# Patient Record
Sex: FEMALE | Race: WHITE | NOT HISPANIC OR LATINO | Employment: UNEMPLOYED
[De-identification: names, ages, dates, MRNs, and addresses within clinical notes are randomized per-mention and may not be internally consistent; named-entity substitution may affect disease eponyms.]

---

## 2017-09-24 ENCOUNTER — HEALTH MAINTENANCE LETTER (OUTPATIENT)
Age: 32
End: 2017-09-24

## 2021-03-03 ENCOUNTER — TRANSFERRED RECORDS (OUTPATIENT)
Dept: HEALTH INFORMATION MANAGEMENT | Facility: CLINIC | Age: 36
End: 2021-03-03

## 2021-03-18 ENCOUNTER — TRANSFERRED RECORDS (OUTPATIENT)
Dept: HEALTH INFORMATION MANAGEMENT | Facility: CLINIC | Age: 36
End: 2021-03-18

## 2021-03-19 ENCOUNTER — TRANSFERRED RECORDS (OUTPATIENT)
Dept: HEALTH INFORMATION MANAGEMENT | Facility: CLINIC | Age: 36
End: 2021-03-19

## 2021-03-25 ENCOUNTER — TRANSFERRED RECORDS (OUTPATIENT)
Dept: HEALTH INFORMATION MANAGEMENT | Facility: CLINIC | Age: 36
End: 2021-03-25

## 2021-04-03 ENCOUNTER — HOSPITAL ENCOUNTER (EMERGENCY)
Facility: CLINIC | Age: 36
Discharge: HOME | End: 2021-04-03
Attending: EMERGENCY MEDICINE | Admitting: EMERGENCY MEDICINE
Payer: COMMERCIAL

## 2021-04-03 VITALS
SYSTOLIC BLOOD PRESSURE: 118 MMHG | OXYGEN SATURATION: 100 % | RESPIRATION RATE: 16 BRPM | HEART RATE: 77 BPM | TEMPERATURE: 98 F | DIASTOLIC BLOOD PRESSURE: 75 MMHG

## 2021-04-03 DIAGNOSIS — L53.9 ERYTHEMATOUS CONDITION: ICD-10-CM

## 2021-04-03 LAB
BASOPHILS # BLD AUTO: 0 10E9/L (ref 0–0.2)
BASOPHILS NFR BLD AUTO: 0.1 %
CRP SERPL-MCNC: <2.9 MG/L (ref 0–8)
DIFFERENTIAL METHOD BLD: ABNORMAL
EOSINOPHIL # BLD AUTO: 0 10E9/L (ref 0–0.7)
EOSINOPHIL NFR BLD AUTO: 0 %
ERYTHROCYTE [DISTWIDTH] IN BLOOD BY AUTOMATED COUNT: 13 % (ref 10–15)
HCT VFR BLD AUTO: 37.7 % (ref 35–47)
HGB BLD-MCNC: 12.6 G/DL (ref 11.7–15.7)
IMM GRANULOCYTES # BLD: 0.1 10E9/L (ref 0–0.4)
IMM GRANULOCYTES NFR BLD: 0.9 %
LYMPHOCYTES # BLD AUTO: 0.4 10E9/L (ref 0.8–5.3)
LYMPHOCYTES NFR BLD AUTO: 4.9 %
MCH RBC QN AUTO: 30.7 PG (ref 26.5–33)
MCHC RBC AUTO-ENTMCNC: 33.4 G/DL (ref 31.5–36.5)
MCV RBC AUTO: 92 FL (ref 78–100)
MONOCYTES # BLD AUTO: 0.4 10E9/L (ref 0–1.3)
MONOCYTES NFR BLD AUTO: 4.9 %
NEUTROPHILS # BLD AUTO: 7 10E9/L (ref 1.6–8.3)
NEUTROPHILS NFR BLD AUTO: 89.2 %
NRBC # BLD AUTO: 0 10*3/UL
NRBC BLD AUTO-RTO: 0 /100
PLATELET # BLD AUTO: 249 10E9/L (ref 150–450)
RBC # BLD AUTO: 4.11 10E12/L (ref 3.8–5.2)
WBC # BLD AUTO: 7.8 10E9/L (ref 4–11)

## 2021-04-03 PROCEDURE — 96365 THER/PROPH/DIAG IV INF INIT: CPT

## 2021-04-03 PROCEDURE — 85025 COMPLETE CBC W/AUTO DIFF WBC: CPT | Performed by: EMERGENCY MEDICINE

## 2021-04-03 PROCEDURE — 96376 TX/PRO/DX INJ SAME DRUG ADON: CPT

## 2021-04-03 PROCEDURE — 86140 C-REACTIVE PROTEIN: CPT | Performed by: EMERGENCY MEDICINE

## 2021-04-03 PROCEDURE — 96375 TX/PRO/DX INJ NEW DRUG ADDON: CPT

## 2021-04-03 PROCEDURE — 250N000011 HC RX IP 250 OP 636: Performed by: EMERGENCY MEDICINE

## 2021-04-03 PROCEDURE — 99285 EMERGENCY DEPT VISIT HI MDM: CPT | Mod: 25

## 2021-04-03 RX ORDER — ONDANSETRON 4 MG/1
4 TABLET, ORALLY DISINTEGRATING ORAL EVERY 4 HOURS PRN
Qty: 10 TABLET | Refills: 0 | Status: SHIPPED | OUTPATIENT
Start: 2021-04-03 | End: 2021-04-06

## 2021-04-03 RX ORDER — ONDANSETRON 2 MG/ML
8 INJECTION INTRAMUSCULAR; INTRAVENOUS ONCE
Status: COMPLETED | OUTPATIENT
Start: 2021-04-03 | End: 2021-04-03

## 2021-04-03 RX ORDER — HYDROMORPHONE HYDROCHLORIDE 2 MG/1
2-4 TABLET ORAL EVERY 4 HOURS PRN
Qty: 12 TABLET | Refills: 0 | Status: SHIPPED | OUTPATIENT
Start: 2021-04-03 | End: 2021-04-06

## 2021-04-03 RX ORDER — ONDANSETRON 2 MG/ML
4 INJECTION INTRAMUSCULAR; INTRAVENOUS ONCE
Status: COMPLETED | OUTPATIENT
Start: 2021-04-03 | End: 2021-04-03

## 2021-04-03 RX ORDER — MAGNESIUM SULFATE HEPTAHYDRATE 40 MG/ML
2 INJECTION, SOLUTION INTRAVENOUS ONCE
Status: COMPLETED | OUTPATIENT
Start: 2021-04-03 | End: 2021-04-03

## 2021-04-03 RX ORDER — HYDROMORPHONE HYDROCHLORIDE 1 MG/ML
0.5 INJECTION, SOLUTION INTRAMUSCULAR; INTRAVENOUS; SUBCUTANEOUS
Status: DISCONTINUED | OUTPATIENT
Start: 2021-04-03 | End: 2021-04-04 | Stop reason: HOSPADM

## 2021-04-03 RX ADMIN — ONDANSETRON 8 MG: 2 INJECTION INTRAMUSCULAR; INTRAVENOUS at 21:45

## 2021-04-03 RX ADMIN — MAGNESIUM SULFATE HEPTAHYDRATE 2 G: 40 INJECTION, SOLUTION INTRAVENOUS at 21:15

## 2021-04-03 RX ADMIN — ONDANSETRON 4 MG: 2 INJECTION INTRAMUSCULAR; INTRAVENOUS at 21:14

## 2021-04-03 RX ADMIN — HYDROMORPHONE HYDROCHLORIDE 0.5 MG: 1 INJECTION, SOLUTION INTRAMUSCULAR; INTRAVENOUS; SUBCUTANEOUS at 21:09

## 2021-04-03 ASSESSMENT — ENCOUNTER SYMPTOMS
JOINT SWELLING: 1
CHILLS: 0
WOUND: 1
ARTHRALGIAS: 1
FEVER: 0
SHORTNESS OF BREATH: 0

## 2021-04-04 NOTE — ED NOTES
Bed: ED12  Expected date:   Expected time:   Means of arrival:   Comments:  441   35F LLE edema/Pain  2015

## 2021-04-04 NOTE — ED PROVIDER NOTES
History   Chief Complaint:  Leg Pain    HPI   Ronna Rivera is a 35 year old female, who presents to the ED for evaluation of leg pain. The patient reports she was recently diagnosed with erythromelalgia. The patient conveys she has gone through numerous screenings and tests including nerve studies, sweat tests, EMG, as well as lab tests below. The patient says she has gone through IV magnesium infusions and recently finished a 5 day course of IV Solu-Medrol 1 g infusions the past week to see if this would help the symptoms associated with this disease pattern. She notes she is going to [facility] for these infusions and is begin followed by them as well as the [facility]. She has been prescribed gabapentin, Norco, and Mexiletine, to manage her symptoms at home, but today felt that it was not helping. She was normally taking the pain medication sparingly, but has been taking it every 6 hours and still have an extreme amount of pain. She notes that she has swelling, redness, and warmth to her face, bilateral hands, and bilateral feet. She conveys that her right foot is the worse with the other aforementioned sites being alright. The patient conveys her right foot has been extremely swollen and notes that the skin is stretching causing some breakdown on the top of the foot. She denies any fever, shaking chills, or diaphoresis. The patient conveys she would like something to cover the pain so she can follow up with [employer] [facility] to see what the next possible treatment could be for her condition. For her symptoms, she has been practically immobile for the past two weeks and needs to elevate her legs. She uses cold packs as well as compression stockings, but has minimal relief. She does not feel that there is an infection as the redness and warmth around the foot are consistent with the disease. She has no shortness of breath or chest pain. Her legs are not swollen.     Laboratory-3/19/21  CBC: WBC  9.6, HGB 14.1,   BMP: ALT 46, WNL (Creatinine 0.87)  Immunoglobulin studies  Monoclonal Gammopathy Diagnostic  Tryptase  Serum Protein Studies  Alpha-Galactosidase    Review of Systems   Constitutional: Negative for chills and fever.   Respiratory: Negative for shortness of breath.    Cardiovascular: Negative for chest pain and leg swelling.   Musculoskeletal: Positive for arthralgias, gait problem and joint swelling.        Right foot swelling   Skin: Positive for wound.     Allergies:  No known drug allergies    Medications:    Mexiletine  Gabapentin  Aspirin  Norco  Albuterol    Past Medical History:    Erythromelalgia  Migraine    Past Surgical History:    The patient denies past surgical history.     Family History:    Hypertension    Social History:  Presents to the ED with spouse.  Arrives via EMS.    Physical Exam     Patient Vitals for the past 24 hrs:   BP Temp Temp src Pulse Resp SpO2   04/03/21 2230 118/75 -- -- 77 -- 100 %   04/03/21 2200 101/68 -- -- 62 -- 99 %   04/03/21 2145 108/64 -- -- 65 -- 98 %   04/03/21 2130 99/58 -- -- 56 -- 97 %   04/03/21 2100 101/49 -- -- 70 -- 99 %   04/03/21 2045 113/66 -- -- -- -- 100 %   04/03/21 2038 -- 98  F (36.7  C) Oral -- -- --   04/03/21 2030 115/73 -- -- 93 16 91 %       Physical Exam  Constitutional: Middle age white female, supine with both legs elevated on wedge pillow.  HENT: No signs of trauma.   Eyes: EOM are normal. Pupils are equal, round, and reactive to light.   Neck: Normal range of motion. No JVD present. No cervical adenopathy.  Cardiovascular: Regular rhythm.  Exam reveals no gallop and no friction rub.    No murmur heard.  Pulmonary/Chest: Bilateral breath sounds normal. No wheezes, rhonchi or rales.  Abdominal: Soft. No tenderness. No rebound or guarding.   Musculoskeletal: Compression gloves on both hands. Let foot, minimal swelling, edema, and diffuse tenderness. Right foot has 1+ edema, red, and warm. Superficial breakdown of skin over  dorsum of foot and ankle and over lateral malleolus. No ascending lymphangitic streaks.   Lymphadenopathy: No lymphadenopathy.   Neurological: Alert and oriented to person, place, and time. Normal strength. Coordination normal.   Skin: Skin is warm and dry. No rash noted. No erythema.     Emergency Department Course   Laboratory:  CBC: WBC 7.8, HGB 12.6,     CRP Inflammation: <2.9    Emergency Department Course:    Reviewed:  I reviewed the patient's nursing notes, vitals, past available medical records.     Assessments:  2028: I obtained history and examined the patient as noted above.     2035: I rechecked the patient and explained findings. The patient and spouse are amendable to the plan.     Interventions:  2109: Dilaudid 0.5 mg IV  2115: Magnesium sulfate 2 g in NS IV Infusion    2115: Zofran 4 mg IV  2145: Zofran 8 mg IV    Disposition:  The patient was discharged to home.    Impression & Plan    Medical Decision Making:  Ronna Rivera is an unfortunate 35 year old female with a recent diagnosis of erythromelalgia. She has been worked up over the last several weeks with multiple tests done. She just finished a 5 day course of IV Solu-Medrol at the neurology clinic and is also on gabapentin, particularly at bedtime, and Vicodin. She has been on a course of antibiotics, naproxen, and antifungals in the past. Reading the notes from the [facility], it seems that this is still being worked up in the treatment course and process. The patient is here tonight as she is having so much pain, particularly in the right foot. The foot is red, there is some superficial skin breakdown, and there is some edema. She has her feet chronically elevated and she is taking her medications without relief. A CBC and CRP were obtained and are normal. I have given her IV magnesium, since that had been tried once at [facility] and they considered trying it again, as well as Dilaudid which has helped. I have offered her a short course of  Dilaudid to use instead of the Vicodin. I have told her to not use this with the Vicodin, but to use this instead. I have also given her Zofran for nausea. Patient will need to contact the [facility] for follow up. We did consider treating her with antibiotics for infection, but both her and her  thought this was the normal look to her foot and that antibiotics have been tried before without relief. She has been on steroids as noted and several things have been tried without success. She does not appear septic.     Diagnosis:    ICD-10-CM    1. Erythematous condition  L53.9        Discharge Medications:  Discharge Medication List as of 4/3/2021 10:34 PM      START taking these medications    Details   HYDROmorphone (DILAUDID) 2 MG tablet Take 1-2 tablets (2-4 mg) by mouth every 4 hours as needed for pain, Disp-12 tablet, R-0, Local Print      ondansetron (ZOFRAN ODT) 4 MG ODT tab Take 1 tablet (4 mg) by mouth every 4 hours as needed for nausea, Disp-10 tablet, R-0, Local Print           Scribe Disclosure:  I, Aaron Perdue, am serving as a scribe at 8:28 PM on 4/3/2021 to document services personally performed by Nate Pascual MD based on my observations and the provider's statements to me.      Nate Pascual MD  04/04/21 0133

## 2021-04-04 NOTE — ED TRIAGE NOTES
Recent dx of erythromelalga at [facility]. Pain getting worse despite norco/gabapentin and solumedrol infusions.

## 2021-04-09 ENCOUNTER — HOSPITAL ENCOUNTER (INPATIENT)
Facility: CLINIC | Age: 36
LOS: 1 days | Discharge: HOME HEALTH | End: 2021-04-13
Attending: EMERGENCY MEDICINE | Admitting: INTERNAL MEDICINE
Payer: COMMERCIAL

## 2021-04-09 DIAGNOSIS — R11.0 NAUSEA: ICD-10-CM

## 2021-04-09 DIAGNOSIS — G89.4 CHRONIC PAIN SYNDROME: ICD-10-CM

## 2021-04-09 DIAGNOSIS — I73.81 ERYTHROMELALGIA (H): Primary | ICD-10-CM

## 2021-04-09 DIAGNOSIS — G89.29 OTHER CHRONIC PAIN: ICD-10-CM

## 2021-04-09 DIAGNOSIS — Z29.9 PREVENTIVE MEASURE: ICD-10-CM

## 2021-04-09 DIAGNOSIS — R21 RASH AND NONSPECIFIC SKIN ERUPTION: ICD-10-CM

## 2021-04-09 LAB
ALBUMIN SERPL-MCNC: 3.7 G/DL (ref 3.4–5)
ALP SERPL-CCNC: 73 U/L (ref 40–150)
ALT SERPL W P-5'-P-CCNC: 121 U/L (ref 0–50)
ANION GAP SERPL CALCULATED.3IONS-SCNC: 3 MMOL/L (ref 3–14)
AST SERPL W P-5'-P-CCNC: 67 U/L (ref 0–45)
BASOPHILS # BLD AUTO: 0 10E9/L (ref 0–0.2)
BASOPHILS NFR BLD AUTO: 0.3 %
BILIRUB SERPL-MCNC: 0.4 MG/DL (ref 0.2–1.3)
BUN SERPL-MCNC: 14 MG/DL (ref 7–30)
CALCIUM SERPL-MCNC: 8.7 MG/DL (ref 8.5–10.1)
CHLORIDE SERPL-SCNC: 107 MMOL/L (ref 94–109)
CO2 SERPL-SCNC: 29 MMOL/L (ref 20–32)
CREAT SERPL-MCNC: 0.81 MG/DL (ref 0.52–1.04)
CRP SERPL-MCNC: <2.9 MG/L (ref 0–8)
DIFFERENTIAL METHOD BLD: NORMAL
EOSINOPHIL # BLD AUTO: 0 10E9/L (ref 0–0.7)
EOSINOPHIL NFR BLD AUTO: 0.4 %
ERYTHROCYTE [DISTWIDTH] IN BLOOD BY AUTOMATED COUNT: 12.9 % (ref 10–15)
ERYTHROCYTE [SEDIMENTATION RATE] IN BLOOD BY WESTERGREN METHOD: 6 MM/H (ref 0–20)
GFR SERPL CREATININE-BSD FRML MDRD: >90 ML/MIN/{1.73_M2}
GLUCOSE SERPL-MCNC: 118 MG/DL (ref 70–99)
HCT VFR BLD AUTO: 39.9 % (ref 35–47)
HGB BLD-MCNC: 13.2 G/DL (ref 11.7–15.7)
IMM GRANULOCYTES # BLD: 0.1 10E9/L (ref 0–0.4)
IMM GRANULOCYTES NFR BLD: 0.9 %
LABORATORY COMMENT REPORT: NORMAL
LACTATE BLD-SCNC: 0.8 MMOL/L (ref 0.7–2)
LYMPHOCYTES # BLD AUTO: 1 10E9/L (ref 0.8–5.3)
LYMPHOCYTES NFR BLD AUTO: 12.7 %
MCH RBC QN AUTO: 31.1 PG (ref 26.5–33)
MCHC RBC AUTO-ENTMCNC: 33.1 G/DL (ref 31.5–36.5)
MCV RBC AUTO: 94 FL (ref 78–100)
MONOCYTES # BLD AUTO: 0.9 10E9/L (ref 0–1.3)
MONOCYTES NFR BLD AUTO: 11.2 %
NEUTROPHILS # BLD AUTO: 5.9 10E9/L (ref 1.6–8.3)
NEUTROPHILS NFR BLD AUTO: 74.5 %
NRBC # BLD AUTO: 0 10*3/UL
NRBC BLD AUTO-RTO: 0 /100
PLATELET # BLD AUTO: 257 10E9/L (ref 150–450)
POTASSIUM SERPL-SCNC: 3.8 MMOL/L (ref 3.4–5.3)
PROT SERPL-MCNC: 6.7 G/DL (ref 6.8–8.8)
RBC # BLD AUTO: 4.25 10E12/L (ref 3.8–5.2)
SARS-COV-2 RNA RESP QL NAA+PROBE: NEGATIVE
SODIUM SERPL-SCNC: 139 MMOL/L (ref 133–144)
SPECIMEN SOURCE: NORMAL
WBC # BLD AUTO: 7.9 10E9/L (ref 4–11)

## 2021-04-09 PROCEDURE — 250N000011 HC RX IP 250 OP 636: Performed by: EMERGENCY MEDICINE

## 2021-04-09 PROCEDURE — 80053 COMPREHEN METABOLIC PANEL: CPT | Performed by: EMERGENCY MEDICINE

## 2021-04-09 PROCEDURE — 250N000013 HC RX MED GY IP 250 OP 250 PS 637: Performed by: INTERNAL MEDICINE

## 2021-04-09 PROCEDURE — 86140 C-REACTIVE PROTEIN: CPT | Performed by: EMERGENCY MEDICINE

## 2021-04-09 PROCEDURE — 83605 ASSAY OF LACTIC ACID: CPT | Performed by: EMERGENCY MEDICINE

## 2021-04-09 PROCEDURE — G0378 HOSPITAL OBSERVATION PER HR: HCPCS

## 2021-04-09 PROCEDURE — 96375 TX/PRO/DX INJ NEW DRUG ADDON: CPT

## 2021-04-09 PROCEDURE — 96374 THER/PROPH/DIAG INJ IV PUSH: CPT

## 2021-04-09 PROCEDURE — 85652 RBC SED RATE AUTOMATED: CPT | Performed by: EMERGENCY MEDICINE

## 2021-04-09 PROCEDURE — 96376 TX/PRO/DX INJ SAME DRUG ADON: CPT

## 2021-04-09 PROCEDURE — 99285 EMERGENCY DEPT VISIT HI MDM: CPT | Mod: 25

## 2021-04-09 PROCEDURE — 87635 SARS-COV-2 COVID-19 AMP PRB: CPT | Performed by: EMERGENCY MEDICINE

## 2021-04-09 PROCEDURE — 250N000011 HC RX IP 250 OP 636: Performed by: INTERNAL MEDICINE

## 2021-04-09 PROCEDURE — 85025 COMPLETE CBC W/AUTO DIFF WBC: CPT | Performed by: EMERGENCY MEDICINE

## 2021-04-09 PROCEDURE — 99220 PR INITIAL OBSERVATION CARE,LEVEL III: CPT | Performed by: INTERNAL MEDICINE

## 2021-04-09 RX ORDER — AMOXICILLIN 250 MG
2 CAPSULE ORAL DAILY
Status: DISCONTINUED | OUTPATIENT
Start: 2021-04-09 | End: 2021-04-13 | Stop reason: HOSPADM

## 2021-04-09 RX ORDER — GABAPENTIN 300 MG/1
600-900 CAPSULE ORAL AT BEDTIME
Status: DISCONTINUED | OUTPATIENT
Start: 2021-04-09 | End: 2021-04-09

## 2021-04-09 RX ORDER — POLYETHYLENE GLYCOL 3350 17 G/17G
17 POWDER, FOR SOLUTION ORAL 2 TIMES DAILY PRN
Status: DISCONTINUED | OUTPATIENT
Start: 2021-04-09 | End: 2021-04-13 | Stop reason: HOSPADM

## 2021-04-09 RX ORDER — MEXILETINE HYDROCHLORIDE 150 MG/1
150 CAPSULE ORAL 3 TIMES DAILY
COMMUNITY
End: 2021-05-28

## 2021-04-09 RX ORDER — GABAPENTIN 300 MG/1
600-900 CAPSULE ORAL AT BEDTIME
Status: ON HOLD | COMMUNITY
End: 2021-04-13

## 2021-04-09 RX ORDER — LIDOCAINE 4 G/G
2 PATCH TOPICAL
Status: DISCONTINUED | OUTPATIENT
Start: 2021-04-09 | End: 2021-04-09

## 2021-04-09 RX ORDER — ONDANSETRON 2 MG/ML
4 INJECTION INTRAMUSCULAR; INTRAVENOUS EVERY 6 HOURS PRN
Status: DISCONTINUED | OUTPATIENT
Start: 2021-04-09 | End: 2021-04-13 | Stop reason: HOSPADM

## 2021-04-09 RX ORDER — HYDROCODONE BITARTRATE AND ACETAMINOPHEN 5; 325 MG/1; MG/1
2 TABLET ORAL EVERY 4 HOURS PRN
Status: DISCONTINUED | OUTPATIENT
Start: 2021-04-09 | End: 2021-04-12

## 2021-04-09 RX ORDER — MORPHINE SULFATE 2 MG/ML
2 INJECTION, SOLUTION INTRAMUSCULAR; INTRAVENOUS
Status: DISCONTINUED | OUTPATIENT
Start: 2021-04-09 | End: 2021-04-13 | Stop reason: HOSPADM

## 2021-04-09 RX ORDER — HYDROMORPHONE HYDROCHLORIDE 2 MG/1
2-4 TABLET ORAL EVERY 4 HOURS PRN
Status: ON HOLD | COMMUNITY
End: 2021-04-13

## 2021-04-09 RX ORDER — ACETAMINOPHEN 325 MG/1
650 TABLET ORAL EVERY 4 HOURS PRN
Status: DISCONTINUED | OUTPATIENT
Start: 2021-04-09 | End: 2021-04-13 | Stop reason: HOSPADM

## 2021-04-09 RX ORDER — MORPHINE SULFATE 4 MG/ML
4 INJECTION, SOLUTION INTRAMUSCULAR; INTRAVENOUS
Status: COMPLETED | OUTPATIENT
Start: 2021-04-09 | End: 2021-04-09

## 2021-04-09 RX ORDER — OXYCODONE HCL 10 MG/1
10 TABLET, FILM COATED, EXTENDED RELEASE ORAL ONCE
Status: COMPLETED | OUTPATIENT
Start: 2021-04-09 | End: 2021-04-09

## 2021-04-09 RX ORDER — LORAZEPAM 2 MG/ML
1 INJECTION INTRAMUSCULAR ONCE
Status: COMPLETED | OUTPATIENT
Start: 2021-04-09 | End: 2021-04-09

## 2021-04-09 RX ORDER — HYDROCODONE BITARTRATE AND ACETAMINOPHEN 5; 325 MG/1; MG/1
1-2 TABLET ORAL EVERY 6 HOURS PRN
Status: ON HOLD | COMMUNITY
End: 2021-04-13

## 2021-04-09 RX ORDER — ONDANSETRON 2 MG/ML
4 INJECTION INTRAMUSCULAR; INTRAVENOUS EVERY 30 MIN PRN
Status: DISCONTINUED | OUTPATIENT
Start: 2021-04-09 | End: 2021-04-09

## 2021-04-09 RX ORDER — LORAZEPAM 0.5 MG/1
0.5 TABLET ORAL EVERY 4 HOURS PRN
Status: DISCONTINUED | OUTPATIENT
Start: 2021-04-09 | End: 2021-04-13 | Stop reason: HOSPADM

## 2021-04-09 RX ORDER — ONDANSETRON 4 MG/1
4 TABLET, ORALLY DISINTEGRATING ORAL EVERY 6 HOURS PRN
COMMUNITY
End: 2021-06-03

## 2021-04-09 RX ORDER — MEXILETINE HYDROCHLORIDE 150 MG/1
150 CAPSULE ORAL 3 TIMES DAILY
Status: DISCONTINUED | OUTPATIENT
Start: 2021-04-09 | End: 2021-04-13 | Stop reason: HOSPADM

## 2021-04-09 RX ORDER — ACETAMINOPHEN 650 MG/1
650 SUPPOSITORY RECTAL EVERY 4 HOURS PRN
Status: DISCONTINUED | OUTPATIENT
Start: 2021-04-09 | End: 2021-04-13 | Stop reason: HOSPADM

## 2021-04-09 RX ORDER — GABAPENTIN 300 MG/1
600 CAPSULE ORAL 3 TIMES DAILY
Status: DISCONTINUED | OUTPATIENT
Start: 2021-04-09 | End: 2021-04-13 | Stop reason: HOSPADM

## 2021-04-09 RX ADMIN — HYDROCODONE BITARTRATE AND ACETAMINOPHEN 2 TABLET: 5; 325 TABLET ORAL at 21:39

## 2021-04-09 RX ADMIN — SENNOSIDES AND DOCUSATE SODIUM 2 TABLET: 8.6; 5 TABLET ORAL at 21:38

## 2021-04-09 RX ADMIN — MORPHINE SULFATE 2 MG: 2 INJECTION, SOLUTION INTRAMUSCULAR; INTRAVENOUS at 22:16

## 2021-04-09 RX ADMIN — ONDANSETRON 4 MG: 2 INJECTION INTRAMUSCULAR; INTRAVENOUS at 17:32

## 2021-04-09 RX ADMIN — MEXILETINE HYDROCHLORIDE 150 MG: 150 CAPSULE ORAL at 22:15

## 2021-04-09 RX ADMIN — GABAPENTIN 600 MG: 300 CAPSULE ORAL at 21:39

## 2021-04-09 RX ADMIN — LORAZEPAM 0.5 MG: 0.5 TABLET ORAL at 21:39

## 2021-04-09 RX ADMIN — LORAZEPAM 1 MG: 2 INJECTION INTRAMUSCULAR; INTRAVENOUS at 17:32

## 2021-04-09 RX ADMIN — MORPHINE SULFATE 4 MG: 4 INJECTION, SOLUTION INTRAMUSCULAR; INTRAVENOUS at 17:33

## 2021-04-09 RX ADMIN — OXYCODONE HYDROCHLORIDE 10 MG: 10 TABLET, FILM COATED, EXTENDED RELEASE ORAL at 20:09

## 2021-04-09 ASSESSMENT — ENCOUNTER SYMPTOMS
ABDOMINAL PAIN: 0
WOUND: 1
CHILLS: 0
DIAPHORESIS: 0
FEVER: 0

## 2021-04-09 ASSESSMENT — MIFFLIN-ST. JEOR: SCORE: 1147.21

## 2021-04-09 NOTE — ED TRIAGE NOTES
Pt has Erythematous condition. She has chronic sores and pain to BLE (feet). She was seen at [facility] today for testing and they referred her to be seen in ED to get admitted for pain control. Patient also reports SI today due to her medica condition and feeing hopeless. She wrapped a cell phone  around her neck in a suicidal attempt.  saw and states that the cord was not on for long due to him stopping her.

## 2021-04-09 NOTE — PHARMACY-ADMISSION MEDICATION HISTORY
Pharmacy Medication History  Admission medication history interview status for the 4/9/2021  admission is complete. See EPIC admission navigator for prior to admission medications     Location of Interview: Patient room  Medication history sources: Patient, Patient's family/friend (Spouse), Surescripts and Care Everywhere    Significant changes made to the medication list:  All medications added to list and confirmed with RX bottles brought in.     In the past week, patient estimated taking medication this percent of the time: greater than 90%    Additional medication history information:   Recently completed Mg infusions and 5 days of methylprednisolone.     Medication reconciliation completed by provider prior to medication history? No    Time spent in this activity: 15 minutes    Prior to Admission medications    Medication Sig Last Dose Taking? Auth Provider   gabapentin (NEURONTIN) 300 MG capsule Take 600-900 mg by mouth At Bedtime 4/9/2021 at PM Yes Unknown, Entered By History   HYDROcodone-acetaminophen (NORCO) 5-325 MG tablet Take 1-2 tablets by mouth every 6 hours as needed for severe pain 4/9/2021 at AM Yes Unknown, Entered By History   HYDROmorphone (DILAUDID) 2 MG tablet Take 2-4 mg by mouth every 4 hours as needed for severe pain 4/9/2021 at 0211 Yes Unknown, Entered By History   melatonin 1 MG TABS tablet Take 1-2 mg by mouth nightly as needed for sleep 4/8/2021 at PM Yes Unknown, Entered By History   mexiletine (MEXITIL) 150 MG capsule Take 150 mg by mouth 3 times daily 4/9/2021 at 1300 Yes Unknown, Entered By History   NONFORMULARY Apply topically 3 times daily Ketamine 0.5/Amit2% in Lidoderm: apply to the feet 4/9/2021 at AM Yes Unknown, Entered By History   ondansetron (ZOFRAN-ODT) 4 MG ODT tab Take 4 mg by mouth every 6 hours as needed for nausea 4/9/2021 at 0211 Yes Unknown, Entered By History       The information provided in this note is only as accurate as the sources available at the time of  update(s)

## 2021-04-09 NOTE — ED PROVIDER NOTES
History   Chief Complaint:  Suicidal Ideation and Pain Control    HPI   Ronna Rivera is a 35 year old female, with history of erythromelalgia and his currently being followed at the [facility]. She has gone through several treatment regiments and has not had relief in her symptoms. She recently was seen at [facility] yesterday and had a study performed, but did not have a treatment plan set up afterwards as they needed to analyze the data. Apparently, during the study, per spouse and patient report, they had to cease it early as she was in excruciating pain and could not tolerate it. She was sent home and while in her hotel, the pain became worse. She notes she has been feeling more down and depressed with the pain, and today it became so bad that she wrapped a phone cord around her neck and her spouse found it shortly after she placed it around her neck and removed it. The spouse then contacted the provider and was instructed to bring the patient into the emergency department for pain control, therefore, they present today. The patient says she had no SI prior to the condition and given how much constant pain she is in, she only started having suicidal thought over the past couple months. She adamantly states that when her pain is controlled she does not feel suicidal, she reports she is fed up with the pain and wants it to stop. She conveys she has not been able to sleep for the past three days and when she normally tries to ice and elevate her feet, it will not fully take away her symptoms. It is a tingling, burning, lightening type feeling in her bilateral feet. She even notes that her skin has become stretched so much that she now has open wounds on her right foot, which were not present during the last emergency department visit. The patient denies any fever, chills, diaphoresis, chest pain, cardinal pain, or any other acute symptoms.  who presents to the ED for evaluation of suicidal ideation and pain  "control.    Review of Systems   Constitutional: Negative for chills, diaphoresis and fever.   Cardiovascular: Negative for chest pain.   Gastrointestinal: Negative for abdominal pain.   Skin: Positive for wound.   Psychiatric/Behavioral: Positive for suicidal ideas.   All other systems reviewed and are negative.    Allergies:  No known drug allergies     Medications:    Mexiletine  Gabapentin  Aspirin  Norco  Albuterol     Past Medical History:    Erythromelalgia  Migraine     Past Surgical History:    The patient denies past surgical history.      Family History:    Hypertension    Social History:  Smoking status: No  Alcohol use: No  Drug use: No  PCP: Rosie Roper  Presents to the ED with spouse    Physical Exam     Patient Vitals for the past 24 hrs:   BP Temp Temp src Pulse Resp SpO2 Height Weight   04/09/21 1832 -- -- -- -- -- 100 % -- --   04/09/21 1801 -- -- -- -- -- 100 % -- --   04/09/21 1800 -- -- -- -- -- 100 % -- --   04/09/21 1643 114/65 97.4  F (36.3  C) Temporal 97 18 99 % 1.575 m (5' 2\") 49.9 kg (110 lb)       Physical Exam  Vitals: reviewed by me  General: Pt seen on [facility], pleasant, cooperative, and alert to conversation, somewhat anxious appearing.  Eyes: Tracking well, clear conjunctiva BL  ENT: MMM, midline trachea.   Lungs: No tachypnea, no accessory muscle use. No respiratory distress.   CV: Rate as above, regular rhythm.    Abd: Soft, non tender, no guarding, no rebound. Non distended  MSK: no peripheral edema or joint effusion.  No evidence of trauma.  Bilateral lower extremities distal to the ankles have superficial and subacute appearing sores throughout, significant erythema noted, skin looks taut, dry, irritated.  Skin is cool to touch, but she has palpable dorsalis pedis pulses bilaterally.  Significant tenderness noted throughout.  No evidence of secondary infection.  Skin: No rash, normal turgor and temperature  Neuro: Clear speech and no facial droop.  Psych: Not " RIS, no e/o AH/      Emergency Department Course   Laboratory:  CBC: WBC 7.9, HGB 13.2,     CMP:  (H), Protein Total 6.7 (L),  (H), AST 67 (H), o/w WNL (Creatinine 0.81)    Lactic Acid (Resulted 1739): 0.8    CRP Inflammation: <2.9    Erythrocyte Sedimentation Rate Auto: 6    Emergency Department Course:    Reviewed:  I reviewed the patient's nursing notes, vitals, past available medical records.     Assessments:  1715: I obtained history and examined the patient as noted above.    1825: I rechecked the patient and explained findings. The patient has some improvement in her symptoms after the initial interventions. Amendable to plan.    Consults:   1820: I spoke to Dr. Monreal on-call for the hospitalist service. Informed to contact the [facility] prior to admission at [facility].     1840: I discussed the patient with the admitting hospitalist, Dr. Monreal    Interventions:  1732: Ativan 1 mg IV  1732: Zofran 4 mg IV  1733: Morphine 4 mg IV    Disposition:  The patient was admitted to the hospital under the care of Dr. Monreal.    Impression & Plan    Medical Decision Making:  Ronna Rivera is a very pleasant 35 year old female who presents to the emergency department for evaluation of bilateral lower extremity skin eruptions, and significant neuropathic pain. Pain control is the main reason she will be admitted here, although I will state that she feels much improved after one dose of pain medication and Ativan here. She does have significant skin eruption and does appear to be in a significant amount of genuine pain. I do not think that her pain can be adequately controlled in the outpatient setting. We tried to send her to [facility] based on specialty care including dermatology and rheumatology, however, they do not have any [facility] beds. Therefore, she will be admitted here, [facility], to Dr. Monreal who has gladly accepted care of the patient. Regarding her suicidality, she denies  suicidality, simply tells me that she was at her wits end when dealing with her pain, and after the intense test at [facility] today did not reveal a diagnosis, she felt hopeless. She denies feeling hopeless right now and her main issue is her pain control.     Covid-19  Ronna Rivera was evaluated during a global COVID-19 pandemic, which necessitated consideration that the patient might be at risk for infection with the SARS-CoV-2 virus that causes COVID-19.   Applicable protocols for evaluation were followed during the patient's care.   COVID-19 was considered as part of the patient's evaluation. The plan for testing is:  a test was obtained during this visit.    Diagnosis:    ICD-10-CM    1. Other chronic pain  G89.29    2. Rash and nonspecific skin eruption  R21        Scribe Disclosure:  I, Aaron Perdue, am serving as a scribe at 5:15 PM on 4/9/2021 to document services personally performed by Neal Daly MD based on my observations and the provider's statements to me.      Neal Daly MD  04/09/21 2224

## 2021-04-10 PROCEDURE — 250N000011 HC RX IP 250 OP 636: Performed by: INTERNAL MEDICINE

## 2021-04-10 PROCEDURE — 250N000013 HC RX MED GY IP 250 OP 250 PS 637: Performed by: INTERNAL MEDICINE

## 2021-04-10 PROCEDURE — G0378 HOSPITAL OBSERVATION PER HR: HCPCS

## 2021-04-10 PROCEDURE — 96376 TX/PRO/DX INJ SAME DRUG ADON: CPT

## 2021-04-10 PROCEDURE — 96375 TX/PRO/DX INJ NEW DRUG ADDON: CPT

## 2021-04-10 PROCEDURE — 99226 PR SUBSEQUENT OBSERVATION CARE,LEVEL III: CPT | Performed by: INTERNAL MEDICINE

## 2021-04-10 PROCEDURE — 90791 PSYCH DIAGNOSTIC EVALUATION: CPT

## 2021-04-10 PROCEDURE — 250N000009 HC RX 250: Performed by: INTERNAL MEDICINE

## 2021-04-10 RX ORDER — GINSENG 100 MG
CAPSULE ORAL
Status: DISCONTINUED | OUTPATIENT
Start: 2021-04-10 | End: 2021-04-13 | Stop reason: HOSPADM

## 2021-04-10 RX ORDER — NALOXONE HYDROCHLORIDE 0.4 MG/ML
0.2 INJECTION, SOLUTION INTRAMUSCULAR; INTRAVENOUS; SUBCUTANEOUS
Status: DISCONTINUED | OUTPATIENT
Start: 2021-04-10 | End: 2021-04-13 | Stop reason: HOSPADM

## 2021-04-10 RX ORDER — OXYCODONE HCL 10 MG/1
10 TABLET, FILM COATED, EXTENDED RELEASE ORAL 2 TIMES DAILY
Status: DISCONTINUED | OUTPATIENT
Start: 2021-04-10 | End: 2021-04-13 | Stop reason: HOSPADM

## 2021-04-10 RX ORDER — NALOXONE HYDROCHLORIDE 0.4 MG/ML
0.4 INJECTION, SOLUTION INTRAMUSCULAR; INTRAVENOUS; SUBCUTANEOUS
Status: DISCONTINUED | OUTPATIENT
Start: 2021-04-10 | End: 2021-04-13 | Stop reason: HOSPADM

## 2021-04-10 RX ORDER — PROCHLORPERAZINE 25 MG
25 SUPPOSITORY, RECTAL RECTAL EVERY 12 HOURS PRN
Status: DISCONTINUED | OUTPATIENT
Start: 2021-04-10 | End: 2021-04-13 | Stop reason: HOSPADM

## 2021-04-10 RX ORDER — PROCHLORPERAZINE MALEATE 5 MG
5 TABLET ORAL EVERY 6 HOURS PRN
Status: DISCONTINUED | OUTPATIENT
Start: 2021-04-10 | End: 2021-04-13 | Stop reason: HOSPADM

## 2021-04-10 RX ADMIN — PROCHLORPERAZINE MALEATE 5 MG: 5 TABLET ORAL at 03:42

## 2021-04-10 RX ADMIN — POLYETHYLENE GLYCOL 3350 17 G: 17 POWDER, FOR SOLUTION ORAL at 20:53

## 2021-04-10 RX ADMIN — HYDROCODONE BITARTRATE AND ACETAMINOPHEN 2 TABLET: 5; 325 TABLET ORAL at 15:29

## 2021-04-10 RX ADMIN — MORPHINE SULFATE 2 MG: 2 INJECTION, SOLUTION INTRAMUSCULAR; INTRAVENOUS at 03:51

## 2021-04-10 RX ADMIN — HYDROCODONE BITARTRATE AND ACETAMINOPHEN 2 TABLET: 5; 325 TABLET ORAL at 20:42

## 2021-04-10 RX ADMIN — MORPHINE SULFATE 2 MG: 2 INJECTION, SOLUTION INTRAMUSCULAR; INTRAVENOUS at 06:45

## 2021-04-10 RX ADMIN — GABAPENTIN 600 MG: 300 CAPSULE ORAL at 15:29

## 2021-04-10 RX ADMIN — SENNOSIDES AND DOCUSATE SODIUM 1 TABLET: 8.6; 5 TABLET ORAL at 11:23

## 2021-04-10 RX ADMIN — MEXILETINE HYDROCHLORIDE 150 MG: 150 CAPSULE ORAL at 13:31

## 2021-04-10 RX ADMIN — ONDANSETRON 4 MG: 2 INJECTION INTRAMUSCULAR; INTRAVENOUS at 02:00

## 2021-04-10 RX ADMIN — OXYCODONE HYDROCHLORIDE 10 MG: 10 TABLET, FILM COATED, EXTENDED RELEASE ORAL at 13:28

## 2021-04-10 RX ADMIN — HYDROCODONE BITARTRATE AND ACETAMINOPHEN 2 TABLET: 5; 325 TABLET ORAL at 11:23

## 2021-04-10 RX ADMIN — ONDANSETRON 4 MG: 2 INJECTION INTRAMUSCULAR; INTRAVENOUS at 22:33

## 2021-04-10 RX ADMIN — POLYETHYLENE GLYCOL 3350 17 G: 17 POWDER, FOR SOLUTION ORAL at 11:19

## 2021-04-10 RX ADMIN — MEXILETINE HYDROCHLORIDE 150 MG: 150 CAPSULE ORAL at 11:22

## 2021-04-10 RX ADMIN — MORPHINE SULFATE 2 MG: 2 INJECTION, SOLUTION INTRAMUSCULAR; INTRAVENOUS at 02:37

## 2021-04-10 RX ADMIN — LORAZEPAM 0.5 MG: 0.5 TABLET ORAL at 02:08

## 2021-04-10 RX ADMIN — OXYCODONE HYDROCHLORIDE 10 MG: 10 TABLET, FILM COATED, EXTENDED RELEASE ORAL at 20:42

## 2021-04-10 RX ADMIN — MORPHINE SULFATE 2 MG: 2 INJECTION, SOLUTION INTRAMUSCULAR; INTRAVENOUS at 01:36

## 2021-04-10 RX ADMIN — GABAPENTIN 600 MG: 300 CAPSULE ORAL at 21:36

## 2021-04-10 RX ADMIN — MEXILETINE HYDROCHLORIDE 150 MG: 150 CAPSULE ORAL at 20:42

## 2021-04-10 RX ADMIN — PROCHLORPERAZINE EDISYLATE 5 MG: 5 INJECTION INTRAMUSCULAR; INTRAVENOUS at 23:01

## 2021-04-10 RX ADMIN — GABAPENTIN 600 MG: 300 CAPSULE ORAL at 11:22

## 2021-04-10 RX ADMIN — BACITRACIN: 500 OINTMENT TOPICAL at 15:30

## 2021-04-10 RX ADMIN — LORAZEPAM 0.5 MG: 0.5 TABLET ORAL at 21:39

## 2021-04-10 NOTE — UTILIZATION REVIEW
[facility]   Concurrent stay review; Secondary Review Determination     [facility]    4/9/2021  4:52 PM    Under the authority of the Utilization Management Committee, the utilization review process indicated a secondary review on the above patient.  The review outcome is based on review of the medical records, discussions with staff, and applying clinical experience noted on the date of the review.          (x) Observation Status Appropriate - Concurrent stay review    RATIONALE FOR DETERMINATION   34 yo female with recent diagnosis of erythromelalgia who was registered to observation with acute on chronic bilateral LE pain after LE venous hemodynamic studies earlier in the day at an outside hospital. She was diagnosed with the erythromelalgia in January and pain control has been difficult. She was registered to observation and was started on OxyContin in additional to PRN Norco and gabapentin. She did express suicidal ideation and was assessed on 4/10. She is no longer suicidal. She may discharge tomorrow pending improved pain control. OK for continued stay obs.       The severity of illness, intensity of service provided, expected LOS and risk for adverse outcome make the care appropriate for further observation.        The information on this document is developed by the utilization review team in order for the business office to ensure compliance.  This only denotes the appropriateness of proper admission status and does not reflect the quality of care rendered.         The definitions of Inpatient Status and Observation Status used in making the determination above are those provided in the CMS Coverage Manual, Chapter 1 and Chapter 6, section 70.4.      Sincerely,     Gali Dawkins MD MPH  Utilization Review  [facility].

## 2021-04-10 NOTE — PROGRESS NOTES
RECEIVING UNIT ED HANDOFF REVIEW    ED Nurse Handoff Report was reviewed by: Caity Flannery RN on April 9, 2021 at 8:15 PM

## 2021-04-10 NOTE — ED NOTES
"[facility]  ED Nurse Handoff Report    ED Chief complaint: Pain Control and Suicidal      ED Diagnosis:   Final diagnoses:   Other chronic pain   Rash and nonspecific skin eruption       Code Status: Full Code    Allergies:   Allergies   Allergen Reactions     Topiramate Anxiety       Patient Story: Pt has condition called erythromelalgia that she is being seen for at [facility].  Pt came here today for pain control.  Focused Assessment:  Alert and oriented.  VSS.  C/o pain, heat and blisters to bilateral feet d/t erythromelalgia.  Pt wears compression socks and ice slippers for pain.  Decrease in pain with IV pain meds and ativan.  Pt states that she has not been able to ambulate;  has been assisting her with standing.    Treatments and/or interventions provided: morphine, ativan, zofran  Patient's response to treatments and/or interventions:     To be done/followed up on inpatient unit:  See epic    Does this patient have any cognitive concerns?:     Activity level - Baseline/Home:  Stand with assist x2  Activity Level - Current:   Total Care    Patient's Preferred language: English   Needed?: No    Isolation: None  Infection: Not Applicable  Patient tested for COVID 19 prior to admission: YES  Bariatric?: No    Vital Signs:   Vitals:    04/09/21 1643 04/09/21 1800 04/09/21 1801 04/09/21 1832   BP: 114/65      Pulse: 97      Resp: 18      Temp: 97.4  F (36.3  C)      TempSrc: Temporal      SpO2: 99% 100% 100% 100%   Weight: 49.9 kg (110 lb)      Height: 1.575 m (5' 2\")          Cardiac Rhythm:     Was the PSS-3 completed:   Yes  What interventions are required if any?               Family Comments:   OBS brochure/video discussed/provided to patient/family: Yes              Name of person given brochure if not patient:               Relationship to patient:     For the majority of the shift this patient's behavior was Green.   Behavioral interventions performed were .    ED NURSE " PHONE NUMBER: [id number]

## 2021-04-10 NOTE — PROGRESS NOTES
[facility]    Hospitalist Progress Note    Assessment & Plan   Ronna Rivera is an otherwise healthy 35 year old female with recent diagnosis of erythromelalgia in 1/2021 for which she follows with providers at [facility] who was admitted under observation on 4/9/2021 with acute worsening of bilateral LE pain (R foot > LLE) after LE venous hemodynamic studies were performed earlier that day    Acute bilateral LE pain (R foot > LLE) dt Erythromelalgia, diagnosed in 1/2021  Had been seen by PCP for LE sx and was initially thought to have a fungal infection or cellulitis in her feet. Given lack of improvement with topical treatments, oral antibiotic. Was referred to [facility]. Has been seen by neurology and dermatology at [place] and, after additional testing, was diagnosed with erythromelalgia. Autoimmune workup was reportedly negative. Has tried various treatments for her pain (including topical ketoconazole, topical fluocinonide, Naproxen, 7d course of Keflex, Xyzol, Gabapentin 300-900mg HS, Duloxetine 20mg daily) with minimal relief. Also see mention that lidoderm patches to the feet, topical amitriptyline and topical ketamine were advised but sounds like these treatments weren't particularly helpful either.Treated with 5d course of IV solumedrol earlier this month with no improvement. Had been using Norco prn. Had been seen in the ED for worsening pain on 4/3 PM and ultimately prescribed oral dilaudid (2-4mg q4h prn). Doesn't think the Dilaudid seemed to help much. At times is unable to ambulate around her house dt the pain and her  has to lift her. Was back at [place] for additional testing on 4/9. Testing included LE venous hemodynamic study with compression of her LEs to assess venous flow. Following this test, she developed worsening pain that was severe enough that she felt suicidal (had reportedly wrapped a phone cord around her neck, spouse found her shortly thereafter and  removed it).  contact her provider at [place] who recommended she seek evaluation in the ED in part to assist with pain control. In the ED, was given 4mg IV morphine and 1mg IV Ativan. Admitted under observation. Started on OxyContin 10mg BID and continued on Norco 5/325mg ii tabs q4h prn. Gabapentin increased to 600mg TID.     -- cont OxyContin 10mg BID  -- cont Norco 5/325mg i-ii tabs q4h prn  -- cont gabapentin 600mg BID, can increase HS dose if needed as symptoms tend to worsen at night  -- cont mexiletine 150mg TID  -- would recommend resumption of duloxetine -- patient willing to consider  -- would also advise continued use of topical agents as previously advised  -- primary neurologist at [place] recommended pain management evaluation for possible stimulator or epidural catheter for clonidine infusion -- explained that will not be possible to do this hospital stay as no one here to manage that, but will see if we can get her established at the pain clinic at discharge  -- cooling techniques as needed  -- added bowel regimen while on scheduled narcotics  -- is on wait list to see neurology at  of , can follow up with [facility] as previously advised, would also benefit from establishing with pain clinic (pain team consult ordered on admission but won't be seen as it is currently the weekend) -- will see if we can get appt with pain clinic at discharge    Suicidal Ideation with depression, dt severe pain   Seen by DEC  on 4/10. Patient was no longer actively suicidal. I confirmed this with her during my visit on 4/10.  Using as needed Ativan for anxiety this stay which has been reportedly helping  Has an appointment with a counselor on 4/12/21.      History of asthma  Stable. No concerns presently.    FEN: no IVFs, lytes stable, regular diet  DVT Prophylaxis: PCDs, ambulation as able  Code Status: Full Code    Disposition: Anticipate discharge in 1-2d, pending adequate pain control. Patient unsure about  returning home dt limited ambulation abilities with pain. Unclear if she would benefit from TCU stay. PT consulted.     Spouse at bedside and updated on care plan.     Lola Garcia     Time Spent on this Encounter   I spent >35 minutes on the unit/floor managing the care of Ronna Rivera. Over 50% of my time was spent on the following:   - Counseling the patient and/or family regarding: prognosis and recommended follow-up  - Coordination of care with the: care coordinator/, nurse and spouse at bedside    Lola Garcia, DO      Interval History   Seen this afternoon. Pain is a little better today. Was able to get some rest overnight. Minimal need for prn Norco after starting OxyContin.     -Data reviewed today: I reviewed all new labs and imaging results over the last 24 hours. I personally reviewed no images or EKG's today.    Physical Exam   Temp: 98.2  F (36.8  C) Temp src: Oral BP: 107/65 Pulse: 90   Resp: 16 SpO2: 99 % O2 Device: None (Room air)    Vitals:    04/09/21 1643   Weight: 49.9 kg (110 lb)     Vital Signs with Ranges  Temp:  [97.4  F (36.3  C)-98.2  F (36.8  C)] 98.2  F (36.8  C)  Pulse:  [] 90  Resp:  [14-20] 16  BP: (107-125)/(65-70) 107/65  SpO2:  [99 %-100 %] 99 %  No intake/output data recorded.    Constitutional: Resting comfortably, conversing appropriately, NAD  Respiratory: CTAB, no wheeze/rales/rhonchi, no increased work of breathing  Cardiovascular: HRRR, no MGR, no LE edema  GI: S, NT, ND, +BS  Skin/Integumen: areas of erythema on R foot including great toe, superficial abrasions and skin tears on feet/ankle but no s/sx of infection  Other:      Medications       gabapentin  600 mg Oral TID     mexiletine  150 mg Oral TID     NONFORMULARY 1 Application  1 Application Topical TID     oxyCODONE  10 mg Oral BID     senna-docusate  2 tablet Oral Daily       Data   Recent Labs   Lab 04/09/21  1725 04/03/21  2037   WBC 7.9 7.8   HGB 13.2 12.6   MCV 94  92    249     --    POTASSIUM 3.8  --    CHLORIDE 107  --    CO2 29  --    BUN 14  --    CR 0.81  --    ANIONGAP 3  --    KEVAN 8.7  --    *  --    ALBUMIN 3.7  --    PROTTOTAL 6.7*  --    BILITOTAL 0.4  --    ALKPHOS 73  --    *  --    AST 67*  --        No results found for this or any previous visit (from the past 24 hour(s)).

## 2021-04-10 NOTE — CONSULTS
CM consult in progress.     Pt has Neurology followup already scheduled:    May 12, 2021  1:30 PM   New Visit with Darek Orta MD  [phone]  [facility] [address] [address]       CCRN task request made for:    Pt would like new PCP established in [facility], Hospitalist states pt needs hospital followup within the week.     Added contact information for pt to schedule with pain clinic:     Please contact the [facility] for your first appointment:   Telemedicineand in-clinic appointments available, call: [phone]  Or visit: [email or web address]          Debbie Saldana RN, BSN, PHN  [facility]  Inpatient Care Management - FLOAT  Mobile: [phone] 04/10/21 until 4pm  (after today's date, please call the patient's unit)

## 2021-04-10 NOTE — PLAN OF CARE
A&Ox4. Pivots to commode or chair otherwise A2 lift. Tolerating diet. Morphine, norco, cold water foot bath, numbing cream, gabapentin for pain. Ativan for anxiety. Denies SI. Feet hot, red, with open sores covered with bandages from home. Pain team consulted.

## 2021-04-10 NOTE — H&P
[facility]  History and Physical   Hospitalist  Elisabeth Monreal MD       Ronna Rivera MRN# [id number]   YOB: 1985 Age: 35 year old      Date of Admission:  4/9/2021         Assessment and Plan:    35-year-old female with recent diagnosis of Erythromelagia since January 2021 presented with acute worsening of bilateral lower extremity pain right foot greater than left lower extremity after a lower extremity venous hemodynamic study performed today    Acute severe worsening of bilateral lower extremity pain secondary to erythromelalgia  Bilateral lower extremity erythromelalgia;  Admit her under observation  We will start her on OxyContin 10 mg p.o. twice daily with the severity of the pain  Along with that will use Norco 5/325 mg tablet 2 tablets every 4 hours as needed for pain  IV morphine for as needed breakthrough pain  We will request pain medicine consultation regarding further evaluation management of this complicated patient  We discussed the importance of her establishing care with the pain clinic for management of her pain going forward  Continue gabapentin increase to 600mg TID  And use cooling techniques cooling fan and ice packs as needed  She is on the waiting list to see neurology at the [facility] as well  If her pain is not well controlled tomorrow with the current regimen we will plan on advancing her to inpatient status  She is at risk of constipation with a bowel regimen so start senna 2 tablets daily and MiraLAX twice daily as needed for constipation    Suicidal ideation with depression due to severe pain;  Psychiatric consultation will be requested for evaluation management  Use as needed Ativan until seen by psychiatry to help with anxiety and panic attacks as per the  with the pain    History of asthma;  Currently stable    DVT prophylaxis PCDs and ambulation    CODE STATUS full code    Elisabeth Monreal MD   Page [phone]           Code  Status:   Full Code         Primary Care Physician:   Rosie Roper SAMIR [phone]         Chief Complaint:   Acute worsening of  chronic pain of bilateral lower extremity with recent diagnosis of erythromelalgia    History is obtained from the patient         History of Present Illness:   35-year-old female with recent diagnosis of Erythromelagia since January 2021 presented with acute worsening of bilateral lower extremity pain right foot greater than left lower extremity.  She was seen by her primary care provider for this initially thought to be fungal infection and then cellulitis but it has been getting worse so she was really referred to [facility].   she was given a course of prednisone by Dr. Garry Smith with dermatology at [facility] and is also followed by neurology  currently.  She was also given course of IV Solu-Medrol for 5 days which was only minimally helpful as per the patient.  She was also started on ketamine and lidocaine cream and gabapentin.  She was taking Norco 5/325 1 to 2 tablets every 6 hours as needed for pain.  She was recently seen in [facility] emergency room and was given a prescription for Dilaudid 2 to 4 mg every 4 hours as needed for pain.  She thinks that the Dilaudid did not seem to help much.  Today she was at [facility] and underwent lower extremity venous hemodynamic study where there was a lot of compression of the lower extremities done to assess the venous flow of the lower extremities and that caused her severe pain.  She is accompanied by her  Ava Stephenson.  She was in so much pain today she felt depressed and felt suicidal.  She wrapped a phone cord around her neck and her spouse found her shortly after she placed it around her neck and removed it.  Her  contacted the provider and was instructed to bring the patient into the emergency department for pain control.  She is not suicidal prior to this condition for the last couple of months she is in so  much pain she was having suicidal thoughts.  If her pain is controlled she does not feel suicidal.  She is not able to walk much on her feet and her  was helping her to ambulate around the house by lifting her at times.. She otherwise denies any fevers chills no diaphoresis no chest pain no shortness of breath.  She is getting admitted under observation status for pain control.  And suicidal ideation .  She was given 4 mg of morphine on arrival to the emergency room and 1 mg of Ativan morphine seem to help with the pain so far.  She is resting comfortably with her feet elevated with the cooling fan on.  When her finger swelled up and the skin over her bones stretches and then opens up she has a abrasion look like looking rash on her right foot as well as skin breakdown with a small onset ulcer on her right lateral medialis .  She also had autoimmune work-up at [facility] which returned negative as per the patient           Past Medical History:   History migraines  Low back pain from degenerative disc  History of asthma          Past Surgical History:     Past Surgical History:   Procedure Laterality Date      History of               Home Medications:     Prior to Admission medications    Medication Sig Last Dose Taking? Auth Provider   gabapentin (NEURONTIN) 300 MG capsule Take 600-900 mg by mouth At Bedtime 2021 at PM Yes Unknown, Entered By History   HYDROcodone-acetaminophen (NORCO) 5-325 MG tablet Take 1-2 tablets by mouth every 6 hours as needed for severe pain 2021 at AM Yes Unknown, Entered By History   HYDROmorphone (DILAUDID) 2 MG tablet Take 2-4 mg by mouth every 4 hours as needed for severe pain 2021 at 0211 Yes Unknown, Entered By History   melatonin 1 MG TABS tablet Take 1-2 mg by mouth nightly as needed for sleep 2021 at PM Yes Unknown, Entered By History   mexiletine (MEXITIL) 150 MG capsule Take 150 mg by mouth 3 times daily 2021 at 1300 Yes Unknown, Entered By  "History   NONFORMULARY Apply topically 3 times daily Ketamine 0.5/Amit2% in Lidoderm: apply to the feet 4/9/2021 at AM Yes Unknown, Entered By History   ondansetron (ZOFRAN-ODT) 4 MG ODT tab Take 4 mg by mouth every 6 hours as needed for nausea 4/9/2021 at 0211 Yes Unknown, Entered By History            Allergies:     Allergies   Allergen Reactions     Topiramate Anxiety            Social History:     Social History     Tobacco Use     Smoking status: Never Smoker   Substance Use Topics     Alcohol use: No     She is .  She has  11-year-old twin boys.  She stays home with the kids            Family History:     Family History   Problem Relation Age of Onset     Hypertension Father      Cerebrovascular Disease Maternal Grandmother      Diabetes Maternal Grandfather      Breast Cancer Paternal Grandmother      Hypertension Paternal Grandfather      C.A.D. Paternal Grandfather                 Review of Systems:       The 10 point Review of Systems is negative other than noted in the HPI           Physical Exam:   Blood pressure 114/65, pulse 97, temperature 97.4  F (36.3  C), temperature source Temporal, resp. rate 18, height 1.575 m (5' 2\"), weight 49.9 kg (110 lb), last menstrual period 04/01/2021, SpO2 100 %.  110 lbs 0 oz    Constitutional: Alert, awake .  Mildly shaky and anxious.  Pleasant female resting with her feet elevated and cooling fan on   Psych:  Slightly depressed   Neuro: Cranial nerves 2-12 are intact, muscle power 5/5, no focal weakness   Eyes: No conjunctival injection, No scleral icterus, PERRLA   ENT: Ear, nose , throat are normal. Mucous membranes moist         Cardiovascular:  Normal rate rhythm regular, no murmurs rubs or gallops   Lungs:  Clear to auscultation, no rales rhonchi or wheezing   Abdomen:  Soft, nontender, nondistended, no hepatosplenomegaly   Skin:  Bilateral lower extremities has erythema of the feet both at the bottom and dorsum of the feet and feet are  warm to touch.  " In the dorsum of the right foot there is a skin abrasion rash and small ulcer on the right lateral mediolus    Musculoskeletal:  No other joint swelling or erythema noted   Other: /Facial skin flushing noted          Data:   All new lab and imaging data was reviewed.   Recent Labs   Lab Test 04/09/21  1725 04/03/21  2037   WBC 7.9 7.8   HGB 13.2 12.6   MCV 94 92    249       Recent Labs   Lab Test 04/09/21  1725      POTASSIUM 3.8   CHLORIDE 107   CO2 29   BUN 14   CR 0.81   ANIONGAP 3   KEVAN 8.7   *     No lab results found.    Invalid input(s): TROP, TROPONINIES

## 2021-04-10 NOTE — PLAN OF CARE
Was able to get some sleep overnight. Continues to have severe pain at times in bilat lower extremities. Utilizing PRN Morphine, Norco, ice. Started to complain of nausea. Zofran given with little effect, Compazine then given. Voiding in BSC. Plan for psych and pain management consults.

## 2021-04-11 ENCOUNTER — APPOINTMENT (OUTPATIENT)
Dept: PHYSICAL THERAPY | Facility: CLINIC | Age: 36
End: 2021-04-11
Attending: INTERNAL MEDICINE
Payer: COMMERCIAL

## 2021-04-11 PROCEDURE — 99226 PR SUBSEQUENT OBSERVATION CARE,LEVEL III: CPT | Performed by: INTERNAL MEDICINE

## 2021-04-11 PROCEDURE — 250N000013 HC RX MED GY IP 250 OP 250 PS 637: Performed by: INTERNAL MEDICINE

## 2021-04-11 PROCEDURE — G0378 HOSPITAL OBSERVATION PER HR: HCPCS

## 2021-04-11 PROCEDURE — 97530 THERAPEUTIC ACTIVITIES: CPT | Mod: GP | Performed by: PHYSICAL THERAPIST

## 2021-04-11 PROCEDURE — 97161 PT EVAL LOW COMPLEX 20 MIN: CPT | Mod: GP | Performed by: PHYSICAL THERAPIST

## 2021-04-11 RX ORDER — BISACODYL 10 MG
10 SUPPOSITORY, RECTAL RECTAL DAILY PRN
Status: DISCONTINUED | OUTPATIENT
Start: 2021-04-11 | End: 2021-04-13 | Stop reason: HOSPADM

## 2021-04-11 RX ADMIN — GABAPENTIN 600 MG: 300 CAPSULE ORAL at 16:29

## 2021-04-11 RX ADMIN — OXYCODONE HYDROCHLORIDE 10 MG: 10 TABLET, FILM COATED, EXTENDED RELEASE ORAL at 20:23

## 2021-04-11 RX ADMIN — GABAPENTIN 600 MG: 300 CAPSULE ORAL at 22:07

## 2021-04-11 RX ADMIN — HYDROCODONE BITARTRATE AND ACETAMINOPHEN 2 TABLET: 5; 325 TABLET ORAL at 17:48

## 2021-04-11 RX ADMIN — HYDROCODONE BITARTRATE AND ACETAMINOPHEN 2 TABLET: 5; 325 TABLET ORAL at 11:06

## 2021-04-11 RX ADMIN — MEXILETINE HYDROCHLORIDE 150 MG: 150 CAPSULE ORAL at 20:24

## 2021-04-11 RX ADMIN — OXYCODONE HYDROCHLORIDE 10 MG: 10 TABLET, FILM COATED, EXTENDED RELEASE ORAL at 09:45

## 2021-04-11 RX ADMIN — POLYETHYLENE GLYCOL 3350 17 G: 17 POWDER, FOR SOLUTION ORAL at 14:15

## 2021-04-11 RX ADMIN — MEXILETINE HYDROCHLORIDE 150 MG: 150 CAPSULE ORAL at 09:46

## 2021-04-11 RX ADMIN — LORAZEPAM 0.5 MG: 0.5 TABLET ORAL at 20:24

## 2021-04-11 RX ADMIN — GABAPENTIN 600 MG: 300 CAPSULE ORAL at 09:45

## 2021-04-11 RX ADMIN — MEXILETINE HYDROCHLORIDE 150 MG: 150 CAPSULE ORAL at 16:28

## 2021-04-11 RX ADMIN — LORAZEPAM 0.5 MG: 0.5 TABLET ORAL at 13:09

## 2021-04-11 RX ADMIN — SENNOSIDES AND DOCUSATE SODIUM 2 TABLET: 8.6; 5 TABLET ORAL at 09:45

## 2021-04-11 NOTE — PLAN OF CARE
DATE & TIME:4/10/21 7-730  Cognitive Concerns/ Orientation : a&Ox4  BEHAVIOR & AGGRESSION TOOL COLOR: green  CIWA SCORE: NA  ABNL VS/O2: VSS on RA    MOBILITY: SBA to BSC  PAIN MANAGMENT: scheduled oxycontin, PRN norco, cold therapy  DIET: Reg, tolerating  BOWEL/BLADDER: continent  ABNL LAB/BG: n/a  DRAIN/DEVICES: none  TELEMETRY RHYTHM: NA  SKIN: ulcerations on feet -dressings CDI  TESTS/PROCEDURES: none  D/C DAY/GOALS/PLACE: awaiting consult completion

## 2021-04-11 NOTE — PROGRESS NOTES
[facility]    Hospitalist Progress Note    Assessment & Plan   Ronna Rivera is an otherwise healthy 35 year old female with recent diagnosis of erythromelalgia in 1/2021 for which she follows with providers at [facility] who was admitted under observation on 4/9/2021 with acute worsening of bilateral LE pain (R foot > LLE) after LE venous hemodynamic studies were performed earlier that day    Acute bilateral LE pain (R foot > LLE) dt Erythromelalgia, diagnosed in 1/2021  Had been seen by PCP for LE sx and was initially thought to have a fungal infection or cellulitis in her feet. Given lack of improvement with topical treatments, oral antibiotic. Was referred to [facility]. Has been seen by neurology and dermatology at [place] and, after additional testing, was diagnosed with erythromelalgia. Autoimmune workup was reportedly negative. Has tried various treatments for her pain (including topical ketoconazole, topical fluocinonide, Naproxen, 7d course of Keflex, Xyzol, Gabapentin 300-900mg HS, Duloxetine 20mg daily) with minimal relief. Also see mention that lidoderm patches to the feet, topical amitriptyline and topical ketamine were advised but sounds like these treatments weren't particularly helpful either.Treated with 5d course of IV solumedrol earlier this month with no improvement. Had been using Norco prn. Had been seen in the ED for worsening pain on 4/3 PM and ultimately prescribed oral dilaudid (2-4mg q4h prn). Doesn't think the Dilaudid seemed to help much. At times is unable to ambulate around her house dt the pain and her  has to lift her. Was back at [place] for additional testing on 4/9. Testing included LE venous hemodynamic study with compression of her LEs to assess venous flow. Following this test, she developed worsening pain that was severe enough that she felt suicidal (had reportedly wrapped a phone cord around her neck, spouse found her shortly thereafter and  removed it).  contact her provider at [place] who recommended she seek evaluation in the ED in part to assist with pain control. In the ED, was given 4mg IV morphine and 1mg IV Ativan. Admitted under observation. Started on OxyContin 10mg BID and continued on Norco 5/325mg ii tabs q4h prn. Gabapentin increased to 600mg TID.     -- cont OxyContin 10mg BID  -- cont Norco 5/325mg i-ii tabs q4h prn  -- cont gabapentin 600mg BID, can increase HS dose if needed as symptoms tend to worsen at night  -- cont mexiletine 150mg TID  -- would recommend resumption of duloxetine -- patient willing to consider  -- would also advise continued use of topical agents as previously advised  -- primary neurologist at [place] (Dr. Shannon De La Rosa) recommended pain management evaluation for possible stimulator or epidural catheter for clonidine infusion -- explained that will not be possible to do this hospital stay as no one here to manage that, but will see if we can get her established at the pain clinic at discharge (pain team consult ordered on admission but won't be seen as it is currently the weekend), ?if Dr. De La Rosa would be able to coordinate infusion through [place]  -- cooling techniques as needed  -- added bowel regimen while on scheduled narcotics  -- has appt with Dr. Orta of [facility] on 5/21/21 can follow up with [place] as previously advised  -- PT consulted to issues with mobility at home -- pain worsens when attempting to ambulate so unclear if TCU stay would be of benefit    Suicidal Ideation with depression, dt severe pain: Resolved  Seen by DEC  on 4/10. Patient was no longer actively suicidal. I confirmed this with her during my visit on 4/10.  Using as needed Ativan for anxiety this stay which has been reportedly helping (says she gets anxious at night as that is when her pain tends to worsen, Ativan has helped this stay)  Has an appointment with a counselor on 4/12/21.   -- patient still interested in  "meeting with psych to discuss options for better management of anxiety       History of asthma  Stable. No concerns presently.    FEN: no IVFs, lytes stable, regular diet  DVT Prophylaxis: PCDs, ambulation as able  Code Status: Full Code    Disposition: Condition seems improved but patient about discharging home too soon. Patient wondering about TCU stay. To work with PT this morning. Will follow up with patient/spouse this afternoon to discuss discharge home vs continued observation for one more night.     1600 Addendum:  Met with patient and spouse this afternoon. Will monitor in hospital tonight to ensure pain remains reasonably controlled. Anticipate discharge tomorrow on oral pain meds with follow up (PCP, pain mgmt, neurology)    Lola Garcia     Interval History    Had a better night and was able to rest. Relays ongoing anxiety at night as that is when her sx worsen. Took dose of Ativan last night which helped. Seems encouraged that she had a better night but still says she is anxious that \"that may have been a fluke\". Hasn't needed any IV medications in 24hrs. Wondering if she would benefit from a TCU stay. Has PT session this morning.     -Data reviewed today: I reviewed all new labs and imaging results over the last 24 hours. I personally reviewed no images or EKG's today.    Physical Exam   Temp: 98.4  F (36.9  C) Temp src: Oral BP: 101/54 Pulse: 76   Resp: 16 SpO2: 97 % O2 Device: None (Room air)    Vitals:    04/09/21 1643   Weight: 49.9 kg (110 lb)     Vital Signs with Ranges  Temp:  [97.5  F (36.4  C)-98.4  F (36.9  C)] 98.4  F (36.9  C)  Pulse:  [76-90] 76  Resp:  [16] 16  BP: ()/(54-63) 101/54  SpO2:  [97 %-98 %] 97 %  I/O last 3 completed shifts:  In: 300 [P.O.:300]  Out: -     Constitutional: Resting comfortably, conversing appropriately, NAD  Respiratory: CTAB, no wheeze/rales/rhonchi, no increased work of breathing  Cardiovascular: HRRR, no MGR, no LE edema  GI: S, NT, ND, " +BS  Skin/Integumen: areas of erythema on R foot, superficial abrasions and skin tears on feet/ankle but no s/sx of infection  Other:      Medications       gabapentin  600 mg Oral TID     mexiletine  150 mg Oral TID     NONFORMULARY 1 Application  1 Application Topical TID     oxyCODONE  10 mg Oral BID     senna-docusate  2 tablet Oral Daily       Data   Recent Labs   Lab 04/09/21  1725   WBC 7.9   HGB 13.2   MCV 94         POTASSIUM 3.8   CHLORIDE 107   CO2 29   BUN 14   CR 0.81   ANIONGAP 3   KEVAN 8.7   *   ALBUMIN 3.7   PROTTOTAL 6.7*   BILITOTAL 0.4   ALKPHOS 73   *   AST 67*       No results found for this or any previous visit (from the past 24 hour(s)).

## 2021-04-11 NOTE — PLAN OF CARE
Summary:     DATE & TIME:4/10/21 3329-2726  Cognitive Concerns/ Orientation : a&Ox4  BEHAVIOR & AGGRESSION TOOL COLOR: green  CIWA SCORE: NA  ABNL VS/O2: VSS on RA    MOBILITY: SBA to BSC  PAIN MANAGMENT: scheduled oxycontin, PRN norco, cold therapy  DIET: Reg, tolerating  BOWEL/BLADDER: continent  ABNL LAB/BG: n/a  DRAIN/DEVICES: none  TELEMETRY RHYTHM: NA  SKIN: ulcerations on feet dressings CDI  TESTS/PROCEDURES: none  D/C DAY/GOALS/PLACE:  here on [place] shift. Very helpful. Pain management moderate w cold therapy. Didn't want to contract to be awakened overnight for pain meds. Nausea at HS. Zofran IV/compazine IV with effect. Crackers. Slept well all night

## 2021-04-11 NOTE — CONSULTS
Care Management Initial Consult    General Information  Assessment completed with: Patient, Spouse or significant other, Ronna & Shiraz   Type of CM/SW Visit: Initial Assessment    Primary Care Provider verified and updated as needed: Yes(see notes )   Readmission within the last 30 days:        Reason for Consult: discharge planning  Advance Care Planning:    no ACP documents on file      Communication Assessment  Patient's communication style: spoken language (English or Bilingual)    Hearing Difficulty or Deaf: no   Wear Glasses or Blind: no    Cognitive  Cognitive/Neuro/Behavioral: WDL  Level of Consciousness: alert  Arousal Level: arouses to touch/gentle shaking  Orientation: oriented x 4  Mood/Behavior: anxious  Best Language: 0 - No aphasia  Speech: whispers    Living Environment:   People in home:  Spouse,  two 11 yr old autistic children   Current living Arrangements: house      Able to return to prior arrangements:  yes     Family/Social Support:  Care provided by:  spouse  Provides care for: children  Marital Status:   Support system:   Shiraz       Description of Support System: Supportive, Involved    Support Assessment: Adequate family and caregiver support(experiencing some caregiver stress )    Current Resources:   Patient receiving home care services: No     Community Resources: None  Equipment currently used at home: wheelchair, manual(since Feb.; transfers only)  Supplies currently used at home: Other(ice, compression stockings)    Employment/Financial:  Employment Status: disabled(newly)     Employment/ Comments: recently disabled due to new diagnosis Jan 2021, needs to apply for disability   Financial Concerns: other (see comments)(newly navigating disability status )   Referral to Financial Counselor: Yes     Lifestyle & Psychosocial Needs:   Socioeconomic History     Marital status:      Spouse name: Shiraz     Number of children: Not on file     Years of  education: 16     Highest education level: Not on file   Occupational History     Employer: UNEMPLOYED     Tobacco Use     Smoking status: Never Smoker   Substance and Sexual Activity     Alcohol use: No     Drug use: No     Sexual activity: Yes     Partners: Male       Functional Status:  Prior to admission patient needed assistance: see therapy note     Mental Health Status:  Mental Health Status: Current Concern  Mental Health Management: Other (see comment)(would like to see Psych this admission (ordered))    Chemical Dependency Status:  Chemical Dependency Status: No Current Concerns           Values/Beliefs:  Spiritual, Cultural Beliefs, Mosque Practices, Values that affect care: no             Additional Information:  CM-RN met with patient and spouse in room, introduced self and role in discharge planning.      Discussed appointment obtained to establish primary care at [facility], getting pt in to be seen and connected with clinic care coordination, and acknowledging pt would like to find a provider who is not discouraged by her rare condition.       Apr 14, 2021  1:20 PM   Office Visit with Gutierrez Martinez MD   [facility] Coffey    Assisted pt in connecting with [facility]'s MyChart so that she has access to appointment scheduling, provider notes, virtual appointments, and messing with clinic providers.  She was able to successfully connect.      Explained I will place Clinic Care Coordinator referral, they may be able to help with specific MD suggestions and re: other concerns (done) adding pertinent details from our conversation.     Services are provided by a Care Coordinator for people with complex needs such as: medical, social, or financial troubles.  The Care Coordinator works with the patient and their Primary Care Provider to determine health goals, obtain resources, achieve outcomes, and develop care plans that help coordinate the patient's care.     Reason  for Referral: Complex Medical Concerns/Education: New diagnosis  recent diagnosis of erythromelalgia in 1/2021     Additional pertinent details:  Met with pt and spouse in hospital.  Community Care Coordination needed.  Wants to establish with  [facility].  Would love ANY help finding a provider who is familiar with erythromelalgia or interested in rare conditions would be a huge help.  Set up with close followup 4/14 so she can at least get established in a [facility] clinic, but is willing to go to other [facility] clinics in the future.  Prefers virtual appointments as having legs in dependent position triggers painful attack.  If in-person, would be AWESOME if clinic can accommodate waiting area (or waiting in exam room) to allow for supine position with legs elevated to assist in preventing episode.  Will be applying for disability and would appreciate any/all resources.  Would appreciate clinic care coordination assistance in coordinating information / continuity of care with [facility] specialty providers, prior PCP clinic did not do this.  Did create a [facility] MyChart for communication.  Thank you!     Clinical Staff have discussed the Care Coordination Referral with the patient and/or caregiver: yes     Provided contact information for Pain Clinics, and encouraged calling to schedule appointment, sharing it appears virtual and same-day appointments are available:     the [facility] for your first appointment:   Telemedicine and in-clinic appointments available, call: [phone]  Or visit: [email or web address]     or [facility] (Pain Clinic) - [place]  [address] [place] [address]  Appointments: [phone]  Or visit: [email or web address]    Discussed Home Care (private duty) which can be scheduled for extended hours but is private pay).  Specifically provided information for [facility]'s private duty contact:     [facility] - private duty  "rates  Call Reynold KILPATRICK 8am-4pm [phone] to arrange, they prefer 48 hours notice to set up care    Their Home Health Aides are via [employer]  (When you call he refers you to: [email or web address]   *see printed information)  - $35/hr 4 hour \"minimum\" each visit to get this rate  - $90 for 2 hour \"short visit\"  - $50 for 45 minute \"bath visit\" (any care can be provided during the time)  All require RN assessment admission fee of $150;  however this fee is waived if using 16+ hours per week.    Discussed Home Care (insurance-covered) as a service for therapies or nurse visits \"a few times per week.\"  They would be interested in this but agree it does not address the current caregiving needs of having 1:1 extended hours.      Discussed Transitional Care.  They are interested to see if insurance would cover for her diagnosis.  However they do not feel it would be a good fit due to the continued COVID-related visitor restrictions (no visitors x 2 weeks).  Discussed Medicare.gov as a good resource for finding Transitional Care Units which are located in Nursing Homes.      Discussed applying for disability on the Social Security website.  They verbalize understanding.  Notified [facility] Financial Counselor: Severiano [employer] (Main # [phone] #3;   Hours: 7 AM-3:30 PM) via email contact to reach out to pt to see how this is going. Encouraged pt and spouse reaching out to their county contacts (their kids have services through the [facility]) to figure out how to connect with a , as they would have the knowledge for qualifying for PCA services.      Discussed DME Supply stores do not have great hours on the weekend, would encourage calling some on Monday for wheelchairs with reclining capability options.  Discussed typically need both an order and a note for things to be covered via insurance.     Reviewed inpatient orders: awaiting Pain Management IP consult, Wound Ostomy Continence " Nurse IP Consult, and Psychiatry IP consult.  These all occur on Monday.  Patient does want to have pain management and anxiety/depression plans in place prior to discharge and would benefit from wound nurse documentation.  These are necessary prior to discharge.      Debbie Saldana RN, BSN, PHN  [facility]  Inpatient Care Management - FLOAT  Mobile: [phone] 04/11/21 until 4pm  (after today's date, please call the patient's unit)

## 2021-04-11 NOTE — PROGRESS NOTES
04/11/21 1140   Quick Adds   Type of Visit Initial PT Evaluation   Living Environment   People in home child(ebonie), dependent;spouse  (parents have been staying as well but leaving soon)   Current Living Arrangements house   Home Accessibility stairs to enter home;stairs within home   Number of Stairs, Within Home, Primary 7   Stair Railings, Within Home, Primary railings safe and in good condition   Transportation Anticipated family or friend will provide   Self-Care   Usual Activity Tolerance excellent  (runs, works out, bikes)   Current Activity Tolerance fair   Regular Exercise Yes   Activity/Exercise Type biking;running/jogging;strength training;walking   Exercise Amount/Frequency 3-5 times/wk   Equipment Currently Used at Home wheelchair, manual  (since Feb.; transfers only)   Disability/Function   Hearing Difficulty or Deaf no   Wear Glasses or Blind no   Concentrating, Remembering or Making Decisions Difficulty no   Difficulty Communicating no   Difficulty Eating/Swallowing no   Walking or Climbing Stairs Difficulty yes  (since Feb. )   Walking or Climbing Stairs ambulation difficulty, dependent   Dressing/Bathing Difficulty yes   Dressing/Bathing bathing difficulty, assistance 1 person   Toileting issues yes   Toileting Assistance toileting difficulty, assistance 1 person   Doing Errands Independently Difficulty (such as shopping) yes   Fall history within last six months no   Change in Functional Status Since Onset of Current Illness/Injury yes   General Information   Onset of Illness/Injury or Date of Surgery 04/09/21  (symptoms began in Jan./transfers to  since Feb.)   Referring Physician Lola Garcia DO   Patient/Family Therapy Goals Statement (PT) Needing assistance for managing symptoms and functional barriers due to erythromelalgia   Pertinent History of Current Problem (include personal factors and/or comorbidities that impact the POC) Began having symptoms of erythromelalgia in  Jan. 2021 which progressed to inability to ambulate/transfers only to  in Feb.  At times, bilateral foot pain/redness/swelling so bad that  has to carry her for transfers and has had to carry her up the stairs since Feb.  Diagnosed at [facility] and care being managed there.  Symtoms dealt with by using fans and ice baths on feet.  R foot dysfunction greater than left.  At times she can pivot on left, other times she cannot.    Existing Precautions/Restrictions weight bearing   Weight-Bearing Status - LLE weight-bearing as tolerated   Weight-Bearing Status - RLE weight-bearing as tolerated   General Observations Lying in bed with feet elevated, fan on feet.   arrived during session.    Cognition   Orientation Status (Cognition) oriented x 4   Affect/Mental Status (Cognition) WNL   Follows Commands (Cognition) WNL   Pain Assessment   Patient Currently in Pain Yes, see Vital Sign flowsheet  (right foot, not rated)   Integumentary/Edema   Integumentary/Edema Comments no swelling noted in feet, bilateral dark reddness, small wound on top of right foot.   Posture    Posture Not impaired   Range of Motion (ROM)   ROM Comment Bilateral ankle dorsiflexion to 10 degrees actively and 15 degrees with passive stretch.  Otherwise, WNL's throughout   Strength   Strength Comments 4-/5 at ankle bilaterally; otherwise appears WNL's   Bed Mobility   Comment (Bed Mobility) Supine to sitting at EOB I[place]y   Transfers   Transfer Safety Comments Sit to stand and then pivot on left foot to recliner with SBA.  Patient and  report it can be harder than this if patient is having increased pain in bilateral feet.     Gait/Stairs (Locomotion)   Comment (Gait/Stairs) Gait not performed.  Patient reluctant to put feet in dependent position due to increase of pain with bloodflow but did perform later in session to place right foot in ice bath.     Balance   Balance Comments SBA for bed to chair transfer   Sensory  Examination   Sensory Perception Comments increased pain in feet when in dependent position R>L; no deviation to touch sensation noted by patient.   Coordination   Coordination Comments delayed movement in dorsiflexio and plantarflexion on right   Muscle Tone   Muscle Tone Comments Mildly increased extensor tone in right foot and toes.  Patient and  report this can increase at times.     Clinical Impression   Criteria for Skilled Therapeutic Intervention yes, treatment indicated   PT Diagnosis (PT) impaired functional mobility   Influenced by the following impairments pain and swelling in bilateral feet   Functional limitations due to impairments decreased independence in functional mobility   Clinical Presentation Evolving/Changing   Clinical Presentation Rationale per clinical judgment   Clinical Decision Making (Complexity) low complexity   Therapy Frequency (PT) 3x/week   Predicted Duration of Therapy Intervention (days/wks) 1 week   Planned Therapy Interventions (PT) gait training;home exercise program;transfer training   Anticipated Equipment Needs at Discharge (PT) walker, rolling   Risk & Benefits of therapy have been explained evaluation/treatment results reviewed;care plan/treatment goals reviewed;risks/benefits reviewed;current/potential barriers reviewed;participants voiced agreement with care plan;participants included;patient;spouse/significant other   PT Discharge Planning    PT Discharge Recommendation (DC Rec) home with assist;home with home care physical therapy  (family is asking for increased one-on-one needs)   PT Rationale for DC Rec Patient and  have been managing with a high level of caregiver needs that  has been meeting, however, he has needed to take a leave from work to do so.  He is now need to return to work (will be working from home) but is needing assistance with care for wife including dealing with frequent bouts of pain in feet which require immediate  attention.  They currently have a wc with leg support but it does not recline and is uncomfortable in that way.  Recommend reclining wc with calf/feet rests.  If immediate assistance cannot be met at home, may need TCU.  Patient will benefit from PT involvement to try sliding board transfers as an option when pain escalates and  normally lifts patient (patient and  were shown sliding boards but patient currently transferring with SBA as able to place feet on floor today).  If further weight bearing would be tolerated, would benefit from additional gait training with a WW.     PT Brief overview of current status  SBA to transfer bed to chair with pivot on left foot but reports sometimes she cannot pivot on feet or place feet in dependent position and then  has to lift her.  Trial with WW in stance with SBA, good balance and patient and  reporting the potential need for one at home.    Total Evaluation Time   Total Evaluation Time (Minutes) 20   Skin WDL   Skin Color redness blanchable   Wellbeing Promotion   Family/Support System Care caregiver stress acknowledged;support provided

## 2021-04-11 NOTE — PLAN OF CARE
Summary:     DATE & TIME:4/11/21  [id number]  Cognitive Concerns/ Orientation : a&Ox4, anxious at times, prn ativan effecive  BEHAVIOR & AGGRESSION TOOL COLOR: green  CIWA SCORE: NA  ABNL VS/O2: VSS on RA    MOBILITY: SBA to BSC  PAIN MANAGMENT: scheduled oxycontin, PRN norco, cold therapy  DIET: Reg, tolerating  BOWEL/BLADDER: continent  ABNL LAB/BG: n/a  DRAIN/DEVICES: none  TELEMETRY RHYTHM: NA  SKIN: ulcerations on feet dressings CDI  TESTS/PROCEDURES: none  D/C DAY/GOALS/PLACE: discharge tomorrow pending pain control and completion of consults.     Other:  here throughout day. Very helpful. Pain management moderate w cold therapy.

## 2021-04-12 PROBLEM — I73.81 ERYTHROMELALGIA (H): Status: ACTIVE | Noted: 2021-04-12

## 2021-04-12 PROCEDURE — 99207 PR CDG-CHARGE REQUIRED MANUAL ENTRY: CPT | Performed by: INTERNAL MEDICINE

## 2021-04-12 PROCEDURE — G0378 HOSPITAL OBSERVATION PER HR: HCPCS

## 2021-04-12 PROCEDURE — 99232 SBSQ HOSP IP/OBS MODERATE 35: CPT | Performed by: PSYCHIATRY & NEUROLOGY

## 2021-04-12 PROCEDURE — 99232 SBSQ HOSP IP/OBS MODERATE 35: CPT | Performed by: INTERNAL MEDICINE

## 2021-04-12 PROCEDURE — 250N000013 HC RX MED GY IP 250 OP 250 PS 637: Performed by: INTERNAL MEDICINE

## 2021-04-12 PROCEDURE — G0463 HOSPITAL OUTPT CLINIC VISIT: HCPCS | Mod: 25

## 2021-04-12 PROCEDURE — 97602 WOUND(S) CARE NON-SELECTIVE: CPT

## 2021-04-12 PROCEDURE — 120N000001 HC R&B MED SURG/OB

## 2021-04-12 PROCEDURE — 250N000011 HC RX IP 250 OP 636: Performed by: INTERNAL MEDICINE

## 2021-04-12 RX ORDER — HYDROCODONE BITARTRATE AND ACETAMINOPHEN 7.5; 325 MG/1; MG/1
1 TABLET ORAL EVERY 4 HOURS PRN
Status: DISCONTINUED | OUTPATIENT
Start: 2021-04-12 | End: 2021-04-13 | Stop reason: HOSPADM

## 2021-04-12 RX ORDER — DULOXETIN HYDROCHLORIDE 20 MG/1
20 CAPSULE, DELAYED RELEASE ORAL DAILY
Status: DISCONTINUED | OUTPATIENT
Start: 2021-04-12 | End: 2021-04-13 | Stop reason: HOSPADM

## 2021-04-12 RX ORDER — LIDOCAINE 4 G/G
1-2 PATCH TOPICAL
Status: DISCONTINUED | OUTPATIENT
Start: 2021-04-12 | End: 2021-04-13 | Stop reason: HOSPADM

## 2021-04-12 RX ORDER — INDOMETHACIN 25 MG/1
25 CAPSULE ORAL
Status: DISCONTINUED | OUTPATIENT
Start: 2021-04-12 | End: 2021-04-13 | Stop reason: HOSPADM

## 2021-04-12 RX ADMIN — LORAZEPAM 0.5 MG: 0.5 TABLET ORAL at 09:16

## 2021-04-12 RX ADMIN — HYDROCODONE BITARTRATE AND ACETAMINOPHEN 2 TABLET: 5; 325 TABLET ORAL at 02:21

## 2021-04-12 RX ADMIN — GABAPENTIN 600 MG: 300 CAPSULE ORAL at 09:01

## 2021-04-12 RX ADMIN — MEXILETINE HYDROCHLORIDE 150 MG: 150 CAPSULE ORAL at 21:59

## 2021-04-12 RX ADMIN — LORAZEPAM 0.5 MG: 0.5 TABLET ORAL at 18:44

## 2021-04-12 RX ADMIN — LORAZEPAM 0.5 MG: 0.5 TABLET ORAL at 14:22

## 2021-04-12 RX ADMIN — OXYCODONE HYDROCHLORIDE 10 MG: 10 TABLET, FILM COATED, EXTENDED RELEASE ORAL at 09:01

## 2021-04-12 RX ADMIN — HYDROCODONE BITARTRATE AND ACETAMINOPHEN 1 TABLET: 7.5; 325 TABLET ORAL at 18:23

## 2021-04-12 RX ADMIN — MEXILETINE HYDROCHLORIDE 150 MG: 150 CAPSULE ORAL at 13:16

## 2021-04-12 RX ADMIN — MEXILETINE HYDROCHLORIDE 150 MG: 150 CAPSULE ORAL at 09:01

## 2021-04-12 RX ADMIN — MORPHINE SULFATE 2 MG: 2 INJECTION, SOLUTION INTRAMUSCULAR; INTRAVENOUS at 20:11

## 2021-04-12 RX ADMIN — PROCHLORPERAZINE EDISYLATE 5 MG: 5 INJECTION INTRAMUSCULAR; INTRAVENOUS at 20:16

## 2021-04-12 RX ADMIN — SENNOSIDES AND DOCUSATE SODIUM 2 TABLET: 8.6; 5 TABLET ORAL at 09:01

## 2021-04-12 RX ADMIN — HYDROCODONE BITARTRATE AND ACETAMINOPHEN 2 TABLET: 5; 325 TABLET ORAL at 12:20

## 2021-04-12 RX ADMIN — OXYCODONE HYDROCHLORIDE 10 MG: 10 TABLET, FILM COATED, EXTENDED RELEASE ORAL at 21:59

## 2021-04-12 RX ADMIN — GABAPENTIN 600 MG: 300 CAPSULE ORAL at 16:25

## 2021-04-12 RX ADMIN — GABAPENTIN 600 MG: 300 CAPSULE ORAL at 21:58

## 2021-04-12 RX ADMIN — HYDROCODONE BITARTRATE AND ACETAMINOPHEN 2 TABLET: 5; 325 TABLET ORAL at 06:54

## 2021-04-12 RX ADMIN — LIDOCAINE 2 PATCH: 560 PATCH PERCUTANEOUS; TOPICAL; TRANSDERMAL at 19:20

## 2021-04-12 ASSESSMENT — ACTIVITIES OF DAILY LIVING (ADL): ADLS_ACUITY_SCORE: 17

## 2021-04-12 NOTE — PLAN OF CARE
Summary:  Observation patient  DATE & TIME:4/12/21 4454-9308  Cognitive Concerns/ Orientation : A&Ox4  BEHAVIOR & AGGRESSION TOOL COLOR: green  CIWA SCORE: n/a  ABNL VS/O2: VSS on RA  MOBILITY: SBA to BSC/chair  PAIN MANAGMENT: c/o bilateral foot pain. Using cold therapy. On scheduled OxyContin; PRN Norco x1; PRN Ativan given X2; Pain cream compound in pt's room, from home ( verified and labeled) given X1  DIET: Reg, tolerating  BOWEL/BLADDER: continent, up to BSC  ABNL LAB/BG: ; AST 67  DRAIN/DEVICES: PIV, saline locked   TELEMETRY RHYTHM: n/a  SKIN: ulcerations on R foot, dressings CDI. Hands/feet red and hot.   TESTS/PROCEDURES: n/a  D/C DAY/GOALS/PLACE: discharge likely today, pending pain managed and consults.   OTHER: WOC and pain team consults completed. Psych consult completed

## 2021-04-12 NOTE — PROGRESS NOTES
Per MD request, nurse reached out to pain management team in an attempt to expedite their consultation with the patient. Haim agreed and reports he will be up to see her sooner.

## 2021-04-12 NOTE — PROGRESS NOTES
[facility]    Hospitalist Progress Note    Assessment & Plan   Ronna Rivera is an otherwise healthy 35 year old female with recent diagnosis of erythromelalgia in 3/2021 for which she follows with providers at [facility] who was admitted under observation on 4/9/2021 with acute worsening of bilateral LE pain (R foot > LLE) after LE venous hemodynamic studies were performed earlier that day    Acute bilateral LE pain (R foot > LLE) dt Erythromelalgia, diagnosed in 3/2021  Had been seen by PCP for LE sx and was initially thought to have a fungal infection or cellulitis in her feet. Given lack of improvement with topical treatments, oral antibiotic. Was referred to [facility]. Has been seen by neurology (Dr. De La Rosa) and dermatology (Dr. Smith) at [place] and, after additional testing, was diagnosed with erythromelalgia. Autoimmune workup was reportedly negative. Has tried various treatments for her pain (including topical ketoconazole, topical fluocinonide, Naproxen, 7d course of Keflex, Xyzol, Gabapentin 300-900mg HS, Duloxetine 20mg daily) with minimal relief. Also see mention that lidoderm patches to the feet, topical amitriptyline and topical ketamine were advised but sounds like these treatments weren't particularly helpful either. Treated with 5d course of IV solumedrol earlier this month with no improvement. Had been using Norco prn. Had been seen in the ED for worsening pain on 4/3 PM and ultimately prescribed oral dilaudid (2-4mg q4h prn). Doesn't think the Dilaudid seemed to help much. At times is unable to ambulate around her house dt the pain and her  has to lift her. Was back at [place] for additional testing on 4/9. Testing included LE venous hemodynamic study with compression of her LEs to assess venous flow (per later update from Dr. De La Rosa, test findings were consistent with diagnosis of erythromelalgia). Following this test, she developed worsening pain that was  severe enough that she felt suicidal (had reportedly wrapped a phone cord around her neck, spouse found her shortly thereafter and removed it).  contact her provider at [place] who recommended she seek evaluation in the ED in part to assist with pain control. In the ED, was given 4mg IV morphine and 1mg IV Ativan. Admitted under observation. Started on OxyContin 10mg BID and continued on Norco 5/325mg ii tabs q4h prn. Gabapentin increased to 600mg TID.     -- cont OxyContin 10mg BID  -- cont Norco but dose increased from 5/325mg tabs to 7.5/325mg i tab po q4h prn per pain team recs  -- added indomethacin 25mg po TID per pain team recs, will also add PPI  -- cont gabapentin 600mg BID, can increase HS dose if needed as symptoms tend to worsen at night  -- cont mexiletine 150mg TID  -- per discussions with WOC RN -- recommended trial of lidoderm patches (higher up on the leg and not on the feet), to see if this can help with some of her neuropathic sx  -- would recommend resumption of Cymbalta, psych reportedly in agreement but awaiting formal recs  -- would also advise continued use of topical agents (ketamine/lidoderm) to feet as previously advised  -- primary neurologist at [place] (Dr. Shannon De La Rosa) recommended pain management evaluation for possible stimulator or epidural catheter for clonidine infusion -- explained that will not be possible to do this hospital stay as no one here to manage that, but will see if we can get her established at the pain clinic at discharge (pain team consult ordered on admission but won't be seen as it is currently the weekend), ?if Dr. De La Rosa would be able to coordinate infusion through [place]  -- cooling techniques as needed  -- added bowel regimen while on scheduled narcotics  -- has appt with [facility] PCP on 4/14/21 and with [facility] on 4/15/21  -- has appt with Dr. Orta of [facility] on 5/21/21, can follow up with [place] as previously advised  -- PT consulted  to issues with mobility at home -- recommended home with assist, CC working on arranging possible home care and wheelchair with elevated legs     Suicidal Ideation with depression, dt severe pain: Resolved  Seen by DEC  on 4/10. Patient was no longer actively suicidal. I confirmed this with her during my visit on 4/10.  Using as needed Ativan for anxiety this stay which has been reportedly helping (says she gets anxious at night as that is when her pain tends to worsen, Ativan has helped this stay)  Has an appointment with a counselor on 4/12/21.   -- seen by psych this morning -- await note in chart but per patient had recommended initiation of Cymbalta and use of Ativan for breakthrough anxiety     History of asthma  Stable. No concerns presently.    FEN: no IVFs, lytes stable, regular diet  DVT Prophylaxis: PCDs, ambulation as able  Code Status: Full Code    Disposition: Given increased pain and anxiety this afternoon, meds further adjusted per pain team recs. Will monitor in hospital tonight to ensure sx reasonably managed and no recurrent pain crisis. Plan for discharge home tomorrow. Patient/spouse in agreement with this plan and reassured. Has follow up visits in the coming days with new [facility] PCP (4/14) and pain clinic (4/15). CC helping facilitate home cares as needed and wheelchair that allows her to keep her legs elevated.    Lola Garcia       Time Spent on this Encounter   I spent >35 minutes on the unit/floor managing the care of Ronna Rivera. Over 50% of my time was spent on the following:   - Counseling the patient and/or family regarding: prognosis, medication adjustments, plan for followup  - Coordination of care with the: consultant(s), care coordinator/, nurse and spouse    Lola Garcia, DO    Interval History    Seen this morning. Says she didn't have as good a night last night as she did the night before. Feeling anxious about pain and getting sx  better managed. Still having periodic flare of pain. Using Norco and OxyContin. Patient/spouse anxious about discharging home and recurrence of severe pain. No reported cp/sob/cough, abd pain/n/v.     -Data reviewed today: I reviewed all new labs and imaging results over the last 24 hours. I personally reviewed no images or EKG's today.    Physical Exam   Temp: 98.1  F (36.7  C) Temp src: Oral BP: 105/66 Pulse: 92   Resp: 16 SpO2: 98 % O2 Device: None (Room air)    Vitals:    04/09/21 1643   Weight: 49.9 kg (110 lb)     Vital Signs with Ranges  Temp:  [97.5  F (36.4  C)-98.2  F (36.8  C)] 98.1  F (36.7  C)  Pulse:  [] 92  Resp:  [16] 16  BP: (105-122)/(63-77) 105/66  SpO2:  [98 %-100 %] 98 %  I/O last 3 completed shifts:  In: -   Out: 250 [Urine:250]    Constitutional: Resting comfortably, conversing appropriately, NAD  Respiratory: CTAB, no wheeze/rales/rhonchi, no increased work of breathing  Cardiovascular: HRRR, no MGR, no LE edema  GI: S, NT, ND, +BS  Skin/Integumen: areas of erythema on R foot, superficial abrasions and skin tears on feet/ankle but no s/sx of infection  Other:      Medications       gabapentin  600 mg Oral TID     mexiletine  150 mg Oral TID     NONFORMULARY 1 Application  1 Application Topical TID     oxyCODONE  10 mg Oral BID     senna-docusate  2 tablet Oral Daily       Data   Recent Labs   Lab 04/09/21  1725   WBC 7.9   HGB 13.2   MCV 94         POTASSIUM 3.8   CHLORIDE 107   CO2 29   BUN 14   CR 0.81   ANIONGAP 3   KEVAN 8.7   *   ALBUMIN 3.7   PROTTOTAL 6.7*   BILITOTAL 0.4   ALKPHOS 73   *   AST 67*       No results found for this or any previous visit (from the past 24 hour(s)).

## 2021-04-12 NOTE — CONSULTS
Patient seen this morning, note to follow    [facility] Psychiatric Consult Progress Note    Interval History:   Pt seen, chart reviewed, case discussed with nursing staff and treating clinicians.  is also at the bedside. Patient reports that she is feeling improvement. Her mood and anxiety disorders are slightly improved. Denies any panic attacks. Her pain is under better control although still intermittently very bothersome. Reports suicidal ideation is now under better control. She is not currently feeling suicidal and does feel safe. Denies any hallucinatory experience or paranoid ideation. Reports her sleep has been improving as has been her appetite. Energy has been improving. She tolerated her medication regimen well.  also confirms that she is improving but they're still concerned about discharging home in terms of the seriousness of her underlying medical issue. They do bring up a belief that she might benefit from a retrial of duloxetine. They note that in the past they thought with a brief trial that it had increased her heart rate but now looking back think that that likely related to other factors at the time. They both report that they accept the possibility that the erythromelalgia might be also related to underlying depression and anxiety and thus patient would like to go ahead and try it again. She denies any history of jeffy or bipolar symptoms historically.     Review of systems:   10 point Review of Systems completed by Dr. Mcclelland, and is  is negative other than noted in the HPI     Medications:       gabapentin  600 mg Oral TID     indomethacin  25 mg Oral TID w/meals     lidocaine  1-2 patch Transdermal Q24h    And     lidocaine   Transdermal Q8H     mexiletine  150 mg Oral TID     NONFORMULARY 1 Application  1 Application Topical TID     [START ON 4/13/2021] omeprazole  20 mg Oral QAM AC     oxyCODONE  10 mg Oral BID     senna-docusate  2 tablet Oral Daily      acetaminophen **OR** acetaminophen, bacitracin, bisacodyl, HYDROcodone-acetaminophen, LORazepam, magnesium hydroxide, morphine, naloxone **OR** naloxone **OR** naloxone **OR** naloxone, ondansetron, polyethylene glycol, prochlorperazine **OR** prochlorperazine **OR** prochlorperazine    Mental Status Examination:     Appearance:  awake, alert  Eye Contact:  good  Speech:  clear, coherent  Language:Normal  Psychomotor Behavior:  no evidence of tardive dyskinesia, dystonia, or tics  Mood:  better  Affect:  mood congruent and reactive, at times tearful but controlled  Thought Process:  logical, linear and goal oriented no loose associations  Thought Content:  no evidence of suicidal ideation or homicidal ideation  Oriented to:  time, person, and place  Attention Span and Concentration:  intact  Recent and Remote Memory:  intact  Fund of Knowledge: appropriate  Muscle Strength and Tone: normal  Gait and Station: Normal  Insight:  good  Judgment:  intact        Labs/Vitals:   No results found for this or any previous visit (from the past 24 hour(s)).  B/P: 105/66, T: 98.1, P: 92, R: 16    Impression:   Ronna Rivera is an otherwise healthy 35 year old female with recent diagnosis of erythromelalgia in 3/2021 for which she follows with providers at [facility] who was admitted under observation on 4/9/2021 with acute worsening of bilateral pain. Pain refractory and severe that it caused worsening depression and suicidal ideation. With inpatient stabilization of pain she has experienced improvement and resoltion of her SI. Depression and anxiety remain improved but of concern to she and her . They have contemplated a re-trial of Cymbalta given theoretical benfeit on pain and perhaps note erythromyalgia can have complicating anxiety and depression.       DIagnoses:   1.Major depressive disorder, severe  2.Unspecified anxiety disorder  3. Erythromyalgia          Plan:   1. Continue Ativan PRN for anxiety  2.  Restart Cymalta 20 mg daily . Reviewed risks of SI, suicidal behavior, jeffy induction, mood/anxiety worsening, common side effects.   3. Patient does not require psychiatry admission thus may discharge once medically cleared for discharge  4. Re consult psychiatry as needed  Attestation:  Patient has been seen and evaluated by me,  Jose C Mcclelland MD

## 2021-04-12 NOTE — PLAN OF CARE
Cognitive Concerns/ Orientation : A&Ox4  BEHAVIOR & AGGRESSION TOOL COLOR: green  CIWA SCORE: n/a  ABNL VS/O2: VSS on RA  MOBILITY: SBA to BSC/chair  PAIN MANAGMENT: c/o bilateral foot pain. Using cold therapy. On scheduled OxyContin; PRN Norco x1  DIET: Reg, tolerating  BOWEL/BLADDER: continent, up to BSC; BM last evening   ABNL LAB/BG: n/a  DRAIN/DEVICES: PIV, saline locked   TELEMETRY RHYTHM: n/a  SKIN: ulcerations on R foot, dressings CDI. Hands/feet red and hot.   TESTS/PROCEDURES: n/a  D/C DAY/GOALS/PLACE: discharge likely today, pending pain managed and consults. Home with assist and home PT.   OTHER: WOC and pain team consults pending. Psych consult placed today.

## 2021-04-12 NOTE — PROGRESS NOTES
Care Management Follow Up    Length of Stay (days): 0    Expected Discharge Date: 04/12/21     Concerns to be Addressed:       Patient plan of care discussed at interdisciplinary rounds: Yes    Anticipated Discharge Disposition: Home      Anticipated Discharge Services:  Home care RN/PT/OT  Anticipated Discharge DME: Reclining wheelchair with elevating leg rests     Patient/family educated on Medicare website which has current facility and service quality ratings: NA   Education Provided on the Discharge Plan:  yes  Patient/Family in Agreement with the Plan: yes     Referrals Placed by CM/SW:  Still working on home care referral  Private pay costs discussed: Pt/spouse were given information by the Care Coordinator yesterday on costs of private duty HHA    Additional Information:  Met with pt and spouse.  Pt has to keep her feet elevated above the level of her head to ease pain that she suffers with her feet dependent.  She will need a reclining wheelchair to be able to have her legs elevated to this degree.  Discussed with Silvia at [facility].  She was sent a Demographic sheet and will get back to writer with what else is required for her insurance auth.    New PCP appt scheduled for 4/14/21   [facility] 4/15/21.      Florecita Cordero RN   Inpatient Care Management  [phone]

## 2021-04-12 NOTE — PLAN OF CARE
Cognitive Concerns/ Orientation : A&Ox4  BEHAVIOR & AGGRESSION TOOL COLOR: green  CIWA SCORE: NA  ABNL VS/O2: VSS on RA    MOBILITY: SBA to BSC  PAIN MANAGMENT: scheduled oxycontin, PRN norco, cold therapy  DIET: Reg, tolerating  BOWEL/BLADDER: continent  ABNL LAB/BG: n/a  DRAIN/DEVICES: none  TELEMETRY RHYTHM: NA  SKIN: ulcerations on feet dressings CDI  TESTS/PROCEDURES: none  D/C DAY/GOALS/PLACE: discharge tomorrow pending pain management and setting up discharge planning   here throughout day. Very helpful. Pain management moderate w cold therapy.

## 2021-04-12 NOTE — UTILIZATION REVIEW
[facility] Utilization Review  Admission Status; Secondary Review Determination     Admission Date: 4/9/2021  4:52 PM      Under the authority of the Utilization Management Committee, the utilization review process indicated a secondary review on the above patient.  The review outcome is based on review of the medical records, discussions with staff, and applying clinical experience noted on the date of the review.        (X)      Inpatient Status Appropriate - This patient's medical care is consistent with medical management for inpatient care and reasonable inpatient medical practice.          RATIONALE FOR DETERMINATION   35-year-old female with history of erythromelalgia, with chronic pain and rash, now admitted with acute worsening of bilateral lower extremity pain after lower extremity venous hemodynamic studies were performed earlier in the day of admission.  Discussed with Dr. Garcia, poorly controlled pain and has been on oral narcotics with increasing doses to transition to home, but now requiring OxyContin, Norco, Ativan, ketamine cream, gabapentin.  Patient still continues to have acute ongoing pain that requires interventions with close monitoring in the hospital with poor pain control, agree with change from observation to inpatient status with anticipated stay more than 2 midnight and aggressive pain control      The severity of illness, intensity of service provided, expected LOS and risk for adverse outcome make the care complex, high risk and appropriate for hospital admission.The patient requires hospital based medical care which is anticipated to require a stay of 2 or more midnights.        The information on this document is developed by the utilization review team in order for the business office to ensure compliance.  This only denotes the appropriateness of proper admission status and does not reflect the quality of care rendered.              Sincerely,       Agapito Tijerina,  MD  Physician Advisor  Utilization Review-[place]    Phone: [phone]

## 2021-04-12 NOTE — CONSULTS
[facility] Nurse Inpatient Assessment   Reason for consultation: Evaluate and treat bilateral feet    Assessment     Pt with complex syndrome that is effecting her bilateral feet, hands and face.   Face without any obvious or irritating presentations at this time, tissue intact, complaints of some heat off and on  Hands, tissue intact finger tips and distal knuckles very red and warm to touch.  Bilateral feet from toes to just above ankle with most impact.     The left foot, no edema, some complaints of pain, and intermittent swelling and erythema, one wound noted on dorsal aspect of foot, smooth, nonfluctuant, small serous scab.   Right foot most impacted and it is what is impacting her full life setting.  This foot randomly becomes severely edematous and hot, causing severe pain.  Wounds present do not appear infected, thin-feeling wounds.  Will use antimicrobial wound cleanser that is good for intact tissue and wounds and Plurogel to help with wound pain and debridment, Polymem foam to keep wound moist and compression- EdemaWear and will get a lymphedema consult        Treatment Plan    Bilateral feet plan of care: effective now   Daily  1. Clean feet/ toes with gentle soap and water  2. Clean wounds by spraying entire foot with Prophase wound cleanser and then wet gauze w Prophase, press to wound and let soak x 10-15minutes, wipe clean  3. Apply Plurogel to wound beds, cover with Polymem Foam (if tissue is moist the gel will slick off so may need to apply gel to the Polymem Foam and press to wound(s))  EdemaWear, small, from toes to knees, folding extra at the knees to make a cuff    Ideas for pain management  1. Confuse the nerves-  - consider a massage device to distract nerves from the pain- use around the knee, thigh areas  -when pain is intense tap above the foot/ ankle area, on the shin, etc  2. Try the lidocaine patches put place higher up on the leg vs just on the foot  area  3. Try different topical creams- menthols could cool but also dialate so th  3. Different ways to manage the swelling.  ? If you can manage the swelling can that help the pain? So more aggressive compression to toes foot? Am not sure this is right or wrong so go slowly and monitor closely as you have seen the toes go white and cold    Recommended provider order: n/a   Orders Written  WOC Nurse follow-up plan: tomorrow    Patient History    According to provider note(s):  35 year old female with recent diagnosis of erythromelalgia in 1/2021 for which she follows with providers at [facility] who was admitted under observation on 4/9/2021 with acute worsening of bilateral LE pain (R foot > LLE) after LE venous hemodynamic studies were performed earlier that day    Objective Data    Containment of urine/stool: Continent of bladder and Continent of bowel  Active Diet Order:  Orders Placed This Encounter      Regular Diet Adult    Labs:   Recent Labs   Lab 04/09/21  1725   ALBUMIN 3.7   HGB 13.2   WBC 7.9   CRP <2.9     Output:   I/O last 3 completed shifts:  In: -   Out: 250 [Urine:250]  Risk Assessment:   Sensory Perception: 4-->no impairment  Moisture: 4-->rarely moist  Activity: 3-->walks occasionally  Mobility: 3-->slightly limited  Nutrition: 3-->adequate  Friction and Shear: 3-->no apparent problem  Rodney Score: 20    Physical Exam    Bilateral feet assessment   Wound / skin history: Acute bilateral LE pain (R foot > LLE) dt Erythromelalgia, diagnosed in 1/2021  Had been seen by PCP for LE sx and was initially thought to have a fungal infection or cellulitis in her feet. Given lack of improvement with topical treatments, oral antibiotic. Was referred to [facility]. Has been seen by neurology and dermatology at [place] and, after additional testing, was diagnosed with erythromelalgia. Autoimmune workup was reportedly negative. Has tried various treatments for her pain (including topical ketoconazole, topical  fluocinonide, Naproxen, 7d course of Keflex, Xyzol, Gabapentin 300-900mg HS, Duloxetine 20mg daily) with minimal relief. Also see mention that lidoderm patches to the feet, topical amitriptyline and topical ketamine were advised but sounds like these treatments weren't particularly helpful either.Treated with 5d course of IV solumedrol earlier this month with no improvement. Had been using Norco prn. Had been seen in the ED for worsening pain on 4/3 PM and ultimately prescribed oral dilaudid (2-4mg q4h prn). Doesn't think the Dilaudid seemed to help much. At times is unable to ambulate around her house dt the pain and her  has to lift her. Was back at [place] for additional testing on 4/9. Testing included LE venous hemodynamic study with compression of her LEs to assess venous flow. Following this test, she developed worsening pain that was severe enough that she felt suicidal (had reportedly wrapped a phone cord around her neck, spouse found her shortly thereafter and removed it).  contact her provider at [place] who recommended she seek evaluation in the ED in part to assist with pain control. In the ED, was given 4mg IV morphine and 1mg IV Ativan. Admitted under observation. Started on OxyContin 10mg BID and continued on Norco 5/325mg ii tabs q4h prn. Gabapentin increased to 600mg TID.      -- cont OxyContin 10mg BID  -- cont Norco 5/325mg i-ii tabs q4h prn  -- cont gabapentin 600mg BID, can increase HS dose if needed as symptoms tend to worsen at night  -- cont mexiletine 150mg TID  -- would recommend resumption of duloxetine -- patient willing to consider  -- would also advise continued use of topical agents as previously advised  -- primary neurologist at [place] (Dr. Shannon De La Rosa) recommended pain management evaluation for possible stimulator or epidural catheter for clonidine infusion -- explained that will not be possible to do this hospital stay as no one here to manage that, but will see if we can  get her established at the pain clinic at discharge (pain team consult ordered on admission but won't be seen as it is currently the weekend), ?if Dr. De La Rosa would be able to coordinate infusion through [place]  -- cooling techniques as needed  -- added bowel regimen while on scheduled narcotics  -- has appt with Dr. Orta of [facility] on 5/21/21 can follow up with [place] as previously advised  -- PT consulted to issues with mobility at home -- pain worsens when attempting to ambulate so unclear if TCU stay would be of benefit  Photo date: April 12, 21 April 12, 21      Right lateral ankle April 12, 21        Left foot trace edema, wound on dorsal foot 0.9cm x 0.4cm x 0.2cm, dry no fluctuance, thin serous scab, ring of pink erythema, lightly warm to the touch, no pain  Right foot- generalized edema especially through ball of foot, intermittent hot to the touch, pink-red erythema and foot becomes very painful.  -dorsal aspect wound area about 5cm x 4cm of patchy, mostly dryish serous looking scabs, with a larger area of slightly moist adherent yellow slough, tender to the touch.  - right lateral ankle, 1.4cm x 1.4cm x 0.2cm dryish red dermal tissue,dry flaking periwound tissue.     Interventions  Current support surface: Standard  Atmos Air mattress,   Current off-loading measures: Pillows,   Visual inspection of wound(s) completed  Wound Care: done per plan of care,   Supplies: reviewed, gathered, placed at the bedside, discussed with RN and discussed with patient  Education provided: importance of repositioning, plan of care, wound progress and Off-loading pressure  Discussed plan of care with Patient, Family and Nurse and Dr Garcia, discussed lidocaine patches for pain    Robert Smith RN

## 2021-04-12 NOTE — CONSULTS
Inpatient Pharmacy Pain Consult:   I met with Ronna for about twenty minutes.   Others present during the interview include: wound care nurse,      Subjective:  Alertness: She was alert and conversational.     Anxiety: She did not present as anxious during our visit. She is engaged to work more with [place] to arrive at an on-going plan.     Competency as an historian: good     Non-Verbal Pain presentation: She is actively doing non-pharmaceutical measures including elevation and cold application during our visit.     Patient description of pain: Neuro-type electric bursts of pain can be very intense. As I understand the burning flushing is more prevalent on the lower limbs and feet with some facial involvement.     Triggers for pain: HEAT    Pain Score(s): Six-seven out of ten    Non-Pain Score Assessment of pain Control:  Quality of sleep/rest: we did not discuss   Participation in activity-therapy as ordered:  Ability to focus away from pain:    Objective:  Pertinent medical conditions/Labs to be aware for managing pain: New diagnosis of erythromelalgia, history of asthma, situational depression     Pertinent pain medication allergies/sensitivities: topiramate     Chronic pain history: Limited; history of - off opiates quickly.     Pertinent pain medication history: With recent diagnosis was initially on aspirin. She has a topical Rx of amitriptyline, ketamine and clonidine. PTA medications include mexiletine and gabapentin.     Side effects from pain medications: She reports no history of redness of itching from opiates per being on pain medications after . Presents as tolerating side effects of opiates.     Addiction history: She did not present as drug-seeking; was focused on results.     Assessment:  We discussed mental diversion techniques to focus away from pain including focused breathing. I encouraged patient to divert thoughts to activities of comfort (music, reading, TV, thinking  about favorite activities).  YES; she is familiar with yoga breathing.       Opiate Evaluation:  Do not suspect that histamine release from opiates adding to discomfort. She reports that oxycodone and hydrocodone are taking the edge off.     Agree with oxycontin 10 mg PO Q 12 H; agree with hydrocodone 5/325 2 tabs PO Q 4 H PRN. Other option would hydrocodone/acetaminophen 7.5/325 1 PO Q 4 H PRN.     Non-Opiate Evaluation:  Acetaminophen: See Vicodin. Consider stopping plain acetaminophen.    Anti-anxiety agents: Defer; see that lorazepam is of help.   Anti-depressants: Literature seems to lean towards medications affecting norepinephrine instead of serotonin.   Anti-histamines: Do not recommend. Antihistamines like hydroxyzine have been tried with erythromelalgia.  Muscle relaxants: Do not recommend.  Neuropathic agents: See gabapentin and mexiletine.   NSAIDS: Consider adding indomethacin 25 mg PO TID with meals/food.   Topical products: See PTA topical Rx.   ICE/Heat: COLD!   Therapy PT/OT: defer     Plan/Recommendations:  1. Agree with multimodal approach, but at later time suggest will want to subtract what may be minimally effective.   2. Agree with oxycontin 10 mg PO Q 12 H; agree with hydrocodone/acetaminophen 5/325 2 tabs PO Q 4 H PRN. Other option would be hydrocodone/acetaminophen 7.5/325 1 PO Q 4 H PRN.  3. Consider adding indomethacin 25 mg PO TID with meals/food.   Utilized the article below:   Yoana Jensen and Lizbet Gutierrez; Current pain management strategies for patients with erythromelalgia: a critical review; J Pain Res. 2018; 11: 1875-8527    Plan for follow-up: Plan to follow patient for the next 24 hours. Encourage re-consult if pain management becomes complex again or needs further follow-up.  Thank you for this consult!  Haim Steeleyuliana, Cell Phone: [phone]

## 2021-04-13 VITALS
HEIGHT: 62 IN | TEMPERATURE: 98.6 F | SYSTOLIC BLOOD PRESSURE: 97 MMHG | OXYGEN SATURATION: 100 % | DIASTOLIC BLOOD PRESSURE: 63 MMHG | BODY MASS INDEX: 20.24 KG/M2 | HEART RATE: 79 BPM | RESPIRATION RATE: 18 BRPM | WEIGHT: 110 LBS

## 2021-04-13 PROCEDURE — 250N000013 HC RX MED GY IP 250 OP 250 PS 637: Performed by: INTERNAL MEDICINE

## 2021-04-13 PROCEDURE — 250N000013 HC RX MED GY IP 250 OP 250 PS 637: Performed by: PSYCHIATRY & NEUROLOGY

## 2021-04-13 PROCEDURE — 99239 HOSP IP/OBS DSCHRG MGMT >30: CPT | Performed by: INTERNAL MEDICINE

## 2021-04-13 PROCEDURE — G0463 HOSPITAL OUTPT CLINIC VISIT: HCPCS | Mod: 25

## 2021-04-13 PROCEDURE — 97602 WOUND(S) CARE NON-SELECTIVE: CPT

## 2021-04-13 RX ORDER — GABAPENTIN 300 MG/1
600 CAPSULE ORAL 3 TIMES DAILY
Qty: 84 CAPSULE | Refills: 0 | Status: SHIPPED | OUTPATIENT
Start: 2021-04-13 | End: 2021-10-27

## 2021-04-13 RX ORDER — DULOXETIN HYDROCHLORIDE 20 MG/1
20 CAPSULE, DELAYED RELEASE ORAL DAILY
Qty: 14 CAPSULE | Refills: 0 | Status: SHIPPED | OUTPATIENT
Start: 2021-04-13 | End: 2021-10-27

## 2021-04-13 RX ORDER — POLYETHYLENE GLYCOL 3350 17 G/17G
17 POWDER, FOR SOLUTION ORAL DAILY
Qty: 510 G | COMMUNITY
Start: 2021-04-13 | End: 2021-06-03

## 2021-04-13 RX ORDER — LORAZEPAM 0.5 MG/1
0.5 TABLET ORAL EVERY 4 HOURS PRN
Qty: 20 TABLET | Refills: 0 | Status: SHIPPED | OUTPATIENT
Start: 2021-04-13 | End: 2021-05-05

## 2021-04-13 RX ORDER — PROCHLORPERAZINE MALEATE 5 MG
5 TABLET ORAL EVERY 6 HOURS PRN
Qty: 20 TABLET | Refills: 0 | Status: SHIPPED | OUTPATIENT
Start: 2021-04-13 | End: 2021-05-05

## 2021-04-13 RX ORDER — AMOXICILLIN 250 MG
2 CAPSULE ORAL DAILY
Qty: 30 TABLET | Refills: 0 | Status: SHIPPED | OUTPATIENT
Start: 2021-04-14 | End: 2021-06-03

## 2021-04-13 RX ORDER — HYDROCODONE BITARTRATE AND ACETAMINOPHEN 7.5; 325 MG/1; MG/1
1 TABLET ORAL EVERY 4 HOURS PRN
Qty: 20 TABLET | Refills: 0 | Status: SHIPPED | OUTPATIENT
Start: 2021-04-13 | End: 2021-10-27

## 2021-04-13 RX ORDER — OXYCODONE HCL 10 MG/1
10 TABLET, FILM COATED, EXTENDED RELEASE ORAL 2 TIMES DAILY
Qty: 8 TABLET | Refills: 0 | Status: SHIPPED | OUTPATIENT
Start: 2021-04-13 | End: 2021-10-04

## 2021-04-13 RX ORDER — INDOMETHACIN 25 MG/1
25 CAPSULE ORAL
Qty: 42 CAPSULE | Refills: 0 | Status: SHIPPED | OUTPATIENT
Start: 2021-04-13 | End: 2021-05-03

## 2021-04-13 RX ADMIN — DULOXETINE HYDROCHLORIDE 20 MG: 20 CAPSULE, DELAYED RELEASE ORAL at 13:07

## 2021-04-13 RX ADMIN — MEXILETINE HYDROCHLORIDE 150 MG: 150 CAPSULE ORAL at 13:09

## 2021-04-13 RX ADMIN — LORAZEPAM 0.5 MG: 0.5 TABLET ORAL at 14:08

## 2021-04-13 RX ADMIN — INDOMETHACIN 25 MG: 25 CAPSULE ORAL at 08:36

## 2021-04-13 RX ADMIN — OMEPRAZOLE 20 MG: 20 CAPSULE, DELAYED RELEASE ORAL at 08:36

## 2021-04-13 RX ADMIN — INDOMETHACIN 25 MG: 25 CAPSULE ORAL at 13:09

## 2021-04-13 RX ADMIN — GABAPENTIN 600 MG: 300 CAPSULE ORAL at 08:36

## 2021-04-13 RX ADMIN — HYDROCODONE BITARTRATE AND ACETAMINOPHEN 1 TABLET: 7.5; 325 TABLET ORAL at 14:08

## 2021-04-13 RX ADMIN — SENNOSIDES AND DOCUSATE SODIUM 2 TABLET: 8.6; 5 TABLET ORAL at 08:36

## 2021-04-13 RX ADMIN — MEXILETINE HYDROCHLORIDE 150 MG: 150 CAPSULE ORAL at 08:36

## 2021-04-13 RX ADMIN — OXYCODONE HYDROCHLORIDE 10 MG: 10 TABLET, FILM COATED, EXTENDED RELEASE ORAL at 08:36

## 2021-04-13 ASSESSMENT — ACTIVITIES OF DAILY LIVING (ADL)
ADLS_ACUITY_SCORE: 16
ADLS_ACUITY_SCORE: 17
ADLS_ACUITY_SCORE: 15
ADLS_ACUITY_SCORE: 17

## 2021-04-13 NOTE — PROGRESS NOTES
[facility]    Ronna Rivera  April 13, 2021    Ronna is followed related to Complex Care Plan which indicates plan to discharge home with supports for chronic pain and medical condition. The patient will not be admitted to a behavioral unit.. Please see initial DEC Crisis Assessment completed for complete assessment information. Medical record is reviewed ; spoke with patient care team, including Dr. Garcia. Dr. Garcia reports that the patient is discharging today, 04/13/21. Started patient on Cymbalta to try to decrease pain. Patient not endorsing SI. Patient has a PCP appointment scheduled for 04/14/21 and an appointment with the [facility] on 04/15/21. Care Management has also been following the patient.     There are not significant status changes. Full DEC Reassessment is not recommended at this time. Extended Care continues to be available for review of changes to initial crisis state resulting in this encounter.     Extended Care will follow and meet with patient/family/care team as able or requested.     Kailey Martinez, [facility], [facility]/DEC Extended Care   [phone]

## 2021-04-13 NOTE — PROGRESS NOTES
Care Management Discharge Note    Discharge Date: 04/13/21       Discharge Disposition: Home     Discharge Services: [facility] ([phone]) (fax [phone])    Discharge DME: Reclining wheelchair with elevated leg rests has been ordered through [facility] ([phone])   Walker was provided at discharge    Discharge Transportation: family or friend will provide    Private pay costs discussed: Not applicable    PAS Confirmation Code:  NA  Patient/family educated on Medicare website which has current facility and service quality ratings: yes     Education Provided on the Discharge Plan:  yes  Persons Notified of Discharge Plans:pt, Nsg  Patient/Family in Agreement with the Plan:  yes    Handoff Referral Completed:  Yes    Additional Information:  Appointments made for pt:  New PCP on 4/14/21  [facility] 4/15/21    Have called [facility] ([phone]) estimated [phone] concerning reclining wheelchair with elevated leg rests.  They noted it is still under review concerning if they have all the information they need.  They have been updated that pt will establish care with [facility] on 4/14/21 and are aware that pt has been discharged.          Florecita Cordero, RN   Inpatient Care Management  [phone]

## 2021-04-13 NOTE — PLAN OF CARE
Physical Therapy Discharge Summary    Reason for therapy discharge:    Discharged to home with home therapy.    Progress towards therapy goal(s). See goals on Care Plan in [facility] electronic health record for goal details.  Goals partially met.  Barriers to achieving goals:   discharge from facility. Bed mobility goal met, transfer and gait goals not met.    Therapy recommendation(s):    Continued therapy is recommended.  Rationale/Recommendations:  eval and treat with home PT.

## 2021-04-13 NOTE — PLAN OF CARE
Cognitive Concerns/ Orientation : A&Ox4  BEHAVIOR & AGGRESSION TOOL COLOR: GREEN  CIWA SCORE: n/a  ABNL VS/O2: VSS on RA  MOBILITY: independently transferring from chair to bed to commode-steady on feet  PAIN MANAGMENT: c/o bilateral foot pain. Using cold therapy. On scheduled OxyContin; Pain cream compound in pt's room, from home ( verified and labeled), lidocaine patches removed this am. Resting   DIET: Regular, tolerating  BOWEL/BLADDER: continent, up to BSC. Feels constipated  ABNL LAB/BG: NA  DRAIN/DEVICES: PIV, saline locked   TELEMETRY RHYTHM: n/a  SKIN: ulcerations on R foot, dressings CDI. Hands/feet warm with various redden areas.   TESTS/PROCEDURES: n/a  D/C DAY/GOALS/PLACE: Discharge today, meds filled, will give norco and ativan prior to discharging to home to assist in managing pain

## 2021-04-13 NOTE — PLAN OF CARE
Summary:  inpatient status now.  DATE & TIME:4/12-4/13/21 Night shift  Cognitive Concerns/ Orientation : A&Ox4  BEHAVIOR & AGGRESSION TOOL COLOR: GREEN  CIWA SCORE: n/a  ABNL VS/O2: VSS on RA  MOBILITY: SBA/ind  to BSC/chair  PAIN MANAGMENT: c/o bilateral foot pain. Using cold therapy. On scheduled OxyContin; Pain cream compound in pt's room, from home ( verified and labeled), lidocaine patches to bilateral calves. Slept well most of shift  DIET: Reg, tolerating  BOWEL/BLADDER: continent, up to BSC. Feels constipated  ABNL LAB/BG: ; AST 67  DRAIN/DEVICES: PIV, saline locked   TELEMETRY RHYTHM: n/a  SKIN: ulcerations on R foot, dressings CDI. Hands/feet red and hot. Pt replaced WOC dressings with her personal due to them being too hot and increasing pain.  TESTS/PROCEDURES: n/a  D/C DAY/GOALS/PLACE: Discharge potentially today, pending pain managed and consults. Home with assist and home PT.   OTHER: Psych consult completed. Needs home reclining wheelchair.

## 2021-04-13 NOTE — DISCHARGE SUMMARY
[facility]    Discharge Summary  Hospitalist    Date of Admission:  4/9/2021  Date of Discharge:  4/13/2021  Discharging Provider: Lola Garcia    Discharge Diagnoses   Acute bilateral LE pain dt Erythromelalgia, diagnosed in 3/2021  Suicidal Ideation with depression dt severe pain: Resolved  History of asthma    History of Present Illness   Ronna Rivera is an otherwise healthy 35 year old female with recent diagnosis of erythromelalgia in 3/2021 for which she follows with providers at [facility] who was admitted  on 4/9/2021 with acute worsening of bilateral LE pain (R foot > LLE) after LE venous hemodynamic studies were performed earlier that day.     Hospital Course   Ronna Rivera was admitted on 4/9/2021.  The following problems were addressed during her hospitalization:    Acute bilateral LE pain dt Erythromelalgia, erythromelalgia was diagnosed in 3/2021  * Had been seen by PCP (Dr. Cantu through [facility]) for LE sx and was initially thought to have a fungal infection or cellulitis in her feet. Given lack of improvement with topical treatments, oral antibiotic was referred to [facility].   * At [facility], was seen by neurology (Dr. De La Rosa) and dermatology (Dr. Smith) at [facility] and, after additional testing, was diagnosed with erythromelalgia. Autoimmune workup was reportedly negative. Has tried various treatments for her pain (including topical ketoconazole, topical fluocinonide, Naproxen, 7d course of Keflex, Xyzol, Gabapentin 300-900mg HS, Duloxetine 20mg daily) with minimal relief. Also see mention that lidoderm patches to the feet, topical amitriptyline and topical ketamine were advised but sounds like these treatments weren't particularly helpful either. Treated with 5d course of IV solumedrol earlier this month with no improvement. Had been using Norco prn.   * Had been seen in [facility] ED for worsening pain on 4/3 PM and prescribed oral dilaudid (2-4mg q4h  prn). Doesn't think the Dilaudid seemed to help much. At times is unable to ambulate around her house dt the pain and her  has to lift her.   * Was back at [place] for additional testing on 4/9. Testing included LE venous hemodynamic study with compression of her LEs to assess venous flow (per later update from Dr. De La Rosa, test findings were consistent with diagnosis of erythromelalgia). Following this test, she developed worsening pain that was severe enough that she felt suicidal (had reportedly wrapped a phone cord around her neck, spouse found her shortly thereafter and removed it).  contact her provider at [place] who recommended she seek evaluation in the ED in part to assist with pain control.   * In the ED, was given 4mg IV morphine and 1mg IV Ativan. Admitted under observation for continued pain control. Started on OxyContin 10mg BID and continued on Norco 5/325mg ii tabs q4h prn. Gabapentin increased to 600mg TID.     Pain meds adjusted this stay with assistance from several services (pain management pharmacist, psych).     Plan at discharge included:   -- OxyContin 10mg BID, discharged with bowel regimen as well  -- Norco 7.5/325mg i tab po q4h   -- indomethacin 25mg po TID (also added PPI)  -- gabapentin 600mg TID (can increase HS dose if needed as symptoms tend to worsen at night)  -- mexiletine 150mg TID  -- Cymbalta 20mg daily  -- use of topical agents (ketamine/lidoderm) to feet as previously advised    Recommended to continue cooling techniques as needed.    Primary neurologist at [place] (Dr. Shannon De La Rosa) recommended pain management evaluation for possible stimulator or epidural catheter for clonidine infusion -- was not be possible to do this hospital stay as no one here to manage chronic pain or epidural infusion. Recommended she establish with pain clinic after discharge (appt made as below), could also consider if Dr. De La Rosa would be able to coordinate infusion through  [facility].    Recommended to keep legs elevated as able as this seemed to help with her symptoms (notes acute worsening in warmth/edema/swelling when legs are in a dependent position). Orders placed for a reclining wheelchair to allow for her to better keep her legs elevated (and hopefully reduce risk of exacerbation of pain). Home RN, PT/OT also ordered    During hospitalization, I discussed with patient and  at length that opioids and benzodiazepines are not a good long-term solution to her pain and that I worry they could lead to addiction. We discussed that we are using these meds now as a bridge to try and manage her pain until she can establish with a pain specialist and follow up with neurology provider at the [facility] to come up with a better long-term solution. They seemed to be understanding of this. Also recommended to seek out support groups for patients/family with her condition.     Has appt with new [facility] PCP on 4/14/21 and with [facility] on 4/15/21  Has appt with Dr. Orta of [facility] on 5/21/21  Can follow up with [facility] team as previously advised.    I did recommend that if patient were to get into a pain crisis again, it may be worthwhile to present directly to the [facility], as they may have better options for specialty evaluation and pain management services (services available to manage chronic pain and pain crises are limited at [facility]).       Suicidal Ideation with depression dt severe pain: Resolved  Reported suicidal ideation around the time of admission dt severity of her pain.   Seen by counselor on 4/10 and felt to no longer be suicidal (which was confirmed during hospitalist visit).   Reported increased anxiety surrounding pain crises. Started on Ativan prn to help mitigate sx.   Reassessed by psych on 4/12. Recommended initiation of Cymbalta 20mg daily.     Discharged with Cymbalta and Ativan prn for breakthrough anxiety. Will follow up with   [facility] in clinic for ongoing evaluation.      History of asthma  Stable. No concerns presently.    Lola Garcia DO      Code Status   Full Code       Primary Care Physician   [facility]    Physical Exam   Temp: 98.6  F (37  C) Temp src: Oral BP: 97/63 Pulse: 79   Resp: 18 SpO2: 100 % O2 Device: None (Room air)    Vitals:    04/09/21 1643   Weight: 49.9 kg (110 lb)     Vital Signs with Ranges  Temp:  [97.7  F (36.5  C)-98.6  F (37  C)] 98.6  F (37  C)  Pulse:  [69-92] 79  Resp:  [16-18] 18  BP: ()/(58-66) 97/63  SpO2:  [98 %-100 %] 100 %  I/O last 3 completed shifts:  In: -   Out: 250 [Urine:250]    General: Resting comfortably, alert, conversive, NAD  CVS: HRRR, no MGR, no LE edema  Respiratory: CTAB, no wheeze/rales/rhonchi, no increased work of breathing  GI: S, NT, ND, +BS  Skin: Warm/dry, +wounds/excoriations on dorsum of feet and lateral malleolus  Neuro: CNs 2-12 intact, no focal motor/sensory deficits    Discharge Disposition   Discharged to home  Condition at discharge: Stable    Consultations This Hospital Stay    PSYCHIATRY IP CONSULT  PAIN MANAGEMENT ADULT IP CONSULT  WOUND OSTOMY CONTINENCE NURSE  IP CONSULT  CARE MANAGEMENT / SOCIAL WORK IP CONSULT  PHYSICAL THERAPY ADULT IP CONSULT    Time Spent on this Encounter   ILola DO, personally saw the patient today and spent greater than 30 minutes discharging this patient.    Discharge Orders      Care Coordination Referral      Home care nursing referral      Home Care PT Referral for Hospital Discharge      Home Care OT Referral for Hospital Discharge      Follow-up and recommended labs and tests     Follow up with [facility] as scheduled for you on Thursday 4/15/21 at 9:05 AM  [address] [place] [id number]  ([phone])     Reason for your hospital stay    To better manage your ongoing pain.     Follow-up and recommended labs and tests     Follow up with new PCP at [facility] on 4/14/21.   Follow up with [facility] as scheduled on 4/15/21.   Follow up with Dr. Orta at the [facility] as scheduled  Follow up with Dawna De La Rosa and Luis at [facility] as needed.     Activity    Your activity upon discharge: activity as tolerated     MD face to face encounter    Documentation of Face to Face and Certification for Home Health Services    I certify that patient: Ronna Rivera is under my care and that I, or a nurse practitioner or physician's assistant working with me, had a face-to-face encounter that meets the physician face-to-face encounter requirements with this patient on: 4/13/2021.    This encounter with the patient was in whole, or in part, for the following medical condition, which is the primary reason for home health care: recently diagnosed erythromelalgia with associated bilateral LE foot pain/swelling.    I certify that, based on my findings, the following services are medically necessary home health services: Nursing, Occupational Therapy and Physical Therapy.    My clinical findings support the need for the above services because: Nurse is needed: to assess response to pain management treatments started this stay., Occupational Therapy Services are needed to assess and treat cognitive ability and address ADL safety due to impairment in pain associated with erythromelalgia. and Physical Therapy Services are needed to assess and treat the following functional impairments: limited mobility dt pain associated with erytheromelalgia.    Further, I certify that my clinical findings support that this patient is homebound (i.e. absences from home require considerable and taxing effort and are for medical reasons or [facility] services or infrequently or of short duration when for other reasons) because: patient can no longer drive due to pain from erythromelalgia.    Based on the above findings. I certify that this patient is confined to the  home and needs intermittent skilled nursing care, physical therapy and/or speech therapy.  The patient is under my care, and I have initiated the establishment of the plan of care.  This patient will be followed by a physician who will periodically review the plan of care.  Physician/Provider to provide follow up care: Clinic, [facility]    [facility] physician (the Medicare certified [facility] provider): Lola Garcia DO  Physician Signature: See electronic signature associated with these discharge orders.  Date: 4/13/2021     Walker Order    DME Documentation:   Describe the reason for need to support medical necessity: impaired functional mobility    I, the undersigned, certify that the above prescribed supplies are medically necessary for this patient and is both reasonable and necessary in reference to accepted standards of medical and necessary in reference to accepted standards of medical practice in the treatment of this patient's condition and is not prescribed as a convenience.     Wheelchair    Wheelchair Documentation:   Describe the reason for need to support medical necessity: chronic lower extremity due to erythromelalgia  1. The patient has mobility limitations that impairs their ability to participate in one or more mobility related activities: all ADLs.  2. The patient's mobility limitations cannot be safely resolved by using a cane/walker: No  3. The patients home has adequate access to use a manual wheelchair: Yes  4. The use of a manual wheelchair on a regular basis will improve the patients ability to participate in mobility related ADL's at home: Yes  5. The patient is willing to use a manual wheelchair at home: Yes  6. The patient has adequate upper body strength and the mental capability to safely use a manual wheelchair and/or has a caregiver that is able to assist: Yes  7. Does the patient have a lower extremity injury or edema?: Yes    Reason for Type of  Wheelchair: Light Weight Wheelchair: Patient is unable to self-propel a standard wheelchair in the home but can self propel a light weight wheelchair.    **Use of a manual wheelchair will significantly improve the patient's ability to participate in MRADLs and the patient will use it on a regular basis in the home. The patient has not expressed an unwillingness to use the manual wheelchair that is provided in the home.**    I, the undersigned, certify that the above prescribed supplies are medically necessary for this patient and is both reasonable and necessary in reference to accepted standards of medical and necessary in reference to accepted standards of medical practice in the treatment of this patient's condition and is not prescribed as a convenience.     Diet    Follow this diet upon discharge: Regular     Discharge Medications   Current Discharge Medication List      START taking these medications    Details   DULoxetine (CYMBALTA) 20 MG capsule Take 1 capsule (20 mg) by mouth daily for 14 days  Qty: 14 capsule, Refills: 0    Associated Diagnoses: Erythromelalgia (H); Chronic pain syndrome      HYDROcodone-acetaminophen (NORCO) 7.5-325 MG per tablet Take 1 tablet by mouth every 4 hours as needed for moderate to severe pain  Qty: 20 tablet, Refills: 0    Associated Diagnoses: Erythromelalgia (H); Chronic pain syndrome      indomethacin (INDOCIN) 25 MG capsule Take 1 capsule (25 mg) by mouth 3 times daily (with meals) for 14 days  Qty: 42 capsule, Refills: 0    Associated Diagnoses: Erythromelalgia (H); Chronic pain syndrome      LORazepam (ATIVAN) 0.5 MG tablet Take 1 tablet (0.5 mg) by mouth every 4 hours as needed for anxiety  Qty: 20 tablet, Refills: 0    Associated Diagnoses: Erythromelalgia (H); Chronic pain syndrome      omeprazole (PRILOSEC) 20 MG DR capsule Take 1 capsule (20 mg) by mouth every morning (before breakfast) for 14 days  Qty: 14 capsule, Refills: 0    Associated Diagnoses: Preventive  measure      oxyCODONE (OXYCONTIN) 10 MG 12 hr tablet Take 1 tablet (10 mg) by mouth 2 times daily  Qty: 8 tablet, Refills: 0    Associated Diagnoses: Erythromelalgia (H); Chronic pain syndrome      polyethylene glycol (MIRALAX) 17 GM/Dose powder Take 17 g by mouth daily  Qty: 510 g    Associated Diagnoses: Preventive measure      prochlorperazine (COMPAZINE) 5 MG tablet Take 1 tablet (5 mg) by mouth every 6 hours as needed for nausea or vomiting  Qty: 20 tablet, Refills: 0    Associated Diagnoses: Nausea      senna-docusate (SENOKOT-S/PERICOLACE) 8.6-50 MG tablet Take 2 tablets by mouth daily  Qty: 30 tablet, Refills: 0    Associated Diagnoses: Preventive measure         CONTINUE these medications which have CHANGED    Details   gabapentin (NEURONTIN) 300 MG capsule Take 2 capsules (600 mg) by mouth 3 times daily  Qty: 84 capsule, Refills: 0    Associated Diagnoses: Erythromelalgia (H); Chronic pain syndrome         CONTINUE these medications which have NOT CHANGED    Details   melatonin 1 MG TABS tablet Take 1-2 mg by mouth nightly as needed for sleep      mexiletine (MEXITIL) 150 MG capsule Take 150 mg by mouth 3 times daily      NONFORMULARY Apply topically 3 times daily Ketamine 0.5/Amit2% in Lidoderm: apply to the feet      ondansetron (ZOFRAN-ODT) 4 MG ODT tab Take 4 mg by mouth every 6 hours as needed for nausea         STOP taking these medications       HYDROcodone-acetaminophen (NORCO) 5-325 MG tablet Comments:   Reason for Stopping:         HYDROmorphone (DILAUDID) 2 MG tablet Comments:   Reason for Stopping:             Allergies   Allergies   Allergen Reactions     Topiramate Anxiety     Data   Most Recent 3 CBC's:  Recent Labs   Lab Test 04/09/21  1725 04/03/21  2037   WBC 7.9 7.8   HGB 13.2 12.6   MCV 94 92    249      Most Recent 3 BMP's:  Recent Labs   Lab Test 04/09/21  1725      POTASSIUM 3.8   CHLORIDE 107   CO2 29   BUN 14   CR 0.81   ANIONGAP 3   KEVAN 8.7   *     Most Recent  2 LFT's:  Recent Labs   Lab Test 04/09/21  1725   AST 67*   *   ALKPHOS 73   BILITOTAL 0.4

## 2021-04-13 NOTE — DISCHARGE INSTRUCTIONS
"A referral has been sent to [facility] for home RN, Physical and Occupational therapies.  They will call you for scheduling.  Their phone number is [phone].    A reclining wheelchair with elevated leg rests has been ordered through [facility].  This will need to be authorized by your insurance before you will be able to get this.  Their phone number is [phone].    Bilateral feet plan of care: effective now   Daily wound care  1. Clean feet/ toes with gentle products, soap and water  2. Clean wounds by wetting gauze with wound cleanser and pressing to wound(s) x 10-15 minutes, Prophase or Vashe, pat dry   3. Apply smear of Iodosorb Gel and Plurogel to wound beds (does not matter which you apply first and if wound bed is wet gels will slick off so will need to apply to dressing then press to wound)- (especially to the wound on the right outside ankle and the top of the right foot) NOTE- mix up wound care plans after 2weeks, I dont like to do any one plan for extended times, you need restimulate wounds with new plans  4. Cover with dressing   - dry gauze or Telfa, held in place with compression stockings  OR  - cut any dressing to what every size may be helpful- try a small piece of Polymem Foam or Mepilex Lite or Mepilex Border- you don't need to use full dressings  5. Lotion  - try Vaseline-  pretty clean and simple  - try Thony then Vaseline and or other lotions that do not bother you  6. Creams  - apply pain medications per md  - look up where the nerves innervate the foot- this is best place to the pain gels and lidocaine patches, etc  - ask about use of morphine gel? Baljeet if morphine has worked good for you as a pain med, * am just not sure if appropriate*  - explore various cooling gels vs the use of the ice baths- can you get the cooling relief with various creams/ gels?   - think- \"Biofreeze\" OR \"cooling gel\" etc, to different brands  7. Try various modes of compression  -monitor for " too much or too little compression  - consider that toes may need no, some or more compression  - need most compression through the ball of the foot  - try combing various stockings, etc to get the best combo for the right foot and perhaps a different plan for the left foot  - may need a couple of different stockings at one time  - Do you need compression up to the groin? Will this help?    Idea behind backing off on the ice/ water baths is it constricts the blood vessels then you have less blood flow= higher potential for skin/ tissue damage=wounds    Ideas for pain management  1. Confuse the nerves-  - consider a massage device to distract nerves from the pain- use around the knee, thigh areas  -when pain is intense tap above the foot/ ankle area, on the shin, etc  2. Try the lidocaine patches but place higher up on the leg vs just on the foot area    Comments  - get a notebook to track details  - pay careful attention to what happened before an onset of swelling and document

## 2021-04-13 NOTE — PROGRESS NOTES
[facility] Nurse Inpatient Assessment   Reason for consultation: Evaluate and treat bilateral feet    Assessment     Pt with complex syndrome that is effecting her bilateral feet, hands and face.   Face without any obvious or irritating presentations at this time, tissue intact, complaints of some mild heat off and on  Hands, tissue intact finger tips and distal knuckles can be very red and warm to touch, but no significant pain at this time.  Bilateral feet from toes to just above ankle with most impact.     The left foot, no edema, some complaints of pain, and intermittent swelling and erythema, one wound noted on dorsal aspect of foot, smooth, nonfluctuant, small serous scab, mild erythema to periwound tissue.   Right foot most impacted and it is what is impacting her full life setting.  This foot randomly becomes severely edematous and hot, causing severe pain.  Wounds present do not appear infected,  with thin-feeling scab vs slough to all wounds.  Am changing the plan a bit today to use Plurogel and Iodosorb Gel.       Today the right foot looks better today than yesterday, wounds more defined, skin less dry and , with use of Vaseline as the   I spent considerable time with pt and wife discussing product options related to pain, wound healing and compression and skin care  Treatment Plan    Bilateral feet plan of care: effective now   Daily wound care  1. Clean feet/ toes with gentle products, soap and water  2. Clean wounds by wetting gauze with wound cleanser and pressing to wound(s) x 10-15 minutes, Prophase or Vashe, pat dry   3. Apply smear of Iodosorb Gel and Plurogel to wound beds (does not matter which you apply first and if wound bed is wet gels will slick off so will need to apply to dressing then press to wound)- (especially to the wound on the right outside ankle and the top of the right foot) NOTE- mix up wound care plans after 2weeks, I dont like to do any one plan  "for extended times, you need restimulate wounds with new plans  4. Cover with dressing   - dry gauze or Telfa, held in place with compression stockings  OR  - cut any dressing to what every size may be helpful- try a small piece of Polymem Foam or Mepilex Lite or Mepilex Border- you don't need to use full dressings  5. Lotion  - try Vaseline-  pretty clean and simple  - try Thony then Vaseline and or other lotions that do not bother you  6. Creams  - apply pain medications per md  - look up where the nerves innervate the foot- this is best place to the pain gels and lidocaine patches, etc  - ask about use of morphine gel? Baljeet if morphine has worked good for you as a pain med, * am just not sure if appropriate*  - explore various cooling gels vs the use of the ice baths- can you get the cooling relief with various creams/ gels?   - think- \"Biofreeze\" OR \"cooling gel\" etc, to different brands  7. Try various modes of compression  -monitor for too much or too little compression  - consider that toes may need no, some or more compression  - need most compression through the ball of the foot  - try combing various stockings, etc to get the best combo for the right foot and perhaps a different plan for the left foot  - may need a couple of different stockings at one time  - Do you need compression up to the groin? Will this help?    Idea behind backing off on the ice/ water baths is it constricts the blood vessels then you have less blood flow= higher potential for skin/ tissue damage=wounds    Ideas for pain management  1. Confuse the nerves-  - consider a massage device to distract nerves from the pain- use around the knee, thigh areas  -when pain is intense tap above the foot/ ankle area, on the shin, etc  2. Try the lidocaine patches but place higher up on the leg vs just on the foot area    Comments  - get a notebook to track details  - pay careful attention to what happened before an onset of swelling and " document    Recommended provider order: n/a   Orders Written  [facility] Nurse follow-up plan: 2x week    Patient History    According to provider note(s):  Ronna Rivera is an otherwise healthy 35 year old female with recent diagnosis of erythromelalgia in 3/2021 for which she follows with providers at [facility] who was admitted  on 4/9/2021 with acute worsening of bilateral LE pain (R foot > LLE) after LE venous hemodynamic studies were performed earlier that day.        Objective Data    Containment of urine/stool: Continent of bladder and Continent of bowel  Active Diet Order:  Orders Placed This Encounter      Regular Diet Adult      Diet    Labs:   Recent Labs   Lab 04/09/21  1725   ALBUMIN 3.7   HGB 13.2   WBC 7.9   CRP <2.9     Output:   I/O last 3 completed shifts:  In: -   Out: 250 [Urine:250]  Risk Assessment:   Sensory Perception: 4-->no impairment  Moisture: 4-->rarely moist  Activity: 3-->walks occasionally  Mobility: 3-->slightly limited  Nutrition: 3-->adequate  Friction and Shear: 3-->no apparent problem  Rodney Score: 20    Physical Exam    Bilateral feet assessment   Wound / skin history: Acute bilateral LE pain (R foot > LLE) dt Erythromelalgia, diagnosed in 3/2021  Had been seen by PCP for LE sx and was initially thought to have a fungal infection or cellulitis in her feet. Given lack of improvement with topical treatments, oral antibiotic. Was referred to [facility]. Has been seen by neurology (Dr. De La Rosa) and dermatology (Dr. Smith) at [facility] and, after additional testing, was diagnosed with erythromelalgia. Autoimmune workup was reportedly negative. Has tried various treatments for her pain (including topical ketoconazole, topical fluocinonide, Naproxen, 7d course of Keflex, Xyzol, Gabapentin 300-900mg HS, Duloxetine 20mg daily) with minimal relief. Also see mention that lidoderm patches to the feet, topical amitriptyline and topical ketamine were advised but sounds like these treatments  weren't particularly helpful either. Treated with 5d course of IV solumedrol earlier this month with no improvement. Had been using Norco prn. Had been seen in the ED for worsening pain on 4/3 PM and ultimately prescribed oral dilaudid (2-4mg q4h prn). Doesn't think the Dilaudid seemed to help much. At times is unable to ambulate around her house dt the pain and her  has to lift her. Was back at [facility] for additional testing on 4/9. Testing included LE venous hemodynamic study with compression of her LEs to assess venous flow (per later update from Dr. De La Rosa, test findings were consistent with diagnosis of erythromelalgia). Following this test, she developed worsening pain that was severe enough that she felt suicidal (had reportedly wrapped a phone cord around her neck, spouse found her shortly thereafter and removed it).  contact her provider at [facility] who recommended she seek evaluation in the ED in part to assist with pain control. In the ED, was given 4mg IV morphine and 1mg IV Ativan. Admitted under observation. Started on OxyContin 10mg BID and continued on Norco 5/325mg ii tabs q4h prn. Gabapentin increased to 600mg TID.   Photo date: see WOC photos from yesterday    Left foot, minimal swelling or erythema. One defined wound on dorsal aspect, narrow ring of pink erythema  Right foot, wounds across dorsal area, achilles, all look better today, more dry and defined        Interventions  Current support surface: Standard  Atmos Air mattress,   Current off-loading measures: Pillows,   Visual inspection of wound(s) completed  Wound Care: done per plan of care,   Supplies: reviewed, gathered, placed at the bedside, discussed with RN, discussed with family and discussed with patient  Education provided: importance of repositioning, plan of care, wound progress, Infection prevention , Moisture management and Off-loading pressure  Discussed plan of care with Patient, Family and Nurse    Rboert GRACIA  DONNIE Smith

## 2021-04-13 NOTE — PLAN OF CARE
Discharge    Patient discharged to home with spouse    Care plan note:  Discharge instructions reviewed with patient and spouse, questions answered. Meds filled and discussed next dose due times. IV removed. Belongings all accounted for in the room. Norco and ativan given at 2:10 pm in anticipation for discharge to home. Discussed finding a support group or initiating counseling to assist with coping/support as patient became tearful while going through discharge instructions and reported feeling fearful of leaving a supportive medical environment. Reassurance offered. Dr Garcia eprescribed compazine to "[employer] on [address] per pt request.    Listed belongings gathered and returned to patient. yes  Belongings returned to patient from security/pharmacy yes  Care Plan and Patient education resolved:yes  Prescriptions if needed, hard copies sent with patient  yes  Home and hospital acquired medications returned to patient: yes  Medication Bin checked and emptied on discharge yes  Follow up appointment made for patient:yes

## 2021-04-13 NOTE — TREATMENT PLAN
Medical Equipment Request    Equipment needed: Reclining wheelchair with calf/feet rest    Diagnosis: severe lower extremity pain secondary to erythromelalgia    Reason Equipment Needed:  Patient recently diagnosed with a condition called erythromelalgia which causes increased pain and swelling in her lower extremities and feet. Her symptoms are exacerbated by attempts at ambulating and when legs are in a dependent position (such as sitting). She frequently requires elevation of her lower extremities to the level of the trunk or higher to allow for relief of pain. She has been using a standard wheelchair, but this requires her legs be in a dependent position and has resulted in pain. Specifically requesting a reclining wheelchair that will have an elevated leg rest and allow her to keep her legs elevated.       Lola Garcia,   Internal Medicine - [facility]  4/13/2021

## 2021-04-13 NOTE — PLAN OF CARE
Summary:  inpatient status now.  DATE & TIME:4/12/21 3-11pm shift  Cognitive Concerns/ Orientation : A&Ox4  BEHAVIOR & AGGRESSION TOOL COLOR: green- anxious  CIWA SCORE: n/a  ABNL VS/O2: VSS on RA  MOBILITY: SBA/ind  to BSC/chair  PAIN MANAGMENT: c/o bilateral foot pain. Using cold therapy. On scheduled OxyContin; PRN Norco x1; PRN Ativan given X1; Pain cream compound in pt's room, from home ( verified and labeled), lidocaine patches to calfs, pain suddenly worse similar to admission and needed IV morphine x1. Took compazine for nausea.  DIET: Reg, tolerating  BOWEL/BLADDER: continent, up to BSC  ABNL LAB/BG: ; AST 67  DRAIN/DEVICES: PIV, saline locked   TELEMETRY RHYTHM: n/a  SKIN: ulcerations on R foot, dressings CDI. Hands/feet red and hot. Pt replaced WOC dressings with her personal due to them being too hot and increasing pain.  TESTS/PROCEDURES: n/a  D/C DAY/GOALS/PLACE: discharge potentially tomorrow, pending pain managed and consults. Home with assist and home PT.   OTHER:Psych consult completed. Needs home reclining wheelchair.

## 2021-04-14 ENCOUNTER — VIRTUAL VISIT (OUTPATIENT)
Dept: FAMILY MEDICINE | Facility: CLINIC | Age: 36
End: 2021-04-14
Payer: COMMERCIAL

## 2021-04-14 ENCOUNTER — PATIENT OUTREACH (OUTPATIENT)
Dept: NURSING | Facility: CLINIC | Age: 36
End: 2021-04-14
Payer: COMMERCIAL

## 2021-04-14 DIAGNOSIS — F33.9 RECURRENT MAJOR DEPRESSIVE DISORDER, REMISSION STATUS UNSPECIFIED (H): ICD-10-CM

## 2021-04-14 DIAGNOSIS — G89.29 OTHER CHRONIC PAIN: ICD-10-CM

## 2021-04-14 DIAGNOSIS — I73.81 ERYTHROMELALGIA (H): Primary | ICD-10-CM

## 2021-04-14 PROCEDURE — 99215 OFFICE O/P EST HI 40 MIN: CPT | Mod: 95 | Performed by: FAMILY MEDICINE

## 2021-04-14 SDOH — ECONOMIC STABILITY: TRANSPORTATION INSECURITY
IN THE PAST 12 MONTHS, HAS THE LACK OF TRANSPORTATION KEPT YOU FROM MEDICAL APPOINTMENTS OR FROM GETTING MEDICATIONS?: NO

## 2021-04-14 SDOH — SOCIAL STABILITY: SOCIAL NETWORK: HOW OFTEN DO YOU GET TOGETHER WITH FRIENDS OR RELATIVES?: MORE THAN THREE TIMES A WEEK

## 2021-04-14 SDOH — SOCIAL STABILITY: SOCIAL NETWORK
DO YOU BELONG TO ANY CLUBS OR ORGANIZATIONS SUCH AS CHURCH GROUPS UNIONS, FRATERNAL OR ATHLETIC GROUPS, OR SCHOOL GROUPS?: NOT ASKED

## 2021-04-14 SDOH — ECONOMIC STABILITY: INCOME INSECURITY: HOW HARD IS IT FOR YOU TO PAY FOR THE VERY BASICS LIKE FOOD, HOUSING, MEDICAL CARE, AND HEATING?: NOT HARD AT ALL

## 2021-04-14 SDOH — ECONOMIC STABILITY: TRANSPORTATION INSECURITY
IN THE PAST 12 MONTHS, HAS LACK OF TRANSPORTATION KEPT YOU FROM MEETINGS, WORK, OR FROM GETTING THINGS NEEDED FOR DAILY LIVING?: NO

## 2021-04-14 SDOH — ECONOMIC STABILITY: FOOD INSECURITY: WITHIN THE PAST 12 MONTHS, YOU WORRIED THAT YOUR FOOD WOULD RUN OUT BEFORE YOU GOT MONEY TO BUY MORE.: NEVER TRUE

## 2021-04-14 SDOH — ECONOMIC STABILITY: FOOD INSECURITY: WITHIN THE PAST 12 MONTHS, THE FOOD YOU BOUGHT JUST DIDN'T LAST AND YOU DIDN'T HAVE MONEY TO GET MORE.: NEVER TRUE

## 2021-04-14 SDOH — SOCIAL STABILITY: SOCIAL NETWORK: ARE YOU MARRIED, WIDOWED, DIVORCED, SEPARATED, NEVER MARRIED, OR LIVING WITH A PARTNER?: MARRIED

## 2021-04-14 ASSESSMENT — ACTIVITIES OF DAILY LIVING (ADL): DEPENDENT_IADLS:: CLEANING;COOKING;LAUNDRY;MEAL PREPARATION;TRANSPORTATION

## 2021-04-14 NOTE — PROGRESS NOTES
Ronna is a 35 year old who is being evaluated via a billable video visit.      How would you like to obtain your AVS? MyChart  If the video visit is dropped, the invitation should be resent by: Text to cell phone: [phone]  Will anyone else be joining your video visit? No      Video Start Time: 1:25 PM    Assessment & Plan     Erythromelalgia (H)  I have a lengthy discussion with the patient regarding diagnosis and treatment.  She will need long-term pain control and she has an upcoming appointment with [facility] pain clinic.  I discussed with her long-term narcotic use may not be appropriate as it has some addiction potential he understand that.  They will discuss that with the pain clinic and see if there is any different treatments available.  Patient requested care coordination referral which is provided to assist with them good resources and social need to have over time.    I do not see any red flag symptoms currently.  She is planning to see neurology in coming weeks to continuation of treatment.  Patient is currently on mexitil which is, anti antiarrhythmic and nerve pain medication.  I told them I do not have much experience with that they need to talk to neurology in future to get a refill on that if it is approriate  For diagnostic details please see [facility] work-up as well as recent hospitalization.  - CARE COORDINATION REFERRAL    Other chronic pain    - CARE COORDINATION REFERRAL    Recurrent major depressive disorder, remission status unspecified (H)  Patient was started on Cymbalta.  She is not sure if she needed long-term but she will evaluate and let us know.  She is advised to let us know if she is using that for long-term.    Review of prior external note(s) from - CareEverywhere information from [facility] reviewed  Review of external notes as documented elsewhere in note   Time spent doing chart review, history and exam, documentation and further activities per the note  60min               Gutierrez  MD Michelle  [facility] MALCOM Friend is a 35 year old who presents for the following health issues  accompanied by her spouse:    [facility]       Hospital Follow-up Visit:    Hospital/Nursing Home/IP Rehab Facility: [facility]  Date of Admission: 4/9/2021  Date of Discharge: 4/13/2021  Reason(s) for Admission: Acute bilateral LE pain dt Erythromelalgia, diagnosed in 3/2021  Suicidal Ideation with depression dt severe pain: Resolved  History of asthma      Was your hospitalization related to COVID-19? No   Problems taking medications regularly:  None  Medication changes since discharge: None  Problems adhering to non-medication therapy:  None    Summary of hospitalization:  [facility] discharge summary reviewed  Diagnostic Tests/Treatments reviewed.  Follow up needed: pain and neurolgy  Other Healthcare Providers Involved in Patient s Care:         None  Update since discharge: stable.      Patient is a very pleasant 35-year-old female who was recently diagnosed with erythromelalgia, she has thorough evaluation the [facility].  Recent hospitalization due to unbearable pain.  She was hospitalized for 5- 6 days seen by psychiatry along with specialist.  She is currently on breakthrough medication as well as long-term medication for the last few days.  She told me she has not used narcotics in the long-term.  She is also taking gabapentin.  She told me she was very active before that whole thing started.  She complains of some rash on the foot which is assosiated to some swelling and initially treated with fungal infection but turned out to be erythromyalgia.  She was advised by [facility] neurology to see pain medicine in [facility] and get some further evaluation for long-term treatment including stimulator versus epidural catheter for medications.  Patient are planning to see neurology in coming days.    Review of Systems   Constitutional, HEENT, cardiovascular,  pulmonary, gi and gu systems are negative, except as otherwise noted.      Objective           Vitals:  No vitals were obtained today due to virtual visit.    Physical Exam   GENERAL: Healthy, alert and no distress  EYES: Eyes grossly normal to inspection.  No discharge or erythema, or obvious scleral/conjunctival abnormalities.  RESP: No audible wheeze, cough, or visible cyanosis.  No visible retractions or increased work of breathing.  .  NEURO: Cranial nerves grossly intact.  Mentation and speech appropriate for age.  PSYCH: Mentation appears normal, affect normal/bright, judgement and insight intact, normal speech and appearance well-groomed.                Video-Visit Details    Type of service:  Video Visit    Video End Time:1:50 PM    Originating Location (pt. Location): Home    Distant Location (provider location):  [facility]     Platform used for Video Visit: [employer]

## 2021-04-14 NOTE — LETTER
[facility]  Complex Care Plan  About Me:    Patient Name:  Ronna Rivera    YOB: 1985  Age:         35 year old   [place] MRN:    [id number] Telephone Information:  Home Phone [phone]   Mobile [phone]       Address:  [address]  [place] [address] Email address:  [email or web address]      Emergency Contact(s)    Name Relationship Lgl Grd Work Phone Home Phone Mobile Phone   LADONNA BUSBY Spouse   [phone]           Health Maintenance  Health Maintenance Reviewed: Up to date    My Access Plan  Medical Emergency 911   Primary Clinic Line [facility] [phone]   24 Hour Appointment Line [phone] or  [phone]) (toll-free)   24 Hour Nurse Line [phone] (toll-free)   Preferred Urgent Care Other   Preferred [facility]  [phone]   [id number] [place] [address] [place] [facility] [address] [facility] [address]     Behavioral Health Crisis Line The National Suicide Prevention Lifeline at 1-200.218.8184 or 911    [facility] Crisis Line at 680-559-7087    Crisis Text Line - Text  [place]  to 571347         My Care Team Members  Patient Care Team       Relationship Specialty Notifications Start End    [facility] PCP - General   4/11/21     Phone: [phone] Fax: [phone]         [address] [facility] [address]    Darek Orta MD MD Neurology  4/13/21     Phone: [phone] Fax: [phone]         [facility] [address]    Lula Brantley LGSW Lead Care Coordinator Primary Care - [facility] Admissions 4/14/21     Gerard Tavares MD MD Dermatology  4/14/21     Phone: [phone] Fax: [phone]         [facility] [address] [place] [address]           My Care Plans  Self Management and Treatment Plan  Goals and (Comments)  Goals        General     1. Medical (pt-stated)     Notes - Note edited  4/14/2021  4:42 PM by Lula Brantley, DIANA    Goal Statement: Over the next 6 months, I would like to follow medical recommendations of establishing a care team in the [facility] for ongoing medical care and to support my health and wellbeing.  Date Goal Set: 4/14/2021  Barriers: Complexity of medical diagnosis  Strengths: Motivated to address medical concerns, strong support system  Date to Achieve By: 10/14/2021  Patient expressed understanding of goal: yes    Action steps to achieve this goal:  1. I will attend upcoming appointments with Neurology, pain clinic, Dermatology, and Vascular  2. I will review  to learn more about SSDI and the process   3. I will [facility]CP [facility]  4. I will outreach to Care Coordination  for further questions or concerns                   My Medical and Care Information  Problem List   Patient Active Problem List   Diagnosis     Other chronic pain     Rash and nonspecific skin eruption     Erythromelalgia (H)     Recurrent major depressive disorder, remission status unspecified (H)        Care Coordination Start Date: 4/14/2021   Frequency of Care Coordination: 2 weeks   Form Last Updated: 04/14/2021

## 2021-04-14 NOTE — PROGRESS NOTES
Clinic Care Coordination Contact    Clinic Care Coordination Contact  OUTREACH    Referral Information:  Referral Source: IP Handoff    Primary Diagnosis: Other (include Comment box)(erythromelalgia)    Chief Complaint   Patient presents with     Clinic Care Coordination - Initial     Clinic Care Coordination - Post Hospital      [place] Utilization: Recent hospital stay.  Clinic Utilization  Difficulty keeping appointments:: No  Compliance Concerns: No  No-Show Concerns: No  No PCP office visit in Past Year: No  Utilization    Last refreshed: 4/14/2021  3:21 PM: Hospital Admissions 1           Last refreshed: 4/14/2021  3:21 PM: ED Visits 2           Last refreshed: 4/14/2021  3:21 PM: No Show Count (past year) 0              Current as of: 4/14/2021  3:21 PM            Clinical Concerns:  COSTA WOO spoke with pt regarding her medical needs and overall wellbeing. Pt shared that she has been working with [facility] but they would like a local team to take over for pt's care and they will take on consultation.    She will be establishing with a Neurologist Dr. Orta on 5/4.    She will be returning her Dermatology care to [facility] in [place] with Dr. Gee Anusha    She has a Vascular specialist through [facility], she will discuss if she needs to establish with someone in [facility]    She will be establishing care with [facility] on 4/15. Plans to go with  Pain but if pain management is more involved then she may keep her care at [facility].    Home Care with be coming to see her on 4/16 for PT and OT.    She would like to establish care with a PCP that is familiar with rare diseases. She is open to going anywhere in the [facility] for this. She will speak with Dr. Orta for a recommendation for PCP. COSTA WOO provided information for [facility] as an option for PCP and ongoing specialty care.    Pt shared she is having trouble coordinating care with [facility]. She messages via electronic system but doesn't get helpful replies. She has  inquired about ongoing treatment plan but hasn't gotten much feedback. COSTA WOO explained that when she establishes care with Neurology at [facility] it will help her to get further feedback and treatment plan.    Wheelchair DME place in hospital. Currently she is using one she got on [employer]. She needs a specialty chair that can elevate her legs.    Discussed SSDI. COSTA WOO provided [email or web address] to research if it is the right decision for her and to learn about the process. Will discuss this further at next conversation.     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 Her husbands plans to take this on. He is working full-time and care for pt as well. Will discuss this further in next conversation to determine if there are resources pt's family can access.    Discussed mental health and affects of pain on her mental health. She has medication from the hospital that is working well for pain management. She had a consult with [facility] Psychiatrist at hospital. She may follow with this provider.    Current Medical Concerns: erythromelalgia  Current Behavioral Concerns: depression/grief regarding diagnosis    Education Provided to patient: CC role   Health Maintenance Reviewed: Up to date  Clinical Pathway: None    Medication Management:  Did not discuss at this time     Functional Status:  Dependent ADLs:: Transfers, Toileting  Dependent IADLs:: Cleaning, Cooking, Laundry, Meal Preparation, Transportation  Bed or wheelchair confined:: Yes  Mobility Status: Dependent/Assisted by Another  Fallen 2 or more times in the past year?: No  Any fall with injury in the past year?: No    Living Situation:  Current living arrangement:: I live in a private home with family  Type of residence:: Private home - stairs    Lifestyle & Psychosocial Needs:     Social Needs     Financial resource strain: Not hard at all     Food insecurity     Worry: Never true     Inability: Never true      Transportation needs     Medical: No     Non-medical: No     Diet:: Regular  Inadequate nutrition (GOAL):: No  Tube Feeding: No  Inadequate activity/exercise (GOAL):: No  Significant changes in sleep pattern (GOAL): No  Transportation means:: Family     Mandaen or spiritual beliefs that impact treatment:: No  Mental health DX:: Yes  Mental health DX how managed:: Medication  Mental health management concern (GOAL):: No  Chemical Dependency Status: No Current Concerns  Informal Support system:: Family, Spouse   Socioeconomic History     Marital status:      Spouse name: Shiraz     Number of children: 2     Years of education: 16     Highest education level: Not on file   Occupational History     Employer: UNEMPLOYED   Relationships     Social connections     Talks on phone: Not on file     Gets together: More than three times a week     Attends Latter-day service: Not on file     Active member of club or organization: Not on file     Attends meetings of clubs or organizations: Not on file     Relationship status:      Intimate partner violence     Fear of current or ex partner: Not on file     Emotionally abused: Not on file     Physically abused: Not on file     Forced sexual activity: Not on file     Tobacco Use     Smoking status: Never Smoker   Substance and Sexual Activity     Alcohol use: No     Drug use: No     Sexual activity: Yes     Partners: Male      Resources and Interventions:  Current Resources:    Community Resources: None  Supplies used at home:: Other(ice, compression stockings)  Equipment Currently Used at Home: wheelchair, manual  Employment Status: disabled(newly))   )    Advance Care Plan/Directive  Advanced Care Plans/Directives on file:: No    Referrals Placed: Other ()     Goals:   Goals        General    1. Medical (pt-stated)     Notes - Note edited  4/14/2021  3:44 PM by Lula Brantley LGSW    Goal Statement: Over the next 6 months, I would like to follow medical  recommendations of establishing a care team in the [facility] for ongoing medical care and to support my health and wellbeing.  Date Goal Set: 4/14/2021  Barriers: Complexity of medical diagnosis  Strengths: Motivated to address medical concerns, strong support system  Date to Achieve By: 10/14/2021  Patient expressed understanding of goal: yes    Action steps to achieve this goal:  1. I will attend upcoming appointments with Neurology, pain clinic, Dermatology, and Vascular  2. I will review  to learn more about SSDI and the process   3. I will explore PCP options provided by Care Coordination  or Neurologist  4. I will outreach to Care Coordination  for further questions or concerns            Patient/Caregiver understanding: Pt reports understanding and denies any additional questions or concerns at this times. SW CC engaged in AIDET communication during encounter.    Outreach Frequency: 2 weeks  Future Appointments              In 2 weeks Darek Orta MD [facility]        Plan: CC SW will outreach to pt in 3 weeks to discuss neurology appointment.     Faviola Brantley [facility]  Clinic Care Coordinator  [facility] [place]  [facility]  [phone]  [email or web address]

## 2021-04-14 NOTE — LETTER
[facility] CARE COORDINATION  [address] [place] [address] [place] [address]    April 14, 2021    Ronna Rivera  [address] [facility] [address]      Dear Ronna,    I am a clinic care coordinator who works with [facility]. I wanted to thank you for spending the time to talk with me.  Below is a description of clinic care coordination and how I can further assist you.      The clinic care coordination team is made up of a registered nurse,  and community health worker who understand the health care system. The goal of clinic care coordination is to help you manage your health and improve access to the health care system in the most efficient manner. The team can assist you in meeting your health care goals by providing education, coordinating services, strengthening the communication among your providers and supporting you with any resource needs.    Please feel free to contact me at [phone] with any questions or concerns. We are focused on providing you with the highest-quality healthcare experience possible and that all starts with you.     Sincerely,     LETY Ray    Enclosed: I have enclosed a copy of the Complex Care Plan. This has helpful information and goals that we have talked about. Please keep this in an easy to access place to use as needed.

## 2021-04-15 ENCOUNTER — TRANSFERRED RECORDS (OUTPATIENT)
Dept: HEALTH INFORMATION MANAGEMENT | Facility: CLINIC | Age: 36
End: 2021-04-15

## 2021-04-16 ENCOUNTER — MEDICAL CORRESPONDENCE (OUTPATIENT)
Dept: HEALTH INFORMATION MANAGEMENT | Facility: CLINIC | Age: 36
End: 2021-04-16

## 2021-04-16 ENCOUNTER — TRANSFERRED RECORDS (OUTPATIENT)
Dept: HEALTH INFORMATION MANAGEMENT | Facility: CLINIC | Age: 36
End: 2021-04-16

## 2021-04-19 ENCOUNTER — TELEPHONE (OUTPATIENT)
Dept: PEDIATRICS | Facility: CLINIC | Age: 36
End: 2021-04-19

## 2021-04-19 NOTE — TELEPHONE ENCOUNTER
It seems like patient is having upcoming appointment for establish care, if she is establishing care with other provider, need to have these order sent to them.

## 2021-04-19 NOTE — TELEPHONE ENCOUNTER
Gabriela from [facility] called requesting the following orders for wound care:    3 times per week for 3 weeks  2 times per week for 5 weeks  1 time per week for 1 week  3 PRN's    Social work eval.     Will route to provider to review. It looks like the Pt was seen once within [facility] in April. Not sure if appropriate to give verbal for orders. Please review.     Gabriela: [phone]    Ana Kinney, RN   [facility] -- Triage Nurse

## 2021-04-20 ENCOUNTER — PATIENT OUTREACH (OUTPATIENT)
Dept: NURSING | Facility: CLINIC | Age: 36
End: 2021-04-20
Payer: COMMERCIAL

## 2021-04-20 NOTE — PROGRESS NOTES
Clinic Care Coordination Contact  Care Team Conversations    CC CHILO spoke with Kortney Mcgee, CSC SW with Primary Care Clinics, regarding pt establishing care with an appropriate provider for her goals of treatment.     Kortney shared Dr. Ball and Dr. Cabral as potential options for providers that would be a good fit for pt's goal of working with a PCP that is comfortable managing complex and unique medical diagnosis.     Kortney shared that pt would be able to consolidate all her specialty appointments to the [facility]; including pain management and mental health if she would like to.     Plan: COSTA WOO will outreach to pt to relay this information.    Faviola Brantley [facility]  Clinic Care Coordinator  [facility] [place]  [facility]  [phone]  [email or web address]

## 2021-04-20 NOTE — PROGRESS NOTES
Clinic Care Coordination Contact    Follow Up Progress Note      Assessment: COSTA WOO received a voicemail from pt's  requesting further information on an appropriate PCP for pt through the [facility].    CC SW returned call and spoke with pt. Pt shared that she has scheduled an appointment with Dr. Otoniel Fowler for 5/3. CC SW shared information given by [facility] SW regarding care provided at [facility].     Goals addressed this encounter:   Goals Addressed                 This Visit's Progress       Patient Stated      1. Medical (pt-stated)   50%     Goal Statement: Over the next 6 months, I would like to follow medical recommendations of establishing a care team in the [facility] for ongoing medical care and to support my health and wellbeing.  Date Goal Set: 4/14/2021  Barriers: Complexity of medical diagnosis  Strengths: Motivated to address medical concerns, strong support system  Date to Achieve By: 10/14/2021  Patient expressed understanding of goal: yes    Action steps to achieve this goal:  1. I will attend upcoming appointments with Neurology, pain clinic, Dermatology, and Vascular  2. I will review  to learn more about SSDI and the process   3. I will [facility]  4. I will outreach to Care Coordination  for further questions or concerns          Outreach Frequency: monthly    Plan: COSTA WOO will outreach to pt after she has attending establishment of care appointments at [facility] to ensure she is comfortable with her care.    Faviola Brantley, [facility]  Clinic Care Coordinator  [facility] [place]  [facility]  [phone]  [email or web address]

## 2021-04-21 ENCOUNTER — VIRTUAL VISIT (OUTPATIENT)
Dept: FAMILY MEDICINE | Facility: CLINIC | Age: 36
End: 2021-04-21
Payer: COMMERCIAL

## 2021-04-21 ENCOUNTER — TELEPHONE (OUTPATIENT)
Dept: FAMILY MEDICINE | Facility: CLINIC | Age: 36
End: 2021-04-21

## 2021-04-21 ENCOUNTER — TRANSFERRED RECORDS (OUTPATIENT)
Dept: HEALTH INFORMATION MANAGEMENT | Facility: CLINIC | Age: 36
End: 2021-04-21

## 2021-04-21 DIAGNOSIS — R21 RASH AND NONSPECIFIC SKIN ERUPTION: ICD-10-CM

## 2021-04-21 DIAGNOSIS — T14.8XXA LOCAL INFECTION OF WOUND: ICD-10-CM

## 2021-04-21 DIAGNOSIS — I73.81 ERYTHROMELALGIA (H): Primary | ICD-10-CM

## 2021-04-21 DIAGNOSIS — L08.9 LOCAL INFECTION OF WOUND: ICD-10-CM

## 2021-04-21 PROCEDURE — 99214 OFFICE O/P EST MOD 30 MIN: CPT | Mod: 95 | Performed by: FAMILY MEDICINE

## 2021-04-21 RX ORDER — TRIAMCINOLONE ACETONIDE 1 MG/G
OINTMENT TOPICAL 2 TIMES DAILY
Qty: 15 G | Refills: 0 | Status: SHIPPED | OUTPATIENT
Start: 2021-04-21 | End: 2021-06-03

## 2021-04-21 RX ORDER — CEPHALEXIN 500 MG/1
500 CAPSULE ORAL 3 TIMES DAILY
Qty: 21 CAPSULE | Refills: 0 | Status: SHIPPED | OUTPATIENT
Start: 2021-04-21 | End: 2021-04-28

## 2021-04-21 ASSESSMENT — PATIENT HEALTH QUESTIONNAIRE - PHQ9
SUM OF ALL RESPONSES TO PHQ QUESTIONS 1-9: 7
5. POOR APPETITE OR OVEREATING: NEARLY EVERY DAY

## 2021-04-21 ASSESSMENT — ANXIETY QUESTIONNAIRES
7. FEELING AFRAID AS IF SOMETHING AWFUL MIGHT HAPPEN: NOT AT ALL
GAD7 TOTAL SCORE: 7
3. WORRYING TOO MUCH ABOUT DIFFERENT THINGS: SEVERAL DAYS
6. BECOMING EASILY ANNOYED OR IRRITABLE: NOT AT ALL
2. NOT BEING ABLE TO STOP OR CONTROL WORRYING: NOT AT ALL
1. FEELING NERVOUS, ANXIOUS, OR ON EDGE: NEARLY EVERY DAY
5. BEING SO RESTLESS THAT IT IS HARD TO SIT STILL: NOT AT ALL

## 2021-04-21 NOTE — TELEPHONE ENCOUNTER
Virtual visit is fine. However if the pain in not managable  they may have to be seen in Er setting.

## 2021-04-21 NOTE — PROGRESS NOTES
Ronna is a 35 year old who is being evaluated via a billable video visit.      How would you like to obtain your AVS? Mail a copy  If the video visit is dropped, the invitation should be resent by: [phone]  Will anyone else be joining your video visit? No      Video Start Time: 1:40 PM    Assessment & Plan     Erythromelalgia (H)  Patient has history of erythromelalgia, she has been suffering with his foot discomfort and sometimes on and off wound.  They placed some dressing on that area which caused some irritation possible allergic dermatitis with superficial skin infection.  We will start her on Keflex along with Kenalog.  Exam was limited due to video visit however they are advised if her symptoms are not improved they need to follow-up sooner.    Rash and nonspecific skin eruption    - cephALEXin (KEFLEX) 500 MG capsule; Take 1 capsule (500 mg) by mouth 3 times daily for 7 days    Local infection of wound    - cephALEXin (KEFLEX) 500 MG capsule; Take 1 capsule (500 mg) by mouth 3 times daily for 7 days  - triamcinolone (KENALOG) 0.1 % external ointment; Apply topically 2 times daily    Depression Screening Follow Up    PHQ 4/21/2021   PHQ-9 Total Score 7   Q9: Thoughts of better off dead/self-harm past 2 weeks Several days       Follow Up      No follow-ups on file.    Gutierrez Martinez MD  [facility]    Jefferson Friend is a 35 year old who presents for the following health issues     HPI   Started last night   Pt has erythromelalgia mostly in feet.  Left foot pain, slight rash, heat to the touch, no fever  history of foot issues pt home health nurse this morning, advised to be seen by primary  Noticed on the left foot on the superior aspect area which is slightly raised and have some nonspecific rash around it.  It looks like they have changed the dressing in that area.  Now that previous wound is slight erythematous.  He denies any systemic symptoms.    Review of Systems    Constitutional, HEENT, cardiovascular, pulmonary, gi and gu systems are negative, except as otherwise noted.      Objective           Vitals:  No vitals were obtained today due to virtual visit.    Physical Exam   GENERAL: Healthy, alert and no distress  EYES: Eyes grossly normal to inspection.  No discharge or erythema, or obvious scleral/conjunctival abnormalities.  RESP: No audible wheeze, cough, or visible cyanosis.  No visible retractions or increased work of breathing.    NEURO: Cranial nerves grossly intact.  Mentation and speech appropriate for age.  PSYCH: Mentation appears normal, affect normal/bright, judgement and insight intact, normal speech and appearance well-groomed.   non specific rash on the superior aspect of the left foot central area of wound slight erythematous, exam limited to video visit.            Video-Visit Details    Type of service:  Video Visit    Video End Time:2:09 PM    Originating Location (pt. Location): Home    Distant Location (provider location):  [facility]     Platform used for Video Visit: [employer]

## 2021-04-21 NOTE — TELEPHONE ENCOUNTER
Ivanna FIELDS calling to report that patient reports wound infection in left foot is painful, red and hot. Pain 10/10 before medication that decreased to 5/10 with medication.     Patient is scheduled to see home care at 12:30 today.    Recommended office visit for this patient for evaluation of wound.     Please advise if office visit or virtual visit.  Triage to call the patient back to schedule.  Gia Jacob RN

## 2021-04-22 ENCOUNTER — TELEPHONE (OUTPATIENT)
Dept: FAMILY MEDICINE | Facility: CLINIC | Age: 36
End: 2021-04-22

## 2021-04-22 ASSESSMENT — ANXIETY QUESTIONNAIRES: GAD7 TOTAL SCORE: 7

## 2021-04-22 NOTE — TELEPHONE ENCOUNTER
Uzma with [facility] calling requesting orders for PT 1x week/3 weeks for pain management.    Verbal orders for homecare given for above and per protocol    Adriana CAMPOS RN  EP Triage

## 2021-04-22 NOTE — TELEPHONE ENCOUNTER
Selene WOODS with [facility] Requesting:      OT lymphadema wraps and ADL retraining   2x week for 3 weeks   Starting Monday 4-26-21    Verbal orders given.     Barbara Dela Cruz RN

## 2021-04-28 ENCOUNTER — TELEPHONE (OUTPATIENT)
Dept: FAMILY MEDICINE | Facility: CLINIC | Age: 36
End: 2021-04-28

## 2021-04-28 NOTE — TELEPHONE ENCOUNTER
Reason for Call:  Form, our goal is to have forms completed with 72 hours, however, some forms may require a visit or additional information.    Type of letter, form or note:  Home Health Certification    Who is the form from?: Home care    Where did the form come from: form was faxed in    What clinic location was the form placed at?: [facility] [place]    Where the form was placed: Form given to Adriana Delcid RN    What number is listed as a contact on the form?: NA       Additional comments: NA    Call taken on 4/28/2021 at 8:40 AM by Karla Ashraf

## 2021-04-30 ENCOUNTER — TELEPHONE (OUTPATIENT)
Dept: FAMILY MEDICINE | Facility: CLINIC | Age: 36
End: 2021-04-30

## 2021-04-30 DIAGNOSIS — Z53.9 DIAGNOSIS NOT YET DEFINED: Primary | ICD-10-CM

## 2021-04-30 PROCEDURE — G0180 MD CERTIFICATION HHA PATIENT: HCPCS | Performed by: FAMILY MEDICINE

## 2021-04-30 NOTE — TELEPHONE ENCOUNTER
Form updated.  Medication list updated.    Form signed by Dr. Martinez and given to TC to fax.    Adriana CAMPOS RN  EP Triage

## 2021-04-30 NOTE — TELEPHONE ENCOUNTER
MED REC DONE     Discrepancies:        Meds on Epic but NOT  on Form:      Nonformulary:  Apply topically 3 times daily.  Ketamine 0.5/Tommy 2% in lidoderm:  Apply to the feet.  (listed as historical)    Prochlorperazine 5 mg tablet:  Take 1 tablet by mouth every 6 hours as needed for nausea or vomiting.  Not taking reported on 4/21/21.    Triamcinolone 0.1% external ointment:  Apply topically 2 times daily.      Meds on Form but NOT on Epic :         Routing to PCP for further review/recommendations/orders    Adriana CAMPOS RN  EP Triage

## 2021-04-30 NOTE — TELEPHONE ENCOUNTER
Reason for Call:  Home Health Care    Jade with [facility]  Homecare called regarding (reason for call): OT orders    Orders are needed for this patient. OT    OT: Reduce OT frequency to one time a week starting this week     Skilled Nursing: N/A    Pt Provider: Dr Martinez    Phone Number Homecare Nurse can be reached at:  Jade (HOME CARE) [phone]       Can we leave a detailed message on this number? YES    Phone number patient can be reached at: [place] number on file [phone] (home)    Best Time: ASAP    Call taken on 4/30/2021 at 3:07 PM by Venita Ward

## 2021-05-03 ENCOUNTER — VIRTUAL VISIT (OUTPATIENT)
Dept: INTERNAL MEDICINE | Facility: CLINIC | Age: 36
End: 2021-05-03
Payer: COMMERCIAL

## 2021-05-03 DIAGNOSIS — I73.81 ERYTHROMELALGIA (H): Primary | ICD-10-CM

## 2021-05-03 PROCEDURE — 99204 OFFICE O/P NEW MOD 45 MIN: CPT | Mod: 95 | Performed by: INTERNAL MEDICINE

## 2021-05-03 NOTE — PROGRESS NOTES
Ronna is a very pleasant 35 year old who is being evaluated via a billable video visit.        Subjective   Ronna presents with her  for the following health issues: establish care, management of Erythromelalgia    Current Outpatient Medications:      gabapentin (NEURONTIN) 300 MG capsule, Take 2 capsules (600 mg) by mouth 3 times daily, Disp: 84 capsule, Rfl: 0     HYDROcodone-acetaminophen (NORCO) 7.5-325 MG per tablet, Take 1 tablet by mouth every 4 hours as needed for moderate to severe pain, Disp: 20 tablet, Rfl: 0     melatonin 1 MG TABS tablet, Take 1-2 mg by mouth nightly as needed for sleep, Disp: , Rfl:      mexiletine (MEXITIL) 150 MG capsule, Take 150 mg by mouth 3 times daily, Disp: , Rfl:      NONFORMULARY, Apply topically 3 times daily Ketamine 0.5/Amit2% in Lidoderm: apply to the feet, Disp: , Rfl:      ondansetron (ZOFRAN-ODT) 4 MG ODT tab, Take 4 mg by mouth every 6 hours as needed for nausea, Disp: , Rfl:      oxyCODONE (OXYCONTIN) 10 MG 12 hr tablet, Take 1 tablet (10 mg) by mouth 2 times daily, Disp: 8 tablet, Rfl: 0     polyethylene glycol (MIRALAX) 17 GM/Dose powder, Take 17 g by mouth daily, Disp: 510 g, Rfl:      senna-docusate (SENOKOT-S/PERICOLACE) 8.6-50 MG tablet, Take 2 tablets by mouth daily, Disp: 30 tablet, Rfl: 0     triamcinolone (KENALOG) 0.1 % external ointment, Apply topically 2 times daily, Disp: 15 g, Rfl: 0     DULoxetine (CYMBALTA) 20 MG capsule, Take 1 capsule (20 mg) by mouth daily for 14 days, Disp: 14 capsule, Rfl: 0     indomethacin (INDOCIN) 25 MG capsule, Take 1 capsule (25 mg) by mouth 3 times daily (with meals) for 14 days (Patient not taking: Reported on 4/21/2021), Disp: 42 capsule, Rfl: 0     LORazepam (ATIVAN) 0.5 MG tablet, Take 1 tablet (0.5 mg) by mouth every 4 hours as needed for anxiety (Patient not taking: Reported on 4/21/2021), Disp: 20 tablet, Rfl: 0     prochlorperazine (COMPAZINE) 5 MG tablet, Take 1 tablet (5 mg) by mouth every 6 hours as needed  for nausea or vomiting (Patient not taking: Reported on 2021), Disp: 20 tablet, Rfl: 0    HPI     Recent diagnosis of Erythromelalgia; I reviewed multiple notes from [place] regarding this rare condition.  It has been causing leg pain, swelling, and skin changes particularly if her feet are dependent for any length of time.  Currently on gabapentin and oxycontin for this.  Next step would be to add clonidine per their recommendations.  I also suggested tramadol might be helpful as she expressed some desire to get off opioids. She is already using compression stockings but could consider a lymphedema consult.  Generally healthy prior to this; enjoys running, eats healthy. She had a twin pregnancy, age 33, uncomplicated other than PUPS rash, and delivered at 36 weeks (first baby vaginally followed by second, )  Reports migraines with aura when stressed; not as much lately--once a month at most.  Needs disability parking form; I can complete this next time I am physically present in clinic.      Review of Systems    ROS: 10 point ROS neg other than the symptoms noted above in the HPI.      Objective           Vitals:  No vitals were obtained today due to virtual visit.    Physical Exam   GENERAL: Healthy, alert and no distress  EYES: Eyes grossly normal to inspection.  No discharge or erythema, or obvious scleral/conjunctival abnormalities.  RESP: No audible wheeze, cough, or visible cyanosis.  No visible retractions or increased work of breathing.    SKIN: Visible skin clear. No significant rash, abnormal pigmentation or lesions.  NEURO: Cranial nerves grossly intact.  Mentation and speech appropriate for age.  PSYCH: Mentation appears normal, affect normal/bright, judgement and insight intact, normal speech and appearance well-groomed.    Labs, imaging reviewed in Epic    A/P:  35 year old here to establish care and review medications/treatment options for erythromelalgia.  We spent some time discussing  this.  I believe clonidine might be helpful for the vasomotor/temperature triggered symptoms, and I advised her to start the low dose patch.  She will see Dr. Orta in Neurology tomorrow in person to establish care.        Video-Visit Details    Type of service:  Video Visit started 10:58 ended 11:38 with another 10 minutes of chart review and documentation, same day    Originating Location (pt. Location): Home    Distant Location (provider location):  [facility] INTER[facility] MEDIC[facility] [place]     Platform used for Video Visit: [facility]     RTC next available in person for PE with pap.  Video visit in between to review medication changes.    Deborah Fowler MD

## 2021-05-03 NOTE — PATIENT INSTRUCTIONS
Margarita Zhou is my RN Care Coordinator.  There is also a lymphedema clinic at [facility], and Rheumatology may be considered if this is deemed an autoimmune condition.

## 2021-05-03 NOTE — NURSING NOTE
Chief Complaint   Patient presents with     Establish Care     pt would like to establish care       Belem Degroot CMA, EMT at 10:33 AM on 5/3/2021.

## 2021-05-03 NOTE — TELEPHONE ENCOUNTER
Home Health Certification completed and  faxed back to [facility] and sent to abstraction.  Karla Ashraf,

## 2021-05-03 NOTE — TELEPHONE ENCOUNTER
Patient Contact    Attempt # 1    Was call answered?  No.  Left message on voicemail with information to call triage back.    On call back:     - Give verbal orders

## 2021-05-04 ENCOUNTER — OFFICE VISIT (OUTPATIENT)
Dept: NEUROLOGY | Facility: CLINIC | Age: 36
End: 2021-05-04
Payer: COMMERCIAL

## 2021-05-04 VITALS
SYSTOLIC BLOOD PRESSURE: 117 MMHG | DIASTOLIC BLOOD PRESSURE: 75 MMHG | RESPIRATION RATE: 12 BRPM | OXYGEN SATURATION: 97 % | HEART RATE: 84 BPM

## 2021-05-04 DIAGNOSIS — I73.81 ERYTHROMELALGIA (H): Primary | ICD-10-CM

## 2021-05-04 LAB — MISCELLANEOUS TEST: NORMAL

## 2021-05-04 PROCEDURE — 86618 LYME DISEASE ANTIBODY: CPT | Performed by: PSYCHIATRY & NEUROLOGY

## 2021-05-04 PROCEDURE — 83520 IMMUNOASSAY QUANT NOS NONAB: CPT | Mod: 90 | Performed by: PSYCHIATRY & NEUROLOGY

## 2021-05-04 PROCEDURE — 99417 PROLNG OP E/M EACH 15 MIN: CPT | Performed by: PSYCHIATRY & NEUROLOGY

## 2021-05-04 PROCEDURE — 83516 IMMUNOASSAY NONANTIBODY: CPT | Performed by: PSYCHIATRY & NEUROLOGY

## 2021-05-04 PROCEDURE — 99205 OFFICE O/P NEW HI 60 MIN: CPT | Performed by: PSYCHIATRY & NEUROLOGY

## 2021-05-04 PROCEDURE — 99000 SPECIMEN HANDLING OFFICE-LAB: CPT | Performed by: PSYCHIATRY & NEUROLOGY

## 2021-05-04 PROCEDURE — 36415 COLL VENOUS BLD VENIPUNCTURE: CPT | Performed by: PSYCHIATRY & NEUROLOGY

## 2021-05-04 PROCEDURE — 86256 FLUORESCENT ANTIBODY TITER: CPT | Performed by: PSYCHIATRY & NEUROLOGY

## 2021-05-04 NOTE — TELEPHONE ENCOUNTER
Left message for [facility] care okaying verbal order to reduce OT frequency to 1x week starting this week.  If any questions to call the clinic at [phone].      Adriana CAMPOS RN  EP Triage

## 2021-05-04 NOTE — LETTER
5/4/2021         RE: Ronna Rivera  [address]  [place] [address]        Dear Colleague,    Thank you for referring your patient, Ronna Rivera, to the [facility] [place]. Please see a copy of my visit note below.    [id number]    Visit Date: 05/04/2021    Dear Dr. Marie:    I met with Ronna Rivera in neuromuscular consultation today for further evaluation and management of a diagnosis of erythromelalgia.  I met with Ms. Rivera and her .  She reports being in good health until 01/20/2021, when she developed a rash over her foot dorsum.  This subsequently spread to involve more of the foot, the left foot, the face, and extremities in a patchy pattern and fluctuating appearance.  This was associated with intermittent redness and swelling.  Findings were consistently triggered by heat or dependency, but also developed spontaneously on many occurrences.  She reportedly was diagnosed, with a high level of clinical confidence, with erythromelalgia by Dr. Garry Smith at the [facility], where a subspecialty erythromelalgia clinic is maintained.  She also saw Dr. Shannon De La Rosa in Neurology.  Thermal regulatory sweat test and QSART demonstrated evidence of widespread hypohidrosis.  Electrodiagnostic studies were negative.  Because of an explosive onset, a suspicion of a dysimmune etiology was raised.  For this reason, she has been treated with pulse methylprednisolone, with some subjective benefit.  She is also being treated with mexiletine, which has alleviated painful paresthesias in the feet.  She also is using low-dose Cymbalta prescribed by a psychiatrist, and is elevating the feet for relief.    HISTORY OF PRESENT ILLNESS:  Ms. Rivera denies night sweats, fever, weight loss, nausea, vomiting, productive cough, other undiagnosed rashes, pelvic pain, hematuria, hematochezia, or dysfunctional or abnormal vaginal bleeding.  She denies any past personal or family  history of similar symptoms.  She has 4 siblings, 2 living parents, and 2 living children who are 11-year-old twins.  She does not use alcohol or smoke and denies exposure to known toxins.    System review is also notable for the fact that, while at [place] and after receiving what she perceived as negative information, she grabbed for a cell phone cord with which to strangle herself.  Her  was with her and stopped the attempt.  She describes it as a momentary, unplanned, spontaneous reaction to pain and a lack of hope about her condition at the time.  She is not certain if she would have continued with a suicide attempt had she been alone.  She was seen in psychiatric consultation at [facility] shortly after this and is currently being treated by a psychologist and a psychiatrist.  She has no past history of depression and suicidal thoughts, suicide attempts or hospitalization for depression.  She denies current suicidal ideation or thoughts of self-harm.  She does describe herself as an anxious person. The patient indicates that she is safe in her home environment and that her relationship with her  is supportive.    MEDICATIONS:  Listed in the electronic health record.    PHYSICAL EXAMINATION:  The patient is a thin, young adult.  She is lying on the examination table with her feet elevated and with compression stockings.  There are no pain behaviors at rest.  General physical examination is notable for symmetric erythema of the cheeks and some patchy areas of redness in the feet.  There is no edema.    Speech, language and affect are normal.  Pupils are equal, round and reactive to light.  Extraocular movements are full.  There is no ptosis.  Cranial nerves II through XII are normal.  Extremity tone, bulk, strength and rapid repetitive movements are normal.  There is a minimal subjective reduction of pinprick perception over the right distal forefoot.  There was equivocal reduction of  light touch perception in the distal halves of both feet.  Vibration scores are 7 at the great toes.  Reflexes are symmetrically brisk with trace Rogelio signs.  There is no clonus, and plantar responses are mute.  Tone, bulk, strength and rapid repetitive movements are normal.  Coordination is normal.  Gait was not tested but was not represented as being abnormal.    I personally reviewed reports of imaging of the brain, numerous laboratory studies, and the aforementioned neurological tests.    I explained the following to Ms. Rivera and her  over the course of a 90-minute visit:  I have no expertise in the diagnosis of her current condition.  I have diagnosed erythromelalgia, but in its more characteristic form.  It is not clear whether there is a neurological disorder as typically classified.  Whether there is secondary or primary dysfunction of small somatic or autonomic fibers remains an open question, although her autonomic reflex testing does suggest the latter may be the case.    Her current management is appropriate as far as I can determine.  If Dr. Smith is quite confident that this is erythromelalgia, I would typically also evaluate for underlying rheumatologic or hematologic disorders, and I have made these referrals, recognizing that the yield is intermediate to low.  I have added TS-HDS, FGFR 3, and Lyme antibodies, recognizing that Lyme is extremely unlikely given the onset in January, because the initial rash had a targetoid appearance to my eyes.  I am also ordering tissue transglutaminase.  We discussed the option of testing for sodium channel mutations.  This could be readily done, but the yield is also relatively low with the explosive onset in the fourth decade of life.  I have also initiated a referral to Dr. Mook James at [facility], as he has considerable experience in similar circumstances, and his input might be valuable.  I emphasized that pain management  resources will be critical.  There are other medications or interventions that certainly could be trialled, but I suggested that her primary care provider and pain management providers be central to her management team.  I would be happy to continue to help facilitate her care, but I think it important that she continue to follow up with her erythromelalgia experts at the [facility] as well.    Darek Orta MD        D: 2021   T: 2021   MT: [facility]    Name:     RADHA REGALADO  MRN:      [id number]        Account:    [id number]   :      1985           Visit Date: 2021     Document: [id number]    cc:  MD Shannon Tracy MD Mark Davis, MD         Again, thank you for allowing me to participate in the care of your patient.        Sincerely,        Darek Orta MD

## 2021-05-04 NOTE — PATIENT INSTRUCTIONS
1. Labs today  2. Referrals to rheumatology and hematology at [facility]  3. Referral to Dr Mook James at [facility]  4. Follow up with the [facility] pain clinic  5. Record release for [facility] pain clinic records  6. I will communicate with your other providers and remain involved if I can be helpful. If you ultimately maintain care with others (e.g., Madison De La Rosa, Luis, etc), that's fine of course.   7. Maintain regular follow up with the EM providers at [place].

## 2021-05-05 LAB
B BURGDOR IGG+IGM SER QL: 0.06 (ref 0–0.89)
TTG IGA SER-ACNC: <1 U/ML
TTG IGG SER-ACNC: <1 U/ML

## 2021-05-05 NOTE — PROGRESS NOTES
Visit Date: 05/04/2021    Dear Dr. Marie:    I met with Ronna Rivera in neuromuscular consultation today for further evaluation and management of a diagnosis of erythromelalgia.  I met with Ms. Rivera and her .  She reports being in good health until 01/20/2021, when she developed a rash over her foot dorsum.  This subsequently spread to involve more of the foot, the left foot, the face, and extremities in a patchy pattern and fluctuating appearance.  This was associated with intermittent redness and swelling.  Findings were consistently triggered by heat or dependency, but also developed spontaneously on many occurrences.  She reportedly was diagnosed, with a high level of clinical confidence, with erythromelalgia by Dr. Garry Simth at the [facility], where a subspecialty erythromelalgia clinic is maintained.  She also saw Dr. Shannon De La Rosa in Neurology.  Thermal regulatory sweat test and QSART demonstrated evidence of widespread hypohidrosis.  Electrodiagnostic studies were negative.  Because of an explosive onset, a suspicion of a dysimmune etiology was raised.  For this reason, she has been treated with pulse methylprednisolone, with some subjective benefit.  She is also being treated with mexiletine, which has alleviated painful paresthesias in the feet.  She also is using low-dose Cymbalta prescribed by a psychiatrist, and is elevating the feet for relief.    HISTORY OF PRESENT ILLNESS:  Ms. Rivera denies night sweats, fever, weight loss, nausea, vomiting, productive cough, other undiagnosed rashes, pelvic pain, hematuria, hematochezia, or dysfunctional or abnormal vaginal bleeding.  She denies any past personal or family history of similar symptoms.  She has 4 siblings, 2 living parents, and 2 living children who are 11-year-old twins.  She does not use alcohol or smoke and denies exposure to known toxins.    System review is also notable for the fact that, while at [place] and after receiving  what she perceived as negative information, she grabbed for a cell phone cord with which to strangle herself.  Her  was with her and stopped the attempt.  She describes it as a momentary, unplanned, spontaneous reaction to pain and a lack of hope about her condition at the time.  She is not certain if she would have continued with a suicide attempt had she been alone.  She was seen in psychiatric consultation at [facility] shortly after this and is currently being treated by a psychologist and a psychiatrist.  She has no past history of depression and suicidal thoughts, suicide attempts or hospitalization for depression.  She denies current suicidal ideation or thoughts of self-harm.  She does describe herself as an anxious person. The patient indicates that she is safe in her home environment and that her relationship with her  is supportive.    MEDICATIONS:  Listed in the electronic health record.    PHYSICAL EXAMINATION:  The patient is a thin, young adult.  She is lying on the examination table with her feet elevated and with compression stockings.  There are no pain behaviors at rest.  General physical examination is notable for symmetric erythema of the cheeks and some patchy areas of redness in the feet.  There is no edema.    Speech, language and affect are normal.  Pupils are equal, round and reactive to light.  Extraocular movements are full.  There is no ptosis.  Cranial nerves II through XII are normal.  Extremity tone, bulk, strength and rapid repetitive movements are normal.  There is a minimal subjective reduction of pinprick perception over the right distal forefoot.  There was equivocal reduction of light touch perception in the distal halves of both feet.  Vibration scores are 7 at the great toes.  Reflexes are symmetrically brisk with trace Rogelio signs.  There is no clonus, and plantar responses are mute.  Tone, bulk, strength and rapid repetitive movements are  normal.  Coordination is normal.  Gait was not tested but was not represented as being abnormal.    I personally reviewed reports of imaging of the brain, numerous laboratory studies, and the aforementioned neurological tests.    I explained the following to Ms. Rivera and her  over the course of a 90-minute visit:  I have no expertise in the diagnosis of her current condition.  I have diagnosed erythromelalgia, but in its more characteristic form.  It is not clear whether there is a neurological disorder as typically classified.  Whether there is secondary or primary dysfunction of small somatic or autonomic fibers remains an open question, although her autonomic reflex testing does suggest the latter may be the case.    Her current management is appropriate as far as I can determine.  If Dr. Smith is quite confident that this is erythromelalgia, I would typically also evaluate for underlying rheumatologic or hematologic disorders, and I have made these referrals, recognizing that the yield is intermediate to low.  I have added TS-HDS, FGFR 3, and Lyme antibodies, recognizing that Lyme is extremely unlikely given the onset in January, because the initial rash had a targetoid appearance to my eyes.  I am also ordering tissue transglutaminase.  We discussed the option of testing for sodium channel mutations.  This could be readily done, but the yield is also relatively low with the explosive onset in the fourth decade of life.  I have also initiated a referral to Dr. Mook James at [facility], as he has considerable experience in similar circumstances, and his input might be valuable.  I emphasized that pain management resources will be critical.  There are other medications or interventions that certainly could be trialled, but I suggested that her primary care provider and pain management providers be central to her management team.  I would be happy to continue to help facilitate  her care, but I think it important that she continue to follow up with her erythromelalgia experts at the [facility] as well.    Darek Orta MD        D: 2021   T: 2021   MT: TARYN    Name:     RADHA REGALADO  MRN:      [id number]        Account:    [id number]   :      1985           Visit Date: 2021     Document: [id number]    cc:  MD Shannon Tracy MD Mark Davis, MD

## 2021-05-07 ENCOUNTER — VIRTUAL VISIT (OUTPATIENT)
Dept: RHEUMATOLOGY | Facility: CLINIC | Age: 36
End: 2021-05-07
Attending: PSYCHIATRY & NEUROLOGY
Payer: COMMERCIAL

## 2021-05-07 DIAGNOSIS — I73.81 ERYTHROMELALGIA (H): ICD-10-CM

## 2021-05-07 PROCEDURE — 99244 OFF/OP CNSLTJ NEW/EST MOD 40: CPT | Mod: 95 | Performed by: STUDENT IN AN ORGANIZED HEALTH CARE EDUCATION/TRAINING PROGRAM

## 2021-05-07 RX ORDER — CLONIDINE HYDROCHLORIDE 0.1 MG/1
TABLET ORAL
COMMUNITY
Start: 2021-05-06 | End: 2021-06-03

## 2021-05-07 NOTE — PATIENT INSTRUCTIONS
Blood tests ordered    Previous extensive autoimmune work-up done at [facility] was normal.  With negative RAYA likelihood of systemic lupus or other RAYA associated autoimmune connective tissue disease is low.    Few blood test for vasculitis ordered    Follow-up on as-needed basis.    Patient will schedule near [place] for labs.

## 2021-05-07 NOTE — PROGRESS NOTES
Ronna is a 35 year old who is being evaluated via a billable video visit.      How would you like to obtain your AVS? MyChart  If the video visit is dropped, the invitation should be resent by: Send to e-mail at: [email or web address]  Will anyone else be joining your video visit? No      Video Start Time: 11:33 AM            Active Problem List:     Patient Active Problem List    Diagnosis Date Noted     Recurrent major depressive disorder, remission status unspecified (H) 04/14/2021     Priority: Medium     Erythromelalgia (H) 04/12/2021     Priority: Medium     Other chronic pain 04/09/2021     Priority: Medium     Rash and nonspecific skin eruption 04/09/2021     Priority: Medium            History of Present Illness:   Ronna Rivera is a 35 year old female with no significant PMH evaluated via a billable virtual visit in consultation at request of Dr Orta for evaluation of possible autoimmune connective tissue disease in the setting of erythromelalgia.    Her symptoms started in January 2021 with a rash on her foot.  She initially thought it to be ringworm infection and treated it with topical antifungals which did not help.  Slowly the rash spread and looked more like hives.  It spread to her toes and also developed swelling over her right foot.  It was associated with burning sensation.  By 2/2021 the rash spread to the left foot along with swelling and redness.  Gradually it involved her hands, face and cheeks. She felt electric shock like pain in her feet. Felt tingling burning with the swelling.    Due to burning sensation she immersed her feet in ice water which resulted in skin break down which was very painful.     She was evaluated by Dr Luis Castañeda in Dermatology and Dr. De La Rosa in Neurology at [facility], and diagnosis of severe disabling subacute Erythromelalgia was made.  Extensive testing so far has shown normal CBC, CMP, negative RAYA, FLORINA panel, dsDNA, Cheryl 1 antibody, centromere  antibody, SCL 70,  Normal ESR, CRP, SPEP, paraneoplastic autoantibody evaluation.  Venous lower extremity-hemodynamic study done on 4/9/2021 was normal. She was initiated on 1 gram IV Solu-Medrol by neurology at [facility].  She received daily solumedrol x 5 doses, then every other day x3 doses and now once a week.  She has noticed some improvement after getting Solu-Medrol.  She has noticed decrease in intermittent foot swelling and burning pain.  She still not able to bear weight on her feet.  She uses wheelchair to move around in the house.    She also takes OxyContin for pain relief which helps.  She keeps her feet elevated most of the time and uses compression socks.  Intermittently she cools her feet by putting them in a plastic bag and immersing in the water for 5 - 10 min. Usually in the morning her feet are swollen and painful but today there were better.     Other than that she denies any joint pains, muscle weakness, photosensitivity, oral/nasal mucosal sores, Raynaud's, pleurisy or sicca symptoms.  Denies fever, weight loss, cough, night sweats, hemoptysis or GI bleed.  No family history of autoimmune connective tissue disease.  Her mother has history of lymphoma.    Denies recurrent sinusitis/rhinitis, swallowing difficulty, hearing or visual changes recently.  No h/o arterial/venous thrombosis in the past.           Review of Systems:     Review Of Systems  Constitutional: denies fever, chills, night sweats and weight loss.  Skin: + skin rash.  Eyes: No dryness or irritation in eyes. No episode of eye inflammation or redness.   Ears/Nose/Throat: no recurrent sinus infections.  Respiratory: No shortness of breath, dyspnea on exertion, cough, or hemoptysis  Cardiovascular: no chest pain or palpitations.  Gastrointestinal: no nausea, vomiting, abdominal pain.  Normal bowel movements.  Genitourinary: no dysuria, frequency  or hematuria.  Musculoskeletal: as in HPI  Neurologic: + numbness,  tingling.  Psychiatric: no mood disorders.  Hematologic/Lymphatic/Immunologic: no history of easy bruising, petechia or purpura.  No abnormal bleeding.   Endocrine: no h/o thyroid disease or Diabetes.                  Past Medical History:   No past medical history on file.  Past Surgical History:   Procedure Laterality Date     NO HISTORY OF SURGERY              Social History:     Social History     Occupational History     Employer: UNEMPLOYED   Tobacco Use     Smoking status: Never Smoker     Smokeless tobacco: Never Used   Substance and Sexual Activity     Alcohol use: No     Drug use: No     Sexual activity: Yes     Partners: Male            Family History:     Family History   Problem Relation Age of Onset     Hypertension Father      Cerebrovascular Disease Maternal Grandmother      Diabetes Maternal Grandfather      Breast Cancer Paternal Grandmother      Hypertension Paternal Grandfather      C.A.D. Paternal Grandfather             Allergies:     Allergies   Allergen Reactions     Topiramate Anxiety            Medications:     Current Outpatient Medications   Medication Sig Dispense Refill     cloNIDine (CATAPRES) 0.1 MG tablet        gabapentin (NEURONTIN) 300 MG capsule Take 2 capsules (600 mg) by mouth 3 times daily 84 capsule 0     HYDROcodone-acetaminophen (NORCO) 7.5-325 MG per tablet Take 1 tablet by mouth every 4 hours as needed for moderate to severe pain 20 tablet 0     magnesium sulfate 500 mg/mL SOLN Take 250 mg by mouth       melatonin 1 MG TABS tablet Take 1-2 mg by mouth nightly as needed for sleep       mexiletine (MEXITIL) 150 MG capsule Take 150 mg by mouth 3 times daily       NONFORMULARY Apply topically 3 times daily Ketamine 0.5/Amit2% in Lidoderm: apply to the feet       ondansetron (ZOFRAN-ODT) 4 MG ODT tab Take 4 mg by mouth every 6 hours as needed for nausea       oxyCODONE (OXYCONTIN) 10 MG 12 hr tablet Take 1 tablet (10 mg) by mouth 2 times daily 8 tablet 0     DULoxetine  (CYMBALTA) 20 MG capsule Take 1 capsule (20 mg) by mouth daily for 14 days 14 capsule 0     melatonin (MELATONIN) 1 MG/ML LIQD liquid Take 1-3 mg by mouth       polyethylene glycol (MIRALAX) 17 GM/Dose powder Take 17 g by mouth daily (Patient not taking: Reported on 5/7/2021) 510 g      senna-docusate (SENOKOT-S/PERICOLACE) 8.6-50 MG tablet Take 2 tablets by mouth daily (Patient not taking: Reported on 5/7/2021) 30 tablet 0     triamcinolone (KENALOG) 0.1 % external ointment Apply topically 2 times daily (Patient not taking: Reported on 5/7/2021) 15 g 0            Physical Exam:   Vitals not taken.   Wt Readings from Last 4 Encounters:   04/09/21 49.9 kg (110 lb)   02/03/09 55.6 kg (122 lb 8 oz)   01/09/09 54.3 kg (119 lb 11.2 oz)       Constitutional: well-developed, appearing stated age; cooperative  MS: Finger tips look red. Feet are swollen. No facial rash.  No synovitis or tenderness present over MCP, PIP, DIP joints, bilateral wrists, elbows.  Normal range of motion of shoulders.  No knee effusions. No tenderness present over bilateral ankles, MTP joints.  Psych: nl judgement, orientation, memory, affect.         Data:     Recent Labs   Lab Test 04/09/21  1725 04/03/21 2037   WBC 7.9 7.8   RBC 4.25 4.11   HGB 13.2 12.6   HCT 39.9 37.7   MCV 94 92   RDW 12.9 13.0    249   ALBUMIN 3.7  --    CRP <2.9 <2.9   BUN 14  --       No results for input(s): TSH, T4 in the last 25565 hours.  Hemoglobin   Date Value Ref Range Status   04/09/2021 13.2 11.7 - 15.7 g/dL Final   04/03/2021 12.6 11.7 - 15.7 g/dL Final   01/09/2009 13.6 11.7 - 15.7 g/dL Final     Urea Nitrogen   Date Value Ref Range Status   04/09/2021 14 7 - 30 mg/dL Final     Sed Rate   Date Value Ref Range Status   04/09/2021 6 0 - 20 mm/h Final     CRP Inflammation   Date Value Ref Range Status   04/09/2021 <2.9 0.0 - 8.0 mg/L Final   04/03/2021 <2.9 0.0 - 8.0 mg/L Final     AST   Date Value Ref Range Status   04/09/2021 67 (H) 0 - 45 U/L Final      Albumin   Date Value Ref Range Status   04/09/2021 3.7 3.4 - 5.0 g/dL Final     Alkaline Phosphatase   Date Value Ref Range Status   04/09/2021 73 40 - 150 U/L Final     ALT   Date Value Ref Range Status   04/09/2021 121 (H) 0 - 50 U/L Final     Recent Labs   Lab Test 04/09/21  1725 04/03/21  2037   WBC 7.9 7.8   HGB 13.2 12.6   HCT 39.9 37.7   MCV 94 92    249   BUN 14  --    AST 67*  --    *  --    ALKPHOS 73  --      Component      Latest Ref Rng & Units 5/4/2021   Tissue Transglutaminase Antibody IgA      <7 U/mL <1   Tissue Transglutaminase Jennifer IgG      <7 U/mL <1   Lyme Disease Antibodies Serum      0.00 - 0.89 0.06       Outside studies reviewed: Extensive labs done at [facility] reviewed.    Autoimmune work-up done on 2/19/2021 - RAYA, FLORINA panel, Cheryl 1 antibody, dsDNA, centromere antibody, SCL 70 antibody.     Reviewed Rheumatology lab flowsheet    Assessment     Severe disabling subacute onset erythromelalgia  Negative RAYA, FLORINA panel, normal CBC with differential  Mildly elevated ALT-121, AST-67-4/9/2021  Normal ESR, CRP    Subacute erythromelalgia : Clinical presentation of pain and erythema involving her extremities along with swelling and burning sensation seems consistent with erythromelalgia.  Erythromelalgia can be primary and secondary.  Primary is idiopathic and has early age onset.  Secondary causes of erythromelalgia can be associated with myeloproliferative disorders, paraneoplastic, small fiber neuropathy, autoimmune connective tissue diseases.    Based on her history and exam no evidence of joint inflammation, muscle weakness, mucocutaneous involvement is noted.  Her extensive autoimmune work-up done at [facility] in 2/2021 showed negative RAYA and specific serologies including FLORINA panel, dsDNA, centromere.  With negative RAYA autoimmune disease like systemic lupus is unlikely.  I have ordered further testing including rheumatoid factor, cryoglobulins, ANCA titer, MPO/GA-3,  C3/C4 levels to evaluate for small vessel vasculitis, cryoglobulinemia.  I will repeat her liver tests.  Last AST, ALT level checked couple weeks ago were elevated.      If her autoimmune work-up comes back normal then she will follow up with neurology at [facility].  I agree with hematology evaluation to rule out secondary causes.     Plan     Blood tests ordered    Previous extensive autoimmune work-up done at [facility] was normal.  With negative RAYA likelihood of systemic lupus or other RAYA associated autoimmune connective tissue disease is low.    Few blood test for vasculitis ordered    Follow-up on as-needed basis.      Video-Visit Details    Type of service:  Video Visit    Video End Time 12:16 PM  Originating Location (pt. Location): Home    Distant Location (provider location):  [facility]     Platform used for Video Visit: Irene Quintero CMA

## 2021-05-07 NOTE — TELEPHONE ENCOUNTER
RECORDS STATUS - ALL OTHER DIAGNOSIS    erythromelalgia was diagnosed in 3/2021  RECORDS RECEIVED FROM: [facility]/ [facility]    DATE RECEIVED: 6/8/2021   NOTES STATUS DETAILS   OFFICE NOTE from referring provider Darek Rodriguez MD   OFFICE NOTE from medical oncologist N/A [facility]- At [facility], was seen by neurology (Dr. De La Rosa) and dermatology (Dr. Smith) at [facility] and, after additional testing, was diagnosed with erythromelalgia.   DISCHARGE SUMMARY from hospital Complete 4/9/2021 Erythromelalgia (Primary Dx) other chronic pain    DISCHARGE REPORT from the ER     OPERATIVE REPORT Complete  Thermoregulatory Stress Test 3/18/2021    MEDICATION LIST Complete [facility]   CLINICAL TRIAL TREATMENTS TO DATE     LABS     PATHOLOGY REPORTS N/A    ANYTHING RELATED TO DIAGNOSIS Complete Labs last updated on 5/4/2021   GENONOMIC TESTING     TYPE:     IMAGING (NEED IMAGES & REPORT)     CT SCANS     Lower Extremity  Complete [facility]  4/9/2021   DX Foot  Complete- [facility]  4/9/2021   MRI     MAMMO     ULTRASOUND     PET       Action    Action Taken 5/7/2021 10:10AM   I called [facility] to have IMG pushed to PACS Ph: [phone] option 4      5/12/2021 9:15AM   I called pt Ronna - All records have been accounted for in Epic and

## 2021-05-07 NOTE — NURSING NOTE
Referral faxed to [facility] Dr. James [phone] along with Dr. Camacho note, demographics and recent labs.  I also asked them to contact to to schedule appt.    Pearl Penny LPN

## 2021-05-10 ENCOUNTER — TELEPHONE (OUTPATIENT)
Dept: FAMILY MEDICINE | Facility: CLINIC | Age: 36
End: 2021-05-10

## 2021-05-10 NOTE — TELEPHONE ENCOUNTER
Lisa FIELDS  for [facility] [phone]    Neurologist, Dr. Orta, states that patient is requesting orders for home infusion for methylprednisone. Dr. Orta has only seen her once.    Dr. Orta advised pt to request this home infusion referral from Dr. De La Rosa at [place] who she has been seeing, and/or Dr. Martinez PCP.     Care Coordinator wanted Dr. Martinez to know that if Dr. De La Rosa wasn't comfortable ordering the home infusion he might get an order request from the patient.       FYI routing to Dr. Martinez.     Barbara Dela Cruz RN

## 2021-05-10 NOTE — TELEPHONE ENCOUNTER
Unfortunately, I am not familiar with fusion orders, will not be able to do it.  She has establish care with  newP and neurology, they need to coordinate care. I have seen this patient once or twice virtually only.  Thank you.    Gutierrez Martinez MD

## 2021-05-11 ENCOUNTER — PATIENT OUTREACH (OUTPATIENT)
Dept: NURSING | Facility: CLINIC | Age: 36
End: 2021-05-11
Payer: COMMERCIAL

## 2021-05-11 NOTE — PROGRESS NOTES
Clinic Care Coordination Contact    Follow Up Progress Note      Assessment: CC CHILO spoke with pt regarding her coordination of medical care. Pt shared that she met with a PCP and neurologist at [facility]. Both expressed that they are unfamiliar with her diagnosis. She also felt that she was getting conflicting information on which provider could help her with what.    Pt explained she has had extensive testing at [place], which was recommended again by Neurologist but she is not interested in doing it again. Pt has also be prescribed steroid infusion by [facility] providers and would like PCP to put in an order for this but PCP stated she couldn't do this.     Pt was encouraged to see an [facility] Neurologist and has an appointment in 3 weeks. COSTA WOO explained that it is likely most important to find the right Neurologist and let that dictate where her care team is. Pt would like to make an appointment with Dr. Landin at [facility] to try one more provider there that may feel more comfortable working with her.    COSTA WOO encouraged pt to outreach to SW at [facility], [place] [phone], to discuss a CC RN supporting pt is starting steroid infusion.     Goals addressed this encounter:   Goals Addressed                 This Visit's Progress       Patient Stated      1. Medical (pt-stated)   50%     Goal Statement: Over the next 6 months, I would like to follow medical recommendations of establishing a care team in the [facility] for ongoing medical care and to support my health and wellbeing.  Date Goal Set: 4/14/2021  Barriers: Complexity of medical diagnosis  Strengths: Motivated to address medical concerns, strong support system  Date to Achieve By: 10/14/2021  Patient expressed understanding of goal: yes    Action steps to achieve this goal:  1. I will attend upcoming appointments with Neurology, pain clinic, Dermatology, and Vascular  2. I will review  to learn more about SSDI and the process   3. I will explore PCP through [facility]  4. I will outreach to Care Coordination  for further questions or concerns          Outreach Frequency: monthly    Plan: CC SW will outreach to pt in 3 weeks after she has met with [facility] Neurologist.    Faviola Brantley, [facility]  Clinic Care Coordinator  [facility] Mami Henry  [facility]  [phone]  [email or web address]

## 2021-05-12 ENCOUNTER — TELEPHONE (OUTPATIENT)
Dept: INTERNAL MEDICINE | Facility: CLINIC | Age: 36
End: 2021-05-12

## 2021-05-12 NOTE — TELEPHONE ENCOUNTER
Uzma PT with [facility] requesting orders to continue PT 1x week for 3 weeks.    Verbal orders for homecare given for above and per protocol.    Uzma can be reached at [phone]    Adriana CAMPOS RN  EP Triage

## 2021-05-12 NOTE — TELEPHONE ENCOUNTER
[facility] [phone]    Verbal orders given for:   OT 1x wk for 4 weeks for:    Edema management, independence with ADLs and functional activity tolerance.     Barbara Dela Cruz RN

## 2021-05-13 ENCOUNTER — VIRTUAL VISIT (OUTPATIENT)
Dept: INTERNAL MEDICINE | Facility: CLINIC | Age: 36
End: 2021-05-13
Payer: COMMERCIAL

## 2021-05-13 DIAGNOSIS — I73.81 ERYTHROMELALGIA (H): Primary | ICD-10-CM

## 2021-05-13 PROCEDURE — 99215 OFFICE O/P EST HI 40 MIN: CPT | Mod: 95 | Performed by: PEDIATRICS

## 2021-05-13 NOTE — PROGRESS NOTES
"Ronna is a 35 year old who is being evaluated via a billable video visit.      How would you like to obtain your AVS? MyChart  If the video visit is dropped, the invitation should be resent by: Send to e-mail at: [email or web address]  Will anyone else be joining your video visit?     Dear patient. Thank you for visiting with me. I want you to feel respected, understood, and empowered. \"Respect\" is valuing you as much as I would a close family member. \"Empowerment\" happens when you are fully informed, and can make the best possible decision for you.  Please ask me questions!  Challenge anything that is not clear.    Medical records are primarily used as memory aids for me and my colleagues. Things to know about my documentation style:  - The 'problem list' includes current symptoms or diagnoses, and some problems that are resolved but may return. I use the past medical history for problems not expected to return.  - I use single quotation marks for things that you or I said, when I want to clarify who was speaking.  - I use double quotation marks when copying a term from elsewhere in your records. Italics (besides here) may also denote a quotation.  If you have questions or concerns, please contact me; I will reply as soon as time allows.      Virtual Visit Details    Type of service:  Video Visit    Start Time: 4:06 PM    End Time:4:57 PM    Originating Location (pt. Location): Home    Distant Location (provider location):  [facility]     Platform used for Visit: [facility]    PCP: Deborah Rios switching to me  Visit type: problem-oriented  Time note (e5, 40'): The total time (on the date of service) for this service was 51 minutes, including discussion/face-to-face, chart review, interpretation not otherwise reported, documentation, and updating of the computerized record.        Context    Ronna Rivera is a 35 year old woman, here with her , with " concerns including:  Chief Complaint   Patient presents with     Establish Care     pt would like to discuss establishing complex care, met with Dr. Frederick and she wa a bit hesitant per pt       History, update, and/or problems    They have had trouble with implementing local care recommendations.    Erythromelalgia    Began with a minor rash on the top of her right foot 1/20/2021, after a yeast infection and what were thought to be ringworm infections. She saw a dermatologist who thought may be it was contact dermatitis, but it became a hot/red/swelling rash. She had trouble on the opposite foot. The dermatologist suspected erythromelalgia by February, originally it was triggered by activity. She got into   Pain was constant mid-level with spikes of excruciating pain. She started developing vascular ulcers, especially with the right foot.    She has been doing cooling with plastic bags in cool water, plus using lotion (seravi?) for protection. She tried ketamine/amitriptyline/clonidine compounded from [place] without much benefit.  She did treatments with regular corticosteroids infusions. Dr. De La Rosa wanted them to be continued regularly, but there was a communication issue with continuing. Last week was good, this week has been worse.  Pain management program involves oxycontin BID via the [facility] (Dr. Wills), with occasional use of norco. She had been in the hospital for pain. There has been some thinking about spinal cord stimulator and/or clonidine pump.    Currently affected areas: both feet up to ankles, both palms and fingers, and cheeks, chin, nose, and ear. Ulcers on both feet, more on the right.     Triggers currently: she feels that there is constant activity, worsened with dependent position for feet. Usually worse in evenings and mornings. She spends a lot of her time in bed with her feet up, often with the fan. She sometimes gets triggering     She was referred to a hematologist,  but hasn't got the appointment yet. She tried aspirin for a month.    She had autoimmune testing at [place], but results were negative. She had her thermoregulatory sweat test on 3/18/21 which showed global hypohidrosis. By the time of the TRST she had already been on the amitriptyline cream. There has been some consideration of a small fiber biopsy.    Sleep has been challenging.    Mental side has been very difficult - they have a psychiatrist and psychologist.     Diet: includes meat and a variety. She usually has almond mild rather than dairy milk.    She has home nursing through [facility], they can do a home draw.    Gabapentin 600 mg TID, Dr. De La Rosa hasn't been sure   IV magnesium and mexiletine has been helpful.        Outstanding issues (Dr. Ball)  --------------------------------------------  -- In-person exam when able  -- Decision about central clonidine admin?  -- Corticosteroid plan?  -- B12, TSH        Ongoing care provided by  --------------------------------------  -- Neuro: Dr. Shannon De La Rosa @ [place]  (also has seen Dr. Darek Orta @ [facility] and [facility]  -- Pain: Dr. Wills @ [facility]   -- Rheum PRN: Dr. Cipriano Cardoza    General comments      Physical exam as possible  Physical Exam  Constitutional:       General: She is not in acute distress.     Appearance: Normal appearance. She is not ill-appearing.   HENT:      Head: Normocephalic.      Right Ear: External ear normal.      Left Ear: External ear normal.      Nose: Nose normal.   Eyes:      General: No scleral icterus (no obvious).        Right eye: No discharge (none obvious).         Left eye: No discharge (none obvious).      Extraocular Movements: Extraocular movements intact.   Pulmonary:      Effort: Pulmonary effort is normal. No respiratory distress.      Comments: No audible wheeze or stridor. No visible cyanosis.  Skin:     Comments: Flushed cheeks   Neurological:      Mental Status: She is alert.       Comments: Cranial nerves appear grossly normal   Psychiatric:         Mood and Affect: Mood normal.         Speech: Speech normal.         Behavior: Behavior normal.         Thought Content: Thought content normal.              Comment about data reviewed  I personally reviewed, interpreted, and/or confirmed interpretation of > 15 notes and results from [facility]

## 2021-05-13 NOTE — NURSING NOTE
Chief Complaint   Patient presents with     Establish Care     pt would like to discuss establishing complex care, met with Dr. Frederick and she wa a bit hesitant per pt       Belem Degroot, CMA, EMT at 3:26 PM on 5/13/2021.

## 2021-05-18 ENCOUNTER — TRANSFERRED RECORDS (OUTPATIENT)
Dept: HEALTH INFORMATION MANAGEMENT | Facility: CLINIC | Age: 36
End: 2021-05-18

## 2021-05-18 ENCOUNTER — TELEPHONE (OUTPATIENT)
Dept: RHEUMATOLOGY | Facility: CLINIC | Age: 36
End: 2021-05-18

## 2021-05-18 NOTE — TELEPHONE ENCOUNTER
Patient is requesting that Dr Cardoza send lab orders to her homecare nursing staff (with [facility]) via fax: [phone]    Please call patient if there are any questions regarding this. KRYSTIN

## 2021-05-20 ENCOUNTER — TELEPHONE (OUTPATIENT)
Dept: INTERNAL MEDICINE | Facility: CLINIC | Age: 36
End: 2021-05-20

## 2021-05-20 NOTE — TELEPHONE ENCOUNTER
[facility] Call Center    Phone Message    May a detailed message be left on voicemail: yes     Reason for Call: Other: Patient calling requesting a handicapped parking pass, Please call to discuss if questions. thank you.      Action Taken: Message routed to:  [facility] ([facility]): pcc    Travel Screening: Not Applicable

## 2021-05-24 NOTE — CONFIDENTIAL NOTE
Placed disability form in Dr. Ball.s in box for signature,    Roselyn Mckinney on 5/24/2021 at 10:47 AM

## 2021-05-24 NOTE — TELEPHONE ENCOUNTER
RE: disabled parking paperwork.  I am willing to do this without a visit, when time allows.   Tuesday afternoon is possible - that's my next time in person.  Team, I'd be grateful if you could start the form for her.

## 2021-05-25 ENCOUNTER — TELEPHONE (OUTPATIENT)
Dept: RHEUMATOLOGY | Facility: CLINIC | Age: 36
End: 2021-05-25

## 2021-05-25 NOTE — TELEPHONE ENCOUNTER
Lab orders re-faxed via Epic.     Solange Goncalves, BSN, RN  Medical Specialty Care Coordinator  [facility]

## 2021-05-25 NOTE — TELEPHONE ENCOUNTER
[facility] Call Center    Phone Message    May a detailed message be left on voicemail: yes     Reason for Call: Order(s): Other:   Reason for requested: lab orders faxed to [facility] at [phone]; they are stating they did not receive them when faxed 5/18  Date needed: 5/26  Provider name: Nani      Action Taken: Message routed to:  Adult Clinics: Rheumatology p [id number]    Travel Screening: Not Applicable

## 2021-05-28 ENCOUNTER — MEDICAL CORRESPONDENCE (OUTPATIENT)
Dept: HEALTH INFORMATION MANAGEMENT | Facility: CLINIC | Age: 36
End: 2021-05-28

## 2021-05-28 LAB
LAB SCANNED RESULT: NORMAL
LAB SCANNED RESULT: NORMAL

## 2021-05-28 NOTE — TELEPHONE ENCOUNTER
Lab orders printed and faxed to [facility] at [phone]. Confirmed via rightfax.      BROOKE [facility]ridge [facility] Rheumatology

## 2021-06-01 ENCOUNTER — APPOINTMENT (OUTPATIENT)
Dept: LAB | Facility: CLINIC | Age: 36
End: 2021-06-01
Attending: PEDIATRICS
Payer: COMMERCIAL

## 2021-06-01 ENCOUNTER — MYC REFILL (OUTPATIENT)
Dept: INTERNAL MEDICINE | Facility: CLINIC | Age: 36
End: 2021-06-01

## 2021-06-01 DIAGNOSIS — I73.81 ERYTHROMELALGIA (H): ICD-10-CM

## 2021-06-02 ENCOUNTER — MYC REFILL (OUTPATIENT)
Dept: INTERNAL MEDICINE | Facility: CLINIC | Age: 36
End: 2021-06-02

## 2021-06-02 DIAGNOSIS — Z53.9 DIAGNOSIS NOT YET DEFINED: Primary | ICD-10-CM

## 2021-06-02 DIAGNOSIS — I73.81 ERYTHROMELALGIA (H): ICD-10-CM

## 2021-06-03 DIAGNOSIS — I73.81 ERYTHROMELALGIA (H): ICD-10-CM

## 2021-06-03 DIAGNOSIS — G89.4 CHRONIC PAIN SYNDROME: ICD-10-CM

## 2021-06-03 DIAGNOSIS — Z53.9 DIAGNOSIS NOT YET DEFINED: Primary | ICD-10-CM

## 2021-06-03 RX ORDER — LORAZEPAM 0.5 MG/1
TABLET ORAL
COMMUNITY
Start: 2021-04-13 | End: 2021-10-27

## 2021-06-03 RX ORDER — DOCUSATE SODIUM 50MG AND SENNOSIDES 8.6MG 8.6; 5 MG/1; MG/1
TABLET, FILM COATED ORAL
COMMUNITY
Start: 2021-04-13 | End: 2021-06-07

## 2021-06-03 RX ORDER — ONDANSETRON 4 MG/1
4 TABLET, FILM COATED ORAL
COMMUNITY
End: 2021-10-27

## 2021-06-03 RX ORDER — INDOMETHACIN 25 MG/1
CAPSULE ORAL
COMMUNITY
Start: 2021-04-13 | End: 2021-06-07

## 2021-06-03 RX ORDER — NICOTINE POLACRILEX 4 MG/1
20 GUM, CHEWING ORAL DAILY
COMMUNITY
End: 2021-10-27

## 2021-06-03 RX ORDER — POLYETHYLENE GLYCOL 3350 17 G/17G
1 POWDER, FOR SOLUTION ORAL DAILY
COMMUNITY
End: 2021-06-07

## 2021-06-03 RX ORDER — MEXILETINE HYDROCHLORIDE 150 MG/1
150 CAPSULE ORAL 3 TIMES DAILY
Qty: 42 CAPSULE | Refills: 0 | OUTPATIENT
Start: 2021-06-03

## 2021-06-03 NOTE — TELEPHONE ENCOUNTER
I think she sent this after she read my reply to an earlier request.  Please call and ensure there isn't something else that needs to be done right now.  Looking at the list of goals for 6/4 (30 min visit, 15 min face to face) ... I will probably have to do the mexilitine conversation on the 6/7 video call. Sorry.

## 2021-06-03 NOTE — TELEPHONE ENCOUNTER
Patient was reached by phone, and RN asked if she had any other questions or needed any other med refills.  Patient relayed that [facility] did send her a message that mexiletine (MEXITIL) 150 MG capsule would be filled today.      Jaclyn Geller RN on 6/3/2021 at 9:57 AM

## 2021-06-04 ENCOUNTER — OFFICE VISIT (OUTPATIENT)
Dept: INTERNAL MEDICINE | Facility: CLINIC | Age: 36
End: 2021-06-04
Payer: COMMERCIAL

## 2021-06-04 ENCOUNTER — PATIENT OUTREACH (OUTPATIENT)
Dept: CARE COORDINATION | Facility: CLINIC | Age: 36
End: 2021-06-04

## 2021-06-04 VITALS
WEIGHT: 110 LBS | BODY MASS INDEX: 20.12 KG/M2 | DIASTOLIC BLOOD PRESSURE: 72 MMHG | HEART RATE: 84 BPM | SYSTOLIC BLOOD PRESSURE: 108 MMHG

## 2021-06-04 DIAGNOSIS — M24.9 HYPERMOBILE JOINTS: ICD-10-CM

## 2021-06-04 DIAGNOSIS — I73.81 ERYTHROMELALGIA (H): ICD-10-CM

## 2021-06-04 DIAGNOSIS — R55 VASOVAGAL REACTION: ICD-10-CM

## 2021-06-04 DIAGNOSIS — G90.9 AUTONOMIC DYSFUNCTION: ICD-10-CM

## 2021-06-04 LAB
ALBUMIN SERPL-MCNC: 3.7 G/DL (ref 3.4–5)
ALP SERPL-CCNC: 90 U/L (ref 40–150)
ALT SERPL W P-5'-P-CCNC: 78 U/L (ref 0–50)
AST SERPL W P-5'-P-CCNC: 23 U/L (ref 0–45)
BILIRUB DIRECT SERPL-MCNC: <0.1 MG/DL (ref 0–0.2)
BILIRUB SERPL-MCNC: 0.3 MG/DL (ref 0.2–1.3)
PROT SERPL-MCNC: 6.9 G/DL (ref 6.8–8.8)

## 2021-06-04 PROCEDURE — 83876 ASSAY MYELOPEROXIDASE: CPT | Mod: 90 | Performed by: PATHOLOGY

## 2021-06-04 PROCEDURE — 86255 FLUORESCENT ANTIBODY SCREEN: CPT | Mod: 90 | Performed by: PATHOLOGY

## 2021-06-04 PROCEDURE — 82595 ASSAY OF CRYOGLOBULIN: CPT | Mod: 90 | Performed by: PATHOLOGY

## 2021-06-04 PROCEDURE — 36415 COLL VENOUS BLD VENIPUNCTURE: CPT | Performed by: PATHOLOGY

## 2021-06-04 PROCEDURE — 99000 SPECIMEN HANDLING OFFICE-LAB: CPT | Performed by: PATHOLOGY

## 2021-06-04 PROCEDURE — 99215 OFFICE O/P EST HI 40 MIN: CPT | Performed by: PEDIATRICS

## 2021-06-04 PROCEDURE — 86160 COMPLEMENT ANTIGEN: CPT | Mod: 90 | Performed by: PATHOLOGY

## 2021-06-04 PROCEDURE — 80076 HEPATIC FUNCTION PANEL: CPT | Performed by: PATHOLOGY

## 2021-06-04 PROCEDURE — 86431 RHEUMATOID FACTOR QUANT: CPT | Mod: 90 | Performed by: PATHOLOGY

## 2021-06-04 PROCEDURE — 83516 IMMUNOASSAY NONANTIBODY: CPT | Mod: 90 | Performed by: PATHOLOGY

## 2021-06-04 NOTE — NURSING NOTE
Chief Complaint   Patient presents with     Consult     Pt would like a second option on erythromelalgia.        Evelia Echeverria LPN at 3:04 PM on 6/4/2021.

## 2021-06-04 NOTE — PROGRESS NOTES
"Dear patient. Thank you for visiting with me. I want you to feel respected, understood, and empowered. \"Respect\" is valuing you as much as I would a close family member. \"Empowerment\" happens when you are fully informed, and can make the best possible decision for you.  Please ask me questions!  Challenge anything that is not clear.    Medical records are primarily used as memory aids for me and my colleagues. Things to know about my documentation style:  - The 'problem list' includes current symptoms or diagnoses, and some problems that are resolved but may return. I use the past medical history for problems not expected to return.  - I use single quotation marks for things that you or I said, when I want to clarify who was speaking.  - I use double quotation marks when copying a term from elsewhere in your records. Italics (besides here) may also denote a quotation.  If you have questions or concerns, please contact me; I will reply as soon as time allows.    Context    Ronna Rivera is a 35 year old woman, here with her , with concerns including:  Chief Complaint   Patient presents with     Consult     Pt would like a second option on erythromelalgia.      PCP: Neal Ball   Visit type: problem-oriented    /72   Pulse 84   Wt 49.9 kg (110 lb)   BMI 20.12 kg/m      Problems and progress    Erythromelagia       SUBJECTIVE: They met with Dr. Fuentes done at [place] 3/26/21.       Right: Doppler Waveforms: Abnormal signals starting at or above the tibial/pedal level.   Resting Index:     SANDEEP (PT)-  1.09      SANDEEP (DP)-  1.01      TBI-  0.55   Temperature, Laser Doppler, TcPO2:     Values as noted.         Left: Doppler Waveforms:     Abnormal signals starting at or above the common femoral level.   Resting Index:     SANDEEP (PT)-  1.15      SANDEEP (DP)-  1.10      TBI-  0.44   Temperature, Laser Doppler, TcPO2:     Values as noted.         General: Erythromelalgia Study--     Vascular studies " performed with symptoms.   Patient instructed to return to the Vascular Laboratory without symptoms for further study.          Conclusions: Study performed in the presence of erythromelalgia symptoms.  Digital temperatures are elevated with significantly elevated laser Doppler flowmetry both suggestive of erythromelalgia.   The diminished TCPO2 values within both feet also suggest underlying erythromelalgia.  Improvement of TCPO2 values in the absence of symptoms would further substantiate the diagnosis. Resting ankle to brachial indices are within normal limits.  The resting digit to brachial indices are moderately abnormal.  Possibilities include an element of small-vessel vascular disease.        OBJECTIVE: laying flat for most of visit, able to arise carefully.       ASSESSMENT and PLAN: Further chart review planned. I will be comfortable following plan set forth by Dawna De La Rosa and Erika. The important thing will be me having access to their records and communications. I apparently cannot see outside [facility] messages.  I briefly renewed mexiletine, and we have a video visit planned for this.  Documentation for home IV therapy: I still need to get recommendation for dosing amount/timing. I gather the plan is twice-weekly corticosteroids for now. She is essentially home bound during the summer because of extreme heat sensitivity and difficulty being upright.       Autonomic dysfuncion       UPDATE: Autonomic reflex screen reviewed from [place] 3/19/21.  Abnormal study. There is evidence of patchy postganglionic sympathetic sudomotor impairment. There was also a suggestion of poor vasomotor tone and dehydration, along with symptomatic orthostatic tachycardia and neurocardiogenic presyncope. Cardiovagal and cardiovascular adrenergic functions were normal. The  findings can be seen in erythromelalgia.   and  Comments on Sudomotor Test: QSART responses were reduced at the proximal leg, absent at the distal leg,  and normal at all other sites.       ASSESSMENT & PLAN: I haven't had a chance yet to go over all of her symptoms, but will in future to-establish visit.         NEW OVERVIEW: Erythromelalgia, symptomatic orthostatic tachycardia (POTS), vasovagal spell       Vasovagal spell       UPDATE: Documented on tilt table test, autonomic reflex screen 3/19/21 at [place]. Symptomatic rapid drop in BP and HR associated with lightheadedness but no loss of consciousness.       ASSESSMENT & PLAN: will discuss in future.           Hypermobile joints       SUBJECTIVE: One goal today was to evaluate for hypermobility, given the apparent autonomic dysfunction. She recalls being more flexible when younger.       OBJECTIVE: A Beighton exam was performed, based on published recommendations. The findings:   - Passive apposition of the thumbs to the flexor aspect of the forearm:Absent (0 points)   - Passive dorsiflexion of the little fingers beyond 90 degrees: Both sides (2 point)   - Hyperextension of the elbows beyond 10 degrees: Left only (1 point)   - Hyperextension of the knees beyond 10 degrees: Right only (1 point)   - Forward flexion of the trunk with knees fully extended so that the palms of the hand rest flat on the floor: Absent (0 points)  Total points: 4/9  The following additional signs of hypermobility were noted:   - unusually soft or velvety skin   - mild skin hyperextensibility at hand and jaw  I also observed:   - Active clunking at ankles and occasionally wrists   - handshake behind back, with high on both sides   - excessive ankle inversion on both sides  Ronna has a limited Beighton exam, but otherwise has 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 It is hard to tell if she has hypermobility syndrome. However, she does not meet the 2017 criteria for Julien-Danlos syndrome. We discussed this in detail, and also how the official  designation of EDS is no longer particularly important. The keys for HMS are to (a) work on muscle strength around problem joints, (b) protect problem joints in unusual circumstances of strain or effort, (c) be aware of additional conditions that may develop such as autonomic dysfunction, and (d) ensure that additional conditions do not become worse because of inactivity or other pitfalls.           Recurrent major depressive disorder, remission status unspecified - this was an imported diagnosis, she (electronically) requested it removed.          Outstanding issues (Dr. Ball)  --------------------------------------------  -- In-person exam when able (6/4?)  -- Video discussion about mexiletine  -- Decision about central clonidine admin?  -- Corticosteroid plan?  -- B12, TSH        Ongoing care provided by  --------------------------------------  -- Neuro: Dr. Shannon De La Rosa @ [facility]  (also has seen Dr. Darek Orta @ [facility] and may be seeing [facility]. [facility]  -- Pain: Dr. Wills @ [facility]   -- Rheum PRN: Dr. Cipriano Cardoza      Other comments        Other physical exam  Physical Exam  Constitutional:       General: She is not in acute distress.     Appearance: Normal appearance. She is not ill-appearing.   HENT:      Head: Normocephalic.      Nose: Nose normal.   Eyes:      General: No scleral icterus.        Right eye: No discharge.         Left eye: No discharge.      Extraocular Movements: Extraocular movements intact.   Pulmonary:      Effort: Pulmonary effort is normal. No respiratory distress.   Neurological:      Mental Status: She is alert.   Psychiatric:         Mood and Affect: Mood normal.         Behavior: Behavior normal.            About this visit:  Time note (e5, 40'): The total time (on the date of service) for this service was 45 minutes, including discussion/face-to-face, chart review, interpretation not otherwise reported, documentation, and updating of the computerized  record.  Comment about data reviewed: I personally reviewed, interpreted, and/or confirmed interpretation of testing from [place] as above

## 2021-06-04 NOTE — PROGRESS NOTES
Clinic Care Coordination Contact  [facility]/Voicemail       Clinical Data: Care Coordinator Outreach    Outreach attempted x 1.  Left message on patient's voicemail with call back information and requested return call.    Plan: Care Coordinator will try to reach patient again in 3-5 business days.    Faviola Brantley [facility]  Clinic Care Coordinator  [facility] Mami Caldwell  [facility]  [phone]  [email or web address]

## 2021-06-04 NOTE — PATIENT INSTRUCTIONS
"[facility] Medication Refill Request Information:  * Please contact your pharmacy regarding ANY request for medication refills.  ** [facility] Prescription Fax = [phone]  * Please allow 3 business days for routine medication refills.  * Please allow 5 business days for controlled substance medication refills.     [facility] Test Result notification information:  *You will be notified with in 7-10 days of your appointment day regarding the results of your test.  If you are on MyChart you will be notified as soon as the provider has reviewed the results and signed off on them.    [facility]: [phone]       -----------------------------------------------------------------------------  Dr. Ball's comments about Hypermobility and  Julien-Danlos syndrome (EDS)                          -----------------------------------------------------------------------------         Julien-Danlos syndrome (EDS) is a term used for a collection of inborn conditions that may not be closely related, but all have hypermobility of joints and some other tissues (\"Hypermobility\" means that the joint or connective tissue is more flexible or stretchy than for other people).       Hypermobility is traditionally assessed by the Beighton score, but most providers with EDS experience will allow some leeway for partial hypermobility because the Beighton score only accounts for 8 joints plus a single type of bending for the spine.          In years past, the EDS diagnosis was given to lots of people with mild joint hypermobility but no other symptoms. In 2017, experts rewrote guidelines for terms so that hypermobile-type EDS (hEDS) would only be used for patients with several other findings besides joint hypermobility. You can ask me about the criteria, or do an Internet search for \"hypermobile Julien Danlos criteria.\"       The 2017 criteria can be a bit frustrating because they vastly restrict the EDS term for " "patients who were previously referred to as having EDS. In fact, the criteria are seemingly designed such that it is very difficult for youth without obvious malformations to qualify. Instead, we now use these terms:       - \"Hypermobility Syndrome\" is the term for pain symptoms AND            either global hypermobility OR partial hypermobility plus certain            additional features.        - \"Joint hypermobility\" or \"hypermobile joint(s)\" is the term for            hypermobility without joint symptoms. These people may             have other conditions, however.    Of course, the official label is not terribly important because the treatment approach for hypermobile EDS and hypermobility syndrome is essentially the same: strengthen the areas around problem joints, and be aware of symptoms and syndromes that may develop.         Technically, EDS and hypermobility should not be regarded as the \"cause\" of pain, but rather a variant in connective tissue that increases the risk for certain types of pain or dysautonomia. Anybody with hypermobility should have their joints assessed, and ensure that they have enough strength around the problem joints to prevent future problems.     What should I do, about my hypermobility?  I have several suggestions:      -- Most important: work on muscles around your problem joints. Pay special attention to your lower back and knees. Muscles will help your joints to stay together. You may want to work with a physical therapist or  - but make sure they know about hypermobility.      -- Subluxing or even dislocations may occur. You may be able to get these back in on your own. If the joint stays stuck, then seek medical help.      -- Warm up carefully before physical activity.      -- Keep active physically. For some reason, hypermobility-associated pain gets worse with inactivity. If activity makes you hurt, talk with me about finding the \"sweet spot\" between overuse " "and under-use.      -- What about joint cracking?  Never crack your neck intentionally (this can lead to pinched nerves, numbness, or pain). Try to avoid cracking your other joints.      -- For back strength, I recommend: swim laps at a gentle speed, 20-30 minutes, 3-4 times per week. Use a snorkel, don't worry about swimming form. The point is to be horizontal in the water, and move forward slowly. If you have autonomic symptoms (dizziness, heart-racing, etc), you will need additional exercise besides swimming.      -- Work on posture, by (a) increasing abdominal muscle tone, and (b) try holding your elbows parallel to your body.      -- What about braces?  Train without braces or tapes as much as possible. Avoid braces or taping except (a) when injured, (b) when doing unusually strenuous activity (e.g. trip to [place]), or (c) for the second half of moderate activity that lasts a long time (hiking).       -- What about anti-inflammatory medications?  Talk with me about this. Ibuprofen or naproxen can reduce pain after injury. Ideally, you should avoid taking them regularly (they lose effectiveness over time). If you have daily pain, I will recommend a different approach.      -- Headache medicines? If you have headaches, check with me for a diagnosis and medicine options. Ibuprofen or naproxen shouldn't be needed for headaches more often than twice per month. Acetaminophen shouldn't be needed for headaches more often than twice per week.      -- Read \"The Hypermobility Handbook\" by Dr. Pedro Reid (just keep in mind that the entire book may not apply to you).  A variety of autonomic symptoms can occur in people with hypermobility, although technically they are not a result of the hypermobility itself. if you have other symptoms not related to joint or muscle problems, check with me.      Answers to some other questions       What about heart or aorta complications?The risk for cardiovascular disease remains " incompletely defined in hypermobile EDS, but is not thought to be predictably elevated compared to the general population. Some patients will have valve or aortic abnormalities, but these patients may turn out to be qualitatively different than their other hypermobile peers. My personal recommendation about this is to (a) keep a longitudinal relationship with a primary care provider who is competent at detecting new murmurs, (b) through age 40 at least, have an annual well exam to ensure that murmurs are assessed, (c) have a heart exam when being evaluated for any illnesses or health problems, and (d) have an evaluation by a cardiologist and/or echocardiogram for murmurs or other unexplainable changes in exam.         Aren't there genetic blood tests for EDS? Technically there are some tests that can be done, but these tests are not very sensitive (many people with EDS or hypermobility are missed by the tests). Also, a positive or negative test doesn't affect our management. I was part of a group that was planning a much broader research program with hypermobility, but unfortunately federal funding for all research was cut and the project was stopped.         Should I see a ? You are welcome to see a  if you wish, but the geneticists don't do anything different than what I do. Also, the geneticists are a lot harder to see! The key thing is to be assessed by somebody with lots of EDS and hypermobility experience. I highly recommend that you establish with an EDS / hypermobility center close to your home, so that you can have ongoing care at an organization that knows you and your history. An important component of such a center is either Physical Therapy or Physical Medicine and Rehabilitation (PM&R).    Useful medical references regarding Julien-Danlos Syndrome     An overview of the different types of can be found at [email or web address]-danlos.com/eds-types/    Criteria for hypermobile type EDS  (hEDS) can be found at https://[employer]/wp-content/uploads/xTQG-Ub-Zulvbvrj-checklist-1.pdf  (the main reference for criteria is Brock holden al, Am J Med Kiana 2017, 175C:8-26)    For a nice article about hEDS versus other hypermobility conditions, see Alvaro et al, Am J Med Kiana 2017, 175c:148-157.

## 2021-06-06 ENCOUNTER — HEALTH MAINTENANCE LETTER (OUTPATIENT)
Age: 36
End: 2021-06-06

## 2021-06-06 PROBLEM — M35.7: Status: ACTIVE | Noted: 2021-06-04

## 2021-06-06 PROBLEM — M51.369 DDD (DEGENERATIVE DISC DISEASE), LUMBAR: Status: ACTIVE | Noted: 2018-02-08

## 2021-06-06 PROBLEM — R55 VASOVAGAL REACTION: Status: ACTIVE | Noted: 2021-06-06

## 2021-06-06 PROBLEM — J45.990 ASTHMA, EXERCISE INDUCED: Status: ACTIVE | Noted: 2020-06-25

## 2021-06-06 PROBLEM — G90.9 AUTONOMIC DYSFUNCTION: Status: ACTIVE | Noted: 2021-06-06

## 2021-06-06 PROBLEM — F32.9 REACTIVE DEPRESSION: Status: RESOLVED | Noted: 2021-01-01 | Resolved: 2021-06-04

## 2021-06-06 PROBLEM — M24.9 HYPERMOBILE JOINTS: Status: ACTIVE | Noted: 2021-06-04

## 2021-06-06 PROBLEM — F51.04 CHRONIC INSOMNIA: Status: ACTIVE | Noted: 2020-06-25

## 2021-06-06 PROBLEM — Z86.69 HISTORY OF MIGRAINE: Status: ACTIVE | Noted: 2018-02-27

## 2021-06-07 ENCOUNTER — HOME INFUSION (PRE-WILLOW HOME INFUSION) (OUTPATIENT)
Dept: PHARMACY | Facility: CLINIC | Age: 36
End: 2021-06-07

## 2021-06-07 ENCOUNTER — VIRTUAL VISIT (OUTPATIENT)
Dept: INTERNAL MEDICINE | Facility: CLINIC | Age: 36
End: 2021-06-07
Payer: COMMERCIAL

## 2021-06-07 ENCOUNTER — TELEPHONE (OUTPATIENT)
Dept: INTERNAL MEDICINE | Facility: CLINIC | Age: 36
End: 2021-06-07

## 2021-06-07 DIAGNOSIS — I73.81 ERYTHROMELALGIA (H): Primary | ICD-10-CM

## 2021-06-07 DIAGNOSIS — F48.9 MOOD PROBLEM: ICD-10-CM

## 2021-06-07 LAB
ANCA AB PATTERN SER IF-IMP: NORMAL
C-ANCA TITR SER IF: NORMAL {TITER}
C3 SERPL-MCNC: 109 MG/DL (ref 81–157)
C4 SERPL-MCNC: 19 MG/DL (ref 13–39)
MYELOPEROXIDASE AB SER-ACNC: <0.2 AI (ref 0–0.9)
PROTEINASE3 IGG SER-ACNC: <0.2 AI (ref 0–0.9)
RHEUMATOID FACT SER NEPH-ACNC: 7 IU/ML (ref 0–20)

## 2021-06-07 PROCEDURE — 99215 OFFICE O/P EST HI 40 MIN: CPT | Mod: 95 | Performed by: PEDIATRICS

## 2021-06-07 RX ORDER — MEXILETINE HYDROCHLORIDE 150 MG/1
150 CAPSULE ORAL 3 TIMES DAILY
Qty: 90 CAPSULE | Refills: 11 | Status: SHIPPED | OUTPATIENT
Start: 2021-06-07 | End: 2021-12-06

## 2021-06-07 RX ORDER — METHYLPREDNISOLONE SODIUM SUCCINATE 1 G/16ML
INJECTION, POWDER, LYOPHILIZED, FOR SOLUTION INTRAMUSCULAR; INTRAVENOUS
Qty: 16 ML | Refills: 0 | Status: SHIPPED | OUTPATIENT
Start: 2021-06-07 | End: 2021-08-05

## 2021-06-07 NOTE — NURSING NOTE
Chief Complaint   Patient presents with     Recheck Medication     follow up       Enoch Tilley CMA (AAMA) at 10:35 AM on 6/7/2021

## 2021-06-07 NOTE — ASSESSMENT & PLAN NOTE
Hypermobile joints       SUBJECTIVE: One goal today was to evaluate for hypermobility, given the apparent autonomic dysfunction. She recalls being more flexible when younger.       OBJECTIVE: A Beighton exam was performed, based on published recommendations. The findings:   - Passive apposition of the thumbs to the flexor aspect of the forearm:Absent (0 points)   - Passive dorsiflexion of the little fingers beyond 90 degrees: Both sides (2 point)   - Hyperextension of the elbows beyond 10 degrees: Left only (1 point)   - Hyperextension of the knees beyond 10 degrees: Right only (1 point)   - Forward flexion of the trunk with knees fully extended so that the palms of the hand rest flat on the floor: Absent (0 points)  Total points: 4/9  The following additional signs of hypermobility were noted:   - unusually soft or velvety skin   - mild skin hyperextensibility at hand and jaw  I also observed:   - Active clunking at ankles and occasionally wrists   - handshake behind back, with high on both sides   - excessive ankle inversion on both sides  Ronna has a limited Beighton exam, but otherwise has 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 It is hard to tell if she has hypermobility syndrome. However, she does not meet the 2017 criteria for Julien-Danlos syndrome. We discussed this in detail, and also how the official designation of EDS is no longer particularly important. The keys for HMS are to (a) work on muscle strength around problem joints, (b) protect problem joints in unusual circumstances of strain or effort, (c) be aware of additional conditions that may develop such as autonomic dysfunction, and (d) ensure that additional conditions do not become worse because of inactivity or other pitfalls.

## 2021-06-07 NOTE — ASSESSMENT & PLAN NOTE
Erythromelalgia       UPDATE: Today's conversation was mainly about the mexiletine, 150 mg TID. First started on mexiletine in March, prescribed by Dr. De La Rosa. She had an ECG that was clear. Around that time also helped with IV magnesium.  The mexiletine was inadvertently stopped in late April because of supply, her symptoms got worse ('electric pain' this was the worst of the pain), then improved after restarted. Then Dr. De La Rosa had asked if it was helping.  She doesn't forget it, she is always home so doesn't forget.  She has been receiving IV corticosteroids, originally three times per week, now BIW. She has noticed considerable improvement with tolerating temperature, ability to treat. This is how they ended up with the BIW regimen (originally was three times per week)  There was originally some question about small fiber neuropathy, but the improvement with steroids argues more in favor of autoimmune.       ASSESSMENT & PLAN: Erythromelalgia, modest amount of initial improvement with treatment.    We had an open conversation about the potential dangers of mexiletine, including about the history. She doesn't recall much of a conversation on this previously with Dr. De La Rosa - she originally had some fatigue, as was warned. Also there has been blurry vision.   I am willing to prescribe mexiletine, after having reviewed records and journal articles on the subject. I would like her to have an ECG in the next few weeks.    Dr. De La Rosa recommended Solumedrol 1 gram twice weekly, 'spaced out' Mondays and Fridays. The orders are coming from previous neurologist (Dr. Karla Espino, at [facility]), eventually needing to get this transferred to me or Dr. James. We had an open conversation about benefits and side effects and risks of IV steroids.      They have been using peripheral IVs - veins are reportedly holding up. Orders are in place for [facility].    Requested that she:  --  get Dawna De La Rosa and Erika to send me records.  -- get a follow-up date from them at the time of the visit.      ADDENDUM: She sent screenshots of recommendations from providers at [facility].  I inserted 1 above, about steroids, and these 2 are about IVIG and steroid timing.

## 2021-06-07 NOTE — ASSESSMENT & PLAN NOTE
Erythromelagia       SUBJECTIVE: They met with    Testing done at [facility] 3/26/21.       Right: Doppler Waveforms: Abnormal signals starting at or above the tibial/pedal level.   Resting Index:     SANDEEP (PT)-  1.09      SANDEEP (DP)-  1.01      TBI-  0.55   Temperature, Laser Doppler, TcPO2:     Values as noted.         Left: Doppler Waveforms:     Abnormal signals starting at or above the common femoral level.   Resting Index:     SANDEEP (PT)-  1.15      SANDEEP (DP)-  1.10      TBI-  0.44   Temperature, Laser Doppler, TcPO2:     Values as noted.         General: Erythromelalgia Study--     Vascular studies performed with symptoms.   Patient instructed to return to the Vascular Laboratory without symptoms for further study.          Conclusions: Study performed in the presence of erythromelalgia symptoms.  Digital temperatures are elevated with significantly elevated laser Doppler flowmetry both suggestive of erythromelalgia.   The diminished TCPO2 values within both feet also suggest underlying erythromelalgia.  Improvement of TCPO2 values in the absence of symptoms would further substantiate the diagnosis. Resting ankle to brachial indices are within normal limits.  The resting digit to brachial indices are moderately abnormal.  Possibilities include an element of small-vessel vascular disease.        OBJECTIVE: laying flat for most of visit, able to arise carefully.       ASSESSMENT and PLAN: Further chart review planned. I will be comfortable following plan set forth by Dawna De La Rosa and Erika. The important thing will be me having access to their records and communications. I apparently cannot see outside [facility] messages.  I briefly renewed mexiletine, and we have a video visit planned for this.  Documentation for home IV therapy: I still need to get recommendation for dosing amount/timing. I gather the plan is twice-weekly corticosteroids for now. She is essentially home bound during the summer because of extreme  heat sensitivity and difficulty being upright.

## 2021-06-07 NOTE — ASSESSMENT & PLAN NOTE
"\"Mood problems\" = oversimplified term for adjustment reaction and stress (her own medical issues plus 2 autistic kids) and medication side effects (IV steroids). She sees a therapist and psychiatrist.  On Duloxetine.    "

## 2021-06-07 NOTE — ASSESSMENT & PLAN NOTE
Vasovagal spell       UPDATE: Documented on tilt table test, autonomic reflex screen 3/19/21 at [facility]. Symptomatic rapid drop in BP and HR associated with lightheadedness but no loss of consciousness.       ASSESSMENT & PLAN: will discuss in future.

## 2021-06-07 NOTE — PROGRESS NOTES
"Dear patient. Thank you for visiting with me. I want you to feel respected, understood, and empowered. \"Respect\" is valuing you as much as I would a close family member. \"Empowerment\" happens when you are fully informed, and can make the best possible decision for you.  Please ask me questions!  Challenge anything that is not clear.    Medical records are primarily used as memory aids for me and my colleagues. Things to know about my documentation style:  - The 'problem list' includes current symptoms or diagnoses, and some problems that are resolved but may return. I use the past medical history for problems not expected to return.  - I use single quotation marks for things that you or I said, when I want to clarify who was speaking.  - I use double quotation marks when copying a term from elsewhere in your records. Italics (besides here) may also denote a quotation.  If you have questions or concerns, please contact me; I will reply as soon as time allows.      Virtual Visit Details    Type of service:  Video Visit    Start Time: 11:01 AM    End Time:11:35 AM    Originating Location (pt. Location): Home    Distant Location (provider location):  [facility] [place]     Platform used for Visit: Well    PCP: Neal Ball  Visit type: problem-oriented  Time note (e5, 40'): The total time (on the date of service) for this service was 40 minutes, including discussion/face-to-face, chart review, interpretation not otherwise reported, documentation, and updating of the computerized record.        Context    Ronna Rivera is a 35 year old woman, with concerns including:  Chief Complaint   Patient presents with     Recheck Medication     follow up       History, update, and/or problems      Erythromelalgia       UPDATE: Today's conversation was mainly about the mexiletine, 150 mg TID. First started on mexiletine in March, prescribed by Dr. De La Rosa. She had an ECG that was " clear. Around that time also helped with IV magnesium.  The mexiletine was inadvertently stopped in late April because of supply, her symptoms got worse ('electric pain' this was the worst of the pain), then improved after restarted. Then Dr. De La Rosa had asked if it was helping.  She doesn't forget it, she is always home so doesn't forget.  She has been receiving IV corticosteroids, originally three times per week, now BIW. She has noticed considerable improvement with tolerating temperature, ability to treat. This is how they ended up with the BIW regimen (originally was three times per week)  There was originally some question about small fiber neuropathy, but the improvement with steroids argues more in favor of autoimmune.       ASSESSMENT & PLAN: Erythromelalgia, modest amount of initial improvement with treatment.    We had an open conversation about the potential dangers of mexiletine, including about the history. She doesn't recall much of a conversation on this previously with Dr. De La Rosa - she originally had some fatigue, as was warned. Also there has been blurry vision.   I am willing to prescribe mexiletine, after having reviewed records and journal articles on the subject. I would like her to have an ECG in the next few weeks.    Dr. De La Rosa recommended Solumedrol 1 gram twice weekly, 'spaced out' Mondays and Fridays. The orders are coming from previous neurologist (Dr. Fox, at [facility]), eventually needing to get this transferred to me or Dr. James. We had an open conversation about benefits and side effects and risks of IV steroids.      They have been using peripheral IVs - veins are reportedly holding up. Orders are in place for [facility].    Requested that she:  -- get Dawna De La Rosa and Erika to send me records.  -- get a follow-up date from them at the time of the visit.      ADDENDUM: She sent screenshots of recommendations from providers at  [place].  I inserted 1 above, about steroids, and these 2 are about IVIG and steroid timing.        Problem List as of 6/7/2021          Noted       Nervous and Auditory    1. Erythromelalgia (H) - Primary 3/25/2021     Overview Addendum 6/7/2021  4:28 PM by Neal Ball MD      Dx with testing by Dr. Shannon De La Rosa at [facility]. Also seeing Dr. Mook James @ [facility]  Tx: mexiletine 150 mg TID, BIW solumedrol 1 gm IV          Last Assessment & Plan 6/7/2021 Virtual Visit Edited 6/7/2021  4:29 PM by Neal Ball MD      Erythromelalgia       UPDATE: Today's conversation was mainly about the mexiletine, 150 mg TID. First started on mexiletine in March, prescribed by Dr. De La Rosa. She had an ECG that was clear. Around that time also helped with IV magnesium.  The mexiletine was inadvertently stopped in late April because of supply, her symptoms got worse ('electric pain' this was the worst of the pain), then improved after restarted. Then Dr. De La Rosa had asked if it was helping.  She doesn't forget it, she is always home so doesn't forget.  She has been receiving IV corticosteroids, originally three times per week, now BIW. She has noticed considerable improvement with tolerating temperature, ability to treat. This is how they ended up with the BIW regimen (originally was three times per week)  There was originally some question about small fiber neuropathy, but the improvement with steroids argues more in favor of autoimmune.       ASSESSMENT & PLAN: Erythromelalgia, modest amount of initial improvement with treatment.    We had an open conversation about the potential dangers of mexiletine, including about the history. She doesn't recall much of a conversation on this previously with Dr. De La Rosa - she originally had some fatigue, as was warned. Also there has been blurry vision.   I am willing to prescribe mexiletine, after having reviewed records and journal articles on the subject. I would  like her to have an ECG in the next few weeks.    Dr. De La Rosa recommended Solumedrol 1 gram twice weekly, 'spaced out' Mondays and Fridays. The orders are coming from previous neurologist (Dr. Karla Espino, at [facility]), eventually needing to get this transferred to me or Dr. James. We had an open conversation about benefits and side effects and risks of IV steroids.      They have been using peripheral IVs - veins are reportedly holding up. Orders are in place for [facility].    Requested that she:  -- get Dawna De La Rosa and Erika to send me records.  -- get a follow-up date from them at the time of the visit.      ADDENDUM: She sent screenshots of recommendations from providers at [facility].  I inserted 1 above, about steroids, and these 2 are about IVIG and steroid timing.              Relevant Medications     methylPREDNISolone sodium succinate (SOLU-MEDROL) 1000 MG injection     mexiletine (MEXITIL) 150 MG capsule     Other Relevant Orders     Vitamin B12     TSH with free T4 reflex

## 2021-06-07 NOTE — TELEPHONE ENCOUNTER
[facility] Call Center    Phone Message    May a detailed message be left on voicemail: no     Reason for Call: Other: Sherice from  Home infusion called and stated she recieved an order today for steroids but no details on the orders, name of drug, frequency etc.      Action Taken: Message routed to:  [facility] ([facility]): curtis

## 2021-06-07 NOTE — ASSESSMENT & PLAN NOTE
Autonomic dysfuncion       UPDATE: Autonomic reflex screen reviewed from [facility] 3/19/21.  Abnormal study. There is evidence of patchy postganglionic sympathetic sudomotor impairment. There was also a suggestion of poor vasomotor tone and dehydration, along with symptomatic orthostatic tachycardia and neurocardiogenic presyncope. Cardiovagal and cardiovascular adrenergic functions were normal. The  findings can be seen in erythromelalgia.   and  Comments on Sudomotor Test: QSART responses were reduced at the proximal leg, absent at the distal leg, and normal at all other sites.       ASSESSMENT & PLAN: I haven't had a chance yet to go over all of her symptoms, but will in future to-establish visit.

## 2021-06-07 NOTE — TELEPHONE ENCOUNTER
Thanks.  Sorry about that - there were several iterations of this order, and I didn't include the duration.  Please consider authorization for twice-weekly 1 gram IV for 8 weeks.  It is likely the doses will continue after that, but I await comments from her neurologists then.  Thanks again  Mf

## 2021-06-07 NOTE — ASSESSMENT & PLAN NOTE
Erythromelalgia     Began with a minor rash on the top of her right foot 1/20/2021, after a yeast infection and what were thought to be ringworm infections. She saw a dermatologist who thought may be it was contact dermatitis, but it became a hot/red/swelling rash. She had trouble on the opposite foot. The dermatologist suspected erythromelalgia by February, originally it was triggered by activity. She got into   Pain was constant mid-level with spikes of excruciating pain. She started developing vascular ulcers, especially with the right foot.     She has been doing cooling with plastic bags in cool water, plus using lotion (seravi?) for protection. She tried ketamine/amitriptyline/clonidine compounded from [facility] without much benefit.  She did treatments with regular corticosteroids infusions. Dr. De La Rosa wanted them to be continued regularly, but there was a communication issue with continuing. Last week was good, this week has been worse.  Pain management program involves oxycontin BID via the [facility] (Dr. Wills), with occasional use of norco. She had been in the hospital for pain. There has been some thinking about spinal cord stimulator and/or clonidine pump.     Currently affected areas: both feet up to ankles, both palms and fingers, and cheeks, chin, nose, and ear. Ulcers on both feet, more on the right.      Triggers currently: she feels that there is constant activity, worsened with dependent position for feet. Usually worse in evenings and mornings. She spends a lot of her time in bed with her feet up, often with the fan. She sometimes gets triggering      She was referred to a hematologist, but hasn't got the appointment yet. She tried aspirin for a month.     She had autoimmune testing at [facility], but results were negative. She had her thermoregulatory sweat test on 3/18/21 which showed global hypohidrosis. By the time of the TRST she had already been on the amitriptyline cream. There has  been some consideration of a small fiber biopsy.     She has home nursing through [facility], they can do a home draw.     Gabapentin 600 mg TID, Dr. De La Rosa hasn't been sure it helps  IV magnesium and mexiletine has been helpful.

## 2021-06-08 ENCOUNTER — VIRTUAL VISIT (OUTPATIENT)
Dept: ONCOLOGY | Facility: CLINIC | Age: 36
End: 2021-06-08
Attending: PSYCHIATRY & NEUROLOGY
Payer: COMMERCIAL

## 2021-06-08 ENCOUNTER — MEDICAL CORRESPONDENCE (OUTPATIENT)
Dept: HEALTH INFORMATION MANAGEMENT | Facility: CLINIC | Age: 36
End: 2021-06-08

## 2021-06-08 ENCOUNTER — PRE VISIT (OUTPATIENT)
Dept: ONCOLOGY | Facility: CLINIC | Age: 36
End: 2021-06-08

## 2021-06-08 VITALS — BODY MASS INDEX: 20.24 KG/M2 | WEIGHT: 110 LBS | HEIGHT: 62 IN

## 2021-06-08 DIAGNOSIS — I73.81 ERYTHROMELALGIA (H): ICD-10-CM

## 2021-06-08 PROCEDURE — 99204 OFFICE O/P NEW MOD 45 MIN: CPT | Mod: 95 | Performed by: INTERNAL MEDICINE

## 2021-06-08 RX ORDER — DULOXETIN HYDROCHLORIDE 20 MG/1
40 CAPSULE, DELAYED RELEASE ORAL DAILY
COMMUNITY
End: 2021-12-14

## 2021-06-08 ASSESSMENT — MIFFLIN-ST. JEOR: SCORE: 1147.21

## 2021-06-08 ASSESSMENT — PAIN SCALES - GENERAL: PAINLEVEL: MODERATE PAIN (5)

## 2021-06-08 NOTE — PROGRESS NOTES
"This patient  is being evaluated via a billable video visit.      The patient has been notified of following:     \"This video visit will be conducted via a call between you and your physician/provider. We have found that certain health care needs can be provided without the need for an in-person physical exam.  This service lets us provide the care you need with a video conversation.  If a prescription is necessary we can send it directly to your pharmacy.  If lab work is needed we can place an order for that and you can then stop by our lab to have the test done at a later time.    Video visits are billed at different rates depending on your insurance coverage.  Please reach out to your insurance provider with any questions.    If during the course of the call the physician/provider feels a video visit is not appropriate, you will not be charged for this service.\"    Patient has given verbal consent for Video visit? yes  Video-Visit Details    Type of service:  Video Visit    Video visit duration:8   min  Originating Location (pt. Location): home    Distant Location (provider location):  [facility]     Platform used for Video Visit: Ivory Bates MD        Hematology Consultation:  Date on this visit: 6/8/2021    Ronna Rivera  is referred by Dr.David Orta for a hematology consultation. She requires evaluation for new diagnosis of erythromelalgia.    PCP: Neal Gonzalez     History Of Present Illness:  Ms. Rivera is a 35 year old female who presents for evaluation of erythromelalgia.  She has developed erythema on her feet in January 2021, and also has had symptoms of swelling of her hands and feet, pain in her hands and feet and face and tingling in her feet.   She has tried baby aspirin for 1 months and that did not help with the symptoms.  She has also tried pulse dexamethasone which did not help significantly either.  She use to cool her feet to the " point of developing sores which are now healing, per patient.   She was seen by Dr. Garry Smith at the [facility] from dermatology and Dr. Shannon De La Rosa in Neurology.  She was also seen by wound care specialist there for bilateral lower extremity feet ulcerations felt to be induced from skin cracking from cooling and edema.  I have reviewed outside medical records from [facility], [place], [place]. Thermal regulatory sweat test and QSART demonstrated evidence of widespread hypohidrosis.  Electrodiagnostic studies were negative.  She was also seen by Dr. Darek Orta  neurology with [facility] physicians.    Lyme disease screen was negative.  Tissue transglutaminase antibody was negative.   TS-HDS and FGFR 3 antibodies were negative.  She was also evaluated by Dr. Cardoza from rheumatology.  She had mildly elevated ALT, improved from previous.  Otherwise, she has had a negative rheumatologic serologic work-up with cryoglobulins still pending.  She had a normal CBC with differential counts in April 2021.  She has not had any constitutional symptoms such as fevers chills night sweats.  In addition, a complete 12 point  review of systems is negative.      Past Medical/Surgical History:  Past Medical History:   Diagnosis Date     Reactive depression      Past Surgical History:   Procedure Laterality Date     NO HISTORY OF SURGERY       Allergies:  Allergies as of 06/08/2021 - Reviewed 06/08/2021   Allergen Reaction Noted     Topiramate Anxiety 04/04/2018     Current Medications:  Current Outpatient Medications   Medication Sig Dispense Refill     DULoxetine (CYMBALTA) 20 MG capsule Take 40 mg by mouth       gabapentin (NEURONTIN) 300 MG capsule Take 2 capsules (600 mg) by mouth 3 times daily 84 capsule 0     HYDROcodone-acetaminophen (NORCO) 7.5-325 MG per tablet Take 1 tablet by mouth every 4 hours as needed for moderate to severe pain 20 tablet 0     LORazepam (ATIVAN) 0.5 MG tablet        magnesium  sulfate 500 mg/mL SOLN Take 250 mg by mouth       melatonin 1 MG TABS tablet Take 1-2 mg by mouth nightly as needed for sleep       methylPREDNISolone sodium succinate (SOLU-MEDROL) 1000 MG injection 1000 mg solu-medrol via peripheral IV, Monday and Thursday (may reschedule by up to 1 day for scheduling conflicts) 16 mL 0     mexiletine (MEXITIL) 150 MG capsule Take 1 capsule (150 mg) by mouth 3 times daily 90 capsule 11     mexiletine (MEXITIL) 150 MG capsule Take 1 capsule (150 mg) by mouth 3 times daily 42 capsule 0     omeprazole 20 MG tablet Take 20 mg by mouth daily       ondansetron (ZOFRAN) 4 MG tablet Take 4 mg by mouth       oxyCODONE (OXYCONTIN) 10 MG 12 hr tablet Take 1 tablet (10 mg) by mouth 2 times daily 8 tablet 0     DULoxetine (CYMBALTA) 20 MG capsule Take 1 capsule (20 mg) by mouth daily for 14 days 14 capsule 0      Family History:  Family History   Problem Relation Age of Onset     Hypertension Father      Cerebrovascular Disease Maternal Grandmother      Diabetes Maternal Grandfather      Breast Cancer Paternal Grandmother      Hypertension Paternal Grandfather      C.A.D. Paternal Grandfather      No family history of bleeding or clotting disorder.  Social History:  Social History     Socioeconomic History     Marital status:      Spouse name: Shiraz     Number of children: 2     Years of education: 16     Highest education level: Not on file   Occupational History     Employer: UNEMPLOYED   Social Needs     Financial resource strain: Not hard at all     Food insecurity     Worry: Never true     Inability: Never true     Transportation needs     Medical: No     Non-medical: No   Tobacco Use     Smoking status: Never Smoker     Smokeless tobacco: Never Used   Substance and Sexual Activity     Alcohol use: No     Drug use: No     Sexual activity: Yes     Partners: Male   Lifestyle     Physical activity     Days per week: Not on file     Minutes per session: Not on file     Stress: Not on  "file     Back Care Yes     Comment: low back pain, with period     Exercise Yes     Comment: 3 times weekly, treadmill     Bike Helmet Yes     Seat Belt Yes     Self-Exams No   Social History Narrative    Lives with , 2 kids (born 2009, both autism). No guns.  Safe home and environment.  Support from friends family and Denominational community.     Physical Exam:  Ht 1.575 m (5' 2\")   Wt 49.9 kg (110 lb)   LMP 05/20/2021 (Exact Date)   Breastfeeding No   BMI 20.12 kg/m   Constitutional: alert and in no distress  Eyes: No redness or discharge  Respiratory: No cough or labored breathing.  Musculoskeletal: Full range of motion in extremities.  Skin: no visible skin lesions or discoloration  Neurological: No tremors and denies headache.  Psychiatric: Mentation appears normal and affect is normal as well.  Alert and oriented x3.  The rest the comprehensive physical examination is deferred due to public health emergency video visit restrictions.  Laboratory/Imaging Studies  Labs reviewed and documented in the EMR.    ASSESSMENT/PLAN:    Barbara is a very pleasant 35-year-old woman with a clinical diagnosis of erythromelalgia with subacute onset in January 2021.   We will go ahead and evaluate for possibility of chronic myeloproliferative disease and order peripheral blood smear review by hematopathologist, CMPD mutation testing of his Josiah 2, CALR, MPL mutation analysis, as well as serum erythropoietin level.  However, erythromelalgia appears to be associated with underlying myeloproliferative diseases in less than 10 percent of cases.  A diagnosis of myeloproliferative disease may precede, follow, or coincide with the development of erythromelalgia.  We'll inform the patient of the results and recommendations and  f/up plan based on the results.   If all of this work-up was nonrevealing, I would recommend that the patient has CBC with differential counts monitored annually going forward.     It was my pleasure to meet " Ronna.  At the end of our visit patient verbalized understanding and concurred with the plan.    Addendum: Labs done on June 28 2021: Serum erythropoietin level low 2.  Peripheral blood smear shows absolute lymphopenia.  Hemoglobin and platelet count was normal.  There were no morphologic normality's in the white cells.  ALC was low at 0.3.  Absolute associate count 1.8.  Free T3 and T4 normal.

## 2021-06-08 NOTE — TELEPHONE ENCOUNTER
[facility] was called, and RN relayed Dr. Ball's message and order to authorize for twice-weekly SOLU-MEDROL 1 gram IV for 8 weeks, with re-assessment after that time.    Jaclyn Geller RN on 6/8/2021 at 9:10 AM

## 2021-06-08 NOTE — LETTER
"    6/8/2021         RE: Ronna Rivera  [address]  [facility] [address]        Dear Colleague,    Thank you for referring your patient, Ronna Rivera, to the [facility]. Please see a copy of my visit note below.    This patient  is being evaluated via a billable video visit.      The patient has been notified of following:     \"This video visit will be conducted via a call between you and your physician/provider. We have found that certain health care needs can be provided without the need for an in-person physical exam.  This service lets us provide the care you need with a video conversation.  If a prescription is necessary we can send it directly to your pharmacy.  If lab work is needed we can place an order for that and you can then stop by our lab to have the test done at a later time.    Video visits are billed at different rates depending on your insurance coverage.  Please reach out to your insurance provider with any questions.    If during the course of the call the physician/provider feels a video visit is not appropriate, you will not be charged for this service.\"    Patient has given verbal consent for Video visit? yes  Video-Visit Details    Type of service:  Video Visit    Video visit duration:8   min  Originating Location (pt. Location): home    Distant Location (provider location):  [facility]     Platform used for Video Visit: Ivory aBtes MD        Hematology Consultation:  Date on this visit: 6/8/2021    Ronna Rivera  is referred by Dr.David Orta for a hematology consultation. She requires evaluation for new diagnosis of erythromelalgia.    PCP: Neal Gonzalez     History Of Present Illness:  Ms. Rivera is a 35 year old female who presents for evaluation of erythromelalgia.  She has developed erythema on her feet in January 2021, and also has had symptoms of swelling of her hands and feet, " pain in her hands and feet and face and tingling in her feet.   She has tried baby aspirin for 1 months and that did not help with the symptoms.  She has also tried pulse dexamethasone which did not help significantly either.  She use to cool her feet to the point of developing sores which are now healing, per patient.   She was seen by Dr. Garry Smith at the [facility] from dermatology and Dr. Shannon De La Rosa in Neurology.  She was also seen by wound care specialist there for bilateral lower extremity feet ulcerations felt to be induced from skin cracking from cooling and edema.  I have reviewed outside medical records from [facility], [place], [place]. Thermal regulatory sweat test and QSART demonstrated evidence of widespread hypohidrosis.  Electrodiagnostic studies were negative.  She was also seen by Dr. Darek Orta  neurology with [facility] physicians.    Lyme disease screen was negative.  Tissue transglutaminase antibody was negative.   TS-HDS and FGFR 3 antibodies were negative.  She was also evaluated by Dr. Cardoza from rheumatology.  She had mildly elevated ALT, improved from previous.  Otherwise, she has had a negative rheumatologic serologic work-up with cryoglobulins still pending.  She had a normal CBC with differential counts in April 2021.  She has not had any constitutional symptoms such as fevers chills night sweats.  In addition, a complete 12 point  review of systems is negative.      Past Medical/Surgical History:  Past Medical History:   Diagnosis Date     Reactive depression      Past Surgical History:   Procedure Laterality Date     NO HISTORY OF SURGERY       Allergies:  Allergies as of 06/08/2021 - Reviewed 06/08/2021   Allergen Reaction Noted     Topiramate Anxiety 04/04/2018     Current Medications:  Current Outpatient Medications   Medication Sig Dispense Refill     DULoxetine (CYMBALTA) 20 MG capsule Take 40 mg by mouth       gabapentin (NEURONTIN) 300 MG capsule  Take 2 capsules (600 mg) by mouth 3 times daily 84 capsule 0     HYDROcodone-acetaminophen (NORCO) 7.5-325 MG per tablet Take 1 tablet by mouth every 4 hours as needed for moderate to severe pain 20 tablet 0     LORazepam (ATIVAN) 0.5 MG tablet        magnesium sulfate 500 mg/mL SOLN Take 250 mg by mouth       melatonin 1 MG TABS tablet Take 1-2 mg by mouth nightly as needed for sleep       methylPREDNISolone sodium succinate (SOLU-MEDROL) 1000 MG injection 1000 mg solu-medrol via peripheral IV, Monday and Thursday (may reschedule by up to 1 day for scheduling conflicts) 16 mL 0     mexiletine (MEXITIL) 150 MG capsule Take 1 capsule (150 mg) by mouth 3 times daily 90 capsule 11     mexiletine (MEXITIL) 150 MG capsule Take 1 capsule (150 mg) by mouth 3 times daily 42 capsule 0     omeprazole 20 MG tablet Take 20 mg by mouth daily       ondansetron (ZOFRAN) 4 MG tablet Take 4 mg by mouth       oxyCODONE (OXYCONTIN) 10 MG 12 hr tablet Take 1 tablet (10 mg) by mouth 2 times daily 8 tablet 0     DULoxetine (CYMBALTA) 20 MG capsule Take 1 capsule (20 mg) by mouth daily for 14 days 14 capsule 0      Family History:  Family History   Problem Relation Age of Onset     Hypertension Father      Cerebrovascular Disease Maternal Grandmother      Diabetes Maternal Grandfather      Breast Cancer Paternal Grandmother      Hypertension Paternal Grandfather      C.A.D. Paternal Grandfather      No family history of bleeding or clotting disorder.  Social History:  Social History     Socioeconomic History     Marital status:      Spouse name: Shiraz     Number of children: 2     Years of education: 16     Highest education level: Not on file   Occupational History     Employer: UNEMPLOYED   Social Needs     Financial resource strain: Not hard at all     Food insecurity     Worry: Never true     Inability: Never true     Transportation needs     Medical: No     Non-medical: No   Tobacco Use     Smoking status: Never Smoker      "Smokeless tobacco: Never Used   Substance and Sexual Activity     Alcohol use: No     Drug use: No     Sexual activity: Yes     Partners: Male   Lifestyle     Physical activity     Days per week: Not on file     Minutes per session: Not on file     Stress: Not on file     Back Care Yes     Comment: low back pain, with period     Exercise Yes     Comment: 3 times weekly, treadmill     Bike Helmet Yes     Seat Belt Yes     Self-Exams No   Social History Narrative    Lives with , 2 kids (born 2009, both autism). No guns.  Safe home and environment.  Support from friends family and [employer] community.     Physical Exam:  Ht 1.575 m (5' 2\")   Wt 49.9 kg (110 lb)   LMP 05/20/2021 (Exact Date)   Breastfeeding No   BMI 20.12 kg/m   Constitutional: alert and in no distress  Eyes: No redness or discharge  Respiratory: No cough or labored breathing.  Musculoskeletal: Full range of motion in extremities.  Skin: no visible skin lesions or discoloration  Neurological: No tremors and denies headache.  Psychiatric: Mentation appears normal and affect is normal as well.  Alert and oriented x3.  The rest the comprehensive physical examination is deferred due to public health emergency video visit restrictions.  Laboratory/Imaging Studies  Labs reviewed and documented in the EMR.    ASSESSMENT/PLAN:    Barbara is a very pleasant 35-year-old woman with a clinical diagnosis of erythromelalgia with subacute onset in January 2021.   We will go ahead and evaluate for possibility of chronic myeloproliferative disease and order peripheral blood smear review by hematopathologist, CMPD mutation testing of his Josiah 2, CALR, MPL mutation analysis, as well as serum erythropoietin level.  However, erythromelalgia appears to be associated with underlying myeloproliferative diseases in less than 10 percent of cases.  A diagnosis of myeloproliferative disease may precede, follow, or coincide with the development of erythromelalgia.  We'll inform " the patient of the results and recommendations and  f/up plan based on the results.   If all of this work-up was nonrevealing, I would recommend that the patient has CBC with differential counts monitored annually going forward.     It was my pleasure to meet Ronna.  At the end of our visit patient verbalized understanding and concurred with the plan.        Again, thank you for allowing me to participate in the care of your patient.        Sincerely,        Karly Bates MD, MD

## 2021-06-08 NOTE — NURSING NOTE
"Oncology Rooming Note (Video Visit)    June 8, 2021 11:53 AM   Ronna Rivera is a 35 year old female who presents for:    Chief Complaint   Patient presents with     Hematology     New Patient     Initial Vitals: Ht 1.575 m (5' 2\")   Wt 49.9 kg (110 lb)   LMP 05/20/2021 (Exact Date)   Breastfeeding No   BMI 20.12 kg/m   Estimated body mass index is 20.12 kg/m  as calculated from the following:    Height as of this encounter: 1.575 m (5' 2\").    Weight as of this encounter: 49.9 kg (110 lb). Body surface area is 1.48 meters squared.  Moderate Pain (5) Comment: Data Unavailable   Patient's last menstrual period was 05/20/2021 (exact date).  Allergies reviewed: Yes  Medications reviewed: Yes    Medications: Medication refills not needed today.  Pharmacy name entered into [facility]:    [id number] - [facility] [address] AT [address] [facility] [address]  [id number] - [place] [address] AT [facility] [address]    Clinical concerns: Erythromelalgia- New patient       Clara Rouse LPN              "

## 2021-06-10 ENCOUNTER — TELEPHONE (OUTPATIENT)
Dept: INTERNAL MEDICINE | Facility: CLINIC | Age: 36
End: 2021-06-10

## 2021-06-10 NOTE — TELEPHONE ENCOUNTER
[facility] Call Center    Phone Message    May a detailed message be left on voicemail: yes     Reason for Call: Order(s): Home Care Orders: Skilled Nursing: Ivanna calling from [facility] requesting skilled nursing orders that were originally for Monday 6/14/21 to be for as early as today 6/10/21 due to needing to see the patient early, Ivanna requested urgent orders.  Please call with verbal orders thank you.     Action Taken: Message routed to:  [facility]    Travel Screening: Not Applicable

## 2021-06-10 NOTE — TELEPHONE ENCOUNTER
Called Ivanna.  Gave verbal orders per Dr. Ball for Home Care Orders: Skilled Nursing: start today instead of Monday 06/14/21.      Enoch Tilley CMA ([facility]) at 11:42 AM on 6/10/2021

## 2021-06-11 NOTE — PROGRESS NOTES
Clinic Care Coordination Contact  [facility]/Voicemail       Clinical Data: Care Coordinator Outreach    Outreach attempted x 2.  Left message on patient's voicemail with call back information and requested return call.    Plan: Care Coordinator will try to reach patient again in 1 month.    Faviola Brantley [facility]  Clinic Care Coordinator  [facility] [place] [facility] Mami Graves  [facility]  [phone]  [email or web address]

## 2021-06-14 ENCOUNTER — TELEPHONE (OUTPATIENT)
Dept: INTERNAL MEDICINE | Facility: CLINIC | Age: 36
End: 2021-06-14

## 2021-06-14 ENCOUNTER — VIRTUAL VISIT (OUTPATIENT)
Dept: INTERNAL MEDICINE | Facility: CLINIC | Age: 36
End: 2021-06-14
Payer: COMMERCIAL

## 2021-06-14 DIAGNOSIS — J30.2 SEASONAL ALLERGIES: ICD-10-CM

## 2021-06-14 DIAGNOSIS — Z86.69 HISTORY OF MIGRAINE: ICD-10-CM

## 2021-06-14 DIAGNOSIS — F51.04 CHRONIC INSOMNIA: ICD-10-CM

## 2021-06-14 DIAGNOSIS — I73.81 ERYTHROMELALGIA (H): Primary | ICD-10-CM

## 2021-06-14 LAB — CRYOGLOB SER QL: ABNORMAL %

## 2021-06-14 PROCEDURE — 99215 OFFICE O/P EST HI 40 MIN: CPT | Mod: 95 | Performed by: PEDIATRICS

## 2021-06-14 NOTE — ASSESSMENT & PLAN NOTE
History of migraine headaches. She has visual auras frequently including without a subsequent migraine - formerly zig-zag lines, now is lingering glare that blanks and may change colors. Auras BIW - three times per week, can last for several minutes, and will progress to a headache 2-3 per month. Location of headache is forehead and base of head. It feels like intense pressure, like being hit in the face really hard. Occasionally will have nausea.  Last one was yesterday.   She tried various medicines with variable benefit. Topamax spiked anxiety.  She takes an advil and has a hot pack on her neck and lays down.

## 2021-06-14 NOTE — ASSESSMENT & PLAN NOTE
Currently not a problem with all the meds.  She had more trouble with anxiety/insomnia after her twins started sleeping through the night.  She has talked with a  Therapist.

## 2021-06-14 NOTE — PROGRESS NOTES
"Dear patient. Thank you for visiting with me. I want you to feel respected, understood, and empowered. \"Respect\" is valuing you as much as I would a close family member. \"Empowerment\" happens when you are fully informed, and can make the best possible decision for you.  Please ask me questions!  Challenge anything that is not clear.    Medical records are primarily used as memory aids for me and my colleagues. Things to know about my documentation style:  - The 'problem list' includes current symptoms or diagnoses, and some problems that are resolved but may return. I use the past medical history for problems not expected to return.  - I use single quotation marks for things that you or I said, when I want to clarify who was speaking.  - I use double quotation marks when copying a term from elsewhere in your records. Italics (besides here) may also denote a quotation.  If you have questions or concerns, please contact me; I will reply as soon as time allows.      Virtual Visit Details    Type of service:  Video Visit    Start Time: 10:10 AM    End Time:10:43 AM    Originating Location (pt. Location): Home    Distant Location (provider location):  [facility] [place]     Platform used for Visit: [facility]    PCP: Neal Ball  Visit type: problem-oriented  Time note (e5, 40'): The total time (on the date of service) for this service was 40 minutes, including discussion/face-to-face, chart review, interpretation not otherwise reported, documentation, and updating of the computerized record.        Context    Ronna Rivera is a 35 year old woman, with concerns including:  Chief Complaint   Patient presents with     Recheck Medication     pt would like to follow up       History, update, and/or problems        Problem List as of 6/14/2021          Noted       Nervous and Auditory    1. Erythromelalgia (H) - Primary 3/25/2021     Overview Addendum 6/7/2021  4:28 PM by Quinn, " Neal Samayoa MD      Dx with testing by Dr. Shannon De La Rosa at [facility]. Also seeing Dr. Mook James @ [facility]  Tx: mexiletine 150 mg TID, BIW solumedrol 1 gm IV          Last Assessment & Plan 6/14/2021 Virtual Visit Written 6/14/2021 10:42 AM by Neal Ball MD      If the solumedrol seems to be helping, it is pointing towards autoimmune, she wonders what the plan and duration. We had a detailed conversation about the running theory,     FHI process has happened, 2 infusions so far.  Veins are still hanging in there - although some difficulty finding a good vein. We discussed pros/cons of PICC line. She has partial hypermobility, so I think we can expect this problem to worsen over time.    If she sees things improve, and reduction in pain - what happens next.  We talked about priorities for meds - steroids are here to stay for now.  Mexiletine versus opioids.   She gets her opioids from [facility]          Relevant Orders     IR PICC Placement > 5 Yrs of Age       Other    2. History of migraine 2/27/2018     Last Assessment & Plan 6/14/2021 Virtual Visit Written 6/14/2021 10:41 AM by Neal Ball MD      History of migraine headaches. She has visual auras frequently including without a subsequent migraine - formerly zig-zag lines, now is lingering glare that blanks and may change colors. Auras BIW - three times per week, can last for several minutes, and will progress to a headache 2-3 per month. Location of headache is forehead and base of head. It feels like intense pressure, like being hit in the face really hard. Occasionally will have nausea.  Last one was yesterday.   She tried various medicines with variable benefit. Topamax spiked anxiety.  She takes an advil and has a hot pack on her neck and lays down.         3. Chronic insomnia 6/25/2020     Last Assessment & Plan 6/14/2021 Virtual Visit Written 6/14/2021 10:34 AM by Neal Ball MD      Currently  not a problem with all the meds.  She had more trouble with anxiety/insomnia after her twins started sleeping through the night.  She has talked with a  Therapist.                   Outstanding issues (Dr. Ball)  --------------------------------------------  -- B12, TSH  -- repeat ECG at some point because of mexiletine.         Ongoing care provided by  --------------------------------------  -- Neuro: Dr. Shannon De La Rosa @ [place], [place]  (prev seen [facility]. Orta @ [facility] and    -- Pain: Dr. Wills @ [facility]   -- Rheum PRN: Dr. Cipriano Cardoza    General comments      Physical exam as possible  Physical Exam  Constitutional:       General: She is not in acute distress.     Appearance: Normal appearance. She is not ill-appearing.   HENT:      Head: Normocephalic.      Right Ear: External ear normal.      Left Ear: External ear normal.      Nose: Nose normal.   Eyes:      General: No scleral icterus (no obvious).        Right eye: No discharge (none obvious).         Left eye: No discharge (none obvious).      Extraocular Movements: Extraocular movements intact.   Pulmonary:      Effort: Pulmonary effort is normal. No respiratory distress.      Comments: No audible wheeze or stridor. No visible cyanosis.  Skin:     Comments: Visible skin is clear, without obvious rash or lesions   Neurological:      Mental Status: She is alert.      Comments: Cranial nerves appear grossly normal   Psychiatric:         Mood and Affect: Mood normal.         Speech: Speech normal.         Behavior: Behavior normal.         Thought Content: Thought content normal.

## 2021-06-14 NOTE — ASSESSMENT & PLAN NOTE
If the solumedrol seems to be helping, it is pointing towards autoimmune, she wonders what the plan and duration. We had a detailed conversation about the running theory,     FHI process has happened, 2 infusions so far.  Veins are still hanging in there - although some difficulty finding a good vein. We discussed pros/cons of PICC line. She has partial hypermobility, so I think we can expect this problem to worsen over time.    If she sees things improve, and reduction in pain - what happens next.  We talked about priorities for meds - steroids are here to stay for now.  Mexiletine versus opioids.   She gets her opioids from [facility]

## 2021-06-14 NOTE — TELEPHONE ENCOUNTER
[facility] Call Center    Phone Message    May a detailed message be left on voicemail: yes     Reason for Call: Order(s): Home Care Orders: Skilled Nursin time a week for 3 weeks, then 1 time a month for 1 month and 2 prn visits for wound care    Action Taken: Message routed to:  [facility]    Travel Screening: Not Applicable

## 2021-06-14 NOTE — NURSING NOTE
Chief Complaint   Patient presents with     Recheck Medication     pt would like to follow up       Belem Degroot CMA, EMT at 9:52 AM on 6/14/2021.

## 2021-06-15 ENCOUNTER — MEDICAL CORRESPONDENCE (OUTPATIENT)
Dept: HEALTH INFORMATION MANAGEMENT | Facility: CLINIC | Age: 36
End: 2021-06-15

## 2021-06-16 DIAGNOSIS — Z11.59 ENCOUNTER FOR SCREENING FOR OTHER VIRAL DISEASES: ICD-10-CM

## 2021-06-17 ENCOUNTER — MEDICAL CORRESPONDENCE (OUTPATIENT)
Dept: HEALTH INFORMATION MANAGEMENT | Facility: CLINIC | Age: 36
End: 2021-06-17

## 2021-06-21 DIAGNOSIS — I73.81 ERYTHROMELALGIA (H): ICD-10-CM

## 2021-06-22 RX ORDER — MEXILETINE HYDROCHLORIDE 150 MG/1
150 CAPSULE ORAL 3 TIMES DAILY
Qty: 90 CAPSULE | Refills: 11 | OUTPATIENT
Start: 2021-06-22

## 2021-06-22 NOTE — TELEPHONE ENCOUNTER
mexiletine (MEXITIL) 150 MG capsule  mexiletine (MEXITIL) 150 MG capsule 90 capsule 11 6/7/2021  --   Sig - Route: Take 1 capsule (150 mg) by mouth 3 times daily - Oral   Sent to pharmacy as: Mexiletine HCl 150 MG Oral Capsule (MEXITIL)   Class: E-Prescribe   Order: [id number]   E-Prescribing Status: Receipt confirmed by pharmacy (6/7/2021  4:32 PM CDT)   Printout Tracking    External Result Report   Pharmacy    [facility] #[id number] - [place] [address] AT [facility] OF  [address]           Patricia Cheatham RN  Central Triage Red Flags/Med Refills

## 2021-06-23 ENCOUNTER — ANCILLARY PROCEDURE (OUTPATIENT)
Dept: RADIOLOGY | Facility: AMBULATORY SURGERY CENTER | Age: 36
End: 2021-06-23
Attending: PEDIATRICS
Payer: COMMERCIAL

## 2021-06-23 ENCOUNTER — TELEPHONE (OUTPATIENT)
Dept: INTERNAL MEDICINE | Facility: CLINIC | Age: 36
End: 2021-06-23

## 2021-06-23 ENCOUNTER — HOSPITAL ENCOUNTER (OUTPATIENT)
Facility: AMBULATORY SURGERY CENTER | Age: 36
Discharge: HOME | End: 2021-06-23
Attending: RADIOLOGY | Admitting: RADIOLOGY
Payer: COMMERCIAL

## 2021-06-23 ENCOUNTER — MEDICAL CORRESPONDENCE (OUTPATIENT)
Dept: HEALTH INFORMATION MANAGEMENT | Facility: CLINIC | Age: 36
End: 2021-06-23

## 2021-06-23 VITALS
OXYGEN SATURATION: 99 % | HEART RATE: 63 BPM | HEIGHT: 62 IN | BODY MASS INDEX: 20.28 KG/M2 | TEMPERATURE: 97.4 F | RESPIRATION RATE: 16 BRPM | SYSTOLIC BLOOD PRESSURE: 115 MMHG | DIASTOLIC BLOOD PRESSURE: 75 MMHG | WEIGHT: 110.23 LBS

## 2021-06-23 DIAGNOSIS — I73.81 ERYTHROMELALGIA (H): ICD-10-CM

## 2021-06-23 LAB — B-HCG SERPL-ACNC: <1 IU/L (ref 0–5)

## 2021-06-23 PROCEDURE — 36573 INSJ PICC RS&I 5 YR+: CPT | Mod: LT

## 2021-06-23 PROCEDURE — 36573 INSJ PICC RS&I 5 YR+: CPT | Mod: LT | Performed by: RADIOLOGY

## 2021-06-23 RX ORDER — HEPARIN SODIUM,PORCINE 10 UNIT/ML
VIAL (ML) INTRAVENOUS PRN
Status: DISCONTINUED | OUTPATIENT
Start: 2021-06-23 | End: 2021-06-23 | Stop reason: HOSPADM

## 2021-06-23 RX ORDER — HEPARIN SODIUM,PORCINE 10 UNIT/ML
5-10 VIAL (ML) INTRAVENOUS
Status: DISCONTINUED | OUTPATIENT
Start: 2021-06-23 | End: 2021-06-24 | Stop reason: HOSPADM

## 2021-06-23 RX ORDER — HEPARIN SODIUM,PORCINE 10 UNIT/ML
5 VIAL (ML) INTRAVENOUS EVERY 24 HOURS
Status: DISCONTINUED | OUTPATIENT
Start: 2021-06-23 | End: 2021-06-24 | Stop reason: HOSPADM

## 2021-06-23 ASSESSMENT — MIFFLIN-ST. JEOR: SCORE: 1148.38

## 2021-06-23 NOTE — PROGRESS NOTES
This is a recent snapshot of the patient's [facility] medical record.  For current drug dose and complete information and questions, call [phone] or In Basket pool, fv home infusion ([id number])  CSN Number:  [id number]

## 2021-06-23 NOTE — TELEPHONE ENCOUNTER
[facility] Call Center    Phone Message    May a detailed message be left on voicemail: yes     Reason for Call: Order(s): Home Care Orders: Occupational Therapy (OT): one visit dated for 6/25/21 to follow up on swelling and wheelchair order that has been started.     Action Taken: Message routed to:  [facility] pcc    Travel Screening: Not Applicable

## 2021-06-23 NOTE — OR NURSING
PICC site was slightly oozing when patient arrived in the recovery area.  Tanisha Eng PA-C came to speak with patient and observed the dressing.  She advised to just have home care change dressing tomorrow as the site looked like it was clotting.  When patient got dressed and moved her arm, dressing was more saturated with blood.  RN paged Tanisha Eng PA-C and asked if she wanted to change dressing or if RN was okay to change dressing.  RN was advised she could change dressing.  PICC dressing was changed prior to dismissal.  Patient dismissed in stable condition with dressing clean, dry and intact.

## 2021-06-23 NOTE — DISCHARGE INSTRUCTIONS
A collaboration between [facility] and [facility]  Experts in minimally invasive, targeted treatments performed using imaging guidance    Venous Access Device,  Port Catheter or Tunneled or Non-Tunneled Central Line Placement    Today you had a procedure today to install a venous access device; either a tunneled central vein catheter or a subcutaneous port catheter.    After you go home:  - Drink plenty of fluids.  Generally 6-8 (8 ounce) glasses a day is recommended.  - Resume your regular diet unless otherwise ordered by a medical provider.  - Keep any applied tape/gauze dressings clean and dry.  Change tape/gauze dressings if they get wet or soiled.  - You may shower the following day after procedure, however cover and protect from moisture any tape/gauze dressings.  You may let water hit and run over dried skin glue, but do not scrub.  Pat the area dry after showering.  - Port placement incisions are closed with absorbable suture, meaning they do not need to be removed at a later date, and a topical skin adhesive (skin glue).  This glue will wear off in 7-14 days.  Do not remove before this time.  If 14 days have passed and residual glue is present, you may gently remove it.  - Do not apply gels, lotions, or ointments to the glue site for the first 10 days as this may cause the glue to prematurely soften and fail.  - Do not perform strenuous activities or lift greater than 10 pounds for the next three days.  - If there is bleeding or oozing from the procedure site, apply firm pressure to the area for 5-10 minutes.  If the bleeding continues seek medical advice at the numbers below.  - Mild procedure site discomfort can be treated with an ice pack and over-the-counter pain relievers.        For 24 hours after any sedation used:  - Relax and take it easy.  No strenuous activities.  - Do not drive or operate machines at home or at work.  - No alcohol  consumption.  - Do not make any important or legal decisions.    Call our Interventional Radiology (IR) service if:  - If you start bleeding from the procedure site.  If you do start to bleed from the site, lie down and hold some pressure on the site.  Our radiology provider can help you decide if you need to return to the hospital.  - If you have new or worsening pain related to the procedure.  - If you have concerning swelling at the procedure site.  - If you develop persistent nausea or vomiting.  - If you develop hives or a rash or any unexplained itching.  - If you have a fever of greater than 100.5  F and chills in the first 5 days after procedure.  - Any other concerns related to your procedure.      [facility]  Interventional Radiology (IR)  [address]  [address] [place] [address]    Contact Number:  [phone]  (IR control desk)  - Monday - Friday 8:00 am - 4:30 pm    After hours for urgent concerns:  [phone]  - After 4:30 pm Monday - Friday, Weekends and Holidays.   - Ask for Interventional Radiology on-call.  Someone is available 24 hours a day.  - [facility] toll free number:  [phone]

## 2021-06-24 ENCOUNTER — TELEPHONE (OUTPATIENT)
Dept: INTERNAL MEDICINE | Facility: CLINIC | Age: 36
End: 2021-06-24

## 2021-06-24 DIAGNOSIS — Z53.9 DIAGNOSIS NOT YET DEFINED: Primary | ICD-10-CM

## 2021-06-24 NOTE — TELEPHONE ENCOUNTER
Called Jade and left a secure VM.  Gave verbal orders per Dr. Ball for Order(s): Home Care Orders: Occupational Therapy (OT): one visit dated for 6/25/21 to follow up on swelling and wheelchair order that has been started.       Enoch Tilley CMA ([facility]) at 12:56 PM on 6/24/2021

## 2021-06-25 ENCOUNTER — TELEPHONE (OUTPATIENT)
Dept: INTERNAL MEDICINE | Facility: CLINIC | Age: 36
End: 2021-06-25

## 2021-06-25 ENCOUNTER — TRANSFERRED RECORDS (OUTPATIENT)
Dept: HEALTH INFORMATION MANAGEMENT | Facility: CLINIC | Age: 36
End: 2021-06-25

## 2021-06-25 NOTE — TELEPHONE ENCOUNTER
[facility] Call Center    Phone Message    May a detailed message be left on voicemail: yes     Reason for Call: Order(s): Other:   Reason for requested: Cavilon wipes or spray  Date needed: Today  Provider name: Dr. Quinn Jaramillo the homecare Rn from [facility] is calling asking if Dr. Ball will give her orders for the pt to use Cavilon wipes or spray for the pt's toes, they are caring for pt's wounds.    Denise stated you can leave a verbal on her vm      Action Taken: Message routed to:  [facility]    Travel Screening: Not Applicable

## 2021-06-27 ENCOUNTER — MEDICAL CORRESPONDENCE (OUTPATIENT)
Dept: HEALTH INFORMATION MANAGEMENT | Facility: CLINIC | Age: 36
End: 2021-06-27

## 2021-06-28 DIAGNOSIS — I73.81 ERYTHROMELALGIA (H): ICD-10-CM

## 2021-06-28 DIAGNOSIS — R79.89 ABNORMAL TSH: ICD-10-CM

## 2021-06-28 LAB
DIFFERENTIAL METHOD BLD: ABNORMAL
ERYTHROCYTE [DISTWIDTH] IN BLOOD BY AUTOMATED COUNT: 12.7 % (ref 10–15)
HCT VFR BLD AUTO: 41.4 % (ref 35–47)
HGB BLD-MCNC: 14.2 G/DL (ref 11.7–15.7)
LYMPHOCYTES # BLD AUTO: 0.3 10E9/L (ref 0.8–5.3)
LYMPHOCYTES NFR BLD AUTO: 4 %
MCH RBC QN AUTO: 31.6 PG (ref 26.5–33)
MCHC RBC AUTO-ENTMCNC: 34.3 G/DL (ref 31.5–36.5)
MCV RBC AUTO: 92 FL (ref 78–100)
MONOCYTES # BLD AUTO: 0.1 10E9/L (ref 0–1.3)
MONOCYTES NFR BLD AUTO: 1 %
NEUTROPHILS # BLD AUTO: 6.4 10E9/L (ref 1.6–8.3)
NEUTROPHILS NFR BLD AUTO: 95 %
PLATELET # BLD AUTO: 186 10E9/L (ref 150–450)
RBC # BLD AUTO: 4.5 10E12/L (ref 3.8–5.2)
RETICS # AUTO: 82.8 10E9/L (ref 25–95)
RETICS/RBC NFR AUTO: 1.8 % (ref 0.5–2)
T4 FREE SERPL-MCNC: 1.05 NG/DL (ref 0.76–1.46)
TSH SERPL DL<=0.005 MIU/L-ACNC: 0.2 MU/L (ref 0.4–4)
VIT B12 SERPL-MCNC: 257 PG/ML (ref 193–986)
WBC # BLD AUTO: 6.8 10E9/L (ref 4–11)

## 2021-06-28 PROCEDURE — 82668 ASSAY OF ERYTHROPOIETIN: CPT | Mod: 90 | Performed by: PEDIATRICS

## 2021-06-28 PROCEDURE — 82607 VITAMIN B-12: CPT | Performed by: PEDIATRICS

## 2021-06-28 PROCEDURE — 85025 COMPLETE CBC W/AUTO DIFF WBC: CPT | Performed by: PEDIATRICS

## 2021-06-28 PROCEDURE — 81270 JAK2 GENE: CPT | Performed by: INTERNAL MEDICINE

## 2021-06-28 PROCEDURE — 84481 FREE ASSAY (FT-3): CPT | Performed by: PEDIATRICS

## 2021-06-28 PROCEDURE — 81219 CALR GENE COM VARIANTS: CPT | Performed by: INTERNAL MEDICINE

## 2021-06-28 PROCEDURE — G0452 MOLECULAR PATHOLOGY INTERPR: HCPCS | Performed by: INTERNAL MEDICINE

## 2021-06-28 PROCEDURE — 81403 MOPATH PROCEDURE LEVEL 4: CPT | Performed by: INTERNAL MEDICINE

## 2021-06-28 PROCEDURE — 84443 ASSAY THYROID STIM HORMONE: CPT | Performed by: PEDIATRICS

## 2021-06-28 PROCEDURE — 99N1109 PR STATISTIC MORPHOLOGY W/INTERP HISTOLOGY TC 85060: Performed by: INTERNAL MEDICINE

## 2021-06-28 PROCEDURE — 99000 SPECIMEN HANDLING OFFICE-LAB: CPT | Performed by: PEDIATRICS

## 2021-06-28 PROCEDURE — 36415 COLL VENOUS BLD VENIPUNCTURE: CPT | Performed by: PEDIATRICS

## 2021-06-28 PROCEDURE — 85045 AUTOMATED RETICULOCYTE COUNT: CPT | Performed by: PEDIATRICS

## 2021-06-28 PROCEDURE — 84439 ASSAY OF FREE THYROXINE: CPT | Performed by: PEDIATRICS

## 2021-06-28 NOTE — TELEPHONE ENCOUNTER
RN called Denise, and gave verbal order OK for Cavilon wipes or spray for the pt's toes.    Jaclyn Geller RN on 6/28/2021 at 11:49 AM

## 2021-06-29 ENCOUNTER — TRANSFERRED RECORDS (OUTPATIENT)
Dept: HEALTH INFORMATION MANAGEMENT | Facility: CLINIC | Age: 36
End: 2021-06-29

## 2021-06-29 LAB
COPATH REPORT: NORMAL
EPO SERPL-ACNC: 2 MU/ML (ref 4–27)

## 2021-06-30 ENCOUNTER — TELEPHONE (OUTPATIENT)
Dept: INTERNAL MEDICINE | Facility: CLINIC | Age: 36
End: 2021-06-30

## 2021-06-30 ENCOUNTER — VIRTUAL VISIT (OUTPATIENT)
Dept: INTERNAL MEDICINE | Facility: CLINIC | Age: 36
End: 2021-06-30
Payer: COMMERCIAL

## 2021-06-30 DIAGNOSIS — R79.89 ABNORMAL TSH: ICD-10-CM

## 2021-06-30 DIAGNOSIS — Z74.09 IMPAIRED MOBILITY: Primary | ICD-10-CM

## 2021-06-30 DIAGNOSIS — I73.81 ERYTHROMELALGIA (H): ICD-10-CM

## 2021-06-30 PROCEDURE — 99214 OFFICE O/P EST MOD 30 MIN: CPT | Mod: 95 | Performed by: PEDIATRICS

## 2021-06-30 NOTE — ASSESSMENT & PLAN NOTE
Erythromelalgia       UPDATE: Toe (redness, swelling, increased pain) calmed down again, after the toenail was fixed by the podiatrist and had lidocaine. It caused a blood blister. She had to go back to using the norco to sleep. But then after the lidocaine injection the toe turned clear and the swelling resolved.       The improvement with the shot prompted me to ask about her use of local anesthetic. She uses lidocaine patches but doesn't seem to prevent a flare - it does help with the pain a little bit when she goes to bed.       She met with Dr. James. He wondered about adding bactrim to prevent infections while she is on the steroids.        She is meeting with Dr. De La Rosa for long-term plan.       ASSESSMENT & PLAN: Doing better after improvement of unrelated distal toe problem (counseled about this)    I will look into bactrim or other appropriate infection suppression.

## 2021-06-30 NOTE — NURSING NOTE
Chief Complaint   Patient presents with     Toe Pain     worsening toe pain     Dme     wheelchair order per therapist     RECHECK     followup       SHANA Moore at 8:57 AM on 6/30/2021

## 2021-06-30 NOTE — ASSESSMENT & PLAN NOTE
Impaired mobility       UPDATE: OT wrote a prescription for a better wheelchair, I signed off. Today we discussed about her need for this for documentation purposes.        ASSESSMENT & PLAN:    Erythromelalgia is a mobility limitation that impairs the ability to accomplish mobility related activities of daily living entirely, safely, or within a reasonable time frame.    This mobility limitation cannot be resolved through use of a cane or a walker.  She has a walker but doesn't benefit her much because of the pain in her feet.     I understand that the patient's home provides adequate access, maneuvering space, and surfaces and the patient can safely self propel the manual wheelchair, and at other times her  will be able to assist her.  The use of the manual wheelchair will significantly improve the ability to participate in mobility-related activities of daily living, and the patient will use it on a regular basis in the home.  I understand that the patient cannot propel in a standard wheelchair in the home and is capable of propelling herself in a lightweight wheelchair without risk of shoulder pain or injury. In particular they are trying to avoid extra work in hands and arms, because of concern that the extra work will lead to a local flare of erythromelalgia.     She also needs height adjustable arms, as she is in the wheelchair for more than 2 hours/day, and elevating leg rests because of a need for the recline feature and positioning. Symptoms worsen sometimes with upright position, and she needs to be laying down, even for transfer from one room to another.

## 2021-06-30 NOTE — PROGRESS NOTES
"Dear patient. Thank you for visiting with me. I want you to feel respected, understood, and empowered. \"Respect\" is valuing you as much as I would a close family member. \"Empowerment\" happens when you are fully informed, and can make the best possible decision for you.  Please ask me questions!  Challenge anything that is not clear.    Medical records are primarily used as memory aids for me and my colleagues. Things to know about my documentation style:  - The 'problem list' includes current symptoms or diagnoses, and some problems that are resolved but may return. I use the past medical history for problems not expected to return.  - I use single quotation marks for things that you or I said, when I want to clarify who was speaking.  - I use double quotation marks when copying a term from elsewhere in your records. Italics (besides here) may also denote a quotation.  If you have questions or concerns, please contact me; I will reply as soon as time allows.      Virtual Visit Details    Type of service:  Video Visit    Start Time: 10:35 AM    End Time:10:55 AM    Originating Location (pt. Location): Home    Distant Location (provider location):  [facility] [place]     Platform used for Visit: Well    PCP: Neal Ball  Visit type: problem-oriented  Time note (e4, 30'): The total time (on the date of service) for this service was 31 minutes, including discussion/face-to-face, chart review, interpretation not otherwise reported, documentation, and updating of the computerized record.              Context    Ronna Rivera is a 35 year old woman, with concerns including:  Chief Complaint   Patient presents with     Toe Pain     worsening toe pain     Dme     wheelchair order per therapist     RECHECK     followup       History, update, and/or problems        Problem List as of 6/30/2021          Noted       Nervous and Auditory    1. Erythromelalgia (H) 3/25/2021     " Overview Addendum 6/7/2021  4:28 PM by Neal Ball MD      Dx with testing by Dr. Shannon De La Rosa at [facility]. Also seeing Dr. Mook James @ [facility] [place]  Tx: mexiletine 150 mg TID, BIW solumedrol 1 gm IV          Last Assessment & Plan 6/30/2021 Virtual Visit Written 6/30/2021 11:01 AM by Neal Ball MD      Erythromelalgia       UPDATE: Toe (redness, swelling, increased pain) calmed down again, after the toenail was fixed by the podiatrist and had lidocaine. It caused a blood blister. She had to go back to using the norco to sleep. But then after the lidocaine injection the toe turned clear and the swelling resolved.       The improvement with the shot prompted me to ask about her use of local anesthetic. She uses lidocaine patches but doesn't seem to prevent a flare - it does help with the pain a little bit when she goes to bed.       She met with Dr. James. He wondered about adding bactrim to prevent infections while she is on the steroids.        She is meeting with Dr. De La Rosa for long-term plan.       ASSESSMENT & PLAN: Doing better after improvement of unrelated distal toe problem (counseled about this)    I will look into bactrim or other appropriate infection suppression.                Endocrine    2. Abnormal TSH 6/30/2021     Overview Signed 6/30/2021 11:05 AM by Neal Ball MD      Likely due to steroids.          Last Assessment & Plan 6/30/2021 Virtual Visit Written 6/30/2021 11:05 AM by Neal Ball MD      Awaiting add-on TSI and T3.            Other    3. Impaired mobility - Primary 6/30/2021     Overview Signed 6/30/2021 10:59 AM by Neal Ball MD      Lightweight wheelchair.  Due to foot pain with erythromelalgia, and exacerbation of erythromelalgia when upright or bearing weight          Last Assessment & Plan 6/30/2021 Virtual Visit Written 6/30/2021 10:58 AM by Neal Ball MD      Impaired mobility       UPDATE: OT  wrote a prescription for a better wheelchair, I signed off. Today we discussed about her need for this for documentation purposes.        ASSESSMENT & PLAN:    Erythromelalgia is a mobility limitation that impairs the ability to accomplish mobility related activities of daily living entirely, safely, or within a reasonable time frame.    This mobility limitation cannot be resolved through use of a cane or a walker.  She has a walker but doesn't benefit her much because of the pain in her feet.     I understand that the patient's home provides adequate access, maneuvering space, and surfaces and the patient can safely self propel the manual wheelchair, and at other times her  will be able to assist her.  The use of the manual wheelchair will significantly improve the ability to participate in mobility-related activities of daily living, and the patient will use it on a regular basis in the home.  I understand that the patient cannot propel in a standard wheelchair in the home and is capable of propelling herself in a lightweight wheelchair without risk of shoulder pain or injury. In particular they are trying to avoid extra work in hands and arms, because of concern that the extra work will lead to a local flare of erythromelalgia.     She also needs height adjustable arms, as she is in the wheelchair for more than 2 hours/day, and elevating leg rests because of a need for the recline feature and positioning. Symptoms worsen sometimes with upright position, and she needs to be laying down, even for transfer from one room to another.                 Outstanding issues (Dr. Ball)  --------------------------------------------  -- TSI and T3  -- repeat ECG at some point because of mexiletine.   -- bactrim while on steroids      Ongoing care provided by  --------------------------------------  -- Neuro: Dr. Shannon De La Rosa @ [place], [place] [place]  (prev seen Dr. Orta @ [facility] and    -- Pain:   Johann @ [facility]   -- Rheum PRN: Dr. Cipriano Cardoza    General comments      Physical exam as possible  Physical Exam  Constitutional:       General: She is not in acute distress.     Appearance: Normal appearance. She is not ill-appearing.   HENT:      Head: Normocephalic.      Nose: Nose normal.   Eyes:      General: No scleral icterus.        Right eye: No discharge.         Left eye: No discharge.      Extraocular Movements: Extraocular movements intact.   Pulmonary:      Effort: Pulmonary effort is normal. No respiratory distress.   Neurological:      Mental Status: She is alert.   Psychiatric:         Mood and Affect: Mood normal.         Behavior: Behavior normal.

## 2021-06-30 NOTE — TELEPHONE ENCOUNTER
[facility] Call Center    Phone Message    May a detailed message be left on voicemail: yes     Reason for Call: Order(s): Home Care Orders: Occupational Therapy (OT): Ok to place OT on hold until patient gets her new WC.     Action Taken: Message routed to:  [facility]    Travel Screening: Not Applicable

## 2021-07-01 ENCOUNTER — APPOINTMENT (OUTPATIENT)
Dept: LAB | Facility: CLINIC | Age: 36
End: 2021-07-01
Attending: PEDIATRICS
Payer: COMMERCIAL

## 2021-07-01 LAB — T3FREE SERPL-MCNC: 2.9 PG/ML (ref 2.3–4.2)

## 2021-07-02 NOTE — PROGRESS NOTES
Clinic Care Coordination Contact  [facility]/Voicemail       Clinical Data: Care Coordinator Outreach    Outreach attempted x 3.  Left message on patient's voicemail with call back information and requested return call.    Plan: Care Coordinator will send disenrollment letter with care coordinator contact information via [employer]. Care Coordinator will do no further outreaches at this time.    Faviola Brantley [facility]  Clinic Care Coordinator  [facility] [place]  [facility]  [phone]  [email or web address]

## 2021-07-03 PROBLEM — Z29.89 NEED FOR PNEUMOCYSTIS PROPHYLAXIS: Status: ACTIVE | Noted: 2021-07-03

## 2021-07-06 ENCOUNTER — TELEPHONE (OUTPATIENT)
Dept: INTERNAL MEDICINE | Facility: CLINIC | Age: 36
End: 2021-07-06

## 2021-07-06 NOTE — TELEPHONE ENCOUNTER
[facility] Call Center    Phone Message    May a detailed message be left on voicemail: yes     Reason for Call: Other: .  Home care is requesting a response.    Action Taken: Message routed to:  [facility] primary care clinic    Travel Screening: Not Applicable

## 2021-07-06 NOTE — TELEPHONE ENCOUNTER
I called and left message with verbal approval for orders below  Anastasiya Galan, EMT at 1:29 PM on 7/6/2021.  [facility]  [facility]  [place]  [phone]

## 2021-07-07 ENCOUNTER — HOME INFUSION (PRE-WILLOW HOME INFUSION) (OUTPATIENT)
Dept: PHARMACY | Facility: CLINIC | Age: 36
End: 2021-07-07

## 2021-07-07 LAB — COPATH REPORT: NORMAL

## 2021-07-08 ENCOUNTER — MEDICAL CORRESPONDENCE (OUTPATIENT)
Dept: HEALTH INFORMATION MANAGEMENT | Facility: CLINIC | Age: 36
End: 2021-07-08

## 2021-07-09 ENCOUNTER — TELEPHONE (OUTPATIENT)
Dept: INTERNAL MEDICINE | Facility: CLINIC | Age: 36
End: 2021-07-09

## 2021-07-09 NOTE — TELEPHONE ENCOUNTER
[facility] Call Center    Phone Message    May a detailed message be left on voicemail: yes     Reason for Call: Order(s): Home Care Orders: Skilled Nursing: Verbal order for wound care, 1 time per week for 5 weeks.     Action Taken: Message routed to:  [facility]    Travel Screening: Not Applicable

## 2021-07-12 NOTE — TELEPHONE ENCOUNTER
Left a message to call the Primary Care clinic back. There was no identifying information or indication if voicemail was secure. No patient information was left on voicemail. Stormy Quinones LPN 7/12/2021 11:24 AM

## 2021-07-13 NOTE — TELEPHONE ENCOUNTER
[facility] Call Center    Phone Message    May a detailed message be left on voicemail: yes     Reason for Call: Other: Denise is calling back because she got a missed call from the care team, The Home Care nurse said it's a Secure Voicemail and its ok to leave a detailed message, she also said she can alter her Voice mail recording or you can contact her on [employer] and that number [phone]. please call with verbal orders asap thank you.     Action Taken: Message routed to:  [facility]    Travel Screening: Not Applicable

## 2021-07-13 NOTE — TELEPHONE ENCOUNTER
Called Denise and gave verbal orders per Dr. Ball for Order(s): Home Care Orders: Skilled Nursing: Verbal order for wound care, 1 time per week for 5 weeks.       Enoch Tilley CMA ([facility]) at 2:42 PM on 7/13/2021

## 2021-07-14 ENCOUNTER — HOME INFUSION (PRE-WILLOW HOME INFUSION) (OUTPATIENT)
Dept: PHARMACY | Facility: CLINIC | Age: 36
End: 2021-07-14

## 2021-07-15 ENCOUNTER — MEDICAL CORRESPONDENCE (OUTPATIENT)
Dept: HEALTH INFORMATION MANAGEMENT | Facility: CLINIC | Age: 36
End: 2021-07-15

## 2021-07-15 NOTE — PROGRESS NOTES
This is a recent snapshot of the patient's [facility] medical record.  For current drug dose and complete information and questions, call [phone] or In Basket pool, [facility] home infusion ([id number])  CSN Number:  [id number]

## 2021-07-21 ENCOUNTER — HOME INFUSION (PRE-WILLOW HOME INFUSION) (OUTPATIENT)
Dept: PHARMACY | Facility: CLINIC | Age: 36
End: 2021-07-21

## 2021-07-22 ENCOUNTER — HOME INFUSION (PRE-WILLOW HOME INFUSION) (OUTPATIENT)
Dept: PHARMACY | Facility: CLINIC | Age: 36
End: 2021-07-22

## 2021-07-22 NOTE — PROGRESS NOTES
This is a recent snapshot of the patient's [facility] medical record.  For current drug dose and complete information and questions, call [phone] or In Basket pool, [facility] home infusion ([id number])  CSN Number:  [id number]

## 2021-07-23 NOTE — PROGRESS NOTES
This is a recent snapshot of the patient's [facility] medical record.  For current drug dose and complete information and questions, call [phone] or In Basket pool, [facility]v home infusion ([id number])  CSN Number:  [id number]

## 2021-07-27 ENCOUNTER — VIRTUAL VISIT (OUTPATIENT)
Dept: ONCOLOGY | Facility: CLINIC | Age: 36
End: 2021-07-27
Payer: COMMERCIAL

## 2021-07-27 ENCOUNTER — TRANSFERRED RECORDS (OUTPATIENT)
Dept: HEALTH INFORMATION MANAGEMENT | Facility: CLINIC | Age: 36
End: 2021-07-27

## 2021-07-27 VITALS — HEIGHT: 62 IN | WEIGHT: 110 LBS | BODY MASS INDEX: 20.24 KG/M2

## 2021-07-27 DIAGNOSIS — I73.81 ERYTHROMELALGIA (H): Primary | ICD-10-CM

## 2021-07-27 PROCEDURE — 99213 OFFICE O/P EST LOW 20 MIN: CPT | Mod: 95 | Performed by: INTERNAL MEDICINE

## 2021-07-27 ASSESSMENT — MIFFLIN-ST. JEOR: SCORE: 1147.21

## 2021-07-27 ASSESSMENT — PAIN SCALES - GENERAL: PAINLEVEL: MODERATE PAIN (4)

## 2021-07-27 NOTE — PROGRESS NOTES
"This patient  is being evaluated via a billable video visit.      The patient has been notified of following:     \"This video visit will be conducted via a call between you and your physician/provider. We have found that certain health care needs can be provided without the need for an in-person physical exam.  This service lets us provide the care you need with a video conversation.  If a prescription is necessary we can send it directly to your pharmacy.  If lab work is needed we can place an order for that and you can then stop by our lab to have the test done at a later time.    Video visits are billed at different rates depending on your insurance coverage.  Please reach out to your insurance provider with any questions.    If during the course of the call the physician/provider feels a video visit is not appropriate, you will not be charged for this service.\"    Patient has given verbal consent for Video visit? yes  Video-Visit Details    Type of service:  Video Visit    Video visit duration:8   min  Originating Location (pt. Location): home    Distant Location (provider location):  [facility]     Platform used for Video Visit: Ivory Bates MD        Hematology Follow-up visit:  Date on this visit: Jul 27, 2021    Diagnosis: erythromelalgia.    PCP: Neal Gonzalez     History Of Present Illness:  Ms. Rivera is a 35 year old female who presents for f/up of erythromelalgia.  She has developed erythema on her feet in January 2021, and also has had symptoms of swelling of her hands and feet, pain in her hands and feet and face and tingling in her feet.   She has tried baby aspirin for 1 months and that did not help with the symptoms.  She has also tried pulse dexamethasone which did not help significantly either.   She was seen by Dr. Garry Smith at the [facility] from dermatology and Dr. Shannon De La Rosa in Neurology.  She was also seen by Dr. Darek Orta  " neurology with [facility] physicians.    Lyme disease screen was negative.  Tissue transglutaminase antibody was negative. Electrodiagnostic studies were negative. Thermal regulatory sweat test and QSART demonstrated evidence of widespread hypohidrosis.    TS-HDS and FGFR 3 antibodies were negative.  She was also evaluated by Dr. Cardoza from rheumatology.   She has had a negative rheumatologic serologic with the exception of trace cryoglobulins. Following our last visit in June she underwent evaluation for chronic myeloproliferative disease (CMPD). Labs done on June 28 2021: Serum erythropoietin level low 2.  Peripheral blood smear shows absolute lymphopenia.  Hemoglobin and platelet count was normal.  There were no morphologic abnormality's in the white cells.  ALC was low at 0.3.  Absolute neutrophil count 1.8.  Free T3 and T4 normal. TSH low. WOJCIECH 2 exon mutation- negative and no mutations in JAK2, CALR and MPL on NGS on peripheral blood.  She had a normal CBC with differential counts in April 2021.  She has not had any constitutional symptoms such as fevers chills night sweats.  She has been on IV methylprednisolone 1 g twice a week since May 24th (was on once weekly in April), and anticipates that she will be done in 2 1/2 weeks . Symptoms of pain and swelling in her hands and feet imrpoved compared to March and April, and plateaued.  She has [facility] home infusion for IV solumedrol- has a picc line in place. Wt has been stable at 110 lbs.  In addition, a complete 12 point  review of systems is negative.      Past Medical/Surgical History:  Past Medical History:   Diagnosis Date     Reactive depression      Seasonal allergies 6/25/2020    Mostly resolved     Past Surgical History:   Procedure Laterality Date     INSERT PICC LINE Left 6/23/2021    Procedure: INSERTION, PICC;  Surgeon: Neal Penny MD;  Location: [facility] [place] [place]      PICC PLACEMENT > 5 YRS OF AGE  6/23/2021     NO HISTORY OF SURGERY        Allergies:  Allergies as of 07/27/2021 - Reviewed 06/30/2021   Allergen Reaction Noted     Topiramate Anxiety 04/04/2018     Current Medications:  Current Outpatient Medications   Medication Sig Dispense Refill     DULoxetine (CYMBALTA) 20 MG capsule Take 40 mg by mouth       DULoxetine (CYMBALTA) 20 MG capsule Take 1 capsule (20 mg) by mouth daily for 14 days 14 capsule 0     gabapentin (NEURONTIN) 300 MG capsule Take 2 capsules (600 mg) by mouth 3 times daily 84 capsule 0     HYDROcodone-acetaminophen (NORCO) 7.5-325 MG per tablet Take 1 tablet by mouth every 4 hours as needed for moderate to severe pain 20 tablet 0     LORazepam (ATIVAN) 0.5 MG tablet        magnesium sulfate 500 mg/mL SOLN Take 250 mg by mouth       melatonin 1 MG TABS tablet Take 1-2 mg by mouth nightly as needed for sleep       methylPREDNISolone sodium succinate (SOLU-MEDROL) 1000 MG injection 1000 mg solu-medrol via peripheral IV, Monday and Thursday (may reschedule by up to 1 day for scheduling conflicts) 16 mL 0     mexiletine (MEXITIL) 150 MG capsule Take 1 capsule (150 mg) by mouth 3 times daily 90 capsule 11     mexiletine (MEXITIL) 150 MG capsule Take 1 capsule (150 mg) by mouth 3 times daily 42 capsule 0     omeprazole 20 MG tablet Take 20 mg by mouth daily       ondansetron (ZOFRAN) 4 MG tablet Take 4 mg by mouth       oxyCODONE (OXYCONTIN) 10 MG 12 hr tablet Take 1 tablet (10 mg) by mouth 2 times daily 8 tablet 0     sulfamethoxazole-trimethoprim (BACTRIM DS) 800-160 MG tablet Take 1 tablet by mouth three times a week (Monday, Wednesday, and Friday) 40 tablet 3      Family History:  Family History   Problem Relation Age of Onset     Hypertension Father      Cerebrovascular Disease Maternal Grandmother      Diabetes Maternal Grandfather      Breast Cancer Paternal Grandmother      Hypertension Paternal Grandfather      C.A.D. Paternal Grandfather      No family history of bleeding or clotting disorder.  Social History:  Social  History     Socioeconomic History     Marital status:      Spouse name: Shiraz     Number of children: 2     Years of education: 16     Highest education level: Not on file   Occupational History     Employer: UNEMPLOYED   Social Needs     Financial resource strain: Not hard at all     Food insecurity     Worry: Never true     Inability: Never true     Transportation needs     Medical: No     Non-medical: No   Tobacco Use     Smoking status: Never Smoker     Smokeless tobacco: Never Used   Substance and Sexual Activity     Alcohol use: No     Drug use: No     Sexual activity: Yes     Partners: Male   Lifestyle     Physical activity     Days per week: Not on file     Minutes per session: Not on file     Stress: Not on file     Back Care Yes     Comment: low back pain, with period     Exercise Yes     Comment: 3 times weekly, treadmill     Bike Helmet Yes     Seat Belt Yes     Self-Exams No   Social History Narrative    Lives with , 2 kids (born 2009, both autism). No guns.  Safe home and environment.  Support from friends family and [employer] community.     Physical Exam:  Wt Readings from Last 5 Encounters:   07/27/21 49.9 kg (110 lb)   06/23/21 50 kg (110 lb 3.7 oz)   06/08/21 49.9 kg (110 lb)   06/04/21 49.9 kg (110 lb)   04/09/21 49.9 kg (110 lb)        Constitutional: alert and in no distress  Eyes: No redness or discharge  Respiratory: No cough or labored breathing.  Musculoskeletal: Full range of motion in extremities.  Skin: no visible skin lesions or discoloration  Neurological: No tremors and denies headache.  Psychiatric: Mentation appears normal and affect is normal as well.  Alert and oriented x3.  The rest the comprehensive physical examination is deferred due to public health emergency video visit restrictions.  Laboratory/Imaging Studies  Labs reviewed and documented in the EMR.    ASSESSMENT/PLAN:    Barbara is a very pleasant 35-year-old woman with a clinical diagnosis of erythromelalgia  with subacute onset in January 2021. NGS analysis for Josiah 2, CALR, MPL mutation analysis, including JAK2 exon analysis, was negative. However, serum erythropoietin level is low, without any clear etiology for it. She also has lymphocytopenia but that is possibly associated with her high dose IV steroid therapy.   At this time recommend that we repeat CBCd and also serum erythropoietin level. If serum erythropoietin level is repeatedly low, then would recommend proceeding with bone marrow biopsy to r/o chronic myeloproliferative disease (CMPD). If BMBx shows hypercellularity for age, with trilineage growth- panmyelosis, with prominent myeloid, granulocytic and megakaryocytic proliferation then diagnosis of P.Vera in the setting low serum erythropoietin level would be confirmed vs  BMBx can be nondiagnostic.  She [place] [facility] [place]  If lymphocytopenia persists after discontinuation of high dose IV steroid therapy (she expects to taper off in mid August) then would evaluate for other causes of lymphocytopenia- screen for HIV, hepatitis, CVID (measure lymphocyte subpopulations (eg, CD4 count) and immunoglobulin levels).  All of Ronna's questions were answered.  We'll inform the patient of the results of her testing and recommendations and  f/up plan based on the results.   At the end of our visit patient verbalized understanding and concurred with the plan.    Addendum:   Labs on 10/16/2021: serum epo level normal at 9  CBCd normal. Absolute differential counts normal. ALC 1.4.  She has been off pulse IV  steroids since first week of September 2021. She has been weaning off opioids as well. [facility]. Plan possibly to start IVIG. F/up planned in October.  Recommend - monitoring cbcd annually, with reflex peripheral blood smear if there are any abnormalities.

## 2021-07-27 NOTE — LETTER
"    7/27/2021         RE: Ronna Rivera  [address]  [place] [place] [facility] [address]        Dear Colleague,    Thank you for referring your patient, Ronna Rivera, to the [facility]. Please see a copy of my visit note below.    This patient  is being evaluated via a billable video visit.      The patient has been notified of following:     \"This video visit will be conducted via a call between you and your physician/provider. We have found that certain health care needs can be provided without the need for an in-person physical exam.  This service lets us provide the care you need with a video conversation.  If a prescription is necessary we can send it directly to your pharmacy.  If lab work is needed we can place an order for that and you can then stop by our lab to have the test done at a later time.    Video visits are billed at different rates depending on your insurance coverage.  Please reach out to your insurance provider with any questions.    If during the course of the call the physician/provider feels a video visit is not appropriate, you will not be charged for this service.\"    Patient has given verbal consent for Video visit? yes  Video-Visit Details    Type of service:  Video Visit    Video visit duration:8   min  Originating Location (pt. Location): home    Distant Location (provider location):  [facility]     Platform used for Video Visit: Ivory Bates MD        Hematology Follow-up visit:  Date on this visit: Jul 27, 2021    Diagnosis: erythromelalgia.    PCP: Neal Gonzalez     History Of Present Illness:  Ms. Rivera is a 35 year old female who presents for f/up of erythromelalgia.  She has developed erythema on her feet in January 2021, and also has had symptoms of swelling of her hands and feet, pain in her hands and feet and face and tingling in her feet.   She has tried baby aspirin for 1 months and that did " not help with the symptoms.  She has also tried pulse dexamethasone which did not help significantly either.   She was seen by Dr. Garry Smith at the [facility] from dermatology and Dr. Shannon De La Rosa in Neurology.  She was also seen by Dr. Darek Orta  neurology with [facility] physicians.    Lyme disease screen was negative.  Tissue transglutaminase antibody was negative. Electrodiagnostic studies were negative. Thermal regulatory sweat test and QSART demonstrated evidence of widespread hypohidrosis.    TS-HDS and FGFR 3 antibodies were negative.  She was also evaluated by Dr. Cardoza from rheumatology.   She has had a negative rheumatologic serologic with the exception of trace cryoglobulins. Following our last visit in June she underwent evaluation for chronic myeloproliferative disease (CMPD). Labs done on June 28 2021: Serum erythropoietin level low 2.  Peripheral blood smear shows absolute lymphopenia.  Hemoglobin and platelet count was normal.  There were no morphologic abnormality's in the white cells.  ALC was low at 0.3.  Absolute neutrophil count 1.8.  Free T3 and T4 normal. TSH low. WOJCIECH 2 exon mutation- negative and no mutations in JAK2, CALR and MPL on NGS on peripheral blood.  She had a normal CBC with differential counts in April 2021.  She has not had any constitutional symptoms such as fevers chills night sweats.  She has been on IV methylprednisolone 1 g twice a week since May 24th (was on once weekly in April), and anticipates that she will be done in 2 1/2 weeks . Symptoms of pain and swelling in her hands and feet imrpoved compared to March and April, and plateaued.  She has [facility] home infusion for IV solumedrol- has a picc line in place. Wt has been stable at 110 lbs.  In addition, a complete 12 point  review of systems is negative.      Past Medical/Surgical History:  Past Medical History:   Diagnosis Date     Reactive depression      Seasonal allergies 6/25/2020    Mostly  resolved     Past Surgical History:   Procedure Laterality Date     INSERT PICC LINE Left 6/23/2021    Procedure: INSERTION, PICC;  Surgeon: Neal Penny MD;  Location: [facility] OR     IR PICC PLACEMENT > 5 YRS OF AGE  6/23/2021     NO HISTORY OF SURGERY       Allergies:  Allergies as of 07/27/2021 - Reviewed 06/30/2021   Allergen Reaction Noted     Topiramate Anxiety 04/04/2018     Current Medications:  Current Outpatient Medications   Medication Sig Dispense Refill     DULoxetine (CYMBALTA) 20 MG capsule Take 40 mg by mouth       DULoxetine (CYMBALTA) 20 MG capsule Take 1 capsule (20 mg) by mouth daily for 14 days 14 capsule 0     gabapentin (NEURONTIN) 300 MG capsule Take 2 capsules (600 mg) by mouth 3 times daily 84 capsule 0     HYDROcodone-acetaminophen (NORCO) 7.5-325 MG per tablet Take 1 tablet by mouth every 4 hours as needed for moderate to severe pain 20 tablet 0     LORazepam (ATIVAN) 0.5 MG tablet        magnesium sulfate 500 mg/mL SOLN Take 250 mg by mouth       melatonin 1 MG TABS tablet Take 1-2 mg by mouth nightly as needed for sleep       methylPREDNISolone sodium succinate (SOLU-MEDROL) 1000 MG injection 1000 mg solu-medrol via peripheral IV, Monday and Thursday (may reschedule by up to 1 day for scheduling conflicts) 16 mL 0     mexiletine (MEXITIL) 150 MG capsule Take 1 capsule (150 mg) by mouth 3 times daily 90 capsule 11     mexiletine (MEXITIL) 150 MG capsule Take 1 capsule (150 mg) by mouth 3 times daily 42 capsule 0     omeprazole 20 MG tablet Take 20 mg by mouth daily       ondansetron (ZOFRAN) 4 MG tablet Take 4 mg by mouth       oxyCODONE (OXYCONTIN) 10 MG 12 hr tablet Take 1 tablet (10 mg) by mouth 2 times daily 8 tablet 0     sulfamethoxazole-trimethoprim (BACTRIM DS) 800-160 MG tablet Take 1 tablet by mouth three times a week (Monday, Wednesday, and Friday) 40 tablet 3      Family History:  Family History   Problem Relation Age of Onset     Hypertension Father       Cerebrovascular Disease Maternal Grandmother      Diabetes Maternal Grandfather      Breast Cancer Paternal Grandmother      Hypertension Paternal Grandfather      C.A.D. Paternal Grandfather      No family history of bleeding or clotting disorder.  Social History:  Social History     Socioeconomic History     Marital status:      Spouse name: Shiraz     Number of children: 2     Years of education: 16     Highest education level: Not on file   Occupational History     Employer: UNEMPLOYED   Social Needs     Financial resource strain: Not hard at all     Food insecurity     Worry: Never true     Inability: Never true     Transportation needs     Medical: No     Non-medical: No   Tobacco Use     Smoking status: Never Smoker     Smokeless tobacco: Never Used   Substance and Sexual Activity     Alcohol use: No     Drug use: No     Sexual activity: Yes     Partners: Male   Lifestyle     Physical activity     Days per week: Not on file     Minutes per session: Not on file     Stress: Not on file     Back Care Yes     Comment: low back pain, with period     Exercise Yes     Comment: 3 times weekly, treadmill     Bike Helmet Yes     Seat Belt Yes     Self-Exams No   Social History Narrative    Lives with , 2 kids (born 2009, both autism). No guns.  Safe home and environment.  Support from friends family and [employer] community.     Physical Exam:  Wt Readings from Last 5 Encounters:   07/27/21 49.9 kg (110 lb)   06/23/21 50 kg (110 lb 3.7 oz)   06/08/21 49.9 kg (110 lb)   06/04/21 49.9 kg (110 lb)   04/09/21 49.9 kg (110 lb)        Constitutional: alert and in no distress  Eyes: No redness or discharge  Respiratory: No cough or labored breathing.  Musculoskeletal: Full range of motion in extremities.  Skin: no visible skin lesions or discoloration  Neurological: No tremors and denies headache.  Psychiatric: Mentation appears normal and affect is normal as well.  Alert and oriented x3.  The rest the  comprehensive physical examination is deferred due to public [facility] emergency video visit restrictions.  Laboratory/Imaging Studies  Labs reviewed and documented in the EMR.    ASSESSMENT/PLAN:    Barbara is a very pleasant 35-year-old woman with a clinical diagnosis of erythromelalgia with subacute onset in January 2021. NGS analysis for Josiah 2, CALR, MPL mutation analysis, including JAK2 exon analysis, was negative. However, serum erythropoietin level is low, without any clear etiology for it. She also has lymphocytopenia but that is possibly associated with her high dose IV steroid therapy.   At this time recommend that we repeat CBCd and also serum erythropoietin level. If serum erythropoietin level is repeatedly low, then would recommend proceeding with bone marrow biopsy to r/o chronic myeloproliferative disease (CMPD). If BMBx shows hypercellularity for age, with trilineage growth- panmyelosis, with prominent myeloid, granulocytic and megakaryocytic proliferation then diagnosis of P.Vera in the setting low serum erythropoietin level would be confirmed vs  BMBx can be nondiagnostic.  She prefers to have BMBx done at [facility] if needed.  If lymphocytopenia persists after discontinuation of high dose IV steroid therapy (she expects to taper off in mid August) then would evaluate for other causes of lymphocytopenia- screen for HIV, hepatitis, CVID (measure lymphocyte subpopulations (eg, CD4 count) and immunoglobulin levels).  All of Ronna's questions were answered.  We'll inform the patient of the results of her testing and recommendations and  f/up plan based on the results.   At the end of our visit patient verbalized understanding and concurred with the plan.        Again, thank you for allowing me to participate in the care of your patient.        Sincerely,        Karly Bates MD, MD

## 2021-07-28 ENCOUNTER — HOME INFUSION (PRE-WILLOW HOME INFUSION) (OUTPATIENT)
Dept: PHARMACY | Facility: CLINIC | Age: 36
End: 2021-07-28

## 2021-07-30 ENCOUNTER — HOME INFUSION (PRE-WILLOW HOME INFUSION) (OUTPATIENT)
Dept: PHARMACY | Facility: CLINIC | Age: 36
End: 2021-07-30

## 2021-07-30 ENCOUNTER — TELEPHONE (OUTPATIENT)
Dept: INTERNAL MEDICINE | Facility: CLINIC | Age: 36
End: 2021-07-30

## 2021-07-30 NOTE — TELEPHONE ENCOUNTER
Shoaib from [facility] called RN, and asked about length of therapy for methylPREDNISolone sodium succinate (SOLU-MEDROL) 1000 MG injection.  Shoaib needs to know if this Rx will be continued or discontinued.    Jaclyn Geller RN on 7/30/2021 at 1:59 PM

## 2021-08-02 ENCOUNTER — HOME INFUSION (PRE-WILLOW HOME INFUSION) (OUTPATIENT)
Dept: PHARMACY | Facility: CLINIC | Age: 36
End: 2021-08-02

## 2021-08-02 ENCOUNTER — VIRTUAL VISIT (OUTPATIENT)
Dept: INTERNAL MEDICINE | Facility: CLINIC | Age: 36
End: 2021-08-02
Payer: COMMERCIAL

## 2021-08-02 ENCOUNTER — MYC MEDICAL ADVICE (OUTPATIENT)
Dept: INTERNAL MEDICINE | Facility: CLINIC | Age: 36
End: 2021-08-02

## 2021-08-02 DIAGNOSIS — Z79.899 MEDICAL MARIJUANA USE: ICD-10-CM

## 2021-08-02 DIAGNOSIS — I73.81 ERYTHROMELALGIA (H): ICD-10-CM

## 2021-08-02 PROCEDURE — 99417 PROLNG OP E/M EACH 15 MIN: CPT | Performed by: PEDIATRICS

## 2021-08-02 PROCEDURE — 99215 OFFICE O/P EST HI 40 MIN: CPT | Mod: 95 | Performed by: PEDIATRICS

## 2021-08-02 NOTE — TELEPHONE ENCOUNTER
This decision is up to Dr. James (neurology at [facility] [place]), visit tomorrow. I gather that he and neurologist Dr. De La Rosa @ [place] are considering decreasing the dose, continuing the same BIW frequency.    I sent her a myC, and asked her to prompt Dr. OVERTON about this.

## 2021-08-02 NOTE — ASSESSMENT & PLAN NOTE
Erythromelalgia  She feels she is at a plateau, better than April/May, good days and bad days - defined as cooling her feet only twice. If she walks for any extended period of time it brings on a worsening. When not very hot she has been able to go to the grocery store.  More facial flushing and swelling this past week, lips, nose, chin.  Swelling will sometimes happen without heat or redness. No itching without the pain.  Med regimen: mexiletine 150 TID, off gabapentin (Dr. De La Rosa didn't think it would be helpful, the peak dose was 600 TID), duloxetine, steroids still BIW but the prescription just ended.  Dr. De La Rosa was thinking about trying to draw down the amount, while keeping the frequency - to gauge the effectiveness and make a case for IVIG. She has an appointment with Dr. James tomorrow.  In the beginning she was on zytec, when her problem was mainly her foot.  [place] considering  Cetrizine 10 mg twice daily  Famotidine 20 mg daily  OK to try benadryl 25 mg at night.     Weight is about stable - she has been hungrier (counseled about this)  Drinking water with electrolyte tablet and morning-time coffee.      The hematologist is wanting to do a bone marrow biopsy. We discussed this, reviewed, and I endorsed the plan. Cancer isn't super likely given other tests, but it is possible and needs to be ruled out.    Researching about steroids. Will send the following message.      Hello,  It was good to see you today. About the continuing steroids ... I am happy to order whatever. Dawna James and Rj recommend. Please let me know tomorrow what they want to do. Or Dr. James is welcome to contact me directly.  As of this message, I and the pharmacists are holding off on renewal.  Rob Ball MD  Internal Medicine & Pediatrics  Complex primary care  [facility], [facility] [place]

## 2021-08-02 NOTE — PROGRESS NOTES
"Dear patient. Thank you for visiting with me. I want you to feel respected, understood, and empowered. \"Respect\" is valuing you as much as I would a close family member. \"Empowerment\" happens when you are fully informed, and can make the best possible decision for you.  Please ask me questions!  Challenge anything that is not clear.     Medical records are primarily used as memory aids for me and my colleagues. Things to know about my documentation style:  - The 'problem list' includes current symptoms or diagnoses, and some problems that are resolved but may return. I use the past medical history for problems not expected to return.  - I use single quotation marks for things that you or I said, when I want to clarify who was speaking.  - I use double quotation marks when copying a term from elsewhere in your records. Italics (besides here) may also denote a quotation.  If you have questions or concerns, please contact me; I will reply as soon as time allows.        Virtual Visit Details     Type of service:  Video Visit     Start Time: 10:04 AM     End Time:10:31 AM     Originating Location (pt. Location): Home     Distant Location (provider location):  [facility]      Platform used for Visit: [facility]     PCP: Neal Ball  Visit type: problem-oriented  Time note (e5+16227, 69-83'): The total time (on the date of service) for this service was 70 minutes, including discussion/face-to-face, chart review, interpretation not otherwise reported, documentation, and updating of the computerized record.           Context     Ronna Rivera is a 35 year old woman, here with her , with concerns including:  Patient presents with:  Erythromelalgia     History, update, and/or problems          Problem List as of 8/2/2021        Noted       Nervous and Auditory    1. Erythromelalgia (H) 3/25/2021     Overview Addendum 7/11/2021  6:45 PM by Neal Ball MD      Dx " with testing by Dr. Shannon De La Rosa at [facility]. Also seeing Dr. Mook James @ [facility]  Tx: mexiletine 150 mg TID, BIW solumedrol 1 gm IV, clonidine 0.1 mg daily.          Last Assessment & Plan 8/2/2021 Virtual Visit Edited 8/2/2021  5:35 PM by Neal Ball MD      Erythromelalgia  She feels she is at a plateau, better than April/May, good days and bad days - defined as cooling her feet only twice. If she walks for any extended period of time it brings on a worsening. When not very hot she has been able to go to the grocery store.  More facial flushing and swelling this past week, lips, nose, chin.  Swelling will sometimes happen without heat or redness. No itching without the pain.  Med regimen: mexiletine 150 TID, off gabapentin (Dr. De La Rosa didn't think it would be helpful, the peak dose was 600 TID), duloxetine, steroids still BIW but the prescription just ended.  Dr. De La Rosa was thinking about trying to draw down the amount, while keeping the frequency - to gauge the effectiveness and make a case for IVIG. She has an appointment with Dr. James tomorrow.  In the beginning she was on zytec, when her problem was mainly her foot.  [place] considering  Cetrizine 10 mg twice daily  Famotidine 20 mg daily  OK to try benadryl 25 mg at night.     Weight is about stable - she has been hungrier (counseled about this)  Drinking water with electrolyte tablet and morning-time coffee.      The hematologist is wanting to do a bone marrow biopsy. We discussed this, reviewed, and I endorsed the plan. Cancer isn't super likely given other tests, but it is possible and needs to be ruled out.    Researching about steroids. Will send the following message.      Hello,  It was good to see you today. About the continuing steroids ... I am happy to order whatever. Dawna James and Rj recommend. Please let me know tomorrow what they want to do. Or Dr. James is welcome to contact me directly.  As of  "this message, I and the pharmacists are holding off on renewal.  Rob Ball MD  Internal Medicine & Pediatrics  Complex primary care  [facility]            Behavioral    2. Medical marijuana use 8/2/2021     Overview Signed 8/2/2021  5:30 PM by Neal Ball MD      Certification in progress          Last Assessment & Plan 8/2/2021 Virtual Visit Written 8/2/2021  5:30 PM by Neal Ball MD      Wants to get off the opioids. Considering medical marijuana, which I think is reasonable (also [place] [facility] thought this). I will send them a PEG3 via [employer] and proceed.     Hello,  It was good to talk with you today. I am proceeding with the certification for medical marijuana. As part of this, can you please reply to the following 3 questions, about your pain.    Question 1.  What number best describes your pain on average in the past week, on a scale from 0 to 10 where 0 is  no pain  and 10 is  pain as bad as you can imagine ?  (Please reply, Question 1 = your number)    The following two questions ask you to describe how, during the past week, pain has interfered with your life on a  0 to 10  scale, where 0 is  does not interfere at all  and 10 is  completely interferes.     Question 2. What number best describes how, during the past week, pain has interfered with your enjoyment of life? (Please reply, Question 2 = your number)    Question 3. What number best describes how, during the past week, pain has interfered with your general activity? (Please reply, Question 3 = your number)      [The above questions are called the \"PEG3 item pain scale.\"  Documenting the PEG3 is necessary for ongoing certification of medical marijuana]                VISIT FREQ: Monthly 30 minutes by video. Every 6 months, in-person 60 minutes.    Outstanding issues (Dr. Ball)  --------------------------------------------  -- TSI and T3  -- repeat ECG at some point " because of mexiletine.   -- bactrim while on steroids        Ongoing care provided by  --------------------------------------  -- Neuro: Dr. Shannon De La Rosa @ [place], [facility]  (prev seen Dr. Orta @ [facility])  -- Pain: Dr. Wills @ [facility]   -- Rheum PRN: Dr. Cipriano Cardoza        Physical exam as possible      Physical Exam  Constitutional:       General: She is not in acute distress.     Appearance: Normal appearance. She is not ill-appearing.   HENT:      Head: Normocephalic.      Right Ear: External ear normal.      Left Ear: External ear normal.      Nose: Nose normal.   Eyes:      General: No scleral icterus (no obvious).        Right eye: No discharge (none obvious).         Left eye: No discharge (none obvious).      Extraocular Movements: Extraocular movements intact.   Pulmonary:      Effort: Pulmonary effort is normal. No respiratory distress.      Comments: No audible wheeze or stridor. No visible cyanosis.  Skin:     Comments: Red cheeks with swelling   Neurological:      Mental Status: She is alert.      Comments: Cranial nerves appear grossly normal   Psychiatric:         Mood and Affect: Mood normal.         Speech: Speech normal.         Behavior: Behavior normal.         Thought Content: Thought content normal.            Comment about data reviewed  I personally reviewed, interpreted, and/or confirmed interpretation of Dr. De La Rosa's notes

## 2021-08-02 NOTE — ASSESSMENT & PLAN NOTE
"Wants to get off the opioids. Considering medical marijuana, which I think is reasonable (also [place] [facility] thought this). I will send them a PEG3 via [employer] and proceed.     Hello,  It was good to talk with you today. I am proceeding with the certification for medical marijuana. As part of this, can you please reply to the following 3 questions, about your pain.    Question 1.  What number best describes your pain on average in the past week, on a scale from 0 to 10 where 0 is  no pain  and 10 is  pain as bad as you can imagine ?  (Please reply, Question 1 = your number)    The following two questions ask you to describe how, during the past week, pain has interfered with your life on a  0 to 10  scale, where 0 is  does not interfere at all  and 10 is  completely interferes.     Question 2. What number best describes how, during the past week, pain has interfered with your enjoyment of life? (Please reply, Question 2 = your number)    Question 3. What number best describes how, during the past week, pain has interfered with your general activity? (Please reply, Question 3 = your number)      [The above questions are called the \"PEG3 item pain scale.\"  Documenting the PEG3 is necessary for ongoing certification of medical marijuana]  "

## 2021-08-03 ENCOUNTER — HOME INFUSION (PRE-WILLOW HOME INFUSION) (OUTPATIENT)
Dept: PHARMACY | Facility: CLINIC | Age: 36
End: 2021-08-03

## 2021-08-03 NOTE — PROGRESS NOTES
This is a recent snapshot of the patient's [facility] medical record.  For current drug dose and complete information and questions, call [phone] or In Basket pool, fv home infusion ([id number])  CSN Number:  [id number]

## 2021-08-03 NOTE — TELEPHONE ENCOUNTER
RN called [facility] and let pharmacy staff know that orders will be coming from Dr. Ball, after he hears back from what Dr. James recommends.    Jaclyn Geller RN on 8/3/2021 at 8:36 AM

## 2021-08-04 ENCOUNTER — HOME INFUSION (PRE-WILLOW HOME INFUSION) (OUTPATIENT)
Dept: PHARMACY | Facility: CLINIC | Age: 36
End: 2021-08-04

## 2021-08-05 ENCOUNTER — HOME INFUSION (PRE-WILLOW HOME INFUSION) (OUTPATIENT)
Dept: PHARMACY | Facility: CLINIC | Age: 36
End: 2021-08-05

## 2021-08-05 ENCOUNTER — MEDICAL CORRESPONDENCE (OUTPATIENT)
Dept: HEALTH INFORMATION MANAGEMENT | Facility: CLINIC | Age: 36
End: 2021-08-05

## 2021-08-05 RX ORDER — METHYLPREDNISOLONE SODIUM SUCCINATE 1 G/16ML
INJECTION, POWDER, LYOPHILIZED, FOR SOLUTION INTRAMUSCULAR; INTRAVENOUS
Qty: 16 ML | Refills: 0 | Status: SHIPPED | OUTPATIENT
Start: 2021-08-05 | End: 2021-10-04 | Stop reason: DRUGHIGH

## 2021-08-05 NOTE — PROGRESS NOTES
This is a recent snapshot of the patient's [facility] medical record.  For current drug dose and complete information and questions, call [phone] or In Basket pool, fv home infusion ([id number])  CSN Number:  [id number]

## 2021-08-05 NOTE — ASSESSMENT & PLAN NOTE
Plan per specialized neurologists Dr. James and Rj. As of 8/4/21, corticosteroids 1g IV per week, after 12 weeks of BIW.

## 2021-08-06 ENCOUNTER — TELEPHONE (OUTPATIENT)
Dept: INTERNAL MEDICINE | Facility: CLINIC | Age: 36
End: 2021-08-06

## 2021-08-06 NOTE — TELEPHONE ENCOUNTER
[facility] Call Center    Phone Message    May a detailed message be left on voicemail: yes     Reason for Call: Order(s): Home Care Orders: Occupational Therapy (OT): Jade from [facility] calling for a verbal order to discharge pt from OT services    Action Taken: Message routed to:  [facility] ([facility]): PCC    Travel Screening: Not Applicable

## 2021-08-09 NOTE — PROGRESS NOTES
This is a recent snapshot of the patient's [facility] medical record.  For current drug dose and complete information and questions, call [phone] or In Basket pool, fv home infusion ([id number])  CSN Number:  [id number]

## 2021-08-09 NOTE — PROGRESS NOTES
This is a recent snapshot of the patient's [facility] medical record.  For current drug dose and complete information and questions, call [phone] or In [facility]  home infusion ([id number])  CSN Number:

## 2021-08-10 NOTE — TELEPHONE ENCOUNTER
I called and left  with approval for orders below.   Anastasiya Galan, EMT at 8:54 AM on 8/10/2021.  [facility]  [facility]  [place]  [phone]

## 2021-08-10 NOTE — PROGRESS NOTES
This is a recent snapshot of the patient's [facility] medical record.  For current drug dose and complete information and questions, call [phone] or In Basket pool, fv home infusion ([id number])  CSN Number:  [id number]

## 2021-08-11 ENCOUNTER — HOME INFUSION (PRE-WILLOW HOME INFUSION) (OUTPATIENT)
Dept: PHARMACY | Facility: CLINIC | Age: 36
End: 2021-08-11

## 2021-08-12 NOTE — PROGRESS NOTES
This is a recent snapshot of the patient's [facility] medical record.  For current drug dose and complete information and questions, call [phone] or In Basket pool, fv home infusion ([id number])  CSN Number:  [id number]

## 2021-08-14 ENCOUNTER — MEDICAL CORRESPONDENCE (OUTPATIENT)
Dept: HEALTH INFORMATION MANAGEMENT | Facility: CLINIC | Age: 36
End: 2021-08-14

## 2021-08-18 ENCOUNTER — MEDICAL CORRESPONDENCE (OUTPATIENT)
Dept: HEALTH INFORMATION MANAGEMENT | Facility: CLINIC | Age: 36
End: 2021-08-18

## 2021-08-18 ENCOUNTER — HOME INFUSION (PRE-WILLOW HOME INFUSION) (OUTPATIENT)
Dept: PHARMACY | Facility: CLINIC | Age: 36
End: 2021-08-18

## 2021-08-20 NOTE — PROGRESS NOTES
This is a recent snapshot of the patient's [facility] medical record.  For current drug dose and complete information and questions, call [phone] or In Basket pool, fv home infusion ([id number])  CSN Number:  [id number]

## 2021-08-25 ENCOUNTER — HOME INFUSION (PRE-WILLOW HOME INFUSION) (OUTPATIENT)
Dept: PHARMACY | Facility: CLINIC | Age: 36
End: 2021-08-25

## 2021-08-25 NOTE — PROGRESS NOTES
This is a recent snapshot of the patient's [facility] medical record.  For current drug dose and complete information and questions, call [phone] or In Basket pool, fv home infusion ([id number])  CSN Number:  [id number]

## 2021-08-30 NOTE — PROGRESS NOTES
This is a recent snapshot of the patient's [facility] medical record.  For current drug dose and complete information and questions, call [phone] or In Basket pool, fv home infusion ([id number])  CSN Number:  [id number]

## 2021-08-31 ENCOUNTER — TRANSFERRED RECORDS (OUTPATIENT)
Dept: HEALTH INFORMATION MANAGEMENT | Facility: CLINIC | Age: 36
End: 2021-08-31

## 2021-09-01 ENCOUNTER — HOME INFUSION (PRE-WILLOW HOME INFUSION) (OUTPATIENT)
Dept: PHARMACY | Facility: CLINIC | Age: 36
End: 2021-09-01

## 2021-09-02 NOTE — PROGRESS NOTES
This is a recent snapshot of the patient's [facility] medical record.  For current drug dose and complete information and questions, call [phone] or In Basket pool, fv home infusion ([id number])  CSN Number:  [id number]

## 2021-09-07 ENCOUNTER — HOME INFUSION (PRE-WILLOW HOME INFUSION) (OUTPATIENT)
Dept: PHARMACY | Facility: CLINIC | Age: 36
End: 2021-09-07

## 2021-09-08 ENCOUNTER — HOME INFUSION (PRE-WILLOW HOME INFUSION) (OUTPATIENT)
Dept: PHARMACY | Facility: CLINIC | Age: 36
End: 2021-09-08

## 2021-09-08 NOTE — PROGRESS NOTES
This is a recent snapshot of the patient's [facility] medical record.  For current drug dose and complete information and questions, call [phone] or In Basket pool, fv home infusion ([id number])  CSN Number:  [id number]

## 2021-09-09 ENCOUNTER — VIRTUAL VISIT (OUTPATIENT)
Dept: INTERNAL MEDICINE | Facility: CLINIC | Age: 36
End: 2021-09-09
Payer: COMMERCIAL

## 2021-09-09 DIAGNOSIS — I73.81 ERYTHROMELALGIA (H): Primary | ICD-10-CM

## 2021-09-09 PROCEDURE — 99214 OFFICE O/P EST MOD 30 MIN: CPT | Mod: 95 | Performed by: PEDIATRICS

## 2021-09-09 NOTE — NURSING NOTE
Chief Complaint   Patient presents with     Recheck Medication     monthly follow up per pt        Anastasiya Galan EMT at 3:06 PM on 9/9/2021.

## 2021-09-09 NOTE — PROGRESS NOTES
"Dear patient. Thank you for visiting with me. I want you to feel respected, understood, and empowered. \"Respect\" is valuing you as much as I would a close family member. \"Empowerment\" happens when you are fully informed, and can make the best possible decision for you.  Please ask me questions!  Challenge anything that is not clear.    Medical records are primarily used as memory aids for me and my colleagues. Things to know about my documentation style:  - The 'problem list' includes current symptoms or diagnoses, and some problems that are resolved but may return. I use the past medical history for problems not expected to return.  - I use single quotation marks for things that you or I said, when I want to clarify who was speaking.  - I use double quotation marks when copying a term from elsewhere in your records. Italics (besides here) may also denote a quotation.  If you have questions or concerns, please contact me; I will reply as soon as time allows.      Virtual Visit Details    Type of service:  Video Visit    Start Time: 3:14 PM    End Time:3:30 PM    Originating Location (pt. Location): Home    Distant Location (provider location):  [facility] [place]     Platform used for Visit: [facility]    PCP: Neal Ball  Visit type: problem-oriented  Time note (e4, 30'): The total time (on the date of service) for this service was 30 minutes, including discussion/face-to-face, chart review, interpretation not otherwise reported, documentation, and updating of the computerized record.        Context    Ronna Rivera is a 35 year old woman, with concerns including:  Chief Complaint   Patient presents with     Recheck Medication     monthly follow up per pt        History, update, and/or problems    Erythromelalgia  Has been able to walk more, only needing wheelchair and cooling at night.  Still has compression socks. Has seen Dr. James (reviewed).   Steroid decrease, she " decreased down via time rather than volume. The last time was last Tuesday. The day or and following she has had facial flushing and feels feverish, and she has more energy but has no change in foot pain (other than the trend overall).  Facial flushing has gone down, she suspects this was tied to the steroids.  Right toes 1 and 2 still a little swollen, but not as bad. The left foot has not shows swelling only.  Decreased oxycodone to the evening only.    Weight gain with the steroids. Increased fatigue since she is off steroids (counseled about this). If she feels a lot more tired, gets other symptoms, or other concerns she will need rescue steroids right away. Good that she doesn't live by herself, so someone will notice if she feels sicker.    Dr. OVERTON could decide about IVIG, but since she is improving there may not need to be used.    We talked about options for weaning meds. Overall I defer to Dr. OVERTON. I would think a partial wean in oxycodone, then wean the mexilitene, then the rest of the oxycodone, then the gabapentin. Lorazepam has been only a couple of times over the past several months, so that's good.  Gabapentin would be last to wean, in my opinion.   Oxycodone is being written by [facility].    February COVID vaccine only once. The erythromelalgia happened before the vaccine, but then worsened. Best to postpone a little with the previous possible worsening, and the current unclear state. She may be immunosuppressed anyway, so would want shots later. Verified safe/masking.          VISIT FREQ: Monthly 30 minutes by video. Every 6 months, in-person 60 minutes.    Outstanding issues (Dr. Ball)  --------------------------------------------  -- TSI and T3  -- repeat ECG at some point because of mexiletine.   -- bactrim while on steroids        Ongoing care provided by  --------------------------------------  -- Neuro: Dr. Shannon De La Rosa @ [place], [facility]ook [facility]  (prev seen Dr. Orta @  [facility])  -- Pain: Dr. Wills @ [facility]   -- Rheum PRN: Dr. Cipriano Cardoza    General comments      Physical exam as possible  Physical Exam  Constitutional:       General: She is not in acute distress.     Appearance: Normal appearance. She is not ill-appearing.   HENT:      Head: Normocephalic.      Nose: Nose normal.   Eyes:      General: No scleral icterus.        Right eye: No discharge.         Left eye: No discharge.      Extraocular Movements: Extraocular movements intact.   Pulmonary:      Effort: Pulmonary effort is normal. No respiratory distress.   Neurological:      Mental Status: She is alert.   Psychiatric:         Mood and Affect: Mood normal.         Behavior: Behavior normal.

## 2021-09-09 NOTE — PATIENT INSTRUCTIONS
[facility] Medication Refill Request Information:  * Please contact your pharmacy regarding ANY request for medication refills.  ** [facility] Prescription Fax = [phone]  * Please allow 3 business days for routine medication refills.  * Please allow 5 business days for controlled substance medication refills.     [facility] Test Result notification information:  *You will be notified with in 7-10 days of your appointment day regarding the results of your test.  If you are on MyChart you will be notified as soon as the provider has reviewed the results and signed off on them.    [facility]: [phone]

## 2021-09-10 NOTE — PROGRESS NOTES
This is a recent snapshot of the patient's [facility] medical record.  For current drug dose and complete information and questions, call [phone] or In Basket pool, fv home infusion ([id number])  CSN Number:  [id number]

## 2021-09-13 ENCOUNTER — HOME INFUSION (PRE-WILLOW HOME INFUSION) (OUTPATIENT)
Dept: PHARMACY | Facility: CLINIC | Age: 36
End: 2021-09-13

## 2021-09-15 ENCOUNTER — HOME INFUSION (PRE-WILLOW HOME INFUSION) (OUTPATIENT)
Dept: PHARMACY | Facility: CLINIC | Age: 36
End: 2021-09-15

## 2021-09-16 ENCOUNTER — MEDICAL CORRESPONDENCE (OUTPATIENT)
Dept: HEALTH INFORMATION MANAGEMENT | Facility: CLINIC | Age: 36
End: 2021-09-16

## 2021-09-17 NOTE — PROGRESS NOTES
This is a recent snapshot of the patient's [facility] medical record.  For current drug dose and complete information and questions, call [phone] or In Basket pool, fv home infusion ([id number])  CSN Number:  [id number]

## 2021-09-18 ENCOUNTER — HOME INFUSION (PRE-WILLOW HOME INFUSION) (OUTPATIENT)
Dept: PHARMACY | Facility: CLINIC | Age: 36
End: 2021-09-18

## 2021-09-21 ENCOUNTER — MEDICAL CORRESPONDENCE (OUTPATIENT)
Dept: HEALTH INFORMATION MANAGEMENT | Facility: CLINIC | Age: 36
End: 2021-09-21

## 2021-09-21 NOTE — PROGRESS NOTES
This is a recent snapshot of the patient's [facility] medical record.  For current drug dose and complete information and questions, call [phone] or In Basket pool, fv home infusion ([id number])  CSN Number:  [id number]

## 2021-09-22 ENCOUNTER — HOME INFUSION (PRE-WILLOW HOME INFUSION) (OUTPATIENT)
Dept: PHARMACY | Facility: CLINIC | Age: 36
End: 2021-09-22

## 2021-09-26 ENCOUNTER — HEALTH MAINTENANCE LETTER (OUTPATIENT)
Age: 36
End: 2021-09-26

## 2021-09-29 ENCOUNTER — HOME INFUSION (PRE-WILLOW HOME INFUSION) (OUTPATIENT)
Dept: PHARMACY | Facility: CLINIC | Age: 36
End: 2021-09-29

## 2021-09-30 ENCOUNTER — HOME INFUSION (PRE-WILLOW HOME INFUSION) (OUTPATIENT)
Dept: PHARMACY | Facility: CLINIC | Age: 36
End: 2021-09-30

## 2021-10-01 ENCOUNTER — HOME INFUSION (PRE-WILLOW HOME INFUSION) (OUTPATIENT)
Facility: CLINIC | Age: 36
End: 2021-10-01

## 2021-10-04 ENCOUNTER — VIRTUAL VISIT (OUTPATIENT)
Dept: INTERNAL MEDICINE | Facility: CLINIC | Age: 36
End: 2021-10-04
Payer: COMMERCIAL

## 2021-10-04 DIAGNOSIS — I73.81 ERYTHROMELALGIA (H): ICD-10-CM

## 2021-10-04 PROCEDURE — 99213 OFFICE O/P EST LOW 20 MIN: CPT | Mod: 95 | Performed by: PEDIATRICS

## 2021-10-04 NOTE — PROGRESS NOTES
This is a recent snapshot of the patient's [facility] medical record.  For current drug dose and complete information and questions, call [phone] or In Basket pool, fv home infusion ([id number])  CSN Number:  [id number]

## 2021-10-04 NOTE — NURSING NOTE
Chief Complaint   Patient presents with     Recheck Medication     monthly visit, discuss keeping picc line in, med draw down, general check in       SHANA Ennis at 10:11 AM on 10/4/2021

## 2021-10-04 NOTE — PROGRESS NOTES
"Dear patient. Thank you for visiting with me. I want you to feel respected, understood, and empowered. \"Respect\" is valuing you as much as I would a close family member. \"Empowerment\" happens when you are fully informed, and can make the best possible decision for you.  Please ask me questions!  Challenge anything that is not clear.    Medical records are primarily used as memory aids for me and my colleagues. Things to know about my documentation style:  - The 'problem list' includes current symptoms or diagnoses, and some problems that are resolved but may return. I use the past medical history for problems not expected to return.  - I use single quotation marks for things that you or I said, when I want to clarify who was speaking.  - I use double quotation marks when copying a term from elsewhere in your records. Italics (besides here) may also denote a quotation.  If you have questions or concerns, please contact me; I will reply as soon as time allows.      Virtual Visit Details    Type of service:  Video Visit    Start Time: 10:35 AM    End Time:10:48 PM    Originating Location (pt. Location): Home    Distant Location (provider location):  [facility] [place]     Platform used for Visit: Well    PCP: Neal Ball  Visit type: problem-oriented  Time note (e3, 20'): The total time (on the date of service) for this service was 20 minutes, including discussion/face-to-face, chart review, interpretation not otherwise reported, documentation, and updating of the computerized record.        Context    Ronna Rivera is a 35 year old woman, with concerns including:  Chief Complaint   Patient presents with     Recheck Medication     monthly visit, discuss keeping picc line in, med draw down, general check in       History, update, and/or problems      Erythromelalgia       UPDATE: Overall progress has leveled off, but a lot better. Coming off meds slightly, including off " long-acting opioids (going to [facility] pain). Now just taking short-acting norco at night, sometimes in middle of night Last IV steroid dose was 4 weeks ago. Weaning off mexilitine and gabapentin. Recommended keeping gabapentin for now, until off mexilitine. Short-acting opioids may be needed for a while.  She kept a couple of doses on hand in the fridge.  Moving around a lot more. Going on a couple of bike rides, wearing big loose tennis shoes, compression socks. Sitting on the couch for our call today!  Uses wheelchair after getting ready for bed after cooling feet.  She still has consistent swelling in right foot. She can stand to wear exercise shoes that have a little more space in them. She tried using the compression machine they gave her. The left foot is still a little swollen.  Some days her feet are hot and uncomfortable, she rests.   Energy is variable - she may take 3 naps per day. Other days she is better.  She sees neurologist Dr. James tomorrow. Not sure about IVIG process/approval.       ASSESSMENT & PLAN:     OK to continue wean of opioids.            VISIT FREQ: Monthly 30 minutes by video. Every 6 months, in-person 60 minutes.    Outstanding issues (Dr. Ball)  --------------------------------------------  -- TSI and T3  -- repeat ECG at some point because of mexiletine.   -- bactrim while on steroids        Ongoing care provided by  --------------------------------------  -- Neuro: Dr. Shannon De La Rosa @ [place], Dr. Mook James @[facility]  (prev seen Dr. Orta @ [facility])  -- Pain: Dr. Wills @ [facility]   -- Rheum PRN: Dr. Cipriano Cardoza    General comments      Physical exam as possible  Physical Exam  Constitutional:       General: She is not in acute distress.     Appearance: Normal appearance. She is not ill-appearing.   HENT:      Head: Normocephalic.      Right Ear: External ear normal.      Left Ear: External ear normal.      Nose: Nose normal.   Eyes:      General: No scleral  icterus (no obvious).        Right eye: No discharge (none obvious).         Left eye: No discharge (none obvious).      Extraocular Movements: Extraocular movements intact.   Pulmonary:      Effort: Pulmonary effort is normal. No respiratory distress.      Comments: No audible wheeze or stridor. No visible cyanosis.  Skin:     Comments: Visible skin is clear, without obvious rash or lesions   Neurological:      Mental Status: She is alert.      Comments: Cranial nerves appear grossly normal   Psychiatric:         Mood and Affect: Mood normal.         Speech: Speech normal.         Behavior: Behavior normal.         Thought Content: Thought content normal.

## 2021-10-04 NOTE — PROGRESS NOTES
This is a recent snapshot of the patient's [facility] medical record.  For current drug dose and complete information and questions, call [phone] or In Basket pool, fv home infusion ([id number])  CSN Number:  [id number]

## 2021-10-06 ENCOUNTER — HOME INFUSION (PRE-WILLOW HOME INFUSION) (OUTPATIENT)
Dept: PHARMACY | Facility: CLINIC | Age: 36
End: 2021-10-06

## 2021-10-06 NOTE — PROGRESS NOTES
This is a recent snapshot of the patient's [facility] medical record.  For current drug dose and complete information and questions, call [phone] or In Basket pool, fv home infusion ([id number])  CSN Number:  [id number]

## 2021-10-07 ENCOUNTER — HOME INFUSION (PRE-WILLOW HOME INFUSION) (OUTPATIENT)
Dept: PHARMACY | Facility: CLINIC | Age: 36
End: 2021-10-07

## 2021-10-08 ENCOUNTER — HOME INFUSION (PRE-WILLOW HOME INFUSION) (OUTPATIENT)
Dept: PHARMACY | Facility: CLINIC | Age: 36
End: 2021-10-08

## 2021-10-13 ENCOUNTER — HOME INFUSION (PRE-WILLOW HOME INFUSION) (OUTPATIENT)
Dept: PHARMACY | Facility: CLINIC | Age: 36
End: 2021-10-13
Payer: COMMERCIAL

## 2021-10-16 ENCOUNTER — LAB (OUTPATIENT)
Dept: LAB | Facility: CLINIC | Age: 36
End: 2021-10-16
Payer: COMMERCIAL

## 2021-10-16 DIAGNOSIS — I73.81 ERYTHROMELALGIA (H): ICD-10-CM

## 2021-10-16 LAB
BASOPHILS # BLD AUTO: 0 10E3/UL (ref 0–0.2)
BASOPHILS NFR BLD AUTO: 0 %
EOSINOPHIL # BLD AUTO: 0.1 10E3/UL (ref 0–0.7)
EOSINOPHIL NFR BLD AUTO: 1 %
ERYTHROCYTE [DISTWIDTH] IN BLOOD BY AUTOMATED COUNT: 13.2 % (ref 10–15)
HCT VFR BLD AUTO: 41.2 % (ref 35–47)
HGB BLD-MCNC: 14.1 G/DL (ref 11.7–15.7)
LYMPHOCYTES # BLD AUTO: 1.4 10E3/UL (ref 0.8–5.3)
LYMPHOCYTES NFR BLD AUTO: 15 %
MCH RBC QN AUTO: 32.5 PG (ref 26.5–33)
MCHC RBC AUTO-ENTMCNC: 34.2 G/DL (ref 31.5–36.5)
MCV RBC AUTO: 95 FL (ref 78–100)
MONOCYTES # BLD AUTO: 0.7 10E3/UL (ref 0–1.3)
MONOCYTES NFR BLD AUTO: 8 %
NEUTROPHILS # BLD AUTO: 7.2 10E3/UL (ref 1.6–8.3)
NEUTROPHILS NFR BLD AUTO: 77 %
PLATELET # BLD AUTO: 188 10E3/UL (ref 150–450)
RBC # BLD AUTO: 4.34 10E6/UL (ref 3.8–5.2)
WBC # BLD AUTO: 9.3 10E3/UL (ref 4–11)

## 2021-10-16 PROCEDURE — 99000 SPECIMEN HANDLING OFFICE-LAB: CPT

## 2021-10-16 PROCEDURE — 36415 COLL VENOUS BLD VENIPUNCTURE: CPT

## 2021-10-16 PROCEDURE — 85025 COMPLETE CBC W/AUTO DIFF WBC: CPT

## 2021-10-16 PROCEDURE — 82668 ASSAY OF ERYTHROPOIETIN: CPT | Mod: 90

## 2021-10-19 ENCOUNTER — MEDICAL CORRESPONDENCE (OUTPATIENT)
Dept: HEALTH INFORMATION MANAGEMENT | Facility: CLINIC | Age: 36
End: 2021-10-19

## 2021-10-19 ENCOUNTER — MYC MEDICAL ADVICE (OUTPATIENT)
Dept: INTERNAL MEDICINE | Facility: CLINIC | Age: 36
End: 2021-10-19

## 2021-10-19 LAB — EPO SERPL-ACNC: 9 MU/ML

## 2021-10-20 ENCOUNTER — HOME INFUSION (PRE-WILLOW HOME INFUSION) (OUTPATIENT)
Dept: PHARMACY | Facility: CLINIC | Age: 36
End: 2021-10-20

## 2021-10-20 ENCOUNTER — NURSE TRIAGE (OUTPATIENT)
Dept: NURSING | Facility: CLINIC | Age: 36
End: 2021-10-20

## 2021-10-20 NOTE — TELEPHONE ENCOUNTER
Reason for Disposition    Wound looks infected    Looks infected (spreading redness, red streak, pus) and fever    Additional Information    Negative: Followed an injury    Negative: Surgical wound infection suspected (post-op)    Negative: Bright red, wide-spread, sunburn-like rash    Negative: Black (necrotic) or blisters develop in wound    Protocols used: TOE PAIN-A-OH, WOUND INFECTION-A-OH

## 2021-10-20 NOTE — TELEPHONE ENCOUNTER
Service contacted: Dr Ball/ Primary Care  PMH:  Erythromelalgia  Today's concern: Flare of paronychia  -Flare has expanded from   visible in 2 spots.RIGHT great toe is most concerning area, worsening since photo 10/19/21.  -This area continues to swell and has begun weeping pus at base of nail  -This area is so swollen and tender that she believes  she may need it lanced.  -It is exquisitely tender  -no odor, no fever  Medications:  -On pain medications, weaning off.  -She has taken steroids/ prophylactic Bactrim> these have been weaned off for about month.    -IVIG treatment for begins Monday via [facility]: Neurology/ Erika  She has been soaking foot  in Epsom salts.     Fever, chills, rigors: NO  Chest pain, Shortness of breath, palpitations:* NO  Other postop:   Eating, taking fluids, bowels working, urinating normally.         Plan: High priority message to  [facility]: please note MyChart sent 10/19/21 with images. She is reluctant to go to Urgent Care because of rarity of Erythromelalgia. Appt made at [facility] for 10:10 tomorrow AM 10/21/21( evaluate, cultures and yao if needed), unless Dr Ball recommends otherwise      Follow up/ next appt: 10:10 on 10/21/21 Dr Ryan [facility]. Patient aware. If color changes or increasinmg pain, concerns, fever she will call back

## 2021-10-21 NOTE — PROGRESS NOTES
This is a recent snapshot of the patient's [facility] medical record.  For current drug dose and complete information and questions, call [phone] or In Basket pool, fv home infusion ([id number])  CSN Number:  [id number]

## 2021-10-22 NOTE — PROGRESS NOTES
This is a recent snapshot of the patient's [facility] medical record.  For current drug dose and complete information and questions, call [phone] or In Basket pool, fv home infusion ([id number])  CSN Number:  [id number]

## 2021-10-26 ENCOUNTER — HOSPITAL ENCOUNTER (EMERGENCY)
Facility: CLINIC | Age: 36
Discharge: HOME | End: 2021-10-27
Attending: EMERGENCY MEDICINE | Admitting: EMERGENCY MEDICINE
Payer: COMMERCIAL

## 2021-10-26 DIAGNOSIS — R51.9 NONINTRACTABLE HEADACHE, UNSPECIFIED CHRONICITY PATTERN, UNSPECIFIED HEADACHE TYPE: ICD-10-CM

## 2021-10-26 LAB
BASOPHILS # BLD AUTO: 0 10E3/UL (ref 0–0.2)
BASOPHILS NFR BLD AUTO: 0 %
EOSINOPHIL # BLD AUTO: 0 10E3/UL (ref 0–0.7)
EOSINOPHIL NFR BLD AUTO: 0 %
ERYTHROCYTE [DISTWIDTH] IN BLOOD BY AUTOMATED COUNT: 12.2 % (ref 10–15)
HCT VFR BLD AUTO: 36.9 % (ref 35–47)
HGB BLD-MCNC: 12.5 G/DL (ref 11.7–15.7)
IMM GRANULOCYTES # BLD: 0 10E3/UL
IMM GRANULOCYTES NFR BLD: 0 %
LYMPHOCYTES # BLD AUTO: 0.4 10E3/UL (ref 0.8–5.3)
LYMPHOCYTES NFR BLD AUTO: 5 %
MCH RBC QN AUTO: 31 PG (ref 26.5–33)
MCHC RBC AUTO-ENTMCNC: 33.9 G/DL (ref 31.5–36.5)
MCV RBC AUTO: 92 FL (ref 78–100)
MONOCYTES # BLD AUTO: 0.6 10E3/UL (ref 0–1.3)
MONOCYTES NFR BLD AUTO: 8 %
NEUTROPHILS # BLD AUTO: 6.9 10E3/UL (ref 1.6–8.3)
NEUTROPHILS NFR BLD AUTO: 87 %
NRBC # BLD AUTO: 0 10E3/UL
NRBC BLD AUTO-RTO: 0 /100
PLATELET # BLD AUTO: 186 10E3/UL (ref 150–450)
RBC # BLD AUTO: 4.03 10E6/UL (ref 3.8–5.2)
WBC # BLD AUTO: 8 10E3/UL (ref 4–11)

## 2021-10-26 PROCEDURE — 96361 HYDRATE IV INFUSION ADD-ON: CPT

## 2021-10-26 PROCEDURE — C9803 HOPD COVID-19 SPEC COLLECT: HCPCS

## 2021-10-26 PROCEDURE — 96375 TX/PRO/DX INJ NEW DRUG ADDON: CPT

## 2021-10-26 PROCEDURE — 83690 ASSAY OF LIPASE: CPT | Performed by: EMERGENCY MEDICINE

## 2021-10-26 PROCEDURE — 85025 COMPLETE CBC W/AUTO DIFF WBC: CPT | Performed by: EMERGENCY MEDICINE

## 2021-10-26 PROCEDURE — 258N000003 HC RX IP 258 OP 636: Performed by: EMERGENCY MEDICINE

## 2021-10-26 PROCEDURE — 99285 EMERGENCY DEPT VISIT HI MDM: CPT | Mod: 25

## 2021-10-26 PROCEDURE — 96374 THER/PROPH/DIAG INJ IV PUSH: CPT

## 2021-10-26 PROCEDURE — 36415 COLL VENOUS BLD VENIPUNCTURE: CPT | Performed by: EMERGENCY MEDICINE

## 2021-10-26 PROCEDURE — 84703 CHORIONIC GONADOTROPIN ASSAY: CPT | Performed by: EMERGENCY MEDICINE

## 2021-10-26 PROCEDURE — 250N000011 HC RX IP 250 OP 636: Performed by: EMERGENCY MEDICINE

## 2021-10-26 PROCEDURE — 80053 COMPREHEN METABOLIC PANEL: CPT | Performed by: EMERGENCY MEDICINE

## 2021-10-26 RX ORDER — DIPHENHYDRAMINE HYDROCHLORIDE 50 MG/ML
25 INJECTION INTRAMUSCULAR; INTRAVENOUS ONCE
Status: COMPLETED | OUTPATIENT
Start: 2021-10-26 | End: 2021-10-26

## 2021-10-26 RX ORDER — KETOROLAC TROMETHAMINE 15 MG/ML
15 INJECTION, SOLUTION INTRAMUSCULAR; INTRAVENOUS ONCE
Status: COMPLETED | OUTPATIENT
Start: 2021-10-26 | End: 2021-10-26

## 2021-10-26 RX ADMIN — SODIUM CHLORIDE 1000 ML: 9 INJECTION, SOLUTION INTRAVENOUS at 23:48

## 2021-10-26 RX ADMIN — PROCHLORPERAZINE EDISYLATE 10 MG: 5 INJECTION INTRAMUSCULAR; INTRAVENOUS at 23:46

## 2021-10-26 RX ADMIN — DIPHENHYDRAMINE HYDROCHLORIDE 25 MG: 50 INJECTION, SOLUTION INTRAMUSCULAR; INTRAVENOUS at 23:47

## 2021-10-26 RX ADMIN — KETOROLAC TROMETHAMINE 15 MG: 15 INJECTION, SOLUTION INTRAMUSCULAR; INTRAVENOUS at 23:46

## 2021-10-26 ASSESSMENT — ENCOUNTER SYMPTOMS
FEVER: 0
DIARRHEA: 1
CHILLS: 0
COUGH: 0
NAUSEA: 1
SHORTNESS OF BREATH: 0
VOMITING: 1

## 2021-10-26 ASSESSMENT — MIFFLIN-ST. JEOR: SCORE: 1237.93

## 2021-10-27 ENCOUNTER — HOSPITAL ENCOUNTER (OUTPATIENT)
Facility: CLINIC | Age: 36
Setting detail: OBSERVATION
Discharge: HOME | End: 2021-10-28
Attending: EMERGENCY MEDICINE | Admitting: INTERNAL MEDICINE
Payer: COMMERCIAL

## 2021-10-27 ENCOUNTER — APPOINTMENT (OUTPATIENT)
Dept: CT IMAGING | Facility: CLINIC | Age: 36
End: 2021-10-27
Attending: EMERGENCY MEDICINE
Payer: COMMERCIAL

## 2021-10-27 VITALS
RESPIRATION RATE: 18 BRPM | WEIGHT: 130 LBS | SYSTOLIC BLOOD PRESSURE: 111 MMHG | DIASTOLIC BLOOD PRESSURE: 71 MMHG | BODY MASS INDEX: 23.92 KG/M2 | HEART RATE: 95 BPM | OXYGEN SATURATION: 97 % | HEIGHT: 62 IN | TEMPERATURE: 98.4 F

## 2021-10-27 DIAGNOSIS — R51.9 INTRACTABLE HEADACHE, UNSPECIFIED CHRONICITY PATTERN, UNSPECIFIED HEADACHE TYPE: ICD-10-CM

## 2021-10-27 LAB
ALBUMIN SERPL-MCNC: 3.5 G/DL (ref 3.4–5)
ALP SERPL-CCNC: 88 U/L (ref 40–150)
ALT SERPL W P-5'-P-CCNC: 36 U/L (ref 0–50)
ANION GAP SERPL CALCULATED.3IONS-SCNC: 5 MMOL/L (ref 3–14)
AST SERPL W P-5'-P-CCNC: 26 U/L (ref 0–45)
BILIRUB SERPL-MCNC: 0.2 MG/DL (ref 0.2–1.3)
BUN SERPL-MCNC: 22 MG/DL (ref 7–30)
CALCIUM SERPL-MCNC: 8.3 MG/DL (ref 8.5–10.1)
CHLORIDE BLD-SCNC: 103 MMOL/L (ref 94–109)
CO2 SERPL-SCNC: 25 MMOL/L (ref 20–32)
CREAT SERPL-MCNC: 0.78 MG/DL (ref 0.52–1.04)
FLUAV RNA SPEC QL NAA+PROBE: NEGATIVE
FLUBV RNA RESP QL NAA+PROBE: NEGATIVE
GFR SERPL CREATININE-BSD FRML MDRD: >90 ML/MIN/1.73M2
GLUCOSE BLD-MCNC: 104 MG/DL (ref 70–99)
HCG SERPL QL: NEGATIVE
LIPASE SERPL-CCNC: 171 U/L (ref 73–393)
POTASSIUM BLD-SCNC: 3.5 MMOL/L (ref 3.4–5.3)
PROT SERPL-MCNC: 7.7 G/DL (ref 6.8–8.8)
RSV RNA SPEC NAA+PROBE: NEGATIVE
SARS-COV-2 RNA RESP QL NAA+PROBE: NEGATIVE
SODIUM SERPL-SCNC: 133 MMOL/L (ref 133–144)

## 2021-10-27 PROCEDURE — 99285 EMERGENCY DEPT VISIT HI MDM: CPT | Mod: 25

## 2021-10-27 PROCEDURE — 96374 THER/PROPH/DIAG INJ IV PUSH: CPT

## 2021-10-27 PROCEDURE — 87637 SARSCOV2&INF A&B&RSV AMP PRB: CPT | Performed by: EMERGENCY MEDICINE

## 2021-10-27 PROCEDURE — 250N000011 HC RX IP 250 OP 636: Performed by: INTERNAL MEDICINE

## 2021-10-27 PROCEDURE — 250N000013 HC RX MED GY IP 250 OP 250 PS 637: Performed by: PSYCHIATRY & NEUROLOGY

## 2021-10-27 PROCEDURE — 250N000013 HC RX MED GY IP 250 OP 250 PS 637: Performed by: HOSPITALIST

## 2021-10-27 PROCEDURE — 258N000003 HC RX IP 258 OP 636: Performed by: EMERGENCY MEDICINE

## 2021-10-27 PROCEDURE — 70450 CT HEAD/BRAIN W/O DYE: CPT

## 2021-10-27 PROCEDURE — 250N000011 HC RX IP 250 OP 636: Performed by: EMERGENCY MEDICINE

## 2021-10-27 PROCEDURE — 96375 TX/PRO/DX INJ NEW DRUG ADDON: CPT

## 2021-10-27 PROCEDURE — 96361 HYDRATE IV INFUSION ADD-ON: CPT

## 2021-10-27 PROCEDURE — G0378 HOSPITAL OBSERVATION PER HR: HCPCS

## 2021-10-27 PROCEDURE — 96376 TX/PRO/DX INJ SAME DRUG ADON: CPT

## 2021-10-27 PROCEDURE — 250N000013 HC RX MED GY IP 250 OP 250 PS 637: Performed by: INTERNAL MEDICINE

## 2021-10-27 PROCEDURE — 99220 PR INITIAL OBSERVATION CARE,LEVEL III: CPT | Performed by: INTERNAL MEDICINE

## 2021-10-27 PROCEDURE — 99207 PR APP CREDIT; MD BILLING SHARED VISIT: CPT | Performed by: INTERNAL MEDICINE

## 2021-10-27 PROCEDURE — 258N000003 HC RX IP 258 OP 636: Performed by: INTERNAL MEDICINE

## 2021-10-27 PROCEDURE — 93005 ELECTROCARDIOGRAM TRACING: CPT

## 2021-10-27 RX ORDER — MULTIVITAMIN,THERAPEUTIC
1 TABLET ORAL DAILY
COMMUNITY
End: 2022-10-17

## 2021-10-27 RX ORDER — MEXILETINE HYDROCHLORIDE 150 MG/1
150 CAPSULE ORAL AT BEDTIME
Status: DISCONTINUED | OUTPATIENT
Start: 2021-10-27 | End: 2021-10-28 | Stop reason: HOSPADM

## 2021-10-27 RX ORDER — GABAPENTIN 600 MG/1
300 TABLET ORAL AT BEDTIME
Status: ON HOLD | COMMUNITY
End: 2023-02-25

## 2021-10-27 RX ORDER — DIPHENHYDRAMINE HYDROCHLORIDE 50 MG/ML
25 INJECTION INTRAMUSCULAR; INTRAVENOUS ONCE
Status: COMPLETED | OUTPATIENT
Start: 2021-10-27 | End: 2021-10-27

## 2021-10-27 RX ORDER — AMOXICILLIN 250 MG
1 CAPSULE ORAL 2 TIMES DAILY PRN
Status: DISCONTINUED | OUTPATIENT
Start: 2021-10-27 | End: 2021-10-28 | Stop reason: HOSPADM

## 2021-10-27 RX ORDER — PREDNISONE 10 MG/1
TABLET ORAL
Qty: 30 TABLET | Refills: 0 | Status: SHIPPED | OUTPATIENT
Start: 2021-10-27 | End: 2021-11-01

## 2021-10-27 RX ORDER — ACETAMINOPHEN 650 MG/1
650 SUPPOSITORY RECTAL EVERY 6 HOURS PRN
Status: DISCONTINUED | OUTPATIENT
Start: 2021-10-27 | End: 2021-10-28 | Stop reason: HOSPADM

## 2021-10-27 RX ORDER — GABAPENTIN 300 MG/1
300 CAPSULE ORAL AT BEDTIME
Status: DISCONTINUED | OUTPATIENT
Start: 2021-10-27 | End: 2021-10-28 | Stop reason: HOSPADM

## 2021-10-27 RX ORDER — PREDNISONE 20 MG/1
20 TABLET ORAL DAILY
Status: DISCONTINUED | OUTPATIENT
Start: 2021-11-05 | End: 2021-10-28 | Stop reason: HOSPADM

## 2021-10-27 RX ORDER — SUMATRIPTAN 100 MG/1
100 TABLET, FILM COATED ORAL 2 TIMES DAILY PRN
Qty: 9 TABLET | Refills: 0 | Status: ON HOLD | OUTPATIENT
Start: 2021-10-27 | End: 2023-02-25

## 2021-10-27 RX ORDER — DIPHENHYDRAMINE HYDROCHLORIDE 50 MG/ML
25 INJECTION INTRAMUSCULAR; INTRAVENOUS EVERY 6 HOURS PRN
Status: DISCONTINUED | OUTPATIENT
Start: 2021-10-27 | End: 2021-10-28 | Stop reason: HOSPADM

## 2021-10-27 RX ORDER — VITAMIN B COMPLEX
25 TABLET ORAL DAILY
COMMUNITY

## 2021-10-27 RX ORDER — ONDANSETRON 4 MG/1
4 TABLET, ORALLY DISINTEGRATING ORAL EVERY 6 HOURS PRN
Status: DISCONTINUED | OUTPATIENT
Start: 2021-10-27 | End: 2021-10-28 | Stop reason: HOSPADM

## 2021-10-27 RX ORDER — OXYCODONE HYDROCHLORIDE 5 MG/1
5-10 TABLET ORAL EVERY 4 HOURS PRN
Status: DISCONTINUED | OUTPATIENT
Start: 2021-10-27 | End: 2021-10-28 | Stop reason: HOSPADM

## 2021-10-27 RX ORDER — MULTIVIT WITH MINERALS/LUTEIN
250 TABLET ORAL DAILY
COMMUNITY

## 2021-10-27 RX ORDER — PREDNISONE 5 MG/1
10 TABLET ORAL DAILY
Status: DISCONTINUED | OUTPATIENT
Start: 2021-11-07 | End: 2021-10-28 | Stop reason: HOSPADM

## 2021-10-27 RX ORDER — HYDROMORPHONE HYDROCHLORIDE 1 MG/ML
0.5 INJECTION, SOLUTION INTRAMUSCULAR; INTRAVENOUS; SUBCUTANEOUS ONCE
Status: COMPLETED | OUTPATIENT
Start: 2021-10-27 | End: 2021-10-27

## 2021-10-27 RX ORDER — OXYCODONE HYDROCHLORIDE 5 MG/1
5-10 TABLET ORAL EVERY 4 HOURS PRN
Qty: 10 TABLET | Refills: 0 | Status: SHIPPED | OUTPATIENT
Start: 2021-10-27 | End: 2021-11-01

## 2021-10-27 RX ORDER — ACETAMINOPHEN 325 MG/1
650 TABLET ORAL EVERY 6 HOURS PRN
Status: DISCONTINUED | OUTPATIENT
Start: 2021-10-27 | End: 2021-10-28 | Stop reason: HOSPADM

## 2021-10-27 RX ORDER — IBUPROFEN 200 MG
400 TABLET ORAL EVERY 6 HOURS PRN
COMMUNITY

## 2021-10-27 RX ORDER — SODIUM CHLORIDE 9 MG/ML
INJECTION, SOLUTION INTRAVENOUS CONTINUOUS
Status: DISCONTINUED | OUTPATIENT
Start: 2021-10-27 | End: 2021-10-28 | Stop reason: HOSPADM

## 2021-10-27 RX ORDER — PROCHLORPERAZINE 25 MG
25 SUPPOSITORY, RECTAL RECTAL EVERY 12 HOURS PRN
Status: DISCONTINUED | OUTPATIENT
Start: 2021-10-27 | End: 2021-10-28 | Stop reason: HOSPADM

## 2021-10-27 RX ORDER — ONDANSETRON 4 MG/1
4 TABLET, ORALLY DISINTEGRATING ORAL EVERY 8 HOURS PRN
Qty: 10 TABLET | Refills: 0 | Status: SHIPPED | OUTPATIENT
Start: 2021-10-27 | End: 2021-10-27

## 2021-10-27 RX ORDER — ONDANSETRON 2 MG/ML
4 INJECTION INTRAMUSCULAR; INTRAVENOUS EVERY 6 HOURS PRN
Status: DISCONTINUED | OUTPATIENT
Start: 2021-10-27 | End: 2021-10-28 | Stop reason: HOSPADM

## 2021-10-27 RX ORDER — IBUPROFEN 600 MG/1
600 TABLET, FILM COATED ORAL 3 TIMES DAILY PRN
Status: DISCONTINUED | OUTPATIENT
Start: 2021-10-27 | End: 2021-10-28 | Stop reason: HOSPADM

## 2021-10-27 RX ORDER — AMOXICILLIN 250 MG
2 CAPSULE ORAL 2 TIMES DAILY PRN
Status: DISCONTINUED | OUTPATIENT
Start: 2021-10-27 | End: 2021-10-28 | Stop reason: HOSPADM

## 2021-10-27 RX ORDER — PROCHLORPERAZINE MALEATE 10 MG
10 TABLET ORAL EVERY 6 HOURS PRN
Status: DISCONTINUED | OUTPATIENT
Start: 2021-10-27 | End: 2021-10-28 | Stop reason: HOSPADM

## 2021-10-27 RX ORDER — KETOROLAC TROMETHAMINE 15 MG/ML
15 INJECTION, SOLUTION INTRAMUSCULAR; INTRAVENOUS EVERY 6 HOURS PRN
Status: DISPENSED | OUTPATIENT
Start: 2021-10-27 | End: 2021-10-28

## 2021-10-27 RX ORDER — PREDNISONE 20 MG/1
40 TABLET ORAL EVERY MORNING
Status: DISCONTINUED | OUTPATIENT
Start: 2021-11-01 | End: 2021-10-28 | Stop reason: HOSPADM

## 2021-10-27 RX ORDER — SUMATRIPTAN 50 MG/1
100 TABLET, FILM COATED ORAL 2 TIMES DAILY PRN
Status: DISCONTINUED | OUTPATIENT
Start: 2021-10-27 | End: 2021-10-28 | Stop reason: HOSPADM

## 2021-10-27 RX ORDER — PREDNISONE 20 MG/1
60 TABLET ORAL EVERY MORNING
Status: DISCONTINUED | OUTPATIENT
Start: 2021-10-28 | End: 2021-10-28 | Stop reason: HOSPADM

## 2021-10-27 RX ORDER — LIDOCAINE 40 MG/G
CREAM TOPICAL
Status: DISCONTINUED | OUTPATIENT
Start: 2021-10-27 | End: 2021-10-28 | Stop reason: HOSPADM

## 2021-10-27 RX ORDER — DEXAMETHASONE SODIUM PHOSPHATE 4 MG/ML
10 INJECTION, SOLUTION INTRA-ARTICULAR; INTRALESIONAL; INTRAMUSCULAR; INTRAVENOUS; SOFT TISSUE ONCE
Status: COMPLETED | OUTPATIENT
Start: 2021-10-27 | End: 2021-10-27

## 2021-10-27 RX ADMIN — SODIUM CHLORIDE: 9 INJECTION, SOLUTION INTRAVENOUS at 17:50

## 2021-10-27 RX ADMIN — ACETAMINOPHEN 650 MG: 325 TABLET, FILM COATED ORAL at 09:39

## 2021-10-27 RX ADMIN — PROCHLORPERAZINE EDISYLATE 10 MG: 5 INJECTION INTRAMUSCULAR; INTRAVENOUS at 05:09

## 2021-10-27 RX ADMIN — SUMATRIPTAN SUCCINATE 100 MG: 50 TABLET ORAL at 17:13

## 2021-10-27 RX ADMIN — SODIUM CHLORIDE 1000 ML: 9 INJECTION, SOLUTION INTRAVENOUS at 05:06

## 2021-10-27 RX ADMIN — OXYCODONE HYDROCHLORIDE 10 MG: 5 TABLET ORAL at 09:39

## 2021-10-27 RX ADMIN — KETOROLAC TROMETHAMINE 15 MG: 15 INJECTION, SOLUTION INTRAMUSCULAR; INTRAVENOUS at 13:31

## 2021-10-27 RX ADMIN — MEXILETINE HYDROCHLORIDE 150 MG: 150 CAPSULE ORAL at 22:35

## 2021-10-27 RX ADMIN — SODIUM CHLORIDE: 9 INJECTION, SOLUTION INTRAVENOUS at 10:00

## 2021-10-27 RX ADMIN — KETOROLAC TROMETHAMINE 15 MG: 15 INJECTION, SOLUTION INTRAMUSCULAR; INTRAVENOUS at 20:14

## 2021-10-27 RX ADMIN — ONDANSETRON 4 MG: 4 TABLET, ORALLY DISINTEGRATING ORAL at 17:12

## 2021-10-27 RX ADMIN — DIPHENHYDRAMINE HYDROCHLORIDE 25 MG: 50 INJECTION, SOLUTION INTRAMUSCULAR; INTRAVENOUS at 17:50

## 2021-10-27 RX ADMIN — ONDANSETRON 4 MG: 2 INJECTION INTRAMUSCULAR; INTRAVENOUS at 09:39

## 2021-10-27 RX ADMIN — DIPHENHYDRAMINE HYDROCHLORIDE 25 MG: 50 INJECTION, SOLUTION INTRAMUSCULAR; INTRAVENOUS at 05:08

## 2021-10-27 RX ADMIN — HYDROMORPHONE HYDROCHLORIDE 0.5 MG: 1 INJECTION, SOLUTION INTRAMUSCULAR; INTRAVENOUS; SUBCUTANEOUS at 00:19

## 2021-10-27 RX ADMIN — OXYCODONE HYDROCHLORIDE 10 MG: 5 TABLET ORAL at 22:35

## 2021-10-27 RX ADMIN — OXYCODONE HYDROCHLORIDE 10 MG: 5 TABLET ORAL at 16:28

## 2021-10-27 RX ADMIN — DEXAMETHASONE SODIUM PHOSPHATE 10 MG: 4 INJECTION, SOLUTION INTRAMUSCULAR; INTRAVENOUS at 05:06

## 2021-10-27 RX ADMIN — GABAPENTIN 300 MG: 300 CAPSULE ORAL at 22:35

## 2021-10-27 ASSESSMENT — ENCOUNTER SYMPTOMS
NUMBNESS: 0
ABDOMINAL PAIN: 0
HEADACHES: 1
DIARRHEA: 1
WEAKNESS: 0
HEADACHES: 1

## 2021-10-27 NOTE — PROGRESS NOTES
[facility]    Hospitalist Progress Note    Assessment & Plan   35 year old female who was admitted on 10/27/2021 persistent headache:    Impression:   Principal Problem:    Headache   -- tolerating oral intake   -- pain improved with IV Toradol and able to take Ibuprofen   -- Neurology consulted, Pred 60 mg daily with taper ordered for possible aseptic meningitis        Palpitations   -- EKG normal     Active Problems:    Erythromelalgia    -- received IV IgG on 10/25 and 10/26, she is thinking headache is related to this      Hx of migraine headaches   -- PRN Imitrex ordered       Plan:  Discharge this PM if tolerating oral intake and headache manageable    Addendum -- after dinner reporting persistent headache and nausea, will plan to continue to observe in hospital tonight, and possibly discharge tomorrow.     DVT Prophylaxis: Pneumatic Compression Devices  Code Status: Full Code    Disposition: Expected discharge home tomorrow.     [facility]  Pager [phone]  Cell Phone [phone]  Text Page (7am to 6pm)    Interval History   Reports headache with pain in retro-orbital area bilaterally and posterior cervical area bilaterally.   Rates pain 5 out of 10, but increases to 7 out of 10 at times.  Was nauseous at home, feels better now.     Physical Exam   Temp: 98.2  F (36.8  C) Temp src: Oral BP: 133/78 Pulse: 98   Resp: 18 SpO2: 96 % O2 Device: None (Room air)    There were no vitals filed for this visit.  Vital Signs with Ranges  Temp:  [98.2  F (36.8  C)-98.8  F (37.1  C)] 98.2  F (36.8  C)  Pulse:  [] 98  Resp:  [16-24] 18  BP: (111-139)/(62-81) 133/78  SpO2:  [94 %-99 %] 96 %  I/O last 3 completed shifts:  In: 1000 [IV Piggyback:1000]  Out: -     # Pain Assessment:  Current Pain Score 10/27/2021   Patient currently in pain? yes   - Ronna is experiencing pain due to headache. Pain management was discussed and the plan was created in a collaborative fashion.  Ronna's  response to the current recommendations: engaged  - Please see the plan for pain management as documented above          Constitutional: Awake, alert, cooperative, no apparent distress  Respiratory: Clear to auscultation bilaterally, no crackles or wheezing  Cardiovascular: Regular rate and rhythm, normal S1 and S2, and no murmur noted  GI: Normal bowel sounds, soft, non-distended, non-tender  Extrem: No calf tenderness, no ankle edema  Neuro: Ox3, no focal motor or sensory deficits    Medications     sodium chloride 100 mL/hr at 10/27/21 1750       [START ON 10/28/2021] predniSONE  60 mg Oral QAM    Followed by     [START ON 10/30/2021] predniSONE  50 mg Oral QAM    Followed by     [START ON 11/1/2021] predniSONE  40 mg Oral QAM    Followed by     [START ON 11/3/2021] predniSONE  30 mg Oral QAM    Followed by     [START ON 11/5/2021] predniSONE  20 mg Oral Daily    Followed by     [START ON 11/7/2021] predniSONE  10 mg Oral Daily     sodium chloride (PF)  3 mL Intracatheter Q8H       Data   Recent Labs   Lab 10/26/21  2349   WBC 8.0   HGB 12.5   MCV 92         POTASSIUM 3.5   CHLORIDE 103   CO2 25   BUN 22   CR 0.78   ANIONGAP 5   KEVAN 8.3*   *   ALBUMIN 3.5   PROTTOTAL 7.7   BILITOTAL 0.2   ALKPHOS 88   ALT 36   AST 26   LIPASE 171       Imaging:   Recent Results (from the past 24 hour(s))   Head CT w/o contrast    Narrative    EXAM: CT HEAD W/O CONTRAST  LOCATION: [facility]  DATE/TIME: 10/27/2021 4:58 AM    INDICATION: Headache, intracranial hemorrhage suspected  COMPARISON: None.  TECHNIQUE: Routine CT Head without IV contrast. Multiplanar reformats. Dose reduction techniques were used.    FINDINGS:  INTRACRANIAL CONTENTS: No intracranial hemorrhage, extraaxial collection, or mass effect.  No CT evidence of acute infarct. Normal parenchymal attenuation. Normal ventricles and sulci.     VISUALIZED ORBITS/SINUSES/MASTOIDS: No intraorbital abnormality. No paranasal  sinus mucosal disease. No middle ear or mastoid effusion.    BONES/SOFT TISSUES: No acute abnormality.      Impression    IMPRESSION:  1.  No acute intracranial process.

## 2021-10-27 NOTE — PLAN OF CARE
PRIMARY DIAGNOSIS: ACUTE PAIN  OUTPATIENT/OBSERVATION GOALS TO BE MET BEFORE DISCHARGE:  1. Pain Status: Improved-controlled with oral pain medications.    2. Return to near baseline physical activity: Yes    3. Cleared for discharge by consultants (if involved): No    Discharge Planner Nurse   Safe discharge environment identified: Yes  Barriers to discharge: Yes       Entered by: Anne Page 10/27/2021 2:07 PM     Please review provider order for any additional goals.   Nurse to notify provider when observation goals have been met and patient is ready for discharge.

## 2021-10-27 NOTE — ED NOTES
[facility]  ED Nurse Handoff Report    ED Chief complaint: Headache      ED Diagnosis:   Final diagnoses:   None       Code Status: Full Code    Allergies:   Allergies   Allergen Reactions     Topiramate Anxiety       Patient Story:  Patient started IVIG two days ago for management of her erythromelalgia. She reports feeling fine after her first infusion. She follows through with Dr. Paulson at [facility] for this treatment. After receiving her second infusion at 0900 this morning she developed a gradual left sided headache persisting throughout the day. She has a history of migraines but has never had one like this before. She was seen here in the ED last night at 2330 and had labs run and was discharged home. Since then she has had 2 episodes of diarrhea and her headache has returned.   Focused Assessment:  headache    Treatments and/or interventions provided: see MAR  Patient's response to treatments and/or interventions:     To be done/followed up on inpatient unit:      Does this patient have any cognitive concerns?: alert and oriented    Activity level - Baseline/Home:  Independent  Activity Level - Current:   Independent    Patient's Preferred language: English   Needed?: No    Isolation: None  Infection: Not Applicable  Patient tested for COVID 19 prior to admission: YES  Bariatric?: No    Vital Signs:   Vitals:    10/27/21 0430   BP: 127/81   Pulse: 114   Resp: 20   Temp: 98.7  F (37.1  C)   TempSrc: Oral   SpO2: 99%       Cardiac Rhythm:     Was the PSS-3 completed:   Yes  What interventions are required if any?               Family Comments:   OBS brochure/video discussed/provided to patient/family: Yes              Name of person given brochure if not patient:               Relationship to patient:     For the majority of the shift this patient's behavior was Green.   Behavioral interventions performed were .    ED NURSE PHONE NUMBER: [phone]

## 2021-10-27 NOTE — ED PROVIDER NOTES
History   Chief Complaint:  Headache       The history is provided by the patient and the EMS personnel.      Ronna Rivera is a 35 year old female with history of erythromelalgia, depression, anxiety, DDD, and asthma who presents via ambulance with headache. Patient started IVIG two days ago for management of her erythromelalgia. She reports feeling fine after her first infusion. She follows through with Dr. Paulson at [facility] for this treatment. After receiving her second infusion at 0900 this morning she developed a gradual left sided headache persisting throughout the day. She has a history of migraines but has never had one like this before. She was seen here in the ED last night at 2330 and had labs run and was discharged home. Since then she has had 2 episodes of diarrhea and her headache has returned. Upon arrival to the ED now she denies any vision changes.     10/26/2021- Dr. Zeng  Laboratory:  CBC: WBC 8.0, HGB 12.5,   CMP: Calcium 8.3 (L), Glucose 104 (H) o/w WNL (Creatinine 0.78)   Lipase: 171  HCG qualitative pregnancy: Negative   Symptomatic Influenza A/B & SARS-CoV2 (COVID19) Virus PCR Multiplex: Negative    Review of Systems   Eyes: Negative for visual disturbance.   Gastrointestinal: Positive for diarrhea.   Neurological: Positive for headaches (Left-sided).   All other systems reviewed and are negative.      Allergies:  Topiramate    Medications:  duloxetine  gabapentin   hydrocodone-acetaminophen   magnesium sulfate   melatonin  mexiletine   omeprazole   sulfamethoxazole-trimethoprim  cetirizine     Past Medical History:     Reactive depression  Seasonal allergies  Erythromelalgia   DDD (degenerative disc disease), lumbar  Migraine  Chronic insomnia  Asthma, exercise induced  Hypermobile joints  Vasovagal reaction  Autonomic dysfunction  Impaired mobility  Abnormal TSH  Need for pneumocystis prophylaxis  Medical marijuana use    Neuropathy  Anxiety     Past Surgical History:     Insert PICC line   Lasik  Corneal surgery    section  Umbilical hernia   Abdominoplasty     Family History:    Hypertension  Cerebrovascular disease  Diabetes  Breast cancer   CAD   Sleep apnea   Obesity   Stroke   Lymphoma   ADD  Anxiety   Multiple Sclerosis    Social History:  Presents unaccompanied.   PCP: Neal Ball     Physical Exam     Patient Vitals for the past 24 hrs:   BP Temp Temp src Pulse Resp SpO2   10/27/21 0430 127/81 98.7  F (37.1  C) Oral 114 20 99 %       Physical Exam    Eye:  Pupils are equal, round, and reactive.  Extraocular movements intact.    ENT:  No rhinorrhea.  Moist mucus membranes.  Normal tongue and tonsil.    Cardiac:  Regular rate and rhythm.  No murmurs, gallops, or rubs.    Pulmonary:  Clear to auscultation bilaterally.  No wheezes, rales, or rhonchi.    Abdomen:  Positive bowel sounds.  Abdomen is soft and non-distended, without focal tenderness.    Musculoskeletal:  Normal movement of all extremities without evidence for deficit.    Skin:  Warm and dry without rashes.    Neurologic:  CN II - XII intact.  5/5 strength in all extremities.  Normal sensation throughout.  Normal finger to nose and heel to shin.  2+ patellar reflexes.  Normal gait.    Psychiatric:  Normal affect with appropriate interaction with examiner.    Emergency Department Course     Imaging:  Head CT w/o contrast   Final Result   IMPRESSION:   1.  No acute intracranial process.        Report per radiology    Emergency Department Course:    Reviewed:  I reviewed nursing notes, vitals, past medical history and Care Everywhere    Assessments:  0442 I obtained history and examined the patient as noted above.    I rechecked the patient and explained findings.     Consults:  7091 I consulted with Dr. Leong of the hospitalist services who is in agreement to accept the patient for admission.     Interventions:  0506 Dexamethasone, 10 mg, IV  0506 0.9% sodium chloride bolus, 1,000 mL, IV    0508 Diphenhydramine, 25 mg, IV  0509 Prochlorperazine, 10 mg, IV    Disposition:  The patient was admitted to the hospital under the care of Dr. Leong.     Impression & Plan     Medical Decision Making:  This somewhat complicated 35-year-old woman returns to us with ongoing issues of a headache.  I invite the reader to review her visit approximately 6 hours ago to this emergency department with my colleague where she was worked up for this headache which began after receiving an IVIG infusion.  Upon leaving the emergency department, she noted that her headache was well controlled and she was tired.  However, after sleeping a few hours, she awoke with a headache returning.  She describes it as a sharp and stabbing pain above the left eye with photophobia and nausea without other neurologic deficits.  She was given 100 mcg of fentanyl by EMS along with Toradol and continues with a headache at the time of my assessment.  I added on Compazine and Benadryl along with a dose of dexamethasone which resulted in improvement in the headache to approximately 7 out of 10.  She did not undergo advanced imaging on her first visit and this was obtained to complete her work-up.  The study is unremarkable.    On reassessment, the patient is very concerned about going home, worried that the headache is going to persist and that she will need to come back.  While I do not believe this is likely to represent subarachnoid hemorrhage, tumor, stroke, or other more nefarious intracranial process, it seems reasonable to admit her under observation for close assessment and ongoing treatment of her headache.  I spoke with Dr. Leong of the hospitalist service who agrees to accept care of the patient.      Diagnosis:    ICD-10-CM    1. Intractable headache, unspecified chronicity pattern, unspecified headache type  R51.9        Scribe Disclosure:  I, Ailyn Downey, am serving as a scribe at 4:36 AM on 10/27/2021 to document services  personally performed by Trierweiler, Chad A, MD based on my observations and the provider's statements to me.            Trierweiler, Chad A, MD  10/27/21 0652

## 2021-10-27 NOTE — H&P
Admitted: 10/27/2021    CHIEF COMPLAINT:  Headache.    HISTORY OF PRESENT ILLNESS:  History has been obtained from the patient, who is a relatively good historian.  I discussed with ER attending, Dr. Trierweiler and I reviewed her chart as well.    Ms. Ronna Rivera is a very pleasant 35-year-old female with a past medical history of erythromelalgia for which she follows with providers at [facility] and [facility], history of chronic pain, following with pain clinic, depression/anxiety, migraine headache and asthma who came in for evaluation of severe headache.    As mentioned above, she has history of erythromelalgia, which apparently was diagnosed in 03/2021.  She followed with Neurology, Dr. De La Rosa, and Dermatology, Dr. Smith, at the [facility] where she had extensive workup done.  It seems that she also follows up with neurology at [facility]  She had been on IV Solu-Medrol in the past with some improvement of her symptoms.  More recently, she was started on IVIG as management for her erythromelalgia.  She states that she was supposed to get IVIG daily for 5 days and after that weekly.  She did have her first dose on Monday, which she tolerated well.  Second dose was on Tuesday.  She reports that later on Tuesday, she started having some headache located behind her eyes.  She took some Advil and drank some water, trying to keep herself hydrated.  She reported feeling nauseated, but she did not vomit.  The pain got progressively worse, was associated with photophobia.  There was no vision changes.  She does have a history of migraine, which usually is located in the occipital area and temporal area.  Her current headache seems somehow similar her migraines, but more severe having a different location and not responding to Advil.  She also took a pill of Norco 7.5 mg last night without any relief of her symptoms.  She initially presented to ER last evening.  Her vitals were stable.  She did have basic blood work, which  showed unremarkable BMP, liver function test and CBC.  She was tested negative for COVID-19 and influenza A and B.  In ER, she was given a bolus of normal saline, 1 dose of IV Dilaudid, one dose of ketorolac 15 mg IV, one dose of Benadryl IV and 1 dose of Compazine.  With all these interventions, the headache seems to have improved.  She was discharged home from ER.  After she got home, she started having worsening headache again located behind her eyes and maybe left more than right, rated as 10/10 in intensity, associated with nausea, but no vomiting.    Upon further questioning, she denies any recent fevers.  She denies chest pain or shortness of breath, no coughing.  She denies abdominal pain.  No dysuria.  She did report that she had some diarrhea yesterday after her IVIG infusion.    In ER, she was seen by Dr. Trierweiler.  Her blood pressure was 127/81, heart rate 115, respiratory rate 20, oxygen saturation 99% on room air and temperature 98.7.  At this time, she had a CT of the head without contrast, which was negative for any acute intracranial process.  In ER, she received again bolus of normal saline, 1 dose of Decadron 10 mg IV, one dose of Benadryl and 1 dose of Compazine.  She did report having mild improvement of her symptoms, but because this was her second ER visit tonight, hospitalist service was called regarding the admission.    PAST MEDICAL HISTORY:     1.  Erythromelalgia diagnosed in 03/2021.  She follows with [facility].  Apparently, she saw neurology and dermatology at [facility] where she had extensive workup done.  She had been on IV Solu-Medrol in the past.  She has a PICC line.  More recently, she was started on IVIG as per neurology at [facility].  She did receive her first 2 doses of IVIG infusion.  2.  Chronic pain, following with pain clinic.  She used to be on OxyContin twice daily and Norco.  Apparently, she had been weaned off long-acting OxyContin and currently she is only on Norco  at bedtime.  3.  Depression/anxiety.  4.  History of asthma.  5.  Migraine headache.  6.  Chronic insomnia.  7.  Hypermobile joints.    PAST SURGICAL HISTORY:    Past Surgical History:   Procedure Laterality Date     INSERT PICC LINE Left 6/23/2021    Procedure: INSERTION, PICC;  Surgeon: Neal Penny MD;  Location: [facility] OR     IR PICC PLACEMENT > 5 YRS OF AGE  6/23/2021     NO HISTORY OF SURGERY         SOCIAL HISTORY:  The patient denies smoking.  She denies alcohol drinking.  She denies illicit drug abuse.    FAMILY HISTORY:    Family History     Problem (# of Occurrences) Relation (Name,Age of Onset)    Breast Cancer (1) Paternal Grandmother    C.A.D. (1) Paternal Grandfather    Cerebrovascular Disease (1) Maternal Grandmother    Diabetes (1) Maternal Grandfather    Hypertension (2) Father, Paternal Grandfather          PRIOR TO ADMISSION MEDICATIONS:    DULoxetine (CYMBALTA) 20 MG capsule Take 40 mg by mouth daily  10/26/2021 at AM Yes Reported, Patient   gabapentin (NEURONTIN) 600 MG tablet Take 300 mg by mouth At Bedtime 10/26/2021 at PM Yes Unknown, Entered By History   ibuprofen (ADVIL/MOTRIN) 200 MG tablet Take 400 mg by mouth every 6 hours as needed for moderate pain 10/26/2021 at 2300 Yes Unknown, Entered By History   melatonin 1 MG TABS tablet Take 2-5 mg by mouth nightly as needed for sleep  Past Week at Unknown time Yes Unknown, Entered By History   mexiletine (MEXITIL) 150 MG capsule Take 1 capsule (150 mg) by mouth 3 times daily  Patient taking differently: Take 150 mg by mouth At Bedtime  10/26/2021 at PM Yes Neal Ball MD   multivitamin, therapeutic (THERA-VIT) TABS tablet Take 1 tablet by mouth daily 10/26/2021 at AM Yes Unknown, Entered By History   vitamin C (ASCORBIC ACID) 250 MG tablet Take 250 mg by mouth daily 10/26/2021 at AM Yes Unknown, Entered By History   Vitamin D3 (CHOLECALCIFEROL) 25 mcg (1000 units) tablet Take 25 mcg by mouth daily 10/26/2021 at AM Yes  Unknown, Entered By History   sulfamethoxazole-trimethoprim (BACTRIM DS) 800-160 MG tablet Take 1 tablet by mouth three times a week (Monday, Wednesday, and Friday)  Patient not taking: Reported on 10/27/2021 Not Taking at Unknown time   Neal Ball MD          ALLERGIES:    Allergies   Allergen Reactions     Topiramate Anxiety       REVIEW OF SYSTEMS:  A 10-point review of systems was conducted and was negative except for pertinent positives mentioned in history of present illness.    PHYSICAL EXAMINATION:    VITAL SIGNS:  Blood pressure is 115/62, heart rate 110, respiratory rate 20, oxygen saturation 98% on room air, temperature 98.7.  GENERAL:  The patient is awake, alert, sitting in a dark room.  She does not seem to be in any acute distress.  HEENT:  Normocephalic, atraumatic.  Pupils are equally round and reactive to light.  Oral mucosa is moist.  NECK:  Supple, no cervical lymphadenopathy, no thyromegaly.  CHEST:  There is bilateral air entry.  No wheezing, no rales, no crackles.  CARDIOVASCULAR:  There is normal S1 and S2, mild tachycardia, no murmurs, no rubs.  ABDOMEN:  Soft, nontender, nondistended.  Bowel sounds are present.  EXTREMITIES:  No calf tenderness, 2+ peripheral pulses are palpable.  SKIN:  Intact.  No cyanosis.  NEUROLOGIC:  The patient is awake, alert, oriented to self, place and time.  There are no focal neurological deficits.  PSYCHIATRIC:  Mildly anxious.   MUSCULOSKELETAL:  She moves all extremities freely.  There are no obvious joint deformities.    LABORATORY DATA:  Reviewed and dictated above.    ASSESSMENT AND PLAN:  Mrs. Ely Rivera is a very pleasant 35-year-old female with past medical history of erythromelalgia being followed by neurology specialist and dermatologist at [facility] and [facility], h/o depression/anxiety, chronic pain, on chronic narcotics asthma and chronic insomnia, who presented for evaluation of severe headache.    1.  Intractable headache.  2.  History  of migraine headache.    She was recently started on IVIG infusion for her diagnosis of erythromelalgia as per neurology at [facility].  She tolerated first infusion well on Monday.  After her second infusion on Tuesday, she started having a headache located behind her eyes associated with some nausea, but no vomiting.  She denies visual changes.  Her pain seems somehow similar to her prior migraine, although the location is different and did not respond to hydration, Advil and Norco that she took last night.  This is her second ER visit tonight.  Initially, she was seen by Dr. Zeng, and she was given a cocktail of ketorolac, Benadryl, Compazine and Dilaudid along with a bolus of IV fluids and she felt good enough to be discharged home, but after she got home, she started having worsening headache again, so she returned to ER.  She does not have any focal neurological deficits.  She is afebrile.  Her blood work is unremarkable and CT of the head is also negative.  She is admitted under observational status.  We will start her on IV fluids, normal saline at 100 mL per hour.  We will start her on Tylenol p.r.n., Toradol 15 mg IV 6 hours p.r.n. along with Benadryl and Compazine p.r.n.  We will also order oxycodone 5-10 mg every 4 hours p.r.n.  We will try to avoid IV narcotics at this time.  I suspect that she has a low tolerance to pain given her complex medical history and chronic pain syndrome.  Given her intractable headache, Neurology consult requested.    3.  History of erythromelalgia, which apparently was diagnosed earlier this year.  She followed with neurology and dermatology at [facility].  She had been on IV Solu-Medrol infusion.  She has a PICC line.  More recently, she was started on IVIG as per neurologist at [facility].  She did receive her first 2 doses of IVIG on Monday, respectively Tuesday she was supposed to receive infusions for 5 days, initially and  then weekly.  We will consult Neurology.    4.  Chronic pain syndrome related to her diagnosis of erythromelalgia.  She follows with pain clinic.  Her medications needs to be verified by the pharmacy.  Apparently, she is on Cymbalta, Mexitil, gabapentin and Norco 7.5/325 mg per tablet, 1 tablet at bedtime.  Again, we will await for medication reconciliation and will resume her medications at that time.  Of note, in the past, she had been on long-acting OxyContin, but apparently she had been weaned off OxyContin and pain clinic is working on further weaning of Norco as well.    5.  History of depression/anxiety.  We will resume her prior to admission Cymbalta once verified by the pharmacy.    8.  History of asthma, not acute issue at this time.    CODE STATUS:  Discussed with the patient.  The patient is FULL CODE.    DISPOSITION:  Admit under observational status.  I anticipate patient may be discharged later on today pending improvement of her headache.    Xiomy Leong MD        D: 10/27/2021   T: 10/27/2021   MT: FRANCISCO    Name:     RADHA REGALADO  MRN:      [id number]        Account:     [id number]   :      1985           Admitted:    10/27/2021       Document: [id number]

## 2021-10-27 NOTE — PROGRESS NOTES
"MD Notification    Notified Person: MD    Notified Person Name: Tyron    Notification Date/Time: October 27, 2021 4:17 PM     Notification Interaction: text page    Purpose of Notification: \"Neuro saw pt, they recommend Prednisone taper and Sumatriptan prn, see note. Can pt still discharge this evening and will you be prescribing those meds? Please advise. Thanks.\"     Orders Received: Provider came to pt room and poc made at pt bedside.     Comments:      "

## 2021-10-27 NOTE — ED TRIAGE NOTES
IV infusion this morning at the [facility] infusion center.  Crying loudly in triage.

## 2021-10-27 NOTE — PROGRESS NOTES
This is a recent snapshot of the patient's [facility] medical record.  For current drug dose and complete information and questions, call [phone] or In Basket pool, [facility]v home infusion ([id number])  CSN Number:  [id number]

## 2021-10-27 NOTE — ED PROVIDER NOTES
History   Chief Complaint:  Myriad of complaints    The history is provided by the patient.      Ronna Rivera is a 35 year old female with history of extensive past medical history including erythromelalgia, chronic pain, and asthma who presents with a myriad of complaints.Patient reports starting IVIG two days ago for management of her erythromelalgia. She reports feeling fine after her first infusion. She follows through with Dr. Paulson at [facility] for this treatment. After receiving her second infusion ~0900 this morning she developed gradual left sided headache persisting throughout the day. She does have a history of headaches but she has not had any headache of this duration before. Patient denies fever, chill, cough, dyspnea, abdominal pain,focal weakness, paresthesias, vision changes though does admit to nausea and one episode of vomiting with loose stool. Of note the patient started keflex for a great right toe infection yesterday. No reported trauma. Patient has one of two of the Covid vaccinations.  No sick contacts.      Review of Systems   Constitutional: Negative for chills and fever.   Respiratory: Negative for cough and shortness of breath.    Cardiovascular: Negative for chest pain.   Gastrointestinal: Positive for diarrhea, nausea and vomiting. Negative for abdominal pain.   Neurological: Positive for headaches. Negative for weakness and numbness.   All other systems reviewed and are negative.      Allergies:  Topiramate    Medications:  Duloxetine   gabapentin  Hydrocodone- acetaminophen (NORCO) 7.5-325 MG per tablet  magnesium sulfate 500 mg/mL SOLN  melatonin 1 MG TABS tablet  mexiletine   omeprazole 20 MG tablet  ondansetron   sulfamethoxazole-trimethoprim     Past Medical History:     Reactive depression  Seasonal allergies  Other chronic pain  Rash and nonspecific skin eruption  Erythromelalgia  DDD  History of migraine  Chronic insomnia  Asthma  Hypermobile joints  Vasovagal  "reaction  Autonomic dysfunction  Impaired mobility  Abnormal TSH  Need for pneumocystis prophylaxis  Medical marijuana use        Past Surgical History:    Insert picc line    Family History:    HTN  Cerebrovascular disease  Diabetes  Breast cancer  HTN  CAD    Social History:  Patient presents with family member  PCP: Neal Ball    Physical Exam     Patient Vitals for the past 24 hrs:   BP Temp Temp src Pulse Resp SpO2 Height Weight   10/27/21 0130 -- -- -- -- -- 97 % -- --   10/27/21 0125 -- -- -- -- -- 96 % -- --   10/27/21 0120 -- -- -- -- -- 98 % -- --   10/27/21 0115 -- -- -- -- -- 97 % -- --   10/27/21 0110 -- -- -- -- -- 98 % -- --   10/27/21 0105 -- -- -- -- -- 98 % -- --   10/27/21 0100 111/71 -- -- 95 18 96 % -- --   10/27/21 0055 -- -- -- -- -- 96 % -- --   10/27/21 0050 -- -- -- -- -- 96 % -- --   10/27/21 0045 -- -- -- -- -- 96 % -- --   10/27/21 0040 -- -- -- -- -- 96 % -- --   10/27/21 0035 -- -- -- -- -- 96 % -- --   10/26/21 2305 -- -- -- -- -- 99 % -- --   10/26/21 2304 139/62 98.4  F (36.9  C) Oral 115 24 -- 1.575 m (5' 2\") 59 kg (130 lb)       Physical Exam  General: Well-nourished, nontoxic  Eyes: PERRL, EOMI, no nystagmus.  No scleral icterus or conjunctival injection.    ENT:  Moist mucus membranes, posterior oropharynx clear without erythema or exudates. TM normal bilaterally  Neck: Supple with full range of motion  Respiratory:  Lungs clear to auscultation bilaterally, no crackles/rubs/wheezes.  Good air movement  CV: Normal rate and rhythm, no murmurs/rubs/gallops. 2/2 DP pulses bilaterally  GI:  Abdomen soft and non-distended.  No tenderness, guarding or rebound  Skin: Warm, dry.  No rashes or petechiae. LUE PICC line, no surrounding warmth/erythema  MSK: No peripheral edema or calf tenderness. R. Great toe with toenail avulsion; no significant surrounding erythema/warmth; 2 well healing sores to dorsal aspects of midfoot (reported to be healing vascular ulcers per " patient)  Neuro: Alert and oriented to person/place/time.  No aphasia/facial droop/dysarthria.  Tongue midline, normal strength at SCM/trapezius/BUE/BLE.  Normal finger to nose. Normal gait.  Negative romberg, sensation intact over face/BUE/BLE  Psychiatric: Anxious appearing      Emergency Department Course   Laboratory:  Labs Ordered and Resulted from Time of ED Arrival Up to the Time of Departure from the ED   COMPREHENSIVE METABOLIC PANEL - Abnormal; Notable for the following components:       Result Value    Calcium 8.3 (*)     Glucose 104 (*)     All other components within normal limits   CBC WITH PLATELETS AND DIFFERENTIAL - Abnormal; Notable for the following components:    Absolute Lymphocytes 0.4 (*)     All other components within normal limits   LIPASE - Normal   HCG QUALITATIVE PREGNANCY - Normal   INFLUENZA A/B & SARS-COV2 PCR MULTIPLEX   CBC WITH PLATELETS & DIFFERENTIAL    Narrative:     The following orders were created for panel order CBC with platelets differential.  Procedure                               Abnormality         Status                     ---------                               -----------         ------                     CBC with platelets and d...[[id number]]  Abnormal            Final result                 Please view results for these tests on the individual orders.     Emergency Department Course:  Reviewed:  I reviewed nursing notes, vitals, past medical history and Care Everywhere    Assessments/Consults  ED Course as of Oct 27 0144   Tue Oct 26, 2021   2330 Obtained history and examined the patient as noted above      2332 Consulted with Dr. Bernal from [facility]  regarding the patients history and presentation in the emergency department. The patient is supposed to be taking Lamictal 25 mg and Vimpat/Keppra.        Wed Oct 27, 2021   0135 Rechecked the patient and prepared them for discharge          Interventions:  2346 Ketorolac injection 15 mg,  IV  2346 Prochlorperazine injection 10 mg, IV  2347 Diphenhydramine injection 25 mg, IV  2348 0.9 % sodium chloride BOLUS 1000 mL, IV  0020    Dilaudid 0.5mg IV    Disposition:  The patient was discharged to home.     Impression & Plan     Medical Decision Making:  Patient is a 35-year-old female with significant medical history presenting with a myriad of complaints.  She complains predominantly of headache, nausea, vomiting, and loose stools.  She is nontoxic, clinically well-hydrated quite anxious appearing.  She is neurologically intact.   The patient has not had any fever or neck stiffness so I doubt meningitis.  The headache was gradual in onset.  I doubt SAH or serious neurologic sequlae.  There is no associated numbness, paresthesia or confusion and I doubt stroke or CNS tumor. I do not feel that advanced imaging is indicated at this time and patient comfortable deferring.  Labs overall reassuring without evidence of profound electrolyte derangements or underlying sepsis.  She is not pregnant.  She was tested for COVID-19 in light of her symptoms though result pending at time of dispo.  She has no abdominal tenderness, I doubt intra-abdominal catastrophe and do not feel emergent imaging is warranted.  On reevaluation, patient did report some symptom improvement.  I discussed I do not believe this this is likely an IVIG reaction though I did recommend she contact her physician at [facility] that coordinates her IVIG therapy.  She is agreeable to return for increasing pain, focal neuro deficits, fever or should symptoms worsen or change.  Patient has analgesia at home, Norco but I will supplement with Zofran on dispo.  Brat diet recommended.  All questions addressed.       Diagnosis:    ICD-10-CM    1. Nonintractable headache, unspecified chronicity pattern, unspecified headache type  R51.9        Discharge Medications:  Discharge Medication List as of 10/27/2021  1:34 AM      START taking these medications     Details   ondansetron (ZOFRAN ODT) 4 MG ODT tab Take 1 tablet (4 mg) by mouth every 8 hours as needed for nausea, Disp-10 tablet, R-0, Local Print             Scribe Disclosure:  I, Osvaldo Shields, am serving as a scribe at 11:31 PM on 10/26/2021 to document services personally performed by Makenna Zeng DO based on my observations and the provider's statements to me.          Makenna Zeng,   10/27/21 0822

## 2021-10-27 NOTE — PHARMACY-ADMISSION MEDICATION HISTORY
Pharmacy Medication History  Admission medication history interview status for the 10/27/2021  admission is complete. See EPIC admission navigator for prior to admission medications     Location of Interview: Patient room  Medication history sources: Patient and Surescripts    Significant changes made to the medication list:  Added: ibuprofen, MVI, vitamin C, vitamin D3  Deleted: Norco, lorazepam, magnesium sulfate, omeprazole, ondansetron  Changed: gabapentin 600mg TID --> 600mg tab, 300mg (0.5 tab) at bedtime. Mexiletine 150mg TID --> 150mg at bedtime.     In the past week, patient estimated taking medication this percent of the time: greater than 90%    Additional medication history information:   Bactrim is taken while patient is on IV steroids, has not been on for about a month.   Patient had first IVIG infusion 2 days ago.   Finished 5 day Keflex prescription 2 days ago.    Medication reconciliation completed by provider prior to medication history? No    Time spent in this activity: 20 minutes    Prior to Admission medications    Medication Sig Last Dose Taking? Auth Provider   DULoxetine (CYMBALTA) 20 MG capsule Take 40 mg by mouth daily  10/26/2021 at AM Yes Reported, Patient   gabapentin (NEURONTIN) 600 MG tablet Take 300 mg by mouth At Bedtime 10/26/2021 at PM Yes Unknown, Entered By History   ibuprofen (ADVIL/MOTRIN) 200 MG tablet Take 400 mg by mouth every 6 hours as needed for moderate pain 10/26/2021 at 2300 Yes Unknown, Entered By History   melatonin 1 MG TABS tablet Take 2-5 mg by mouth nightly as needed for sleep  Past Week at Unknown time Yes Unknown, Entered By History   mexiletine (MEXITIL) 150 MG capsule Take 1 capsule (150 mg) by mouth 3 times daily  Patient taking differently: Take 150 mg by mouth At Bedtime  10/26/2021 at PM Yes Neal Ball MD   multivitamin, therapeutic (THERA-VIT) TABS tablet Take 1 tablet by mouth daily 10/26/2021 at AM Yes Unknown, Entered By History    vitamin C (ASCORBIC ACID) 250 MG tablet Take 250 mg by mouth daily 10/26/2021 at AM Yes Unknown, Entered By History   Vitamin D3 (CHOLECALCIFEROL) 25 mcg (1000 units) tablet Take 25 mcg by mouth daily 10/26/2021 at AM Yes Unknown, Entered By History   sulfamethoxazole-trimethoprim (BACTRIM DS) 800-160 MG tablet Take 1 tablet by mouth three times a week (Monday, Wednesday, and Friday)  Patient not taking: Reported on 10/27/2021 Not Taking at Unknown time  Neal Ball MD       The information provided in this note is only as accurate as the sources available at the time of update(s)

## 2021-10-27 NOTE — DISCHARGE SUMMARY
[facility]    Discharge Summary  Hospitalist    Date of Admission:  10/27/2021  Date of Discharge:  10/28/2021  Discharging Provider: Wes Mccray MD    Discharge Diagnoses   Principal Problem:    Headache with Associate Nausea and Vomiting    Active Problems:    Erythromelalgia       Hx of migraine headaches      History of Present Illness   35-year-old female with a past medical history of erythromelalgia for which she follows with providers at [facility] and [facility], history of chronic pain, following with pain clinic, depression/anxiety, migraine headache and asthma who came in for evaluation of severe headache with nausea which started 2 days after initiating IV IgG through [facility] (Dr. Mook James).     In ER, blood pressure was 127/81, heart rate 115, respiratory rate 20, oxygen saturation 99% on room air and temperature 98.7.  At this time, she had a CT of the head without contrast, which was negative for any acute intracranial process.  In ER, she received again bolus of normal saline, 1 dose of Decadron 10 mg IV, one dose of Benadryl and 1 dose of Compazine.  She did report having mild improvement of her symptoms, but because this was her second ER visit tonight, hospitalist service was called regarding the admission.    Hospital Course   Admitted to observation, given IV fluids and antiemetics.  Pain was improved and tolerating oral intake by time of discharge. Unclear if IV IgG treatments in any way contributed to this admission, but she will discuss with [facility] neurologist about any further treatments (initially was planning a total of 5 infusions, has gotten 2 so far).      Wes Mccray MD  Pager: [phone]  Cell Phone:  [phone]       Significant Results and Procedures   As above    Pending Results   These results will be followed up by Dr. Mccray  Unresulted Labs Ordered in the Past 30 Days of this Admission     No orders found for last 31 day(s).           Code Status   Full Code       Primary Care Physician   Neal Ball    Physical Exam   Temp: 98.2  F (36.8  C) Temp src: Oral BP: 110/66 Pulse: 80   Resp: 18 SpO2: 94 % O2 Device: None (Room air)    There were no vitals filed for this visit.  Vital Signs with Ranges  Temp:  [98.2  F (36.8  C)-100  F (37.8  C)] 98.2  F (36.8  C)  Pulse:  [73-98] 80  Resp:  [16-18] 18  BP: (107-134)/(66-79) 110/66  SpO2:  [94 %-100 %] 94 %  I/O last 3 completed shifts:  In: 1397 [P.O.:240; I.V.:1157]  Out: -     Exam on discharge:   Neuro -- alert, Ox3    Discharge Disposition   Discharged to home  Condition at discharge: Stable    Consultations This Hospital Stay   NEUROLOGY IP CONSULT    Time Spent on this Encounter   I spent a total of 25 minutes discharging this patient.     Discharge Orders      Reason for your hospital stay    Headache and nausea     Follow-up and recommended labs and tests     Follow up with Dr. Mook James as scheduled, and discuss whether to continue IV IgG.     Activity    Your activity upon discharge: activity as tolerated     Discharge Instructions    Call Dr. Ackerman if any medical questions at Cell Phone [phone].     IV access    Routine PICC line care as previous.     Diet    Follow this diet upon discharge: Orders Placed This Encounter      Regular Diet Adult     Discharge Medications   Current Discharge Medication List      START taking these medications    Details   oxyCODONE (ROXICODONE) 5 MG tablet Take 1-2 tablets (5-10 mg) by mouth every 4 hours as needed for moderate to severe pain  Qty: 10 tablet, Refills: 0    Associated Diagnoses: Intractable headache, unspecified chronicity pattern, unspecified headache type      predniSONE (DELTASONE) 10 MG tablet 50 mg daily for 2 days then decrease 10 mg every 2 days until completed.  Qty: 30 tablet, Refills: 0    Associated Diagnoses: Intractable headache, unspecified chronicity pattern, unspecified headache type      SUMAtriptan  (IMITREX) 100 MG tablet Take 1 tablet (100 mg) by mouth 2 times daily as needed for migraine  Qty: 9 tablet, Refills: 0    Associated Diagnoses: Intractable headache, unspecified chronicity pattern, unspecified headache type         CONTINUE these medications which have NOT CHANGED    Details   DULoxetine (CYMBALTA) 20 MG capsule Take 40 mg by mouth daily       gabapentin (NEURONTIN) 600 MG tablet Take 300 mg by mouth At Bedtime      ibuprofen (ADVIL/MOTRIN) 200 MG tablet Take 400 mg by mouth every 6 hours as needed for moderate pain      melatonin 1 MG TABS tablet Take 2-5 mg by mouth nightly as needed for sleep       mexiletine (MEXITIL) 150 MG capsule Take 1 capsule (150 mg) by mouth 3 times daily  Qty: 90 capsule, Refills: 11    Associated Diagnoses: Erythromelalgia (H)      multivitamin, therapeutic (THERA-VIT) TABS tablet Take 1 tablet by mouth daily      vitamin C (ASCORBIC ACID) 250 MG tablet Take 250 mg by mouth daily      Vitamin D3 (CHOLECALCIFEROL) 25 mcg (1000 units) tablet Take 25 mcg by mouth daily      sulfamethoxazole-trimethoprim (BACTRIM DS) 800-160 MG tablet Take 1 tablet by mouth three times a week (Monday, Wednesday, and Friday)  Qty: 40 tablet, Refills: 3    Associated Diagnoses: Erythromelalgia (H); Need for pneumocystis prophylaxis           Allergies   Allergies   Allergen Reactions     Topiramate Anxiety     Data   Most Recent 3 CBC's:Recent Labs   Lab Test 10/26/21  2349 10/16/21  1307 06/28/21  1504   WBC 8.0 9.3 6.8   HGB 12.5 14.1 14.2   MCV 92 95 92    188 186      Most Recent 3 BMP's:  Recent Labs   Lab Test 10/26/21  2349 04/09/21  1725    139   POTASSIUM 3.5 3.8   CHLORIDE 103 107   CO2 25 29   BUN 22 14   CR 0.78 0.81   ANIONGAP 5 3   KEVAN 8.3* 8.7   * 118*     Most Recent 2 LFT's:  Recent Labs   Lab Test 10/26/21  2349 06/04/21  1655   AST 26 23   ALT 36 78*   ALKPHOS 88 90   BILITOTAL 0.2 0.3     Most Recent INR's and Anticoagulation Dosing  History:  Anticoagulation Dose History    There is no flowsheet data to display.       Most Recent 3 Troponin's:No lab results found.  Most Recent Cholesterol Panel:No lab results found.  Most Recent 6 Bacteria Isolates From Any Culture (See EPIC Reports for Culture Details):No lab results found.  Most Recent TSH, T4 and A1c Labs:  Recent Labs   Lab Test 06/28/21  1504   TSH 0.20*   T4 1.05

## 2021-10-27 NOTE — PLAN OF CARE
PRIMARY DIAGNOSIS: ACUTE PAIN  OUTPATIENT/OBSERVATION GOALS TO BE MET BEFORE DISCHARGE:  1. Pain Status: improvement appears to be short lived    2. Return to near baseline physical activity: Yes    3. Cleared for discharge by consultants (if involved): Yes    Discharge Planner Nurse   Safe discharge environment identified: Yes  Barriers to discharge: Yes       Entered by: Anne Page 10/27/2021 6:23 PM     Please review provider order for any additional goals.   Nurse to notify provider when observation goals have been met and patient is ready for discharge.    A&Ox4. VSS on RA. Up independently. Arrived to floor around 1400 with minimal HA discomfort and had light lunch. Seen by neuro, see note. Plan was to discharge home with evening with meds to manage headache at home however headache pain began to increase at 1630 and continued to worsen despite imitrex and oxycodone. Subsequently zofran and benadryl were administered with minimal improvement. Pt also reported intermittent heart palpitations, EKG shows NSR. PICC infusing. Right PIV SL. Supportive spouse at the bedside and it was decided pt would not discharge this evening. Continue to monitor.

## 2021-10-27 NOTE — PROGRESS NOTES
"MD Notification    Notified Person: MD    Notified Person Name: Tyron    Notification Date/Time: 10/27/21 5:46 PM     Notification Interaction: text page    Purpose of Notification: \"Significant increase in HA pain and reporting intermittent heart palpitations. Would you like to get EKG?\"    Orders Received: EKG    Comments:      "

## 2021-10-27 NOTE — ED NOTES
Bed: ED01  Expected date: 10/27/21  Expected time: 4:15 AM  Means of arrival: Ambulance  Comments:  HEMS 443 35F acute HA; fentanyl

## 2021-10-27 NOTE — PROGRESS NOTES
Headaches slightly improved with oxycodone/tylenol. Pt also received 15 mg of Toradol before transferring to Short Stay Unit, because headache is not completely gone. Receiving RN updated.

## 2021-10-27 NOTE — ED NOTES
28-Oct-2019 05:39 Bed: ED13  Expected date:   Expected time:   Means of arrival:   Comments:  Triage

## 2021-10-27 NOTE — PROGRESS NOTES
RECEIVING UNIT ED HANDOFF REVIEW    ED Nurse Handoff Report was reviewed by: Anne Page RN on October 27, 2021 at 1:16 PM

## 2021-10-28 ENCOUNTER — HOME INFUSION (PRE-WILLOW HOME INFUSION) (OUTPATIENT)
Dept: PHARMACY | Facility: CLINIC | Age: 36
End: 2021-10-28

## 2021-10-28 VITALS
SYSTOLIC BLOOD PRESSURE: 110 MMHG | HEART RATE: 80 BPM | TEMPERATURE: 98.2 F | DIASTOLIC BLOOD PRESSURE: 66 MMHG | RESPIRATION RATE: 18 BRPM | OXYGEN SATURATION: 94 %

## 2021-10-28 PROCEDURE — 96376 TX/PRO/DX INJ SAME DRUG ADON: CPT

## 2021-10-28 PROCEDURE — G0378 HOSPITAL OBSERVATION PER HR: HCPCS

## 2021-10-28 PROCEDURE — 250N000013 HC RX MED GY IP 250 OP 250 PS 637: Performed by: INTERNAL MEDICINE

## 2021-10-28 PROCEDURE — 258N000003 HC RX IP 258 OP 636: Performed by: INTERNAL MEDICINE

## 2021-10-28 PROCEDURE — 250N000012 HC RX MED GY IP 250 OP 636 PS 637: Performed by: PSYCHIATRY & NEUROLOGY

## 2021-10-28 PROCEDURE — 999N000190 HC STATISTIC VAT ROUNDS

## 2021-10-28 PROCEDURE — 250N000011 HC RX IP 250 OP 636: Performed by: INTERNAL MEDICINE

## 2021-10-28 PROCEDURE — 99217 PR OBSERVATION CARE DISCHARGE: CPT | Performed by: INTERNAL MEDICINE

## 2021-10-28 RX ADMIN — IBUPROFEN 600 MG: 600 TABLET ORAL at 10:04

## 2021-10-28 RX ADMIN — DIPHENHYDRAMINE HYDROCHLORIDE 25 MG: 50 INJECTION, SOLUTION INTRAMUSCULAR; INTRAVENOUS at 00:22

## 2021-10-28 RX ADMIN — KETOROLAC TROMETHAMINE 15 MG: 15 INJECTION, SOLUTION INTRAMUSCULAR; INTRAVENOUS at 03:32

## 2021-10-28 RX ADMIN — PREDNISONE 60 MG: 20 TABLET ORAL at 08:37

## 2021-10-28 RX ADMIN — SODIUM CHLORIDE: 9 INJECTION, SOLUTION INTRAVENOUS at 03:31

## 2021-10-28 NOTE — PLAN OF CARE
A&O.  VSS except febrile.  C/o headache.  Toradol and oxycodone given.  Saline locked on left hand.  NS infusing 100 ml through picc line.  Independent.  Possible discharge tomorrow.

## 2021-10-28 NOTE — PLAN OF CARE
A&Ox4. VSS on RA. IV removed. Regular diet breakfast well tolerated. Denies nausea. Pain (HA) management much improved from yesterday. AVS and discharge medications reviewed at the bedside; understanding verbalized.

## 2021-10-28 NOTE — PROGRESS NOTES
Observation goals PRIOR TO DISCHARGE    Comments: -diagnostic tests and consults completed and resulted: met   -vital signs normal or at patient baseline: met  -adequate pain control on oral analgesics: not met   Nurse to notify provider when observation goals have been met and patient is ready for discharge.

## 2021-10-28 NOTE — PLAN OF CARE
Observation goals PRIOR TO DISCHARGE     Comments: -diagnostic tests and consults completed and resulted  Not Met  -vital signs normal or at patient baseline  Not Met  -adequate pain control on oral analgesics  Not Met  Nurse to notify provider when observation goals have been met and patient is ready for discharge.  Yes

## 2021-10-28 NOTE — PLAN OF CARE
Aox4. VSS on RA. Headache managed with PRN benadryl x1, PRN toradol x1. Regular diet. Denies nausea. Moves ind in room. PIV SL. PICC infusing NS. Discharge pending. Continue to monitor.     Observation goals PRIOR TO DISCHARGE    Comments: -diagnostic tests and consults completed and resulted: met   -vital signs normal or at patient baseline: met  -adequate pain control on oral analgesics: not met   Nurse to notify provider when observation goals have been met and patient is ready for discharge.

## 2021-10-28 NOTE — PLAN OF CARE
PRIMARY DIAGNOSIS: ACUTE PAIN  OUTPATIENT/OBSERVATION GOALS TO BE MET BEFORE DISCHARGE:  1. Pain Status: improvement     2. Return to near baseline physical activity: Yes     3. Cleared for discharge by consultants (if involved): Yes     Discharge Planner Nurse   Safe discharge environment identified: Yes  Barriers to discharge: Yes       Entered by: Anne Page 10/28/21 10:11 AM     Please review provider order for any additional goals.   Nurse to notify provider when observation goals have been met and patient is ready for discharge.

## 2021-10-30 LAB
ATRIAL RATE - MUSE: 79 BPM
DIASTOLIC BLOOD PRESSURE - MUSE: NORMAL MMHG
INTERPRETATION ECG - MUSE: NORMAL
P AXIS - MUSE: 53 DEGREES
PR INTERVAL - MUSE: 130 MS
QRS DURATION - MUSE: 76 MS
QT - MUSE: 360 MS
QTC - MUSE: 412 MS
R AXIS - MUSE: 54 DEGREES
SYSTOLIC BLOOD PRESSURE - MUSE: NORMAL MMHG
T AXIS - MUSE: 50 DEGREES
VENTRICULAR RATE- MUSE: 79 BPM

## 2021-10-30 NOTE — PROGRESS NOTES
This is a recent snapshot of the patient's [facility] medical record.  For current drug dose and complete information and questions, call [phone] or In Basket pool, fv home infusion ([id number])  CSN Number:  [id number]

## 2021-10-31 NOTE — ASSESSMENT & PLAN NOTE
Erythromelalgia       UPDATE: Overall progress has leveled off, but a lot better. Coming off meds slightly, including off long-acting opioids (going to [facility]). Now just taking short-acting norco at night, sometimes in middle of night Last IV steroid dose was 4 weeks ago. Weaning off mexilitine and gabapentin. Recommended keeping gabapentin for now, until off mexilitine. Short-acting opioids may be needed for a while.  She kept a couple of doses on hand in the fridge.  Moving around a lot more. Going on a couple of bike rides, wearing big loose tennis shoes, compression socks. Sitting on the couch for our call today!  Uses wheelchair after getting ready for bed after cooling feet.  She still has consistent swelling in right foot. She can stand to wear exercise shoes that have a little more space in them. She tried using the compression machine they gave her. The left foot is still a little swollen.  Some days her feet are hot and uncomfortable, she rests.   Energy is variable - she may take 3 naps per day. Other days she is better.  She sees neurologist Dr. James tomorrow. Not sure about IVIG process/approval.       ASSESSMENT & PLAN:     OK to continue wean of opioids.

## 2021-11-01 ENCOUNTER — VIRTUAL VISIT (OUTPATIENT)
Dept: INTERNAL MEDICINE | Facility: CLINIC | Age: 36
End: 2021-11-01
Payer: COMMERCIAL

## 2021-11-01 DIAGNOSIS — I73.81 ERYTHROMELALGIA (H): ICD-10-CM

## 2021-11-01 DIAGNOSIS — G44.40 HEADACHE, DRUG INDUCED: ICD-10-CM

## 2021-11-01 DIAGNOSIS — E27.49 ADRENAL SUPPRESSION (H): ICD-10-CM

## 2021-11-01 DIAGNOSIS — Z29.89 NEED FOR PNEUMOCYSTIS PROPHYLAXIS: ICD-10-CM

## 2021-11-01 PROCEDURE — 99215 OFFICE O/P EST HI 40 MIN: CPT | Mod: 95 | Performed by: PEDIATRICS

## 2021-11-01 NOTE — ASSESSMENT & PLAN NOTE
Adrenal suppression  Theoretically likely with the previous steroids although weekly. She was on steroids for a while, and weaned.  Current meds: half of the norco at night only, mexilitine should be off by next week. Still on gabapentin for now.

## 2021-11-01 NOTE — ASSESSMENT & PLAN NOTE
Erythromelalgia  Still doing pretty well, swelling is better. She was able to wear boots for a while yesterday and wasn't bothered much. She was on a lot of different medicines in the ER/observation and didn't have trouble despite not being in her bed.  She even went for a 8-9 mile bike ride with her family.  She hasn't heard yet about whether she will get IVIG again.  talked with Dr. James. The infusion center will be asked to adhere more to the protocol.

## 2021-11-01 NOTE — ASSESSMENT & PLAN NOTE
Headache  She is in the ER recently 2x.  She had IVIG last week, and developed a headache afterwards that worsened despite advil and heat packs. She had been on antibiotics after her toe infection, but had stopped by the timing of the IVIG.  The ER tried various things (dexamethasone, dilaudid, ketorolac, magnesium, morphine, ondansetron, oxycodone, compazine), eventually benadryl helped her be more sleepy. She went back via ambulance and was in observation.  Dr. James gave specific instructions for IVIG, she wasn't sure if the infusion people followed them exactly, w/re to fluids and all the meds.

## 2021-11-01 NOTE — PROGRESS NOTES
"Dear patient. Thank you for visiting with me. I want you to feel respected, understood, and empowered. \"Respect\" is valuing you as much as I would a close family member. \"Empowerment\" happens when you are fully informed, and can make the best possible decision for you.  Please ask me questions!  Challenge anything that is not clear.    Medical records are primarily used as memory aids for me and my colleagues. Things to know about my documentation style:  - The 'problem list' includes current symptoms or diagnoses, and some problems that are resolved but may return. I use the past medical history for problems not expected to return.  - I use single quotation marks for things that you or I said, when I want to clarify who was speaking.  - I use double quotation marks when copying a term from elsewhere in your records. Italics (besides here) may also denote a quotation.  If you have questions or concerns, please contact me; I will reply as soon as time allows.      Virtual Visit Details    Type of service:  Video Visit    Start Time: 11:56 AM    End Time:12:12 PM    Originating Location (pt. Location): Home    Distant Location (provider location):  [facility]     Platform used for Visit: [facility]    PCP: Neal Ball  Visit type: problem-oriented  Time note (e5, 40'): The total time (on the date of service) for this service was 55 minutes, including discussion/face-to-face, chart review, interpretation not otherwise reported, documentation, and updating of the computerized record.        Context    Ronna Rivera is a 35 year old woman, with concerns including:  Chief Complaint   Patient presents with     Chronic Disease Management       History, update, and/or problems      Problem List as of 11/1/2021        Noted       Nervous and Auditory    1. Erythromelalgia (H) 3/25/2021     Overview Addendum 8/5/2021  7:47 AM by Neal Ball MD      Dx with testing by " Dr. Shannon De La Rosa at [facility]. Also seeing Dr. Mook James @ [facility]  As of 8/4/21, corticosteroids 1g IV per week, after 12 weeks of BIW. Also still on mexiletine 150 mg TID, clonidine 0.1 mg daily.          Last Assessment & Plan 11/1/2021 Virtual Visit Written 11/1/2021  3:11 PM by Neal Ball MD      Erythromelalgia  Still doing pretty well, swelling is better. She was able to wear boots for a while yesterday and wasn't bothered much. She was on a lot of different medicines in the ER/observation and didn't have trouble despite not being in her bed.  She even went for a 8-9 mile bike ride with her family.  She hasn't heard yet about whether she will get IVIG again.  talked with Dr. James. The infusion center will be asked to adhere more to the protocol.         2. Headache, drug induced (IVIG) 10/27/2021     Last Assessment & Plan 11/1/2021 Virtual Visit Written 11/1/2021  3:12 PM by Neal Ball MD      Headache  She is in the ER recently 2x.  She had IVIG last week, and developed a headache afterwards that worsened despite advil and heat packs. She had been on antibiotics after her toe infection, but had stopped by the timing of the IVIG.  The ER tried various things (dexamethasone, dilaudid, ketorolac, magnesium, morphine, ondansetron, oxycodone, compazine), eventually benadryl helped her be more sleepy. She went back via ambulance and was in observation.  Dr. James gave specific instructions for IVIG, she wasn't sure if the infusion people followed them exactly, w/re to fluids and all the meds.             Endocrine    3. Adrenal suppression (H) 11/1/2021     Overview Signed 11/1/2021  3:15 PM by Neal Ball MD      From steroid dosing 2021 (for erythromelalgia). Needs stress-dose steroids with illness or other challenge.          Last Assessment & Plan 11/1/2021 Virtual Visit Written 11/1/2021  3:15 PM by Neal Ball MD      Adrenal  suppression  Theoretically likely with the previous steroids although weekly. She was on steroids for a while, and weaned.  Current meds: half of the norco at night only, mexilitine should be off by next week. Still on gabapentin for now.              Other    4. Need for pneumocystis prophylaxis 7/3/2021     Overview Signed 7/3/2021  4:39 PM by Neal Ball MD      Bactrim DS on M,W,F          Last Assessment & Plan 11/1/2021 Virtual Visit Written 11/1/2021  3:13 PM by Neal Ball MD      Ended. Can be ceased when steroid burst done                 Toenail infection  She was seen by a podiatrist, and the nail loosened and was removed. It seems to be healing now.  This was not a toe ulceration.                         VISIT FREQ: Monthly 30 minutes by video. Every 6 months, in-person 60 minutes.    Outstanding issues (Dr. Ball)  --------------------------------------------  -- bactrim while on steroids        Ongoing care provided by  --------------------------------------  -- Neuro: Dr. Shannon De La Rosa @ [place], [place] [place]ook [place]  ([facility] Orta [facility])  -- Pain: Dr. Wills @ [facility]   -- Rheum PRN: Dr. Cipriano Cardoza    General comments      Physical exam as possible  Physical Exam  Constitutional:       General: She is not in acute distress.     Appearance: Normal appearance. She is not ill-appearing.   HENT:      Head: Normocephalic.      Right Ear: External ear normal.      Left Ear: External ear normal.      Nose: Nose normal.   Eyes:      General: No scleral icterus (no obvious).        Right eye: No discharge (none obvious).         Left eye: No discharge (none obvious).      Extraocular Movements: Extraocular movements intact.   Pulmonary:      Effort: Pulmonary effort is normal. No respiratory distress.      Comments: No audible wheeze or stridor. No visible cyanosis.  Skin:     Comments: Visible skin is clear, without obvious rash or lesions   Neurological:       Mental Status: She is alert.      Comments: Cranial nerves appear grossly normal   Psychiatric:         Speech: Speech normal.

## 2021-11-03 NOTE — PROGRESS NOTES
This is a recent snapshot of the patient's [facility] medical record.  For current drug dose and complete information and questions, call [phone] or In Basket pool, fv home infusion ([id number])  CSN Number:  [id number]

## 2021-11-05 NOTE — PROGRESS NOTES
This is a recent snapshot of the patient's [facility] medical record.  For current drug dose and complete information and questions, call [phone] or In Basket pool, fv home infusion ([id number])  CSN Number:  [id number]

## 2021-11-05 NOTE — PROGRESS NOTES
This is a recent snapshot of the patient's [facility] medical record.  For current drug dose and complete information and questions, call [phone] or In Basket pool, fv home infusion ([id number])  CSN Number:  [id number]

## 2021-11-09 ENCOUNTER — MEDICAL CORRESPONDENCE (OUTPATIENT)
Dept: HEALTH INFORMATION MANAGEMENT | Facility: CLINIC | Age: 36
End: 2021-11-09
Payer: COMMERCIAL

## 2021-11-10 ENCOUNTER — HOME INFUSION (PRE-WILLOW HOME INFUSION) (OUTPATIENT)
Dept: PHARMACY | Facility: CLINIC | Age: 36
End: 2021-11-10
Payer: COMMERCIAL

## 2021-11-11 NOTE — PROGRESS NOTES
This is a recent snapshot of the patient's [facility] medical record.  For current drug dose and complete information and questions, call [phone] or In Basket pool, fv home infusion ([id number])  CSN Number:  [id number]

## 2021-11-15 ENCOUNTER — MYC MEDICAL ADVICE (OUTPATIENT)
Dept: INTERNAL MEDICINE | Facility: CLINIC | Age: 36
End: 2021-11-15
Payer: COMMERCIAL

## 2021-11-15 DIAGNOSIS — I73.81 ERYTHROMELALGIA (H): ICD-10-CM

## 2021-11-15 DIAGNOSIS — F32.A DEPRESSION: Primary | ICD-10-CM

## 2021-11-16 NOTE — CONFIDENTIAL NOTE
Sent message to Dr. SAMIA Noel,I'm contacting you at the request of mutual patient Ronna Rivera. It looks like this e-mail stays within [facility], so we can remain inside [employer].Ronna said that you were having some difficulty with infusion center orders.  I know the feeling!!  If you can reply with some specifics, I can try getting it ordered from within the system. I can't see [facility] notes without an update visit from Ronna ([facility]'s CareEverywhere demands permission every time).  Thanks  Rob

## 2021-11-18 ENCOUNTER — MEDICAL CORRESPONDENCE (OUTPATIENT)
Dept: HEALTH INFORMATION MANAGEMENT | Facility: CLINIC | Age: 36
End: 2021-11-18
Payer: COMMERCIAL

## 2021-11-24 ENCOUNTER — HOME INFUSION (PRE-WILLOW HOME INFUSION) (OUTPATIENT)
Dept: PHARMACY | Facility: CLINIC | Age: 36
End: 2021-11-24
Payer: COMMERCIAL

## 2021-11-28 ENCOUNTER — HOSPITAL ENCOUNTER (EMERGENCY)
Facility: CLINIC | Age: 36
Discharge: HOME | End: 2021-11-29
Attending: EMERGENCY MEDICINE | Admitting: EMERGENCY MEDICINE
Payer: COMMERCIAL

## 2021-11-28 DIAGNOSIS — G43.909 MIGRAINE WITHOUT STATUS MIGRAINOSUS, NOT INTRACTABLE, UNSPECIFIED MIGRAINE TYPE: ICD-10-CM

## 2021-11-28 LAB
ANION GAP SERPL CALCULATED.3IONS-SCNC: 4 MMOL/L (ref 3–14)
BASOPHILS # BLD AUTO: 0 10E3/UL (ref 0–0.2)
BASOPHILS NFR BLD AUTO: 1 %
BUN SERPL-MCNC: 16 MG/DL (ref 7–30)
CALCIUM SERPL-MCNC: 8.4 MG/DL (ref 8.5–10.1)
CHLORIDE BLD-SCNC: 109 MMOL/L (ref 94–109)
CO2 SERPL-SCNC: 29 MMOL/L (ref 20–32)
CREAT SERPL-MCNC: 0.68 MG/DL (ref 0.52–1.04)
EOSINOPHIL # BLD AUTO: 0 10E3/UL (ref 0–0.7)
EOSINOPHIL NFR BLD AUTO: 1 %
ERYTHROCYTE [DISTWIDTH] IN BLOOD BY AUTOMATED COUNT: 12.6 % (ref 10–15)
GFR SERPL CREATININE-BSD FRML MDRD: >90 ML/MIN/1.73M2
GLUCOSE BLD-MCNC: 85 MG/DL (ref 70–99)
HCT VFR BLD AUTO: 39.1 % (ref 35–47)
HGB BLD-MCNC: 13.1 G/DL (ref 11.7–15.7)
IMM GRANULOCYTES # BLD: 0 10E3/UL
IMM GRANULOCYTES NFR BLD: 0 %
LYMPHOCYTES # BLD AUTO: 1 10E3/UL (ref 0.8–5.3)
LYMPHOCYTES NFR BLD AUTO: 26 %
MCH RBC QN AUTO: 30.5 PG (ref 26.5–33)
MCHC RBC AUTO-ENTMCNC: 33.5 G/DL (ref 31.5–36.5)
MCV RBC AUTO: 91 FL (ref 78–100)
MONOCYTES # BLD AUTO: 0.6 10E3/UL (ref 0–1.3)
MONOCYTES NFR BLD AUTO: 15 %
NEUTROPHILS # BLD AUTO: 2.3 10E3/UL (ref 1.6–8.3)
NEUTROPHILS NFR BLD AUTO: 57 %
NRBC # BLD AUTO: 0 10E3/UL
NRBC BLD AUTO-RTO: 0 /100
PLATELET # BLD AUTO: 149 10E3/UL (ref 150–450)
POTASSIUM BLD-SCNC: 3.9 MMOL/L (ref 3.4–5.3)
RBC # BLD AUTO: 4.3 10E6/UL (ref 3.8–5.2)
SODIUM SERPL-SCNC: 142 MMOL/L (ref 133–144)
WBC # BLD AUTO: 4 10E3/UL (ref 4–11)

## 2021-11-28 PROCEDURE — 80048 BASIC METABOLIC PNL TOTAL CA: CPT | Performed by: EMERGENCY MEDICINE

## 2021-11-28 PROCEDURE — 36415 COLL VENOUS BLD VENIPUNCTURE: CPT | Performed by: EMERGENCY MEDICINE

## 2021-11-28 PROCEDURE — 258N000003 HC RX IP 258 OP 636: Performed by: EMERGENCY MEDICINE

## 2021-11-28 PROCEDURE — 99285 EMERGENCY DEPT VISIT HI MDM: CPT | Mod: 25

## 2021-11-28 PROCEDURE — 250N000011 HC RX IP 250 OP 636: Performed by: EMERGENCY MEDICINE

## 2021-11-28 PROCEDURE — 85025 COMPLETE CBC W/AUTO DIFF WBC: CPT | Performed by: EMERGENCY MEDICINE

## 2021-11-28 PROCEDURE — 96361 HYDRATE IV INFUSION ADD-ON: CPT

## 2021-11-28 PROCEDURE — 96374 THER/PROPH/DIAG INJ IV PUSH: CPT

## 2021-11-28 PROCEDURE — 96375 TX/PRO/DX INJ NEW DRUG ADDON: CPT

## 2021-11-28 RX ORDER — ONDANSETRON 2 MG/ML
4 INJECTION INTRAMUSCULAR; INTRAVENOUS EVERY 30 MIN PRN
Status: DISCONTINUED | OUTPATIENT
Start: 2021-11-28 | End: 2021-11-29 | Stop reason: HOSPADM

## 2021-11-28 RX ORDER — SODIUM CHLORIDE 9 MG/ML
INJECTION, SOLUTION INTRAVENOUS CONTINUOUS
Status: DISCONTINUED | OUTPATIENT
Start: 2021-11-28 | End: 2021-11-29 | Stop reason: HOSPADM

## 2021-11-28 RX ORDER — DIPHENHYDRAMINE HYDROCHLORIDE 50 MG/ML
50 INJECTION INTRAMUSCULAR; INTRAVENOUS ONCE
Status: COMPLETED | OUTPATIENT
Start: 2021-11-28 | End: 2021-11-28

## 2021-11-28 RX ORDER — KETOROLAC TROMETHAMINE 15 MG/ML
15 INJECTION, SOLUTION INTRAMUSCULAR; INTRAVENOUS ONCE
Status: COMPLETED | OUTPATIENT
Start: 2021-11-28 | End: 2021-11-28

## 2021-11-28 RX ORDER — DEXAMETHASONE SODIUM PHOSPHATE 10 MG/ML
10 INJECTION, SOLUTION INTRAMUSCULAR; INTRAVENOUS ONCE
Status: COMPLETED | OUTPATIENT
Start: 2021-11-28 | End: 2021-11-28

## 2021-11-28 RX ADMIN — DEXAMETHASONE SODIUM PHOSPHATE 10 MG: 10 INJECTION INTRAMUSCULAR; INTRAVENOUS at 21:59

## 2021-11-28 RX ADMIN — ONDANSETRON 4 MG: 2 INJECTION INTRAMUSCULAR; INTRAVENOUS at 21:59

## 2021-11-28 RX ADMIN — PROCHLORPERAZINE EDISYLATE 10 MG: 5 INJECTION INTRAMUSCULAR; INTRAVENOUS at 23:39

## 2021-11-28 RX ADMIN — SODIUM CHLORIDE 1000 ML: 9 INJECTION, SOLUTION INTRAVENOUS at 22:10

## 2021-11-28 RX ADMIN — DIPHENHYDRAMINE HYDROCHLORIDE 50 MG: 50 INJECTION, SOLUTION INTRAMUSCULAR; INTRAVENOUS at 21:59

## 2021-11-28 RX ADMIN — KETOROLAC TROMETHAMINE 15 MG: 15 INJECTION, SOLUTION INTRAMUSCULAR; INTRAVENOUS at 22:13

## 2021-11-28 ASSESSMENT — ENCOUNTER SYMPTOMS
FATIGUE: 1
HEADACHES: 1
CHILLS: 0
FEVER: 0
SORE THROAT: 0
VOMITING: 0
DIARRHEA: 0
NAUSEA: 1
NECK PAIN: 1
PHOTOPHOBIA: 1
SHORTNESS OF BREATH: 0
ABDOMINAL PAIN: 0

## 2021-11-28 ASSESSMENT — MIFFLIN-ST. JEOR: SCORE: 1210.25

## 2021-11-29 VITALS
SYSTOLIC BLOOD PRESSURE: 112 MMHG | BODY MASS INDEX: 23 KG/M2 | WEIGHT: 125 LBS | HEART RATE: 93 BPM | RESPIRATION RATE: 16 BRPM | HEIGHT: 62 IN | TEMPERATURE: 98.2 F | DIASTOLIC BLOOD PRESSURE: 64 MMHG | OXYGEN SATURATION: 99 %

## 2021-11-29 PROCEDURE — 250N000011 HC RX IP 250 OP 636: Performed by: EMERGENCY MEDICINE

## 2021-11-29 RX ORDER — LORAZEPAM 2 MG/ML
1 INJECTION INTRAMUSCULAR ONCE
Status: COMPLETED | OUTPATIENT
Start: 2021-11-29 | End: 2021-11-29

## 2021-11-29 RX ORDER — HEPARIN SODIUM (PORCINE) LOCK FLUSH IV SOLN 100 UNIT/ML 100 UNIT/ML
100 SOLUTION INTRAVENOUS ONCE
Status: COMPLETED | OUTPATIENT
Start: 2021-11-29 | End: 2021-11-29

## 2021-11-29 RX ORDER — PREDNISONE 20 MG/1
TABLET ORAL
Qty: 10 TABLET | Refills: 0 | Status: SHIPPED | OUTPATIENT
Start: 2021-11-29 | End: 2021-12-06

## 2021-11-29 RX ADMIN — HEPARIN SODIUM (PORCINE) LOCK FLUSH IV SOLN 100 UNIT/ML 500 UNITS: 100 SOLUTION at 01:26

## 2021-11-29 RX ADMIN — LORAZEPAM 1 MG: 2 INJECTION INTRAMUSCULAR; INTRAVENOUS at 00:25

## 2021-11-29 NOTE — ED TRIAGE NOTES
Pt. had IV IG immunogloblulin on Friday and now having intense headache with concurrent nausea similar to past reactions, unable to treat at home.

## 2021-11-29 NOTE — DISCHARGE INSTRUCTIONS
Please call neurology team to discuss steroid taper. Return to the ED with any new or worsening symptoms.

## 2021-11-29 NOTE — ED PROVIDER NOTES
History     Chief Complaint:  Headache     HPI   Ronna Rivera is a 36 year old female with complex past medical history of erythromalacia undergoing infusions of IVIG, migraine headaches, autonomic dysfunction and chronic pain who presents with headache and nausea following IVIG infusion.  Patient reports earlier this week she underwent turn second IVIG infusion.  Unfortunately after the first infusion she developed a significant migraine headache that required her to come to the emergency department on 2 occasions and eventually was admitted for observation symptom control.  At that point symptoms were abated with IV fluids and steroids.  Following her infusion earlier this week she was also pretreated with Solu-Cortef and Benadryl following the infusion on Friday.  Unfortunately over the weekend she gradually developed a worsening headache.  Today she noted that it was significantly worse and debilitating.  She notes associated symptoms of photophobia, nausea and fatigue.  She locates her headache beginning around her forehead and radiating back through into her neck.  She notes that this is similar in characteristic and quality to her migraine headaches that she has had in the past however her migraine headaches typically last half an hour to an hour and then are abated with fluids, ibuprofen and occasionally Imitrex.  Today she has been taking ibuprofen and Benadryl at home without significant relief.  She also notes that she has been pushing fluids and took a dose of Imitrex without any significant relief.  In the emergency department, she denies any fevers, vomiting, vision changes, weakness in arm or leg, sensation changes, cough, chest pain or abdominal pain.    Of note she is also being treated for an infection in her left toe with antibiotics.  She is scheduled for follow-up with podiatry.    Ongoing care provided by  - Neuro: Dr. Shannon De La Rosa @ [place], [facility]ook [facility] [place]  - Pain: Dr. Wills @  [facility]   - Rheum PRN: Dr. Cipriano Cardoza    Review of Systems   Constitutional: Positive for fatigue. Negative for chills and fever.   HENT: Negative for sore throat.    Eyes: Positive for photophobia.   Respiratory: Negative for shortness of breath.    Cardiovascular: Negative for chest pain.   Gastrointestinal: Positive for nausea. Negative for abdominal pain, diarrhea and vomiting.   Musculoskeletal: Positive for neck pain.   Neurological: Positive for headaches.     Allergies:  Topiramate    Medications:    predniSONE (DELTASONE) 20 MG tablet  DULoxetine (CYMBALTA) 20 MG capsule  gabapentin (NEURONTIN) 600 MG tablet  ibuprofen (ADVIL/MOTRIN) 200 MG tablet  melatonin 1 MG TABS tablet  mexiletine (MEXITIL) 150 MG capsule  multivitamin, therapeutic (THERA-VIT) TABS tablet  SUMAtriptan (IMITREX) 100 MG tablet  vitamin C (ASCORBIC ACID) 250 MG tablet  Vitamin D3 (CHOLECALCIFEROL) 25 mcg (1000 units) tablet      Past Medical History:    Past Medical History:   Diagnosis Date     Reactive depression      Seasonal allergies 6/25/2020     Patient Active Problem List    Diagnosis Date Noted     Adrenal suppression (H) 11/01/2021     Priority: Medium     From steroid dosing 2021 (for erythromelalgia). Needs stress-dose steroids with illness or other challenge.       Headache, drug induced (IVIG) 10/27/2021     Priority: Medium     Medical marijuana use 08/02/2021     Priority: Medium     Certification in progress       Need for pneumocystis prophylaxis 07/03/2021     Priority: Medium     Bactrim DS on M,W,F       Impaired mobility 06/30/2021     Priority: Medium     Lightweight wheelchair.  Due to foot pain with erythromelalgia, and exacerbation of erythromelalgia when upright or bearing weight       Abnormal TSH 06/30/2021     Priority: Medium     Likely due to steroids.       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 Shannon De La Rosa at [facility]. Also seeing Dr. Mook James @ [facility] [place]  As of 8/4/21, corticosteroids 1g IV per week, after 12 weeks of BIW. Also still on mexiletine 150 mg TID, clonidine 0.1 mg daily.       Chronic insomnia 06/25/2020     Priority: Medium     Asthma, exercise induced 06/25/2020     Priority: Medium     Hx of migraine headaches 02/27/2018     Priority: Medium     DDD (degenerative disc disease), lumbar 02/08/2018     Priority: Medium     Formatting of this note might be different from the original.  2/8/18  Moderate degenerative collapse of the L5-S1 disc with minimal disc bulging. The other lumbar discs appear normal. Bilateral degenerative facet changes at the lowest 2 lumbar levels most advanced on the right at L5-S1          Past Surgical History:    Past Surgical History:   Procedure Laterality Date     INSERT PICC LINE Left 6/23/2021    Procedure: INSERTION, PICC;  Surgeon: Neal Penny MD;  [place] [facility] [place]     IR PICC PLACEMENT > 5 YRS OF AGE  6/23/2021     NO HISTORY OF SURGERY         Family History:    Family History   Problem Relation Age of Onset     Hypertension Father      Cerebrovascular Disease Maternal Grandmother      Diabetes Maternal Grandfather      Breast Cancer Paternal Grandmother      Hypertension Paternal Grandfather      C.A.D. Paternal Grandfather        Social History:  Presents to the ED with her      Physical Exam     Patient Vitals for the past 24 hrs:   BP Temp Pulse Resp SpO2 Height  "Weight   11/29/21 0155 112/64 -- 93 -- 99 % -- --   11/29/21 0100 -- -- -- -- 95 % -- --   11/29/21 0000 -- -- -- -- 98 % -- --   11/28/21 2138 132/88 98.2  F (36.8  C) 90 16 100 % 1.575 m (5' 2\") 56.7 kg (125 lb)     Physical Exam  General: Patient is awake, alert and interactive when I enter the room. Appears uncomfortable  Head: The scalp, face, and head appear normal. Atraumatic   Eyes: The pupils are equal, round, and reactive to light. Conjunctivae and sclerae are normal. EOMI without nystagmus.  Neck: Normal range of motion. No anterior cervical lymphadenopathy noted  CV: Regular rate.  Resp: Lungs are clear without wheezes or rales. No respiratory distress.   GI: Abdomen is soft, no rigidity, guarding, or rebound. No distension. No tenderness to palpation in any quadrant.     MS: Normal tone. Joints grossly normal without effusions. No asymmetric leg swelling, calf or thigh tenderness.    Skin: No rash or lesions noted. Normal capillary refill noted  Neuro: Speech is normal and fluent. Face is symmetric without droop. Moving all extremities well. CN's II-XII intact. 5/5 UE and LE strength. Sensation intact to light touch.   Psych:  Normal affect.  Appropriate interactions.    Emergency Department Course     Laboratory:  Labs Ordered and Resulted from Time of ED Arrival to Time of ED Departure   BASIC METABOLIC PANEL - Abnormal       Result Value    Sodium 142      Potassium 3.9      Chloride 109      Carbon Dioxide (CO2) 29      Anion Gap 4      Urea Nitrogen 16      Creatinine 0.68      Calcium 8.4 (*)     Glucose 85      GFR Estimate >90     CBC WITH PLATELETS AND DIFFERENTIAL - Abnormal    WBC Count 4.0      RBC Count 4.30      Hemoglobin 13.1      Hematocrit 39.1      MCV 91      MCH 30.5      MCHC 33.5      RDW 12.6      Platelet Count 149 (*)     % Neutrophils 57      % Lymphocytes 26      % Monocytes 15      % Eosinophils 1      % Basophils 1      % Immature Granulocytes 0      NRBCs per 100 WBC 0   "    Absolute Neutrophils 2.3      Absolute Lymphocytes 1.0      Absolute Monocytes 0.6      Absolute Eosinophils 0.0      Absolute Basophils 0.0      Absolute Immature Granulocytes 0.0      Absolute NRBCs 0.0       Emergency Department Course:    Reviewed:  I reviewed nursing notes, vitals, past history and care everywhere    Assessments:  2215 I obtained history and examined the patient as noted above.   2315 I rechecked the patient. Mild improvement in headache. Dicussed adjunctive treatments.   0020 Headache significantly improved, experiencing akathisias.    0109 Sleeping comfortably     Interventions:  Medications   0.9% sodium chloride BOLUS (0 mLs Intravenous Stopped 11/28/21 2319)     Followed by   sodium chloride 0.9% infusion (has no administration in time range)   ondansetron (ZOFRAN) injection 4 mg (4 mg Intravenous Given 11/28/21 2159)   diphenhydrAMINE (BENADRYL) injection 50 mg (50 mg Intravenous Given 11/28/21 2159)   dexamethasone PF (DECADRON) injection 10 mg (10 mg Intravenous Given 11/28/21 2159)   ketorolac (TORADOL) injection 15 mg (15 mg Intravenous Given 11/28/21 2213)   prochlorperazine (COMPAZINE) injection 10 mg (10 mg Intravenous Given 11/28/21 2339)   LORazepam (ATIVAN) injection 1 mg (1 mg Intravenous Given 11/29/21 0025)   heparin 100 UNIT/ML injection 100 Units (500 Units Intracatheter Given 11/29/21 0126)     Disposition:  The patient was discharged to home.    Impression & Plan      Medical Decision Making:  Ronna Rivera is a 36 year old female with complex past medical history of erythromalacia undergoing infusions of IVIG, migraine headaches, autonomic dysfunction and chronic pain who presents with headache and nausea following IVIG infusion.  And has history of migraine headaches that are usually abated with ibuprofen, fluids and rest and occasionally Imitrex.  Unfortunately after her last 2 IVIG infusions a few days later she is developed significant migraine headaches.  She was  seen in this emergency department on 10/26/2021 by Dr. Zeng.  At that time she had reassuring work-up and was treated with Compazine, Benadryl, Toradol and fluids with improvement of symptoms.  She was discharged home but unfortunately bounced back due to recurrent headache.  She was admitted for observation and had gradual improvement of her symptoms with Toradol, steroids and fluids.  This past week and had another IVIG infusion for her erythromalacia and was treated post infusion with Solu-Cortef and Benadryl.  Unfortunately headache presented and significantly worsened  today which prompted her presentation to the emergency department.      Upon initial evaluation she is hemodynamically stable with normal vital signs.  She is afebrile. Physical exam detailed above.  No significant neurological deficits or red flag findings on exam and history.  Her headache today is similar in quality and location to previous migraines but is significantly worse in severity and duration.  It is also been refractory to her typical migraine medications of ibuprofen, Benadryl and Imitrex.  I have low concern for significant intracranial pathology, meningitis or significant systemic pathology based on her history and exam.  Focus in the emergency department was on symptom control.  Patient was initially treated with IV fluids, Benadryl, Toradol, Zofran and Decadron.  This led to slight improvement of headache symptoms.  We discussed adjunctive therapies including droperidol and Zyprexa.  Patient notes that she has had some benefit with Compazine in the past so this was administered first.  Unfortunately patient developed some mild akathisia's following administration of Compazine.  However headache was significantly improved.  Akathisia is significantly improved following Ativan.  Patient is sleeping comfortably with improvement of her headache.  Discussed discharge home.  Patient and  are amenable to this plan.  As she  has had some benefit from prednisone in the past with her headaches I will place her on a short course.  However I advised her to call her neurologist in the morning to discuss steroid taper and further treatment.    Diagnosis:    ICD-10-CM    1. Migraine without status migrainosus, not intractable, unspecified migraine type  G43.909        Discharge Medications:  Discharge Medication List as of 11/29/2021  1:45 AM      START taking these medications    Details   predniSONE (DELTASONE) 20 MG tablet Take two tablets (= 40mg) each day for 5 (five) days, Disp-10 tablet, R-0, Local Print             MD Sandra Martines, Mich Harvey MD  11/29/21 0208

## 2021-12-01 ENCOUNTER — HOME INFUSION (PRE-WILLOW HOME INFUSION) (OUTPATIENT)
Dept: PHARMACY | Facility: CLINIC | Age: 36
End: 2021-12-01
Payer: COMMERCIAL

## 2021-12-06 ENCOUNTER — VIRTUAL VISIT (OUTPATIENT)
Dept: INTERNAL MEDICINE | Facility: CLINIC | Age: 36
End: 2021-12-06
Payer: COMMERCIAL

## 2021-12-06 DIAGNOSIS — G44.40 HEADACHE, DRUG INDUCED: Primary | ICD-10-CM

## 2021-12-06 DIAGNOSIS — I73.81 ERYTHROMELALGIA (H): ICD-10-CM

## 2021-12-06 PROCEDURE — 99214 OFFICE O/P EST MOD 30 MIN: CPT | Mod: 95 | Performed by: PEDIATRICS

## 2021-12-06 NOTE — PROGRESS NOTES
"Dear patient. Thank you for visiting with me. I want you to feel respected, understood, and empowered. \"Respect\" is valuing you as much as I would a close family member. \"Empowerment\" happens when you are fully informed, and can make the best possible decision for you.  Please ask me questions!  Challenge anything that is not clear.    Medical records are primarily used as memory aids for me and my colleagues. Things to know about my documentation style:  - The 'problem list' includes current symptoms or diagnoses, and some problems that are resolved but may return. I use the past medical history for problems not expected to return.  - I use single quotation marks for things that you or I said, when I want to clarify who was speaking.  - I use double quotation marks when copying a term from elsewhere in your records. Italics (besides here) may also denote a quotation.  If you have questions or concerns, please contact me; I will reply as soon as time allows.      Virtual Visit Details    Type of service:  Video Visit    Start Time: 12:12 PM    End Time:12:28 PM    Originating Location (pt. Location): Home    Distant Location (provider location):  [facility] [place]     Platform used for Visit: [employer]    PCP: Neal Ball  Visit type: problem-oriented  Time note (e4, 30'): The total time (on the date of service) for this service was 30 minutes, including discussion/face-to-face, chart review, interpretation not otherwise reported, documentation, and updating of the computerized record.        Context    Ronna Rivera is a 36 year old woman, with concerns including:  Chief Complaint   Patient presents with     Recheck Medication     Patient calls in for a follow up.       History, update, and/or problems      Erythromelalgia  Migraine/Headache reaction to IVIG  Was in ER 11/28/21 after with a migraine, another after-the-IVIG reaction, similar to previous experience.  Dr." Erika has an care protocol which helped other symptoms, but she had the headache after all. The stay was shorter and more effective than the previous time.    She feels the erythromelalgia is a lot better over time, including with the IVIG timing. Migraine is a problem, but her feet are better.   There was a partial flare due to a minor cold that everyone in the house had.    COVID pandemic  She visited with Dr. James about vaccination. It sounds like we both agree that it will worsen her symptoms temporarily but this is not unexpected AND it is safe. She went to a pharmacy for a COVID shot and they took her history and declined to give a shot, wanted more from her doctor. I suggested she go to a [place] pharmacy (at [place], closer to her house), they could read this note and I could call them.  The real question is about whether she needs to restart the series, since she only had one shot (2/16/21) and has had big-dose steroids over time. No one is sure about this, but if she doesn't have postvaccine symptoms or an erythromelalgia setback, then I would vote to call this her new first shot, repeat the 2nd shot in 4 weeks, and then do the booster 6 months after that.    To clarify for pharmacy: Ronna Rivera has a complicated neuro-autoimmune condition (erythromelalgia) but has much improved with steroids, various other meds, and now is on IVIG. We anticipate that COVID vaccination will lead to the usual post-vaccine symptoms, but it is possible she will also have a temporarily setback of her erythromelalgia - many autoimmune patients have this with some vaccines or infections. This is not a contraindication to vaccination.      Ronna will call to make the appointment.  I will call to make sure the pharmacy knows about this note. [phone]    Talked with pharmacist. He said it should have been ok anyway because the indication has been clarified for the second and third dose anyway. They will go ahead  when she makes the appointment.    VISIT FREQ: Monthly 30 minutes by video. Every 6 months, in-person 60 minutes.    Outstanding issues (Dr. Ball)  --------------------------------------------  -- symptoms after COVID vaccination?  Does she need to consider this a restart of the 1st dose?        Ongoing care provided by  --------------------------------------  -- Neuro: Dr. Shannon De La Rosa @ [place], Dr. Mook James @[facility] [place]  (prev seen Dr. Orta @ [facility])  -- Pain: Dr. Wills @ [facility]   -- Rheum PRN: Dr. Cipriano Cardoza

## 2021-12-06 NOTE — ASSESSMENT & PLAN NOTE
Erythromelalgia  Migraine/Headache reaction to IVIG  Was in ER 11/28/21 after with a migraine, another after-the-IVIG reaction, similar to previous experience.  Dr. James has an care protocol which helped other symptoms, but she had the headache after all. The stay was shorter and more effective than the previous time.    She feels the erythromelalgia is a lot better over time, including with the IVIG timing. Migraine is a problem, but her feet are better.   There was a partial flare due to a minor cold that everyone in the house had.    COVID pandemic  She visited with Dr. James about vaccination. It sounds like we both agree that it will worsen her symptoms temporarily but this is not unexpected AND it is safe. She went to a pharmacy for a COVID shot and they took her history and declined to give a shot, wanted more from her doctor. I suggested she go to a [place] pharmacy (at [place], closer to her house), they could read this note and I could call them.  The real question is about whether she needs to restart the series, since she only had one shot (2/16/21) and has had big-dose steroids over time. No one is sure about this, but if she doesn't have postvaccine symptoms or an erythromelalgia setback, then I would vote to call this her new first shot, repeat the 2nd shot in 4 weeks, and then do the booster 6 months after that.    To clarify for pharmacy: Ronna Rivera has a complicated neuro-autoimmune condition (erythromelalgia) but has much improved with steroids, various other meds, and now is on IVIG. We anticipate that COVID vaccination will lead to the usual post-vaccine symptoms, but it is possible she will also have a temporarily setback of her erythromelalgia - many autoimmune patients have this with some vaccines or infections. This is not a contraindication to vaccination.      Ronna will call to make the appointment.  I will call to make sure the pharmacy knows about this note.

## 2021-12-06 NOTE — NURSING NOTE
Chief Complaint   Patient presents with     Recheck Medication     Patient calls in for a follow up.         Pepito Rodriguez MA on 12/6/2021 at 11:27 AM

## 2021-12-08 ENCOUNTER — HOME INFUSION (PRE-WILLOW HOME INFUSION) (OUTPATIENT)
Dept: PHARMACY | Facility: CLINIC | Age: 36
End: 2021-12-08
Payer: COMMERCIAL

## 2021-12-13 ENCOUNTER — HOME INFUSION (PRE-WILLOW HOME INFUSION) (OUTPATIENT)
Dept: PHARMACY | Facility: CLINIC | Age: 36
End: 2021-12-13
Payer: COMMERCIAL

## 2021-12-15 ENCOUNTER — HOME INFUSION (PRE-WILLOW HOME INFUSION) (OUTPATIENT)
Dept: PHARMACY | Facility: CLINIC | Age: 36
End: 2021-12-15
Payer: COMMERCIAL

## 2021-12-16 ENCOUNTER — HOME INFUSION (PRE-WILLOW HOME INFUSION) (OUTPATIENT)
Dept: PHARMACY | Facility: CLINIC | Age: 36
End: 2021-12-16
Payer: COMMERCIAL

## 2021-12-22 ENCOUNTER — HOME INFUSION (PRE-WILLOW HOME INFUSION) (OUTPATIENT)
Dept: PHARMACY | Facility: CLINIC | Age: 36
End: 2021-12-22
Payer: COMMERCIAL

## 2021-12-27 ENCOUNTER — HOME INFUSION (PRE-WILLOW HOME INFUSION) (OUTPATIENT)
Dept: PHARMACY | Facility: CLINIC | Age: 36
End: 2021-12-27
Payer: COMMERCIAL

## 2021-12-29 ENCOUNTER — HOME INFUSION (PRE-WILLOW HOME INFUSION) (OUTPATIENT)
Dept: PHARMACY | Facility: CLINIC | Age: 36
End: 2021-12-29
Payer: COMMERCIAL

## 2022-01-04 NOTE — PROGRESS NOTES
This is a recent snapshot of the patient's [facility] medical record.  For current drug dose and complete information and questions, call [phone] or In Basket pool, fv home infusion ([id number])  CSN Number:  [id number]

## 2022-01-05 ENCOUNTER — HOME INFUSION (PRE-WILLOW HOME INFUSION) (OUTPATIENT)
Dept: PHARMACY | Facility: CLINIC | Age: 37
End: 2022-01-05
Payer: COMMERCIAL

## 2022-01-06 NOTE — PROGRESS NOTES
This is a recent snapshot of the patient's [facility] medical record.  For current drug dose and complete information and questions, call [phone] or In Basket pool, fv home infusion ([id number])  CSN Number:  [id number]

## 2022-01-11 ENCOUNTER — HOME INFUSION (PRE-WILLOW HOME INFUSION) (OUTPATIENT)
Dept: PHARMACY | Facility: CLINIC | Age: 37
End: 2022-01-11
Payer: COMMERCIAL

## 2022-01-12 ENCOUNTER — HOME INFUSION (PRE-WILLOW HOME INFUSION) (OUTPATIENT)
Dept: PHARMACY | Facility: CLINIC | Age: 37
End: 2022-01-12
Payer: COMMERCIAL

## 2022-01-12 RX ORDER — DULOXETIN HYDROCHLORIDE 20 MG/1
40 CAPSULE, DELAYED RELEASE ORAL DAILY
Qty: 180 CAPSULE | Refills: 3 | Status: SHIPPED | OUTPATIENT
Start: 2022-01-12 | End: 2022-10-17

## 2022-01-19 ENCOUNTER — HOME INFUSION (PRE-WILLOW HOME INFUSION) (OUTPATIENT)
Dept: PHARMACY | Facility: CLINIC | Age: 37
End: 2022-01-19

## 2022-01-20 NOTE — PROGRESS NOTES
This is a recent snapshot of the patient's [facility] medical record.  For current drug dose and complete information and questions, call [phone] or In Basket pool, fv home infusion ([id number])  CSN Number:  [id number]

## 2022-01-26 ENCOUNTER — HOME INFUSION (PRE-WILLOW HOME INFUSION) (OUTPATIENT)
Dept: PHARMACY | Facility: CLINIC | Age: 37
End: 2022-01-26

## 2022-01-27 NOTE — PROGRESS NOTES
This is a recent snapshot of the patient's [facility] medical record.  For current drug dose and complete information and questions, call [phone] or In Basket pool, fv home infusion ([id number])  CSN Number:  [id number]

## 2022-02-01 ENCOUNTER — HOME INFUSION (PRE-WILLOW HOME INFUSION) (OUTPATIENT)
Dept: PHARMACY | Facility: CLINIC | Age: 37
End: 2022-02-01

## 2022-02-06 ENCOUNTER — MYC MEDICAL ADVICE (OUTPATIENT)
Dept: INTERNAL MEDICINE | Facility: CLINIC | Age: 37
End: 2022-02-06
Payer: COMMERCIAL

## 2022-02-06 NOTE — CONFIDENTIAL NOTE
I put in the 'nursing' order for [facility] to remove her PICC. This may already have happened, but if not please call and facilitate with patient and [facility].    Please schedule her for a video follow-up with me, at her convenience.

## 2022-02-07 ENCOUNTER — HOME INFUSION (PRE-WILLOW HOME INFUSION) (OUTPATIENT)
Dept: PHARMACY | Facility: CLINIC | Age: 37
End: 2022-02-07
Payer: COMMERCIAL

## 2022-02-14 ENCOUNTER — TELEPHONE (OUTPATIENT)
Dept: FAMILY MEDICINE | Facility: CLINIC | Age: 37
End: 2022-02-14
Payer: COMMERCIAL

## 2022-02-15 ENCOUNTER — MEDICAL CORRESPONDENCE (OUTPATIENT)
Dept: HEALTH INFORMATION MANAGEMENT | Facility: CLINIC | Age: 37
End: 2022-02-15
Payer: COMMERCIAL

## 2022-02-25 NOTE — PROGRESS NOTES
This is a recent snapshot of the patient's [facility] medical record.  For current drug dose and complete information and questions, call [phone] or In Basket pool, fv home infusion ([id number])  CSN Number:  [id number]

## 2022-03-04 ENCOUNTER — VIRTUAL VISIT (OUTPATIENT)
Dept: INTERNAL MEDICINE | Facility: CLINIC | Age: 37
End: 2022-03-04
Payer: COMMERCIAL

## 2022-03-04 DIAGNOSIS — I73.81 ERYTHROMELALGIA (H): ICD-10-CM

## 2022-03-04 DIAGNOSIS — G44.40 HEADACHE, DRUG INDUCED: ICD-10-CM

## 2022-03-04 PROCEDURE — 99213 OFFICE O/P EST LOW 20 MIN: CPT | Mod: 95 | Performed by: PEDIATRICS

## 2022-03-04 RX ORDER — CYPROHEPTADINE HYDROCHLORIDE 4 MG/1
TABLET ORAL
COMMUNITY
Start: 2022-02-21 | End: 2022-10-17

## 2022-03-04 RX ORDER — AZATHIOPRINE 50 MG/1
100 TABLET ORAL DAILY
COMMUNITY
Start: 2022-03-02

## 2022-03-04 NOTE — PROGRESS NOTES
"Dear patient. Thank you for visiting with me. I want you to feel respected, understood, and empowered. \"Respect\" is valuing you as much as I would a close family member. \"Empowerment\" happens when you are fully informed, and can make the best possible decision for you.  Please ask me questions!  Challenge anything that is not clear.    Medical records are primarily used as memory aids for me and my colleagues. Things to know about my documentation style:  - The 'problem list' includes current symptoms or diagnoses, and some problems that are resolved but may return. I use the past medical history for problems not expected to return.  - I use single quotation marks for things that you or I said, when I want to clarify who was speaking.  - I use double quotation marks when copying a term from elsewhere in your records. Italics (besides here) may also denote a quotation.  If you have questions or concerns, please contact me; I will reply as soon as time allows.      Virtual Visit Details    Start Time: 9:03 AM    Type of service:  Video Visit    End Time:9:10 AM    Originating Location (pt. Location): Home    Platform used for Visit: [facility] Location (provider location):  [facility] [place]     PCP: Neal Ball  Visit type: problem-oriented  Time note (e3, 20'): The total time (on the date of service) for this service was 21 minutes, including discussion/face-to-face, chart review, interpretation not otherwise reported, documentation, and updating of the computerized record.        Context    Ronna Rivera is a 36 year old woman, with concerns including:  Chief Complaint   Patient presents with     RECHECK       History, update, and/or problems        Problem List as of 3/4/2022 Reviewed: 3/4/2022  9:23 AM by Neal Ball MD          Noted       Nervous and Auditory    1. Erythromelalgia (H) 3/25/2021     Overview Addendum 3/4/2022  9:22 AM by Quinn, " Neal Samayoa MD      Imuran and IVIG.  Improved after high-dose intermittent steroids, then worsened.  Dx with testing by Dr. Shannon De La Rosa at [facility]. Also seeing Dr. Mook James @ [facility]            Last Assessment & Plan 3/4/2022 Virtual Visit Written 3/4/2022  9:22 AM by Neal Ball MD      Erythromelalgia  This worsened again (reviewed mycharts). Dr. James sounded surprised it ratchet back up. I was wondering if there was a cold effect (in addition to the heat), but she hasn't been able to   She started imuran last night and is having IVIG getting done this morning. One treatment last week, 3 this week. It feels like the IVIG is starting to turn thing around.   She is following the fluid (before, during, after) protocol and getting acetaminophen, cetirizine before, along with solucortef compazine  benadryl IV.  I can defer this to Dr. OVERTON - we made this appointment at a time where she was having trouble restarting treatment, and I wanted to ensure everything was going well.  Follow-up with me in 6 months.          Relevant Medications     cyproheptadine (PERIACTIN) 4 MG tablet    2. Headache, drug induced (IVIG) 10/27/2021     Last Assessment & Plan 3/4/2022 Virtual Visit Written 3/4/2022  9:21 AM by Neal Ball MD      Improved with appropriate oral/IV protocol around IVIG treatment.                   VISIT FREQ: every 6 months  Outstanding issues (Dr. Ball)  --------------------------------------------       Ongoing care provided by  --------------------------------------  -- Neuro: Dr. Shannon De La Rosa @ [place], Dr. Mook James @[facility]  (prev seen Dr. Orta @ [facility])  -- Pain: Dr. Wills @ [facility]   -- Rheum PRN: Dr. Cipriano Cardoza

## 2022-03-04 NOTE — NURSING NOTE
Ronna Rivera is a 36 year old female patient that presents today in clinic for the following:    Chief Complaint   Patient presents with     RECHECK     The patient's allergies and medications were reviewed as noted. A set of vitals were recorded as noted without incident. The patient does not have any other questions for the provider.    Mitra Delong, EMT at 8:50 AM on 3/4/2022

## 2022-03-04 NOTE — ASSESSMENT & PLAN NOTE
Erythromelalgia  This worsened again (reviewed mycharts). Dr. James sounded surprised it ratchet back up. I was wondering if there was a cold effect (in addition to the heat), but she hasn't been able to   She started imuran last night and is having IVIG getting done this morning. One treatment last week, 3 this week. It feels like the IVIG is starting to turn thing around.   She is following the fluid (before, during, after) protocol and getting acetaminophen, cetirizine before, along with solucortef compazine  benadryl IV.  I can defer this to Dr. OVERTON - we made this appointment at a time where she was having trouble restarting treatment, and I wanted to ensure everything was going well.  Follow-up with me in 6 months.

## 2022-03-17 NOTE — PROGRESS NOTES
This is a recent snapshot of the patient's [facility] medical record.  For current drug dose and complete information and questions, call [phone] or In Basket pool, fv home infusion ([id number])  CSN Number:  [id number]

## 2022-04-04 NOTE — PROGRESS NOTES
This is a recent snapshot of the patient's [facility] medical record.  For current drug dose and complete information and questions, call [phone] or In Basket pool, fv home infusion ([id number])  CSN Number:  [id number]

## 2022-04-13 NOTE — PROGRESS NOTES
This is a recent snapshot of the patient's [facility] medical record.  For current drug dose and complete information and questions, call [phone] or In Basket pool, fv home infusion ([id number])  CSN Number:  [id number]

## 2022-04-14 NOTE — PROGRESS NOTES
This is a recent snapshot of the patient's [facility] medical record.  For current drug dose and complete information and questions, call [phone] or In Basket pool, fv home infusion ([id number])  CSN Number:  [id number]

## 2022-04-25 NOTE — PROGRESS NOTES
This is a recent snapshot of the patient's [facility] medical record.  For current drug dose and complete information and questions, call [phone] or In Basket pool, fv home infusion ([id number])  CSN Number:  [id number]

## 2022-04-27 NOTE — PROGRESS NOTES
This is a recent snapshot of the patient's [facility] medical record.  For current drug dose and complete information and questions, call [phone] or In Basket pool, fv home infusion ([id number])  CSN Number:  [id number]

## 2022-05-11 NOTE — PROGRESS NOTES
This is a recent snapshot of the patient's [facility] medical record.  For current drug dose and complete information and questions, call [phone] or In Basket pool, fv home infusion ([id number])  CSN Number:  [id number]

## 2022-05-11 NOTE — PROGRESS NOTES
This is a recent snapshot of the patient's [facility] medical record.  For current drug dose and complete information and questions, call [phone] or In Basket pool, fv home infusion ([id number])  CSN Number:  [id number]

## 2022-06-23 NOTE — PROGRESS NOTES
This is a recent snapshot of the patient's [facility] medical record.  For current drug dose and complete information and questions, call [phone] or In Basket pool, fv home infusion ([id number])  CSN Number:  [id number]

## 2022-07-01 NOTE — PROGRESS NOTES
This is a recent snapshot of the patient's [facility] medical record.  For current drug dose and complete information and questions, call [phone] or In Basket pool, fv home infusion ([id number])  CSN Number:  [id number]

## 2022-07-02 ENCOUNTER — HEALTH MAINTENANCE LETTER (OUTPATIENT)
Age: 37
End: 2022-07-02

## 2022-07-22 NOTE — PROGRESS NOTES
This is a recent snapshot of the patient's [facility] medical record.  For current drug dose and complete information and questions, call [phone] or In Basket pool, fv home infusion ([id number])  CSN Number:  [id number]

## 2022-10-17 ENCOUNTER — VIRTUAL VISIT (OUTPATIENT)
Dept: INTERNAL MEDICINE | Facility: CLINIC | Age: 37
End: 2022-10-17
Payer: COMMERCIAL

## 2022-10-17 ENCOUNTER — MYC MEDICAL ADVICE (OUTPATIENT)
Dept: INTERNAL MEDICINE | Facility: CLINIC | Age: 37
End: 2022-10-17

## 2022-10-17 DIAGNOSIS — Z86.69 HX OF MIGRAINE HEADACHES: ICD-10-CM

## 2022-10-17 DIAGNOSIS — G44.40 HEADACHE, DRUG INDUCED: ICD-10-CM

## 2022-10-17 DIAGNOSIS — I73.81 ERYTHROMELALGIA (H): ICD-10-CM

## 2022-10-17 DIAGNOSIS — R55 VASOVAGAL SYNCOPE: ICD-10-CM

## 2022-10-17 DIAGNOSIS — F48.9 MOOD PROBLEM: ICD-10-CM

## 2022-10-17 DIAGNOSIS — G43.109 MIGRAINE WITH AURA AND WITHOUT STATUS MIGRAINOSUS, NOT INTRACTABLE: ICD-10-CM

## 2022-10-17 DIAGNOSIS — N94.89 MENSTRUAL SUPPRESSION: ICD-10-CM

## 2022-10-17 PROCEDURE — 99215 OFFICE O/P EST HI 40 MIN: CPT | Mod: GT | Performed by: PEDIATRICS

## 2022-10-17 RX ORDER — LORAZEPAM 0.5 MG/1
0.5 TABLET ORAL EVERY 6 HOURS PRN
Qty: 45 TABLET | Refills: 0 | Status: SHIPPED | OUTPATIENT
Start: 2022-10-17 | End: 2023-01-15

## 2022-10-17 RX ORDER — ACETAMINOPHEN AND CODEINE PHOSPHATE 120; 12 MG/5ML; MG/5ML
0.35 SOLUTION ORAL DAILY
Qty: 90 TABLET | Refills: 3 | Status: SHIPPED | OUTPATIENT
Start: 2022-10-17 | End: 2023-01-12

## 2022-10-17 NOTE — ASSESSMENT & PLAN NOTE
Erythromelalgia  Consistent on the imuran, and was hoping that would help. Things were 'going ok' during the summer, it was tolerable. Not as bad as it was.  Has to wear compression stockings and vary activity level, not get too hot or else has to put them up and cool them.  Could drive.  Sleeps with feet elevated and wearing compression socks, after cooling.  Until September, things started to get worse, then progressively worse. Going into hands, face, more easily triggered.  She reached out to Dr. De La Rosa and E[facility]. She was started on IVIG again (), burst, then weekly for 12 weeks. Last infusion was two last week. She is cautious because of headaches.  Sent me Dr. LOVETT's note: the imuran doesn't seem to be working enough. Considering rituximab  She has noticed a worsening with premenstrual time, then improved after that.       ASSESSMENT: Erythromelalgia, worse again (counseled about this).       PLAN:    -- IVIG planning as per Dr. OVERTON and Dr. LOVETT.  I do not have an opinion about subcu versus IV.  I would be interested in how she does with rituximab, in particular.   -- Since she is having exacerbations before her menstrual period (which is very common with dysautonomia), we could suppress her period. Was on OCPs ~15 years ago.  She agreed to do this. Since aura migraines, will need to go with norethindrone alone. See comments under menstrual suppression

## 2022-10-17 NOTE — ASSESSMENT & PLAN NOTE
Menstrual suppression  Reason: worsened erythromelalgia pre-menstrually  Method: Norethindrone because of migraines with aura.  Single dose without gap started 10/17/22.  May need to increase to BID after December.    Menstrual suppression being planned because of worsening erythromelalgia before her period.  Advised about options and technique.  We will start with a 0.35 mg dose daily, explained about skipping the fourth week and proceeding to the next pack.  The first menstrual period on this regimen that is often unusual, and sometimes the second may be as well (heavy, prolonged, or light).  Thereafter there may be some spotting but it should be small.  If necessary the dose could be increased to 0.7 mg once daily, or 0.35 mg twice daily.

## 2022-10-17 NOTE — ASSESSMENT & PLAN NOTE
vasovagal spells  Had to switch insurance and pharmacy.  There was a slightly different formulation in duloxetine - right after the switch (August) had a fainting episode while walking the dog, woke up having bumped her face on the porch. Decided to taper off the duloxetine (which was originally prescribed for   I note she has had a vasovagal spell provoked on the tilt table test in 2021.   Was feeling dizzy and lightheaded before the event.  Comments her BP is 'fairly low.'       ASSESSMENT: Interventions already in place, although other options also possible - she may need to discuss this with the neurologists to ensure no interaction with their medications. Midodrine may be of benefit.  Duloxetine was prescribed for pain and erythromelalgia, but also beneficial for reactive depression. Had taken a few pills of prescribed ativan. Generic pills can vary in dosage, but I'm not sure that would have led to worsened fainting as she described. Withdrawing from duloxetine DEFINITELY could have increased the fainting.       PLAN: Should keep fluids up as previous. Possibly the menstrual suppression may also benefit.

## 2022-10-17 NOTE — ASSESSMENT & PLAN NOTE
Migraine headaches triggered by bright lights, and the shape stays there with pain  She has had zig-zaggy when younger. Will have a spot.

## 2022-10-17 NOTE — PROGRESS NOTES
"Ronna is a 36 year old who is being evaluated via a billable video visit.      How would you like to obtain your AVS? MyChart  If the video visit is dropped, the invitation should be resent by: Text to cell phone: [phone]  Will anyone else be joining your video visit? No    Dear patient. Thank you for visiting with me. I want you to feel respected, understood, and empowered. \"Respect\" is valuing you as much as I would a close family member. \"Empowerment\" happens when you are fully informed, and can make the best possible decision for you.  Please ask me questions!  Challenge anything that is not clear.    Medical records are primarily used as memory aids for me and my colleagues. Things to know about my documentation style:  - The 'problem list' includes current symptoms or diagnoses, and some problems that are resolved but may return. I use the past medical history for problems not expected to return.  - I use single quotation marks for things that you or I said, when I want to clarify who was speaking.  - I use double quotation marks when copying a term from elsewhere in your records. Italics (besides here) may also denote a quotation.  If you have questions or concerns, please contact me; I will reply as soon as time allows.      Virtual Visit Details    Start Time: 9:05 AM    Type of service:  Video Visit    End Time:9:36 AM    Originating Location (pt. Location): Home    Platform used for Visit: [facility]    Distant Location (provider location):  Off-site    PCP: Neal Ball  Visit type: problem-oriented  Time note (e5, 40'): The total time (on the date of service) for this service was 50 minutes, including discussion/face-to-face, chart review, interpretation not otherwise reported, documentation, and updating of the computerized record.        Context    Ronna Rivera is a 36 year old woman, seen with her , with concerns including:  Chief Complaint   Patient presents with     Video " Visit     Follow up        History, update, and/or problems      Updating me about various developments.    Problem List as of 10/17/2022 Reviewed: 3/4/2022  9:23 AM by Neal Ball MD          Noted       Nervous and Auditory    1. Erythromelalgia (H) 3/25/2021     Overview Addendum 3/4/2022  9:22 AM by Neal Ball MD      Imuran and IVIG.  Improved after high-dose intermittent steroids, then worsened.  Dx with testing by Dr. Shannon De La Rosa at [facility]. Also seeing Dr. Mook James @ [facility]            Last Assessment & Plan 10/17/2022 Virtual Visit Written 10/17/2022  9:47 AM by Neal Ball MD      Erythromelalgia  Consistent on the imuran, and was hoping that would help. Things were 'going ok' during the summer, it was tolerable. Not as bad as it was.  Has to wear compression stockings and vary activity level, not get too hot or else has to put them up and cool them.  Could drive.  Sleeps with feet elevated and wearing compression socks, after cooling.  Until September, things started to get worse, then progressively worse. Going into hands, face, more easily triggered.  She reached out to Dr. De La Rosa and E[facility]. She was started on IVIG again (), burst, then weekly for 12 weeks. Last infusion was two last week. She is cautious because of headaches.  Sent me Dr. LOVETT's note: the imuran doesn't seem to be working enough. Considering rituximab  She has noticed a worsening with premenstrual time, then improved after that.       ASSESSMENT: Erythromelalgia, worse again (counseled about this).       PLAN:    -- IVIG planning as per Dr. OVERTON and Dr. LOVETT.  I do not have an opinion about subcu versus IV.  I would be interested in how she does with rituximab, in particular.   -- Since she is having exacerbations before her menstrual period (which is very common with dysautonomia), we could suppress her period. Was on OCPs ~15 years ago.  She agreed to do this. Since aura migraines, will  need to go with norethindrone alone. See comments under menstrual suppression          Relevant Medications     norethindrone (MICRONOR) 0.35 MG tablet     LORazepam (ATIVAN) 0.5 MG tablet    2. Headache, drug induced (IVIG) 10/27/2021     Overview Addendum 10/17/2022  9:35 AM by Neal Ball MD      No visual aura. Feels like a combination of migraine and tension (including shoulders).  Somewhat helped with advil and benadryl.          Last Assessment & Plan 3/4/2022 Virtual Visit Written 3/4/2022  9:21 AM by Neal Ball MD      Improved with appropriate oral/IV protocol around IVIG treatment.         3. Vasovagal syncope 6/6/2021     Overview Addendum 10/17/2022  9:52 AM by Neal Ball MD      Rare syncope, moderately frequent presyncope  Provoked on tilt table test 3/19/21, no loss of consciousness          Last Assessment & Plan 10/17/2022 Virtual Visit Edited 10/17/2022  9:53 AM by Neal Ball MD      vasovagal spells  Had to switch insurance and pharmacy.  There was a slightly different formulation in duloxetine - right after the switch (August) had a fainting episode while walking the dog, woke up having bumped her face on the porch. Decided to taper off the duloxetine (which was originally prescribed for   I note she has had a vasovagal spell provoked on the tilt table test in 2021.   Was feeling dizzy and lightheaded before the event.  Comments her BP is 'fairly low.'       ASSESSMENT: Interventions already in place, although other options also possible - she may need to discuss this with the neurologists to ensure no interaction with their medications. Midodrine may be of benefit.  Duloxetine was prescribed for pain and erythromelalgia, but also beneficial for reactive depression. Had taken a few pills of prescribed ativan. Generic pills can vary in dosage, but I'm not sure that would have led to worsened fainting as she described. Withdrawing from duloxetine  DEFINITELY could have increased the fainting.       PLAN: Should keep fluids up as previous. Possibly the menstrual suppression may also benefit.         4. Migraine with aura and without status migrainosus, not intractable 2/27/2018     Overview Signed 10/17/2022  9:50 AM by Neal Ball MD      Visual aura BIW, pain with aura 1-2/month.          Last Assessment & Plan 10/17/2022 Virtual Visit Written 10/17/2022  9:49 AM by Neal Ball MD      Migraine headaches triggered by bright lights, and the shape stays there with pain  She has had zig-zaggy when younger. Will have a spot.             Other    5. Menstrual suppression because of erythromelalagia 10/17/2022     Overview Signed 10/17/2022  9:48 AM by Neal Ball MD      Reason: worsened erythromelalgia pre-menstrually  Method: Norethindrone because of migraines with aura.  Single dose without gap started 10/17/22.  May need to increase to BID after December.            Last Assessment & Plan 10/17/2022 Virtual Visit Written 10/17/2022  9:48 AM by Neal Ball MD      Menstrual suppression  Reason: worsened erythromelalgia pre-menstrually  Method: Norethindrone because of migraines with aura.  Single dose without gap started 10/17/22.  May need to increase to BID after December.    Menstrual suppression being planned because of worsening erythromelalgia before her period.  Advised about options and technique.  We will start with a 0.35 mg dose daily, explained about skipping the fourth week and proceeding to the next pack.  The first menstrual period on this regimen that is often unusual, and sometimes the second may be as well (heavy, prolonged, or light).  Thereafter there may be some spotting but it should be small.  If necessary the dose could be increased to 0.7 mg once daily, or 0.35 mg twice daily.          Relevant Medications     norethindrone (MICRONOR) 0.35 MG tablet    6. Mood problem 1/1/2021      "Overview Addendum 10/17/2022  9:54 AM by Neal Ball MD      \"Mood problems\" = oversimplified term for adjustment reaction and stress (her own medical issues plus 2 autistic kids) and medication side effects (IV steroids). She sees a therapist and psychiatrist.  Off duloxetine as of 10/17/2022          Last Assessment & Plan 10/17/2022 Virtual Visit Written 10/17/2022  9:54 AM by Neal Ball MD      See comments about duloxetine under vasovagal spells          Relevant Medications     LORazepam (ATIVAN) 0.5 MG tablet         "

## 2022-10-20 ENCOUNTER — TELEPHONE (OUTPATIENT)
Dept: FAMILY MEDICINE | Facility: CLINIC | Age: 37
End: 2022-10-20

## 2022-10-20 NOTE — TELEPHONE ENCOUNTER
Patient Quality Outreach      Summary:    Patient has the following on her problem list/HM:   Asthma review     No flowsheet data found.       Patient is due/failing the following:   Asthma  -  ACT needed    Type of outreach:    Sent [employer] message.    Questions for provider review:    None                                                                                                                                     Ricky OCHOA CMA

## 2022-11-07 ENCOUNTER — MYC MEDICAL ADVICE (OUTPATIENT)
Dept: INTERNAL MEDICINE | Facility: CLINIC | Age: 37
End: 2022-11-07

## 2022-11-07 ENCOUNTER — TELEPHONE (OUTPATIENT)
Dept: INTERNAL MEDICINE | Facility: CLINIC | Age: 37
End: 2022-11-07

## 2022-11-07 DIAGNOSIS — I73.81 ERYTHROMELALGIA (H): Primary | ICD-10-CM

## 2022-11-07 NOTE — TELEPHONE ENCOUNTER
[facility] Call Center    Phone Message    May a detailed message be left on voicemail: yes     Reason for Call: Other: Pt is calling in asking Dr. Ball to put a Hematology referral in her chart.  Pt is asking for a call when this has been done     Action Taken: Message routed to:  [facility] ([facility]    Travel Screening: Not Applicable

## 2022-11-09 NOTE — TELEPHONE ENCOUNTER
[facility] Call Center    Phone Message    May a detailed message be left on voicemail: yes     Reason for Call: Other: Ronna calling to follow up on her request to have a referral placed for Hematology as she has not heard from anyone as of yet.  Please call her back to discuss    Action Taken: Message routed to:  Other: Cardiology    Travel Screening: Not Applicable

## 2022-11-17 ENCOUNTER — MYC MEDICAL ADVICE (OUTPATIENT)
Dept: INTERNAL MEDICINE | Facility: CLINIC | Age: 37
End: 2022-11-17
Payer: COMMERCIAL

## 2022-11-17 DIAGNOSIS — I73.81 ERYTHROMELALGIA (H): Primary | ICD-10-CM

## 2022-11-17 PROCEDURE — 99207 E-CONSULT TO HEMATOLOGY (ADULT OUTPT PROVIDER TO SPECIALIST WRITTEN QUESTION & RESPONSE): CPT | Performed by: PEDIATRICS

## 2022-11-21 ENCOUNTER — PATIENT OUTREACH (OUTPATIENT)
Dept: ONCOLOGY | Facility: CLINIC | Age: 37
End: 2022-11-21

## 2022-11-21 ENCOUNTER — VIRTUAL VISIT (OUTPATIENT)
Dept: INTERNAL MEDICINE | Facility: CLINIC | Age: 37
End: 2022-11-21
Payer: COMMERCIAL

## 2022-11-21 DIAGNOSIS — I73.81 ERYTHROMELALGIA (H): ICD-10-CM

## 2022-11-21 PROCEDURE — 99215 OFFICE O/P EST HI 40 MIN: CPT | Mod: GT | Performed by: PEDIATRICS

## 2022-11-21 NOTE — CONFIDENTIAL NOTE
Apparently heme Dr. Cogan wants us to do an econsult.  I will do this, but try paging also since this is urgent.  He called back and I did my best to explain, he will review the chart. Clinic visit could take a couple of weeks.

## 2022-11-21 NOTE — PROGRESS NOTES
"Dear patient. Thank you for visiting with me. I want you to feel respected, understood, and empowered. \"Respect\" is valuing you as much as I would a close family member. \"Empowerment\" happens when you are fully informed, and can make the best possible decision for you.  Please ask me questions!  Challenge anything that is not clear.    Medical records are primarily used as memory aids for me and my colleagues. Things to know about my documentation style:  - The 'problem list' includes current symptoms or diagnoses, and some problems that are resolved but may return. I use the past medical history for problems not expected to return.  - I use single quotation marks for things that you or I said, when I want to clarify who was speaking.  - I use double quotation marks when copying a term from elsewhere in your records. Italics (besides here) may also denote a quotation.  If you have questions or concerns, please contact me; I will reply as soon as time allows.      Virtual Visit Details    Start Time: 4:32 PM    End Time:4:58 PM    Type of service:  Video Visit    Originating Location (pt. Location): Home    Platform used for Visit: [facility] Location (provider location):  Off-site    PCP: Neal Ball  Visit type: problem-oriented  Time note (e5, 40'): The total time (on the date of service) for this service was 42 minutes, including discussion/face-to-face, chart review, interpretation not otherwise reported, documentation, and updating of the computerized record.        Context    Ronna Rivera is a 37 year old woman, seen with her , with concerns including:  Chief Complaint   Patient presents with     erythromelalgia       History, update, and/or problems      Problem List as of 11/21/2022 Reviewed: 11/21/2022  5:19 PM by Neal Ball MD          Noted       Nervous and Auditory    1. Erythromelalgia (H) 3/25/2021     Overview Addendum 11/21/2022  5:16 PM by Neal Ball " "MD Yao      Imuran and IVIG and (as of Nov-2022) high-dose steroids  Improved after high-dose intermittent steroids, then worsened.  Dx with testing by Dr. Shannon De La Rosa at [facility]. Also seeing Dr. Mook James @ [facility]            Last Assessment & Plan 11/21/2022 Virtual Visit Written 11/21/2022  5:16 PM by Neal Ball MD      Erythromelalgia  Several things done today about her worsening erythromelalgia symptoms and the outside request for rituximab.  As noted in several other notes here and elsewhere, her swelling redness and pain have returned after a few month period of relative improvement.  She has continued with Imuran and weekly IVIG.  With the recent worsening her neurologists had determined to give another several days of IV Solu-Medrol, which gave her the side effects that she is used to.    She has appointments with outside rheumatology and hematology, but there is a delay as would be the case here at [facility].  Earlier I was requested to put in a request for hematology here, on the hope that they would be able to get her in sooner.   It looks like my earlier contact led to a Nov 29 appointment with Dr. Coffman, but this was cancelled.  Then today the consult led to a request for an e-consult, which I sent and also did a page to find out if this was the correct program.  I did this video visit to check in with them and see about progress elsewhere. To review the diagnosis was done by [place] neurologist Dr. De La Rosa based on several tests including her autonomic reflex screen 3/22/2021.  Had reported negative \"JAK2, CALR, MPL mutation analysis, as well as serum erythropoietin level.\"  It looks like [facility] has a consult in, but the appointment isn't until mid-December. Dr. James and Rj was hoping to get them in sooner.  She is able to stand briefly, uses wheelchair.  breaks in to comment that she has been crying and 'screaming' in the bathtub for treatments.  No ulcers " like original, but feel are swollen, starting to stretch.       ASSESSMENT: Clinical diagnosis of erythromelalgia by outside experts.  I was originally involved because of facility for home IV treatments with IVIG via [place] home infusion.  I defer to my expert colleagues on indication for rituximab, although that certainly seems appropriate to me at this point.       PLAN:   -- They will send me (and Dawna LOVETT and BROOKLYNN) pictures the recent of her feet.  -- Continue with comfort measures as is being done  --Trying to get her into 1 or another specialty provider who would be able/willing to write for rituximab based on existing or expeditable evaluation.   -- We talked about severity as far as possibility of admission. If an outpatient treatment isn't possible, then my rough guess is she would be best off going to [place] in [place] rather than [facility].              See also encounters about consults.

## 2022-11-21 NOTE — PROGRESS NOTES
November 21, 2022    Hematology/Oncology  referral reviewed.       Pertinent labs -- BOOKMARKED    Referring provider visit note -- BOOKMARKED    IB to [facility] team requesting input on referral. Attempted to reach patient to discuss 11/21. Left VM with direct contact info requesting call back.     Addendum: Dr. Cogan reviewed and is recommending E-Consult to start. IB message to referring MD requesting E-consult Attn: Dr. Cogan. Updated patient on plan. She is agreeable to E-Consult if Dr. Ball is willing to order. Will follow-up pending reply from Dr. Ball/E-Consult recommendations.     Hematology intake scheduling team (NPS phone number [phone] Monday - Friday 8am - 4:30 pm) will contact pt in the next 1-2 business days to schedule the consultation.    Roula Jj, JUVENTINON, RN, PHN, OCN  Hematology/Oncology Nurse Navigator  [facility]  [phone]

## 2022-11-21 NOTE — ASSESSMENT & PLAN NOTE
"Erythromelalgia  Several things done today about her worsening erythromelalgia symptoms and the outside request for rituximab.  As noted in several other notes here and elsewhere, her swelling redness and pain have returned after a few month period of relative improvement.  She has continued with Imuran and weekly IVIG.  With the recent worsening her neurologists had determined to give another several days of IV Solu-Medrol, which gave her the side effects that she is used to.    She has appointments with outside rheumatology and hematology, but there is a delay as would be the case here at [facility].  Earlier I was requested to put in a request for hematology here, on the hope that they would be able to get her in sooner.   It looks like my earlier contact led to a Nov 29 appointment with Dr. Coffman, but this was cancelled.  Then today the consult led to a request for an e-consult, which I sent and also did a page to find out if this was the correct program.  I did this video visit to check in with them and see about progress elsewhere. To review the diagnosis was done by [place] neurologist Dr. De La Rosa based on several tests including her autonomic reflex screen 3/22/2021.  Had reported negative \"JAK2, CALR, MPL mutation analysis, as well as serum erythropoietin level.\"  It looks like [facility] has a consult in, but the appointment isn't until mid-December. Dr. James and Rj was hoping to get them in sooner.  She is able to stand briefly, uses wheelchair.  breaks in to comment that she has been crying and 'screaming' in the bathtub for treatments.  No ulcers like original, but feel are swollen, starting to stretch.       ASSESSMENT: Clinical diagnosis of erythromelalgia by outside experts.  I was originally involved because of facility for home IV treatments with IVIG via [place] home infusion.  I defer to my expert colleagues on indication for rituximab, although that certainly seems appropriate to me " at this point.       PLAN:   -- They will send me (and Drs. S and BROOKLYNN) pictures the recent of her feet.  -- Continue with comfort measures as is being done  --Trying to get her into 1 or another specialty provider who would be able/willing to write for rituximab based on existing or expeditable evaluation.   -- We talked about severity as far as possibility of admission. If an outpatient treatment isn't possible, then my rough guess is she would be best off going to [place] in [place] rather than [facility].

## 2022-11-23 ENCOUNTER — E-CONSULT (OUTPATIENT)
Dept: HEMATOLOGY | Facility: CLINIC | Age: 37
End: 2022-11-23
Payer: COMMERCIAL

## 2022-11-23 PROCEDURE — 99451 NTRPROF PH1/NTRNET/EHR 5/>: CPT | Performed by: STUDENT IN AN ORGANIZED HEALTH CARE EDUCATION/TRAINING PROGRAM

## 2022-11-23 NOTE — PROGRESS NOTES
"    11/23/2022     E-Consult has been accepted.    Interprofessional consultation requested by:  Neal Ball MD      Clinical Question/Purpose: MY CLINICAL QUESTION IS: rituximab for known erythromelalgia    Patient assessment and information reviewed:     37-year-old woman with a history of erythromelalgia referred to hematology for evaluation and candidacy for rituximab.    She has had severe erythromelalgia for at least 2 years.  She follows with a neurologist at [facility], and has reportedly been seen at the [facility] also.  She has been managed with azathioprine and IVIG, and this appears to have led to a sustained remission over the earlier part of 2022.  However she has had recurrence of her symptoms recently.  High-dose steroids were started, though her symptoms persist.    On chart review, she has negative MPN testing, and recent mild leukopenia on CBC.    Currently with recurrent severe pain, using a wheelchair.    Assessment: It is conceivable that she has a \"triple negative\" MPN, e.g. a myeloproliferative neoplasm without unknown  mutation (negative Josiah 2, MPL, KEVAN-R testing).  A bone marrow biopsy would be required for confirmation.  However, it is just is likely that the mild leukopenia is due to azathioprine use.  It would be highly unlikely to have a triple negative MPN causing such severe erythromelalgia without significant erythrocytosis or thrombocytosis.    It appears the more acute issue is her severe pain due to her longstanding erythromelalgia.  She appears to have tried aspirin in the past, which is how erythromelalgia related to myeloproliferative neoplasms tends to be managed.  Unfortunately, this particular situation is outside our area of expertise, and we do not have anything outside of what has already been done for her to offer her.  Rituximab is not unknown treatment for erythromelalgia, though it is possible that another specialty that this condition might " come across (rheumatology, neurology, etc.) might have a better sense of that than us.  We could certainly see her in clinic if her condition stabilizes and an outpatient bone marrow biopsy might be feasible, but again this would not do anything to manage the severe erythromelalgia.    I regret that there is not more that we can offer at this time, as I can tell that the patient is experiencing quite a bit of suffering.  My advice would be for her to pursue advanced therapies with whichever provider originally suggested that rituximab might be beneficial.  They likely have more experience with this therapy for this condition then anyone in our hematology group.    Recommendations:     - No indication for further myeloproliferative neoplasm work-up at this time  - No evidence from the hematology perspective that rituximab would help with her symptoms      The recommendations provided in this E-Consult are based on a review of clinical data pertinent to the clinical question presented, without a review of the patient's complete medical record or, the benefit of a comprehensive in-person or virtual patient evaluation. This consultation should not replace the clinical judgement and evaluation of the provider ordering this E-Consult. Any new clinical issues, or changes in patient status since the filing of this E-Consult will need to be taken into account when assessing these recommendations. Please contact me if you have further questions.    My total time spent reviewing clinical information and formulating assessment was 20 minutes.        Jacob Cogan, MD

## 2022-11-28 ENCOUNTER — MEDICAL CORRESPONDENCE (OUTPATIENT)
Dept: HEALTH INFORMATION MANAGEMENT | Facility: CLINIC | Age: 37
End: 2022-11-28

## 2022-11-28 ENCOUNTER — VIRTUAL VISIT (OUTPATIENT)
Dept: INTERNAL MEDICINE | Facility: CLINIC | Age: 37
End: 2022-11-28
Payer: COMMERCIAL

## 2022-11-28 DIAGNOSIS — G90.9 AUTONOMIC DYSFUNCTION: ICD-10-CM

## 2022-11-28 DIAGNOSIS — F32.9 REACTIVE DEPRESSION: ICD-10-CM

## 2022-11-28 DIAGNOSIS — I73.81 ERYTHROMELALGIA (H): Primary | ICD-10-CM

## 2022-11-28 DIAGNOSIS — Z79.899 MEDICAL MARIJUANA USE: ICD-10-CM

## 2022-11-28 DIAGNOSIS — F48.9 MOOD PROBLEM: ICD-10-CM

## 2022-11-28 DIAGNOSIS — F41.9 ANXIETY: ICD-10-CM

## 2022-11-28 PROCEDURE — 99215 OFFICE O/P EST HI 40 MIN: CPT | Mod: GT | Performed by: PEDIATRICS

## 2022-11-28 RX ORDER — VENLAFAXINE HYDROCHLORIDE 37.5 MG/1
37.5 CAPSULE, EXTENDED RELEASE ORAL DAILY
Qty: 60 CAPSULE | Refills: 1 | Status: SHIPPED | OUTPATIENT
Start: 2022-11-28 | End: 2023-01-12

## 2022-11-28 ASSESSMENT — ANXIETY QUESTIONNAIRES
IF YOU CHECKED OFF ANY PROBLEMS ON THIS QUESTIONNAIRE, HOW DIFFICULT HAVE THESE PROBLEMS MADE IT FOR YOU TO DO YOUR WORK, TAKE CARE OF THINGS AT HOME, OR GET ALONG WITH OTHER PEOPLE: SOMEWHAT DIFFICULT
5. BEING SO RESTLESS THAT IT IS HARD TO SIT STILL: NOT AT ALL
1. FEELING NERVOUS, ANXIOUS, OR ON EDGE: NEARLY EVERY DAY
GAD7 TOTAL SCORE: 15
GAD7 TOTAL SCORE: 15
6. BECOMING EASILY ANNOYED OR IRRITABLE: MORE THAN HALF THE DAYS
3. WORRYING TOO MUCH ABOUT DIFFERENT THINGS: MORE THAN HALF THE DAYS
7. FEELING AFRAID AS IF SOMETHING AWFUL MIGHT HAPPEN: NEARLY EVERY DAY
2. NOT BEING ABLE TO STOP OR CONTROL WORRYING: MORE THAN HALF THE DAYS

## 2022-11-28 ASSESSMENT — PATIENT HEALTH QUESTIONNAIRE - PHQ9
5. POOR APPETITE OR OVEREATING: NEARLY EVERY DAY
SUM OF ALL RESPONSES TO PHQ QUESTIONS 1-9: 10

## 2022-11-28 NOTE — PROGRESS NOTES
"Ronna is a 37 year old who is being evaluated via a billable video visit.      How would you like to obtain your AVS? MyChart  If the video visit is dropped, the invitation should be resent by: Send to e-mail at: [email or web address]  Will anyone else be joining your video visit? No    Josephine DAVID    Dear patient. Thank you for visiting with me. I want you to feel respected, understood, and empowered. \"Respect\" is valuing you as much as I would a close family member. \"Empowerment\" happens when you are fully informed, and can make the best possible decision for you.  Please ask me questions!  Challenge anything that is not clear.    Medical records are primarily used as memory aids for me and my colleagues. Things to know about my documentation style:  - The 'problem list' includes current symptoms or diagnoses, and some problems that are resolved but may return. I use the past medical history for problems not expected to return.  - I use single quotation marks for things that you or I said, when I want to clarify who was speaking.  - I use double quotation marks when copying a term from elsewhere in your records. Italics (besides here) may also denote a quotation.  If you have questions or concerns, please contact me; I will reply as soon as time allows.      Virtual Visit Details    Start Time: 2:02 PM    End Time:2:33 PM    Type of service:  Video Visit    Originating Location (pt. Location): Home    Platform used for Visit: [facility] Location (provider location):  Off-site    PCP: Neal Ball  Visit type: problem-oriented  Time note (e5, 40'): The total time (on the date of service) for this service was 61 minutes, including discussion/face-to-face, chart review, interpretation not otherwise reported, documentation, and updating of the computerized record.        Context    Ronna Rivera is a 37 year old woman, with concerns including:  Chief Complaint   Patient presents with     " Video Visit       History, update, and/or problems         Problem List as of 11/28/2022 Reviewed: 11/28/2022  9:57 PM by Neal Ball MD          Noted       Nervous and Auditory    1. Autonomic dysfunction 6/6/2021     Overview Signed 6/6/2021  9:10 PM by Neal Ball MD      Erythromelalgia, symptomatic orthostatic tachycardia (POTS), vasovagal spell           Last Assessment & Plan 6/4/2021 Office Visit Written 6/6/2021  9:10 PM by Neal Ball MD      Autonomic dysfuncion       UPDATE: Autonomic reflex screen reviewed from [facility] 3/19/21.  Abnormal study. There is evidence of patchy postganglionic sympathetic sudomotor impairment. There was also a suggestion of poor vasomotor tone and dehydration, along with symptomatic orthostatic tachycardia and neurocardiogenic presyncope. Cardiovagal and cardiovascular adrenergic functions were normal. The  findings can be seen in erythromelalgia.   and  Comments on Sudomotor Test: QSART responses were reduced at the proximal leg, absent at the distal leg, and normal at all other sites.       ASSESSMENT & PLAN: I haven't had a chance yet to go over all of her symptoms, but will in future to-establish visit.            Relevant Orders     [facility]    2. Erythromelalgia (H) - Primary 3/25/2021     Overview Addendum 11/21/2022  5:16 PM by Neal Ball MD      Imuran and IVIG and (as of Nov-2022) high-dose steroids  Improved after high-dose intermittent steroids, then worsened.  Dx with testing by Dr. Shannon De La Rosa at [facility]. Also seeing Dr. Mook James @ [facility]            Last Assessment & Plan 11/21/2022 Virtual Visit Written 11/21/2022  5:16 PM by Neal Ball MD      Erythromelalgia  Several things done today about her worsening erythromelalgia symptoms and the outside request for rituximab.  As noted in several other notes here and elsewhere, her swelling redness and pain have returned after a few month period  "of relative improvement.  She has continued with Imuran and weekly IVIG.  With the recent worsening her neurologists had determined to give another several days of IV Solu-Medrol, which gave her the side effects that she is used to.    She has appointments with outside rheumatology and hematology, but there is a delay as would be the case here at [facility].  Earlier I was requested to put in a request for hematology here, on the hope that they would be able to get her in sooner.   It looks like my earlier contact led to a Nov 29 appointment with Dr. Coffman, but this was cancelled.  Then today the consult led to a request for an e-consult, which I sent and also did a page to find out if this was the correct program.  I did this video visit to check in with them and see about progress elsewhere. To review the diagnosis was done by [place] neurologist Dr. De La Rosa based on several tests including her autonomic reflex screen 3/22/2021.  Had reported negative \"JAK2, CALR, MPL mutation analysis, as well as serum erythropoietin level.\"  It looks like [facility] [place] consult in, but the appointment isn't until mid-December. Dr. James and Rj was hoping to get them in sooner.  She is able to stand briefly, uses wheelchair.  breaks in to comment that she has been crying and 'screaming' in the bathtub for treatments.  No ulcers like original, but feel are swollen, starting to stretch.       ASSESSMENT: Clinical diagnosis of erythromelalgia by outside experts.  I was originally involved because of facility for home IV treatments with IVIG via [place] home infusion.  I defer to my expert colleagues on indication for rituximab, although that certainly seems appropriate to me at this point.       PLAN:   -- They will send me (and Dawna LOVETT and BROOKLYNN) pictures the recent of her feet.  -- Continue with comfort measures as is being done  --Trying to get her into 1 or another specialty provider who would be able/willing to write " for rituximab based on existing or expeditable evaluation.   -- We talked about severity as far as possibility of admission. If an outpatient treatment isn't possible, then my rough guess is she would be best off going to [place] in [place] rather than [facility].          Relevant Medications     venlafaxine (EFFEXOR XR) 37.5 MG 24 hr capsule     Other Relevant Orders     [facility]       Behavioral    3. Reactive depression 11/28/2022     Overview Signed 11/28/2022  9:57 PM by Neal Ball MD      Venlafaxine 11/28/2022  Duloxetine earlier in 2022          Last Assessment & Plan 11/28/2022 Virtual Visit Written 11/28/2022  9:56 PM by Neal Ball MD      Depression  With symptoms going downhill, has been using the ativan more. Talking with  more about going off duloxetine. IT was hard to get off, was July or August, had a taper after the fainting spell.   It helped with mood problems, but also made her really tired. She felt more energy again after going through the withdrawal symptoms.  She comments she has a tendency towards anxiety and depression, and usually feels better with exercise, but can't do that because to the erythromelalgia.   It was originally started at the pain clinic, and then increased in the hospital.       ASSESSMENT: Depression, anxiety. There may have been two effects - one for mood, and one for her pain or even the erythromelalgia.   We had a extensive discussion about options and alternatives. She ended up electing to try venlafaxine, since I told her there might be a similar benefit as duloxetine, and it might be easier to titrate. We can do some follow-up after that.       PLAN:   -- venlafaxine. See orders for plan  -- pharmacogenetics testing, given difficulties with medications thus far. Provided counseling about pros and cons including uncertainty about cost. I can do the post-result counseling, having done this at 2 institutions in a more official  "capacity.  -- Should maintain an effective relationship with a therapist.          Relevant Medications     venlafaxine (EFFEXOR XR) 37.5 MG 24 hr capsule     Other Relevant Orders     RIGHTMED       Other    4. Mood problem 1/1/2021     Overview Addendum 10/17/2022  9:54 AM by Neal Ball MD      \"Mood problems\" = oversimplified term for adjustment reaction and stress (her own medical issues plus 2 autistic kids) and medication side effects (IV steroids). She sees a therapist and psychiatrist.  Off duloxetine as of 10/17/2022          Last Assessment & Plan 10/17/2022 Virtual Visit Written 10/17/2022  9:54 AM by Neal Ball MD      See comments about duloxetine under vasovagal spells          Relevant Orders     RIGHTMED       ADDENDUM: completed rightmed order forms and attached photos of her insurance card from [employer].  "

## 2022-11-29 NOTE — ASSESSMENT & PLAN NOTE
Depression  With symptoms going downhill, has been using the ativan more. Talking with  more about going off duloxetine. IT was hard to get off, was July or August, had a taper after the fainting spell.   It helped with mood problems, but also made her really tired. She felt more energy again after going through the withdrawal symptoms.  She comments she has a tendency towards anxiety and depression, and usually feels better with exercise, but can't do that because to the erythromelalgia.   It was originally started at the pain clinic, and then increased in the hospital.       ASSESSMENT: Depression, anxiety. There may have been two effects - one for mood, and one for her pain or even the erythromelalgia.   We had a extensive discussion about options and alternatives. She ended up electing to try venlafaxine, since I told her there might be a similar benefit as duloxetine, and it might be easier to titrate. We can do some follow-up after that.       PLAN:   -- venlafaxine. See orders for plan  -- pharmacogenetics testing, given difficulties with medications thus far. Provided counseling about pros and cons including uncertainty about cost. I can do the post-result counseling, having done this at 2 institutions in a more official capacity.  -- Should maintain an effective relationship with a therapist.

## 2022-12-06 ENCOUNTER — DOCUMENTATION ONLY (OUTPATIENT)
Dept: INTERNAL MEDICINE | Facility: CLINIC | Age: 37
End: 2022-12-06

## 2022-12-06 NOTE — PROGRESS NOTES
Type of Form Received: TEST REQUISITION FORM    Form Received (Date) 11/28/22   Form Filled out Yes, date 11/28/22   Placed in provider folder Yes

## 2022-12-20 ENCOUNTER — TELEPHONE (OUTPATIENT)
Dept: INTERNAL MEDICINE | Facility: CLINIC | Age: 37
End: 2022-12-20

## 2022-12-20 NOTE — TELEPHONE ENCOUNTER
[facility] Call Center    Phone Message    May a detailed message be left on voicemail: no     Reason for Call: Other: Daphne pt's nurse  from [facility] requested a message be sent to the pt's care team to let them know she is available as a resourse to the pt if needed at all

## 2022-12-27 ENCOUNTER — MYC MEDICAL ADVICE (OUTPATIENT)
Dept: INTERNAL MEDICINE | Facility: CLINIC | Age: 37
End: 2022-12-27

## 2022-12-27 DIAGNOSIS — B00.1 RECURRENT HERPES LABIALIS: ICD-10-CM

## 2022-12-27 RX ORDER — VALACYCLOVIR HYDROCHLORIDE 500 MG/1
1000 TABLET, FILM COATED ORAL DAILY
Qty: 10 TABLET | Refills: 0 | Status: ON HOLD | OUTPATIENT
Start: 2022-12-27 | End: 2023-02-25

## 2023-01-09 ENCOUNTER — MYC MEDICAL ADVICE (OUTPATIENT)
Dept: INTERNAL MEDICINE | Facility: CLINIC | Age: 38
End: 2023-01-09

## 2023-01-09 DIAGNOSIS — I73.81 ERYTHROMELALGIA (H): ICD-10-CM

## 2023-01-09 DIAGNOSIS — N94.89 MENSTRUAL SUPPRESSION: ICD-10-CM

## 2023-01-09 DIAGNOSIS — F32.9 REACTIVE DEPRESSION: ICD-10-CM

## 2023-01-12 RX ORDER — VENLAFAXINE HYDROCHLORIDE 37.5 MG/1
37.5 CAPSULE, EXTENDED RELEASE ORAL DAILY
Qty: 60 CAPSULE | Refills: 0 | Status: SHIPPED | OUTPATIENT
Start: 2023-01-12 | End: 2023-05-03

## 2023-01-12 RX ORDER — ACETAMINOPHEN AND CODEINE PHOSPHATE 120; 12 MG/5ML; MG/5ML
0.35 SOLUTION ORAL DAILY
Qty: 90 TABLET | Refills: 1 | Status: SHIPPED | OUTPATIENT
Start: 2023-01-12 | End: 2023-02-17

## 2023-02-14 ENCOUNTER — MYC MEDICAL ADVICE (OUTPATIENT)
Dept: INTERNAL MEDICINE | Facility: CLINIC | Age: 38
End: 2023-02-14
Payer: COMMERCIAL

## 2023-02-14 DIAGNOSIS — G89.4 CHRONIC PAIN SYNDROME: ICD-10-CM

## 2023-02-14 DIAGNOSIS — I73.81 ERYTHROMELALGIA (H): ICD-10-CM

## 2023-02-17 ENCOUNTER — VIRTUAL VISIT (OUTPATIENT)
Dept: INTERNAL MEDICINE | Facility: CLINIC | Age: 38
End: 2023-02-17
Payer: COMMERCIAL

## 2023-02-17 DIAGNOSIS — I73.81 ERYTHROMELALGIA (H): Primary | ICD-10-CM

## 2023-02-17 DIAGNOSIS — N94.89 MENSTRUAL SUPPRESSION: ICD-10-CM

## 2023-02-17 PROCEDURE — 99215 OFFICE O/P EST HI 40 MIN: CPT | Mod: VID | Performed by: PEDIATRICS

## 2023-02-17 RX ORDER — ACETAMINOPHEN AND CODEINE PHOSPHATE 120; 12 MG/5ML; MG/5ML
0.7 SOLUTION ORAL DAILY
Qty: 180 TABLET | Refills: 3 | Status: SHIPPED | OUTPATIENT
Start: 2023-02-17 | End: 2023-09-28

## 2023-02-17 RX ORDER — HYDROCODONE BITARTRATE AND ACETAMINOPHEN 7.5; 325 MG/1; MG/1
1 TABLET ORAL 2 TIMES DAILY PRN
Qty: 60 TABLET | Refills: 0 | Status: ON HOLD | OUTPATIENT
Start: 2023-02-17 | End: 2023-03-02

## 2023-02-17 RX ORDER — GABAPENTIN 300 MG/1
300 CAPSULE ORAL 3 TIMES DAILY
Qty: 90 CAPSULE | Refills: 3 | Status: ON HOLD | OUTPATIENT
Start: 2023-02-17 | End: 2023-02-28

## 2023-02-17 NOTE — PROGRESS NOTES
"JUAN ENRIQUEZ F    Dear patient. Thank you for visiting with me. I want you to feel respected, understood, and empowered. \"Respect\" is valuing you as much as I would a close family member. \"Empowerment\" happens when you are fully informed, and can make the best possible decision for you.  Please ask me questions!  Challenge anything that is not clear.    Medical records are primarily used as memory aids for me and my colleagues. Things to know about my documentation style:  - The 'problem list' includes current symptoms or diagnoses, and some problems that are resolved but may return. I use the past medical history for problems not expected to return.  - I use single quotation marks for things that you or I said, when I want to clarify who was speaking.  - I use double quotation marks when copying a term from elsewhere in your records. Italics (besides here) may also denote a quotation.  If you have questions or concerns, please contact me; I will reply as soon as time allows.      Virtual Visit Details    Start Time: 445 PM    End Time:5:34 PM    Type of service:  Video Visit    Originating Location (pt. Location): Home    Platform used for Visit: [facility]    Distant Location (provider location):  Off-site    PCP: Neal Ball  Visit type: problem-oriented  Time note (e5, 40'): The total time (on the date of service) for this service was 47 minutes, including discussion/face-to-face, chart review, interpretation not otherwise reported, documentation, and updating of the computerized record.        Context    Ronna Rivera is a 37 year old woman, seen with her , with concerns including:  Chief Complaint   Patient presents with     Video Visit       History, update, and/or problems    Problem List as of 2/17/2023 Reviewed: 11/28/2022  9:57 PM by Neal Ball MD          Noted       Nervous and Auditory    1. Erythromelalgia (H) - Primary 3/25/2021     Overview Addendum 11/21/2022  5:16 " PM by Neal Ball MD      Imuran and IVIG and (as of Nov-2022) high-dose steroids  Improved after high-dose intermittent steroids, then worsened.  Dx with testing by Dr. Shannon De La Rosa at [facility]. Also seeing Dr. Mook James @ [facility]            Last Assessment & Plan 2/17/2023 Virtual Visit Written 2/21/2023 10:50 AM by Neal Ball MD      Erythromelalgia  Was on rituximab, B cell count zeroed effectively. Finished IVIG and steroids and improved but then worsened again last week - swelling and pain. Ulcers are just starting. Before was worst medially, now is worse laterally. No other recent illnesses  Is trying various strategies.   Had to have oxycodone again - uses 1-2 times per day.  OK to continue coming to me for pain. A pain clinic would mostly be for other interventions.   Prefers hydrocodone or oxycodone and hydromorphone. Hasn't sent rightmed back.  Could restart gabapentin.  In future could consider mexiletine again for symptoms.        ASSESSMENT and PLAN: mostly I need to defer to Dr. OVERTON or Rj @ [facility]. For now, I ok'd regular use of hydrocodone.          Relevant Medications     HYDROcodone-acetaminophen (NORCO) 7.5-325 MG per tablet     gabapentin (NEURONTIN) 300 MG capsule     norethindrone (MICRONOR) 0.35 MG tablet       Menstrual suppression because of erythromelalgia  Norethindrone has been taken but hasn't stopped her period yet. Has been on board since October.        ASSESSMENT and PLAN: OK to increase to 2 daily (can be either 2 once or 1 twice

## 2023-02-17 NOTE — NURSING NOTE
"Is the patient currently in the state of [place]? YES    Visit mode:VIDEO    If the visit is dropped, the patient can be reconnected by: VIDEO VISIT: Text to cell phone: [phone]    Will anyone else be joining the visit? Yes,  Shiraz.      How would you like to obtain your AVS? MyChart    Are changes needed to the allergy or medication list? YES: Imuran is now 100mg (2 50mg tablets) instead of the 150. PT started taking Hydrocodone (\"7.5-325t\") as needed for pain, started using about 3 days ago.    Comments or concerns regarding today's visit: PT having worsening symptoms, wants help with pain management, next steps, and coordinating with other providers in other instutions.    JUAN ENRIQUEZ VVF        "

## 2023-02-20 ENCOUNTER — NURSE TRIAGE (OUTPATIENT)
Dept: NURSING | Facility: CLINIC | Age: 38
End: 2023-02-20
Payer: COMMERCIAL

## 2023-02-20 ENCOUNTER — TELEPHONE (OUTPATIENT)
Dept: INTERNAL MEDICINE | Facility: CLINIC | Age: 38
End: 2023-02-20
Payer: COMMERCIAL

## 2023-02-20 NOTE — TELEPHONE ENCOUNTER
Called patient- Dr. Ball recommended she go to [facility] or [place] as her rheum and onc docs are there, apparently both are out on vacation. Patient will go to [facility]. Will have Dr. Ball call admissions if possible.    Patricio Rojas RN on 2/20/2023 at 12:46 PM

## 2023-02-20 NOTE — TELEPHONE ENCOUNTER
[facility] Call Center    Phone Message    May a detailed message be left on voicemail: yes     Reason for Call: Other: Patient requesting Dr. Ball help with her getting admitted to a hospital to help with managing her care with her condition. Please call her back at  [phone] thank you     Action Taken: Message routed to:  [facility] pcc    Travel Screening: Not Applicable

## 2023-02-21 ENCOUNTER — TRANSFERRED RECORDS (OUTPATIENT)
Dept: HEALTH INFORMATION MANAGEMENT | Facility: CLINIC | Age: 38
End: 2023-02-21

## 2023-02-21 ENCOUNTER — TELEPHONE (OUTPATIENT)
Dept: INTERNAL MEDICINE | Facility: CLINIC | Age: 38
End: 2023-02-21
Payer: COMMERCIAL

## 2023-02-21 ENCOUNTER — HOSPITAL ENCOUNTER (INPATIENT)
Facility: CLINIC | Age: 38
LOS: 9 days | Discharge: HOME | DRG: 300 | End: 2023-03-02
Attending: EMERGENCY MEDICINE | Admitting: STUDENT IN AN ORGANIZED HEALTH CARE EDUCATION/TRAINING PROGRAM
Payer: COMMERCIAL

## 2023-02-21 DIAGNOSIS — G89.4 CHRONIC PAIN SYNDROME: ICD-10-CM

## 2023-02-21 DIAGNOSIS — Z11.52 ENCOUNTER FOR SCREENING LABORATORY TESTING FOR SEVERE ACUTE RESPIRATORY SYNDROME CORONAVIRUS 2 (SARS-COV-2): ICD-10-CM

## 2023-02-21 DIAGNOSIS — I73.81 ERYTHROMELALGIA (H): Primary | ICD-10-CM

## 2023-02-21 LAB
ALBUMIN SERPL BCG-MCNC: 4.3 G/DL (ref 3.5–5.2)
ALP SERPL-CCNC: 62 U/L (ref 35–104)
ALT SERPL W P-5'-P-CCNC: 43 U/L (ref 10–35)
ANION GAP SERPL CALCULATED.3IONS-SCNC: 14 MMOL/L (ref 7–15)
AST SERPL W P-5'-P-CCNC: 43 U/L (ref 10–35)
BASOPHILS # BLD AUTO: 0 10E3/UL (ref 0–0.2)
BASOPHILS NFR BLD AUTO: 0 %
BILIRUB SERPL-MCNC: 0.4 MG/DL
BUN SERPL-MCNC: 13.1 MG/DL (ref 6–20)
CALCIUM SERPL-MCNC: 9.4 MG/DL (ref 8.6–10)
CHLORIDE SERPL-SCNC: 101 MMOL/L (ref 98–107)
CREAT SERPL-MCNC: 0.66 MG/DL (ref 0.51–0.95)
DEPRECATED HCO3 PLAS-SCNC: 24 MMOL/L (ref 22–29)
EOSINOPHIL # BLD AUTO: 0 10E3/UL (ref 0–0.7)
EOSINOPHIL NFR BLD AUTO: 0 %
ERYTHROCYTE [DISTWIDTH] IN BLOOD BY AUTOMATED COUNT: 13.4 % (ref 10–15)
GFR SERPL CREATININE-BSD FRML MDRD: >90 ML/MIN/1.73M2
GLUCOSE SERPL-MCNC: 89 MG/DL (ref 70–99)
HCT VFR BLD AUTO: 37.9 % (ref 35–47)
HGB BLD-MCNC: 12.7 G/DL (ref 11.7–15.7)
IMM GRANULOCYTES # BLD: 0 10E3/UL
IMM GRANULOCYTES NFR BLD: 0 %
LYMPHOCYTES # BLD AUTO: 0.7 10E3/UL (ref 0.8–5.3)
LYMPHOCYTES NFR BLD AUTO: 10 %
MCH RBC QN AUTO: 31.8 PG (ref 26.5–33)
MCHC RBC AUTO-ENTMCNC: 33.5 G/DL (ref 31.5–36.5)
MCV RBC AUTO: 95 FL (ref 78–100)
MONOCYTES # BLD AUTO: 0.4 10E3/UL (ref 0–1.3)
MONOCYTES NFR BLD AUTO: 7 %
NEUTROPHILS # BLD AUTO: 5.3 10E3/UL (ref 1.6–8.3)
NEUTROPHILS NFR BLD AUTO: 83 %
NRBC # BLD AUTO: 0 10E3/UL
NRBC BLD AUTO-RTO: 0 /100
PLATELET # BLD AUTO: 194 10E3/UL (ref 150–450)
POTASSIUM SERPL-SCNC: 4 MMOL/L (ref 3.4–5.3)
PROT SERPL-MCNC: 7.8 G/DL (ref 6.4–8.3)
RBC # BLD AUTO: 4 10E6/UL (ref 3.8–5.2)
SODIUM SERPL-SCNC: 139 MMOL/L (ref 136–145)
WBC # BLD AUTO: 6.4 10E3/UL (ref 4–11)

## 2023-02-21 PROCEDURE — U0005 INFEC AGEN DETEC AMPLI PROBE: HCPCS

## 2023-02-21 PROCEDURE — 250N000011 HC RX IP 250 OP 636

## 2023-02-21 PROCEDURE — 36415 COLL VENOUS BLD VENIPUNCTURE: CPT | Performed by: EMERGENCY MEDICINE

## 2023-02-21 PROCEDURE — 80053 COMPREHEN METABOLIC PANEL: CPT | Performed by: EMERGENCY MEDICINE

## 2023-02-21 PROCEDURE — 99285 EMERGENCY DEPT VISIT HI MDM: CPT | Performed by: EMERGENCY MEDICINE

## 2023-02-21 PROCEDURE — C9803 HOPD COVID-19 SPEC COLLECT: HCPCS | Performed by: EMERGENCY MEDICINE

## 2023-02-21 PROCEDURE — 120N000002 HC R&B MED SURG/OB UMMC

## 2023-02-21 PROCEDURE — 99223 1ST HOSP IP/OBS HIGH 75: CPT | Performed by: STUDENT IN AN ORGANIZED HEALTH CARE EDUCATION/TRAINING PROGRAM

## 2023-02-21 PROCEDURE — 85025 COMPLETE CBC W/AUTO DIFF WBC: CPT | Performed by: EMERGENCY MEDICINE

## 2023-02-21 PROCEDURE — 250N000013 HC RX MED GY IP 250 OP 250 PS 637: Performed by: EMERGENCY MEDICINE

## 2023-02-21 PROCEDURE — 250N000011 HC RX IP 250 OP 636: Performed by: EMERGENCY MEDICINE

## 2023-02-21 PROCEDURE — 250N000013 HC RX MED GY IP 250 OP 250 PS 637

## 2023-02-21 RX ORDER — OXYCODONE HCL 10 MG/1
10 TABLET, FILM COATED, EXTENDED RELEASE ORAL 2 TIMES DAILY
Qty: 60 TABLET | Refills: 0 | Status: ON HOLD | OUTPATIENT
Start: 2023-02-21 | End: 2023-03-02

## 2023-02-21 RX ORDER — NALOXONE HYDROCHLORIDE 0.4 MG/ML
0.4 INJECTION, SOLUTION INTRAMUSCULAR; INTRAVENOUS; SUBCUTANEOUS
Status: DISCONTINUED | OUTPATIENT
Start: 2023-02-21 | End: 2023-03-02 | Stop reason: HOSPADM

## 2023-02-21 RX ORDER — OXYCODONE HYDROCHLORIDE 10 MG/1
10 TABLET ORAL EVERY 8 HOURS PRN
Status: DISCONTINUED | OUTPATIENT
Start: 2023-02-21 | End: 2023-02-22

## 2023-02-21 RX ORDER — POLYETHYLENE GLYCOL 3350 17 G/17G
17 POWDER, FOR SOLUTION ORAL DAILY PRN
Status: DISCONTINUED | OUTPATIENT
Start: 2023-02-21 | End: 2023-02-21

## 2023-02-21 RX ORDER — NALOXONE HYDROCHLORIDE 0.4 MG/ML
0.2 INJECTION, SOLUTION INTRAMUSCULAR; INTRAVENOUS; SUBCUTANEOUS
Status: DISCONTINUED | OUTPATIENT
Start: 2023-02-21 | End: 2023-03-02 | Stop reason: HOSPADM

## 2023-02-21 RX ORDER — OXYCODONE HYDROCHLORIDE 10 MG/1
10 TABLET ORAL ONCE
Status: COMPLETED | OUTPATIENT
Start: 2023-02-21 | End: 2023-02-21

## 2023-02-21 RX ORDER — POLYETHYLENE GLYCOL 3350 17 G/17G
17 POWDER, FOR SOLUTION ORAL DAILY
Status: DISCONTINUED | OUTPATIENT
Start: 2023-02-22 | End: 2023-03-02 | Stop reason: HOSPADM

## 2023-02-21 RX ORDER — AZATHIOPRINE 50 MG/1
100 TABLET ORAL DAILY
Status: DISCONTINUED | OUTPATIENT
Start: 2023-02-22 | End: 2023-02-21

## 2023-02-21 RX ORDER — HYDROMORPHONE HYDROCHLORIDE 1 MG/ML
0.5 INJECTION, SOLUTION INTRAMUSCULAR; INTRAVENOUS; SUBCUTANEOUS
Status: DISCONTINUED | OUTPATIENT
Start: 2023-02-21 | End: 2023-02-21

## 2023-02-21 RX ORDER — ACETAMINOPHEN AND CODEINE PHOSPHATE 120; 12 MG/5ML; MG/5ML
0.7 SOLUTION ORAL DAILY
Status: DISCONTINUED | OUTPATIENT
Start: 2023-02-22 | End: 2023-02-21

## 2023-02-21 RX ORDER — LIDOCAINE 40 MG/G
CREAM TOPICAL
Status: DISCONTINUED | OUTPATIENT
Start: 2023-02-21 | End: 2023-03-02 | Stop reason: HOSPADM

## 2023-02-21 RX ORDER — ACETAMINOPHEN AND CODEINE PHOSPHATE 120; 12 MG/5ML; MG/5ML
0.7 SOLUTION ORAL DAILY
Status: DISCONTINUED | OUTPATIENT
Start: 2023-02-21 | End: 2023-03-02 | Stop reason: HOSPADM

## 2023-02-21 RX ORDER — VENLAFAXINE HYDROCHLORIDE 37.5 MG/1
37.5 CAPSULE, EXTENDED RELEASE ORAL DAILY
Status: DISCONTINUED | OUTPATIENT
Start: 2023-02-21 | End: 2023-03-02 | Stop reason: HOSPADM

## 2023-02-21 RX ORDER — AMOXICILLIN 250 MG
1 CAPSULE ORAL 2 TIMES DAILY PRN
Status: DISCONTINUED | OUTPATIENT
Start: 2023-02-21 | End: 2023-02-21

## 2023-02-21 RX ORDER — AZATHIOPRINE 50 MG/1
100 TABLET ORAL DAILY
Status: DISCONTINUED | OUTPATIENT
Start: 2023-02-21 | End: 2023-03-02 | Stop reason: HOSPADM

## 2023-02-21 RX ORDER — AMOXICILLIN 250 MG
1 CAPSULE ORAL DAILY
Status: DISCONTINUED | OUTPATIENT
Start: 2023-02-22 | End: 2023-03-02 | Stop reason: HOSPADM

## 2023-02-21 RX ORDER — HYDROMORPHONE HYDROCHLORIDE 1 MG/ML
.3-.5 INJECTION, SOLUTION INTRAMUSCULAR; INTRAVENOUS; SUBCUTANEOUS EVERY 4 HOURS PRN
Status: DISCONTINUED | OUTPATIENT
Start: 2023-02-21 | End: 2023-02-21

## 2023-02-21 RX ORDER — IBUPROFEN 200 MG
400 TABLET ORAL EVERY 6 HOURS PRN
Status: DISCONTINUED | OUTPATIENT
Start: 2023-02-21 | End: 2023-02-21

## 2023-02-21 RX ORDER — PREDNISONE 20 MG/1
40 TABLET ORAL DAILY
Status: DISCONTINUED | OUTPATIENT
Start: 2023-02-22 | End: 2023-02-25

## 2023-02-21 RX ORDER — ACETAMINOPHEN 325 MG/1
975 TABLET ORAL EVERY 8 HOURS
Status: DISCONTINUED | OUTPATIENT
Start: 2023-02-21 | End: 2023-03-02 | Stop reason: HOSPADM

## 2023-02-21 RX ORDER — VENLAFAXINE HYDROCHLORIDE 37.5 MG/1
37.5 CAPSULE, EXTENDED RELEASE ORAL DAILY
Status: DISCONTINUED | OUTPATIENT
Start: 2023-02-22 | End: 2023-02-21

## 2023-02-21 RX ORDER — HYDROMORPHONE HYDROCHLORIDE 1 MG/ML
0.5 INJECTION, SOLUTION INTRAMUSCULAR; INTRAVENOUS; SUBCUTANEOUS
Status: COMPLETED | OUTPATIENT
Start: 2023-02-21 | End: 2023-02-21

## 2023-02-21 RX ORDER — IBUPROFEN 200 MG
400 TABLET ORAL EVERY 6 HOURS
Status: DISCONTINUED | OUTPATIENT
Start: 2023-02-21 | End: 2023-03-02 | Stop reason: HOSPADM

## 2023-02-21 RX ORDER — AMOXICILLIN 250 MG
2 CAPSULE ORAL DAILY
Status: DISCONTINUED | OUTPATIENT
Start: 2023-02-22 | End: 2023-03-02 | Stop reason: HOSPADM

## 2023-02-21 RX ORDER — AMOXICILLIN 250 MG
2 CAPSULE ORAL 2 TIMES DAILY PRN
Status: DISCONTINUED | OUTPATIENT
Start: 2023-02-21 | End: 2023-02-21

## 2023-02-21 RX ORDER — ACETAMINOPHEN 325 MG/1
975 TABLET ORAL EVERY 8 HOURS PRN
Status: DISCONTINUED | OUTPATIENT
Start: 2023-02-21 | End: 2023-02-21

## 2023-02-21 RX ORDER — OXYCODONE HCL 10 MG/1
10 TABLET, FILM COATED, EXTENDED RELEASE ORAL 2 TIMES DAILY
Status: DISCONTINUED | OUTPATIENT
Start: 2023-02-21 | End: 2023-02-21

## 2023-02-21 RX ORDER — HYDROMORPHONE HYDROCHLORIDE 1 MG/ML
.3-.5 INJECTION, SOLUTION INTRAMUSCULAR; INTRAVENOUS; SUBCUTANEOUS
Status: DISCONTINUED | OUTPATIENT
Start: 2023-02-22 | End: 2023-02-22

## 2023-02-21 RX ORDER — ENOXAPARIN SODIUM 100 MG/ML
40 INJECTION SUBCUTANEOUS EVERY 24 HOURS
Status: DISCONTINUED | OUTPATIENT
Start: 2023-02-21 | End: 2023-03-02 | Stop reason: HOSPADM

## 2023-02-21 RX ORDER — GABAPENTIN 300 MG/1
300 CAPSULE ORAL 3 TIMES DAILY
Status: DISCONTINUED | OUTPATIENT
Start: 2023-02-21 | End: 2023-02-22

## 2023-02-21 RX ADMIN — HYDROMORPHONE HYDROCHLORIDE 0.5 MG: 1 INJECTION, SOLUTION INTRAMUSCULAR; INTRAVENOUS; SUBCUTANEOUS at 21:46

## 2023-02-21 RX ADMIN — IBUPROFEN 400 MG: 200 TABLET, FILM COATED ORAL at 22:18

## 2023-02-21 RX ADMIN — HYDROMORPHONE HYDROCHLORIDE 0.5 MG: 1 INJECTION, SOLUTION INTRAMUSCULAR; INTRAVENOUS; SUBCUTANEOUS at 18:48

## 2023-02-21 RX ADMIN — ACETAMINOPHEN 975 MG: 325 TABLET, FILM COATED ORAL at 22:17

## 2023-02-21 RX ADMIN — OXYCODONE HYDROCHLORIDE 10 MG: 10 TABLET, FILM COATED, EXTENDED RELEASE ORAL at 21:23

## 2023-02-21 RX ADMIN — OXYCODONE HYDROCHLORIDE 10 MG: 10 TABLET ORAL at 14:44

## 2023-02-21 RX ADMIN — HYDROMORPHONE HYDROCHLORIDE 0.5 MG: 1 INJECTION, SOLUTION INTRAMUSCULAR; INTRAVENOUS; SUBCUTANEOUS at 20:14

## 2023-02-21 RX ADMIN — GABAPENTIN 300 MG: 300 CAPSULE ORAL at 21:22

## 2023-02-21 ASSESSMENT — ACTIVITIES OF DAILY LIVING (ADL)
ADLS_ACUITY_SCORE: 37
ADLS_ACUITY_SCORE: 35
ADLS_ACUITY_SCORE: 37

## 2023-02-21 NOTE — CONFIDENTIAL NOTE
I don't believe [facility] will be able or willing to help this patient, even if she could get a hospital bed. There is not a direct admission capacity for something like this, she would just have to board in the ER.

## 2023-02-21 NOTE — ASSESSMENT & PLAN NOTE
Erythromelalgia  Was on rituximab, B cell count zeroed effectively. Finished IVIG and steroids and improved but then worsened again last week - swelling and pain. Ulcers are just starting. Before was worst medially, now is worse laterally. No other recent illnesses  Is trying various strategies.   Had to have oxycodone again - uses 1-2 times per day.  OK to continue coming to me for pain. A pain clinic would mostly be for other interventions.   Prefers hydrocodone or oxycodone and hydromorphone. Hasn't sent rightmed back.  Could restart gabapentin.  In future could consider mexiletine again for symptoms.        ASSESSMENT and PLAN: mostly I need to defer to Dr. OVERTON or Rj @ [facility]. For now, I ok'd regular use of hydrocodone.

## 2023-02-21 NOTE — ED PROVIDER NOTES
[place] EMERGENCY DEPARTMENT ([facility])  2/21/23  History     Chief Complaint   Patient presents with     Foot Pain     HPI  Ronna Rivera is a 37 year old female with a history notable for chronic pain secondary to erythromelalgia who presents to the ED for evaluation of bilateral foot pain. She is followed by [facility] and [facility].  She states that her pain has significantly worsened over the past several days.  She does not know what is caused this.  Patient has been elevating her feet as well as using cold.  She has had IVIG and rituximab in the past which have not improved her symptoms.  Patient did have some improvement with steroids in the past.  No known precipitating factors to this episode.  Patient's been in contact with her primary care physician who recommended that she go to this facility.  No other symptoms noted.    [facility] Everywhere records reviewed, she had an IVIG dose for her erythromelalgia on 2/6/2023 through [facility].    Past Medical History  Past Medical History:   Diagnosis Date     Auto-erythrocyte sensitization (H)      Reactive depression      Seasonal allergies 06/25/2020    Mostly resolved     Past Surgical History:   Procedure Laterality Date     INSERT PICC LINE Left 6/23/2021    Procedure: INSERTION, PICC;  Surgeon: Neal Penny MD;  Location: [facility] [place]     IR PICC PLACEMENT > 5 YRS OF AGE  6/23/2021     NO HISTORY OF SURGERY       azaTHIOprine (IMURAN) 50 MG tablet  gabapentin (NEURONTIN) 300 MG capsule  gabapentin (NEURONTIN) 600 MG tablet  HYDROcodone-acetaminophen (NORCO) 7.5-325 MG per tablet  ibuprofen (ADVIL/MOTRIN) 200 MG tablet  melatonin 1 MG TABS tablet  naloxone (NARCAN) 4 MG/0.1ML nasal spray  norethindrone (MICRONOR) 0.35 MG tablet  oxyCODONE (OXYCONTIN) 10 MG 12 hr tablet  SUMAtriptan (IMITREX) 100 MG tablet  valACYclovir (VALTREX) 500 MG tablet  venlafaxine (EFFEXOR XR) 37.5 MG 24 hr capsule  vitamin C  (ASCORBIC ACID) 250 MG tablet  Vitamin D3 (CHOLECALCIFEROL) 25 mcg (1000 units) tablet      Allergies   Allergen Reactions     Topiramate Anxiety     Family History  Family History   Problem Relation Age of Onset     Hypertension Father      Cerebrovascular Disease Maternal Grandmother      Diabetes Maternal Grandfather      Breast Cancer Paternal Grandmother      Hypertension Paternal Grandfather      C.A.D. Paternal Grandfather      Social History   Social History     Tobacco Use     Smoking status: Never     Smokeless tobacco: Never   Substance Use Topics     Alcohol use: No     Drug use: No      Past medical history, past surgical history, medications, allergies, family history, and social history were reviewed with the patient. No additional pertinent items.     Review of Systems  A complete review of systems was performed with pertinent positives and negatives noted in the HPI, and all other systems negative.    Physical Exam   BP: 127/77  Pulse: 104  Temp: 98.5  F (36.9  C)  Resp: 20  SpO2: 100 %      Physical Exam  Vitals and nursing note reviewed.   Constitutional:       General: She is not in acute distress.     Appearance: She is well-developed. She is not diaphoretic.   HENT:      Head: Normocephalic and atraumatic.      Mouth/Throat:      Pharynx: No oropharyngeal exudate.   Eyes:      General: No scleral icterus.        Right eye: No discharge.         Left eye: No discharge.      Pupils: Pupils are equal, round, and reactive to light.   Cardiovascular:      Rate and Rhythm: Regular rhythm. Tachycardia present.      Heart sounds: Normal heart sounds. No murmur heard.    No friction rub. No gallop.   Pulmonary:      Effort: Pulmonary effort is normal. No respiratory distress.      Breath sounds: Normal breath sounds. No wheezing.   Chest:      Chest wall: No tenderness.   Abdominal:      General: Bowel sounds are normal. There is no distension.      Palpations: Abdomen is soft.      Tenderness: There is  no abdominal tenderness.   Musculoskeletal:         General: No tenderness or deformity. Normal range of motion.      Cervical back: Normal range of motion and neck supple.   Skin:     General: Skin is warm and dry.      Coloration: Skin is not pale.      Findings: No erythema or rash.      Comments: Erythema and tenderness to bilateral feet.  Skin breakdown with small areas of necrosis on lateral distal aspects of feet.   Neurological:      Mental Status: She is alert and oriented to person, place, and time.      Cranial Nerves: No cranial nerve deficit.         ED Course, Procedures, & Data        Procedures               No results found for this or any previous visit (from the past 24 hour(s)).  Medications - No data to display          Medical Decision Making  The patient presented with a problem that is high complexity (a chronic illness severe exacerbation, progression, or side effect of treatment).    The patient's evaluation involved:  an assessment requiring an independent historian (Family)  review of external note(s) from 3+ sources (Previous notes)  discussion of management or test interpretation with another health professional (Rheumatology)    The patient's management involved moderate risk (prescription drug management including medications given in the ED) and high risk (a decision regarding hospitalization).    Assessments & Plan   This is a 37-year-old female with a history of erythromelalgia who presents with bilateral foot pain.  This has been worsening over the past several days with no known precipitating factors.  On exam patient has erythema and tenderness to bilateral feet.  There is areas of skin breakdown with small areas of necrosis bilaterally.  Lab work shows no acute abnormalities.  Patient was given hydromorphone and oxycodone for pain.  Discussed case with Rheumatology.  At this time we will hold off on steroids as patient did not have significant relief last time she was on them.  We will admit for further monitoring work-up and treatment.    I have reviewed the nursing notes.    I have reviewed the findings, diagnosis, plan and need for follow up with the patient.    New Prescriptions    No medications on file       Final diagnoses:   None       Saji Hua DO  2/21/2023   [facility] EMERGENCY DEPARTMENT     Saji Hua, DO  02/21/23 1936

## 2023-02-21 NOTE — ASSESSMENT & PLAN NOTE
Norethindrone has been taken but hasn't stopped her period yet. Has been on board since October.        ASSESSMENT and PLAN: OK to increase to 2 daily (can be either 2 once or 1 twice

## 2023-02-21 NOTE — ED TRIAGE NOTES
Pt has hx of Erythromelalgia which causes bilateral foot pain and swelling, states having a flare up of this. Pt unable to see normal specialist. Pt took oxycodone at 10am w/out relief.      Triage Assessment     Row Name 02/21/23 1158       Triage Assessment (Adult)    Airway WDL WDL       Respiratory WDL    Respiratory WDL WDL       Skin Circulation/Temperature WDL    Skin Circulation/Temperature WDL WDL       Cardiac WDL    Cardiac WDL WDL       Peripheral/Neurovascular WDL    Peripheral Neurovascular WDL WDL       Cognitive/Neuro/Behavioral WDL    Cognitive/Neuro/Behavioral WDL WDL

## 2023-02-21 NOTE — TELEPHONE ENCOUNTER
Nurse Triage SBAR    Situation:   -Direct admit questions    Background:   -patient calling with  will  -she gave verbal consent to communicate     Assessment:   -her PCP MD wanted her to get admitted to [facility] due to pain (has complex medical history)  -they called EMS and were told that the ED's had about a 6 hour weight time   -notes from today:    -wondering if they could get a direct admit from [facility]  (but next available  is in two days)    Recommendation:   They will go to the ED when the wait times are less online  -call  again if symptoms get worse    TOMAS STEVENS RN on 2/20/2023 at 6:29 PM           Reason for Disposition    [1] Caller requesting NON-URGENT health information AND [2] PCP's office is the best resource    Protocols used: INFORMATION ONLY CALL - NO TRIAGE-A-

## 2023-02-21 NOTE — TELEPHONE ENCOUNTER
[facility] Call Center    Phone Message    May a detailed message be left on voicemail: yes     Reason for Call: Other: Patient is wanting a call back from the care team, he thinks Nurse Patricio has been helping with her condition. She is needing ER visit but they are waiting to hear back from the care team     Action Taken: Message routed to:  [facility] pcp    Travel Screening: Not Applicable

## 2023-02-22 LAB
ALBUMIN SERPL BCG-MCNC: 3.7 G/DL (ref 3.5–5.2)
ALP SERPL-CCNC: 56 U/L (ref 35–104)
ALT SERPL W P-5'-P-CCNC: 36 U/L (ref 10–35)
ANION GAP SERPL CALCULATED.3IONS-SCNC: 10 MMOL/L (ref 7–15)
AST SERPL W P-5'-P-CCNC: 35 U/L (ref 10–35)
ATRIAL RATE - MUSE: 77 BPM
BILIRUB SERPL-MCNC: 0.4 MG/DL
BUN SERPL-MCNC: 9.1 MG/DL (ref 6–20)
CALCIUM SERPL-MCNC: 9.1 MG/DL (ref 8.6–10)
CHLORIDE SERPL-SCNC: 105 MMOL/L (ref 98–107)
CREAT SERPL-MCNC: 0.69 MG/DL (ref 0.51–0.95)
DEPRECATED HCO3 PLAS-SCNC: 24 MMOL/L (ref 22–29)
DIASTOLIC BLOOD PRESSURE - MUSE: NORMAL MMHG
ERYTHROCYTE [DISTWIDTH] IN BLOOD BY AUTOMATED COUNT: 13.3 % (ref 10–15)
GFR SERPL CREATININE-BSD FRML MDRD: >90 ML/MIN/1.73M2
GLUCOSE SERPL-MCNC: 92 MG/DL (ref 70–99)
HCT VFR BLD AUTO: 37.9 % (ref 35–47)
HGB BLD-MCNC: 12.5 G/DL (ref 11.7–15.7)
INTERPRETATION ECG - MUSE: NORMAL
MCH RBC QN AUTO: 32.1 PG (ref 26.5–33)
MCHC RBC AUTO-ENTMCNC: 33 G/DL (ref 31.5–36.5)
MCV RBC AUTO: 97 FL (ref 78–100)
P AXIS - MUSE: 60 DEGREES
PLATELET # BLD AUTO: 184 10E3/UL (ref 150–450)
POTASSIUM SERPL-SCNC: 4.3 MMOL/L (ref 3.4–5.3)
PR INTERVAL - MUSE: 124 MS
PROT SERPL-MCNC: 7 G/DL (ref 6.4–8.3)
QRS DURATION - MUSE: 70 MS
QT - MUSE: 398 MS
QTC - MUSE: 450 MS
R AXIS - MUSE: 65 DEGREES
RBC # BLD AUTO: 3.89 10E6/UL (ref 3.8–5.2)
SARS-COV-2 RNA RESP QL NAA+PROBE: NEGATIVE
SODIUM SERPL-SCNC: 139 MMOL/L (ref 136–145)
SYSTOLIC BLOOD PRESSURE - MUSE: NORMAL MMHG
T AXIS - MUSE: 59 DEGREES
VENTRICULAR RATE- MUSE: 77 BPM
WBC # BLD AUTO: 2.9 10E3/UL (ref 4–11)

## 2023-02-22 PROCEDURE — 96372 THER/PROPH/DIAG INJ SC/IM: CPT

## 2023-02-22 PROCEDURE — 99223 1ST HOSP IP/OBS HIGH 75: CPT | Performed by: PHYSICIAN ASSISTANT

## 2023-02-22 PROCEDURE — 85027 COMPLETE CBC AUTOMATED: CPT

## 2023-02-22 PROCEDURE — 250N000012 HC RX MED GY IP 250 OP 636 PS 637

## 2023-02-22 PROCEDURE — 250N000011 HC RX IP 250 OP 636: Performed by: STUDENT IN AN ORGANIZED HEALTH CARE EDUCATION/TRAINING PROGRAM

## 2023-02-22 PROCEDURE — 99233 SBSQ HOSP IP/OBS HIGH 50: CPT | Mod: GC | Performed by: HOSPITALIST

## 2023-02-22 PROCEDURE — 250N000009 HC RX 250

## 2023-02-22 PROCEDURE — G0463 HOSPITAL OUTPT CLINIC VISIT: HCPCS

## 2023-02-22 PROCEDURE — 250N000011 HC RX IP 250 OP 636

## 2023-02-22 PROCEDURE — 80053 COMPREHEN METABOLIC PANEL: CPT

## 2023-02-22 PROCEDURE — 250N000013 HC RX MED GY IP 250 OP 250 PS 637: Performed by: STUDENT IN AN ORGANIZED HEALTH CARE EDUCATION/TRAINING PROGRAM

## 2023-02-22 PROCEDURE — 250N000013 HC RX MED GY IP 250 OP 250 PS 637

## 2023-02-22 PROCEDURE — G0378 HOSPITAL OBSERVATION PER HR: HCPCS

## 2023-02-22 PROCEDURE — 99233 SBSQ HOSP IP/OBS HIGH 50: CPT | Mod: GC | Performed by: INTERNAL MEDICINE

## 2023-02-22 PROCEDURE — 120N000002 HC R&B MED SURG/OB UMMC

## 2023-02-22 PROCEDURE — 36415 COLL VENOUS BLD VENIPUNCTURE: CPT

## 2023-02-22 RX ORDER — LIDOCAINE 50 MG/G
OINTMENT TOPICAL EVERY 4 HOURS PRN
Status: DISCONTINUED | OUTPATIENT
Start: 2023-02-22 | End: 2023-03-02 | Stop reason: HOSPADM

## 2023-02-22 RX ORDER — HYDROMORPHONE HYDROCHLORIDE 1 MG/ML
.3-.5 INJECTION, SOLUTION INTRAMUSCULAR; INTRAVENOUS; SUBCUTANEOUS 3 TIMES DAILY PRN
Status: DISCONTINUED | OUTPATIENT
Start: 2023-02-22 | End: 2023-02-23

## 2023-02-22 RX ORDER — GABAPENTIN 400 MG/1
400 CAPSULE ORAL 3 TIMES DAILY
Status: DISCONTINUED | OUTPATIENT
Start: 2023-02-22 | End: 2023-03-01

## 2023-02-22 RX ORDER — OXYCODONE HCL 10 MG/1
10 TABLET, FILM COATED, EXTENDED RELEASE ORAL EVERY 12 HOURS
Status: DISCONTINUED | OUTPATIENT
Start: 2023-02-22 | End: 2023-02-22

## 2023-02-22 RX ORDER — HYDROMORPHONE HYDROCHLORIDE 2 MG/1
2-4 TABLET ORAL EVERY 4 HOURS PRN
Status: DISCONTINUED | OUTPATIENT
Start: 2023-02-22 | End: 2023-02-28

## 2023-02-22 RX ORDER — OXYCODONE HCL 10 MG/1
10 TABLET, FILM COATED, EXTENDED RELEASE ORAL EVERY 12 HOURS
Status: COMPLETED | OUTPATIENT
Start: 2023-02-23 | End: 2023-02-22

## 2023-02-22 RX ORDER — OXYCODONE HYDROCHLORIDE 5 MG/1
5-10 TABLET ORAL EVERY 6 HOURS PRN
Status: DISCONTINUED | OUTPATIENT
Start: 2023-02-22 | End: 2023-02-22

## 2023-02-22 RX ADMIN — POLYETHYLENE GLYCOL 3350 17 G: 17 POWDER, FOR SOLUTION ORAL at 08:43

## 2023-02-22 RX ADMIN — GABAPENTIN 400 MG: 400 CAPSULE ORAL at 13:16

## 2023-02-22 RX ADMIN — GABAPENTIN 300 MG: 300 CAPSULE ORAL at 08:43

## 2023-02-22 RX ADMIN — IBUPROFEN 400 MG: 200 TABLET, FILM COATED ORAL at 03:11

## 2023-02-22 RX ADMIN — IBUPROFEN 400 MG: 200 TABLET, FILM COATED ORAL at 16:01

## 2023-02-22 RX ADMIN — OXYCODONE HYDROCHLORIDE 10 MG: 5 TABLET ORAL at 00:48

## 2023-02-22 RX ADMIN — HYDROMORPHONE HYDROCHLORIDE 4 MG: 2 TABLET ORAL at 14:51

## 2023-02-22 RX ADMIN — ENOXAPARIN SODIUM 40 MG: 40 INJECTION SUBCUTANEOUS at 00:00

## 2023-02-22 RX ADMIN — GABAPENTIN 400 MG: 400 CAPSULE ORAL at 21:31

## 2023-02-22 RX ADMIN — AZATHIOPRINE 100 MG: 50 TABLET ORAL at 00:00

## 2023-02-22 RX ADMIN — HYDROMORPHONE HYDROCHLORIDE 0.5 MG: 1 INJECTION, SOLUTION INTRAMUSCULAR; INTRAVENOUS; SUBCUTANEOUS at 01:50

## 2023-02-22 RX ADMIN — OXYCODONE HYDROCHLORIDE 10 MG: 10 TABLET, FILM COATED, EXTENDED RELEASE ORAL at 23:41

## 2023-02-22 RX ADMIN — VENLAFAXINE HYDROCHLORIDE 37.5 MG: 37.5 CAPSULE, EXTENDED RELEASE ORAL at 21:35

## 2023-02-22 RX ADMIN — ACETAMINOPHEN 975 MG: 325 TABLET, FILM COATED ORAL at 23:41

## 2023-02-22 RX ADMIN — NORETHINDRONE 0.7 MG: 0.35 TABLET ORAL at 00:49

## 2023-02-22 RX ADMIN — OXYCODONE HYDROCHLORIDE 10 MG: 10 TABLET, FILM COATED, EXTENDED RELEASE ORAL at 13:16

## 2023-02-22 RX ADMIN — PREDNISONE 40 MG: 20 TABLET ORAL at 08:43

## 2023-02-22 RX ADMIN — ACETAMINOPHEN 975 MG: 325 TABLET, FILM COATED ORAL at 16:01

## 2023-02-22 RX ADMIN — IBUPROFEN 400 MG: 200 TABLET, FILM COATED ORAL at 21:31

## 2023-02-22 RX ADMIN — ENOXAPARIN SODIUM 40 MG: 40 INJECTION SUBCUTANEOUS at 23:41

## 2023-02-22 RX ADMIN — AZATHIOPRINE 100 MG: 50 TABLET ORAL at 21:31

## 2023-02-22 RX ADMIN — GENTIAN VIOLET 1% 0.5 ML: 10 LIQUID TOPICAL at 16:49

## 2023-02-22 RX ADMIN — OXYCODONE HYDROCHLORIDE 10 MG: 10 TABLET, FILM COATED, EXTENDED RELEASE ORAL at 01:20

## 2023-02-22 RX ADMIN — IBUPROFEN 400 MG: 200 TABLET, FILM COATED ORAL at 09:55

## 2023-02-22 RX ADMIN — SENNOSIDES AND DOCUSATE SODIUM 2 TABLET: 50; 8.6 TABLET ORAL at 08:43

## 2023-02-22 RX ADMIN — VENLAFAXINE HYDROCHLORIDE 37.5 MG: 37.5 CAPSULE, EXTENDED RELEASE ORAL at 00:48

## 2023-02-22 RX ADMIN — HYDROMORPHONE HYDROCHLORIDE 0.5 MG: 1 INJECTION, SOLUTION INTRAMUSCULAR; INTRAVENOUS; SUBCUTANEOUS at 16:00

## 2023-02-22 RX ADMIN — HYDROMORPHONE HYDROCHLORIDE 4 MG: 2 TABLET ORAL at 21:30

## 2023-02-22 RX ADMIN — ACETAMINOPHEN 975 MG: 325 TABLET, FILM COATED ORAL at 08:43

## 2023-02-22 RX ADMIN — NORETHINDRONE 0.7 MG: 0.35 TABLET ORAL at 21:35

## 2023-02-22 ASSESSMENT — ACTIVITIES OF DAILY LIVING (ADL)
BATHING: 1-->ASSISTANCE NEEDED
DRESSING/BATHING: BATHING DIFFICULTY, ASSISTANCE 1 PERSON
ADLS_ACUITY_SCORE: 37
ADLS_ACUITY_SCORE: 36
TOILETING_ASSISTANCE: TOILETING DIFFICULTY, ASSISTANCE 1 PERSON
ADLS_ACUITY_SCORE: 37
DRESS: 0-->INDEPENDENT
HEARING_DIFFICULTY_OR_DEAF: NO
DRESSING/BATHING_DIFFICULTY: YES
ADLS_ACUITY_SCORE: 36
TOILETING_ISSUES: YES
CHANGE_IN_FUNCTIONAL_STATUS_SINCE_ONSET_OF_CURRENT_ILLNESS/INJURY: YES
WALKING_OR_CLIMBING_STAIRS_DIFFICULTY: OTHER (SEE COMMENTS)
ADLS_ACUITY_SCORE: 36
CONCENTRATING,_REMEMBERING_OR_MAKING_DECISIONS_DIFFICULTY: NO
DIFFICULTY_COMMUNICATING: NO
ADLS_ACUITY_SCORE: 36
EQUIPMENT_CURRENTLY_USED_AT_HOME: WHEELCHAIR, MANUAL;WALKER, STANDARD
DOING_ERRANDS_INDEPENDENTLY_DIFFICULTY: YES
ADLS_ACUITY_SCORE: 36
DIFFICULTY_EATING/SWALLOWING: OTHER (SEE COMMENTS)
NUMBER_OF_TIMES_PATIENT_HAS_FALLEN_WITHIN_LAST_SIX_MONTHS: 1
ADLS_ACUITY_SCORE: 37
WEAR_GLASSES_OR_BLIND: NO
DRESS: 1-->ASSISTANCE (EQUIPMENT/PERSON) NEEDED
ADLS_ACUITY_SCORE: 36
TOILETING: 0-->NOT TOILET TRAINED OR ASSISTANCE NEEDED (DEVELOPMENTALLY APPROPRIATE)
TOILETING: 1-->ASSISTANCE (EQUIPMENT/PERSON) NEEDED
FALL_HISTORY_WITHIN_LAST_SIX_MONTHS: YES

## 2023-02-22 NOTE — PLAN OF CARE
/73 (BP Location: Right arm)   Pulse 78   Temp 98.5  F (36.9  C) (Oral)   Resp 16   LMP 02/19/2023   SpO2 99%     Time: 4696-1216  Reason for admission: pain control and further management of erythromelalgia.  Activity:  Assist of 1 due to pain on extremities   Pain:  maintained with oxycodone, scheduled OxyContin, breakthrough dilaudid x1 given, Heat packs in use    Neuro: A&O x4, able to make needs known   Cardiac: WDL, denies chest pain   Respiratory: On RA, denies SOB   GI/: Bedside commode provided, no BM this shift, +BS   Diet: Regular  Lines:  R PIV SL    Lab: Reviewed      Plan: Continue with POC

## 2023-02-22 NOTE — CONSULTS
[facility] Nurse Inpatient Assessment     Consulted for: Bilateral 5th toe wounds    Patient History (according to provider note(s):    Ronna Rivera is a 37 year old female with PMHx of migraine and erythromelalgia who presented with increased LE pain and being admitted on 2/21/2023 for pain control and further management of erythromelalgia.    Areas Assessed:      Areas visualized during today's visit: Focused: bilateral feet  Wound location: Left and right 5th toe     Right 5th toe and plantar aspect at base of toe 2/22    Left 5th toe 2/22    Last photo: 2/22  Wound due to: Patient with Erythromelalgia and during bad flares she cools her feet. Wound etiology not completely clear, but wounds are likely a combination of neuropathic and vascular related ulceration.  Wound history/plan of care: present on admission.  Wound base: 100 % purple and epidermis- left side feels callused     Palpation of the wound bed: normal and firm      Drainage: none     Description of drainage: none     Measurements (length x width x depth, in cm): Right 0.5 x 1 x 0 cm; Left 0.3 x 0.3 x 0 cm- right plantar area with small cracks measuring 0.3 x 0.1 x 0.1     Tunneling: N/A     Undermining: N/A  Periwound skin: Intact and Erythema- blanchable      Color: pink and red- related to Erythromelalgia and/ or cooling her feet      Temperature: cool  Odor: none  Pain: mild, feet become very tender at times, not specific to the wounds  Pain interventions prior to dressing change: N/A  Treatment goal: Protection  STATUS: initial assessment  Supplies ordered: supplies stored on unit and discussed with RN    Patient notable also has small chronic wound on anterior aspect of right foot that appears to be mostly scar tissue and small amount of fibrin.    2/22     Treatment Plan:     Bilateral foot and 5th toe cares: Daily  Cleanse feet as needed per unit routine.  Paint over purple/black closed  wounds on 5th toes only with povidone iodine and let dry.   Apply, or have patient apply Sween 24 lotion to bilateral feet avoiding areas where povidone iodine was applied.   If patient with reaction to povidone iodine use gentian violet, per provider order.     Orders: Written    RECOMMEND PRIMARY TEAM ORDER: None, at this time  Education provided: plan of care and wound progress  Discussed plan of care with: Patient  WO nurse follow-up plan: every other week  Notify WOC if wound(s) deteriorate.  Nursing to notify the Provider(s) and re-consult the WO Nurse if new skin concern.    DATA:     Current support surface: Standard  Standard gel/foam mattress (IsoFlex, Atmos air, etc)  Containment of urine/stool: Continent of bladder and Continent of bowel  BMI: There is no height or weight on file to calculate BMI.   Active diet order: Orders Placed This Encounter      Regular Diet Adult     Output: No intake/output data recorded.     Labs: Recent Labs   Lab 02/22/23  0759   ALBUMIN 3.7   HGB 12.5   WBC 2.9*     Pressure injury risk assessment:   Sensory Perception: 3-->slightly limited  Moisture: 4-->rarely moist  Activity: 4-->walks frequently  Mobility: 4-->no limitation  Nutrition: 3-->adequate  Friction and Shear: 3-->no apparent problem  Rodney Score: 21    Nydia Ware RN, CWOCN  Pager no longer is use, please contact through [employer] group: [facility] Nurse  Dept. Office Number: [phone]

## 2023-02-22 NOTE — UTILIZATION REVIEW
"Admission Status; Secondary Review Determination     Under the authority of the Utilization Management Committee, the utilization review process indicated a secondary review on the above patient. The review outcome is based on review of the medical records, discussions with staff, and applying clinical experience noted on the date of the review.     (x) Observation Status Appropriate - This patient does not meet hospital inpatient criteria and is placed in observation status. If this patient's primary payer is Medicare and was admitted as an inpatient, Condition Code 44 should be used and patient status changed to \"observation\".     RATIONALE FOR DETERMINATION:    37 year old female with PMHx of migraine and erythromelalgia who presented with increased LE pain and was admitted on 2/21/2023 for pain control and further management of erythromelalgia.    Vital signs unremarkable.  No fever.    Labs notable for WBC count 2.9 today (was 6.4 yesterday).  AST/ALT 43 yesterday and have decreased today.    No imaging    The patient is receiving oral prednisone.  She has required one dose of IV dilaudid since admission.      Hematology has been consulted to assist with management.    Given pain is being managed primarily with oral pain medication, vital signs are stable, labs are unremarkable, and she is not requiring intravenous medications for management of erythromelagia for now recommend observation status as we await hematology input.    The severity of illness, intensity of service provided, expected LOS and risk for adverse outcome make the care appropriate for further observation; however, doesn't meet criteria for hospital inpatient admission. Dr Oquendo notified of this determination via text message through [employer] system today.     This document was produced using voice recognition software.   The information on this document is developed by the utilization review team in order for the business office to ensure " compliance. This only denotes the appropriateness of proper admission status and does not reflect the quality of care rendered.   The definitions of Inpatient Status and Observation Status used in making the determination above are those provided in the CMS Coverage Manual, Chapter 1 and Chapter 6, section 70.4.     Sincerely,     Srinivas Park MD  Utilization Review   Physician Advisor   [facility]

## 2023-02-22 NOTE — PROGRESS NOTES
I was present with the student who participated in the service and in the documentation of the note. I have verified the history and personally performed the physical exam and medical decisionmaking. I agree with the assessment and plan of care as documented in the note.    This is a shared note, my edits are incorporated.     Naseem Ho MD  Internal Medicine, PGY-3  Date of Service (when I saw the patient): 02/22/23    [facility]    History and Physical - Medicine Service, [facility] TEAM 3       Date of Admission:  2/21/2023    Assessment & Plan      Ronna Rivera is a 37 year old female with PMHx of migraine and erythromelalgia who presented with worsening LE pain that started 1 week ago with concerning ulcers being admitted on 2/21/2023 for pain control due to chronic erythromelalgia.    Changes today:  -Hematology consulted, recs pending  -Pain management consulted, appreciate recs  -Wound care consulted  -adjusted pain regimen  -placed OP pain clinic referral for long term pain management plan    Erythromelalgia flare  R foot ulcers  Menstrual suppression for erythromelalgia  Increased bilateral LE swelling, warmth and erythema over the last week. Normotensive, tachycardic, afebrile, maintaining oxygen saturations on room air on arrival to ED. Labs including CBC and BMP WNL.  Presentation consistent with previous erythromelalgia flares. Given steroids improved symptoms previously, will resume until further recommendations from hematology. Low likelihood cellulitis and holding off on antibiotics. Two ulcers concerning for ischemia and necrosis noted on L and R 5th toe.  -hematology consulted  -pain management consulted, appreciate recommendations   - APAP 975 TID scheduled   - Ibuprofen 400 mg q6h scheduled    - lidocaine 5% ointment q4h PRN   - Gabapentin 400 mg q8h scheduled   - Discontinue oxycodone, stop oxycontin    - Hydromorphone PO 2-4 mg q4h PRN    -  Hydromorphone IV TID PRN  -outpatient pain clinic referral sent and patient encouraged to call to schedule appointment prior to discharge  -prednisone 40 mg daily, ordered daily until further hematology recommendations  -hold PTA aspirin 81  -continue PTA azathioprine 100 mg daily and norethindrone 0.7 mg daily  -wound care consult, appreciate recommendations    CHRONIC, RESOLVED & STABLE PROBLEMS    Elevated LFTs  AST//196 in January 2023. Work up including hepatitis Bc antibody positive with no HBV DNA, hep C negative, CMV and EBV negative. Suspected to be 2/2 Imuran, so dose was reduced to 100 mg daily. LFTs have been down trending since. CTM, although could consider RUQ US if LFTs increasing.  - trend LFTs    Tachycardia, resolved  EKG with sinus tachycardia. Suspect 2/2 to pain. CTM.     Diet: Regular Diet Adult    DVT Prophylaxis: enoxaparin 40 mg daily  Garcia Catheter: Not present  Fluids: PO intake  Lines: None   Cardiac Monitoring: None  Code Status: Full Code     Clinically Significant Risk Factors Present on Admission                             Disposition Plan observation, plan for home once tolerating PO pain regimen     Expected Discharge Date: 02/23/2023                The patient's care was discussed with the Dr. Sandoval.    Sebas Sousa  [facility]  [facility]  Securely message with [employer] (more info)  Text page via [facility] Paging/Directory   See signed in provider for up to date coverage information  ______________________________________________________________________    Interval History  NAEO. Pt feeling discomfort and pain in her lower extremities. Similar to how she felt when she came in last night. Denies any fevers, chills, CP or abdominal pain. In good spirits, very engaged in her care and goals for treatment inpatient.     Past Medical History    Past Medical History:   Diagnosis Date     Auto-erythrocyte  sensitization (H)      Reactive depression      Seasonal allergies 06/25/2020    Mostly resolved       Past Surgical History   Past Surgical History:   Procedure Laterality Date     INSERT PICC LINE Left 6/23/2021    Procedure: INSERTION, PICC;  Surgeon: Neal Penny MD;  Location: [facility] OR     IR PICC PLACEMENT > 5 YRS OF AGE  6/23/2021     NO HISTORY OF SURGERY         Prior to Admission Medications   Prior to Admission Medications   Prescriptions Last Dose Informant Patient Reported? Taking?   HYDROcodone-acetaminophen (NORCO) 7.5-325 MG per tablet 2/21/2023 at 12:00 pm Self No Yes   Sig: Take 1 tablet by mouth 2 times daily as needed for moderate to severe pain   SUMAtriptan (IMITREX) 100 MG tablet  Self No No   Sig: Take 1 tablet (100 mg) by mouth 2 times daily as needed for migraine   Patient not taking: Reported on 2/17/2023   Vitamin D3 (CHOLECALCIFEROL) 25 mcg (1000 units) tablet 2/19/2023 at unknown Self Yes Yes   Sig: Take 25 mcg by mouth daily   azaTHIOprine (IMURAN) 50 MG tablet 2/20/2023 at night Self Yes Yes   gabapentin (NEURONTIN) 300 MG capsule  Self No No   Sig: Take 1 capsule (300 mg) by mouth 3 times daily   gabapentin (NEURONTIN) 600 MG tablet Not Taking Self Yes No   Sig: Take 300 mg by mouth At Bedtime   Patient not taking: Reported on 2/21/2023   ibuprofen (ADVIL/MOTRIN) 200 MG tablet 2/19/2023 at unknown Self Yes Yes   Sig: Take 400 mg by mouth every 6 hours as needed for moderate pain   melatonin 1 MG TABS tablet 2/20/2023 at 10:00 pm Self Yes Yes   Sig: Take 2-5 mg by mouth nightly as needed for sleep    naloxone (NARCAN) 4 MG/0.1ML nasal spray Unknown at as needed Self No Yes   Sig: Spray 1 spray (4 mg) into one nostril alternating nostrils as needed for opioid reversal every 2-3 minutes until assistance arrives   norethindrone (MICRONOR) 0.35 MG tablet 2/20/2023 at 10:00 pm Self No Yes   Sig: Take 2 tablets (0.7 mg) by mouth daily Take the first 3 weeks of each pack, and then  immediately move on to the next pack (discard the 4th week of each pack)   oxyCODONE (OXYCONTIN) 10 MG 12 hr tablet 2/21/2023 at 10:00 am Self No Yes   Sig: Take 1 tablet (10 mg) by mouth 2 times daily   valACYclovir (VALTREX) 500 MG tablet   No No   Sig: Take 2 tablets (1,000 mg) by mouth daily for 5 days , start at onset of cold sore   venlafaxine (EFFEXOR XR) 37.5 MG 24 hr capsule 2/20/2023 at 10:00 pm Self No Yes   Sig: Take 1 capsule (37.5 mg) by mouth daily   vitamin C (ASCORBIC ACID) 250 MG tablet 2/19/2023 at unknown Self Yes Yes   Sig: Take 250 mg by mouth daily      Facility-Administered Medications: None        Social History   I have reviewed this patient's social history and updated it with pertinent information if needed.  Social History     Tobacco Use     Smoking status: Never     Smokeless tobacco: Never   Substance Use Topics     Alcohol use: No     Drug use: No       Family History   I have reviewed this patient's family history and updated it with pertinent information if needed.  Family History   Problem Relation Age of Onset     Hypertension Father      Cerebrovascular Disease Maternal Grandmother      Diabetes Maternal Grandfather      Breast Cancer Paternal Grandmother      Hypertension Paternal Grandfather      C.A.D. Paternal Grandfather        Allergies   Allergies   Allergen Reactions     Topiramate Anxiety        Physical Exam   Vital Signs: Temp: 97.9  F (36.6  C) Temp src: Oral BP: 116/62 Pulse: 72   Resp: 18 SpO2: 99 % O2 Device: None (Room air)    Weight: 0 lbs 0 oz    Constitutional: Awake, alert, cooperative, in NAD.  Eyes: Sclera clear, conjunctiva normal.  Respiratory: Non-labored breathing, good air exchange, clear to auscultation bilaterally, no crackles or wheezing.  Cardiovascular: RRR, no obvious murmur noted.  GI: soft abdomen, non-distended, non-tender to palpation, rebound or guarding  Skin: No concerning lesions or exposed areas.  Erythema noted over feet bilaterally  extending to the ankles, mild edema of her feet up to ankles, cold to the touch with pt cooling her feet actively for her condition, cracking of the bottom of the feet, small ulcer noted over right midfoot, small ulcer noted over right lateral small toe (slight black to purplish in color), similar appearing ulcer noted on left small toe.  Neurologic: Awake, alert & oriented x3.    Psych: Appropriate affect      Medical Decision Making         Data     I have personally reviewed the following data over the past 24 hrs:    2.9 (L)  \   12.5   / 184     139 105 9.1 /  92   4.3 24 0.69 \       ALT: 36 (H) AST: 35 AP: 56 TBILI: 0.4   ALB: 3.7 TOT PROTEIN: 7.0 LIPASE: N/A       Imaging results reviewed over the past 24 hrs:   No results found for this or any previous visit (from the past 24 hour(s)).

## 2023-02-22 NOTE — H&P
[facility]    History and Physical - Medicine Service, [facility] TEAM        Date of Admission:  2/21/2023    Assessment & Plan      Ronna Rivera is a 37 year old female with PMHx of migraine and erythromelalgia who presented with increased LE pain and being admitted on 2/21/2023 for pain control and further management of erythromelalgia.    Erythromelalgia flare  R foot ulcers  Menstrual suppression for erythromelalgia  Increased bilateral LE swelling, warmth and erythema over the last week. Normotensive, tachycardic, afebrile, maintaining oxygen saturations on room air on arrival to ED. Labs including CBC and BMP WNL. Presentation consistent with previous erythromelalgia flares. Given steroids improved symptoms previously, will resume (pt requesting to start in the am) until further recommendations from hematology. Consider cellulitis, although no other clinical signs to point to infectious etiology at this time, but continue to consider and low threshold to start abx for cellulitis pending clinical course.  --hematology consulted, appreciate recommendatinos  --prednisone 40 mg daily (ordered daily until further hematology recommendations)  --hold aspirin 81 (reports that it doesn't improve symptoms)  --resume PTA imuran 100 mg daily (pm)  --resume PTA norethindrone 0.7 mg daily   --Pain regimen :    -- APAP 975 TID scheduled   -- Ibuprofen 400 mg q6h scheduled    -- PTA gabapentin 300 mg TID   -- Oxycodone 5-10 mg q6h PRN    -- Hydromorphone 0.3-0.5 mg q3h PRN (for break through pain)    -- oxycontin 10 mg PO q12h  -wound care consult    Tachycardia  No obvious elevations. Suspect 2/2 to pain. CTM.  - EKG completed    Elevated LFTs  AST//196 in January 2023. Work up including hepatitis Bc antibody positive with no HBV DNA, hep C negative, CMV and EBV negative. Suspected to be 2/2 Imuran, so dose was reduced to 100 mg daily. LFTs have been down trending  since. CTM, although could consider RUQ US if LFTs increasing.  - trend LFTs        Diet: Regular Diet Adult    DVT Prophylaxis: enoxaparin 40 mg daily  Garcia Catheter: Not present  Fluids: PO intake  Lines: PRESENT           Cardiac Monitoring: None  Code Status: Full Code (confirmed with the patient on admission)    Clinically Significant Risk Factors Present on Admission                               Disposition Plan      Expected Discharge Date: 02/23/2023                The patient's care was discussed with the Attending Physician, Dr. Saji Cantor.      Mirna Dimas MD  Medicine Service, [facility]  Securely message with [employer] (more info)  Text page via [facility] Paging/Directory   See signed in provider for up to date coverage information  ______________________________________________________________________    Chief Complaint   Bilateral foot pain    History is obtained from the patient    History of Present Illness   Ronna Rivera is a 37 year old female w/ PMHx of migraine and erythromelalgia who presented with increased LE pain and being admitted on 2/21/2023 for pain control and further management of erythromelalgia.    Around a week ago Ronna started to have worsening pain in her lower extremities.  This pain is so severe that she is not able to walk and remains bed bound.  In addition to pain her legs feel hot.  Previously she has had pins and needlelike sensation however this is not present at this time.  To help with the pain she has been taking oral pain medications OxyContin 10 mg twice a day and Norco 7.5 twice a day as needed. Despite this she continues to have severe pain in her leg and continued warmth of the legs. For the warmth she submerges her feet in cold water with plastic bags around her feet to protect her skin.    [place] [facility]  Recently treated with Truxima over January and IVIG last dose  on 2/6.     Past Medical History    Past Medical History:   Diagnosis Date     Auto-erythrocyte sensitization (H)      Reactive depression      Seasonal allergies 06/25/2020    Mostly resolved       Past Surgical History   Past Surgical History:   Procedure Laterality Date     INSERT PICC LINE Left 6/23/2021    Procedure: INSERTION, PICC;  Surgeon: Neal Penny MD;  [place] [facility] [place]     IR PICC PLACEMENT > 5 YRS OF AGE  6/23/2021     NO HISTORY OF SURGERY         Prior to Admission Medications   Prior to Admission Medications   Prescriptions Last Dose Informant Patient Reported? Taking?   HYDROcodone-acetaminophen (NORCO) 7.5-325 MG per tablet 2/21/2023 at 12:00 pm Self No Yes   Sig: Take 1 tablet by mouth 2 times daily as needed for moderate to severe pain   SUMAtriptan (IMITREX) 100 MG tablet  Self No No   Sig: Take 1 tablet (100 mg) by mouth 2 times daily as needed for migraine   Patient not taking: Reported on 2/17/2023   Vitamin D3 (CHOLECALCIFEROL) 25 mcg (1000 units) tablet 2/19/2023 at unknown Self Yes Yes   Sig: Take 25 mcg by mouth daily   azaTHIOprine (IMURAN) 50 MG tablet 2/20/2023 at night Self Yes Yes   gabapentin (NEURONTIN) 300 MG capsule  Self No No   Sig: Take 1 capsule (300 mg) by mouth 3 times daily   gabapentin (NEURONTIN) 600 MG tablet Not Taking Self Yes No   Sig: Take 300 mg by mouth At Bedtime   Patient not taking: Reported on 2/21/2023   ibuprofen (ADVIL/MOTRIN) 200 MG tablet 2/19/2023 at unknown Self Yes Yes   Sig: Take 400 mg by mouth every 6 hours as needed for moderate pain   melatonin 1 MG TABS tablet 2/20/2023 at 10:00 pm Self Yes Yes   Sig: Take 2-5 mg by mouth nightly as needed for sleep    naloxone (NARCAN) 4 MG/0.1ML nasal spray Unknown at as needed Self No Yes   Sig: Spray 1 spray (4 mg) into one nostril alternating nostrils as needed for opioid reversal every 2-3 minutes until assistance arrives   norethindrone (MICRONOR) 0.35 MG tablet 2/20/2023 at 10:00 pm Self No  Yes   Sig: Take 2 tablets (0.7 mg) by mouth daily Take the first 3 weeks of each pack, and then immediately move on to the next pack (discard the 4th week of each pack)   oxyCODONE (OXYCONTIN) 10 MG 12 hr tablet 2/21/2023 at 10:00 am Self No Yes   Sig: Take 1 tablet (10 mg) by mouth 2 times daily   valACYclovir (VALTREX) 500 MG tablet   No No   Sig: Take 2 tablets (1,000 mg) by mouth daily for 5 days , start at onset of cold sore   venlafaxine (EFFEXOR XR) 37.5 MG 24 hr capsule 2/20/2023 at 10:00 pm Self No Yes   Sig: Take 1 capsule (37.5 mg) by mouth daily   vitamin C (ASCORBIC ACID) 250 MG tablet 2/19/2023 at unknown Self Yes Yes   Sig: Take 250 mg by mouth daily      Facility-Administered Medications: None        Social History   I have reviewed this patient's social history and updated it with pertinent information if needed.  Social History     Tobacco Use     Smoking status: Never     Smokeless tobacco: Never   Substance Use Topics     Alcohol use: No     Drug use: No       Family History   I have reviewed this patient's family history and updated it with pertinent information if needed.  Family History   Problem Relation Age of Onset     Hypertension Father      Cerebrovascular Disease Maternal Grandmother      Diabetes Maternal Grandfather      Breast Cancer Paternal Grandmother      Hypertension Paternal Grandfather      C.A.D. Paternal Grandfather        Allergies   Allergies   Allergen Reactions     Topiramate Anxiety        Physical Exam   Vital Signs: Temp: 97.9  F (36.6  C) Temp src: Oral BP: 123/77 Pulse: 84   Resp: 18 SpO2: 100 % O2 Device: None (Room air)    Weight: 0 lbs 0 oz    Constitutional: Awake, alert, cooperative, in NAD.  Eyes: Sclera clear, conjunctiva normal.  ENT: Normocephalic, without obvious abnormality, oral pharynx with moist mucus membranes  Respiratory: Non-labored breathing, good air exchange, clear to auscultation bilaterally, no crackles or wheezing.  Cardiovascular: RRR, no  obvious murmur noted.  GI: + bowel sounds, soft, non-distended, non-tender to palpation  Skin: No concerning lesions exposed areas.  Erythema noted over feet bilaterally extending to the ankles, edema of her feet up to ankles, warm to the touch, cracking of the bottom of the feet, small ulcer noted over right midfoot, small ulcer noted over right lateral small toe, slight black to purplish in color, similar appearing ulcer noted on left small toe.  Neurologic: Awake, alert & oriented x3.    Psych: Appropriate affect      Medical Decision Making         Data     I have personally reviewed the following data over the past 24 hrs:    6.4  \   12.7   / 194     139 101 13.1 /  89   4.0 24 0.66 \       ALT: 43 (H) AST: 43 (H) AP: 62 TBILI: 0.4   ALB: 4.3 TOT PROTEIN: 7.8 LIPASE: N/A       Imaging results reviewed over the past 24 hrs:   No results found for this or any previous visit (from the past 24 hour(s)).

## 2023-02-22 NOTE — CONSULTS
"Pain Service Consultation Note  [facility]      Patient Name: Ronna Rivera  MRN: [id number]   Age: 37 year old  Sex: female  Date: February 22, 2023                                      Reviewed: Yes    Referring Provider:  Naseem Ho MD  Referring Service:  Medicine   Reason for Consultation: \"37F with rare dz erythromyalalgia (managed at [facility] and [facility]) presenting with progressive pain/swelling and admitted for pain control. Please assist in IP and OP pain control plan\"    Assessment/Recommendations:  Ronna Rivera is a 37 year old female who has PMH of lower back pain (h/o facet joint injections) , migraine, panic attacks, erythromelalgia who presented with increased LE pain and being admitted on 2/21/2023 for pain control and further management of erythromelalgia. She states this pain started after an ulcer appeared on her foot, swelling pain and then was dx with erythromelalgia with extensive testing at [facility] erythromelalgia clinic/neurology. She states the severe episode started in 2021 and took about 5 months of IV steroids to reduce the symptoms.  She states that for awhile the symptoms improved to such that she was able to stop opioid pain management.      However, pain/swelling/redness increased in the bilateral foot around Sept. 2022 and has progressively worsened in the the past 1.5 weeks.  She has tried IVIG, rituximab, compounded topical amitriptyline +ketamine cream, lidocaine patches and is followed closely by outpatient neurology.  States [facility] has recommended consideration of LESI, SCS , IV ketamine locally.    Recently has tried left over OxyContin and Norco from previous scripts from 2021 (?from [facility] pain clinic)  and states that pain was not managed with them and thus needed inpatient care.  Pain is currently on lateral sides of bilateral foot, \"pinky\" toes.  Triggers for increased pain is heat, ambulation.  Pain is currently 6/10 as compression " stockings, and foot elevation are helping.  States pain is a swelling type pain that is constant. Worst pain level is 10/10 overnight.   So far, feels IV Dilaudid is the most helpful.       Plan:   1. May continue OxyContin 10mg PO Q 12 hours if there is plan to continue outpatient, has outpatient provider willing to prescribe it, is covered by insurance as it can be cost prohibitive.   Otherwise, please discontinue to once tablet/day x 1 day and stop.  2. Stop oxycodone-not helpful.  3. Start Dilaudid 2-4mg PO Q 4 hours PRN, as IV opioid is being tapered and discontinued.   Upon discharge, look at past 24 hour usage of PO Dilaudid and discharge on that dose PRN x 1 day then decrease by 1-2 doses every 1-2 days until discontinuation.   -Ronna will need to f/u with PCP if needed for opioid pain management as it may take sometime to be evaluated by a pain clinic.  (She states that her PCP is comfortable managing opioids for her).  4. Change IV Dilaudid to 0.3-0.5mg IV up to TID prn, dose at least 3 hours apart.  (please use oral dilaudid first). Reserve for rescue only.  Discontinue IV Dilaudid on 2/23/23.  5. Increase gabapentin to 400mg PO TID (currently 300mg TID).  6. Start lidocaine ointment to feet.  7. Consider Robaxin 500mg PO Q 6 hours PRN.  8. Bowel regimen, no BM in 2 days.  9. Please refer Ronna to a pain clinic.  For [facility] pain clinic, call at [phone] or return to [facility] clinic who evaluated/treated her in 2021.   10. Ronna and spouse discussed potential for anesthesia epidural catheter for pain management while inpatient.  Discussed this with our anesthesia team who feel that a pain clinic may be be the best option as inpatient anesthesia does not perform epidural for this type of pain---few case report per literature as a treatment option for erythromelalgia.     Thank you for the opportunity to participate in the care of Ronna Rivera  Pain Service will sign off.    Discussed with  "attending anesthesiologist  Primary Service Contacted with Recommendations? Yes    Please Page the Pain Team Via Amcom: \"PAIN MANAGEMENT ACUTE INPATIENT/ [facility]\"    Gaviota Gardner PA-C  2/22/2023            Per [place]  review pulled from system on 02/22/23.   02/17/2023  1   02/17/2023  Gabapentin 300 MG Capsule  90.00  [address]   [id number]   Gra ([id number])   0/3   [place]   10/17/2022  1   10/17/2022  Lorazepam 0.5 MG Tablet  45.00  [address]   [id number]   Gra ([id number])   0/0  0.25 LME  [place]   05/03/2022  1   07/09/2021  Gabapentin 600 MG Tablet  90.00  [address]   [id number]   Wal ([id number])   1/2  2.01 LME  [place]             Past Medical History:  Past Medical History:   Diagnosis Date     Auto-erythrocyte sensitization (H)      Reactive depression      Seasonal allergies 06/25/2020    Mostly resolved         Family History:    Family History   Problem Relation Age of Onset     Hypertension Father      Cerebrovascular Disease Maternal Grandmother      Diabetes Maternal Grandfather      Breast Cancer Paternal Grandmother      Hypertension Paternal Grandfather      C.A.D. Paternal Grandfather        Social History:  Social History     Tobacco Use     Smoking status: Never     Smokeless tobacco: Never   Substance Use Topics     Alcohol use: No               Review of Systems:  Complete ROS reviewed. Unless otherwise noted, all other systems found to be negative.        Laboratory Results:  Recent Labs   Lab Test 02/22/23  0759      BUN 9.1         Allergies:  Allergies   Allergen Reactions     Topiramate Anxiety         Pain Medications:  Pain Medications/Prescriber: PCP: Dr. Ball-gabapentin.  In 2021-[facility] pain clinic.    Current Pain Related Medications:  Medications related to Pain Management (From now, onward)    Start     Dose/Rate Route Frequency Ordered Stop    02/22/23 0800  polyethylene glycol (MIRALAX) Packet 17 g         17 g Oral DAILY 02/21/23 2014 02/22/23 0800  " senna-docusate (SENOKOT-S/PERICOLACE) 8.6-50 MG per tablet 1 tablet        See Hyperspace for full Linked Orders Report.    1 tablet Oral DAILY 02/21/23 2014 02/22/23 0800  senna-docusate (SENOKOT-S/PERICOLACE) 8.6-50 MG per tablet 2 tablet        See Hyperspace for full Linked Orders Report.    2 tablet Oral DAILY 02/21/23 2014 02/22/23 0100  oxyCODONE (oxyCONTIN) 12 hr tablet 10 mg         10 mg Oral EVERY 12 HOURS 02/22/23 0053      02/22/23 0000  oxyCODONE (ROXICODONE) tablet 5-10 mg         5-10 mg Oral EVERY 6 HOURS PRN 02/22/23 0000      02/22/23 0000  HYDROmorphone (PF) (DILAUDID) injection 0.3-0.5 mg         0.3-0.5 mg Intravenous EVERY 3 HOURS PRN 02/21/23 2351 02/21/23 2155  acetaminophen (TYLENOL) tablet 975 mg         975 mg Oral EVERY 8 HOURS 02/21/23 2150 02/21/23 2155  ibuprofen (ADVIL/MOTRIN) tablet 400 mg        Note to Pharmacy: PTA Sig:Take 400 mg by mouth every 6 hours as needed for moderate pain      400 mg Oral EVERY 6 HOURS 02/21/23 2151 02/21/23 2015  gabapentin (NEURONTIN) capsule 300 mg        Note to Pharmacy: PTA Sig:Take 1 capsule (300 mg) by mouth 3 times daily      300 mg Oral 3 TIMES DAILY 02/21/23 2012 02/21/23 1849  lidocaine 1 % 0.1-1 mL         0.1-1 mL Other EVERY 1 HOUR PRN 02/21/23 1901 02/21/23 1849  lidocaine (LMX4) cream          Topical EVERY 1 HOUR PRN 02/21/23 1901              Physical Exam:  Vitals: /62 (BP Location: Right arm)   Pulse 72   Temp 97.9  F (36.6  C) (Oral)   Resp 18   LMP 02/19/2023   SpO2 99%     Physical Exam:     CONSTITUTIONAL/GENERAL APPEARANCE:  NAD  EYES: EOMI, sclera anicteric, PERRLA  ENT/NECK: atraumatic, lips and oral mucous membranes dry  RESPIRATORY: non-labored breathing. No cough, wheeze  CV: HR within normal limits  ABDOMEN: Soft, non-tender, non-distended  MUSCULOSKELETAL/BACK/SPINE/EXTREMITIES: Moves all extremities purposefully. Feet with compression stockings on.   NEURO: Alert and Oriented  x3. Answers questions appropriately  SKIN/VASCULAR EXAM:  No jaundice, dry, warm      80 MINUTES SPENT BY ME on the date of service doing chart review, history, exam, documentation & further activities per the note.

## 2023-02-22 NOTE — CONSULTS
Hematology Consult Note   Date of Service: 02/22/2023    Patient: Ronna Rivera  MRN: [id number]  Admission Date: 2/21/2023  Hospital Day # 1   Primary Outpatient Hematologist: Not established with hematology as an outpatient   Reason for Consult: Erythromelalgia     History of Present Illness:    Ronna Rivera is a 37 year old woman with a history of migraines and erythromelalgia who presents for increased lower extremity pain and swelling. Her pain has been gradually worsening over the last week and has included a feeling of heat with her legs. She has tried placing her legs in cool water and has been taking oxycontin 10mg BID and PRN hydrocodone-acetaminophen with limited relief. She has been following Dr. Mook De La Torre with neurology at [facility] recently.     She reports being diagnosed with erythromelalgia around 2 years ago, with progressive symptoms around that time. She had gotten worked-up with neurology at [facility] and had been tried on high dose steroids with some benefit. She notes more recently, she tried IVIG in the fall of 2022 and 4 doses of rituximab in February 2023. Of note, she reports having an abnormal sweat test at [facility] where only her palms produced sweat, as well as significant tachycardia with a tilt table test.     During this admission, she was started on prednisone 40mg daily, continued on home azathioprine, norethindrone. For pain, she has been getting scheduled tylenol, ibuprofen, gabapentin, oxycontin, with PRN oxycodone 5-10 mg q6 hr PRN with diluadid PRN for breakthrough pain.     Review of Systems: Pertinent positive and negative systems described in HPI; the remainder of the 14 systems are negative    Past Medical History:  Past Medical History:   Diagnosis Date    Auto-erythrocyte sensitization (H)     Reactive depression     Seasonal allergies 06/25/2020    Mostly resolved       Past Surgical History:  Past Surgical History:   Procedure Laterality Date    INSERT PICC LINE  Left 6/23/2021    Procedure: INSERTION, PICC;  Surgeon: Neal Penny MD;  Location: [facility] OR    IR PICC PLACEMENT > 5 YRS OF AGE  6/23/2021    NO HISTORY OF SURGERY         Social History:  Social History     Socioeconomic History    Marital status:      Spouse name: Shiraz    Number of children: 2    Years of education: 16    Highest education level: None   Occupational History     Employer: UNEMPLOYED   Tobacco Use    Smoking status: Never    Smokeless tobacco: Never   Substance and Sexual Activity    Alcohol use: No    Drug use: No    Sexual activity: Yes     Partners: Male   Other Topics Concern     Service No    Blood Transfusions No    Caffeine Concern No    Occupational Exposure No    Hobby Hazards No    Sleep Concern No    Stress Concern No    Weight Concern No    Special Diet No    Back Care Yes     Comment: low back pain, with period    Exercise Yes     Comment: 3 times weekly, treadmill    Bike Helmet Yes    Seat Belt Yes    Self-Exams No   Social History Narrative    Lives with , 2 kids (born 2009, both autism). No guns.  Safe home and environment.  Support from friends family and [employer] community.     Social Determinants of Health     Financial Resource Strain: Low Risk     Difficulty of Paying Living Expenses: Not hard at all   Food Insecurity: No Food Insecurity    Worried About Running Out of Food in the Last Year: Never true    Ran Out of Food in the Last Year: Never true   Transportation Needs: No Transportation Needs    Lack of Transportation (Medical): No    Lack of Transportation (Non-Medical): No        Family History  Family History   Problem Relation Age of Onset    Hypertension Father     Cerebrovascular Disease Maternal Grandmother     Diabetes Maternal Grandfather     Breast Cancer Paternal Grandmother     Hypertension Paternal Grandfather     C.A.D. Paternal Grandfather        Outpatient Medications:  No current facility-administered medications on file  prior to encounter.  azaTHIOprine (IMURAN) 50 MG tablet,   HYDROcodone-acetaminophen (NORCO) 7.5-325 MG per tablet, Take 1 tablet by mouth 2 times daily as needed for moderate to severe pain  ibuprofen (ADVIL/MOTRIN) 200 MG tablet, Take 400 mg by mouth every 6 hours as needed for moderate pain  melatonin 1 MG TABS tablet, Take 2-5 mg by mouth nightly as needed for sleep   naloxone (NARCAN) 4 MG/0.1ML nasal spray, Spray 1 spray (4 mg) into one nostril alternating nostrils as needed for opioid reversal every 2-3 minutes until assistance arrives  norethindrone (MICRONOR) 0.35 MG tablet, Take 2 tablets (0.7 mg) by mouth daily Take the first 3 weeks of each pack, and then immediately move on to the next pack (discard the 4th week of each pack)  oxyCODONE (OXYCONTIN) 10 MG 12 hr tablet, Take 1 tablet (10 mg) by mouth 2 times daily  venlafaxine (EFFEXOR XR) 37.5 MG 24 hr capsule, Take 1 capsule (37.5 mg) by mouth daily  vitamin C (ASCORBIC ACID) 250 MG tablet, Take 250 mg by mouth daily  Vitamin D3 (CHOLECALCIFEROL) 25 mcg (1000 units) tablet, Take 25 mcg by mouth daily  gabapentin (NEURONTIN) 300 MG capsule, Take 1 capsule (300 mg) by mouth 3 times daily  gabapentin (NEURONTIN) 600 MG tablet, Take 300 mg by mouth At Bedtime (Patient not taking: Reported on 2/21/2023)  SUMAtriptan (IMITREX) 100 MG tablet, Take 1 tablet (100 mg) by mouth 2 times daily as needed for migraine (Patient not taking: Reported on 2/17/2023)  valACYclovir (VALTREX) 500 MG tablet, Take 2 tablets (1,000 mg) by mouth daily for 5 days , start at onset of cold sore       Physical Exam:    /62 (BP Location: Right arm)   Pulse 72   Temp 97.9  F (36.6  C) (Oral)   Resp 18   LMP 02/19/2023   SpO2 99%   General: alert and cooperative, NAD, pleasant   HEENT: NC/AT, sclera anicteric  CV: RRR, no murmurs, rubs, gallops   Resp: CTAB, normal work of breathing on room air   GI: soft, non-tender, non-distended, normal bowel sounds   MSK: warm and  well-perfused, normal tone  Extremities: Distal pulses 2+, b/l feet with erythema and 1+ edema to the ankles   Skin: Erythema with b/l feet, skin peeling at b/l soles, darkened ulceration right foot 5th digit, skin becomes more pale when legs are raised, no cyanosis  Good pulses  DP and PT bilaterally  Neuro: Alert and interactive, moves all extremities equally, equal sensation in upper and lower extremities   Psych: Euthymic     Labs & Studies: I personally reviewed the following studies:  ROUTINE LABS (Last four results):  CMP  Recent Labs   Lab 02/22/23  0759 02/21/23  1357    139   POTASSIUM 4.3 4.0   CHLORIDE 105 101   CO2 24 24   ANIONGAP 10 14   GLC 92 89   BUN 9.1 13.1   CR 0.69 0.66   GFRESTIMATED >90 >90   KEVAN 9.1 9.4   PROTTOTAL 7.0 7.8   ALBUMIN 3.7 4.3   BILITOTAL 0.4 0.4   ALKPHOS 56 62   AST 35 43*   ALT 36* 43*     CBC  Recent Labs   Lab 02/22/23  0759 02/21/23  1357   WBC 2.9* 6.4   RBC 3.89 4.00   HGB 12.5 12.7   HCT 37.9 37.9   MCV 97 95   MCH 32.1 31.8   MCHC 33.0 33.5   RDW 13.3 13.4    194     INRNo lab results found in last 7 days.    Assessment & Plan:   Ronna Rivera is a 37 year old woman with a history of migraines and erythromelalgia who presents for increased lower extremity pain and swelling, most consistent with an exacerbation of her erythromelalgia.    #Erythromelalgia  -Her presentation so far has not been suggestive of cellulitis or volume overload/heart failure. She reports being diagnosed with erythromelalgia about 2 years ago and follows with neurology at both [place] and [facility]. She has tried multiple lines of treatment, including topical creams, lidocaine, high dose steroids in Spring/summer of 2021, IVIG in fall of 2022, and rituximab February 2023.   -Although there is some association with erythromelalgia and myeloproliferate disease, her presentation does not currently appear to be associated with a myeloproliferate disorder. Suspect that she may have more of a  neurological/dysautonomic process with potential vascular component, especially with her reported abnormal response to sweat test and tilt table test at [facility]. Her skin did become more pale with lifting her legs, suggesting that she does have venous drainage. She reports having an arteriogram done with [facility] as well   -Agree with symptom management and pain service consultation, with goal to control symptoms and continue outpatient work-up with her neurology teams     Recommendations:   -We believe her erythromelalgia is less likely to be driven by a hematologic process, suspect this is more related to neurologic/dysautonomic dysfunction. Recommend continued evaluation with outpatient neurology when her symptoms are better controlled   -Appreciate anesthesia/pain service input  -Consider aspirin 81mg BID (though pt notes she has not benefited from ASA in the past)   -Consider amitriptyline or sodium channel blocker such as mexiletine for symptomatic relief     Patient was seen and plan of care was discussed with attending physician Dr. Corcoran.    We will continue to follow along peripherally. Please do not hesitate to call with additional questions.     Curtis Connolly MD  Hematology/Oncology Fellow PGY4  Pager: [phone]    Attending  The patient was seen and examined by me separate from the resident/fellow provider.The note above reflects my assessment and plan. I have personally reviewed today's labs,vital and radiology results. The points of care that were added by me are:    Very interesting young woman with debilitating erythromelalgia worked up extensively at [facility]. I am impressed that the redness and heat is worse with feet in dependent position and improved with elevation, history of anhidrosis, history of abnormal cardiac response to tilt table suggesting to me a dysautonomia. Not sure is this was due to an immune response to small and large nerve fibers. Does not seem to be hematologic. Response to  prednisone and IV IgG is of note.Wonder about amitriptyline or Na channel agents, would consider ASA 81mg BID  Semaj Corcoran M.D.  [phone]

## 2023-02-22 NOTE — PLAN OF CARE
Vitals: Temp: 97.9  F (36.6  C) Temp src: Oral BP: 116/62 Pulse: 72   Resp: 18 SpO2: 99 % O2 Device: None (Room air)        Time: 1614-3228  Reason for admission: erythromelalgia + pain  Activity:  up ad sandra.     Pain:  Controlled w/ scheduled Tylenol, ibuprofen, oxycontin. Legs elevated on wedge, ice packs + compression stockings in use.   Neuro: A&O x4. CMS intact.   Cardiac: WDL. Denies chest pain.  Respiratory:  LS clear and equal. RA. Denies cough or SOB.   GI/:  Voiding spontaneously, not saving. LBM 2/20.   Diet: Regular, tolerating well.   Lines:  R PIV SL.   Incisions/Drains/Skin:  BLE erythema and swelling. Scabbing on bilateral 5th toes, wound care done orders.   Lab:  Reviewed.   Electrolyte Replacements: Not indicated.      New changes this shift:  Continue POC.

## 2023-02-23 ENCOUNTER — TELEPHONE (OUTPATIENT)
Dept: INTERNAL MEDICINE | Facility: CLINIC | Age: 38
End: 2023-02-23

## 2023-02-23 LAB
HOLD SPECIMEN: NORMAL
WBC # BLD AUTO: 5.1 10E3/UL (ref 4–11)

## 2023-02-23 PROCEDURE — 250N000011 HC RX IP 250 OP 636

## 2023-02-23 PROCEDURE — 99222 1ST HOSP IP/OBS MODERATE 55: CPT | Mod: GC | Performed by: PSYCHIATRY & NEUROLOGY

## 2023-02-23 PROCEDURE — 258N000003 HC RX IP 258 OP 636

## 2023-02-23 PROCEDURE — 250N000013 HC RX MED GY IP 250 OP 250 PS 637

## 2023-02-23 PROCEDURE — G0378 HOSPITAL OBSERVATION PER HR: HCPCS

## 2023-02-23 PROCEDURE — 96372 THER/PROPH/DIAG INJ SC/IM: CPT

## 2023-02-23 PROCEDURE — 36415 COLL VENOUS BLD VENIPUNCTURE: CPT | Performed by: STUDENT IN AN ORGANIZED HEALTH CARE EDUCATION/TRAINING PROGRAM

## 2023-02-23 PROCEDURE — 250N000013 HC RX MED GY IP 250 OP 250 PS 637: Performed by: STUDENT IN AN ORGANIZED HEALTH CARE EDUCATION/TRAINING PROGRAM

## 2023-02-23 PROCEDURE — 120N000002 HC R&B MED SURG/OB UMMC

## 2023-02-23 PROCEDURE — 250N000012 HC RX MED GY IP 250 OP 636 PS 637

## 2023-02-23 PROCEDURE — 85048 AUTOMATED LEUKOCYTE COUNT: CPT | Performed by: STUDENT IN AN ORGANIZED HEALTH CARE EDUCATION/TRAINING PROGRAM

## 2023-02-23 PROCEDURE — 99233 SBSQ HOSP IP/OBS HIGH 50: CPT | Performed by: INTERNAL MEDICINE

## 2023-02-23 PROCEDURE — 99233 SBSQ HOSP IP/OBS HIGH 50: CPT | Mod: GC | Performed by: HOSPITALIST

## 2023-02-23 RX ORDER — METHYLPREDNISOLONE SODIUM SUCCINATE 125 MG/2ML
125 INJECTION, POWDER, LYOPHILIZED, FOR SOLUTION INTRAMUSCULAR; INTRAVENOUS
Status: DISCONTINUED | OUTPATIENT
Start: 2023-02-23 | End: 2023-02-24

## 2023-02-23 RX ORDER — OXYCODONE HCL 10 MG/1
10 TABLET, FILM COATED, EXTENDED RELEASE ORAL EVERY 12 HOURS
Status: DISCONTINUED | OUTPATIENT
Start: 2023-02-23 | End: 2023-02-25

## 2023-02-23 RX ORDER — DIPHENHYDRAMINE HYDROCHLORIDE 50 MG/ML
50 INJECTION INTRAMUSCULAR; INTRAVENOUS ONCE
Status: COMPLETED | OUTPATIENT
Start: 2023-02-23 | End: 2023-02-23

## 2023-02-23 RX ORDER — DIPHENHYDRAMINE HYDROCHLORIDE 50 MG/ML
50 INJECTION INTRAMUSCULAR; INTRAVENOUS
Status: DISCONTINUED | OUTPATIENT
Start: 2023-02-23 | End: 2023-02-24

## 2023-02-23 RX ORDER — HYDROMORPHONE HYDROCHLORIDE 1 MG/ML
0.5 INJECTION, SOLUTION INTRAMUSCULAR; INTRAVENOUS; SUBCUTANEOUS 3 TIMES DAILY PRN
Status: DISCONTINUED | OUTPATIENT
Start: 2023-02-23 | End: 2023-02-25

## 2023-02-23 RX ORDER — ACETAMINOPHEN 325 MG/1
650 TABLET ORAL
Status: DISCONTINUED | OUTPATIENT
Start: 2023-02-23 | End: 2023-02-24

## 2023-02-23 RX ORDER — SODIUM CHLORIDE 9 MG/ML
INJECTION, SOLUTION INTRAVENOUS CONTINUOUS
Status: ACTIVE | OUTPATIENT
Start: 2023-02-23 | End: 2023-02-24

## 2023-02-23 RX ORDER — DIPHENHYDRAMINE HCL 50 MG
50 CAPSULE ORAL ONCE
Status: COMPLETED | OUTPATIENT
Start: 2023-02-23 | End: 2023-02-23

## 2023-02-23 RX ORDER — DIPHENHYDRAMINE HYDROCHLORIDE 50 MG/ML
50 INJECTION INTRAMUSCULAR; INTRAVENOUS ONCE
Status: COMPLETED | OUTPATIENT
Start: 2023-02-23 | End: 2023-02-24

## 2023-02-23 RX ORDER — DIPHENHYDRAMINE HCL 50 MG
50 CAPSULE ORAL
Status: DISCONTINUED | OUTPATIENT
Start: 2023-02-23 | End: 2023-02-24

## 2023-02-23 RX ORDER — PROCHLORPERAZINE MALEATE 5 MG
10 TABLET ORAL ONCE
Status: COMPLETED | OUTPATIENT
Start: 2023-02-23 | End: 2023-02-24

## 2023-02-23 RX ORDER — ACETAMINOPHEN 325 MG/1
650 TABLET ORAL ONCE
Status: COMPLETED | OUTPATIENT
Start: 2023-02-23 | End: 2023-02-23

## 2023-02-23 RX ADMIN — POLYETHYLENE GLYCOL 3350 17 G: 17 POWDER, FOR SOLUTION ORAL at 07:57

## 2023-02-23 RX ADMIN — OXYCODONE HYDROCHLORIDE 10 MG: 10 TABLET, FILM COATED, EXTENDED RELEASE ORAL at 16:24

## 2023-02-23 RX ADMIN — SENNOSIDES AND DOCUSATE SODIUM 2 TABLET: 50; 8.6 TABLET ORAL at 07:57

## 2023-02-23 RX ADMIN — GABAPENTIN 400 MG: 400 CAPSULE ORAL at 07:57

## 2023-02-23 RX ADMIN — VENLAFAXINE HYDROCHLORIDE 37.5 MG: 37.5 CAPSULE, EXTENDED RELEASE ORAL at 21:05

## 2023-02-23 RX ADMIN — ACETAMINOPHEN 650 MG: 325 TABLET ORAL at 21:13

## 2023-02-23 RX ADMIN — SODIUM CHLORIDE, POTASSIUM CHLORIDE, SODIUM LACTATE AND CALCIUM CHLORIDE 500 ML: 600; 310; 30; 20 INJECTION, SOLUTION INTRAVENOUS at 21:33

## 2023-02-23 RX ADMIN — HYDROMORPHONE HYDROCHLORIDE 4 MG: 2 TABLET ORAL at 15:11

## 2023-02-23 RX ADMIN — ENOXAPARIN SODIUM 40 MG: 40 INJECTION SUBCUTANEOUS at 23:10

## 2023-02-23 RX ADMIN — HUMAN IMMUNOGLOBULIN G 20 G: 20 LIQUID INTRAVENOUS at 22:59

## 2023-02-23 RX ADMIN — ACETAMINOPHEN 975 MG: 325 TABLET, FILM COATED ORAL at 07:57

## 2023-02-23 RX ADMIN — NORETHINDRONE 0.7 MG: 0.35 TABLET ORAL at 21:06

## 2023-02-23 RX ADMIN — IBUPROFEN 400 MG: 200 TABLET, FILM COATED ORAL at 16:24

## 2023-02-23 RX ADMIN — IBUPROFEN 400 MG: 200 TABLET, FILM COATED ORAL at 21:05

## 2023-02-23 RX ADMIN — GABAPENTIN 400 MG: 400 CAPSULE ORAL at 13:25

## 2023-02-23 RX ADMIN — ACETAMINOPHEN 975 MG: 325 TABLET, FILM COATED ORAL at 16:24

## 2023-02-23 RX ADMIN — HYDROMORPHONE HYDROCHLORIDE 0.5 MG: 1 INJECTION, SOLUTION INTRAMUSCULAR; INTRAVENOUS; SUBCUTANEOUS at 17:57

## 2023-02-23 RX ADMIN — IBUPROFEN 400 MG: 200 TABLET, FILM COATED ORAL at 09:47

## 2023-02-23 RX ADMIN — GABAPENTIN 400 MG: 400 CAPSULE ORAL at 21:05

## 2023-02-23 RX ADMIN — IBUPROFEN 400 MG: 200 TABLET, FILM COATED ORAL at 04:57

## 2023-02-23 RX ADMIN — AZATHIOPRINE 100 MG: 50 TABLET ORAL at 21:05

## 2023-02-23 RX ADMIN — SODIUM CHLORIDE: 9 INJECTION, SOLUTION INTRAVENOUS at 22:59

## 2023-02-23 RX ADMIN — DIPHENHYDRAMINE HYDROCHLORIDE 50 MG: 50 CAPSULE ORAL at 21:05

## 2023-02-23 ASSESSMENT — ACTIVITIES OF DAILY LIVING (ADL)
ADLS_ACUITY_SCORE: 36

## 2023-02-23 NOTE — PROGRESS NOTES
[facility]    History and Physical - Medicine Service, DELMY TEAM 3       Date of Admission:  2/21/2023    Assessment & Plan      Ronna Rivera is a 37 year old female with PMHx of migraine and erythromelalgia who presented with worsening LE pain that started 1 week ago with concerning ulcers being admitted on 2/21/2023 for pain control due to chronic erythromelalgia.    Changes today:  -Neurology consulted, recs pending  -adjusted pain regimen  -planned to discontinue oxycontin and IV dilaudid but pt requested to continue due to increased pain/flare   -take breaks from ice on feet q2h  -bowel regimen      Erythromelalgia flare  R foot ulcers  Menstrual suppression for erythromelalgia  Increased bilateral LE swelling, warmth and erythema over the last week. Normotensive, tachycardic, afebrile, maintaining oxygen saturations on room air on arrival to ED. Labs including CBC and BMP WNL.  Presentation consistent with previous erythromelalgia flares. Given steroids improved symptoms previously, will resume until further recommendations from hematology. Low likelihood cellulitis and holding off on antibiotics. Two ulcers concerning for ischemia and necrosis noted on L and R 5th toe.  -hematology consulted  -pain management consulted, appreciate recommendations   - APAP 975 TID scheduled   - Ibuprofen 400 mg q6h scheduled    - lidocaine 5% ointment q4h PRN   - Gabapentin 400 mg q8h scheduled   - Discontinue oxycodone, planned to stop oxycontin but pt requested to continue   - Hydromorphone PO 2-4 mg q4h PRN    - Discontinued hydromorphone IV TID PRN then pt requested to continue  -outpatient pain clinic referral sent and patient encouraged to call to schedule appointment prior to discharge  -prednisone 40 mg daily, ordered daily until further hematology recommendations  -hold PTA aspirin 81  -continue PTA azathioprine 100 mg daily and norethindrone 0.7 mg daily  -wound  care consult, appreciate recommendations    CHRONIC, RESOLVED & STABLE PROBLEMS    Elevated LFTs  AST//196 in January 2023. Work up including hepatitis Bc antibody positive with no HBV DNA, hep C negative, CMV and EBV negative. Suspected to be 2/2 Imuran, so dose was reduced to 100 mg daily. LFTs have been down trending since. CTM, although could consider RUQ US if LFTs increasing.  - trend LFTs    Tachycardia, resolved  EKG with sinus tachycardia. Suspect 2/2 to pain. CTM.     Diet: Regular Diet Adult    DVT Prophylaxis: enoxaparin 40 mg daily  Garcia Catheter: Not present  Fluids: PO intake  Lines: None   Cardiac Monitoring: None  Code Status: Full Code     Clinically Significant Risk Factors Present on Admission                             Disposition Plan observation, plan for home once tolerating PO pain regimen     Expected Discharge Date: 02/23/2023                The patient's care was discussed with the Dr. Sandoval.    Jacqueline Oquendo MD  Medicine Service, [facility] TEAM [address] [place] [address]  Securely message with [employer] (more info)  Text page via [facility] Paging/Directory   See signed in provider for up to date coverage information  ______________________________________________________________________    Interval History  NAEO.  at bedside. Pt feeling discomfort and pain in her lower extremities. Says pain at nighttime has been better than daytime. Asking about possibility of amitriptyline and IVIG. Is aware that using IV pain medications will be barrier to discharge, shared goal to move to entirely PO regimen. Denies any fevers, chills, CP or abdominal pain. Remains engaged in her care and goals for treatment inpatient.     Past Medical History    Past Medical History:   Diagnosis Date     Auto-erythrocyte sensitization (H)      Reactive depression      Seasonal allergies 06/25/2020    Mostly resolved       Past Surgical History   Past Surgical History:    Procedure Laterality Date     INSERT PICC LINE Left 6/23/2021    Procedure: INSERTION, PICC;  Surgeon: Neal Penny MD;  Location: [facility] OR     IR PICC PLACEMENT > 5 YRS OF AGE  6/23/2021     NO HISTORY OF SURGERY         Prior to Admission Medications   Prior to Admission Medications   Prescriptions Last Dose Informant Patient Reported? Taking?   HYDROcodone-acetaminophen (NORCO) 7.5-325 MG per tablet 2/21/2023 at 12:00 pm Self No Yes   Sig: Take 1 tablet by mouth 2 times daily as needed for moderate to severe pain   SUMAtriptan (IMITREX) 100 MG tablet  Self No No   Sig: Take 1 tablet (100 mg) by mouth 2 times daily as needed for migraine   Patient not taking: Reported on 2/17/2023   Vitamin D3 (CHOLECALCIFEROL) 25 mcg (1000 units) tablet 2/19/2023 at unknown Self Yes Yes   Sig: Take 25 mcg by mouth daily   azaTHIOprine (IMURAN) 50 MG tablet 2/20/2023 at night Self Yes Yes   gabapentin (NEURONTIN) 300 MG capsule  Self No No   Sig: Take 1 capsule (300 mg) by mouth 3 times daily   gabapentin (NEURONTIN) 600 MG tablet Not Taking Self Yes No   Sig: Take 300 mg by mouth At Bedtime   Patient not taking: Reported on 2/21/2023   ibuprofen (ADVIL/MOTRIN) 200 MG tablet 2/19/2023 at unknown Self Yes Yes   Sig: Take 400 mg by mouth every 6 hours as needed for moderate pain   melatonin 1 MG TABS tablet 2/20/2023 at 10:00 pm Self Yes Yes   Sig: Take 2-5 mg by mouth nightly as needed for sleep    naloxone (NARCAN) 4 MG/0.1ML nasal spray Unknown at as needed Self No Yes   Sig: Spray 1 spray (4 mg) into one nostril alternating nostrils as needed for opioid reversal every 2-3 minutes until assistance arrives   norethindrone (MICRONOR) 0.35 MG tablet 2/20/2023 at 10:00 pm Self No Yes   Sig: Take 2 tablets (0.7 mg) by mouth daily Take the first 3 weeks of each pack, and then immediately move on to the next pack (discard the 4th week of each pack)   oxyCODONE (OXYCONTIN) 10 MG 12 hr tablet 2/21/2023 at 10:00 am Self No Yes    Sig: Take 1 tablet (10 mg) by mouth 2 times daily   valACYclovir (VALTREX) 500 MG tablet   No No   Sig: Take 2 tablets (1,000 mg) by mouth daily for 5 days , start at onset of cold sore   venlafaxine (EFFEXOR XR) 37.5 MG 24 hr capsule 2/20/2023 at 10:00 pm Self No Yes   Sig: Take 1 capsule (37.5 mg) by mouth daily   vitamin C (ASCORBIC ACID) 250 MG tablet 2/19/2023 at unknown Self Yes Yes   Sig: Take 250 mg by mouth daily      Facility-Administered Medications: None        Social History   I have reviewed this patient's social history and updated it with pertinent information if needed.  Social History     Tobacco Use     Smoking status: Never     Smokeless tobacco: Never   Substance Use Topics     Alcohol use: No     Drug use: No       Family History   I have reviewed this patient's family history and updated it with pertinent information if needed.  Family History   Problem Relation Age of Onset     Hypertension Father      Cerebrovascular Disease Maternal Grandmother      Diabetes Maternal Grandfather      Breast Cancer Paternal Grandmother      Hypertension Paternal Grandfather      C.A.D. Paternal Grandfather        Allergies   Allergies   Allergen Reactions     Topiramate Anxiety        Physical Exam   Vital Signs: Temp: 98.2  F (36.8  C) Temp src: Oral BP: 121/77 Pulse: 91   Resp: 18 SpO2: 99 % O2 Device: None (Room air)    Weight: 0 lbs 0 oz    Constitutional: Awake, alert, cooperative, in NAD, legs raised and using ice packs.  Eyes: Sclera clear, conjunctiva normal.  Respiratory: Non-labored breathing, clear to auscultation bilaterally, no crackles or wheezing.  Cardiovascular: RRR, no obvious murmur noted.  GI: soft abdomen, non-distended, non-tender to palpation, rebound or guarding  Skin: No concerning lesions or flushing or exposed areas. BLE warm to touch, covered by compression stockings and icepacks. .  Neurologic: Awake, alert & oriented x3.    Psych: Appropriate affect      Medical Decision  Making         Data     I have personally reviewed the following data over the past 24 hrs:    5.1  \   N/A   / N/A     N/A N/A N/A /  N/A   N/A N/A N/A \       Imaging results reviewed over the past 24 hrs:   No results found for this or any previous visit (from the past 24 hour(s)).

## 2023-02-23 NOTE — PROVIDER NOTIFICATION
"DELMY 3 paged \"[phone], [phone], Miguel.   Pt wondering why scheduled oxycontin q12h order was discontinued/if it can be re-started please.   Thanks, Kaila davenport\"      "

## 2023-02-23 NOTE — PROVIDER NOTIFICATION
7B Ronna Rivera  Patient is reports redness to burning sensation to face and ears. Can you please see patient? Also patient is requesting for IV pain medication as well  Yaneth Ramos.

## 2023-02-23 NOTE — CONSULTS
[facility]  Neurology Consultation    Patient Name:  Ronna Rivera  MRN:  [id number]    :  1985  Date of Service:  2023  Primary care provider:  Neal Ball      Neurology consultation service was asked to see Ronna Rivera by Dr. Oquendo to evaluate pain and swelling 2/2 erythromelalgia.    Chief Complaint:  pain and swelling 2/2 erythromelalgia.    History of Present Illness:   Ronna Rivera is a 37 year old female with history of migraine, erythromelalgia who was admitted with worsening LE pain that started 1 week ago with concerning ulcers for pain control due to chronic erythromelalgia.  Neurology were consulted to evaluate pain and swelling secondary to her bilateral erythromelalgia.      Patient was diagnosed with erythromelalgia 2 years ago.  She became wheelchair-bound then in , and was started on 1 g Solu-Medrol every week for 20 weeks with good improvement, and she did not require the wheelchair anymore.  However, last , symptoms started to worsen again, and for this she was given IVIG weekly 20 g, for 11 sessions starting in October, last dose was on .  She was also given monthly infusions of rituximab, last dose in 2023.  At baseline she is on azathioprine, although notably dose was reduced to 100 mg on 2023 because of elevated LFTs.  These improved her symptoms perhaps a little bit, but symptoms started worsening again about a week ago, resulting in her coming to the ED and getting admitted here 2 days ago.      She says that she has always had leg pain and swelling, and flareups associated with it, but this would be relieved on ice packs and elevating her legs.  However, this time, she has had to use ice packs 24/ to keep it cool, and keep the pain down to an 8/10, and this is unrealistic.  She also has spontaneous nontraumatic blisters on both of her legs.  There is redness up to  the mid calf area.\     Anesthesiology were consulted for management of her pain, and they have her continued on OxyContin, Dilaudid, gabapentin, lidocaine ointment, Robaxin.  They would also want her to follow-up with the pain clinic.    She had autonomic testing done at the time of presentation in spring 2021-thermoregulatory sweat testing showed global anhidrosis.  She would have sweating only from her hands and feet, after 1.6 degree core body temperature rise.Her QSART responses were reduced at the knee, absent at the ankle, robust to increased at the foot, and normal at the forearm. She has never had biopsies.    COVID-19 nucleocapsid antibodies have been positive last year, even though she has never tested positive for COVID-19.  Otherwise all anti-neuronal antibodies were negative, as well as paraneoplastic antibodies.  Unremarkable rheumatology work-up so far.      ROS  A comprehensive ROS was performed and pertinent findings were included in HPI.     PMH  Past Medical History:   Diagnosis Date     Auto-erythrocyte sensitization (H)      Reactive depression      Seasonal allergies 06/25/2020    Mostly resolved     Past Surgical History:   Procedure Laterality Date     INSERT PICC LINE Left 6/23/2021    Procedure: INSERTION, PICC;  Surgeon: Neal Penny MD;  Location: [facility] OR     IR PICC PLACEMENT > 5 YRS OF AGE  6/23/2021     NO HISTORY OF SURGERY         Medications   I have personally reviewed the patient's medication list.     Allergies  I have personally reviewed the patient's allergy list.     Social History  Social History     Socioeconomic History     Marital status:      Spouse name: Shiraz     Number of children: 2     Years of education: 16     Highest education level: Not on file   Occupational History     Employer: UNEMPLOYED   Tobacco Use     Smoking status: Never     Smokeless tobacco: Never   Substance and Sexual Activity     Alcohol use: No     Drug use: No     Sexual  activity: Yes     Partners: Male   Other Topics Concern      Service No     Blood Transfusions No     Caffeine Concern No     Occupational Exposure No     Hobby Hazards No     Sleep Concern No     Stress Concern No     Weight Concern No     Special Diet No     Back Care Yes     Comment: low back pain, with period     Exercise Yes     Comment: 3 times weekly, treadmill     Bike Helmet Yes     Seat Belt Yes     Self-Exams No   Social History Narrative    Lives with , 2 kids (born 2009, both autism). No guns.  Safe home and environment.  Support from friends family and [employer] community.     Social Determinants of Health     Financial Resource Strain: Low Risk      Difficulty of Paying Living Expenses: Not hard at all   Food Insecurity: No Food Insecurity     Worried About Running Out of Food in the Last Year: Never true     Ran Out of Food in the Last Year: Never true   Transportation Needs: No Transportation Needs     Lack of Transportation (Medical): No     Lack of Transportation (Non-Medical): No   Physical Activity: Not on file   Stress: Not on file   Social Connections: Not on file   Intimate Partner Violence: Not on file   Housing Stability: Not on file       Family History    Family History   Problem Relation Age of Onset     Hypertension Father      Cerebrovascular Disease Maternal Grandmother      Diabetes Maternal Grandfather      Breast Cancer Paternal Grandmother      Hypertension Paternal Grandfather      C.A.D. Paternal Grandfather          Physical Examination   Vitals: /74 (BP Location: Right arm)   Pulse 95   Temp 98.3  F (36.8  C) (Oral)   Resp 18   LMP 02/19/2023   SpO2 100%   General: Lying in bed, NAD  Head: NC/AT  Eyes: no icterus, op pink and moist  Cardiac: RRR. Extremities warm, no edema.   Respiratory: non-labored on RA  GI: S/NT/ND    Skin: No rash or lesion on exposed skin other than bilateral lower extremities.  There is redness and swelling of both lower  extremities up to her mid calf.  Multiple blisters are also seen on both extremities.  These are slightly tender, although she says that the most tender spot is on the ventral aspect of her first metatarsophalangeal joint.    Psych: Mood pleasant, affect congruent  Neuro:  Mental status: Awake, alert, attentive, oriented to self, time, place, and circumstance. Language is fluent and coherent with intact comprehension of complex commands, naming and repetition.  Cranial nerves: VFF, PERRL, conjugate gaze, EOMI, facial sensation intact, face symmetric, shoulder shrug strong, tongue/uvula midline, no dysarthria.   Motor: Normal bulk and tone. No abnormal movements.  No drift in bilateral upper extremities.  Hip flexion 5/5 bilaterally.  Reflexes: Normo-reflexic and symmetric biceps, brachioradialis, triceps, patellae, areflexic in achilles. No clonus, toes equivocal.  Sensory: Intact to light touch, pin, vibration, and proprioception in proximal and distal aspects of all 4 extremities except bilateral lower feet as described below.    If normal is 100, she says she feels 2-3 over her ventral feet over MTP joints, 10-20 on the soles of her feet, 30-40 over the dorsal aspect of her feet, 60-70 over lower calves.  This is for both light touch and pinprick.  Over 10 seconds to vibration in bilateral great toes, proprioception of great toes intact.    Coordination: FNF without ataxia or dysmetria. Rapid alternating movements intact.   Gait: Deferred    Investigations   I have personally reviewed pertinent labs, tests, and radiological imaging. Discussion of notable findings is included under Impression.     Was patient transferred from outside hospital?   No    Impression  Ronna Rivera is a 37 year old female with history of erythromelalgia who is here with worsening pain and swelling of her legs, likely a flareup of her erythromelalgia.  Despite significant pain medications, she still continues to have a lot of  discomfort causing her to use the ice packs continuously.    Based on chart review and history, patient's last IVIG infusion was on 6 February, and symptoms started to worsen last week.  It is possible that coming off on the IVIG, plus going down on the azathioprine dose in late January is causing her symptoms to worsen.  Since rituximab last about 6 months after an infusion, we would not expect stopping the rituximab 6 weeks ago to cause worsening of her symptoms.  Her LFTs are normal, but for now, it would probably be safer to give her 1 dose of IVIG tonight, instead of going up on the azathioprine dose.  Tomorrow morning, we will plan on talking with Dr. James for further management of her erythromelalgia.  She might need other steroid sparing agent such as mycophenolate for her management.    For her IVIG, she was following a specific protocol of having it given she has a hard time tolerating it otherwise.  The instructions from her IVIG infusions in the past are copied below.    Apart from IVIG, we would also like to get a CD19 level just to assess the effectiveness of rituximab.    Recommendations  -Give premeds 500 ml fluid bolus (over 1 hr), tylenol and PO benadryl.  Then give 20g IVIG concurrently with NS at a rate of 175ml/hr. give PO compazine 30 min prior to IVIG completion and also give 50 mg IV benadryl and 100 mg IVP Solu-Cortef post infusion completion.  IVIG infusion rates:  16ml/hr - 30 min  33ml/hr - 30 min  66ml/hr - 30 min  132ml/hr - remainder of infusion  -check CD19 level.  Neurology will continue to follow.    Thank you for involving Neurology in the care of Ronna Rivera.  Please do not hesitate to call with questions/concerns (consult pager [phone]).      Patient was discussed with Dr. Doll.    Allyson Palmer MD  PGY1 Resident

## 2023-02-23 NOTE — PLAN OF CARE
Vitals: Temp: 98.3  F (36.8  C) Temp src: Oral BP: 124/74 Pulse: 95   Resp: 18 SpO2: 100 % O2 Device: None (Room air)        Time: 2476-3772  Reason for admission: erythromelalgia + pain  Activity:  up ad sandra to [facility].   Pain:  Controlled w/ scheduled Tylenol, ibuprofen, oxycontin. Legs elevated on wedge, ice packs + compression stockings in use.   Neuro: A&O x4. CMS intact.   Cardiac: WDL. Denies chest pain.  Respiratory:  LS clear and equal. RA. Denies cough or SOB.   GI/:  Voiding spontaneously, not saving. LBM 2/20.   Diet: Regular, tolerating well.   Lines:  R PIV SL.   Incisions/Drains/Skin:  BLE erythema and swelling. Scabbing on bilateral 5th toes, wound care per orders.   Lab:  Reviewed.   Electrolyte Replacements: Not indicated.      New changes this shift:  Continue POC.     ~OBS GOALS  Adequate pain control on PO regimen - PROGRESSING

## 2023-02-23 NOTE — PROGRESS NOTES
Hematology Progress Note   Date of Service: 02/23/2023    Patient: Ronna Rivera  MRN: [id number]  Admission Date: 2/21/2023  Hospital Day # 1   Primary Outpatient Hematologist: Not established with hematology as an outpatient   Reason for Consult: Erythromelalgia     History of Present Illness:    Ronna Rivera is a 37 year old woman with a history of migraines and erythromelalgia who presents for increased lower extremity pain and swelling. Her pain has been gradually worsening over the last week and has included a feeling of heat with her legs. She has tried placing her legs in cool water and has been taking oxycontin 10mg BID and PRN hydrocodone-acetaminophen with limited relief. She has been following Dr. Mook De La Torre with [place] [facility] [place].     She reports being diagnosed with erythromelalgia around 2 years ago, with progressive symptoms around that time. She had gotten worked-up with neurology at [place] and had been tried on high dose steroids with some benefit. She notes more recently, she tried IVIG in the fall of 2022 and 4 doses of rituximab in February 2023. Of note, she reports having an abnormal sweat test at [place] where only her palms produced sweat, as well as significant tachycardia with a tilt table test.     During this admission, she was started on prednisone 40mg daily, continued on home azathioprine, norethindrone. For pain, she has been getting scheduled tylenol, ibuprofen, gabapentin, oxycontin, with PRN oxycodone 5-10 mg q6 hr PRN with diluadid PRN for breakthrough pain.     INTERVAL HISTORY  Still having pain and erythema  of lower extremities and feet     Review of Systems: Pertinent positive and negative systems described in HPI; the remainder of the 14 systems are negative    Past Medical History:  Past Medical History:   Diagnosis Date     Auto-erythrocyte sensitization (H)      Reactive depression      Seasonal allergies 06/25/2020    Mostly resolved       Past  Surgical History:  Past Surgical History:   Procedure Laterality Date     INSERT PICC LINE Left 6/23/2021    Procedure: INSERTION, PICC;  Surgeon: Neal Penny MD;  Location: [facility] OR     IR PICC PLACEMENT > 5 YRS OF AGE  6/23/2021     NO HISTORY OF SURGERY         Social History:  Social History     Socioeconomic History     Marital status:      Spouse name: Shiraz     Number of children: 2     Years of education: 16     Highest education level: None   Occupational History     Employer: UNEMPLOYED   Tobacco Use     Smoking status: Never     Smokeless tobacco: Never   Substance and Sexual Activity     Alcohol use: No     Drug use: No     Sexual activity: Yes     Partners: Male   Other Topics Concern      Service No     Blood Transfusions No     Caffeine Concern No     Occupational Exposure No     Hobby Hazards No     Sleep Concern No     Stress Concern No     Weight Concern No     Special Diet No     Back Care Yes     Comment: low back pain, with period     Exercise Yes     Comment: 3 times weekly, treadmill     Bike Helmet Yes     Seat Belt Yes     Self-Exams No   Social History Narrative    Lives with , 2 kids (born 2009, both autism). No guns.  Safe home and environment.  Support from friends family and [employer] community.     Social Determinants of Health     Financial Resource Strain: Low Risk      Difficulty of Paying Living Expenses: Not hard at all   Food Insecurity: No Food Insecurity     Worried About Running Out of Food in the Last Year: Never true     Ran Out of Food in the Last Year: Never true   Transportation Needs: No Transportation Needs     Lack of Transportation (Medical): No     Lack of Transportation (Non-Medical): No        Family History  Family History   Problem Relation Age of Onset     Hypertension Father      Cerebrovascular Disease Maternal Grandmother      Diabetes Maternal Grandfather      Breast Cancer Paternal Grandmother      Hypertension Paternal  Grandfather      MERYL Paternal Grandfather        Outpatient Medications:  No current facility-administered medications on file prior to encounter.  azaTHIOprine (IMURAN) 50 MG tablet,   HYDROcodone-acetaminophen (NORCO) 7.5-325 MG per tablet, Take 1 tablet by mouth 2 times daily as needed for moderate to severe pain  ibuprofen (ADVIL/MOTRIN) 200 MG tablet, Take 400 mg by mouth every 6 hours as needed for moderate pain  melatonin 1 MG TABS tablet, Take 2-5 mg by mouth nightly as needed for sleep   naloxone (NARCAN) 4 MG/0.1ML nasal spray, Spray 1 spray (4 mg) into one nostril alternating nostrils as needed for opioid reversal every 2-3 minutes until assistance arrives  norethindrone (MICRONOR) 0.35 MG tablet, Take 2 tablets (0.7 mg) by mouth daily Take the first 3 weeks of each pack, and then immediately move on to the next pack (discard the 4th week of each pack)  oxyCODONE (OXYCONTIN) 10 MG 12 hr tablet, Take 1 tablet (10 mg) by mouth 2 times daily  venlafaxine (EFFEXOR XR) 37.5 MG 24 hr capsule, Take 1 capsule (37.5 mg) by mouth daily  vitamin C (ASCORBIC ACID) 250 MG tablet, Take 250 mg by mouth daily  Vitamin D3 (CHOLECALCIFEROL) 25 mcg (1000 units) tablet, Take 25 mcg by mouth daily  gabapentin (NEURONTIN) 300 MG capsule, Take 1 capsule (300 mg) by mouth 3 times daily  gabapentin (NEURONTIN) 600 MG tablet, Take 300 mg by mouth At Bedtime (Patient not taking: Reported on 2/21/2023)  SUMAtriptan (IMITREX) 100 MG tablet, Take 1 tablet (100 mg) by mouth 2 times daily as needed for migraine (Patient not taking: Reported on 2/17/2023)  valACYclovir (VALTREX) 500 MG tablet, Take 2 tablets (1,000 mg) by mouth daily for 5 days , start at onset of cold sore       Physical Exam:    /74 (BP Location: Right arm)   Pulse 95   Temp 98.3  F (36.8  C) (Oral)   Resp 18   LMP 02/19/2023   SpO2 100%   General: alert and cooperative, NAD, pleasant   HEENT: NC/AT, sclera anicteric  CV: RRR, no murmurs, rubs, gallops    Resp: CTAB, normal work of breathing on room air   GI: soft, non-tender, non-distended, normal bowel sounds   MSK: warm and well-perfused, normal tone  Extremities: Distal pulses 2+, b/l feet with erythema and 1+ edema to the ankles   Skin: Erythema with b/l feet, skin peeling at b/l soles, darkened ulceration right foot 5th digit, skin becomes more pale when legs are raised, no cyanosis  Good pulses  DP and PT bilaterally  Neuro: Alert and interactive, moves all extremities equally, equal sensation in upper and lower extremities   Psych: Euthymic     Labs & Studies: I personally reviewed the following studies:  ROUTINE LABS (Last four results):  CMP  Recent Labs   Lab 02/22/23  0759 02/21/23  1357    139   POTASSIUM 4.3 4.0   CHLORIDE 105 101   CO2 24 24   ANIONGAP 10 14   GLC 92 89   BUN 9.1 13.1   CR 0.69 0.66   GFRESTIMATED >90 >90   KEVAN 9.1 9.4   PROTTOTAL 7.0 7.8   ALBUMIN 3.7 4.3   BILITOTAL 0.4 0.4   ALKPHOS 56 62   AST 35 43*   ALT 36* 43*     CBC  Recent Labs   Lab 02/23/23  0933 02/22/23  0759 02/21/23  1357   WBC 5.1 2.9* 6.4   RBC  --  3.89 4.00   HGB  --  12.5 12.7   HCT  --  37.9 37.9   MCV  --  97 95   MCH  --  32.1 31.8   MCHC  --  33.0 33.5   RDW  --  13.3 13.4   PLT  --  184 194     INRNo lab results found in last 7 days.    Assessment & Plan:   Ronna Rivera is a 37 year old woman with a history of migraines and erythromelalgia who presents for increased lower extremity pain and swelling, most consistent with an exacerbation of her erythromelalgia.    #Erythromelalgia  -Her presentation so far has not been suggestive of cellulitis or volume overload/heart failure. She reports being diagnosed with erythromelalgia about 2 years ago and [place] [facility] [place] She has tried multiple lines of treatment, including topical creams, lidocaine, high dose steroids in Spring/summer of 2021, IVIG in fall of 2022, and rituximab February 2023.   -Although there is some  association with erythromelalgia and myeloproliferate disease, her presentation does not currently appear to be associated with a myeloproliferate disorder. Suspect that she may have more of a neurological/dysautonomic process with potential vascular component, especially with her reported abnormal response to sweat test and tilt table test at [place]. Her skin did become more pale with lifting her legs, suggesting that she does have venous drainage. She reports having an arteriogram done with [place] as well   -Agree with symptom management and pain service consultation, with goal to control symptoms and continue outpatient work-up with her neurology teams     Recommendations:   -We believe her erythromelalgia is less likely to be driven by a hematologic process, suspect this is more related to neurologic/dysautonomic dysfunction. Recommend continued evaluation with outpatient neurology when her symptoms are better controlled   -Appreciate anesthesia/pain service input  -Consider aspirin 81mg BID (though pt notes she has not benefited from ASA in the past)   -Consider amitriptyline or sodium channel blocker such as mexiletine for symptomatic relief   Would consider adding IV IgG treating like CIDP as IV IgG has helped her pain in the past.   We will sign off and suggest she follow up with [facility]     I am impressed that the redness and heat is worse with feet in dependent position and improved with elevation, history of anhidrosis, history of abnormal cardiac response to tilt table suggesting to me a dysautonomia. Not sure is this was due to an immune response to small and large nerve fibers. Does not seem to be hematologic. Response to prednisone and IV IgG is of note.Wonder about amitriptyline or Na channel agents, would consider ASA 81mg BID  Semaj Corcoran M.D.  [phone]

## 2023-02-23 NOTE — PLAN OF CARE
B/P: 108/68, T: 98.2, P: 79, R: 18 O2 sats 98% on room air denies SOB. A/Ox4. +BS reports small BM. Bedside commode voiding adequate. Up to bedside commode independently. amounts. Pain BLE gave scheduled oxycontin, tylenol and ibuprofen. Appeared to rest in between cares. Continue to monitor and notify MD with any significant changes.

## 2023-02-23 NOTE — PLAN OF CARE
Neuro: A/Ox4 able to make needs known.  Respiratory: on room air denies SOB  Cardiac: WDL denies cardiac chest pain  Diet: Regular tolerating. Denies nausea  GI/: +BS reports small BM. Bedside commode voiding with AUOP.  Skin: redness to BLE, scab to bilateral 5th toes and wound to top of foot. Wound care completed this shift.  IV Access: R PIV SL  Pain: reports pain BLE gave PRN oral and IV dilaudid, scheduled tylenol and ibuprofen.   Labs: reviewed  VS: /64 (BP Location: Right arm)   Pulse 75   Temp 98.3  F (36.8  C) (Oral)   Resp 18   LMP 02/19/2023   SpO2 97%    Activity: Up to bedside commode independently.   New Changes this shift: no significant changes.   Plan: continue POC

## 2023-02-23 NOTE — PROGRESS NOTES
Observation goals  Adequate pain control on PO regimen: Ongoing, rating pain at 5-8/10. Oral and IV pain medication utilized.

## 2023-02-24 LAB
ALBUMIN SERPL BCG-MCNC: 3.6 G/DL (ref 3.5–5.2)
ALP SERPL-CCNC: 52 U/L (ref 35–104)
ALT SERPL W P-5'-P-CCNC: 27 U/L (ref 10–35)
AST SERPL W P-5'-P-CCNC: 25 U/L (ref 10–35)
BILIRUB DIRECT SERPL-MCNC: <0.2 MG/DL (ref 0–0.3)
BILIRUB SERPL-MCNC: 0.2 MG/DL
CD19 B CELL COMMENT: ABNORMAL
CD19 CELLS # BLD: <1 CELLS/UL (ref 107–698)
CD19 CELLS NFR BLD: <1 % (ref 6–27)
ERYTHROCYTE [DISTWIDTH] IN BLOOD BY AUTOMATED COUNT: 13.2 % (ref 10–15)
HCT VFR BLD AUTO: 37.6 % (ref 35–47)
HGB BLD-MCNC: 12.4 G/DL (ref 11.7–15.7)
MCH RBC QN AUTO: 32 PG (ref 26.5–33)
MCHC RBC AUTO-ENTMCNC: 33 G/DL (ref 31.5–36.5)
MCV RBC AUTO: 97 FL (ref 78–100)
PLATELET # BLD AUTO: 196 10E3/UL (ref 150–450)
PROT SERPL-MCNC: 7.2 G/DL (ref 6.4–8.3)
RBC # BLD AUTO: 3.88 10E6/UL (ref 3.8–5.2)
WBC # BLD AUTO: 7.1 10E3/UL (ref 4–11)

## 2023-02-24 PROCEDURE — 250N000013 HC RX MED GY IP 250 OP 250 PS 637

## 2023-02-24 PROCEDURE — 999N000128 HC STATISTIC PERIPHERAL IV START W/O US GUIDANCE

## 2023-02-24 PROCEDURE — 258N000003 HC RX IP 258 OP 636

## 2023-02-24 PROCEDURE — 250N000012 HC RX MED GY IP 250 OP 636 PS 637

## 2023-02-24 PROCEDURE — 250N000011 HC RX IP 250 OP 636

## 2023-02-24 PROCEDURE — G0378 HOSPITAL OBSERVATION PER HR: HCPCS

## 2023-02-24 PROCEDURE — 36415 COLL VENOUS BLD VENIPUNCTURE: CPT

## 2023-02-24 PROCEDURE — 85027 COMPLETE CBC AUTOMATED: CPT

## 2023-02-24 PROCEDURE — 80076 HEPATIC FUNCTION PANEL: CPT

## 2023-02-24 PROCEDURE — 86355 B CELLS TOTAL COUNT: CPT | Performed by: HOSPITALIST

## 2023-02-24 PROCEDURE — 99233 SBSQ HOSP IP/OBS HIGH 50: CPT | Mod: GC | Performed by: HOSPITALIST

## 2023-02-24 PROCEDURE — 120N000002 HC R&B MED SURG/OB UMMC

## 2023-02-24 PROCEDURE — 250N000013 HC RX MED GY IP 250 OP 250 PS 637: Performed by: STUDENT IN AN ORGANIZED HEALTH CARE EDUCATION/TRAINING PROGRAM

## 2023-02-24 PROCEDURE — 96372 THER/PROPH/DIAG INJ SC/IM: CPT

## 2023-02-24 RX ORDER — SODIUM CHLORIDE 9 MG/ML
INJECTION, SOLUTION INTRAVENOUS CONTINUOUS
Status: DISCONTINUED | OUTPATIENT
Start: 2023-02-24 | End: 2023-02-25

## 2023-02-24 RX ORDER — DIPHENHYDRAMINE HCL 50 MG
50 CAPSULE ORAL ONCE
Status: COMPLETED | OUTPATIENT
Start: 2023-02-24 | End: 2023-02-24

## 2023-02-24 RX ORDER — DIPHENHYDRAMINE HYDROCHLORIDE 50 MG/ML
50 INJECTION INTRAMUSCULAR; INTRAVENOUS ONCE
Status: DISCONTINUED | OUTPATIENT
Start: 2023-02-24 | End: 2023-02-24

## 2023-02-24 RX ORDER — DIPHENHYDRAMINE HCL 50 MG
50 CAPSULE ORAL
Status: DISCONTINUED | OUTPATIENT
Start: 2023-02-24 | End: 2023-03-02 | Stop reason: HOSPADM

## 2023-02-24 RX ORDER — ACETAMINOPHEN 325 MG/1
650 TABLET ORAL
Status: DISCONTINUED | OUTPATIENT
Start: 2023-02-24 | End: 2023-03-02 | Stop reason: HOSPADM

## 2023-02-24 RX ORDER — ACETAMINOPHEN 325 MG/1
650 TABLET ORAL ONCE
Status: DISCONTINUED | OUTPATIENT
Start: 2023-02-24 | End: 2023-02-24

## 2023-02-24 RX ORDER — DIPHENHYDRAMINE HYDROCHLORIDE 50 MG/ML
50 INJECTION INTRAMUSCULAR; INTRAVENOUS ONCE
Status: COMPLETED | OUTPATIENT
Start: 2023-02-25 | End: 2023-02-25

## 2023-02-24 RX ORDER — DIPHENHYDRAMINE HYDROCHLORIDE 50 MG/ML
50 INJECTION INTRAMUSCULAR; INTRAVENOUS ONCE
Status: COMPLETED | OUTPATIENT
Start: 2023-02-24 | End: 2023-02-24

## 2023-02-24 RX ORDER — PROCHLORPERAZINE MALEATE 5 MG
10 TABLET ORAL ONCE
Status: COMPLETED | OUTPATIENT
Start: 2023-02-24 | End: 2023-02-25

## 2023-02-24 RX ORDER — DIPHENHYDRAMINE HCL 50 MG
50 CAPSULE ORAL ONCE
Status: DISCONTINUED | OUTPATIENT
Start: 2023-02-24 | End: 2023-02-24

## 2023-02-24 RX ORDER — DIPHENHYDRAMINE HYDROCHLORIDE 50 MG/ML
50 INJECTION INTRAMUSCULAR; INTRAVENOUS
Status: DISCONTINUED | OUTPATIENT
Start: 2023-02-24 | End: 2023-03-01

## 2023-02-24 RX ORDER — METHYLPREDNISOLONE SODIUM SUCCINATE 125 MG/2ML
125 INJECTION, POWDER, LYOPHILIZED, FOR SOLUTION INTRAMUSCULAR; INTRAVENOUS
Status: DISCONTINUED | OUTPATIENT
Start: 2023-02-24 | End: 2023-03-02 | Stop reason: HOSPADM

## 2023-02-24 RX ORDER — ACETAMINOPHEN 325 MG/1
650 TABLET ORAL ONCE
Status: COMPLETED | OUTPATIENT
Start: 2023-02-24 | End: 2023-02-24

## 2023-02-24 RX ORDER — DIPHENHYDRAMINE HYDROCHLORIDE 50 MG/ML
50 INJECTION INTRAMUSCULAR; INTRAVENOUS
Status: DISCONTINUED | OUTPATIENT
Start: 2023-02-24 | End: 2023-03-02 | Stop reason: HOSPADM

## 2023-02-24 RX ADMIN — POLYETHYLENE GLYCOL 3350 17 G: 17 POWDER, FOR SOLUTION ORAL at 09:12

## 2023-02-24 RX ADMIN — OXYCODONE HYDROCHLORIDE 10 MG: 10 TABLET, FILM COATED, EXTENDED RELEASE ORAL at 15:55

## 2023-02-24 RX ADMIN — DIPHENHYDRAMINE HYDROCHLORIDE 50 MG: 50 INJECTION, SOLUTION INTRAMUSCULAR; INTRAVENOUS at 02:00

## 2023-02-24 RX ADMIN — HYDROCORTISONE SODIUM SUCCINATE 100 MG: 100 INJECTION, POWDER, FOR SOLUTION INTRAMUSCULAR; INTRAVENOUS at 02:00

## 2023-02-24 RX ADMIN — DIPHENHYDRAMINE HYDROCHLORIDE 50 MG: 50 CAPSULE ORAL at 21:19

## 2023-02-24 RX ADMIN — HYDROMORPHONE HYDROCHLORIDE 4 MG: 2 TABLET ORAL at 00:43

## 2023-02-24 RX ADMIN — VENLAFAXINE HYDROCHLORIDE 37.5 MG: 37.5 CAPSULE, EXTENDED RELEASE ORAL at 21:20

## 2023-02-24 RX ADMIN — GABAPENTIN 400 MG: 400 CAPSULE ORAL at 15:56

## 2023-02-24 RX ADMIN — HUMAN IMMUNOGLOBULIN G 20 G: 20 LIQUID INTRAVENOUS at 22:23

## 2023-02-24 RX ADMIN — ACETAMINOPHEN 975 MG: 325 TABLET, FILM COATED ORAL at 09:13

## 2023-02-24 RX ADMIN — IBUPROFEN 400 MG: 200 TABLET, FILM COATED ORAL at 09:13

## 2023-02-24 RX ADMIN — PREDNISONE 40 MG: 20 TABLET ORAL at 09:13

## 2023-02-24 RX ADMIN — GABAPENTIN 400 MG: 400 CAPSULE ORAL at 20:18

## 2023-02-24 RX ADMIN — HYDROMORPHONE HYDROCHLORIDE 4 MG: 2 TABLET ORAL at 21:33

## 2023-02-24 RX ADMIN — IBUPROFEN 400 MG: 200 TABLET, FILM COATED ORAL at 15:56

## 2023-02-24 RX ADMIN — IBUPROFEN 400 MG: 200 TABLET, FILM COATED ORAL at 03:45

## 2023-02-24 RX ADMIN — OXYCODONE HYDROCHLORIDE 10 MG: 10 TABLET, FILM COATED, EXTENDED RELEASE ORAL at 03:45

## 2023-02-24 RX ADMIN — IBUPROFEN 400 MG: 200 TABLET, FILM COATED ORAL at 21:33

## 2023-02-24 RX ADMIN — AZATHIOPRINE 100 MG: 50 TABLET ORAL at 21:21

## 2023-02-24 RX ADMIN — SODIUM CHLORIDE: 9 INJECTION, SOLUTION INTRAVENOUS at 22:27

## 2023-02-24 RX ADMIN — NORETHINDRONE 0.7 MG: 0.35 TABLET ORAL at 21:22

## 2023-02-24 RX ADMIN — PROCHLORPERAZINE MALEATE 10 MG: 5 TABLET ORAL at 01:20

## 2023-02-24 RX ADMIN — ACETAMINOPHEN 650 MG: 325 TABLET ORAL at 21:18

## 2023-02-24 RX ADMIN — ENOXAPARIN SODIUM 40 MG: 40 INJECTION SUBCUTANEOUS at 23:41

## 2023-02-24 RX ADMIN — SODIUM CHLORIDE, POTASSIUM CHLORIDE, SODIUM LACTATE AND CALCIUM CHLORIDE 500 ML: 600; 310; 30; 20 INJECTION, SOLUTION INTRAVENOUS at 21:17

## 2023-02-24 RX ADMIN — SENNOSIDES AND DOCUSATE SODIUM 2 TABLET: 50; 8.6 TABLET ORAL at 09:12

## 2023-02-24 RX ADMIN — GABAPENTIN 400 MG: 400 CAPSULE ORAL at 09:13

## 2023-02-24 RX ADMIN — ACETAMINOPHEN 975 MG: 325 TABLET, FILM COATED ORAL at 15:56

## 2023-02-24 ASSESSMENT — ACTIVITIES OF DAILY LIVING (ADL)
ADLS_ACUITY_SCORE: 36

## 2023-02-24 NOTE — PROGRESS NOTES
Observation Goals:    Adequate pain control on PO regimen:   Progressing, patient requested no IV pain meds in the last 4 hours.

## 2023-02-24 NOTE — PLAN OF CARE
Neuro: A/Ox4 able to make needs known.  Respiratory: on room air denies SOB  Cardiac: WDL denies cardiac chest pain  Diet: Regular tolerating. Denies nausea  GI/: +BS no BM this shift. Bedside commode voiding with AUOP.  Skin: redness to BLE, scab to bilateral 5th toes unable to complete wound care due to pain and swelling.   IV Access: R PIV infusing IVIG and NS per order.   Pain: reports pain BLE gave PRN oral and IV dilaudid, scheduled tylenol and ibuprofen.   Labs: reviewed  VS: /63 (BP Location: Right arm)   Pulse 79   Temp 98.2  F (36.8  C) (Oral)   Resp 18   LMP 02/19/2023   SpO2 95%    Activity: Up to bedside commode independently.   New Changes this shift: redness to face and ears this shift, team made aware. Seen by neurologist order placed to start IVIG. Premedication completed IVIG currently infusing.   Plan: continue POC

## 2023-02-24 NOTE — PLAN OF CARE
Time: 2300-0730  Temp: 98.2  F (36.8  C) Temp src: Oral BP: 118/77 Pulse: 69   Resp: 18 SpO2: 99 % O2 Device: None (Room air)       Activity: Independnetly pivoting to commode.   Diet: Regular.  Pain: Rates pain moderate to high. Scheduled oxycontin Q12. Last given Oral dilaudid at 0043.   Neuro: Alert, oriented x4. SOUMYA assess pedal pulses due to patient wearing compression socks, radial pulses +2. Denies numbness/tingling. BLE redness and edema +1-2.   Cardiac: WDL.   Respiratory: Lung sounds clear. Denies SOB. Room air.   Skin: BLE cracking. Left and right 5th toe wounds. Patient refused assessment of BLE, due to pain and wearing compression stocking 24/7.   GI/: Voiding spontaneously. LBM 2/24.  Lines/inf: Right PIV saline locked.       Observation Goal:    Adequate pain control on PO regimen:

## 2023-02-24 NOTE — UTILIZATION REVIEW
Inpatient appropriate    Admission Status; Secondary Review Determination      Under the authority of the Utilization Management Committee, the utilization review process indicated a secondary review on the above patient. The review outcome is based on review of the medical records, discussions with staff, and applying clinical experience noted on the date of the review.    (x) Inpatient Status Appropriate - This patient's medical care is consistent with medical management for inpatient care and reasonable inpatient medical practice.    RATIONALE FOR DETERMINATION  37-year-old female with history of migraine and erythromelalgia was admitted to [facility] on 2/21/2023 with worsening lower extremity pain.  Patient was initially managed conservatively with oral pain medication however she has not improved as expected.  Initially she was suggested for observation care.  However after evaluation by neurology on 2/23, she has been started on IVIG with further dose planned later this evening.  Patient still has ongoing symptoms and will require ongoing hospitalization for symptom control and med adjustment.  She has failed initial trial with observation.  Inpatient care is appropriate at this time.    At the time of admission with the information available to the attending physician more than 2 nights Hospital complex care was anticipated, based on patient risk of adverse outcome if treated as outpatient and complex care required. Inpatient admission is appropriate based on the Medicare guidelines.    This document was produced using voice recognition software      The information on this document is developed by the utilization review team in order for the business office to ensure compliance. This only denotes the appropriateness of proper admission status and does not reflect the quality of care rendered.  The definitions of Inpatient Status and Observation Status used in making the determination above are those provided in  the CMS Coverage Manual, Chapter 1 and Chapter 6, section 70.4.    Sincerely,    Utilization Review  Physician Advisor  [facility].

## 2023-02-24 NOTE — PROGRESS NOTES
"Progress Note    Subjective:  NAEO. Received IVIG yesterday PM. She reports feeling better from a pain standpoint. She also notes that she sweat overnight, but this for her means \"her body is going to normal function\". She denies fever or chills. Otherwise no changes.     Physical Examination   /66 (BP Location: Left arm)   Pulse 81   Temp 98.9  F (37.2  C) (Oral)   Resp 18   Ht 1.575 m (5' 2\")   Wt 54.4 kg (120 lb)   LMP 02/19/2023   SpO2 99%   BMI 21.95 kg/m    General: Lying in bed, NAD  Head: NC/AT  Eyes: no icterus, op pink and moist  Cardiac: RRR. Extremities warm, no edema.   Respiratory: non-labored on RA  GI: S/NT/ND     Skin: No rash or lesion on exposed skin other than bilateral lower extremities.  There is redness and swelling of both lower extremities up to her mid calf.  Multiple blisters are also seen on both extremities.  These are slightly tender, although she says that the most tender spot is on the ventral aspect of her first metatarsophalangeal joint.     Psych: Mood pleasant, affect congruent  Neuro:  Mental status: Awake, alert, attentive, oriented to self, time, place, and circumstance. Language is fluent and coherent with intact comprehension of complex commands, naming and repetition.  Cranial nerves: VFF, PERRL, conjugate gaze, EOMI, facial sensation intact, face symmetric  Motor: Normal bulk and tone. No abnormal movements.    Sensory: Intact to light touch, pin, vibration, and proprioception in proximal and distal aspects of all 4 extremities except bilateral lower feet as described below.    Impression  Ronna Rivera is a 37 year old female with history of erythromelalgia who is here with worsening pain and swelling of her legs, likely a flareup of her erythromelalgia which has been refractory to pharmacologic therapy thus far.  Despite significant pain medications, she still continues to have a lot of discomfort causing her to use the ice packs continuously.     Based " on chart review and history, patient's last IVIG infusion was on 6 February, and symptoms started to worsen last week.  It is possible that coming off on the IVIG, plus going down on the azathioprine dose in late January is causing her symptoms to worsen.  Since rituximab last about 6 months after an infusion, we would not expect stopping the rituximab 6 weeks ago to cause worsening of her symptoms.     We aim to continue IVIG 0.4mg/kg q 24 hours x 3 days (started 2/23/23). As she has received IVIG weekly for 11 weeks then developed symptoms after stopping 2 weeks ago. She also feels subjectively better since IVIG on 2/23/23. Also it could be that the solu-cortef she is receiving post IVIG that could be helping.     For her IVIG, she was following a specific protocol of having it given she has a hard time tolerating it otherwise.  The instructions from her IVIG infusions in the past are copied below.       Recommendations  -IVIG 0.4mg/kg q 24 hours x 3 days (started 2/23/23)  -Give premeds 500 ml fluid bolus (over 1 hr), tylenol and PO benadryl.  Then give 20g IVIG concurrently with NS at a rate of 175ml/hr. give PO compazine 30 min prior to IVIG completion and also give 50 mg IV benadryl and 100 mg IVP Solu-Cortef post infusion completion.  IVIG infusion rates:  16ml/hr - 30 min  33ml/hr - 30 min  66ml/hr - 30 min  132ml/hr - remainder of infusion  -CD19 in process  - Will discuss with Dr. Weinberg at [facility] for further management (whether that would mean spinal cord stimulator, IV ketamine)  Neurology will continue to follow.     Thank you for involving Neurology in the care of Ronna Rivera.  Please do not hesitate to call with questions/concerns (consult pager [phone]).       Patient was discussed with Dr. Doll.    Beka Estes, DO  Internal Medicine PGY-2

## 2023-02-24 NOTE — PLAN OF CARE
"Goal Outcome Evaluation:    Plan of Care Reviewed With: patient  Overall Patient Progress: no changeOverall Patient Progress: no change.  /66 (BP Location: Left arm)   Pulse 81   Temp 98.9  F (37.2  C) (Oral)   Resp 18   Ht 1.575 m (5' 2\")   Wt 54.4 kg (120 lb)   LMP 02/19/2023   SpO2 99%   BMI 21.95 kg/m       Neuro: WDL.    GI/: WDL. Using BSC. Good uop.    Diet: regular diet. No c/o nausea.     Incisions/Drains: None.     IV Access: PIV.     Labs: reviewed.     Activity: Up to BSC independently. Bilateral feet elevated. Ice in use for comfort.     Pain: On scheduled tylenol, gabapentin and ibuprofen.     New changes this shift: Immune - globulin iv scheduled for  2200 today. See orders for pre/post meds and boluses. Ketamine/Gabapentin ointment ordered - pending pharmacy verification for bilateral LEs. Lymphedema IP consulted.     Plan: Continue with current POC.            "

## 2023-02-24 NOTE — PROGRESS NOTES
[facility]    History and Physical - Medicine Service, DELMY TEAM 3    Date of Admission:  2/21/2023    Assessment & Plan      Ronna Rivera is a 37 year old female with PMHx of migraine and erythromelalgia who presented with worsening LE pain that started 1 week ago with concerning ulcers being admitted on 2/21/2023 for pain control due to chronic erythromelalgia.    Changes today:  -continued to encourage using PO instead of IV pain meds  -social work consulted  -take breaks from ice on feet q2h  -IVIG x3 days     Erythromelalgia flare  R foot ulcers  Menstrual suppression for erythromelalgia  Increased bilateral LE swelling, warmth and erythema over the last week. Normotensive, tachycardic, afebrile, maintaining oxygen saturations on room air on arrival to ED. Labs including CBC and BMP WNL.  Presentation consistent with previous erythromelalgia flares. Hematology has low suspicion for myelodysplastic syndrome underlying this flare, believes this is more neurologic/dysautonomic related and recommends IVIG inpatient and continuing outpatient neurology evaluation. IVIG was given 2/23 and pt tolerated it well with night sweats ON. Suspect night sweats are 2/2 the IVIG infusion. Other potential causes in the ddx but low suspicion for infection vs endocrine vs malignancy vs rheumatologic.    -neurology consulted, appreciate recommendations   -Give IVIG 3X (1st 2/23  2nd 2/24  3rd 2/25)   -Check CD19 levels to assess effectiveness of rituximab   -Will touch base with Dr. Whittington at [facility] for further management of her erythromelalgia  -hematology consulted, appreciate recommendations   -Erythromelalgia is more likely related to neurologic/dysautonomic dysfunction. Continue outpatient neurology evaluation   -Consider aspirin 81mg BID (though pt notes she has not benefited from ASA in the past)   -Consider amitriptyline or sodium channel blocker such as mexiletine  Encounter Date: 11/13/2018       History     Chief Complaint   Patient presents with    Leg Pain     Pt reports he was at work when he hit his left leg right below the knee on a pipe. Pt ambulatory with steady gait.      57-year-old male with no known past medical history presents for pain to the medial left knee after he fell, hitting the inside of his leg on a steel pipe.  Reports he was able to ambulate with pain after the fall.  Has not attempted any therapy.  Denies any clicking, twisting or instability of the leg.  Denies pain to the shin, ankle, thigh or contralateral leg.  No numbness or weakness.  Denies head injury, headache, neck pain or back pain.          Review of patient's allergies indicates:  No Known Allergies  History reviewed. No pertinent past medical history.  History reviewed. No pertinent surgical history.  History reviewed. No pertinent family history.  Social History     Tobacco Use    Smoking status: Current Every Day Smoker     Packs/day: 1.00    Smokeless tobacco: Never Used   Substance Use Topics    Alcohol use: No     Frequency: Never    Drug use: No     Review of Systems   Constitutional: Negative for fatigue and fever.   Respiratory: Negative for cough and shortness of breath.    Cardiovascular: Negative for chest pain and leg swelling.   Gastrointestinal: Negative for abdominal pain, nausea and vomiting.   Genitourinary: Negative for dysuria and hematuria.   Musculoskeletal: Positive for arthralgias and gait problem. Negative for back pain, joint swelling, myalgias, neck pain and neck stiffness.   Skin: Negative for wound.   Allergic/Immunologic: Negative for immunocompromised state.   Neurological: Negative for weakness, light-headedness, numbness and headaches.   Hematological: Does not bruise/bleed easily.       Physical Exam     Initial Vitals [11/13/18 2328]   BP Pulse Resp Temp SpO2   129/64 79 18 98.6 °F (37 °C) 99 %      MAP       --         Physical Exam    Nursing  for symptomatic relief   -Consider IV IgG treating like CIDP as IV IgG has helped her pain in the past  -pain management consulted, appreciate recommendations   - APAP 975 TID scheduled   - Ibuprofen 400 mg q6h scheduled   - lidocaine 5% ointment q4h PRN   - Gabapentin 400 mg q8h scheduled   - Discontinue oxycodone, planned to stop oxycontin but pt requested to continue   - Hydromorphone PO 2-4 mg q4h PRN   - Discontinued hydromorphone IV TID PRN then pt requested to continue  -outpatient pain clinic referral sent and patient encouraged to call to schedule appointment prior to discharge  -prednisone 40 mg daily  -continue PTA azathioprine 100 mg daily and norethindrone 0.7 mg daily  -wound care consult, appreciate recommendations    CHRONIC, RESOLVED & STABLE PROBLEMS    Elevated LFTs  AST//196 in January 2023. Work up including hepatitis Bc antibody positive with no HBV DNA, hep C negative, CMV and EBV negative. Suspected to be 2/2 Imuran, so dose was reduced to 100 mg daily. LFTs have been down trending since.     Tachycardia, resolved  EKG with sinus tachycardia. Suspect 2/2 to pain. CTM.     Diet: Regular Diet Adult    DVT Prophylaxis: enoxaparin 40 mg daily  Garcia Catheter: Not present  Fluids: PO intake  Lines: None   Cardiac Monitoring: None  Code Status: Full Code     Clinically Significant Risk Factors Present on Admission                             Disposition Plan observation, plan for home once tolerating PO pain regimen        The patient's care was discussed with the Dr. Sandoval.    Sebas Sousa  Medicine Service, [facility] TEAM 3  [facility]  Securely message with [employer] (more info)  Text page via [facility] Paging/Directory   See signed in provider for up to date coverage information  ______________________________________________________________________    Interval History  NAEO. Pt still feeling discomfort and pain in her lower extremities. Says  note and vitals reviewed.  Constitutional: He appears well-developed and well-nourished. He is not diaphoretic. No distress.   HENT:   Head: Normocephalic and atraumatic.   Eyes: EOM are normal. Pupils are equal, round, and reactive to light.   Neck: Normal range of motion. Neck supple.   Cardiovascular: Normal rate, regular rhythm, normal heart sounds and intact distal pulses.   Pulmonary/Chest: Breath sounds normal.   Musculoskeletal: Normal range of motion. He exhibits tenderness. He exhibits no edema.        Right knee: Normal.        Left knee: He exhibits swelling. He exhibits normal range of motion, no effusion, no ecchymosis, no deformity, no laceration, no erythema, normal alignment, no LCL laxity, normal patellar mobility, no bony tenderness, normal meniscus and no MCL laxity. Tenderness found. Medial joint line tenderness noted. No lateral joint line, no MCL, no LCL and no patellar tendon tenderness noted.        Right ankle: Normal.        Left ankle: Normal.        Right upper leg: Normal.        Left upper leg: Normal.        Right lower leg: Normal.        Left lower leg: Normal.   Neurological: He is alert and oriented to person, place, and time. He has normal strength.   Skin: Skin is warm and dry.   Psychiatric: He has a normal mood and affect.         ED Course   Procedures  Labs Reviewed - No data to display       Imaging Results          X-Ray Knee 3 View Left (Final result)  Result time 11/14/18 00:11:03    Final result by Jeronimo Murdock MD (11/14/18 00:11:03)                 Impression:      No acute bony abnormality.      Electronically signed by: Jeronimo Murdock MD  Date:    11/14/2018  Time:    00:11             Narrative:    EXAMINATION:  XR KNEE 3 VIEW LEFT    CLINICAL HISTORY:  Pain in unspecified knee.    TECHNIQUE:  AP, lateral, and Merchant views of the left knee were performed.    COMPARISON:  None.    FINDINGS:  No evidence of acute fracture or dislocation.  Soft tissues are  symmetric.  No radiopaque foreign body.                                 Medical Decision Making:   Clinical Tests:   Radiological Study: Ordered and Reviewed  Other:   I have discussed this case with another health care provider.       APC / Resident Notes:   57-year-old male presenting with medial knee left knee pain after falling hitting the knee on a steel pipe.  On exam, patient has tenderness at the medial knee with minor edema. No tenderness to palpation of anterior shin, ankle, thigh or calf. DDx includes knee sprain, dislocation, fracture.  Will x-ray knee, give Tylenol, Motrin for pain, apply lidocaine patch, ice and Ace wrap for pain.    X-ray shows no acute bony abnormality.  Will discharge with naproxen for pain. Discussed the importance of follow-up, ED return precautions given.  Patient voiced understanding and is comfortable with discharge. I discussed this patient with my supervising physician.    Becca Patrick PA-C           Attending Attestation:     Physician Attestation Statement for NP/PA:   I discussed this assessment and plan of this patient with the NP/PA, but I did not personally examine the patient. The face to face encounter was performed by the NP/PA.                     Clinical Impression:   The encounter diagnosis was Knee pain.      Disposition:   Disposition: Discharged  Condition: Stable                        Becca Patrick PA-C  11/14/18 0029       Kev Beverly MD  11/15/18 0978     that pain has not worsen and maybe is starting to trend in the right direction. Pt mentions that the IVIG infusion went well. She did express waking up drenched in sweat this morning. Pt denies fevers, CP, SOB or chills. She mentions that this is normal from her going back to her baseline. Is aware that using IV pain medications will be barrier to discharge, shared goal to move to entirely PO regimen. Remains engaged in her care and goals for treatment inpatient. Very happy to share details about her caring family including twins and .     Past Medical History    Past Medical History:   Diagnosis Date     Auto-erythrocyte sensitization (H)      Reactive depression      Seasonal allergies 06/25/2020    Mostly resolved       Past Surgical History   Past Surgical History:   Procedure Laterality Date     INSERT PICC LINE Left 6/23/2021    Procedure: INSERTION, PICC;  Surgeon: Neal Penny MD;  [place] [facility] [place]     IR PICC PLACEMENT > 5 YRS OF AGE  6/23/2021     NO HISTORY OF SURGERY         Prior to Admission Medications   Prior to Admission Medications   Prescriptions Last Dose Informant Patient Reported? Taking?   HYDROcodone-acetaminophen (NORCO) 7.5-325 MG per tablet 2/21/2023 at 12:00 pm Self No Yes   Sig: Take 1 tablet by mouth 2 times daily as needed for moderate to severe pain   SUMAtriptan (IMITREX) 100 MG tablet  Self No No   Sig: Take 1 tablet (100 mg) by mouth 2 times daily as needed for migraine   Patient not taking: Reported on 2/17/2023   Vitamin D3 (CHOLECALCIFEROL) 25 mcg (1000 units) tablet 2/19/2023 at unknown Self Yes Yes   Sig: Take 25 mcg by mouth daily   azaTHIOprine (IMURAN) 50 MG tablet 2/20/2023 at night Self Yes Yes   gabapentin (NEURONTIN) 300 MG capsule  Self No No   Sig: Take 1 capsule (300 mg) by mouth 3 times daily   gabapentin (NEURONTIN) 600 MG tablet Not Taking Self Yes No   Sig: Take 300 mg by mouth At Bedtime   Patient not taking: Reported on 2/21/2023   ibuprofen  (ADVIL/MOTRIN) 200 MG tablet 2/19/2023 at unknown Self Yes Yes   Sig: Take 400 mg by mouth every 6 hours as needed for moderate pain   melatonin 1 MG TABS tablet 2/20/2023 at 10:00 pm Self Yes Yes   Sig: Take 2-5 mg by mouth nightly as needed for sleep    naloxone (NARCAN) 4 MG/0.1ML nasal spray Unknown at as needed Self No Yes   Sig: Spray 1 spray (4 mg) into one nostril alternating nostrils as needed for opioid reversal every 2-3 minutes until assistance arrives   norethindrone (MICRONOR) 0.35 MG tablet 2/20/2023 at 10:00 pm Self No Yes   Sig: Take 2 tablets (0.7 mg) by mouth daily Take the first 3 weeks of each pack, and then immediately move on to the next pack (discard the 4th week of each pack)   oxyCODONE (OXYCONTIN) 10 MG 12 hr tablet 2/21/2023 at 10:00 am Self No Yes   Sig: Take 1 tablet (10 mg) by mouth 2 times daily   valACYclovir (VALTREX) 500 MG tablet   No No   Sig: Take 2 tablets (1,000 mg) by mouth daily for 5 days , start at onset of cold sore   venlafaxine (EFFEXOR XR) 37.5 MG 24 hr capsule 2/20/2023 at 10:00 pm Self No Yes   Sig: Take 1 capsule (37.5 mg) by mouth daily   vitamin C (ASCORBIC ACID) 250 MG tablet 2/19/2023 at unknown Self Yes Yes   Sig: Take 250 mg by mouth daily      Facility-Administered Medications: None        Social History   I have reviewed this patient's social history and updated it with pertinent information if needed.  Social History     Tobacco Use     Smoking status: Never     Smokeless tobacco: Never   Substance Use Topics     Alcohol use: No     Drug use: No       Family History   I have reviewed this patient's family history and updated it with pertinent information if needed.  Family History   Problem Relation Age of Onset     Hypertension Father      Cerebrovascular Disease Maternal Grandmother      Diabetes Maternal Grandfather      Breast Cancer Paternal Grandmother      Hypertension Paternal Grandfather      C.A.D. Paternal Grandfather        Allergies   Allergies    Allergen Reactions     Topiramate Anxiety        Physical Exam   Vital Signs: Temp: 98.9  F (37.2  C) Temp src: Oral BP: 122/66 Pulse: 81   Resp: 18 SpO2: 99 % O2 Device: None (Room air)    Weight: 0 lbs 0 oz    Constitutional: Awake, alert, cooperative, in NAD, legs raised in bed  Eyes: Sclera clear, conjunctiva normal.  Respiratory: Non-labored breathing, clear to auscultation bilaterally, no crackles or wheezing.  Cardiovascular: RRR, no obvious murmur noted  GI: soft abdomen, non-distended, non-tender to palpation, rebound or guarding  Skin: No concerning lesions or flushing or exposed areas. BLE warm to touch, covered by compression stockings and icepacks  Neurologic: Awake, alert & oriented x3.    Psych: Appropriate affect      Medical Decision Making         Data     I have personally reviewed the following data over the past 24 hrs:    7.1  \   12.4   / 196     N/A N/A N/A /  N/A   N/A N/A N/A \       Imaging results reviewed over the past 24 hrs:   No results found for this or any previous visit (from the past 24 hour(s)).

## 2023-02-24 NOTE — PROVIDER NOTIFICATION
7B 238 2   Ronna Rivera  can you please order NS to be infused with IVIG.   Thank you  [facility])

## 2023-02-25 PROCEDURE — 250N000011 HC RX IP 250 OP 636: Performed by: STUDENT IN AN ORGANIZED HEALTH CARE EDUCATION/TRAINING PROGRAM

## 2023-02-25 PROCEDURE — 99233 SBSQ HOSP IP/OBS HIGH 50: CPT | Mod: GC | Performed by: HOSPITALIST

## 2023-02-25 PROCEDURE — 250N000012 HC RX MED GY IP 250 OP 636 PS 637

## 2023-02-25 PROCEDURE — 250N000013 HC RX MED GY IP 250 OP 250 PS 637: Performed by: STUDENT IN AN ORGANIZED HEALTH CARE EDUCATION/TRAINING PROGRAM

## 2023-02-25 PROCEDURE — 250N000011 HC RX IP 250 OP 636

## 2023-02-25 PROCEDURE — 120N000002 HC R&B MED SURG/OB UMMC

## 2023-02-25 PROCEDURE — 250N000013 HC RX MED GY IP 250 OP 250 PS 637

## 2023-02-25 PROCEDURE — 99232 SBSQ HOSP IP/OBS MODERATE 35: CPT | Performed by: PSYCHIATRY & NEUROLOGY

## 2023-02-25 PROCEDURE — 258N000003 HC RX IP 258 OP 636

## 2023-02-25 PROCEDURE — 258N000003 HC RX IP 258 OP 636: Performed by: STUDENT IN AN ORGANIZED HEALTH CARE EDUCATION/TRAINING PROGRAM

## 2023-02-25 RX ORDER — DIPHENHYDRAMINE HCL 50 MG
50 CAPSULE ORAL ONCE
Status: COMPLETED | OUTPATIENT
Start: 2023-02-25 | End: 2023-02-25

## 2023-02-25 RX ORDER — DIPHENHYDRAMINE HYDROCHLORIDE 50 MG/ML
50 INJECTION INTRAMUSCULAR; INTRAVENOUS ONCE
Status: COMPLETED | OUTPATIENT
Start: 2023-02-25 | End: 2023-02-25

## 2023-02-25 RX ORDER — OXYCODONE HCL 10 MG/1
10 TABLET, FILM COATED, EXTENDED RELEASE ORAL EVERY 12 HOURS
Status: COMPLETED | OUTPATIENT
Start: 2023-02-25 | End: 2023-02-25

## 2023-02-25 RX ORDER — SODIUM CHLORIDE 9 MG/ML
INJECTION, SOLUTION INTRAVENOUS CONTINUOUS
Status: DISCONTINUED | OUTPATIENT
Start: 2023-02-25 | End: 2023-02-26

## 2023-02-25 RX ORDER — ACETAMINOPHEN 325 MG/1
650 TABLET ORAL ONCE
Status: COMPLETED | OUTPATIENT
Start: 2023-02-25 | End: 2023-02-25

## 2023-02-25 RX ORDER — DEXTROSE MONOHYDRATE 25 G/50ML
50 INJECTION, SOLUTION INTRAVENOUS ONCE
Status: DISCONTINUED | OUTPATIENT
Start: 2023-02-25 | End: 2023-02-25

## 2023-02-25 RX ORDER — DEXTROSE MONOHYDRATE 25 G/50ML
50 INJECTION, SOLUTION INTRAVENOUS
Status: DISCONTINUED | OUTPATIENT
Start: 2023-02-25 | End: 2023-02-25

## 2023-02-25 RX ORDER — DIPHENHYDRAMINE HYDROCHLORIDE 50 MG/ML
50 INJECTION INTRAMUSCULAR; INTRAVENOUS ONCE
Status: COMPLETED | OUTPATIENT
Start: 2023-02-26 | End: 2023-02-26

## 2023-02-25 RX ORDER — HYDROMORPHONE HYDROCHLORIDE 1 MG/ML
INJECTION, SOLUTION INTRAMUSCULAR; INTRAVENOUS; SUBCUTANEOUS
Status: COMPLETED
Start: 2023-02-25 | End: 2023-02-25

## 2023-02-25 RX ORDER — PROCHLORPERAZINE MALEATE 5 MG
10 TABLET ORAL ONCE
Status: COMPLETED | OUTPATIENT
Start: 2023-02-25 | End: 2023-02-26

## 2023-02-25 RX ORDER — HYDROMORPHONE HYDROCHLORIDE 1 MG/ML
.5-1 INJECTION, SOLUTION INTRAMUSCULAR; INTRAVENOUS; SUBCUTANEOUS EVERY 4 HOURS PRN
Status: DISCONTINUED | OUTPATIENT
Start: 2023-02-25 | End: 2023-02-27

## 2023-02-25 RX ADMIN — SODIUM CHLORIDE, POTASSIUM CHLORIDE, SODIUM LACTATE AND CALCIUM CHLORIDE 500 ML: 600; 310; 30; 20 INJECTION, SOLUTION INTRAVENOUS at 21:07

## 2023-02-25 RX ADMIN — NORETHINDRONE 0.7 MG: 0.35 TABLET ORAL at 20:26

## 2023-02-25 RX ADMIN — ENOXAPARIN SODIUM 40 MG: 40 INJECTION SUBCUTANEOUS at 23:01

## 2023-02-25 RX ADMIN — GABAPENTIN 400 MG: 400 CAPSULE ORAL at 20:18

## 2023-02-25 RX ADMIN — HYDROCORTISONE SODIUM SUCCINATE 100 MG: 100 INJECTION, POWDER, FOR SOLUTION INTRAMUSCULAR; INTRAVENOUS at 01:30

## 2023-02-25 RX ADMIN — GABAPENTIN 400 MG: 400 CAPSULE ORAL at 16:46

## 2023-02-25 RX ADMIN — POLYETHYLENE GLYCOL 3350 17 G: 17 POWDER, FOR SOLUTION ORAL at 10:18

## 2023-02-25 RX ADMIN — HYDROMORPHONE HYDROCHLORIDE 4 MG: 2 TABLET ORAL at 23:40

## 2023-02-25 RX ADMIN — IBUPROFEN 400 MG: 200 TABLET, FILM COATED ORAL at 16:46

## 2023-02-25 RX ADMIN — SENNOSIDES AND DOCUSATE SODIUM 2 TABLET: 50; 8.6 TABLET ORAL at 10:17

## 2023-02-25 RX ADMIN — HYDROMORPHONE HYDROCHLORIDE 0.5 MG: 1 INJECTION, SOLUTION INTRAMUSCULAR; INTRAVENOUS; SUBCUTANEOUS at 20:11

## 2023-02-25 RX ADMIN — ACETAMINOPHEN 650 MG: 325 TABLET, FILM COATED ORAL at 21:07

## 2023-02-25 RX ADMIN — DEXTROSE MONOHYDRATE 50 ML: 50 INJECTION, SOLUTION INTRAVENOUS at 22:38

## 2023-02-25 RX ADMIN — SODIUM CHLORIDE: 9 INJECTION, SOLUTION INTRAVENOUS at 22:47

## 2023-02-25 RX ADMIN — GABAPENTIN 400 MG: 400 CAPSULE ORAL at 10:18

## 2023-02-25 RX ADMIN — IBUPROFEN 400 MG: 200 TABLET, FILM COATED ORAL at 23:01

## 2023-02-25 RX ADMIN — OXYCODONE HYDROCHLORIDE 10 MG: 10 TABLET, FILM COATED, EXTENDED RELEASE ORAL at 18:04

## 2023-02-25 RX ADMIN — AZATHIOPRINE 100 MG: 50 TABLET ORAL at 20:26

## 2023-02-25 RX ADMIN — OXYCODONE HYDROCHLORIDE 10 MG: 10 TABLET, FILM COATED, EXTENDED RELEASE ORAL at 04:35

## 2023-02-25 RX ADMIN — DIPHENHYDRAMINE HYDROCHLORIDE 50 MG: 50 CAPSULE ORAL at 21:07

## 2023-02-25 RX ADMIN — DIPHENHYDRAMINE HYDROCHLORIDE 50 MG: 50 INJECTION, SOLUTION INTRAMUSCULAR; INTRAVENOUS at 01:31

## 2023-02-25 RX ADMIN — ACETAMINOPHEN 975 MG: 325 TABLET, FILM COATED ORAL at 10:18

## 2023-02-25 RX ADMIN — HUMAN IMMUNOGLOBULIN G 20 G: 20 LIQUID INTRAVENOUS at 22:39

## 2023-02-25 RX ADMIN — IBUPROFEN 400 MG: 200 TABLET, FILM COATED ORAL at 10:39

## 2023-02-25 RX ADMIN — ACETAMINOPHEN 975 MG: 325 TABLET, FILM COATED ORAL at 00:57

## 2023-02-25 RX ADMIN — ACETAMINOPHEN 975 MG: 325 TABLET, FILM COATED ORAL at 16:46

## 2023-02-25 RX ADMIN — PROCHLORPERAZINE MALEATE 10 MG: 5 TABLET ORAL at 00:57

## 2023-02-25 RX ADMIN — IBUPROFEN 400 MG: 200 TABLET, FILM COATED ORAL at 04:11

## 2023-02-25 RX ADMIN — HYDROMORPHONE HYDROCHLORIDE 4 MG: 2 TABLET ORAL at 19:40

## 2023-02-25 RX ADMIN — VENLAFAXINE HYDROCHLORIDE 37.5 MG: 37.5 CAPSULE, EXTENDED RELEASE ORAL at 20:26

## 2023-02-25 ASSESSMENT — ACTIVITIES OF DAILY LIVING (ADL)
ADLS_ACUITY_SCORE: 36

## 2023-02-25 NOTE — PROGRESS NOTES
[facility]    History and Physical - Medicine Service, DELMY TEAM 3    Date of Admission:  2/21/2023    Assessment & Plan      Ronna Rivera is a 37 year old female with PMHx of migraine and erythromelalgia who presented with worsening LE pain that started 1 week ago with concerning ulcers being admitted on 2/21/2023 for pain control due to chronic erythromelalgia. Getting IVIG and working on pain regimen.    Changes today:  -continued to encourage using PO instead of IV pain meds  -discontinued oxycontin  -take breaks from ice on feet q2h  -IVIG - 3rd dose ordered for 2/25 (pre-   -PT/OT consulted    Erythromelalgia flare  R foot ulcers  Menstrual suppression for erythromelalgia  Increased bilateral LE swelling, warmth and erythema over the last week. Normotensive, tachycardic, afebrile, maintaining oxygen saturations on room air on arrival to ED. Labs including CBC and BMP WNL.  Presentation consistent with previous erythromelalgia flares. Hematology has low suspicion for myelodysplastic syndrome underlying this flare, believes this is more neurologic/dysautonomic related and recommends IVIG inpatient and continuing outpatient neurology evaluation. Two doses of IVIG given so far and pt tolerated it well with night sweats ON both nights. Suspect night sweats are 2/2 the IVIG infusion. Other potential causes in the ddx but low suspicion for infection vs endocrine vs malignancy vs rheumatologic.   -neurology consulted, appreciate recommendations   -Give IVIG 3X (1st 2/23  2nd 2/24  3rd 2/25)   -Will touch base with Dr. Whittington at [facility] [place] for further management of her erythromelalgia  -hematology consulted, appreciate recommendations  -Erythromelalgia is more likely related to neurologic/dysautonomic dysfunction. Continue outpatient neurology evaluation   -Consider aspirin 81mg BID (though pt notes she has not benefited from ASA in the past)   -Consider  amitriptyline or sodium channel blocker such as mexiletine for symptomatic relief   -Consider IV IgG treating like CIDP as IV IgG has helped her pain in the past  -pain management consulted, appreciate recommendations   - APAP 975 TID scheduled   - Ibuprofen 400 mg q6h scheduled   - lidocaine 5% ointment q4h PRN   - Gabapentin 400 mg q8h scheduled   - Discontinue oxycontin    - Hydromorphone PO 2-4 mg q4h PRN   - Discontinued hydromorphone IV TID PRN  -outpatient pain clinic referral sent and patient encouraged to call to schedule appointment prior to discharge  -discontinued prednisone 40 mg daily, per pt preference  -continue PTA azathioprine 100 mg daily and norethindrone 0.7 mg daily  -wound care consult, appreciate recommendations  -PT/OT consulted to work on strength and logistics of ADLs at home given condition    CHRONIC, RESOLVED & STABLE PROBLEMS    Elevated LFTs  AST//196 in January 2023. Work up including hepatitis Bc antibody positive with no HBV DNA, hep C negative, CMV and EBV negative. Suspected to be 2/2 Imuran, so dose was reduced to 100 mg daily. LFTs have been down trending since.     Tachycardia, resolved  EKG with sinus tachycardia. Suspect 2/2 to pain. CTM.     Diet: Regular Diet Adult    DVT Prophylaxis: enoxaparin 40 mg daily  Garcia Catheter: Not present  Fluids: PO intake  Lines: None   Cardiac Monitoring: None  Code Status: Full Code     Clinically Significant Risk Factors                               Disposition Plan observation, plan for home once tolerating PO pain regimen    Expected Discharge Date: 02/26/2023        Discharge Comments: gettin more IVIG   - F/u with [facility] neurologist  - Schedule with pain clinic (OP referral placed)  - short term dilaudid Rx and then f/u with Dr. Ball (PCP)     The patient's care was discussed with the Dr. Sandoval.    Naseem oH MD  Medicine Service, [facility] TEAM [address] [place] [address]  Securely message  with Rachel (more info)  Text page via [facility] Paging/Directory   See signed in provider for up to date coverage information  ______________________________________________________________________    Interval History  NAEO. Pt still feeling some discomfort and pain in LE. However, pt says that pain has improved from admission. Pt mentions that the 2nd round of IVIG infusion went well. She did express waking up drenched in sweat this morning again. Pt denies fevers, CP, SOB or chills. Remains very engaged in her care and goals for treatment inpatient. Goals for discharge per pt is to have better control of pain and work on strength and logistics with ADL at home.    Past Medical History    Past Medical History:   Diagnosis Date     Auto-erythrocyte sensitization (H)      Reactive depression      Seasonal allergies 06/25/2020    Mostly resolved       Past Surgical History   Past Surgical History:   Procedure Laterality Date     INSERT PICC LINE Left 6/23/2021    Procedure: INSERTION, PICC;  Surgeon: Neal Penny MD;  Location: [facility] OR     IR PICC PLACEMENT > 5 YRS OF AGE  6/23/2021     NO HISTORY OF SURGERY         Prior to Admission Medications   Prior to Admission Medications   Prescriptions Last Dose Informant Patient Reported? Taking?   HYDROcodone-acetaminophen (NORCO) 7.5-325 MG per tablet 2/21/2023 at 12:00 pm Self No Yes   Sig: Take 1 tablet by mouth 2 times daily as needed for moderate to severe pain   SUMAtriptan (IMITREX) 100 MG tablet  Self No No   Sig: Take 1 tablet (100 mg) by mouth 2 times daily as needed for migraine   Patient not taking: Reported on 2/17/2023   Vitamin D3 (CHOLECALCIFEROL) 25 mcg (1000 units) tablet 2/19/2023 at unknown Self Yes Yes   Sig: Take 25 mcg by mouth daily   azaTHIOprine (IMURAN) 50 MG tablet 2/20/2023 at night Self Yes Yes   gabapentin (NEURONTIN) 300 MG capsule  Self No No   Sig: Take 1 capsule (300 mg) by mouth 3 times daily   gabapentin (NEURONTIN) 600 MG tablet  Not Taking Self Yes No   Sig: Take 300 mg by mouth At Bedtime   Patient not taking: Reported on 2/21/2023   ibuprofen (ADVIL/MOTRIN) 200 MG tablet 2/19/2023 at unknown Self Yes Yes   Sig: Take 400 mg by mouth every 6 hours as needed for moderate pain   melatonin 1 MG TABS tablet 2/20/2023 at 10:00 pm Self Yes Yes   Sig: Take 2-5 mg by mouth nightly as needed for sleep    naloxone (NARCAN) 4 MG/0.1ML nasal spray Unknown at as needed Self No Yes   Sig: Spray 1 spray (4 mg) into one nostril alternating nostrils as needed for opioid reversal every 2-3 minutes until assistance arrives   norethindrone (MICRONOR) 0.35 MG tablet 2/20/2023 at 10:00 pm Self No Yes   Sig: Take 2 tablets (0.7 mg) by mouth daily Take the first 3 weeks of each pack, and then immediately move on to the next pack (discard the 4th week of each pack)   oxyCODONE (OXYCONTIN) 10 MG 12 hr tablet 2/21/2023 at 10:00 am Self No Yes   Sig: Take 1 tablet (10 mg) by mouth 2 times daily   valACYclovir (VALTREX) 500 MG tablet   No No   Sig: Take 2 tablets (1,000 mg) by mouth daily for 5 days , start at onset of cold sore   venlafaxine (EFFEXOR XR) 37.5 MG 24 hr capsule 2/20/2023 at 10:00 pm Self No Yes   Sig: Take 1 capsule (37.5 mg) by mouth daily   vitamin C (ASCORBIC ACID) 250 MG tablet 2/19/2023 at unknown Self Yes Yes   Sig: Take 250 mg by mouth daily      Facility-Administered Medications: None        Social History   I have reviewed this patient's social history and updated it with pertinent information if needed.  Social History     Tobacco Use     Smoking status: Never     Smokeless tobacco: Never   Substance Use Topics     Alcohol use: No     Drug use: No       Family History   I have reviewed this patient's family history and updated it with pertinent information if needed.  Family History   Problem Relation Age of Onset     Hypertension Father      Cerebrovascular Disease Maternal Grandmother      Diabetes Maternal Grandfather      Breast Cancer  Paternal Grandmother      Hypertension Paternal Grandfather      C.A.D. Paternal Grandfather        Allergies   Allergies   Allergen Reactions     Topiramate Anxiety        Physical Exam   Vital Signs: Temp: 97.5  F (36.4  C) Temp src: Oral BP: 117/64 Pulse: 95   Resp: 16 SpO2: 98 % O2 Device: None (Room air)    Weight: 120 lbs 0 oz    Constitutional: Awake, alert, cooperative, in NAD, legs raised in bed  Eyes: Sclera clear, conjunctiva normal.  Respiratory: Non-labored breathing, clear to auscultation bilaterally, no crackles or wheezing.  Cardiovascular: RRR, no obvious murmur noted  GI: soft abdomen, non-distended, non-tender to palpation, rebound or guarding  Extremities: bilateral legs elevated on foot wedge, compression stockings and ice packs on   Skin: No concerning lesions or flushing or exposed areas  Neurologic: Awake, alert & oriented x3.    Psych: Appropriate affect      Medical Decision Making         Data         Imaging results reviewed over the past 24 hrs:   No results found for this or any previous visit (from the past 24 hour(s)).

## 2023-02-25 NOTE — PROGRESS NOTES
"[facility]  Neurology Consultation - Progress Note    Patient Name:  Ronna Rivera  Date of Service:  February 25, 2023    Subjective:    Reports her pain is a little better.  Rates it as 6 out of 10 currently.  She states that opioids do help take the edge off.  She says she has not tried to stand up or transfer yet.    Objective:    Vitals: /64 (BP Location: Left arm)   Pulse 95   Temp 97.5  F (36.4  C) (Oral)   Resp 16   Ht 1.575 m (5' 2\")   Wt 54.4 kg (120 lb)   LMP 02/19/2023   SpO2 98%   BMI 21.95 kg/m    General: Lying in bed, NAD, he was up in the air  Head: Atraumatic, normocephalic   Cardiac: Mild lower extremity edema  Neurologic:  Awake, alert, attentive, oriented.  Speech is fluent.  No dysarthria.  Gaze is conjugate.  Face is symmetric.  Sensation intact to light touch.  Distal lower extremity strength including dorsiflexion and plantarflexion are full.    Pertinent Investigations:    I have personally reviewed most recent and pertinent labs, tests, and radiological images.    CD19: <1     Assessment  Ronna Rivera is a 37-year-old female with a history of erythromyalgia who is here for worsening pain consistent with her previous flareup of her erythromelalgia.  I discussed basis with her outpatient autonomic specialist.  At this time given that she is already on Imuran and seems to have therapeutic effect of her Rituxan I do not think there are any other steroid sparing agents that I would start.  It does seems that she worsened in the setting of IVIG.  Therefore would plan to do a small burst of IVIG and probably continue IVIG as an outpatient.  Alternative immunosuppressant regimens may be tried in the outpatient setting.    Recommendations:   Please order third dose of IVIG with pre and post meds as below.      -IVIG 0.4mg/kg day #3 tdoay  -Give premeds 500 ml fluid bolus (over 1 hr), tylenol and PO benadryl.  Then give 20g IVIG " concurrently with NS at a rate of 175ml/hr. give PO compazine 30 min prior to IVIG completion and also give 50 mg IV benadryl and 100 mg IVP Solu-Cortef post infusion completion.  IVIG infusion rates:  16ml/hr - 30 min  33ml/hr - 30 min  66ml/hr - 30 min  132ml/hr - remainder of infusion    - Will consider 4th dose tomorrow if needed    Thank you for involving Neurology in the care of PamAdriana Rivera.    Yareli Doll,       Medical Decision Making       NOTE(S)/MEDICAL RECORDS REVIEWED over the past 24 hours: medicinenote  Tests ORDERED & REVIEWED in the past 24 hours:  - CD19  Medical complexity over the past 24 hours:  - Prescription DRUG MANAGEMENT performed

## 2023-02-25 NOTE — PROVIDER NOTIFICATION
Purpose of Notification: clarification on med order: solu-cortef  Notified Person: Jere  Notification Time: 1300  Notification Interaction: Paged    Order for IV solu-cortef was placed with admin instructions to give after IVIG. Clarifying if pt will be getting a dose of IVIG again today and/or if order needed to be updated. Awaiting callback.

## 2023-02-25 NOTE — PLAN OF CARE
"Vital signs:  Temp: 97.5  F (36.4  C) Temp src: Oral BP: 126/80 Pulse: 95   Resp: 18 SpO2: 98 % O2 Device: None (Room air)   Height: 157.5 cm (5' 2\") Weight: 54.4 kg (120 lb) (per pt.)    8451-8311:    Activity: Up to commode with SBA. Nonweightbearing on feet due to pain with standing.  Neuros: A & O x4. Neuro intact.   Cardiac: WDL. Asymptomatic.   Respiratory: LS clear. O2 sats high 90s on RA. Denies SOB. Unlabored. Tolerated IVIG treatment without complications.  GI/: BS+, passing flatus, had two BMs yesterday. Voiding adequate, not saving.   Diet: Regular diet.  Skin: BLE from below knee to foot redness and mild swelling. Two open areas pencil eraser-sized on left and right dorsal foot each. Two PIVs saline locked on left arm. Flaky, dry bilateral heels. Wearing compression stockings. Elevating legs and feet on wedge pillow.   Lines: PIV x2 saline locked. NS infused per orders on separate PIV. IVIG via other PIV administered per orders, VSS throughout administration. Pre-medication and post-medication administered per orders.   Incisions/Drains: None.   Labs: Reviewed.   Pain/nausea: PRN oral Dilaudid 4mg x1, scheduled oxycontin 10mg x1, scheduled ibuprofen, scheduled Tylenol, scheduled Gabapentin administered for bilateral feet pain helpful. Ice packs helpful to BLE, patient puts on and takes off. Denies nausea. One time compazine 10mg x1 effective for nausea.   New changes this shift: None.   Plan: Continue POC.     "

## 2023-02-25 NOTE — PROGRESS NOTES
Admitted/transferred from: ED  2 RN skin assessment completed by Ronna Pineda, RN and Jocelyne GRIFFIN RN.  Skin assessment finding: BLE from below knee to foot redness and mild swelling. Two open areas pencil eraser-sized on left and right dorsal foot each. Two PIVs saline locked on left arm. Flaky, dry bilateral heels.   Interventions/actions: Compression stockings and elevated bilateral legs on wedge pillow. Iodine medication prominent on wounds per orders. Will continue to monitor.

## 2023-02-25 NOTE — PROGRESS NOTES
Care Management Follow Up    Length of Stay (days): 2    Expected Discharge Date: 02/26/2023     Concerns to be Addressed:       Patient plan of care discussed at interdisciplinary rounds: Yes    Anticipated Discharge Disposition: Home     Anticipated Discharge Services:    Anticipated Discharge DME:      Patient/family educated on Medicare website which has current facility and service quality ratings:    Education Provided on the Discharge Plan:    Patient/Family in Agreement with the Plan:      Referrals Placed by CM/SW:  HC PT/OT     Private pay costs discussed: Not applicable    Additional Information:  RNCC was updated by SW that patient/ were interested in patient getting HC set up, possibly had been with [facility] at some time in past, RNCC unable to find this in charting. RNCC placed HC referrals as follows:    [facility]  Phone: [phone]  Fax: [phone]  Status: declined, insurance    [facility]  Ph: [phone]  Fax: [phone]  Status: Pending, referral sent    [facility]  Ph: [phone]  Fax: [phone]  Status: Pending, referral sent    [facility]   Ph: [phone]   Fax: [phone]  Status: Pending, referral sent      SARAH Santos, BA, RN, CMSRN, RNCC  Covering Units 6D/OBS  Pager: [phone]  Phone: [phone]  6th floor Weekend/Holiday Pager: [phone]  Observation weekend/after hours: [phone]

## 2023-02-26 ENCOUNTER — APPOINTMENT (OUTPATIENT)
Dept: OCCUPATIONAL THERAPY | Facility: CLINIC | Age: 38
DRG: 300 | End: 2023-02-26
Attending: STUDENT IN AN ORGANIZED HEALTH CARE EDUCATION/TRAINING PROGRAM
Payer: COMMERCIAL

## 2023-02-26 ENCOUNTER — APPOINTMENT (OUTPATIENT)
Dept: ULTRASOUND IMAGING | Facility: CLINIC | Age: 38
DRG: 300 | End: 2023-02-26
Payer: COMMERCIAL

## 2023-02-26 LAB
ANION GAP SERPL CALCULATED.3IONS-SCNC: 8 MMOL/L (ref 7–15)
BUN SERPL-MCNC: 9 MG/DL (ref 6–20)
CALCIUM SERPL-MCNC: 8.1 MG/DL (ref 8.6–10)
CHLORIDE SERPL-SCNC: 107 MMOL/L (ref 98–107)
CREAT SERPL-MCNC: 0.58 MG/DL (ref 0.51–0.95)
DEPRECATED HCO3 PLAS-SCNC: 23 MMOL/L (ref 22–29)
ERYTHROCYTE [DISTWIDTH] IN BLOOD BY AUTOMATED COUNT: 13.3 % (ref 10–15)
GFR SERPL CREATININE-BSD FRML MDRD: >90 ML/MIN/1.73M2
GLUCOSE SERPL-MCNC: 111 MG/DL (ref 70–99)
HCT VFR BLD AUTO: 37.2 % (ref 35–47)
HGB BLD-MCNC: 12.2 G/DL (ref 11.7–15.7)
MCH RBC QN AUTO: 32.1 PG (ref 26.5–33)
MCHC RBC AUTO-ENTMCNC: 32.8 G/DL (ref 31.5–36.5)
MCV RBC AUTO: 98 FL (ref 78–100)
PLATELET # BLD AUTO: 195 10E3/UL (ref 150–450)
POTASSIUM SERPL-SCNC: 4.5 MMOL/L (ref 3.4–5.3)
RBC # BLD AUTO: 3.8 10E6/UL (ref 3.8–5.2)
SODIUM SERPL-SCNC: 138 MMOL/L (ref 136–145)
WBC # BLD AUTO: 4.4 10E3/UL (ref 4–11)

## 2023-02-26 PROCEDURE — 250N000013 HC RX MED GY IP 250 OP 250 PS 637: Performed by: STUDENT IN AN ORGANIZED HEALTH CARE EDUCATION/TRAINING PROGRAM

## 2023-02-26 PROCEDURE — 250N000013 HC RX MED GY IP 250 OP 250 PS 637

## 2023-02-26 PROCEDURE — 80048 BASIC METABOLIC PNL TOTAL CA: CPT | Performed by: STUDENT IN AN ORGANIZED HEALTH CARE EDUCATION/TRAINING PROGRAM

## 2023-02-26 PROCEDURE — 85027 COMPLETE CBC AUTOMATED: CPT | Performed by: STUDENT IN AN ORGANIZED HEALTH CARE EDUCATION/TRAINING PROGRAM

## 2023-02-26 PROCEDURE — 250N000012 HC RX MED GY IP 250 OP 636 PS 637

## 2023-02-26 PROCEDURE — 93970 EXTREMITY STUDY: CPT | Mod: 26 | Performed by: RADIOLOGY

## 2023-02-26 PROCEDURE — 93970 EXTREMITY STUDY: CPT

## 2023-02-26 PROCEDURE — 250N000011 HC RX IP 250 OP 636

## 2023-02-26 PROCEDURE — 250N000011 HC RX IP 250 OP 636: Performed by: STUDENT IN AN ORGANIZED HEALTH CARE EDUCATION/TRAINING PROGRAM

## 2023-02-26 PROCEDURE — 99232 SBSQ HOSP IP/OBS MODERATE 35: CPT | Performed by: PSYCHIATRY & NEUROLOGY

## 2023-02-26 PROCEDURE — 258N000003 HC RX IP 258 OP 636: Performed by: STUDENT IN AN ORGANIZED HEALTH CARE EDUCATION/TRAINING PROGRAM

## 2023-02-26 PROCEDURE — 97165 OT EVAL LOW COMPLEX 30 MIN: CPT | Mod: GO

## 2023-02-26 PROCEDURE — 99233 SBSQ HOSP IP/OBS HIGH 50: CPT | Mod: GC | Performed by: HOSPITALIST

## 2023-02-26 PROCEDURE — 36415 COLL VENOUS BLD VENIPUNCTURE: CPT | Performed by: STUDENT IN AN ORGANIZED HEALTH CARE EDUCATION/TRAINING PROGRAM

## 2023-02-26 PROCEDURE — 120N000002 HC R&B MED SURG/OB UMMC

## 2023-02-26 PROCEDURE — 97530 THERAPEUTIC ACTIVITIES: CPT | Mod: GO

## 2023-02-26 RX ORDER — SODIUM CHLORIDE 9 MG/ML
INJECTION, SOLUTION INTRAVENOUS CONTINUOUS
Status: ACTIVE | OUTPATIENT
Start: 2023-02-27 | End: 2023-02-27

## 2023-02-26 RX ORDER — SODIUM CHLORIDE 9 MG/ML
INJECTION, SOLUTION INTRAVENOUS CONTINUOUS
Status: DISCONTINUED | OUTPATIENT
Start: 2023-02-27 | End: 2023-02-26

## 2023-02-26 RX ORDER — DIPHENHYDRAMINE HYDROCHLORIDE 50 MG/ML
50 INJECTION INTRAMUSCULAR; INTRAVENOUS ONCE
Status: DISCONTINUED | OUTPATIENT
Start: 2023-02-27 | End: 2023-02-26

## 2023-02-26 RX ORDER — DIPHENHYDRAMINE HYDROCHLORIDE 50 MG/ML
50 INJECTION INTRAMUSCULAR; INTRAVENOUS ONCE
Status: COMPLETED | OUTPATIENT
Start: 2023-02-27 | End: 2023-02-27

## 2023-02-26 RX ORDER — HYDROXYZINE HYDROCHLORIDE 25 MG/1
50 TABLET, FILM COATED ORAL
Status: DISCONTINUED | OUTPATIENT
Start: 2023-02-26 | End: 2023-03-02 | Stop reason: HOSPADM

## 2023-02-26 RX ORDER — OXYCODONE HCL 10 MG/1
10 TABLET, FILM COATED, EXTENDED RELEASE ORAL EVERY 12 HOURS
Status: DISCONTINUED | OUTPATIENT
Start: 2023-02-26 | End: 2023-03-02 | Stop reason: HOSPADM

## 2023-02-26 RX ORDER — DIPHENHYDRAMINE HCL 50 MG
50 CAPSULE ORAL ONCE
Status: COMPLETED | OUTPATIENT
Start: 2023-02-27 | End: 2023-02-27

## 2023-02-26 RX ORDER — ACETAMINOPHEN 325 MG/1
650 TABLET ORAL ONCE
Status: DISCONTINUED | OUTPATIENT
Start: 2023-02-27 | End: 2023-02-26

## 2023-02-26 RX ORDER — DIPHENHYDRAMINE HCL 50 MG
50 CAPSULE ORAL ONCE
Status: DISCONTINUED | OUTPATIENT
Start: 2023-02-27 | End: 2023-02-26

## 2023-02-26 RX ORDER — ACETAMINOPHEN 325 MG/1
650 TABLET ORAL ONCE
Status: DISCONTINUED | OUTPATIENT
Start: 2023-02-26 | End: 2023-02-26

## 2023-02-26 RX ORDER — PROCHLORPERAZINE MALEATE 5 MG
10 TABLET ORAL ONCE
Status: COMPLETED | OUTPATIENT
Start: 2023-02-27 | End: 2023-02-27

## 2023-02-26 RX ORDER — ACETAMINOPHEN 325 MG/1
650 TABLET ORAL ONCE
Status: COMPLETED | OUTPATIENT
Start: 2023-02-27 | End: 2023-02-27

## 2023-02-26 RX ADMIN — SENNOSIDES AND DOCUSATE SODIUM 2 TABLET: 50; 8.6 TABLET ORAL at 08:09

## 2023-02-26 RX ADMIN — AZATHIOPRINE 100 MG: 50 TABLET ORAL at 20:24

## 2023-02-26 RX ADMIN — ACETAMINOPHEN 975 MG: 325 TABLET, FILM COATED ORAL at 01:09

## 2023-02-26 RX ADMIN — HYDROMORPHONE HYDROCHLORIDE 4 MG: 2 TABLET ORAL at 20:40

## 2023-02-26 RX ADMIN — GABAPENTIN 400 MG: 400 CAPSULE ORAL at 08:09

## 2023-02-26 RX ADMIN — GABAPENTIN 400 MG: 400 CAPSULE ORAL at 20:24

## 2023-02-26 RX ADMIN — GABAPENTIN 400 MG: 400 CAPSULE ORAL at 14:17

## 2023-02-26 RX ADMIN — NORETHINDRONE 0.7 MG: 0.35 TABLET ORAL at 20:29

## 2023-02-26 RX ADMIN — ACETAMINOPHEN 975 MG: 325 TABLET, FILM COATED ORAL at 11:20

## 2023-02-26 RX ADMIN — DIPHENHYDRAMINE HYDROCHLORIDE 50 MG: 50 INJECTION, SOLUTION INTRAMUSCULAR; INTRAVENOUS at 01:49

## 2023-02-26 RX ADMIN — HYDROMORPHONE HYDROCHLORIDE 4 MG: 2 TABLET ORAL at 16:38

## 2023-02-26 RX ADMIN — IBUPROFEN 400 MG: 200 TABLET, FILM COATED ORAL at 16:38

## 2023-02-26 RX ADMIN — HYDROMORPHONE HYDROCHLORIDE 4 MG: 2 TABLET ORAL at 04:32

## 2023-02-26 RX ADMIN — OXYCODONE HYDROCHLORIDE 10 MG: 10 TABLET, FILM COATED, EXTENDED RELEASE ORAL at 20:24

## 2023-02-26 RX ADMIN — ENOXAPARIN SODIUM 40 MG: 40 INJECTION SUBCUTANEOUS at 22:58

## 2023-02-26 RX ADMIN — HYDROMORPHONE HYDROCHLORIDE 4 MG: 2 TABLET ORAL at 11:32

## 2023-02-26 RX ADMIN — POLYETHYLENE GLYCOL 3350 17 G: 17 POWDER, FOR SOLUTION ORAL at 08:09

## 2023-02-26 RX ADMIN — ACETAMINOPHEN 975 MG: 325 TABLET, FILM COATED ORAL at 18:04

## 2023-02-26 RX ADMIN — PROCHLORPERAZINE MALEATE 10 MG: 5 TABLET ORAL at 01:10

## 2023-02-26 RX ADMIN — IBUPROFEN 400 MG: 200 TABLET, FILM COATED ORAL at 11:19

## 2023-02-26 RX ADMIN — HYDROCORTISONE SODIUM SUCCINATE 100 MG: 100 INJECTION, POWDER, FOR SOLUTION INTRAMUSCULAR; INTRAVENOUS at 01:48

## 2023-02-26 RX ADMIN — OXYCODONE HYDROCHLORIDE 10 MG: 10 TABLET, FILM COATED, EXTENDED RELEASE ORAL at 09:05

## 2023-02-26 RX ADMIN — DEXTROSE MONOHYDRATE 50 ML: 50 INJECTION, SOLUTION INTRAVENOUS at 01:50

## 2023-02-26 RX ADMIN — IBUPROFEN 400 MG: 200 TABLET, FILM COATED ORAL at 04:32

## 2023-02-26 RX ADMIN — IBUPROFEN 400 MG: 200 TABLET, FILM COATED ORAL at 22:58

## 2023-02-26 RX ADMIN — VENLAFAXINE HYDROCHLORIDE 37.5 MG: 37.5 CAPSULE, EXTENDED RELEASE ORAL at 20:24

## 2023-02-26 RX ADMIN — SODIUM CHLORIDE 1000 MG: 9 INJECTION, SOLUTION INTRAVENOUS at 18:05

## 2023-02-26 ASSESSMENT — ACTIVITIES OF DAILY LIVING (ADL)
ADLS_ACUITY_SCORE: 36
ADLS_ACUITY_SCORE: 37
ADLS_ACUITY_SCORE: 36
ADLS_ACUITY_SCORE: 37
ADLS_ACUITY_SCORE: 36
ADLS_ACUITY_SCORE: 36
ADLS_ACUITY_SCORE: 37

## 2023-02-26 NOTE — PROGRESS NOTES
Patient refused skin assessment. Alert and oriented x 4. Bilateral leg/foot pain 8/10 scheduled medication administered and ice packs applied intermittently. PIV x 2 saline locked.

## 2023-02-26 NOTE — PROGRESS NOTES
"[facility]  Neurology Consultation - Progress Note    Patient Name:  Ronna Rivera  Date of Service:  February 26, 2023    Subjective:      Patient feels her pain is worsened overnight.   showed me pictures of her feet with slightly worsening wounds.  Reports pain is 7 out of 10 today.    Objective:    Vitals: /74 (BP Location: Right arm)   Pulse 73   Temp 98.7  F (37.1  C) (Oral)   Resp 18   Ht 1.575 m (5' 2\")   Wt 54.4 kg (120 lb)   LMP 02/19/2023   SpO2 96%   BMI 21.95 kg/m    General: Lying in bed, NAD  Head: Atraumatic, normocephalic   Cardiac: Does have lower extremity edema that is moderate.  Neurologic:  Awake, alert, attentive, oriented.  Speech is fluent.  No dysarthria, gaze conjugate, face is symmetric.  Sensation intact to light touch in the distal lower extremities and she has at least 9 seconds of vibration in the distal lower extremities.  Distal lower extremity strength including dorsiflexion and plantarflexion are full.    Pertinent Investigations:    I have personally reviewed most recent and pertinent labs, tests, and radiological images.     Assessment  #Erythromelalgia    Ronna Rivera is a 37-year-old female with a history of erythromelalgia who neurology is consulted for worsening pain consistent with her flareup of this in the past.  Unfortunately she is worsened overnight.  It is affecting her sleep.  She has failed multiple other treatments and has had intolerable side effects along term steroid dosing.  Medicine team has too many questions for me today.  The first is regarding steroids.  I am reluctant to start long-term steroids or do aggressive steroid dosing as this has resulted in intolerable side effects including neuropsychiatric effects, worsening swelling, and wound healing.  The latter of which I am concerned about with now.  I do think since he seems to be significantly affected that a single dose is reasonable " and patient is in agreement and understands there is a risk associated with this.  In addition would stretch out her IVIG to a full 5-day course.  I have asked medicine team to dose it in the a.m. which may help promote sleep.  Regarding IV ketamine, I think this is potentially reasonable and it is what her outpatient neurologist is interested in trying.  That being said, I have no experience with dosing this and so would reach out to pain team to see if they feel is indicated.  From neurologic perspective I do not see any barriers to considering.    Recommendations:   -IVIG 0.4mg/kg day #4 tomorrow AM.  Would hold tonight's dose to promote sleep.  -Give premeds 500 ml fluid bolus (over 1 hr), tylenol and PO benadryl.  Then give 20g IVIG concurrently with NS at a rate of 175ml/hr. give PO compazine 30 min prior to IVIG completion and also give 50 mg IV benadryl and 100 mg IVP Solu-Cortef post infusion completion.  IVIG infusion rates:  16ml/hr - 30 min  33ml/hr - 30 min  66ml/hr - 30 min  132ml/hr - remainder of infusion  -Methylprednisolone 1000 mg x 1 today.  -No barriers to IV ketamine from neurologic perspective, would discuss with pain team.    Thank you for involving Neurology in the care of Ronna Rivera.      Yareli Doll, DO    Medical Decision Making       MANAGEMENT DISCUSSED with the following over the past 24 hours: Medicine attending Dr. Sandoval   NOTE(S)/MEDICAL RECORDS REVIEWED over the past 24 hours: Medicine note  CBC andBMP reviewed  Medical complexity over the past 24 hours:  - Prescription DRUG MANAGEMENT performed

## 2023-02-26 NOTE — CONSULTS
Care Management Initial Consult    General Information  Assessment completed with: Patient,    Type of CM/SW Visit: Initial Assessment    Primary Care Provider verified and updated as needed: Yes-   Neal Ball [phone] 0 [facility] [address] [facility], [facility] [address]     Readmission within the last 30 days: no previous admission in last 30 days         Advance Care Planning: Advance Care Planning Reviewed: Patient does not have a HCD, but interested. Provided blank HCD and explanation of completion process and importance.          Communication Assessment  Patient's communication style: spoken language-English    Hearing Difficulty or Deaf: no   Wear Glasses or Blind: no    Cognitive  Cognitive/Neuro/Behavioral: WDL  Level of Consciousness: alert  Arousal Level: opens eyes spontaneously  Orientation: oriented x 4  Mood/Behavior: calm, cooperative  Best Language: 0 - No aphasia  Speech: clear, spontaneous    Living Environment:   People in home:  and 2 twin 14 y/o boys     Current living Arrangements: [facility] home in [place] with a bonus room above the garage with full flight of stairs.      Able to return to prior arrangements: Yes       Family/Social Support:  Care provided by: self, but Spouse when she has flare ups of her disease.  Provides care for: Autistic 14 y/o twins, but they do go to school and are able to be in regular classroom settings.  Marital Status: -Shiraz      Description of Support System: Supportive, Involved    Support Assessment: Adequate family and caregiver support-Parents live in [place], but able to fly to [place] to help when needed. 's family [place] in [place], but coming back next week and also very helpful. Patient has 3 siblings, but none are in [place].    Current Resources:   Patient receiving home care services: No-but interested in home PT/OT again as it was helpful 2 years ago when she was diagnosed.     Community Resources: None  Equipment  currently used at home: shower chair, wheelchair-manual. She states that she has several W/C's at home  Supplies currently used at home:  Ice packs for feet.    Employment/Financial:  Employment Status: unemployed-Took care of her children for last 13 years.   Writer did talk about SSDI and process of applying, but that because she hasn't worked in 13 years, unsure if she would qualify for SSDI.     Financial Concerns:   No financial concerns.          Lifestyle & Psychosocial Needs:  Social Determinants of Health     Tobacco Use: Low Risk      Smoking Tobacco Use: Never     Smokeless Tobacco Use: Never     Passive Exposure: Not on file   Alcohol Use: Not on file   Financial Resource Strain: Low Risk      Difficulty of Paying Living Expenses: Not hard at all   Food Insecurity: No Food Insecurity     Worried About Running Out of Food in the Last Year: Never true     Ran Out of Food in the Last Year: Never true   Transportation Needs: No Transportation Needs     Lack of Transportation (Medical): No     Lack of Transportation (Non-Medical): No   Physical Activity: Not on file   Stress: Not on file   Social Connections: Not on file   Intimate Partner Violence: Not on file   Depression: At risk     PHQ-2 Score: 5   Housing Stability: Not on file       Functional Status:  Prior to admission patient needed assistance: Patient reports that when she was diagnosed 2 years ago, her  would have to carry her up the stairs and provide a lot of physical support. Her parents and 's parents also provided a lot of assistance and support. Due to the severity of her pain and Edema in her lower extremities, she would have to lay with her legs/feet propped up and apply ice packs and cold baths to her feet.    She reports that things had been much better until several weeks ago when she started to have a flare-up. She reports that it reminds her of 2 years ago when first diagnosed. She has been unable to leave her bed  because of the Edema and pain. At that time, she had PT and OT in the home and felt that this was helpful. , despite working full-time, is able to provide a lot of support, as do her children, now that they are 13. She anticipates more support from 's parent when they return next week.        Mental Health Status:  Mental Health Status: No Current Concerns-but admits to having some emotional days where she feels discouraged. She has thought about talking to a counselor and will continue to look into this as a potential option for support.       Chemical Dependency Status:  Chemical Dependency Status: No Current Concerns             Values/Beliefs:  Spiritual, Cultural Beliefs, [employer] Practices, Values that affect care:                 Additional Information:  Writer met with patient in her room on 7B. She was laying in bed covered with a blanket and a fan blowing on her. Her legs/feet were propped up in bed to help with pain and swelling.    We talked about completion of a health care directive  How to apply for social security disability, but that she might not qualify   Her desire for a home care referral for PT/OT again in the home.    No other community resources identified currently or psychosocial concerns. Notified RN CC about wanting home care yesterday and he is making referrals in preparation for her to discharge home.     Patricia John, [facility] Medical CenterSW

## 2023-02-26 NOTE — PROGRESS NOTES
[facility]    History and Physical - Medicine Service, DELMY TEAM 3    Date of Admission:  2/21/2023    Assessment & Plan      Ronna Rivera is a 37 year old female with PMHx of migraine and erythromelalgia who presented with worsening LE pain that started 1 week ago with concerning ulcers being admitted on 2/21/2023 for pain control due to chronic erythromelalgia. Getting IVIG and working on pain regimen.    Changes today:  -adjusted pain regimen  -IV solumedrol 1g today  -redose IVIG for 4th and 5th dose, starting 2/27 AM to promote sleep  -PT/OT consulted  -will reach out to [facility] again 2/27  -will consider pain IP consult tomorrow    Erythromelalgia flare  R foot ulcers  Menstrual suppression for erythromelalgia  Increased bilateral LE swelling, warmth and erythema over the last week. Normotensive, tachycardic, afebrile, maintaining oxygen saturations on room air on arrival to ED. Labs including CBC and BMP WNL.  Presentation consistent with previous erythromelalgia flares. Hematology has low suspicion for myelodysplastic syndrome underlying this flare, believes this is more neurologic/dysautonomic related and recommends neuro consult + IVIG inpatient.  -neurology consulted, appreciate recommendations   -Give IVIG x5 doses, next dose 2/27 AM (see neurology note for specific dosing details)   -IV solumedrol 1g today   -pain management consulted, appreciate recommendations  - APAP 975 TID scheduled, Ibuprofen 400 mg q6h scheduled, lidocaine 5% ointment q4h PRN, Gabapentin 400 mg q8h scheduled  - oxycontin BID, Hydromorphone PO 2-4 mg q4h PRN, hydromorphone IV q4h for breakthrough pain  - will consider reaching out 2/27 to discuss other possible long term pain solutions given persistent, recurrent pain  -outpatient pain clinic referral sent and patient encouraged to call to schedule appointment prior to discharge  -discontinued prednisone 40 mg daily, per pt  preference  -continue PTA azathioprine 100 mg daily and norethindrone 0.7 mg daily  -wound care consult, appreciate recommendations  -PT/OT consulted to work on strength and logistics of ADLs at home given condition    CHRONIC, RESOLVED & STABLE PROBLEMS    Elevated LFTs  AST//196 in January 2023. Work up including hepatitis Bc antibody positive with no HBV DNA, hep C negative, CMV and EBV negative. Suspected to be 2/2 Imuran, so dose was reduced to 100 mg daily. LFTs have been down trending since.     Tachycardia, resolved  EKG with sinus tachycardia. Suspect 2/2 to pain. CTM.     Diet: Regular Diet Adult    DVT Prophylaxis: enoxaparin 40 mg daily  Garcia Catheter: Not present  Fluids: PO intake  Lines: None   Cardiac Monitoring: None  Code Status: Full Code     Clinically Significant Risk Factors                               Disposition Plan plan for home once tolerating PO pain regimen    Expected Discharge Date: 02/26/2023      Destination: home with family  Discharge Comments: gettin more IVIG   - F[facility]u with [facility] neurologist  - Schedule with pain clinic (OP referral placed)  - short term dilaudid Rx and then f/u with Dr. Ball (PCP)     The patient's care was discussed with the Dr. Sandoval.    Naseem Ho MD  Medicine Service, [facility] TEAM [address] [place] [address]  Securely message with [employer] (more info)  Text page via [employer] Paging/Directory   See signed in provider for up to date coverage information  ______________________________________________________________________    Interval History  Overnight, pt felt as though she had pain flare starting ~ 4 PM yesterday. Tearful and severe.     This AM, better but  overal patient and spouse frustrated with pain plan and hoping for longer term solution for underlying Erythromelalgia. They are both actively engaged and good advocates.     Also noting increased swelling, in right ankle particularly. No facial swelling this AM.  No dysphonia, dysphagia, stridor, SOB, tongue swelling.     Physical Exam   Vital Signs: Temp: 98.7  F (37.1  C) Temp src: Oral BP: 122/74 Pulse: 73   Resp: 18 SpO2: 96 % O2 Device: None (Room air)    Weight: 120 lbs 0 oz    Constitutional: Awake, alert, cooperative, in NAD, legs raised in bed  Eyes: Sclera clear, conjunctiva normal.  Respiratory: Non-labored breathing, clear to auscultation bilaterally, no crackles or wheezing.  Cardiovascular: RRR, no obvious murmur noted  GI: soft abdomen, non-distended, non-tender to palpation, rebound or guarding  Extremities: bilateral legs elevated on foot wedge, compression stockings and ice packs on   Skin: No concerning lesions or flushing or exposed areas  Neurologic: Awake, alert & oriented x3.    Psych: Appropriate affect    Data     I have personally reviewed the following data over the past 24 hrs:    4.4  \   12.2   / 195     138 107 9.0 /  111 (H)   4.5 23 0.58 \       Imaging results reviewed over the past 24 hrs:   Recent Results (from the past 24 hour(s))   US Lower Extremity Venous Duplex Bilateral    Impression    RESIDENT PRELIMINARY INTERPRETATION  IMPRESSION: No deep venous thrombosis in either lower extremity.

## 2023-02-26 NOTE — PLAN OF CARE
"Vital signs:  Temp: 98.2  F (36.8  C) Temp src: Oral BP: 122/84 Pulse: 84   Resp: 16 SpO2: 96 % O2 Device: None (Room air)   Height: 157.5 cm (5' 2\") Weight: 54.4 kg (120 lb) (per pt.)    4414-3671:    Activity: Up to commode with SBA. Nonweightbearing on feet due to pain with standing.  Neuros: A & O x4. Neuro intact.   Cardiac: WDL. Asymptomatic.   Respiratory: LS clear. O2 sats high 90s on RA. Denies SOB. Unlabored. Tolerated IVIG treatment without complications.  GI/: BS+, passing flatus, no BM this shift. Monitoring voids due to patient stating decrease urine output. Voiding good amounts.  Diet: Regular diet.  Skin: At beginning of shift at 1945, patient teary-eyed and c/o of increased right foot pain, right foot swelling slightly more than left foot, right foot redness slightly more than left foot. Pedal pulses present, CMS intact. BLE from below knee to foot redness and mild swelling. Administered PRN oral Dilaudid 4mg x1, provider notified who saw patient and feet, ordered US of feet, ordered IV Dilaudid 0.5mg (can give extra dose 0.5mg if needed). Two open areas pencil eraser-sized on left and right dorsal foot each. Flaky, dry bilateral heels. Wearing compression stockings. Elevating legs and feet on wedge pillow. Patient states pain is tolerable at 7/10 and resting in between cares.  Lines: PIV x2 saline locked. NS infused per orders on separate PIV. IVIG via other PIV administered per orders, VSS throughout administration. Pre-medication and post-medication administered per orders.   Incisions/Drains: None.   Labs: Reviewed.   Pain/nausea: PRN oral Dilaudid 4mg x2, IV Dilaudid 0.5mg x1, scheduled ibuprofen, scheduled Tylenol, scheduled Gabapentin administered for bilateral feet pain helpful. Slept in between cares. Ice packs helpful to BLE, patient puts on and takes off. Denies nausea. One time compazine 10mg x1 effective for nausea.   New changes this shift: None.   Plan: Continue POC.   "

## 2023-02-26 NOTE — PROGRESS NOTES
"Brief Progress Note     S: Paged due to increased swelling of face and bilateral LE, as well as severe pain. She has new facial swelling - no tongue swelling, difficulty breathing, diffuse rash, or pruritus. Per Ronna and her , no history of cellulitis - report doing wound cares at home. Her ulcer on her R foot is expanding. Both cite concern for worsening wounds and are curious if any further interventions.     O:   Vital signs:  Temp: 98.1  F (36.7  C) Temp src: Oral BP: 136/65 Pulse: 92   Resp: 16 SpO2: 99 % O2 Device: None (Room air)   Height: 157.5 cm (5' 2\") Weight: 54.4 kg (120 lb) (per pt.)  Estimated body mass index is 21.95 kg/m  as calculated from the following:    Height as of this encounter: 1.575 m (5' 2\").    Weight as of this encounter: 54.4 kg (120 lb).    General: moderate distress 2/2 pain, generalized body shaking   HEENT: facial swelling with mild erythema in bilateral cheeks and chin   Skin/MSK: bilateral feet swollen - loss of joint demarcation with expanding ulceration on R foot, bilateral LE erythema, warm to the touch     A/P:  Erythromelagia   Bilateral LE Swelling  Facial Swelling   Concern for ongoing and worsening flare related to her erythromelagia. Significant LE edema with erythema and expanding ulceration. Given bilateral erythema, unlikely to be cellulitis, however with open skin will continue to monitor for signs of infection. Reached out to Neurology who will discuss high dose steroids in the AM. Her facial swelling is likely related to her erythromelagia - low suspicion for allergic reaction at this time.   - PRN IV Dilaudid X 2 doses   - Bilateral Duplex of LE to evaluate for DVT   - Will discuss with Neurology if any role for high dose steroids   - Encouraged compression and elevation   - Monitor for expanding ulceration and signs of infection       Enedelia Villarreal MD   Internal Medicine PGY-2  [facility]  Contact information " available via [facility] Paging/Directory

## 2023-02-27 PROCEDURE — 250N000009 HC RX 250: Performed by: STUDENT IN AN ORGANIZED HEALTH CARE EDUCATION/TRAINING PROGRAM

## 2023-02-27 PROCEDURE — 99233 SBSQ HOSP IP/OBS HIGH 50: CPT | Performed by: PHYSICIAN ASSISTANT

## 2023-02-27 PROCEDURE — 999N000128 HC STATISTIC PERIPHERAL IV START W/O US GUIDANCE

## 2023-02-27 PROCEDURE — 258N000003 HC RX IP 258 OP 636: Performed by: STUDENT IN AN ORGANIZED HEALTH CARE EDUCATION/TRAINING PROGRAM

## 2023-02-27 PROCEDURE — 999N000127 HC STATISTIC PERIPHERAL IV START W US GUIDANCE

## 2023-02-27 PROCEDURE — 250N000013 HC RX MED GY IP 250 OP 250 PS 637

## 2023-02-27 PROCEDURE — 250N000011 HC RX IP 250 OP 636

## 2023-02-27 PROCEDURE — 250N000013 HC RX MED GY IP 250 OP 250 PS 637: Performed by: STUDENT IN AN ORGANIZED HEALTH CARE EDUCATION/TRAINING PROGRAM

## 2023-02-27 PROCEDURE — 99233 SBSQ HOSP IP/OBS HIGH 50: CPT | Mod: GC | Performed by: HOSPITALIST

## 2023-02-27 PROCEDURE — 250N000012 HC RX MED GY IP 250 OP 636 PS 637

## 2023-02-27 PROCEDURE — 250N000011 HC RX IP 250 OP 636: Performed by: STUDENT IN AN ORGANIZED HEALTH CARE EDUCATION/TRAINING PROGRAM

## 2023-02-27 PROCEDURE — 99232 SBSQ HOSP IP/OBS MODERATE 35: CPT | Mod: GC | Performed by: PSYCHIATRY & NEUROLOGY

## 2023-02-27 PROCEDURE — 120N000002 HC R&B MED SURG/OB UMMC

## 2023-02-27 RX ORDER — DIPHENHYDRAMINE HYDROCHLORIDE 50 MG/ML
50 INJECTION INTRAMUSCULAR; INTRAVENOUS ONCE
Status: COMPLETED | OUTPATIENT
Start: 2023-02-28 | End: 2023-02-28

## 2023-02-27 RX ORDER — SODIUM CHLORIDE 9 MG/ML
INJECTION, SOLUTION INTRAVENOUS CONTINUOUS
Status: ACTIVE | OUTPATIENT
Start: 2023-02-28 | End: 2023-02-28

## 2023-02-27 RX ORDER — DIPHENHYDRAMINE HCL 50 MG
50 CAPSULE ORAL ONCE
Status: COMPLETED | OUTPATIENT
Start: 2023-02-28 | End: 2023-02-28

## 2023-02-27 RX ORDER — PROCHLORPERAZINE MALEATE 5 MG
10 TABLET ORAL ONCE
Status: COMPLETED | OUTPATIENT
Start: 2023-02-28 | End: 2023-02-28

## 2023-02-27 RX ORDER — HYDROMORPHONE HYDROCHLORIDE 1 MG/ML
.3-.5 INJECTION, SOLUTION INTRAMUSCULAR; INTRAVENOUS; SUBCUTANEOUS 3 TIMES DAILY PRN
Status: DISCONTINUED | OUTPATIENT
Start: 2023-02-27 | End: 2023-02-28

## 2023-02-27 RX ORDER — METHOCARBAMOL 500 MG/1
500 TABLET, FILM COATED ORAL EVERY 6 HOURS PRN
Status: DISCONTINUED | OUTPATIENT
Start: 2023-02-27 | End: 2023-03-02 | Stop reason: HOSPADM

## 2023-02-27 RX ORDER — ACETAMINOPHEN 325 MG/1
650 TABLET ORAL ONCE
Status: COMPLETED | OUTPATIENT
Start: 2023-02-28 | End: 2023-02-28

## 2023-02-27 RX ADMIN — OXYCODONE HYDROCHLORIDE 10 MG: 10 TABLET, FILM COATED, EXTENDED RELEASE ORAL at 20:01

## 2023-02-27 RX ADMIN — SODIUM CHLORIDE 500 ML: 9 INJECTION, SOLUTION INTRAVENOUS at 08:51

## 2023-02-27 RX ADMIN — AZATHIOPRINE 100 MG: 50 TABLET ORAL at 20:01

## 2023-02-27 RX ADMIN — HYDROMORPHONE HYDROCHLORIDE 4 MG: 2 TABLET ORAL at 16:48

## 2023-02-27 RX ADMIN — IBUPROFEN 400 MG: 200 TABLET, FILM COATED ORAL at 16:44

## 2023-02-27 RX ADMIN — ACETAMINOPHEN 650 MG: 325 TABLET, FILM COATED ORAL at 08:59

## 2023-02-27 RX ADMIN — IBUPROFEN 400 MG: 200 TABLET, FILM COATED ORAL at 22:10

## 2023-02-27 RX ADMIN — IBUPROFEN 400 MG: 200 TABLET, FILM COATED ORAL at 09:12

## 2023-02-27 RX ADMIN — HYDROCORTISONE SODIUM SUCCINATE 100 MG: 100 INJECTION, POWDER, FOR SOLUTION INTRAMUSCULAR; INTRAVENOUS at 13:29

## 2023-02-27 RX ADMIN — SENNOSIDES AND DOCUSATE SODIUM 2 TABLET: 50; 8.6 TABLET ORAL at 09:00

## 2023-02-27 RX ADMIN — OXYCODONE HYDROCHLORIDE 10 MG: 10 TABLET, FILM COATED, EXTENDED RELEASE ORAL at 09:12

## 2023-02-27 RX ADMIN — DEXTROSE MONOHYDRATE 50 ML: 50 INJECTION, SOLUTION INTRAVENOUS at 10:10

## 2023-02-27 RX ADMIN — GABAPENTIN 400 MG: 400 CAPSULE ORAL at 13:29

## 2023-02-27 RX ADMIN — DIPHENHYDRAMINE HYDROCHLORIDE 50 MG: 50 CAPSULE ORAL at 09:00

## 2023-02-27 RX ADMIN — GENTIAN VIOLET 1% 0.5 ML: 10 LIQUID TOPICAL at 08:59

## 2023-02-27 RX ADMIN — VENLAFAXINE HYDROCHLORIDE 37.5 MG: 37.5 CAPSULE, EXTENDED RELEASE ORAL at 20:00

## 2023-02-27 RX ADMIN — HUMAN IMMUNOGLOBULIN G 20 G: 20 LIQUID INTRAVENOUS at 10:23

## 2023-02-27 RX ADMIN — ENOXAPARIN SODIUM 40 MG: 40 INJECTION SUBCUTANEOUS at 22:09

## 2023-02-27 RX ADMIN — NORETHINDRONE 0.7 MG: 0.35 TABLET ORAL at 22:10

## 2023-02-27 RX ADMIN — SODIUM CHLORIDE: 9 INJECTION, SOLUTION INTRAVENOUS at 10:12

## 2023-02-27 RX ADMIN — POLYETHYLENE GLYCOL 3350 17 G: 17 POWDER, FOR SOLUTION ORAL at 09:01

## 2023-02-27 RX ADMIN — PROCHLORPERAZINE MALEATE 10 MG: 5 TABLET ORAL at 12:54

## 2023-02-27 RX ADMIN — KETAMINE HYDROCHLORIDE 5 MG/HR: 100 INJECTION, SOLUTION, CONCENTRATE INTRAMUSCULAR; INTRAVENOUS at 22:08

## 2023-02-27 RX ADMIN — GABAPENTIN 400 MG: 400 CAPSULE ORAL at 08:59

## 2023-02-27 RX ADMIN — ACETAMINOPHEN 975 MG: 325 TABLET, FILM COATED ORAL at 16:43

## 2023-02-27 RX ADMIN — DIPHENHYDRAMINE HYDROCHLORIDE 50 MG: 50 INJECTION, SOLUTION INTRAMUSCULAR; INTRAVENOUS at 13:30

## 2023-02-27 RX ADMIN — GABAPENTIN 400 MG: 400 CAPSULE ORAL at 20:01

## 2023-02-27 ASSESSMENT — ACTIVITIES OF DAILY LIVING (ADL)
ADLS_ACUITY_SCORE: 37
ADLS_ACUITY_SCORE: 38
ADLS_ACUITY_SCORE: 37

## 2023-02-27 NOTE — PROGRESS NOTES
"Pain Service Progress Note  [facility]  Date: 02/27/2023       Patient Name: Ronna Rivera  MRN: [id number]  Age: 37 year old  Sex: female      Assessment:  Ronna Rivera is a 37 year old female who has PMH of lower back pain (h/o facet joint injections) , migraine, panic attacks, erythromelalgia who presented with increased LE pain and being admitted on 2/21/2023 for pain control and further management of erythromelalgia. She states this pain started after an ulcer appeared on her foot, swelling pain and then was dx with erythromelalgia with extensive testing at [facility]urology. She states the severe episode started in 2021 and took about 5 months of IV steroids to reduce the symptoms.  She states that for awhile the symptoms improved to such that she was able to stop opioid pain management.       However, pain/swelling/redness increased in the bilateral foot around Sept. 2022 and has progressively worsened in the the past 1.5 weeks.  She has tried IVIG, rituximab, compounded topical amitriptyline +ketamine cream, lidocaine patches and is followed closely by outpatient neurology.  States [facility] has recommended consideration of LESI, SCS , IV ketamine locally.    Interval history 2/27/23.  Ronna states pain is \"okay.\"  AAOx3, bright, and conversant. States she had a \"good night.\"  Pain continues to be in the bilateral foot, on the lateral sides and now more swelling on the ankles.  Pain is throbbing.  Best pain level is 5/10 and worse is 7/10 today.  Pain may be improving from IV IG infusion but unclear as she states her pain waxes and wanes.  Legs are elevated with fan blowing on her to keep cool.  She is taking OxyContin 10mg BID but not sure how helpful it is---states her PCP will continue to prescribe for her.  She states that she has been able to \"use less pain medications.\"  Note no other usage of opioids besides OxyContin for the past 14+ hours.     We " "discussed pain management.  Ronna asked about injections---epidural injections,  Ketamine infusion etc.  We discussed that epidural catheter is not recommended by anesthesia but lumbar sympathetic nerve block can be considered in the pain clinic.  She is scheduled at [facility] pain clinic on 4/3/23.    For now, she would like to try ketamine infusion for pain. We discussed indications, risks and benefits of ketamine infusion which she expresses understanding.  She wants to try this and \"cross this off\" her list of potential treatment options.    Plan/Recommendations:    1. May start ketamine infusion  5mg/hour x 12 -24 hours and stop.  Ronna is agreeable with this pain.   -if experiencing side effects (tachycardia, hallucinations, vivid dreams, sedation etc), may stop abruptly.  2. May continue OxyContin 10mg PO Q 12 hours if there is plan to continue outpatient, has outpatient provider willing to prescribe it, is covered by insurance as it can be cost prohibitive.              Otherwise, please discontinue to once tablet/day x 1 day and stop.  2. Stop oxycodone-not helpful.  3. Start Dilaudid 2-4mg PO Q 4 hours PRN, as IV opioid is being tapered and discontinued.  (has not used in past 12 hours)              Upon discharge, look at past 24 hour usage of PO Dilaudid and discharge on that dose PRN x 1 day then decrease by 1-2 doses every 1-2 days until  discontinuation.              -Ronna will need to f/u with PCP if needed for opioid pain management as it may take sometime to be evaluated by a pain clinic.  (She states that her PCP is comfortable managing opioids for her).  4. Change IV Dilaudid to 0.3-0.5mg IV up to TID prn, dose at least 3 hours apart.  (please use oral dilaudid first). Reserve for rescue only. (not used in past 24 hours)  5. Continue  gabapentin to 400mg PO TID (was at 300mg TID). _notes sleepiness with increased dose.  6.  lidocaine ointment to feet.  7. Consider Robaxin 500mg PO Q 6 hours " "PRN.  8. Bowel regimen, no BM in 2 days.  9. Keep [facility] pain clinic on 4/3/23.    10. Ronna  discussed potential for anesthesia epidural catheter for pain management while inpatient.  Discussed this with our anesthesia team who feel that a pain clinic may be be the best option as inpatient anesthesia does not perform epidural for this type of pain---few case report per literature as a treatment option for erythromelalgia.     Pain Service will continue to follow.    Discussed with attending anesthesiologist    Please Page the Pain Team Via Amcom: \"PAIN MANAGEMENT ACUTE INPATIENT/ [facility]\"    Gaviota Gardner PA-C  02/27/2023           Diet: Regular Diet Adult    Relevant Labs:  Recent Labs   Lab Test 02/26/23  0740      BUN 9.0       Physical Exam:  Vitals: BP (!) 140/78 (BP Location: Left arm, Patient Position: Supine)   Pulse 91   Temp 97.5  F (36.4  C) (Oral)   Resp 16   Ht 1.575 m (5' 2\")   Wt 54.4 kg (120 lb)   LMP 02/19/2023   SpO2 100%   BMI 21.95 kg/m      Physical Exam:   CONSTITUTIONAL/GENERAL APPEARANCE: Conversant.  EYES: EOMI, sclerae clear  ENT/NECK: neck is supple  RESPIRATORY:non labored breathing, on room air  CARDIOVASCULAR: HR within normal limits  MUSCULOSKELETAL/BACK/SPINE/EXTREMITIES: Moving UE.  Bilateral toes are red, swollen, scattered closed ulcers on foot dorsa.     NEURO:  AAOx3.   SKIN/VASCULAR EXAM:  Dry and warm.  PSYCHIATRIC/BEHAVIORAL/OBSERVATIONS:  No objective signs of pain observed during our interview.  Able to remove compression stockings without issues.   Judgment/Insight -fair   Orientation - x3   Memory -fair   Mood and affect - calm, pleasant, cooperative          Relevant Medications:  Current Pain Medications:  Medications related to Pain Management (From now, onward)    Start     Dose/Rate Route Frequency Ordered Stop    02/28/23 1230  diphenhydrAMINE (BENADRYL) injection 50 mg         50 mg Intravenous ONCE 02/27/23 1257      02/28/23 0900  " acetaminophen (TYLENOL) tablet 650 mg         650 mg Oral ONCE 02/27/23 1257      02/28/23 0900  diphenhydrAMINE (BENADRYL) capsule 50 mg        Note to Pharmacy: PHARMACIST NOTE: Change premedication frequency to the same frequency as IVIG infusion ordered. Schedule pre-medication 30 minutes prior to each IVIG infusion.   See Hyperspace for full Linked Orders Report.    50 mg Oral ONCE 02/27/23 1257      02/28/23 0900  diphenhydrAMINE (BENADRYL) injection 50 mg        Note to Pharmacy: PHARMACIST NOTE: Change premedication frequency to the same frequency as IVIG infusion ordered. Schedule pre-medication 30 minutes prior to each IVIG infusion.   See Hyperspace for full Linked Orders Report.    50 mg Intravenous ONCE 02/27/23 1257      02/26/23 2100  hydrOXYzine (ATARAX) tablet 50 mg         50 mg Oral ONCE PRN 02/26/23 1714      02/26/23 0900  oxyCODONE (oxyCONTIN) 12 hr tablet 10 mg         10 mg Oral EVERY 12 HOURS 02/26/23 0835      02/25/23 2007  HYDROmorphone (PF) (DILAUDID) injection 0.5-1 mg         0.5-1 mg Intravenous EVERY 4 HOURS PRN 02/25/23 2008      02/24/23 1123  diphenhydrAMINE (BENADRYL) injection 50 mg         50 mg Intravenous ONCE PRN 02/24/23 1125      02/24/23 1123  diphenhydrAMINE (BENADRYL) capsule 50 mg        See Hyperspace for full Linked Orders Report.    50 mg Oral EVERY 24 HOURS PRN 02/24/23 1125      02/24/23 1123  diphenhydrAMINE (BENADRYL) injection 50 mg        See Hyperspace for full Linked Orders Report.    50 mg Intravenous EVERY 24 HOURS PRN 02/24/23 1125      02/24/23 1123  acetaminophen (TYLENOL) tablet 650 mg         650 mg Oral EVERY 24 HOURS PRN 02/24/23 1125      02/22/23 1400  gabapentin (NEURONTIN) capsule 400 mg        Note to Pharmacy: PTA Sig:Take 1 capsule (300 mg) by mouth 3 times daily      400 mg Oral 3 TIMES DAILY 02/22/23 1253      02/22/23 1249  lidocaine (XYLOCAINE) 5 % ointment          Topical EVERY 4 HOURS PRN 02/22/23 1253      02/22/23 1249  HYDROmorphone  "(DILAUDID) tablet 2-4 mg         2-4 mg Oral EVERY 4 HOURS PRN 02/22/23 1253      02/22/23 0800  polyethylene glycol (MIRALAX) Packet 17 g         17 g Oral DAILY 02/21/23 2014 02/22/23 0800  senna-docusate (SENOKOT-S/PERICOLACE) 8.6-50 MG per tablet 1 tablet        See Hyperspace for full Linked Orders Report.    1 tablet Oral DAILY 02/21/23 2014 02/22/23 0800  senna-docusate (SENOKOT-S/PERICOLACE) 8.6-50 MG per tablet 2 tablet        See Hyperspace for full Linked Orders Report.    2 tablet Oral DAILY 02/21/23 2014 02/21/23 2155  acetaminophen (TYLENOL) tablet 975 mg         975 mg Oral EVERY 8 HOURS 02/21/23 2150 02/21/23 2155  ibuprofen (ADVIL/MOTRIN) tablet 400 mg        Note to Pharmacy: PTA Sig:Take 400 mg by mouth every 6 hours as needed for moderate pain      400 mg Oral EVERY 6 HOURS 02/21/23 2151 02/21/23 1849  lidocaine 1 % 0.1-1 mL         0.1-1 mL Other EVERY 1 HOUR PRN 02/21/23 1901 02/21/23 1849  lidocaine (LMX4) cream          Topical EVERY 1 HOUR PRN 02/21/23 1901            Primary Service Contacted with Recommendations? Yes                Gaviota Gardner PA-C  Acute Inpatient Pain Service [facility]  Hours of pain coverage 24/7   Page via Amcom- Please Page the Pain Team Via Amcom: \"PAIN MANAGEMENT ACUTE INPATIENT/ [facility]\"           "

## 2023-02-27 NOTE — TELEPHONE ENCOUNTER
Central Prior Authorization Team   Phone: [phone]      PA Initiation    Medication: oxyCODONE (OXYCONTIN) 10 MG 12 hr tablet  Insurance Company: [employer] - Phone [phone] Fax [phone]  Pharmacy Filling the Rx: [id number] [address] [address]  Filling Pharmacy Phone: [phone]  Filling Pharmacy Fax:    Start Date: 2/27/2023

## 2023-02-27 NOTE — PROGRESS NOTES
Care Management Follow Up    Length of Stay (days): 6    Expected Discharge Date: 02/28/2023     Concerns to be Addressed: discharge planning      Patient plan of care discussed at interdisciplinary rounds: Yes    Anticipated Discharge Disposition: Home Care     Anticipated Discharge Services: None  Anticipated Discharge DME: None    Patient/family educated on Medicare website which has current facility and service quality ratings: yes   Education Provided on the Discharge Plan: yes   Patient/Family in Agreement with the Plan: TBD     Referrals Placed by CM/SW:  Home Care  Private pay costs discussed: Not applicable    Additional Information:   PT/OT recommending home PT/OT services.  Continuing to search for home care for the pt.  Referrals sent to the agencies below. RNCC will continue to follow and assist with discharge planning.         Home Care Agency Referrals  [facility] (ph:[phone] fx:[phone])- left  [facility] (ph:[phone], fx:[phone])- left  [facility] (ph:[phone] fx:[phone])-referral sent    Home Care Agencies [facility]  (ph:[phone] fx:[phone])-payor capacity   [facility] -don't take insurance  [place] -don't take insurance  [facility] - don't take insurance  [facility]-don't take insurance  [facility] -don't take insurance  [facility] -don't take insurance  [facility] -don't take insurance  [facility] -don't take insurance  [facility] - don't take insurance  [facility]  -take insurance, as of 1/11/23 only taking [facility] referrals  [facility]  -don't take insurance  [facility] -don't take insurance  [facility]-payor capacity    [facility]-payor capacity   [facility]   [facility] (ph:[phone]:[phone])-payor capacity  [facility] (ph:[phone]  fx:[phone])-at capacity in pt's area  [facility] (ph:[phone], fx:[phone])-unable to accept, not in service area    Home Care Agencies that are in network with pt's insurance:  [facility] (ph:[phone] fx: [phone])   [facility] (ph:[phone] fx: [phone])    [facility] (ph:[phone] fx: [phone])   [facility] (ph:[phone] fx:[phone])  [facility] (ph:[phone] fx:[phone])  [facility] (aka: [facility]) (ph:[phone] fx:[phone])  [facility] (ph:[phone] fx:[phone])        ANTHONY Whitley  Phone: [phone]  Pager: [phone]    SEARCHABLE in AMCOM - search CARE COORDINATOR     [place] ([phone]) Saturday & Sunday; ([phone]) [facility] Recognized Holidays     Units: 4A, 4C, 4E, 5A & 5B   Pager: [phone]    Units: 6A & 6B    Pager: [phone]    Units: 6C & 6D   Pager: [phone]    Units: 7A, 7B, 7C, 7D & 5C    Pager: [phone]    Units: [facility] ED, 5 Ortho, 5 Med/Surg, 6 Med/Surg, 8A, 10 ICU, & [facility]    Pager: [phone]

## 2023-02-27 NOTE — PLAN OF CARE
"Neuro: AOx4, able to make needs known, calls appropriately. Denies numbness and tingling. Dependent edema on feet, both heels are elevated using pillows and wedge with compression stockings on.  Respiratory: WDL  Cardiac: WDL except slight HTN. Denies cardiac chest pain  Diet: Regular with good appetite.  GI/: Continent of bladder, uses bedside commode independently. No BM this shift.  IV Access: R PIV x 2. Both saline locked.   Lines/Drains: No current lines or drains in use.  Wounds: Scabs on 5th toes of both feet. Ulcers on top of both feet. BLE blanchable redness. Wound care done this shift in AM.  Pain: Rates pain 7/10 throughout shift. Given scheduled tylenol, gabapentin, and oxycodone. PRN oral dilaudid x1.   Labs: Reviewed  VS: BP (!) 145/76 (BP Location: Left arm)   Pulse 76   Temp 98.4  F (36.9  C) (Oral)   Resp 18   Ht 1.575 m (5' 2\")   Wt 54.4 kg (120 lb)   LMP 02/19/2023   SpO2 100%   BMI 21.95 kg/m    Activity: Weight-bearing as tolerated, but painful for her to stand on feet.   New Changes This Shift: Incentive spirometer Q2 hours while awake added to inpatient nursing orders. Pt and pain management team discussed ketamine infusion to help with pain.   Plan: Continue to monitor and manage pain. Continue POC and monitoring.    "

## 2023-02-27 NOTE — PROGRESS NOTES
[facility]    History and Physical - Medicine Service, DELMY TEAM 3    Date of Admission:  2/21/2023    Assessment & Plan      Ronna Rivera is a 37 year old female with PMHx of migraine and erythromelalgia who presented with worsening LE pain that started 1 week ago with concerning ulcers being admitted on 2/21/2023 for pain control due to acute on chronic erythromelalgia. Undergoing IVIG, steroids and working on pain control.    Changes today:  - 2 more IVIG doses (2/27 & 2/28)  - PT/OT consulted  - WOCN consulted - will try to see pt on 2/28  - Pain management consulted to get perspective on starting IV ketamine for pain management    Acute on chronic severe Erythromelalgia  R foot ulcers  Menstrual suppression for erythromelalgia  Labs including CBC and BMP WNL. Diagnosed in 2//2021. Follow with Dr. De La Rosa in [facility] and Dr. James in [facility] ([place]urology). Presentation consistent with previous erythromelalgia flares. Prior treatment includes topical ketamine, rituximab, steroids, weekly IVIG with waxing/waning symtpoms. Hematology has low suspicion for myelodysplastic syndrome underlying this flare, believes this is more neurologic/dysautonomic related and recommends neuro consult + IVIG inpatient. Did get high dose steroids  2/26 with possibly some improvement. Ultimately, condition is large disability (unable to walk, care for self/children) and patient utilizes wheelchair for mobility at home.  -neurology consulted, appreciate recommendations  -Give IVIG x5 doses,(see neurology note for specific dosing details), final dose on 2/28  -pain management consulted, appreciate recommendations  - APAP 975 TID scheduled, Ibuprofen 400 mg q6h scheduled, lidocaine 5% ointment q4h PRN, Gabapentin 400 mg q8h scheduled  - oxycontin BID, Hydromorphone PO 2-4 mg q4h PRN, hydromorphone IV q4h for breakthrough pain  - they plan to see pt 2/27  -outpatient pain clinic  referral sent and patient encouraged to call to schedule appointment prior to discharge  -discontinued prednisone 40 mg daily, per pt preference  -continue PTA azathioprine 100 mg daily and norethindrone 0.7 mg daily  -wound care consult, appreciate recommendations   - will try to see pt on 2/28  -PT/OT consulted to work on strength and logistics of ADLs at home given condition  -health psychology consult given patients current hospitalization and severely debilitating disease    CHRONIC, RESOLVED & STABLE PROBLEMS    Elevated LFTs  AST//196 in January 2023. Work up including hepatitis Bc antibody positive with no HBV DNA, hep C negative, CMV and EBV negative. Suspected to be 2/2 Imuran, so dose was reduced to 100 mg daily. LFTs have been down trending since.     Tachycardia, resolved  EKG with sinus tachycardia. Suspect 2/2 to pain. CTM.     Diet: Regular Diet Adult    DVT Prophylaxis: enoxaparin 40 mg daily  Garcia Catheter: Not present  Fluids: PO intake  Lines: None   Cardiac Monitoring: None  Code Status: Full Code     Clinically Significant Risk Factors                               Disposition Plan plan for home once tolerating PO pain regimen     Expected Discharge Date: 02/28/2023,  6:00 PM    Destination: home with family  Discharge Comments: Pending: IVIG through 2/28, pain IP reconsult  Dispo: home with assist, Home OT   - F/u with [facility] neurologist  - Schedule with pain clinic (OP referral placed)  - short term dilaudid Rx and then f/u with Dr. Ball (PCP)     The patient's care was discussed with the Dr. Sandoval.    Sebas Sousa  Medicine Service, [facility] TEAM [address] [place] [address]  Securely message with NICE (more info)  Text page via [facility] Paging/Directory   See signed in provider for up to date coverage information  ______________________________________________________________________    Interval History  NAEO. Pt slept well but had more night  sweats ON, similar to the most recent ones. Says pain has improved from yesterday but that she has noticed more swelling around the ankle of both feet. She denies fever, chills or SOB. Pt interested in hearing from pain team about further treatment like IV ketamine. Very engaged and great advocate for her care.     Physical Exam   Vital Signs: Temp: 98.3  F (36.8  C) Temp src: Oral BP: 121/70 Pulse: 90   Resp: 16 SpO2: 99 % O2 Device: None (Room air)    Weight: 120 lbs 0 oz    Constitutional: Awake, alert, cooperative, in NAD, legs raised in bed  Eyes: Sclera clear, conjunctiva normal.  Respiratory: Non-labored breathing, clear to auscultation bilaterally, no crackles or wheezing.  Cardiovascular: RRR, no obvious murmur noted  GI: deferred  Extremities: bilateral legs elevated on foot wedge, compression stockings and ice packs on feet. New erythematous lesions noted on both feet without any ulceration, ischemia, necrosis, blood or drainage  Skin: No concerning lesions or flushing or exposed areas  Neurologic: Awake, alert & oriented x3  Psych: Appropriate affect    Data         Imaging results reviewed over the past 24 hrs:   No results found for this or any previous visit (from the past 24 hour(s)).

## 2023-02-27 NOTE — PROGRESS NOTES
"[facility]  Neurology Consultation - Progress Note    Patient Name:  Ronna Rivera  Date of Service:  February 27, 2023    Subjective:      Patient feels her pain is improved today compared to the weekend. She feels as if her ankles are more swollen however. No other concerns at this time. She slept better overnight.     Objective:    Vitals: /53 (BP Location: Right arm, Patient Position: Supine)   Pulse 74   Temp 98.6  F (37  C) (Oral)   Resp 16   Ht 1.575 m (5' 2\")   Wt 54.4 kg (120 lb)   LMP 02/19/2023   SpO2 98%   BMI 21.95 kg/m       General: Lying in bed, NAD  Head: Atraumatic, normocephalic   Cardiac: Does have lower extremity edema that is moderate.  Neurologic:  Awake, alert, attentive, oriented.  Speech is fluent.  No dysarthria, gaze conjugate, face is symmetric.  Sensation intact to light touch in the distal lower extremities. Distal lower extremity strength including dorsiflexion and plantarflexion are full.  Skin:  Slight expansion of the erythema surrounding the wound on the dorsal aspect of the R foot. New superficial erosions beginning on the lateral fifth digit left foot. New wound on L dorsal foot    Pertinent Investigations:    I have personally reviewed most recent and pertinent labs, tests, and radiological images.     Assessment  #Erythromelalgia    Ronna Rivera is a 37-year-old female with a history of erythromelalgia. Neurology was consulted for management for an acute exacerbation of her erythromelalgia. It has been treatment refractory and progressive. She has been trialed on topical ketamine, rituximab, pulse dose steroids, weekly IVIG with only modest responses and has had intolerable side effects along term steroid dosing. She did receive 1x 1g methylprednisolone on 2/26/23 with some improvement. Would be reluctant to redose high does steroids given hx of neuro psych side effects.     Would continue with IVIG 5-day course. " Regarding IV ketamine, potentially reasonable and it is what her outpatient neurologist is interested in trying.  From neurologic perspective I do not see any barriers to considering. Would involve pain management.     Recommendations:   -IVIG 0.4mg/kg day #4 2/27 AM  -Give premeds 500 ml fluid bolus (over 1 hr), tylenol and PO benadryl.  Then give 20g IVIG concurrently with NS at a rate of 175ml/hr. give PO compazine 30 min prior to IVIG completion and also give 50 mg IV benadryl and 100 mg IVP Solu-Cortef post infusion completion.  IVIG infusion rates:  16ml/hr - 30 min  33ml/hr - 30 min  66ml/hr - 30 min  132ml/hr - remainder of infusion  -No barriers to IV ketamine from neurologic perspective, recommend pain consult  - Agree with Health psych consult    Thank you for involving Neurology in the care of Ronna Rivera.     Case discussed with Dr. Lavon Estes, DO  Internal Medicine PGY-2

## 2023-02-27 NOTE — PROGRESS NOTES
Patient alert and oriented x 4, denies chest pain. Right foot has more swelling compared to left. Doppler ultrasound to bilateral legs today. Patient refused skin assessment today.

## 2023-02-27 NOTE — PROGRESS NOTES
02/26/23 1700   Appointment Info   Signing Clinician's Name / Credentials (OT) April Gu OT   Living Environment   People in Home child(ebonie), dependent;spouse  (13 y.o. twin boys, one of them is autisic)   Current Living Arrangements house   Home Accessibility stairs within home   Number of Stairs, Within Home, Primary greater than 10 stairs   Stair Railings, Within Home, Primary railings safe and in good condition   Transportation Anticipated family or friend will provide   Living Environment Comments Pt lives with  & 13 y.o twin boys (one with Autism) in 2-level home. Bedroom & bathroom on 2nd level. Has multiple w/c in her house, one on each level.   Self-Care   Usual Activity Tolerance good   Current Activity Tolerance fair   Equipment Currently Used at Home shower chair;wheelchair, manual   Fall history within last six months yes   Number of times patient has fallen within last six months 1   Activity/Exercise/Self-Care Comment Pt able to complete ADLs IND-ly up until a few weeks before this most recent hospitalization   Instrumental Activities of Daily Living (IADL)   IADL Comments Pt able to complete IADLs IND-ly up until a few weeks before this most recent hospitalization.   General Information   Onset of Illness/Injury or Date of Surgery 02/21/23   Referring Physician Cameron Sandoval MD   Patient/Family Therapy Goal Statement (OT) get better   Additional Occupational Profile Info/Pertinent History of Current Problem Per H&P:  37 year old female with PMHx of migraine and erythromelalgia who presented with worsening LE pain that started 1 week ago with concerning ulcers being admitted on 2/21/2023 for pain control due to chronic erythromelalgia. Getting IVIG and working on pain regimen.   Existing Precautions/Restrictions fall   Left Upper Extremity (Weight-bearing Status) full weight-bearing (FWB)   Right Upper Extremity (Weight-bearing Status) full weight-bearing (FWB)   Left Lower  Extremity (Weight-bearing Status) full weight-bearing (FWB)   Right Lower Extremity (Weight-bearing Status) full weight-bearing (FWB)   Heart Disease Risk Factors Medical history   General Observations and Info Activity: up ad sandra   Cognitive Status Examination   Orientation Status orientation to person, place and time   Sensory   Sensory Comments josh UE intact,   Range of Motion Comprehensive   General Range of Motion no range of motion deficits identified  (josh UE)   Strength Comprehensive (MMT)   General Manual Muscle Testing (MMT) Assessment no strength deficits identified  (in josh UE)   Bed Mobility   Comment (Bed Mobility) per pt & RN, pt able to complete lateral transfer from bed<>commode.   Activities of Daily Living   BADL Assessment/Intervention   (Per clinicial judgement, pt is SBA with ADLs, however with current medical status, likely needs increased assistance with bathing)   Clinical Impression   Criteria for Skilled Therapeutic Interventions Met (OT) Yes, treatment indicated   OT Diagnosis decreased IND with ADLs & mobility   OT Problem List-Impairments impacting ADL problems related to;activity tolerance impaired;mobility;strength   Assessment of Occupational Performance 5 or more Performance Deficits   Identified Performance Deficits dressing, g/h, strength, transfers, activity tolernace, amb   Planned Therapy Interventions (OT) ADL retraining;IADL retraining;bed mobility training;strengthening;transfer training   Clinical Decision Making Complexity (OT) moderate complexity   Anticipated Equipment Needs Upon Discharge (OT)   (TBD)   Risk & Benefits of therapy have been explained evaluation/treatment results reviewed   Clinical Impression Comments Pt is appropriate for skilled OT services to address deficits in functional tasks & mobility.   OT Total Evaluation Time   OT Eval, Moderate Complexity Minutes (02709) 10   OT Goals   Therapy Frequency (OT) 3 times/wk   OT Predicted Duration/Target Date for  "Goal Attainment 03/10/23   OT Goals Hygiene/Grooming;Upper Body Dressing;Lower Body Dressing;Toilet Transfer/Toileting;OT Goal 2   OT: Hygiene/Grooming modified independent;while standing   OT: Upper Body Dressing Modified independent;including set-up/clothing retrieval   OT: Lower Body Dressing Modified independent;including set-up/clothing retrieval   OT: Toilet Transfer/Toileting Modified independent   OT: Goal 2 Pt to amb safely ~150ft with AD prn to increase endurance   Interventions   Interventions Quick Adds Therapeutic Activity   Therapeutic Activities   Therapeutic Activity Minutes (35409) 40   Treatment Detail/Skilled Intervention OT: Pt supine in bed, josh LE elevated & under a wedge for increased comfort and to manage pain & swelling in josh LE. Due to pain, swelling and hot temperature of josh feet, pt had 2 fans on her, ice packs applied to josh lower legs and cooling packs to her feet. Per pt & bedside RN, able to complete lateral transfer while keeping josh LE elevated bed<>commode. Pt politely declined to demo transfer due to the anticipation of pain. Pt able to don/doff socks while supine in bed in figure-4 position. Issued green t-band and handout and returned demo of exercises. Will schedule pt in a few days to check status in regards to medical condition. Has manual w/c at each level of home. To get upstairs to bed/bathroom, pt would \"scoot on my bottom\" to get up/down the stairs.  works 3 days at home and 2 days in the office, and will be able to intermittently assist with transfers and ADLs. Current recommendation is home with assist and home OT/PT.   OT Discharge Planning   OT Plan LB dressing, g/h sitting EOB and progress to standing at sink.   OT Discharge Recommendation (DC Rec) home with assist;home with home care occupational therapy   OT Rationale for DC Rec Pt is currently well below baseline with fucntional tasks & mobility. Recommend d/c to home with assist, OP OT/PT once medically " stable to for d/c   Total Session Time   Timed Code Treatment Minutes 40   Total Session Time (sum of timed and untimed services) 50

## 2023-02-27 NOTE — PLAN OF CARE
"Vital signs:  Temp: 98.7  F (37.1  C) Temp src: Oral BP: 119/75 Pulse: 77   Resp: 18 SpO2: 97 % O2 Device: None (Room air)   Height: 157.5 cm (5' 2\") Weight: 54.4 kg (120 lb) (per pt.)    9901-8766:    Activity: Up to commode with SBA. Nonweightbearing on feet due to pain with standing.  Neuros: A & O x4. Neuro intact.   Cardiac: WDL. Asymptomatic.   Respiratory: LS clear. O2 sats high 90s on RA. Denies SOB. Unlabored.   GI/: BS+, passing flatus, had small formed brown BM this shift. Voiding adequate amounts.   Diet: Regular diet.  Skin: Patient refused skin assessment of feet, stated the pain was better. Wearing compression stockings. Elevating legs and feet on wedge pillow.   Lines: PIV x2 saline locked.   Incisions/Drains: None.   Labs: Reviewed.   Pain/nausea: PRN oral Dilaudid 4mg 2, scheduled Oxycontin 10mg x1, scheduled Gabapentin administered for bilateral feet pain effective, patient slept throughout night. Patient states bilateral feet pain is better tonight. Slept in between cares. Ice packs helpful to BLE, patient puts on and takes off. Denies nausea.   New changes this shift: None.   Plan: Continue POC.     "

## 2023-02-28 ENCOUNTER — APPOINTMENT (OUTPATIENT)
Dept: OCCUPATIONAL THERAPY | Facility: CLINIC | Age: 38
DRG: 300 | End: 2023-02-28
Payer: COMMERCIAL

## 2023-02-28 ENCOUNTER — MYC MEDICAL ADVICE (OUTPATIENT)
Dept: INTERNAL MEDICINE | Facility: CLINIC | Age: 38
End: 2023-02-28
Payer: COMMERCIAL

## 2023-02-28 PROCEDURE — 250N000013 HC RX MED GY IP 250 OP 250 PS 637: Performed by: STUDENT IN AN ORGANIZED HEALTH CARE EDUCATION/TRAINING PROGRAM

## 2023-02-28 PROCEDURE — G0463 HOSPITAL OUTPT CLINIC VISIT: HCPCS

## 2023-02-28 PROCEDURE — 99233 SBSQ HOSP IP/OBS HIGH 50: CPT | Mod: GC | Performed by: STUDENT IN AN ORGANIZED HEALTH CARE EDUCATION/TRAINING PROGRAM

## 2023-02-28 PROCEDURE — 250N000011 HC RX IP 250 OP 636: Performed by: STUDENT IN AN ORGANIZED HEALTH CARE EDUCATION/TRAINING PROGRAM

## 2023-02-28 PROCEDURE — 99232 SBSQ HOSP IP/OBS MODERATE 35: CPT | Performed by: PHYSICIAN ASSISTANT

## 2023-02-28 PROCEDURE — 99232 SBSQ HOSP IP/OBS MODERATE 35: CPT | Mod: GC | Performed by: PSYCHIATRY & NEUROLOGY

## 2023-02-28 PROCEDURE — 250N000012 HC RX MED GY IP 250 OP 636 PS 637

## 2023-02-28 PROCEDURE — 250N000011 HC RX IP 250 OP 636

## 2023-02-28 PROCEDURE — 120N000002 HC R&B MED SURG/OB UMMC

## 2023-02-28 PROCEDURE — 258N000003 HC RX IP 258 OP 636: Performed by: STUDENT IN AN ORGANIZED HEALTH CARE EDUCATION/TRAINING PROGRAM

## 2023-02-28 PROCEDURE — 97535 SELF CARE MNGMENT TRAINING: CPT | Mod: GO

## 2023-02-28 PROCEDURE — 250N000013 HC RX MED GY IP 250 OP 250 PS 637

## 2023-02-28 RX ORDER — HYDROMORPHONE HYDROCHLORIDE 2 MG/1
2-4 TABLET ORAL EVERY 6 HOURS PRN
Status: DISCONTINUED | OUTPATIENT
Start: 2023-02-28 | End: 2023-03-01

## 2023-02-28 RX ORDER — HYDROMORPHONE HYDROCHLORIDE 1 MG/ML
.3-.5 INJECTION, SOLUTION INTRAMUSCULAR; INTRAVENOUS; SUBCUTANEOUS
Status: COMPLETED | OUTPATIENT
Start: 2023-02-28 | End: 2023-03-01

## 2023-02-28 RX ORDER — HYDROMORPHONE HYDROCHLORIDE 1 MG/ML
0.5 INJECTION, SOLUTION INTRAMUSCULAR; INTRAVENOUS; SUBCUTANEOUS
Status: DISCONTINUED | OUTPATIENT
Start: 2023-02-28 | End: 2023-02-28

## 2023-02-28 RX ORDER — OXYCODONE HCL 10 MG/1
10 TABLET, FILM COATED, EXTENDED RELEASE ORAL 2 TIMES DAILY
Qty: 60 TABLET | Refills: 0 | Status: CANCELLED | OUTPATIENT
Start: 2023-02-28

## 2023-02-28 RX ORDER — HYDROMORPHONE HYDROCHLORIDE 2 MG/1
2-4 TABLET ORAL EVERY 8 HOURS PRN
Qty: 90 TABLET | Refills: 0 | Status: CANCELLED | OUTPATIENT
Start: 2023-02-28

## 2023-02-28 RX ADMIN — ACETAMINOPHEN 650 MG: 325 TABLET, FILM COATED ORAL at 09:44

## 2023-02-28 RX ADMIN — VENLAFAXINE HYDROCHLORIDE 37.5 MG: 37.5 CAPSULE, EXTENDED RELEASE ORAL at 20:26

## 2023-02-28 RX ADMIN — GABAPENTIN 400 MG: 400 CAPSULE ORAL at 09:05

## 2023-02-28 RX ADMIN — GABAPENTIN 400 MG: 400 CAPSULE ORAL at 13:53

## 2023-02-28 RX ADMIN — GABAPENTIN 400 MG: 400 CAPSULE ORAL at 20:26

## 2023-02-28 RX ADMIN — DIPHENHYDRAMINE HYDROCHLORIDE 50 MG: 50 CAPSULE ORAL at 09:45

## 2023-02-28 RX ADMIN — HUMAN IMMUNOGLOBULIN G 20 G: 20 LIQUID INTRAVENOUS at 10:45

## 2023-02-28 RX ADMIN — OXYCODONE HYDROCHLORIDE 10 MG: 10 TABLET, FILM COATED, EXTENDED RELEASE ORAL at 09:53

## 2023-02-28 RX ADMIN — AZATHIOPRINE 100 MG: 50 TABLET ORAL at 20:26

## 2023-02-28 RX ADMIN — PROCHLORPERAZINE MALEATE 10 MG: 5 TABLET ORAL at 12:20

## 2023-02-28 RX ADMIN — ACETAMINOPHEN 975 MG: 325 TABLET, FILM COATED ORAL at 16:48

## 2023-02-28 RX ADMIN — NORETHINDRONE 0.7 MG: 0.35 TABLET ORAL at 20:28

## 2023-02-28 RX ADMIN — HYDROMORPHONE HYDROCHLORIDE 4 MG: 2 TABLET ORAL at 18:10

## 2023-02-28 RX ADMIN — ENOXAPARIN SODIUM 40 MG: 40 INJECTION SUBCUTANEOUS at 22:59

## 2023-02-28 RX ADMIN — DIPHENHYDRAMINE HYDROCHLORIDE 50 MG: 50 INJECTION, SOLUTION INTRAMUSCULAR; INTRAVENOUS at 13:32

## 2023-02-28 RX ADMIN — HYDROCORTISONE SODIUM SUCCINATE 100 MG: 100 INJECTION, POWDER, FOR SOLUTION INTRAMUSCULAR; INTRAVENOUS at 13:32

## 2023-02-28 RX ADMIN — IBUPROFEN 400 MG: 200 TABLET, FILM COATED ORAL at 16:48

## 2023-02-28 RX ADMIN — SODIUM CHLORIDE: 9 INJECTION, SOLUTION INTRAVENOUS at 10:45

## 2023-02-28 RX ADMIN — ACETAMINOPHEN 975 MG: 325 TABLET, FILM COATED ORAL at 09:04

## 2023-02-28 RX ADMIN — OXYCODONE HYDROCHLORIDE 10 MG: 10 TABLET, FILM COATED, EXTENDED RELEASE ORAL at 20:26

## 2023-02-28 RX ADMIN — ACETAMINOPHEN 975 MG: 325 TABLET, FILM COATED ORAL at 00:08

## 2023-02-28 RX ADMIN — IBUPROFEN 400 MG: 200 TABLET, FILM COATED ORAL at 09:04

## 2023-02-28 RX ADMIN — IBUPROFEN 400 MG: 200 TABLET, FILM COATED ORAL at 22:59

## 2023-02-28 RX ADMIN — SODIUM CHLORIDE 500 ML: 9 INJECTION, SOLUTION INTRAVENOUS at 09:48

## 2023-02-28 ASSESSMENT — ACTIVITIES OF DAILY LIVING (ADL)
ADLS_ACUITY_SCORE: 37
ADLS_ACUITY_SCORE: 38
ADLS_ACUITY_SCORE: 37
ADLS_ACUITY_SCORE: 37
ADLS_ACUITY_SCORE: 38
ADLS_ACUITY_SCORE: 37

## 2023-02-28 NOTE — LETTER
[facility]  Fax [phone]    CC. Dawna James, Rj, patient's myChart    RE: Ronna Rivera, MRN [id number]       TREATMENT ORDERS    Upon completion of the 3 days pulse steroids (ordered from hospital discharge by Dr. Ho)...    1. Methylprednisolone 1 gram IV twice weekly (every 3-4 days, via PICC line) for 3 months   2. Appropriate line and dressing care for PICC      Rob Ball MD  (elec sig 03/05/23 9:51 AM)  Internal Medicine & Pediatrics  Complex primary care  [facility], [facility]

## 2023-02-28 NOTE — PROGRESS NOTES
Physician Attestation   I, Марина Nguyen, was present with the medical/BETH student and the medical resident, Dr. Ho, who participated in the service and in the documentation of the note.  I have verified the history and personally performed the physical exam and medical decision making.  I agree with the assessment and plan of care as documented in the note.      Key findings:   Completed ketamine trial with some improvement. Less edema and pain in feet today. Will try to be more mobile today and if tolerates ADLs then plan for discharge tomorrow to home with home services. I and her PCP outpatient can provide enough pain medications to bridge her to outpatient pain clinic.     Please see A&P for additional details of medical decision making.     Марина Tineo (Chidi Nguyen MD  Internal Medicine/Pediatrics  Hospitalist    Date of Service (when I saw the patient): 02/28/23      [facility]    History and Physical - Medicine Service, DELMY TEAM 3    Date of Admission:  2/21/2023    Assessment & Plan      Ronna Rivera is a 37 year old female with PMHx of migraine and erythromelalgia who presented with worsening LE pain that started 1 week ago with concerning ulcers being admitted on 2/21/2023 for pain control due to acute on chronic erythromelalgia. Undergoing IVIG, steroids and working on pain control.    Changes today:  - Final IVIG dose today  - WOCN saw pt today  - [facility] are able to accept pt on service for RN/PT/OT  - 12 hr trial of IV ketamine finished    Acute on chronic severe Erythromelalgia  R foot ulcers  Menstrual suppression for erythromelalgia  Labs including CBC and BMP WNL. Diagnosed in 2//2021. Follow with Dr. De La Rosa in [facility] and Dr. [facility] ([facility]urology). Presentation consistent with previous erythromelalgia flares. Prior treatment includes topical ketamine, rituximab, steroids, weekly IVIG with waxing/waning  symtpoms. Hematology has low suspicion for myelodysplastic syndrome underlying this flare, believes this is more neurologic/dysautonomic related and recommends neuro consult + IVIG inpatient. Did get high dose steroids 2/26 with possibly some improvement. 12 hr IV ketamine trial tolerated well w/o side effects and with mild improvements in pain. Ultimately, condition is largely disability (unable to walk, care for self/children) and patient utilizes wheelchair for mobility at home.  -neurology consulted, appreciate recommendations  -Give IVIG x5 doses,(see neurology note for specific dosing details), final dose on 2/28  -pain management consulted, appreciate recommendations  - APAP 975 TID scheduled, Ibuprofen 400 mg q6h scheduled, lidocaine 5% ointment q4h PRN, Gabapentin 400 mg q8h scheduled  - oxycontin 10 mg BID, Hydromorphone PO 2-4 mg q4h PRN, hydromorphone 0.3-0.5 mg IV q8h PRN for breakthrough pain  -outpatient pain clinic referral sent and patient encouraged to call to schedule appointment prior to discharge  -discontinued prednisone 40 mg daily, per pt preference  -continue PTA azathioprine 100 mg daily and norethindrone 0.7 mg daily  -wound care consult, appreciate recommendations   - Bilateral foot and 5th toe cares: Daily   - Cleanse feet as needed per unit routine.   - Paint over purple/black closed wounds on 5th toes only with povidone iodine and let dry.    - Apply, or have patient apply Sween 24 lotion to bilateral feet avoiding areas where povidone iodine was applied.    - If patient with reaction to povidone iodine use gentian violet, per provider order.   -PT/OT consulted to work on strength and logistics of ADLs at home given condition   - OT: home with assist; home with home care occupational therapy  -health psychology consult given patients current hospitalization and severely debilitating disease  -Per care coordinator, [facility] are able to accept pt on service for  RN/PT/OT    CHRONIC, RESOLVED & STABLE PROBLEMS    Elevated LFTs  AST//196 in January 2023. Work up including hepatitis Bc antibody positive with no HBV DNA, hep C negative, CMV and EBV negative. Suspected to be 2/2 Imuran, so dose was reduced to 100 mg daily. LFTs have been down trending since.     Tachycardia, resolved  EKG with sinus tachycardia. Suspect 2/2 to pain. CTM.     Diet: Regular Diet Adult    DVT Prophylaxis: enoxaparin 40 mg daily  Garcia Catheter: Not present  Fluids: PO intake  Lines: None   Cardiac Monitoring: None  Code Status: Full Code     Clinically Significant Risk Factors                               Disposition Plan plan for home once tolerating PO pain regimen     Expected Discharge Date: 03/01/2023,  6:00 PM    Destination: home with family  Discharge Comments: Pending: IVIG through 2/28, pain IP reconsult  Dispo: home with assist, Home OT   - F/u with [facility] neurologist  - Schedule with pain clinic (OP referral placed)  - short term dilaudid Rx and then f/u with Dr. Ball (PCP)     The patient's care was discussed with the Dr Nguyen.    Sebas Chua-Justin  Medicine Service, [facility] TEAM [address] [place] [address]  Securely message with [employer] (more info)  Text page via [facility] Paging/Directory   See signed in provider for up to date coverage information  ______________________________________________________________________    Interval History  NAEO. Pt slept well ON and tolerated the IV ketamine without any reported side effects. Says pain and ankle swelling has improved from yesterday. She denies fever, chills or SOB. Pt and 's goal prior to discharge is to tolerate ambulation around room more and to have a bridging pain plan before PCP can prescribe pain medications. Very engaged and great advocate for her care. Very supportive partner in room with her.    Physical Exam   Vital Signs: Temp: 98.4  F (36.9  C) Temp src: Oral BP: 113/72 Pulse: 86    Resp: 16 SpO2: 98 % O2 Device: None (Room air)    Weight: 120 lbs 0 oz    Constitutional: Awake, alert, cooperative, in NAD, legs raised in bed  Eyes: Sclera clear, conjunctiva normal.  Respiratory: Non-labored breathing, clear to auscultation bilaterally, no crackles or wheezing.  Cardiovascular: RRR, no obvious murmur noted  GI: nondistended abdomen, no tenderness to palpation, no rebound or guarding  Extremities: bilateral legs elevated on foot wedge, compression stockings on feet. No new erythematous lesions noted on both feet without any ulceration, ischemia, necrosis, blood or drainage  Skin: No concerning lesions or flushing or exposed areas  Neurologic: Awake, alert & oriented x3  Psych: Appropriate affect    Data         Imaging results reviewed over the past 24 hrs:   No results found for this or any previous visit (from the past 24 hour(s)).

## 2023-02-28 NOTE — PLAN OF CARE
Goal Outcome Evaluation:      Plan of Care Reviewed With: patient, spouse    Overall Patient Progress: improvingOverall Patient Progress: improving    Outcome Evaluation: Patient recieved last dose of IVIG today. Ketamine infusion stopped at 10am. Patient was able to weight bear on feet.

## 2023-02-28 NOTE — PROGRESS NOTES
"[facility]  Neurology Consultation - Progress Note    Patient Name:  Ronna Rivera  Date of Service:  February 28, 2023    Subjective:    Patient reports that her symptoms have been improving. They have gotten to the point where she is able to swing her legs over the edge of the bed to transfer to the commode. Tolerated IVIG. Ketamine was started last night and has tolerated well. No new weakness or numbness.    Objective:    Vitals: /76 (BP Location: Right arm)   Pulse 72   Temp 97.8  F (36.6  C) (Oral)   Resp 17   Ht 1.575 m (5' 2\")   Wt 54.4 kg (120 lb)   LMP 02/19/2023   SpO2 100%   BMI 21.95 kg/m       General: Lying in bed, NAD  Head: Atraumatic, normocephalic   Cardiac: Does have lower extremity edema that is moderate.  Skin:  Slight expansion of the erythema surrounding the wound on the dorsal aspect of the R foot. New superficial erosions beginning on the lateral fifth digit left foot. New wound on L dorsal foot  Neurologic:  Mental status: Awake, alert, attentive; oriented to person, place, and time  Speech: Fluent, comprehension intact; No dysarthria, no paraphasic errors noted  Cranial nerves: Visual fields intact, Eyes conjugate, EOMI w/ normal and smooth pursuit, face expression symmetric, hearing intact to conversation, palate rise symmetric, tongue/uvula midline.  Motor: Tone normal. LUE is 5/5, RUE is 5/5, LLE is 5/5, RLE is 5/5. No abnormal movements noted. Pronator drift absent.   Sensory: Intact to light touch in all 4 extremities  Coordination: FNF intact bilaterally with no dysmetria  Gait: Deferred    Pertinent Investigations:    I have personally reviewed most recent and pertinent labs, tests, and radiological images.     Assessment  #Erythromelalgia    Ronna Rivera is a 37-year-old female with a history of erythromelalgia. Neurology was consulted for management for an acute exacerbation of her erythromelalgia. It has been treatment " refractory and progressive. She has been trialed on topical ketamine, rituximab, pulse dose steroids, weekly IVIG with only modest responses and has had intolerable side effects along term steroid dosing. She did receive 1x 1g methylprednisolone on 2/26/23 with some improvement. Would be reluctant to redose high does steroids given hx of neuro psych side effects.     Would continue with IVIG 5-day course. Regarding IV ketamine, potentially reasonable and it is what her outpatient neurologist is interested in trying.  From neurologic perspective I do not see any barriers to considering. Would involve pain management.     Recommendations:   -IVIG 0.4mg/kg day #5 2/28   -Give premeds 500 ml fluid bolus (over 1 hr), tylenol and PO benadryl.  Then give 20g IVIG concurrently with NS at a rate of 175ml/hr. give PO compazine 30 min prior to IVIG completion and also give 50 mg IV benadryl and 100 mg IVP Solu-Cortef post infusion completion.  IVIG infusion rates:  16ml/hr - 30 min  33ml/hr - 30 min  66ml/hr - 30 min  132ml/hr - remainder of infusion  - Ketamine gtt per primary  - Neurology will sign off, please contact us if further questions or concerns arise    Thank you for involving Neurology in the care of Ronna Rivera.     Case discussed with Dr. Lavon Cruz MD  PGY-3 Neurology Resident

## 2023-02-28 NOTE — PROGRESS NOTES
Pain Service Progress Note  [facility]  Date: 02/28/2023       Patient Name: Ronna Rivera  MRN: [id number]  Age: 37 year old  Sex: female      Assessment:     Ronna Rivera is a 37 year old female who has PMH of lower back pain (h/o facet joint injections) , migraine, panic attacks, erythromelalgia who presented with increased LE pain and being admitted on 2/21/2023 for pain control and further management of erythromelalgia. She states this pain started after an ulcer appeared on her foot, swelling pain and then was dx with erythromelalgia with extensive testing at [facility]/neurology. She states the severe episode started in 2021 and took about 5 months of IV steroids to reduce the symptoms.  She states that for awhile (~ 1 year) the symptoms improved to such that she was able to stop opioid pain management.       However, pain/swelling/redness increased in the bilateral foot around Sept. 2022 and has progressively worsened in the the past 1.5 weeks.  She has tried IVIG, rituximab, compounded topical amitriptyline +ketamine cream, lidocaine patches and is followed closely by outpatient neurology.  States [facility] has recommended consideration of LESI, SCS , IV ketamine locally.    Interval history: 2/28/23.    Ronna was evaluated with her spouse and RN at the bedside. Ronna reported pain and swelling have improved from last night, especially at the ankles.  She stated that she can tolerate removing her compression socks this morning and able to keep the stockings off for a longer period of time than yesterday.  Also was able to take a few steps on the hard floor.  Is hopeful that ketamine infusion is the treatment for erythromelalgia.  Spouse is wondering if ketamine infusion can be repeated, continued if indeed is the reason for improvement.    I discussed that the inpatient pain service typically uses ketamine infusion for acute pain with end point and not  "necessarily to treat for chronic pain (or for the definitive treatment for erythromelalgia).  Therefore would not continue to recommend ketamine infusion at this time.  Would strongly recommend keeping scheduled appt. With pain clinic.           1. Discontinue ketamine infusion  5mg/hour.              -states tolerated it well.  Did have an episode near bedtime where she was seeing yellow objects-not concerning to her.  2. May continue OxyContin 10mg PO Q 12 hours if there is plan to continue outpatient, has outpatient provider willing to prescribe it, is covered by insurance as it can be cost prohibitive.              Otherwise, please discontinue by tapering to once tablet/day x 1 day and stop.   -she stated that her PCP will continue OxyContin rx for her.  2. Change Dilaudid 2-4mg PO Q 6 hours PRN. (used one dose yesterday)              Upon discharge, look at past 24 hour usage of PO Dilaudid and discharge on that dose PRN x 1 day then decrease by 1-2 doses every 1-2 days until        discontinuation.              -Ronna will need to f/u with PCP if needed for opioid pain management as it may take sometime to be evaluated by a pain clinic (scheduled at /[facility] on 4/3/23).  (She states that her PCP is comfortable managing opioids for her).  4. discontinue IV Dilaudid to 0.3-0.5mg IV up to TID prn, dose at least 3 hours apart.  (please use oral dilaudid first). Reserve for rescue only. (not used in past 24 hours)  5. Continue  gabapentin to 400mg PO TID (was at 300mg TID). _notes sleepiness with increased dose.  6.  lidocaine ointment to feet.  7. Consider Robaxin 500mg PO Q 6 hours PRN.  8. Bowel regimen, no BM in 2 days.  9. Keep [facility]/ pain clinic on 4/3/23.         Pain Service will sign off.     Discussed with attending anesthesiologist     Please Page the Pain Team [facility]: \"PAIN MANAGEMENT ACUTE INPATIENT/ [facility]\"  Gaviota Gardner PA-C  02/28/2023           Diet: Regular Diet Adult    Relevant " "Labs:  Recent Labs   Lab Test 02/26/23  0740      BUN 9.0       Physical Exam:  Vitals: /76 (BP Location: Right arm)   Pulse 72   Temp 97.8  F (36.6  C) (Oral)   Resp 17   Ht 1.575 m (5' 2\")   Wt 54.4 kg (120 lb)   LMP 02/19/2023   SpO2 100%   BMI 21.95 kg/m      Physical Exam:   CONSTITUTIONAL/GENERAL APPEARANCE: Conversant.  EYES: EOMI, sclerae clear  ENT/NECK: neck is supple  RESPIRATORY:non labored breathing, on room air  CARDIOVASCULAR: HR within normal limits  GI:soft, nontender,   MUSCULOSKELETAL/BACK/SPINE/EXTREMITIES: Moving UE and LE independently.     NEURO:  AAOx3.   SKIN/VASCULAR EXAM:  Dry and warm.  PSYCHIATRIC/BEHAVIORAL/OBSERVATIONS:  No objective signs of pain observed during our interview.   Judgment/Insight -fair   Orientation - x3   Memory -fair   Mood and affect - calm, pleasant, cooperative        Relevant Medications:  Current Pain Medications:  Medications related to Pain Management (From now, onward)    Start     Dose/Rate Route Frequency Ordered Stop    02/28/23 1230  diphenhydrAMINE (BENADRYL) injection 50 mg         50 mg Intravenous ONCE 02/27/23 1257      02/27/23 2000  ketamine (KETALAR) 2 mg/mL in sodium chloride 0.9 % 50 mL ANALGESIA infusion         5 mg/hr  2.5 mL/hr  Intravenous CONTINUOUS 02/27/23 1945 02/27/23 1944  HYDROmorphone (PF) (DILAUDID) injection 0.3-0.5 mg         0.3-0.5 mg Intravenous 3 TIMES DAILY PRN 02/27/23 1944 02/27/23 1944  methocarbamol (ROBAXIN) tablet 500 mg         500 mg Oral EVERY 6 HOURS PRN 02/27/23 1944 02/26/23 2100  hydrOXYzine (ATARAX) tablet 50 mg         50 mg Oral ONCE PRN 02/26/23 1714      02/26/23 0900  oxyCODONE (oxyCONTIN) 12 hr tablet 10 mg         10 mg Oral EVERY 12 HOURS 02/26/23 0835      02/24/23 1123  diphenhydrAMINE (BENADRYL) injection 50 mg         50 mg Intravenous ONCE PRN 02/24/23 1125      02/24/23 1123  diphenhydrAMINE (BENADRYL) capsule 50 mg        See Hyperspace for full Linked Orders " "Report.    50 mg Oral EVERY 24 HOURS PRN 02/24/23 1125      02/24/23 1123  diphenhydrAMINE (BENADRYL) injection 50 mg        See Hyperspace for full Linked Orders Report.    50 mg Intravenous EVERY 24 HOURS PRN 02/24/23 1125      02/24/23 1123  acetaminophen (TYLENOL) tablet 650 mg         650 mg Oral EVERY 24 HOURS PRN 02/24/23 1125      02/22/23 1400  gabapentin (NEURONTIN) capsule 400 mg        Note to Pharmacy: PTA Sig:Take 1 capsule (300 mg) by mouth 3 times daily      400 mg Oral 3 TIMES DAILY 02/22/23 1253      02/22/23 1249  lidocaine (XYLOCAINE) 5 % ointment          Topical EVERY 4 HOURS PRN 02/22/23 1253      02/22/23 1249  HYDROmorphone (DILAUDID) tablet 2-4 mg         2-4 mg Oral EVERY 4 HOURS PRN 02/22/23 1253      02/22/23 0800  polyethylene glycol (MIRALAX) Packet 17 g         17 g Oral DAILY 02/21/23 2014 02/22/23 0800  senna-docusate (SENOKOT-S/PERICOLACE) 8.6-50 MG per tablet 1 tablet        See Hyperspace for full Linked Orders Report.    1 tablet Oral DAILY 02/21/23 2014 02/22/23 0800  senna-docusate (SENOKOT-S/PERICOLACE) 8.6-50 MG per tablet 2 tablet        See Hyperspace for full Linked Orders Report.    2 tablet Oral DAILY 02/21/23 2014 02/21/23 2155  acetaminophen (TYLENOL) tablet 975 mg         975 mg Oral EVERY 8 HOURS 02/21/23 2150 02/21/23 2155  ibuprofen (ADVIL/MOTRIN) tablet 400 mg        Note to Pharmacy: PTA Sig:Take 400 mg by mouth every 6 hours as needed for moderate pain      400 mg Oral EVERY 6 HOURS 02/21/23 2151 02/21/23 1849  lidocaine 1 % 0.1-1 mL         0.1-1 mL Other EVERY 1 HOUR PRN 02/21/23 1901 02/21/23 1849  lidocaine (LMX4) cream          Topical EVERY 1 HOUR PRN 02/21/23 1901            Primary Service Contacted with Recommendations? Yes              Gaviota Gardner,PA-C  Acute Inpatient Pain Service [facility]  Hours of pain coverage 24/7   Page via Am[employer]- Please Page the Pain Team Via [facility]: \"PAIN MANAGEMENT ACUTE INPATIENT/ [facility]  " "BANK\"           "

## 2023-02-28 NOTE — PROGRESS NOTES
[facility]  WOC Nurse Inpatient Assessment     Consulted for: Bilateral 5th toe wounds    Patient History (according to provider note(s):    Ronna Rivera is a 37 year old female with PMHx of migraine and erythromelalgia who presented with increased LE pain and being admitted on 2/21/2023 for pain control and further management of erythromelalgia.    Areas Assessed:      Areas visualized during today's visit: Focused: bilateral feet  Wound location: Left and right 5th toe     Right 5th toe and plantar aspect at base of toe 2/22    Right 5th toe 2/28/23    Left 5th toe 2/22    Left 5th toe 2/28    Last photo: 2/28  Wound due to: Patient with Erythromelalgia and during bad flares she cools her feet. Wound etiology not completely clear, but wounds are likely a combination of neuropathic and vascular related ulceration.   Wound history/plan of care: present on admission. Skin stained with gentian violet solution making assessment difficult.  Wound base: 100 % purple and epidermis- left side feels callused     Palpation of the wound bed: normal and firm      Drainage: none     Description of drainage: none     Measurements (length x width x depth, in cm): Right 01.5 x 1 x 0 cm; Left 0.3 x 0.3 x 0 cm- right plantar area with small cracks measuring 0.3 x 0.1 x 0.1 no change     Tunneling: N/A     Undermining: N/A  Periwound skin: Intact and Erythema- blanchable      Color: pink and red- related to Erythromelalgia and/ or cooling her feet      Temperature: cool  Odor: none  Pain: mild, feet become very tender at times, not specific to the wounds  Pain interventions prior to dressing change: N/A  Treatment goal: Protection  STATUS: initial assessment  Supplies ordered: supplies stored on unit and discussed with RN    Wound location: Dorsal feet      Right dorsal foot 2/28    Left dorsal foot 2/28    Recommend treatment with polymem 2 by 2. Place consult order for WOC to  follow.    Treatment Plan:     Bilateral foot and 5th toe cares: Daily  Cleanse feet as needed per unit routine.  Paint over purple/black closed wounds on 5th toes only with povidone iodine and let dry.   Apply, or have patient apply Sween 24 lotion to bilateral feet avoiding areas where povidone iodine was applied.   If patient with reaction to povidone iodine use gentian violet, per provider order.     Orders: Written    RECOMMEND PRIMARY TEAM ORDER: woc consult placed for dorsal foot wounds  Education provided: plan of care and wound progress  Discussed plan of care with: Patient  WOC nurse follow-up plan: every other week  Notify WOC if wound(s) deteriorate.  Nursing to notify the Provider(s) and re-consult the WOC Nurse if new skin concern.    DATA:     Current support surface: Standard  Standard gel/foam mattress (IsoFlex, Atmos air, etc)  Containment of urine/stool: Continent of bladder and Continent of bowel  BMI: Body mass index is 21.95 kg/m .   Active diet order: Orders Placed This Encounter      Regular Diet Adult     Output: I/O last 3 completed shifts:  In: 240 [P.O.:240]  Out: -      Labs:   Recent Labs   Lab 02/26/23  0740 02/24/23  0722   ALBUMIN  --  3.6   HGB 12.2 12.4   WBC 4.4 7.1     Pressure injury risk assessment:   Sensory Perception: 4-->no impairment  Moisture: 4-->rarely moist  Activity: 3-->walks occasionally  Mobility: 3-->slightly limited  Nutrition: 3-->adequate  Friction and Shear: 3-->no apparent problem  Rodney Score: 20    Negar Adorno RN CWOCN  Pager no longer is use, please contact through [employer] group: [facility] Nurse  Dept. Office Number: [phone]

## 2023-02-28 NOTE — PROGRESS NOTES
SPIRITUAL HEALTH SERVICES Progress Note  [facility] 7B    Patient has been admitted for 7 days so I introduced myself as the unit  and offered a visit. Patient said not right now as she was trying to get some rest.     Velasquez Syed MDiv  Chaplain Resident  Pager [phone]      * [facility] remains available 24/7 for emergent requests/referrals, either by having the switchboard page the on-call  or by entering an ASAP/STAT consult in Epic (this will also page the on-call ). Routine Epic consults receive an initial response within 24 hours.*

## 2023-02-28 NOTE — PLAN OF CARE
Problem: Pain Acute  Goal: Optimal Pain Control and Function  Intervention: Prevent or Manage Pain  Flowsheets  Taken 2/28/2023 0415  Bowel Elimination Promotion:    adequate fluid intake promoted    commode/bedpan at bedside  Medication Review/Management:    medications reviewed    high-risk medications identified    infusion initiated  Taken 2/27/2023 2001  Medication Review/Management: medications reviewed   Goal Outcome Evaluation:    Pt started on continuous Ketamine gtt at 5mg/hr. Pt states her pain has been staying around 5-6/10. Pain is also being managed with scheduled pain meds. VSS

## 2023-02-28 NOTE — TELEPHONE ENCOUNTER
PRIOR AUTHORIZATION DENIED    Medication: oxyCODONE (OXYCONTIN) 10 MG 12 hr tablet    Denial Date: 2/27/2023    Denial Rational:             Appeal Information:

## 2023-02-28 NOTE — PROGRESS NOTES
Care Management Follow Up    Length of Stay (days): 7    Expected Discharge Date: 03/01/2023     Concerns to be Addressed:       Patient plan of care discussed at interdisciplinary rounds: Yes    Anticipated Discharge Disposition: Home with services     Anticipated Discharge Services: Home Care  Anticipated Discharge DME: None    Patient/family educated on Medicare website which has current facility and service quality ratings: yes   Education Provided on the Discharge Plan: yes   Patient/Family in Agreement with the Plan: yes    Referrals Placed by CM/SW: Home Care   Private pay costs discussed: Not applicable    Additional Information:  Pt is anticipated to be medically ready tomorrow for discharge.  Received a call back from [facility] and they are able to accept pt on service for RN/PT/OT.  Orders placed.  Anticipate discharge to home tomorrow.  RNCC will continue to follow and assist with discharge planning.      [facility](RN/PT/OT)  Phone:[phone]   Fax:[phone]    Deborah Staton RNCC  Phone: [phone]  Pager: [phone]    SEARCHABLE in AMCOM - search CARE COORDINATOR     [place] [facility] [place] (3233-2553) Saturday & Sunday; (4899-3237) [facility] Recognized Holidays     Units: 4A, 4C, 4E, 5A & 5B   Pager: [phone]    Units: 6A & 6B    Pager: [phone]    Units: 6C & 6D   Pager: [phone]    Units: 7A, 7B, 7C, 7D & 5C    Pager: [phone]    Units: [facility] ED, 5 Ortho, 5 Med/Surg, 6 Med/Surg, 8A, 10 ICU, & [facility]    Pager: [phone]

## 2023-03-01 ENCOUNTER — APPOINTMENT (OUTPATIENT)
Dept: OCCUPATIONAL THERAPY | Facility: CLINIC | Age: 38
DRG: 300 | End: 2023-03-01
Payer: COMMERCIAL

## 2023-03-01 ENCOUNTER — TELEPHONE (OUTPATIENT)
Dept: INTERNAL MEDICINE | Facility: CLINIC | Age: 38
End: 2023-03-01
Payer: COMMERCIAL

## 2023-03-01 PROCEDURE — 97530 THERAPEUTIC ACTIVITIES: CPT | Mod: GO

## 2023-03-01 PROCEDURE — 120N000002 HC R&B MED SURG/OB UMMC

## 2023-03-01 PROCEDURE — 250N000011 HC RX IP 250 OP 636

## 2023-03-01 PROCEDURE — 250N000012 HC RX MED GY IP 250 OP 636 PS 637

## 2023-03-01 PROCEDURE — 97535 SELF CARE MNGMENT TRAINING: CPT | Mod: GO

## 2023-03-01 PROCEDURE — 99233 SBSQ HOSP IP/OBS HIGH 50: CPT | Mod: GC | Performed by: STUDENT IN AN ORGANIZED HEALTH CARE EDUCATION/TRAINING PROGRAM

## 2023-03-01 PROCEDURE — 250N000013 HC RX MED GY IP 250 OP 250 PS 637

## 2023-03-01 PROCEDURE — 250N000013 HC RX MED GY IP 250 OP 250 PS 637: Performed by: STUDENT IN AN ORGANIZED HEALTH CARE EDUCATION/TRAINING PROGRAM

## 2023-03-01 PROCEDURE — 999N000147 HC STATISTIC PT IP EVAL DEFER

## 2023-03-01 RX ORDER — GABAPENTIN 300 MG/1
600 CAPSULE ORAL AT BEDTIME
Status: DISCONTINUED | OUTPATIENT
Start: 2023-03-01 | End: 2023-03-02 | Stop reason: HOSPADM

## 2023-03-01 RX ORDER — GABAPENTIN 400 MG/1
400 CAPSULE ORAL 2 TIMES DAILY
Status: DISCONTINUED | OUTPATIENT
Start: 2023-03-01 | End: 2023-03-02 | Stop reason: HOSPADM

## 2023-03-01 RX ORDER — LIDOCAINE 40 MG/G
CREAM TOPICAL
Status: DISCONTINUED | OUTPATIENT
Start: 2023-03-01 | End: 2023-03-02 | Stop reason: HOSPADM

## 2023-03-01 RX ORDER — MEXILETINE HYDROCHLORIDE 150 MG/1
150 CAPSULE ORAL AT BEDTIME
Status: DISCONTINUED | OUTPATIENT
Start: 2023-03-01 | End: 2023-03-02 | Stop reason: HOSPADM

## 2023-03-01 RX ORDER — HYDROMORPHONE HYDROCHLORIDE 2 MG/1
2-4 TABLET ORAL EVERY 4 HOURS PRN
Status: DISCONTINUED | OUTPATIENT
Start: 2023-03-01 | End: 2023-03-02

## 2023-03-01 RX ADMIN — GABAPENTIN 400 MG: 400 CAPSULE ORAL at 15:37

## 2023-03-01 RX ADMIN — MEXILETINE HYDROCHLORIDE 150 MG: 150 CAPSULE ORAL at 21:41

## 2023-03-01 RX ADMIN — OXYCODONE HYDROCHLORIDE 10 MG: 10 TABLET, FILM COATED, EXTENDED RELEASE ORAL at 09:17

## 2023-03-01 RX ADMIN — ACETAMINOPHEN 975 MG: 325 TABLET, FILM COATED ORAL at 00:17

## 2023-03-01 RX ADMIN — HYDROMORPHONE HYDROCHLORIDE 2 MG: 2 TABLET ORAL at 21:38

## 2023-03-01 RX ADMIN — AZATHIOPRINE 100 MG: 50 TABLET ORAL at 21:39

## 2023-03-01 RX ADMIN — IBUPROFEN 400 MG: 200 TABLET, FILM COATED ORAL at 21:47

## 2023-03-01 RX ADMIN — GABAPENTIN 600 MG: 300 CAPSULE ORAL at 21:38

## 2023-03-01 RX ADMIN — HYDROMORPHONE HYDROCHLORIDE 4 MG: 2 TABLET ORAL at 12:34

## 2023-03-01 RX ADMIN — IBUPROFEN 400 MG: 200 TABLET, FILM COATED ORAL at 16:40

## 2023-03-01 RX ADMIN — HYDROMORPHONE HYDROCHLORIDE 0.5 MG: 1 INJECTION, SOLUTION INTRAMUSCULAR; INTRAVENOUS; SUBCUTANEOUS at 00:17

## 2023-03-01 RX ADMIN — NORETHINDRONE 0.7 MG: 0.35 TABLET ORAL at 21:47

## 2023-03-01 RX ADMIN — ENOXAPARIN SODIUM 40 MG: 40 INJECTION SUBCUTANEOUS at 21:47

## 2023-03-01 RX ADMIN — OXYCODONE HYDROCHLORIDE 10 MG: 10 TABLET, FILM COATED, EXTENDED RELEASE ORAL at 21:39

## 2023-03-01 RX ADMIN — HYDROMORPHONE HYDROCHLORIDE 4 MG: 2 TABLET ORAL at 16:40

## 2023-03-01 RX ADMIN — ACETAMINOPHEN 975 MG: 325 TABLET, FILM COATED ORAL at 15:38

## 2023-03-01 RX ADMIN — HYDROMORPHONE HYDROCHLORIDE 4 MG: 2 TABLET ORAL at 05:41

## 2023-03-01 RX ADMIN — VENLAFAXINE HYDROCHLORIDE 37.5 MG: 37.5 CAPSULE, EXTENDED RELEASE ORAL at 21:39

## 2023-03-01 RX ADMIN — IBUPROFEN 400 MG: 200 TABLET, FILM COATED ORAL at 12:33

## 2023-03-01 RX ADMIN — GABAPENTIN 400 MG: 400 CAPSULE ORAL at 09:17

## 2023-03-01 RX ADMIN — IBUPROFEN 400 MG: 200 TABLET, FILM COATED ORAL at 05:41

## 2023-03-01 RX ADMIN — ACETAMINOPHEN 975 MG: 325 TABLET, FILM COATED ORAL at 09:17

## 2023-03-01 ASSESSMENT — ACTIVITIES OF DAILY LIVING (ADL)
ADLS_ACUITY_SCORE: 37

## 2023-03-01 NOTE — TELEPHONE ENCOUNTER
[facility] Call Center    Phone Message    May a detailed message be left on voicemail: yes     Reason for Call: Other: Patient calling regarding her being in the hospital for the past week. She wants to speak with Patricio or Dr. Ball with plans for her to go home. She is wondering if she could have a PICC line ordered to be on infusion steroids. She would also need home infusion services ordered. She is requesting a phone call back to discuss.      Action Taken: Message routed to:  [facility] pcc    Travel Screening: Not Applicable

## 2023-03-01 NOTE — TELEPHONE ENCOUNTER
RN called patient and let her know that Dr. Ball and DONNIE Curry are not in clinic now, and that Dr. Ball will be back again tomorrow afternoon, but that he doesn't have any openings.  RN advised that patient should start an e-visit with Dr. Ball to ask her questions about possible PICC and home infusion orders.  RN let patient know that message would be sent to Dr. Shirlene RN.    Jaclyn Geller RN on 3/1/2023 at 3:59 PM

## 2023-03-01 NOTE — TELEPHONE ENCOUNTER
[facility] Call Center    Phone Message    May a detailed message be left on voicemail: yes     Reason for Call: Other: Patient calling to follow up with Dr Ball and walter Curry. She needs to talk to one of them today. She needs some decisions made and needs answers after talking with her care team. She needs to know this today as she is being discharged tomorrow.     Action Taken: Message routed to:  [facility] PCC    Travel Screening: Not Applicable

## 2023-03-01 NOTE — PLAN OF CARE
"Vital signs:  Temp: 98.7  F (37.1  C) Temp src: Oral BP: 136/84 Pulse: 77   Resp: 18 SpO2: 97 % O2 Device: None (Room air)   Height: 157.5 cm (5' 2\") Weight: 54.4 kg (120 lb) (per pt.)    6554-7251:    Activity: Up to commode independently/SBA. Nonweightbearing on feet due to pain with standing.  Neuros: A & O x4. Neuro intact.   Cardiac: WDL. Asymptomatic.   Respiratory: LS clear. O2 sats high 90s on RA. Denies SOB. Unlabored.   GI/: BS+, patient reported having two BMs yesterday. Voiding adequate amounts.   Diet: Regular diet.  Skin: Patient refused skin assessment of feet. Wearing compression stockings. Elevating legs and feet on wedge pillow.   Lines: PIV x3 saline locked.   Incisions/Drains: None.   Labs: Reviewed.   Pain/nausea: Scheduled oxycontin 10mg x1, scheduled Gabapentin, PRN IV Dilaudid 0.5mg one time dose effective for BLE feet pain control, patient slept most of night. PRN oral Dilaudid 4mg x1. Slept in between cares. Ice packs helpful to BLE, patient puts on and takes off. Denies nausea.   New changes this shift: None.   Plan: Continue POC.       "

## 2023-03-01 NOTE — PLAN OF CARE
[id number]  Status: admitted with erythromelalgia flare- red and swollen feet/legs  Vitals: VSS on RA  Neuros: A&Ox4  IV: PIV SL'd  Diet: regular diet  Bowel status: no issues  : voiding  Skin: pt refuse full skin assessment. Did not see under stockings- she has known wounds on toes. Declined to have wound cares done  Pain: leg pain controlled with po dilaudid   Activity: WBAT due to pain. Has been able to transfer self to commode today.   Social:  at bedside and helpful  Plan: possible PICC placement? May do IV meds outpatient.

## 2023-03-01 NOTE — PLAN OF CARE
Physical Therapy: Orders received. Chart reviewed and discussed with care team.? Physical Therapy not indicated due to pt demonstrating functional mobility near baseline and has all DME needed to return home safely per OT. No acute PT needs at this time; OT addressing all impairments.? Defer discharge recommendations to OT.? Will complete orders.  Please re-consult PT if any new needs arise.

## 2023-03-02 ENCOUNTER — DOCUMENTATION ONLY (OUTPATIENT)
Dept: MEDSURG UNIT | Facility: CLINIC | Age: 38
End: 2023-03-02
Payer: COMMERCIAL

## 2023-03-02 ENCOUNTER — HOME INFUSION (PRE-WILLOW HOME INFUSION) (OUTPATIENT)
Dept: PHARMACY | Facility: CLINIC | Age: 38
End: 2023-03-02
Payer: COMMERCIAL

## 2023-03-02 VITALS
HEART RATE: 68 BPM | RESPIRATION RATE: 18 BRPM | BODY MASS INDEX: 22.08 KG/M2 | OXYGEN SATURATION: 100 % | HEIGHT: 62 IN | DIASTOLIC BLOOD PRESSURE: 72 MMHG | TEMPERATURE: 97.8 F | SYSTOLIC BLOOD PRESSURE: 125 MMHG | WEIGHT: 120 LBS

## 2023-03-02 PROCEDURE — 250N000013 HC RX MED GY IP 250 OP 250 PS 637

## 2023-03-02 PROCEDURE — 99239 HOSP IP/OBS DSCHRG MGMT >30: CPT | Mod: GC | Performed by: STUDENT IN AN ORGANIZED HEALTH CARE EDUCATION/TRAINING PROGRAM

## 2023-03-02 PROCEDURE — 02HV33Z INSERTION OF INFUSION DEVICE INTO SUPERIOR VENA CAVA, PERCUTANEOUS APPROACH: ICD-10-PCS | Performed by: STUDENT IN AN ORGANIZED HEALTH CARE EDUCATION/TRAINING PROGRAM

## 2023-03-02 PROCEDURE — 272N000450 HC KIT 4FR POWER PICC SINGLE LUMEN

## 2023-03-02 PROCEDURE — 250N000011 HC RX IP 250 OP 636: Performed by: STUDENT IN AN ORGANIZED HEALTH CARE EDUCATION/TRAINING PROGRAM

## 2023-03-02 PROCEDURE — 90834 PSYTX W PT 45 MINUTES: CPT | Performed by: PSYCHOLOGIST

## 2023-03-02 PROCEDURE — 36569 INSJ PICC 5 YR+ W/O IMAGING: CPT

## 2023-03-02 PROCEDURE — 250N000013 HC RX MED GY IP 250 OP 250 PS 637: Performed by: STUDENT IN AN ORGANIZED HEALTH CARE EDUCATION/TRAINING PROGRAM

## 2023-03-02 PROCEDURE — 272N000473 HC KIT, VPS RHYTHM STYLET

## 2023-03-02 RX ORDER — HEPARIN SODIUM,PORCINE 10 UNIT/ML
5-20 VIAL (ML) INTRAVENOUS
Status: DISCONTINUED | OUTPATIENT
Start: 2023-03-02 | End: 2023-03-02 | Stop reason: HOSPADM

## 2023-03-02 RX ORDER — ACETAMINOPHEN 325 MG/1
975 TABLET ORAL EVERY 8 HOURS
COMMUNITY
Start: 2023-03-02 | End: 2023-03-16

## 2023-03-02 RX ORDER — GABAPENTIN 400 MG/1
400 CAPSULE ORAL 2 TIMES DAILY
Qty: 60 CAPSULE | Refills: 3 | Status: SHIPPED | OUTPATIENT
Start: 2023-03-03 | End: 2023-05-23 | Stop reason: DRUGHIGH

## 2023-03-02 RX ORDER — ACETAMINOPHEN 650 MG
TABLET, EXTENDED RELEASE ORAL PRN
Qty: 236 ML | Refills: 1 | Status: SHIPPED | OUTPATIENT
Start: 2023-03-02 | End: 2023-03-16

## 2023-03-02 RX ORDER — GABAPENTIN 300 MG/1
600 CAPSULE ORAL AT BEDTIME
Qty: 60 CAPSULE | Refills: 3 | Status: SHIPPED | OUTPATIENT
Start: 2023-03-02 | End: 2023-05-23 | Stop reason: DRUGHIGH

## 2023-03-02 RX ORDER — GABAPENTIN 400 MG/1
400 CAPSULE ORAL 3 TIMES DAILY
Qty: 90 CAPSULE | Refills: 1 | Status: SHIPPED | OUTPATIENT
Start: 2023-03-02 | End: 2023-03-02

## 2023-03-02 RX ORDER — HYDROMORPHONE HYDROCHLORIDE 2 MG/1
2-4 TABLET ORAL EVERY 4 HOURS PRN
Status: DISCONTINUED | OUTPATIENT
Start: 2023-03-02 | End: 2023-03-02 | Stop reason: HOSPADM

## 2023-03-02 RX ORDER — HYDROMORPHONE HYDROCHLORIDE 2 MG/1
3-4 TABLET ORAL EVERY 4 HOURS PRN
Qty: 63 TABLET | Refills: 0 | Status: SHIPPED | OUTPATIENT
Start: 2023-03-02 | End: 2023-03-09

## 2023-03-02 RX ORDER — LIDOCAINE 50 MG/G
OINTMENT TOPICAL EVERY 4 HOURS PRN
Qty: 50 G | Refills: 63 | Status: SHIPPED | OUTPATIENT
Start: 2023-03-02 | End: 2023-03-16

## 2023-03-02 RX ORDER — MEXILETINE HYDROCHLORIDE 150 MG/1
150 CAPSULE ORAL AT BEDTIME
Qty: 90 CAPSULE | Refills: 3 | Status: SHIPPED | OUTPATIENT
Start: 2023-03-02 | End: 2023-07-11

## 2023-03-02 RX ORDER — OXYCODONE HCL 10 MG/1
10 TABLET, FILM COATED, EXTENDED RELEASE ORAL 2 TIMES DAILY
Qty: 14 TABLET | Refills: 0 | Status: SHIPPED | OUTPATIENT
Start: 2023-03-02 | End: 2023-03-27

## 2023-03-02 RX ORDER — METHYLPREDNISOLONE SODIUM SUCCINATE 1 G/16ML
1000 INJECTION, POWDER, LYOPHILIZED, FOR SOLUTION INTRAMUSCULAR; INTRAVENOUS DAILY
Qty: 48 ML | Refills: 0 | Status: SHIPPED | OUTPATIENT
Start: 2023-03-02 | End: 2023-03-05

## 2023-03-02 RX ORDER — HEPARIN SODIUM,PORCINE 10 UNIT/ML
5-20 VIAL (ML) INTRAVENOUS EVERY 24 HOURS
Status: DISCONTINUED | OUTPATIENT
Start: 2023-03-02 | End: 2023-03-02 | Stop reason: HOSPADM

## 2023-03-02 RX ADMIN — HYDROMORPHONE HYDROCHLORIDE 2 MG: 2 TABLET ORAL at 09:21

## 2023-03-02 RX ADMIN — POLYETHYLENE GLYCOL 3350 17 G: 17 POWDER, FOR SOLUTION ORAL at 09:18

## 2023-03-02 RX ADMIN — Medication 5 ML: at 16:35

## 2023-03-02 RX ADMIN — ACETAMINOPHEN 975 MG: 325 TABLET, FILM COATED ORAL at 16:36

## 2023-03-02 RX ADMIN — HYDROMORPHONE HYDROCHLORIDE 3 MG: 2 TABLET ORAL at 18:08

## 2023-03-02 RX ADMIN — IBUPROFEN 400 MG: 200 TABLET, FILM COATED ORAL at 11:12

## 2023-03-02 RX ADMIN — OXYCODONE HYDROCHLORIDE 10 MG: 10 TABLET, FILM COATED, EXTENDED RELEASE ORAL at 09:18

## 2023-03-02 RX ADMIN — GABAPENTIN 400 MG: 400 CAPSULE ORAL at 13:34

## 2023-03-02 RX ADMIN — IBUPROFEN 400 MG: 200 TABLET, FILM COATED ORAL at 17:24

## 2023-03-02 RX ADMIN — ACETAMINOPHEN 975 MG: 325 TABLET, FILM COATED ORAL at 09:19

## 2023-03-02 RX ADMIN — HYDROMORPHONE HYDROCHLORIDE 2 MG: 2 TABLET ORAL at 13:34

## 2023-03-02 RX ADMIN — GABAPENTIN 400 MG: 400 CAPSULE ORAL at 09:19

## 2023-03-02 ASSESSMENT — ACTIVITIES OF DAILY LIVING (ADL)
ADLS_ACUITY_SCORE: 37

## 2023-03-02 NOTE — TELEPHONE ENCOUNTER
I'm concerned about a plan being made without anyone from the hospital contacting me. However, if a PICC or other line is placed, I am willing to authorize its care, provided there is continued involvement from Dr. James and/or Dr. De La Rosa. Alternatively, some meaningful involvement by specialists at [facility].

## 2023-03-02 NOTE — PROGRESS NOTES
Prior Authorization Approval    OxyCONTIN 10mg tabs  Date Initiated: 3/2/2023  Date Completed: 3/2/2023  Prior Auth Type: Clinical                Status: Approved    Effective Date: 01/31/2023 - 03/01/2024  Copay: 66.09     Filling Pharmacy: [facility] - [place], [place] - [address] [place] [address]    Insurance: [employer] - Phone [phone] Fax [phone]  ID: [id number]  [facility] Key/Case Number: [id number] / [id number]   Submitted Via: Jamshid Brewer  [facility] Pharmacy Liaison  Ph: [phone] Pager: [phone]

## 2023-03-02 NOTE — TELEPHONE ENCOUNTER
Received call from resident and attending in hospital. Patient is possibly being discharged today, they did order for PICC to be placed but wondering if Dr. Ball can help facilitate twice a week steroid infusions and IV IG. This would be in cooperation with Dr. Jmaes, [place] neurologist [place] [facility]. Informed providers of plan to meet with Dr. Ball today and discuss next steps, RN called Dr. Ball and gave george back number.     [phone]- Dr. Felisa Rojas RN on 3/2/2023 at 12:39 PM

## 2023-03-02 NOTE — PROGRESS NOTES
Therapy: Line Care, Solumedrol  Insurance: [employer]    Patient has coverage for Line Care and Solumedrol with their [employer] plan. Once out of pocket is met in full, coverage is 100%.    **Note that nursing is not covered when patient is on Line Care therapy only**    Ded: $3000  Met: $2815  Co-Insurance: 80/20  Max Out of Pocket: $6000  Met: $2815    [facility] in reference to admission date 02/21/2023 to check Line Care and Solumedrol coverage.    Please contact Intake with any questions, [phone] or In Basket pool, [facility] ([phone]).

## 2023-03-02 NOTE — TELEPHONE ENCOUNTER
This prescription was for oxycontin, but the suggested alternatives includes oxycontin. Please call the pharmacy or reviewer back and clarify.   Timing is important, she is about to be released from the hospital

## 2023-03-02 NOTE — PROGRESS NOTES
Home Infusion  Received referral from Lizzeth Hood RNCC for IV steroids.  Benefits verified.  Patient has [employer] plan, deductible $3000(met $2815) then 80/20 coverage up to max OOP  $6000(met $2815).  Once out of pocket is met in full, coverage is 100%.  Note that nursing is not covered if patient is on line care therapy only.  Met with patient and spouse at bedside to review home infusion services, review benefits and offer choice of providers.  Patient has used [facility] in the past and would like to use [facility] again for home infusion.  Confirmed discharge address, phone, and emergency contact information.  Ronna is expected to dc tonight and will be going home on IV solumedrol.  She and her spouse have done home IV therapy before and they do not need initial teaching.  Provided them with information about [facility] services.  Reviewed IV solumedrol administration via elastomeric pump method with SASH flushing using training materials and teaching sheets.  Had spouse practice removal of air from saline syringe.  Spouse declined need for further hands on training as he has administered this medication for patient in the past.  Informed patient and spouse about supplies and delivery of supplies, storage of medication, dosing times, plan for SNV and 24/7 availability of [facility] staff while on IV therapy.     Ronna and Shiraz verbalized understanding of all information given.   They are willing and able to manage home IV therapy independently.  Questions answered.  Plan for [facility] to provide home care services after discharge.  Pending final discharge orders, [facility] will plan to deliver medication and supplies to patient's home address tomorrow.  Patient has [facility] 24/7 number to call with any questions or triage needs.    Patient is ready for discharge from [facility] perspective.    Thank you for the referral      Harinder Saucedo, RN CRNI  [facility] Nurse Liaison   [email or web address]  Cell: [phone] M-F  [facility] Main:  [phone]

## 2023-03-02 NOTE — CONFIDENTIAL NOTE
This is being handled in a different encounter (As of 3/2/2023, 11:52 AM). I will close this one.

## 2023-03-02 NOTE — TELEPHONE ENCOUNTER
Spoke with Dr. Naseem Ho. They are going to get a central line in place. I'm ok with signing off on whatever I needs, so long as Dr. James or Dr. De La Rosa guide the therapy plan.  Dr. OVERTON knows how to reach me (although often this has been via Ronna and her ).

## 2023-03-02 NOTE — PROGRESS NOTES
[facility]    Medicine Progress Note - Medicine Service, DELMY TEAM 3    Date of Admission:  2/21/2023    Assessment & Plan      Ronna Rivera is a 37 year old female with PMHx of migraine and erythromelalgia who presented with worsening LE pain that started 1 week ago with concerning ulcers being admitted on 2/21/2023 for pain control due to acute on chronic erythromelalgia. Completed course of IVIG and steroids. Working on pain control.    Changes today:  - Increase gabapentin nighttime dose to 600 mg   - increase PO dilaudid to q4H PRN  - start mexiletine for pain, 150 mg at bedtime (previously on TID)  - will ask Pain team to see again tomorrow  - PICC placement for outpatient steroid infusions, discussed with OP neurology  - pt and  will try to discuss infusion orders with PCP Dr. Ball tomorrow  - Will need IR referral for port placement on discharge    Acute on chronic severe erythromelalgia  R foot ulcers  Menstrual suppression for erythromelalgia  Labs including CBC and BMP WNL. Diagnosed in 2//2021. Follow with [facility]. De La Rosa in [facility] and Dr. James in [facility] ([place]urology). Presentation consistent with previous erythromelalgia flares. Prior treatment includes topical ketamine, rituximab, steroids, weekly IVIG with waxing/waning symtpoms. Hematology has low suspicion for myelodysplastic syndrome underlying this flare, believes this is more neurologic/dysautonomic related and recommends neuro consult + IVIG inpatient. Did get high dose steroids 2/26 with possibly some improvement. 12 hr IV ketamine trial tolerated well w/o side effects and with mild improvements in pain. Ultimately, condition is largely disability (unable to walk, care for self/children) and patient utilizes wheelchair for mobility at home.  -neurology consulted, appreciate recommendations  -Give IVIG x5 doses,(see neurology note for specific dosing details), final dose on  2/28  -pain management consulted, appreciate recommendations  - APAP 975 TID scheduled, Ibuprofen 400 mg q6h scheduled, lidocaine 5% ointment q4h PRN, Gabapentin 400 mg q8h scheduled - continue 400 mg in AM and PM and increase bedtime dose to 600 mg  - oxycontin 10 mg BID, Hydromorphone PO 2-4 mg q6h PRN increase to q4H PRN, avoiding IV dilaudid if possible  - start mexiletine 150 mg at bedtime (previously on TID dosing)  - will ask Pain team to check in 3/2 - pt and  wondering about ketamine and other thoughts for ongoing management outpatient  -outpatient pain clinic referral sent and patient encouraged to call to schedule appointment prior to discharge  -discontinued prednisone 40 mg daily, per pt preference  -continue PTA azathioprine 100 mg daily and norethindrone 0.7 mg daily  -wound care consult, appreciate recommendations   - Bilateral foot and 5th toe cares: Daily   - Cleanse feet as needed per unit routine.   - Paint over purple/black closed wounds on 5th toes only with povidone iodine and let dry.    - Apply, or have patient apply Sween 24 lotion to bilateral feet avoiding areas where povidone iodine was applied.    - If patient with reaction to povidone iodine use gentian violet, per provider order.   -PT/OT consulted to work on strength and logistics of ADLs at home given condition   - OT: home with assist; home with home care occupational therapy  -health psychology consult given patients current hospitalization and severely debilitating disease  -Per care coordinator, [facility] are able to accept pt on service for RN/PT/OT  - discussed with outpatient neurologist - plan for high dose steroids as an outpatient. PICC order placed. Will need infusion orders for [facility] and in the past her PCP has ordered and managed these; pt will reach out 3/2. Will need IR referral for port placement on discharge.   - discussed with RNCC, will clarify that the home care agencies can  still accept her with home infusion orders    CHRONIC, RESOLVED & STABLE PROBLEMS    Elevated LFTs  AST//196 in January 2023. Work up including hepatitis Bc antibody positive with no HBV DNA, hep C negative, CMV and EBV negative. Suspected to be 2/2 Imuran, so dose was reduced to 100 mg daily. LFTs have been down trending since.     Tachycardia, resolved  EKG with sinus tachycardia. Suspect 2/2 to pain. CTM.       Diet: Regular Diet Adult    DVT Prophylaxis: Enoxaparin (Lovenox) SQ  Garcia Catheter: Not present  Lines: None     Cardiac Monitoring: None  Code Status: Full Code      Clinically Significant Risk Factors                                Disposition Plan      Expected Discharge Date: 03/02/2023,  6:00 PM    Destination: home with family  Discharge Comments: Pending: pain control, PICC Placmeent  Dispo: home with assist, Home OT            Physician Attestation   I, Марина Nguyen, was present with the medical resident, Dr. Oquendo, who participated in the service and in the documentation of the note.  I have verified the history and personally performed the physical exam and medical decision making.  I agree with the assessment and plan of care as documented in the note.        Please see A&P for additional details of medical decision making.     Марина Tineo (Chidi Nguyen MD  Internal Medicine/Pediatrics  Hospitalist  Date of Service (when I saw the patient): 03/01/23    Medicine Service, DELMY TEAM [address] [place] [address]  Securely message with [facility] (more info)  Text page via [facility] Paging/Directory   See signed in provider for up to date coverage information  ______________________________________________________________________    Interval History   Overnight had burning pain in feet starting around 6PM, took PO Dilaudid without relief, required IV Dilaudid around midnight and oral Dilaudid again around 5AM. Worked with therapies minimally and had onset of pain.  This afternoon had ice packs on her feet. Swelling still minimal - some edema in her ankles. Overall somewhat discouraged by lack of improvement and wondering about next steps and how to discharge from the hospital when pain is not well controlled. They have been discussing with OP neurology and pain for high dose steroids as outpatient.     Physical Exam   Vital Signs: Temp: 97.6  F (36.4  C) Temp src: Oral BP: 130/88 Pulse: 75   Resp: 18 SpO2: 99 % O2 Device: None (Room air)    Weight: 120 lbs 0 oz    General: awake, alert, in no acute distress  HEENT: NCAT, sclera anicteric, no nasal discharge, MMM  CV: RRR, no murmurs noted  Resp: CTAB, no increased WOB  Abd: Soft, nontender, nondistended  MSK: Legs elevated, compression stockings on. Ulcerations on toes/feet, unchanged. Mild edema in ankles. Blanching erythema on feet salvador on sides.  Skin: warm, dry  Neuro: No focal deficits. Alert and oriented x4.        Medical Decision Making     60 MINUTES SPENT BY ME on the date of service doing chart review, history, exam, documentation & further activities per the note.      Data   No results found for this or any previous visit (from the past 24 hour(s)).

## 2023-03-02 NOTE — PROGRESS NOTES
Care Management Follow Up    Length of Stay (days): 9    Expected Discharge Date: 03/02/2023     Concerns to be Addressed:   Discharge planning    Patient plan of care discussed at interdisciplinary rounds: Yes    Anticipated Discharge Disposition:  Home with skilled home care.      Anticipated Discharge Services:  Skilled home care RN, PT & OT thru [facility].   Anticipated Discharge DME: None    Referrals Placed by CM/SW:  [facility]  Private pay costs discussed: Not applicable    Additional Information:  Received call this AM from [facility] asking if pt is discharging today. They have her on their scheduled for a home visit tomorrow. Will keep them updated.   Paged Dr Oquendo and inquired about plan for PICC line and outpt infusions. She will get back to me.     1:09pm: Received voice message from [place] at [facility] inquiring if pt is discharging today. Called back and left a message, I am still waiting to hear about discharge plan.   Text paged Dr Oquendo and inquired about discharge plan & need for IVs.   If pt discharges with a PICC line she will need IV flush supplies and arrangements made for PICC line dressing changes (either in-home or outpt).  Referral made to [facility] to check insurance coverage for IV flush supplies (no other meds).     3:08pm: Received call from [facility]. Since discharge is not confirmed for today, they need to take her off their schedule for a home visit tomorrow. Next home care opening is Monday, 3-06. Need to keep them updated with discharge plan.     3:25pm:  Informed by Dr Ho she has spoken with pt's outpatient providers. Pt's primary, Dr Ball will order IVs. Plan is discharge on IV Solmedrol 1 gram x3 days; then change to 2 times per week for 3 months. Pt has done home IVs before. Pt can discharge today.   I just sent a referral to [facility]. Will wait to hear back from them about IV coverage & if they can accept pt. Dr Bazzi updated.  "  Will need to update [facility] with final discharge plan. They were planning to see pt on Monday, 3-06.   ___________________________    Addendum 5:19pm:  Informed by Harinder Saucedo RN [facility], pt has coverage for home IVs.   Per [facility]: \"Patient has coverage for Line Care and Solumedrol with their [employer] plan. Once out of pocket is met in full, coverage is 100%.  **Note that nursing is not covered when patient is on Line Care therapy only**    Spoke with Dr Ho and informed her pt has insurance coverage for home IVs. Plan for discharge home today. Dr Ho will enter Discharge Orders for the immediate Solumedrol doses. The long-term orders will be placed by Dr Ball, pt's primary.   Updated DONNIE Pizano [facility]. He will discuss with pt; if more IV teaching is needed then [facility] will make arrangements. Pt OK for discharge today. [facility] will make arrangements to deliver IV supplies to pt's home later today or tomorrow. Next IV dose is tomorrow.     Fax Discharge Orders to [facility] when complete.   _________________________         Lizzeth Hood RN Care Coordinator  [facility]  On 03- RN[facility] coverage for [address]. Call [phone] or Page: [phone].        Home Infusion (IV steroids)  Phone: [phone]    [facility]  Phone: [phone]  Fax:  [phone]  "

## 2023-03-02 NOTE — CONSULTS
Health Psychology          Ana Grover, Ph.D.,  [phone]  Sparkle Kent, Ph.D.,  [phone]  Jacquie Newell, Ph.D.,  [phone]  Kortney Maher, Ph.D., , [facility] [phone]  Shiraz Blackwell, Ph.D., , [facility] [phone]  Joan Michel, Ph.D.,  [phone]  Conchita Garcia, Ph.D., , [facility] [phone]    [facility], 3rd Floor  [address] [place] [address]       Inpatient Health Psychology Consultation     Date of Service: 03/02/23   Time in: 3:41 PM  Time out: 4:19 PM    BACKGROUND:  Per EMR: Ronna Rivera is a 37 year old female with PMHx of migraine and erythromelalgia who presented with worsening LE pain that started 1 week ago with concerning ulcers being admitted on 2/21/2023 for pain control due to acute on chronic erythromelalgia. Completed course of IVIG and steroids. Working on pain control.    Health psychology consulted for assistance with depression in the context of chronic erythromelalgia and associated disability and functional impairment. Consultation request also noted that patient is interested in meeting with health psychology related to whether any additional resources could be offered to her. Completed chart review.     Called patient on her hospital room number at 1:33pm to check-in about completing consult today. She states that she would be available for the consultation around 3:30pm, after she returns from getting her PICC line placed. Will plan to see patient around that time.     SUBJECTIVE: Entered patient's room and introduced myself, the health psychology service, and reason for consult. Patient's , Will, was at bedside for the duration of the visit with patient's permission. Patient and  provided an accounting of their journey with patient's diagnosis and treatment over the past 2 years. Patient noted that she was diagnosed in early 2021 and had improved significantly by the fall of  "2021. In late 2022, symptoms worsened again, which was very distressing for the patient. She describes herself as \"an anxious person\" and notes that exercise and physical activity are very helpful for her in managing her anxiety. With the pain and symptoms of her condition, she is unable to participate in many of her most cherished activities. She notes at one point she contemplated suicide in 2021, but denies any recent active SI, plans, or intent. Patient notes that she has a very supportive family, but that her twin 13 year old boys are neuro diverse and have additional needs and care requirements, which is difficult for her when she has extremely limited mobility. She and her  note that they have been working to find ways of coping and bringing a sense of meaning and purpose into their lives in the context of her limitations, but also state that this has been challenging. Patient and  also report that high dose steroids are often part of treating acute flares of her condition and she often has significant steroid-induced mood swings and irritability. Patient has a history of psychotherapy, but notes that she has not been in therapy for quite some time. Provided psychoeducation about ACT approach to therapy and recommended patient consider this approach. Also recommended that they request consultation or referral to a pain psychologist from her pain team at [facility]. Also encouraged  to seek support for caregiver-related stress. Will send information about ACT via [employer]. Patient and  expressed appreciation for the visit today.     OBJECTIVE:   Mental Status Exam:   Appearance: Appropriate   Eye Contact: Good    Orientation: Yes, x4  Behavior/relationship to provider/demeanor: Cooperative, Engaged and Pleasant  Motor Activity: normal or unremarkable  Mood (subjective report): Depressed, Anxious, Stable  Affect (objective appearance): Appropriate  Speech Rate: Normal  Speech Volume: " Normal  Speech Articulation: Normal  Speech Coherence: Normal  Speech Spontaneity: Normal  Thought Content: no suicidal/homicidal ideation, plans, or intent, endorses depressive and anxious cognitions  Thought Process (associations): Logical, Linear and Goal directed  Thought Process (rate): Normal  Abnormal Perception: None  Attention/Concentration: Normal  Memory: Appears grossly intact  Fund of Knowledge: Appears within normal limits  Abstraction:  Normal  Insight:  Good  Judgment:  Good    ASSESSMENT: Patient with PMH significant for erythromelalgia, anxiety, and depression who would likely benefit from outpatient psychotherapy support.      DIAGNOSES:       Adjustment disorder with mixed anxiety and depressed mood    PLAN AND RECOMMENDATIONS:    1. Will send patient information about ACT via [employer]  2. Please feel free to call or re-consult if urgent concerns arise or additional assistance is needed.     Conchita Garcia, Ph.D., , [facility]  Clinical Health Psychologist  Phone: [phone]   Pager: [phone]  3/2/2023 1:33 PM

## 2023-03-02 NOTE — PROCEDURES
[facility]    Single Lumen PICC Placement    Date/Time: 3/2/2023 2:40 PM  Performed by: Saul Florez RN  Authorized by: Марина Nguyen   Indications: vascular access      UNIVERSAL PROTOCOL   Site Marked: Yes  Prior Images Obtained and Reviewed:  Yes  Required items: Required blood products, implants, devices and special equipment available    Patient identity confirmed:  Verbally with patient, arm band, provided demographic data, hospital-assigned identification number and anonymous protocol, patient vented/unresponsive  NA - No sedation, light sedation, or local anesthesia  Confirmation Checklist:  Patient's identity using two indicators, relevant allergies, procedure was appropriate and matched the consent or emergent situation and correct equipment/implants were available  Time out: Immediately prior to the procedure a time out was called    Universal Protocol: the Joint Commission Universal Protocol was followed    Preparation: Patient was prepped and draped in usual sterile fashion       ANESTHESIA    Anesthesia: Local infiltration  Local Anesthetic:  Lidocaine 1% without epinephrine  Anesthetic Total (mL):  5      SEDATION    Patient Sedated: No        Preparation: skin prepped with ChloraPrep  Skin prep agent: skin prep agent completely dried prior to procedure  Sterile barriers: maximum sterile barriers were used: cap, mask, sterile gown, sterile gloves, and large sterile sheet  Hand hygiene: hand hygiene performed prior to central venous catheter insertion  Type of line used: PICC  Catheter type: single lumen  Lumen type: non-valved and power PICC  Catheter size: 4 Fr  Brand: Bard  Lot number: [id number]  Placement method: venipuncture, MST, ultrasound and tip navigation system  Number of attempts: 1  Difficulty threading catheter: no  Successful placement: yes  Orientation: left    Location: brachial vein (lateral) (0.42cm)  Tip Location: SVC  Arm  circumference: adults 10 cm  Extremity circumference: 26  Visible catheter length: 1  Total catheter length: 44

## 2023-03-02 NOTE — PLAN OF CARE
"Vital signs:  Temp: 98.5  F (36.9  C) Temp src: Oral BP: 122/79 Pulse: 65   Resp: 18 SpO2: 96 % O2 Device: None (Room air)   Height: 157.5 cm (5' 2\") Weight: 54.4 kg (120 lb) (per pt.)    [id number]:    Activity: Up to commode independently/SBA. Nonweightbearing on feet due to pain with standing.  Neuros: A & O x4. Neuro intact. Somnolent, slept all night.  Cardiac: WDL. Asymptomatic.   Respiratory: LS clear. O2 sats high 90s on RA. Denies SOB. Unlabored.   GI/: BS+, no BM this shift. Voiding adequate amounts.   Diet: Regular diet.  Skin: Patient refused skin assessment of feet. Wearing compression stockings. Elevating legs and feet on wedge pillow.   Lines: PIV x3 saline locked.   Incisions/Drains: None.   Labs: Reviewed.   Pain/nausea: Slept all night. Ice packs helpful to BLE, patient puts on and takes off. Denies nausea.   New changes this shift: None.   Plan: Continue POC.   "

## 2023-03-03 NOTE — DISCHARGE INSTRUCTIONS
Pain Regimen Plan  - Tylenol 975 every 8 hours scheduled  - Ibuprofen 400 mg every 6 hours scheduled  - Lidocaine 5% ointment every 4 hours as needed  - Gabapentin 400 mg in AM and afternoon and 600 mg at bedtime  - Oxycontin 10 mg twice daily   - Hydromorphone oral 2-4 mg every 4 hours as needed   - Mexiletine 150 mg at bedtime  ** Important to avoid constipation with goal of 1 soft bowel movement daily: if needed, recommend either psyillium (fiber), miralax, or senna-docusate - you can trial bowel regimens and choose your desired regimen**    Wound care consult  - Bilateral foot and 5th toe cares: Daily  - Paint over purple/black closed wounds on 5th toes only with povidone iodine and let dry.   - Apply, or have patient apply lotion/cream (similar to Sween 24) to bilateral feet avoiding areas where povidone iodine was applied.

## 2023-03-03 NOTE — CARE PLAN
Occupational Therapy Discharge Summary    Date of OT Discharge: March 2, 2023  Refer to daily doc flowsheet for equipment issued and current functional status.  Discharge Destination: Home  Discharge Comments: Pt discharge to home with family support/assist. Recommend home OT and lymphedema to address josh LE edema.     Please note that if goals are not fully met the patient is making progress towards established goals, which is documented in flowsheet notes. If further therapy is recommended it is related to documented deficits, and is necessary to maximize functional independence in order for patient to return to previous level of function.      3/3/2023 by April Gu, OT

## 2023-03-03 NOTE — PLAN OF CARE
"Patient discharging. Discharge paperwork was reviewed with patient. Patient expressed understanding. A copy was given to patient. Pt discharging home. Significant other at bedside and will be transport. Discharge medications available in pharmacy; picked up by spouse before discharge. All patient belongings were taken with patient.       Problem: Plan of Care - These are the overarching goals to be used throughout the patient stay.    Goal: Plan of Care Review  Description: The Plan of Care Review/Shift note should be completed every shift.  The Outcome Evaluation is a brief statement about your assessment that the patient is improving, declining, or no change.  This information will be displayed automatically on your shift note.  Outcome: Adequate for Care Transition  Goal: Patient-Specific Goal (Individualized)  Description: You can add care plan individualizations to a care plan. Examples of Individualization might be:  \"Parent requests to be called daily at 9am for status\", \"I have a hard time hearing out of my right ear\", or \"Do not touch me to wake me up as it startles me\".  Outcome: Adequate for Care Transition  Goal: Absence of Hospital-Acquired Illness or Injury  Outcome: Adequate for Care Transition  Intervention: Identify and Manage Fall Risk  Recent Flowsheet Documentation  Taken 3/2/2023 0919 by LUIS ARMANDO CERNA  Safety Promotion/Fall Prevention:   activity supervised   assistive device/personal items within reach   clutter free environment maintained   fall prevention program maintained   nonskid shoes/slippers when out of bed   patient and family education   supervised activity  Intervention: Prevent Skin Injury  Recent Flowsheet Documentation  Taken 3/2/2023 0925 by LUIS ARMANDO CERNA  Body Position:   position changed independently   legs elevated  Intervention: Prevent and Manage VTE (Venous Thromboembolism) Risk  Recent Flowsheet Documentation  Taken 3/2/2023 0919 by LUIS ARMANDO CERNA  VTE " Prevention/Management: compression stockings on  Goal: Optimal Comfort and Wellbeing  Outcome: Adequate for Care Transition  Intervention: Monitor Pain and Promote Comfort  Recent Flowsheet Documentation  Taken 3/2/2023 1419 by LUIS ARMANDO CERNA  Pain Management Interventions:   repositioned   rest   medication (see MAR)  Taken 3/2/2023 1157 by LUIS ARMANDO CERNA  Pain Management Interventions:   repositioned   rest  Taken 3/2/2023 1004 by LUIS ARMANDO CERNA  Pain Management Interventions:   repositioned   rest  Taken 3/2/2023 0921 by LUIS ARMANDO CERNA  Pain Management Interventions: medication (see MAR)  Goal: Readiness for Transition of Care  Outcome: Adequate for Care Transition     Problem: Pain Acute  Goal: Optimal Pain Control and Function  Outcome: Adequate for Care Transition  Intervention: Develop Pain Management Plan  Recent Flowsheet Documentation  Taken 3/2/2023 1419 by LUIS ARMANDO CERNA  Pain Management Interventions:   repositioned   rest   medication (see MAR)  Taken 3/2/2023 1157 by LUIS ARMANDO CERNA  Pain Management Interventions:   repositioned   rest  Taken 3/2/2023 1004 by LUIS ARMANDO CERNA  Pain Management Interventions:   repositioned   rest  Taken 3/2/2023 0921 by LUIS ARMANDO CERNA  Pain Management Interventions: medication (see MAR)  Intervention: Prevent or Manage Pain  Recent Flowsheet Documentation  Taken 3/2/2023 0919 by LUIS ARMANDO CERNA  Medication Review/Management: medications reviewed     Problem: Pain Chronic (Persistent) (Comorbidity Management)  Goal: Acceptable Pain Control and Functional Ability  Outcome: Adequate for Care Transition  Intervention: Develop Pain Management Plan  Recent Flowsheet Documentation  Taken 3/2/2023 1419 by LUIS ARMANDO CERNA  Pain Management Interventions:   repositioned   rest   medication (see MAR)  Taken 3/2/2023 1157 by LUIS ARMANDO CERNA  Pain Management Interventions:   repositioned   rest  Taken 3/2/2023 1004 by LUIS ARMANDO CERNA  Pain Management  Interventions:   repositioned   rest  Taken 3/2/2023 0921 by LUIS ARMANDO CERNA  Pain Management Interventions: medication (see MAR)  Intervention: Manage Persistent Pain  Recent Flowsheet Documentation  Taken 3/2/2023 0919 by LUIS ARMANDO CERNA  Medication Review/Management: medications reviewed

## 2023-03-03 NOTE — DISCHARGE SUMMARY
[facility]  Discharge Summary - Medicine & Pediatrics       Date of Admission:  2/21/2023  Date of Discharge:  3/2/2023  7:41 PM  Discharging Provider: Dr. Nguyen  Discharge Service: Medicine Service, DELMY TEAM 3    Discharge Diagnoses     Acute on chronic severe erythromelalgia  Bilateral foot wounds  Acute on chronic pain  Menstrual suppression for erythromelalgia  Elevated LFTs  Tachycardia, resolved    Follow-ups Needed After Discharge     Discharge Disposition   Discharged to home  Condition at discharge: [facility]    Hospital Course   Ronna Rivera is a 37 year old female with PMHx of migraine and erythromelalgia who presented with worsening LE pain that started 1 week ago with concerning ulcers being admitted on 2/21/2023 for acute on chronic erythromelalgia and pain control.  The following problems were addressed during her hospitalization:    Acute on chronic severe erythromelalgia, improving  Acute on chronic pain, improving  Bilateral foot ulcers  Menstrual suppression for erythromelalgia  Diagnosed in 2//2021. Follow with Dr. De La Rosa in [facility] and Dr. James in [facility] ([place]). Has been evaluated by Hematology and Rheumatology. Presentation consistent with previous erythromelalgia flares. Ultimately, condition is largely disabling (unable to walk, care for self/children) and patient utilizes wheelchair for mobility at home.     Hematology, Neurology, Wound RN, PT/OT, and Pain evaluated patient during current hospitalization. PICC placed for access. Overall, she did have improvement in her pain but was still unable to tolerate almost any physical activity with her legs in a dependent position.     In hospital, received:     high dose steroids x2 with possibly some improvement    12 hr IV ketamine trial tolerated well w/o side effects and with mild improvements in pain and modest improvement in erythema    IVIG x5 doses    Pain regimen adjustments as  below  Pain Regimen Plan  - Tylenol 975 every 8 hours scheduled  - Ibuprofen 400 mg every 6 hours scheduled  - Lidocaine 5% ointment every 4 hours as needed  - Gabapentin 400 mg in AM and afternoon and 600 mg at bedtime  - Oxycontin 10 mg twice daily   - Hydromorphone oral 2-4 mg every 4 hours as needed   - Mexiletine 150 mg at bedtime  ** Important to avoid constipation with goal of 1 soft bowel movement daily: if needed, recommend either psyillium (fiber), miralax, or senna-docusate - you can trial bowel regimens and choose your desired regimen**    outpatient pain clinic referral sent and patient scheduled 4/3/23    continue PTA azathioprine 100 mg daily and norethindrone 0.7 mg daily    wound cares daily with povidone iodine    home with home care occupational therapy   >[facility] accepted pt on service for RN/PT/OT    Plan discussed with outpatient neurologist, Dr. James. - plan for high dose steroids as an outpatient. Accepted to [facility]  > Pulse dose steroids 1g for 3-5 days (pt prefers 3 days, I have ordered)  > then IV methylprednisolone 1g twice weekly for 3 months, will ask Dr. Ball to assist in ordering    IR OP consult for port placement     Patient to follow-up with PCP (pain management, assistance in ordering medications for [facility]), Dr. James ([facility]), and Dr. James ([place] [facility]ist)      Consultations This Hospital Stay   WOUND OSTOMY CONTINENCE NURSE  IP CONSULT  HEMATOLOGY ADULT IP CONSULT  PAIN MANAGEMENT ADULT IP CONSULT  NEUROLOGY GENERAL ADULT IP CONSULT  SOCIAL WORK IP CONSULT  LYMPHEDEMA THERAPY IP CONSULT  NURSING TO CONSULT FOR VASCULAR ACCESS CARE IP CONSULT  NURSING TO CONSULT FOR VASCULAR ACCESS CARE IP CONSULT  PHYSICAL THERAPY ADULT IP CONSULT  OCCUPATIONAL THERAPY ADULT IP CONSULT  CARE MANAGEMENT / SOCIAL WORK IP CONSULT  PSYCHOLOGY ADULT IP CONSULT  NURSING TO CONSULT FOR VASCULAR ACCESS CARE IP CONSULT  INTERVENTIONAL  RADIOLOGY ADULT/PEDS IP CONSULT  NURSING TO CONSULT FOR VASCULAR ACCESS CARE IP CONSULT  NURSING TO CONSULT FOR VASCULAR ACCESS CARE IP CONSULT  VASCULAR ACCESS FOR PICC PLACEMENT ADULT IP CONSULT    Code Status   Full Code     The patient was discussed with Dr. Patrick Ho MD  [facility] [address]  [address] [place] [address]  Phone: [phone]  ______________________________________________________________________    Physical Exam   Vital Signs: Temp: 97.8  F (36.6  C) Temp src: Oral BP: 125/72 Pulse: 68   Resp: 18 SpO2: 100 % O2 Device: None (Room air)    Weight: 120 lbs 0 oz     Constitutional: Awake, alert, cooperative, in NAD.  Eyes: Sclera clear, conjunctiva normal.  Respiratory: Non-labored breathing, good air exchange, clear to auscultation bilaterally, no crackles or wheezing.  Cardiovascular: RRR, no obvious murmur noted.  GI: soft abdomen, non-distended, non-tender to palpation  Skin: Elevated LE on wedge, compression stockings and ice packs on. Erythema noted over feet bilaterally extending to the ankles, mild edema of her feet up to ankles, cold to the touch with pt cooling her feet actively for her condition, cracking of the bottom of the feet, small ulcer noted over bilateral midfoot, small ulcer noted on bilateral 5th toe laterally (slight black to purplish in color). Drastic erythema of LE in dependent position  Neurologic: Awake, alert & oriented x3.    Psych: Appropriate affect    Primary Care Physician   Neal Ball    Discharge Orders      IR Referral     Pain Management  Referral      Home Care Referral      Home Infusion Referral      Home Care Referral      Reason for your hospital stay    Dear Ronna Rivera,    You were hospitalized at [facility] with erythromelalgia flare. You were treated with IV high dose steroids, IVIG for 5 days, multi-modal pain regimen. You were seen by Neurology,  Hematology and the Pain Service. You should continue your pain regimen and schedule follow-up with your Neurologists and Primary Care Doctor.     Thank you for allowing me and my team the privilege of caring for you.    Naseem Vasquez MD  [facility]  Internal Medicine Resident     Activity    Your activity upon discharge: activity as tolerated     Follow Up and recommended labs and tests    Follow-up with Neurology (with Dr. De La Torre and Dr. De La Rosa to determine next steps for treatment for erythromelalgia)    Follow-up with pain clinic as scheduled and Dr. Ball for pain management.     Adult [facility] Follow-up and recommended labs and tests    Follow up with primary care provider, Neal Ball, within 14 days, for pain management.     Appointments on [place] and/or [facility] (with [facility] or [facility] provider or service). Call [phone] if you haven't heard regarding these appointments within 7 days of discharge.     Reason for your hospital stay    Dear Ronna Rivera,    You were hospitalized at [facility] with erythromelalgia flare.  Over your hospitalization you were treated with IV ketamine trial (14 hours), 5 day course of IVIG, two doses of high dose IV steroids, pain regimen, and started on mexiletine. You were seen by Hematology, Pain Team, Neurology, wound care team.  Dr. James recommended continued IV steroids (3 day burst and twice weekly doses for 3 months). This regimen was sent to Dr. Ball to set up and a  PICC was placed for home infusion administration. If you develop fever, shortness of breath, light headedness, chest pain or worsening pain, please seek medical attention.    Thank you for allowing me and my team the privilege of caring for you.    Naseem Vasquez MD  [facility]  Internal Medicine Resident     Activity    Your activity upon discharge: activity  as tolerated     Follow Up ([facility])    Follow up with primary care provider, Neal Ball, within 7 days pain management plan.      Appointments on [place] and/or [facility] (with [facility] or [facility] provider or service). Call [phone] if you haven't heard regarding these appointments within 7 days of discharge.      Please schedule follow-up with your outpatient neurologist, Dr. James, [place] [facility] [place] the next 3-4 weeks.     Diet    Follow this diet upon discharge: Regular Diet Adult     Diet    Follow this diet upon discharge:  Regular Diet Adult       Significant Results and Procedures   Most Recent 3 CBC's:Recent Labs   Lab Test 02/26/23  0740 02/24/23  0722 02/23/23  0933 02/22/23  0759   WBC 4.4 7.1 5.1 2.9*   HGB 12.2 12.4  --  12.5   MCV 98 97  --  97    196  --  184     Most Recent 3 BMP's:Recent Labs   Lab Test 02/26/23  0740 02/22/23  0759 02/21/23  1357    139 139   POTASSIUM 4.5 4.3 4.0   CHLORIDE 107 105 101   CO2 23 24 24   BUN 9.0 9.1 13.1   CR 0.58 0.69 0.66   ANIONGAP 8 10 14   KEVAN 8.1* 9.1 9.4   * 92 89     Most Recent 2 LFT's:Recent Labs   Lab Test 02/24/23  0722 02/22/23  0759   AST 25 35   ALT 27 36*   ALKPHOS 52 56   BILITOTAL 0.2 0.4       Discharge Medications   Discharge Medication List as of 3/2/2023  6:42 PM      START taking these medications    Details   acetaminophen (TYLENOL) 325 MG tablet Take 3 tablets (975 mg) by mouth every 8 hours, OTC      gentian violet 1 % external solution Apply 0.5 mLs topically daily as needed (for 5th toes)Disp-59 mL, [id number]      HYDROmorphone (DILAUDID) 2 MG tablet Take 1.5-2 tablets (3-4 mg) by mouth every 4 hours as needed for moderate pain (4-6), Disp-63 tablet, R-0, Local Print      lidocaine (XYLOCAINE) 5 % external ointment Apply topically every 4 hours as needed for moderate pain (4-6)Disp-50 g, [id number]      methylPREDNISolone sodium succinate (SOLU-MEDROL) 1000 MG injection Inject 16 mLs (1,000  mg) into the vein daily for 3 doses, Disp-48 mL, R-0, E-Prescribe      mexiletine (MEXITIL) 150 MG capsule Take 1 capsule (150 mg) by mouth At Bedtime, Disp-90 capsule, R-3, E-Prescribe      povidone-iodine (BETADINE) 10 % topical solution Apply topically as needed for wound care Paint over purple/black closed wounds on 5th toes only with povidone iodine and let dry.Disp-236 mL, [id number]         CONTINUE these medications which have CHANGED    Details   !! gabapentin (NEURONTIN) 300 MG capsule Take 2 capsules (600 mg) by mouth At Bedtime, Disp-60 capsule, R-3, E-Prescribe      !! gabapentin (NEURONTIN) 400 MG capsule Take 1 capsule (400 mg) by mouth 2 times daily AM and lunch, Disp-60 capsule, R-3, E-Prescribe      oxyCODONE (OXYCONTIN) 10 MG 12 hr tablet Take 1 tablet (10 mg) by mouth 2 times daily, Disp-14 tablet, R-0, Local Print       !! - Potential duplicate medications found. Please discuss with provider.      CONTINUE these medications which have NOT CHANGED    Details   azaTHIOprine (IMURAN) 50 MG tablet Historical      ibuprofen (ADVIL/MOTRIN) 200 MG tablet Take 400 mg by mouth every 6 hours as needed for moderate pain, Historical      melatonin 1 MG TABS tablet Take 2-5 mg by mouth nightly as needed for sleep , Historical      naloxone (NARCAN) 4 MG/0.1ML nasal spray Spray 1 spray (4 mg) into one nostril alternating nostrils as needed for opioid reversal every 2-3 minutes until assistance arrives, Disp-0.2 mL, R-0, E-Prescribe      norethindrone (MICRONOR) 0.35 MG tablet Take 2 tablets (0.7 mg) by mouth daily Take the first 3 weeks of each pack, and then immediately move on to the next pack (discard the 4th week of each pack), Disp-180 tablet, R-3, E-Prescribe      venlafaxine (EFFEXOR XR) 37.5 MG 24 hr capsule Take 1 capsule (37.5 mg) by mouth daily, Disp-60 capsule, R-0, E-Prescribe      vitamin C (ASCORBIC ACID) 250 MG tablet Take 250 mg by mouth daily, Historical      Vitamin D3 (CHOLECALCIFEROL)  25 mcg (1000 units) tablet Take 25 mcg by mouth daily, Historical         STOP taking these medications       HYDROcodone-acetaminophen (NORCO) 7.5-325 MG per tablet Comments:   Reason for Stopping:             Allergies   Allergies   Allergen Reactions     Topiramate Anxiety

## 2023-03-03 NOTE — PLAN OF CARE
Occupational Therapy Discharge Summary    Reason for therapy discharge:    Discharged to home with home therapy.    Progress towards therapy goal(s). See goals on Care Plan in [facility] electronic health record for goal details.  Goals partially met.  Barriers to achieving goals:   discharge from facility.    Therapy recommendation(s):    Continued therapy is recommended.  Rationale/Recommendations:  To increase independence and safety in I/ADLs and functional mobility.

## 2023-03-05 NOTE — CONFIDENTIAL NOTE
Sent at request of hospital/discharge    [facility]  Fax [phone]    CC. Rj Phillips, patient's myChart    RE: Ronna Rivera, MRN [id number]       TREATMENT ORDERS    Upon completion of the 3 days pulse steroids (ordered from hospital discharge by Dr. Ho)...    1. Methylprednisolone 1 gram IV twice weekly (every 3-4 days, via PICC line) for 3 months   2. Appropriate line and dressing care for PICC      Rob Ball MD  (elec sig 03/05/23 9:51 AM)  Internal Medicine & Pediatrics  Complex primary care  [facility] [place]

## 2023-03-06 ENCOUNTER — TELEPHONE (OUTPATIENT)
Dept: INTERNAL MEDICINE | Facility: CLINIC | Age: 38
End: 2023-03-06

## 2023-03-06 ENCOUNTER — DOCUMENTATION ONLY (OUTPATIENT)
Dept: INTERNAL MEDICINE | Facility: CLINIC | Age: 38
End: 2023-03-06

## 2023-03-06 ENCOUNTER — VIRTUAL VISIT (OUTPATIENT)
Dept: INTERNAL MEDICINE | Facility: CLINIC | Age: 38
End: 2023-03-06
Payer: COMMERCIAL

## 2023-03-06 ENCOUNTER — MEDICAL CORRESPONDENCE (OUTPATIENT)
Dept: HEALTH INFORMATION MANAGEMENT | Facility: CLINIC | Age: 38
End: 2023-03-06

## 2023-03-06 DIAGNOSIS — I73.81 ERYTHROMELALGIA (H): Primary | ICD-10-CM

## 2023-03-06 DIAGNOSIS — Z45.2 PICC (PERIPHERALLY INSERTED CENTRAL CATHETER) IN PLACE: ICD-10-CM

## 2023-03-06 PROBLEM — G44.40 HEADACHE, DRUG INDUCED: Status: ACTIVE | Noted: 2021-10-27

## 2023-03-06 PROCEDURE — 99215 OFFICE O/P EST HI 40 MIN: CPT | Mod: VID | Performed by: PEDIATRICS

## 2023-03-06 RX ORDER — OXYCODONE HCL 10 MG/1
10 TABLET, FILM COATED, EXTENDED RELEASE ORAL EVERY 12 HOURS
Qty: 60 TABLET | Refills: 0 | Status: SHIPPED | OUTPATIENT
Start: 2023-03-06 | End: 2023-03-27

## 2023-03-06 RX ORDER — HYDROMORPHONE HYDROCHLORIDE 2 MG/1
2 TABLET ORAL EVERY 6 HOURS PRN
Qty: 30 TABLET | Refills: 0 | Status: SHIPPED | OUTPATIENT
Start: 2023-03-06 | End: 2023-04-03

## 2023-03-06 NOTE — TELEPHONE ENCOUNTER
Called April and gave verbal orders per Dr. Ball for Order(s): Home Care Orders: Skilled Nursing:  Once a week for nine weeks with five as needed visits. and Other:  eval with 1 as needed visit          Enoch Tilley CMA ([facility]) at 3:01 PM on 3/6/2023

## 2023-03-06 NOTE — PROGRESS NOTES
"Dear patient. Thank you for visiting with me. I want you to feel respected, understood, and empowered. \"Respect\" is valuing you as much as I would a close family member. \"Empowerment\" happens when you are fully informed, and can make the best possible decision for you.  Please ask me questions!  Challenge anything that is not clear.    Medical records are primarily used as memory aids for me and my colleagues. Things to know about my documentation style:  - The 'problem list' includes current symptoms or diagnoses, and some problems that are resolved but may return. I use the past medical history for problems not expected to return.  - I use single quotation marks for things that you or I said, when I want to clarify who was speaking.  - I use double quotation marks when copying a term from elsewhere in your records. Italics (besides here) may also denote a quotation.  If you have questions or concerns, please contact me; I will reply as soon as time allows.      Virtual Visit Details    Start Time: 4:43 PM    End Time: 5:33 PM    Type of service:  Video Visit    Originating Location (pt. Location): Home    Platform used for Visit: [employer] microcaps    [place] Location (provider location):  Off-site    PCP: Neal Ball  Visit type: problem-oriented  Time note (e5, 40'): The total time (on the date of service) for this service was 55 minutes, including discussion/face-to-face, chart review, interpretation not otherwise reported, documentation, and updating of the computerized record.        Context    Ronna Rivera is a 37 year old woman, seen with her , with concerns including:  Chief Complaint   Patient presents with     Video Visit       History, update, and/or problems      Problem List as of 3/6/2023 Reviewed: 11/28/2022  9:57 PM by Neal Ball MD          Noted       Medium    1. Erythromelalgia (H) - Primary 3/25/2021     Overview Addendum 3/6/2023  6:15 PM by Neal Ball MD "      3/6/2023 back on steroids (pulse, then BIW).  Has been on rituximab, imuran, IVIG, ASA, mexiletine  Improved after high-dose intermittent steroids, then worsened.  Dx with testing by Dr. Shannon De La Rosa at [facility]. Also seeing Dr. Mook James @ [facility]            Last Assessment & Plan 3/6/2023 Virtual Visit Edited 3/6/2023  6:16 PM by Neal Ball MD      Erythromelalgia  Had some stomach upset at hospital (they were concerned about constipation, and she thinks she might have given more anti-constipation meds than she needed). Started steroid doses yesterday.   Sounds like she hasn't had a full prescription of opioids. She has tried several other meds. They told the story again about lidocaine injection for her podiatry procedure, and how the red turned to white. They did an ASA trial, I see Dr. Corcoran wrote about ASA. We talked about this today   I gave my perspective on pain control and her erythromelalgia.   They are wondering about IV ketamine for erythromelalgia. The hospital did a 12-hour trial of ketamine at Ronna's request. That next day she was somewhat better. In my opinion the problem with ketamine is the mechanism isn't clear   Mostly this will be up to Dawna De La Rosa and Erika. I am happy to be aboard and help with pain control, IV orders, etc.  Oxycontin 10 mg every 12 hours. Epic thinks her 10 mg won't be covered, wants to do 15 mg instead. I am open to this, but did the 10 mg just in case because [facility]'s database about this isn't always reliable. I asked them to check with the insurance tomorrow.  Hydromorphone 2-4 mg every 6 hours. I encouraged her to be honest about pain control during this time. The dysautonomic aspect of her problem will worsen with distress (physical, emotional, immune, etc). Controlled pain aggressively now will reduce the duration of her flare.           Relevant Medications     oxyCODONE (OXYCONTIN) 10 MG 12 hr tablet     HYDROmorphone (DILAUDID)  2 MG tablet    2. PICC (peripherally inserted central catheter) in place 3/6/2023     Overview Signed 3/6/2023  6:16 PM by Neal Ball MD      Will likely need a port soon          Last Assessment & Plan 3/6/2023 Virtual Visit Written 3/6/2023  6:17 PM by Neal Ball MD      PICC line is in place and being managed by [facility]. We have a port in mind.   We talked about options for local care of a port.

## 2023-03-06 NOTE — NURSING NOTE
Is the patient currently in the state of [place]? YES    Visit mode:VIDEO    If the visit is dropped, the patient can be reconnected by: VIDEO VISIT: Text to cell phone: [phone]    Will anyone else be joining the visit? Yes,  Will.      How would you like to obtain your AVS? MyChart    Are changes needed to the allergy or medication list? YES: Azathioprine should be 2 50MG (100MG), PT taking 1 300mg Gabapentin at daytime and 2 at night (total 900mg), pt also taking 250mg Magnesium, Calcium 300mg, B12 133 MCG, B12 Folate 800MCG, Potassium 200MG.     Reason for visit: Post-hospital visit, plans for new medication, plans for prescriptions for opiates.    Pt has concerns about medications and where to  certain ones.    JUAN SMITH

## 2023-03-06 NOTE — PROGRESS NOTES
Type of Form Received: order    Form Received (Date) 3/6/23   Form Filled out No   Placed in provider folder Yes

## 2023-03-06 NOTE — TELEPHONE ENCOUNTER
[facility] Call Center    Phone Message    May a detailed message be left on voicemail: yes     Reason for Call: Order(s): Home Care Orders: Skilled Nursing:  Once a week for nine weeks with five as needed visits. and Other:  eval with 1 as needed visit    Action Taken: Message routed to:  [facility] pcc    Travel Screening: Not Applicable

## 2023-03-07 ENCOUNTER — DOCUMENTATION ONLY (OUTPATIENT)
Dept: INTERNAL MEDICINE | Facility: CLINIC | Age: 38
End: 2023-03-07
Payer: COMMERCIAL

## 2023-03-07 NOTE — ASSESSMENT & PLAN NOTE
Erythromelalgia  Had some stomach upset at hospital (they were concerned about constipation, and she thinks she might have given more anti-constipation meds than she needed). Started steroid doses yesterday.   Sounds like she hasn't had a full prescription of opioids. She has tried several other meds. They told the story again about lidocaine injection for her podiatry procedure, and how the red turned to white. They did an ASA trial, I see Dr. Corcoran wrote about ASA. We talked about this today   I gave my perspective on pain control and her erythromelalgia.   They are wondering about IV ketamine for erythromelalgia. The hospital did a 12-hour trial of ketamine at Ronna's request. That next day she was somewhat better. In my opinion the problem with ketamine is the mechanism isn't clear   Mostly this will be up to Dawna De La Rosa and Erika. I am happy to be aboard and help with pain control, IV orders, etc.  Oxycontin 10 mg every 12 hours. Epic thinks her 10 mg won't be covered, wants to do 15 mg instead. I am open to this, but did the 10 mg just in case because [facility]'s database about this isn't always reliable. I asked them to check with the insurance tomorrow.  Hydromorphone 2-4 mg every 6 hours. I encouraged her to be honest about pain control during this time. The dysautonomic aspect of her problem will worsen with distress (physical, emotional, immune, etc). Controlled pain aggressively now will reduce the duration of her flare.

## 2023-03-07 NOTE — ASSESSMENT & PLAN NOTE
PICC line is in place and being managed by [facility]. We have a port in mind.   We talked about options for local care of a port.

## 2023-03-07 NOTE — PROGRESS NOTES
Type of Form Received: order    Form Received (Date) 3/7/23   Form Filled out No   Placed in provider folder Yes

## 2023-03-08 ENCOUNTER — TELEPHONE (OUTPATIENT)
Dept: INTERNAL MEDICINE | Facility: CLINIC | Age: 38
End: 2023-03-08
Payer: COMMERCIAL

## 2023-03-08 ENCOUNTER — HOSPITAL ENCOUNTER (OUTPATIENT)
Facility: AMBULATORY SURGERY CENTER | Age: 38
End: 2023-03-08
Attending: RADIOLOGY
Payer: COMMERCIAL

## 2023-03-08 DIAGNOSIS — I73.81 ERYTHROMELALGIA (H): ICD-10-CM

## 2023-03-08 NOTE — TELEPHONE ENCOUNTER
Called Lupe- nurse from [facility]. LVM on confidential line. Verbally confirmed Home Care Orders:  PT 1x every 2 weeks for 4 weeks, 2PRN.    Wes GRACIA, EMT at 12:11 PM on 3/8/2023.  Primary Care Clinic: [phone]

## 2023-03-08 NOTE — TELEPHONE ENCOUNTER
[facility] Call Center    Phone Message    May a detailed message be left on voicemail: yes     Reason for Call: Order(s): Home Care Orders: Other: Verbal order for PT 1x every 2 weeks for 4 weeks, 2PRN     Action Taken: Message routed to:  [facility] pcp    Travel Screening: Not Applicable

## 2023-03-09 ENCOUNTER — TELEPHONE (OUTPATIENT)
Dept: INTERNAL MEDICINE | Facility: CLINIC | Age: 38
End: 2023-03-09

## 2023-03-09 ENCOUNTER — E-VISIT (OUTPATIENT)
Dept: INTERNAL MEDICINE | Facility: CLINIC | Age: 38
End: 2023-03-09
Payer: COMMERCIAL

## 2023-03-09 DIAGNOSIS — I73.81 ERYTHROMELALGIA (H): Primary | ICD-10-CM

## 2023-03-09 PROCEDURE — 99207 PR NO BILLABLE SERVICE THIS VISIT: CPT | Performed by: PEDIATRICS

## 2023-03-09 NOTE — TELEPHONE ENCOUNTER
Provider E-Visit time total (minutes): 1  Addendum: +6 min (total 7)  Addendum: +2 (total 9)      Sent message to Dr. Corcoran.

## 2023-03-09 NOTE — TELEPHONE ENCOUNTER
[facility] Call Center    Phone Message    May a detailed message be left on voicemail: yes     Reason for Call: Order(s): Home Care Orders: Occupational Therapy (OT): Windy OT calling from [facility] requesting verbal orders for 2x per week for 1 week, 1x per week for 2 weeks, then 3 PRN visits for home OT to address self-cares.   OK to LVM.  Action Taken: Message routed to:  [facility] pcc    Travel Screening: Not Applicable

## 2023-03-10 ENCOUNTER — TELEPHONE (OUTPATIENT)
Dept: INTERNAL MEDICINE | Facility: CLINIC | Age: 38
End: 2023-03-10
Payer: COMMERCIAL

## 2023-03-10 NOTE — TELEPHONE ENCOUNTER
Called and spoke with [facility] [place] [facility]. Verbally confirmed Home Care Orders: Occupational Therapy (OT): 2x per week for 1 week, 1x per week for 2 weeks, then 3 PRN visits for home OT to address self-cares.     Wes GRACIA, EMT at 8:07 AM on 3/10/2023.  Primary Care Clinic: [phone]

## 2023-03-10 NOTE — TELEPHONE ENCOUNTER
[facility] Call Center    Phone Message    May a detailed message be left on voicemail: yes     Reason for Call: Other: JASSI Serna, [facility], [phone] [facility], reporting that patient is at home with home care services, patient wanted her to reach out to Dr. Ball.       Action Taken: Message routed to:  [facility]    Travel Screening: Not Applicable

## 2023-03-10 NOTE — TELEPHONE ENCOUNTER
Message sent to notify provider.      Enoch Tilley CMA ([facility]) at 10:32 AM on 3/10/2023

## 2023-03-12 RX ORDER — OXYCODONE HCL 20 MG/1
20 TABLET, FILM COATED, EXTENDED RELEASE ORAL EVERY 12 HOURS
Qty: 60 TABLET | Refills: 0 | Status: SHIPPED | OUTPATIENT
Start: 2023-03-12 | End: 2023-03-21

## 2023-03-13 ENCOUNTER — DOCUMENTATION ONLY (OUTPATIENT)
Dept: INTERNAL MEDICINE | Facility: CLINIC | Age: 38
End: 2023-03-13
Payer: COMMERCIAL

## 2023-03-13 NOTE — PROGRESS NOTES
Type of Form Received: order    Form Received (Date) 3/13/23   Form Filled out No   Placed in provider folder Yes

## 2023-03-14 ENCOUNTER — TELEPHONE (OUTPATIENT)
Dept: INTERNAL MEDICINE | Facility: CLINIC | Age: 38
End: 2023-03-14
Payer: COMMERCIAL

## 2023-03-14 ENCOUNTER — TELEPHONE (OUTPATIENT)
Dept: WOUND CARE | Facility: CLINIC | Age: 38
End: 2023-03-14
Payer: COMMERCIAL

## 2023-03-14 NOTE — TELEPHONE ENCOUNTER
[facility] Call Center    Phone Message    May a detailed message be left on voicemail: yes     Reason for Call: Other: Patient's  calling in regards to her pain medication increase, pharmacy states a prior auth is needed. He is looking for a phone call back asap to get this started.      Action Taken: Message routed to:  [facility] pcc    Travel Screening: Not Applicable

## 2023-03-14 NOTE — TELEPHONE ENCOUNTER
Consult received via Workqueue from Neal Ball MD in [facility] for wound of the foot    Please schedule with Dr. Aguila, Dr. Long, or Dr. Avila  at [facility] [place] for next available appointment.    **If scheduling with Solange DOUGLAS please schedule a follow up 2-3 weeks after initial appointment.    Is the patient able to make their own medical decisions? yes    Is patient a PATEL lift? PLEASE INQUIRE WHEN MAKING THE APPOINTMENT AND PUT IN APPOINTMENT NOTES    Routing to  Wound Healing Scheduling.

## 2023-03-15 ENCOUNTER — MYC MEDICAL ADVICE (OUTPATIENT)
Dept: INTERNAL MEDICINE | Facility: CLINIC | Age: 38
End: 2023-03-15
Payer: COMMERCIAL

## 2023-03-15 ENCOUNTER — DOCUMENTATION ONLY (OUTPATIENT)
Dept: INTERNAL MEDICINE | Facility: CLINIC | Age: 38
End: 2023-03-15
Payer: COMMERCIAL

## 2023-03-15 ENCOUNTER — TELEPHONE (OUTPATIENT)
Dept: INTERNAL MEDICINE | Facility: CLINIC | Age: 38
End: 2023-03-15
Payer: COMMERCIAL

## 2023-03-15 ASSESSMENT — ANXIETY QUESTIONNAIRES
3. WORRYING TOO MUCH ABOUT DIFFERENT THINGS: NOT AT ALL
6. BECOMING EASILY ANNOYED OR IRRITABLE: SEVERAL DAYS
2. NOT BEING ABLE TO STOP OR CONTROL WORRYING: NEARLY EVERY DAY
GAD7 TOTAL SCORE: 11
1. FEELING NERVOUS, ANXIOUS, OR ON EDGE: NEARLY EVERY DAY
GAD7 TOTAL SCORE: 11
4. TROUBLE RELAXING: NEARLY EVERY DAY
7. FEELING AFRAID AS IF SOMETHING AWFUL MIGHT HAPPEN: SEVERAL DAYS
5. BEING SO RESTLESS THAT IT IS HARD TO SIT STILL: NOT AT ALL
8. IF YOU CHECKED OFF ANY PROBLEMS, HOW DIFFICULT HAVE THESE MADE IT FOR YOU TO DO YOUR WORK, TAKE CARE OF THINGS AT HOME, OR GET ALONG WITH OTHER PEOPLE?: SOMEWHAT DIFFICULT
IF YOU CHECKED OFF ANY PROBLEMS ON THIS QUESTIONNAIRE, HOW DIFFICULT HAVE THESE PROBLEMS MADE IT FOR YOU TO DO YOUR WORK, TAKE CARE OF THINGS AT HOME, OR GET ALONG WITH OTHER PEOPLE: SOMEWHAT DIFFICULT
7. FEELING AFRAID AS IF SOMETHING AWFUL MIGHT HAPPEN: SEVERAL DAYS

## 2023-03-15 ASSESSMENT — PAIN SCALES - PAIN ENJOYMENT GENERAL ACTIVITY SCALE (PEG)
INTERFERED_GENERAL_ACTIVITY: 10
AVG_PAIN_PASTWEEK: 8
INTERFERED_GENERAL_ACTIVITY: 10 - COMPLETELY INTERFERES
AVG_PAIN_PASTWEEK: 8
INTERFERED_ENJOYMENT_LIFE: 10 - COMPLETELY INTERFERES
INTERFERED_ENJOYMENT_LIFE: 10
PEG_TOTALSCORE: 9.33
PEG_TOTALSCORE: 9.33

## 2023-03-15 NOTE — TELEPHONE ENCOUNTER
Central Prior Authorization Team   Phone: [phone]    PA Initiation    Medication: oxyCODONE (OXYCONTIN) 20 MG 12 hr tablet  Insurance Company: [employer] - Phone [phone] Fax [phone]  Pharmacy [facility]illing the Rx: [id number] [address] [address]  Filling Pharmacy Phone: [phone]  Filling Pharmacy Fax:    Start Date: 3/15/2023

## 2023-03-15 NOTE — TELEPHONE ENCOUNTER
Called pharmacy and verified that the PA was needed for 20 mg Oxycontin. Submitted PA to Pa team. Sent mychart to patient.    Patricio Rojas RN on 3/15/2023 at 9:15 AM

## 2023-03-15 NOTE — PROGRESS NOTES
Type of Form Received: order    Form Received (Date) 3/15/23   Form Filled out No   Placed in provider folder Yes

## 2023-03-15 NOTE — TELEPHONE ENCOUNTER
Prior Authorization Retail Medication Request    Medication/Dose: oxyCODONE (OXYCONTIN) 20 MG 12 hr tablet  Insurance Name:     Coverage information:     Subscriber: [id number] LADONNA REGALADO     Rel to sub: 02 - Spouse     Member ID: [id number]     Payor: [employer] Ph: [phone]     Benefit plan: [id number] OPEN ACCESS Ph: [phone]     Group number: [id number]     Member effective dates: from 06/01/22

## 2023-03-16 ENCOUNTER — VIRTUAL VISIT (OUTPATIENT)
Dept: INTERNAL MEDICINE | Facility: CLINIC | Age: 38
End: 2023-03-16
Payer: COMMERCIAL

## 2023-03-16 ENCOUNTER — OFFICE VISIT (OUTPATIENT)
Dept: PALLIATIVE MEDICINE | Facility: CLINIC | Age: 38
End: 2023-03-16
Attending: STUDENT IN AN ORGANIZED HEALTH CARE EDUCATION/TRAINING PROGRAM
Payer: COMMERCIAL

## 2023-03-16 VITALS — OXYGEN SATURATION: 98 % | DIASTOLIC BLOOD PRESSURE: 78 MMHG | SYSTOLIC BLOOD PRESSURE: 123 MMHG | HEART RATE: 98 BPM

## 2023-03-16 DIAGNOSIS — I73.81 ERYTHROMELALGIA (H): ICD-10-CM

## 2023-03-16 PROCEDURE — 99417 PROLNG OP E/M EACH 15 MIN: CPT | Performed by: NURSE PRACTITIONER

## 2023-03-16 PROCEDURE — 99215 OFFICE O/P EST HI 40 MIN: CPT | Mod: VID | Performed by: PEDIATRICS

## 2023-03-16 PROCEDURE — 99205 OFFICE O/P NEW HI 60 MIN: CPT | Performed by: NURSE PRACTITIONER

## 2023-03-16 RX ORDER — LIDOCAINE 50 MG/G
OINTMENT TOPICAL EVERY 4 HOURS PRN
Qty: 50 G | Refills: 63 | Status: SHIPPED | OUTPATIENT
Start: 2023-03-16

## 2023-03-16 ASSESSMENT — PAIN SCALES - GENERAL: PAINLEVEL: EXTREME PAIN (8)

## 2023-03-16 NOTE — PATIENT INSTRUCTIONS
Maximize gabapentin, can go up to the maximum dose of 3600 mg a day. This would be 1200 mg three times per day. The goal is to help to spare the need for opioids.   Consider morphine (MS Contin) or morphine derivative such as Hydrocodone. Gene testing may also shed some light on best analgesic options.   I would recommend changing one medication at a time, I.e. change oxycontin to Ms contin or dilaudid to norco.  Sympathetic nerve blocks can help with central sensitization. This would be a series of 3 injections one week apart.   Spinal cord stimulator would be a reasonable option to pursue. Since you have already had the work-up portion of this process with [facility], this would reasonable to resume care with them.  The last resort would be an intrathecal pump. This is also done at [facility].  Ketamine clinics exist (they are typically cash only practices). [facility] in [place] is a pain clinic that does this. [facility] is another local clinic.   I will reach out to my partners to see if they ever admit patients for epidural infusions or if someone would be willing to try this.   I will also find out about home acupuncture.   I have submitted certification for medical cannabis.

## 2023-03-16 NOTE — ASSESSMENT & PLAN NOTE
Dr. James just started mexiletine again. Still on imuran.   Went to pain clinic. They had some ideas including medical cannabis, and ketamine clinics. Question about natural versus synthetic opioids.   They also have an appointment at [facility].  Worsened pain at night, from sundown, needs to be in the bathtub. Wears similar clothing including pants and compression stockings.   Suggested increase gabapentin. Raised concern about hydromorphone dose being too high.  No opinions from anyone about a higher dose of steroids or anything.  Question about IV benadryl. 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   I have never actually seen her sedated. I'm not sure I share concerns about dosing, so long as not stacking.  Not sure about a reason for worsening regularly in the evening. Should trial doing wound care in the morning at least twice. Then can try the kids' homework.   Options  -- increase gabapentin past typical range (currently 400/600, change to 400/600/600)  -- trial of opioid variation (higher dose, hydromorphone, etc)  -- change time/routine for wound care, and maybe homework help  -- Add benadryl 50 mg

## 2023-03-16 NOTE — PROGRESS NOTES
"Dear patient. Thank you for visiting with me. I want you to feel respected, understood, and empowered. \"Respect\" is valuing you as much as I would a close family member. \"Empowerment\" happens when you are fully informed, and can make the best possible decision for you.  Please ask me questions!  Challenge anything that is not clear.    Medical records are primarily used as memory aids for me and my colleagues. Things to know about my documentation style:  - The 'problem list' includes current symptoms or diagnoses, and some problems that are resolved but may return. I use the past medical history for problems not expected to return.  - I use single quotation marks for things that you or I said, when I want to clarify who was speaking.  - I use double quotation marks when copying a term from elsewhere in your records. Italics (besides here) may also denote a quotation.  If you have questions or concerns, please contact me; I will reply as soon as time allows.      Virtual Visit Details    Start Time: 3:44 PM    End Time:4:24 PM    Type of service:  Video Visit    Originating Location (pt. Location): Home    Platform used for Visit: [facility]    [place] Location (provider location):  On-site    PCP: Neal Ball  Visit type: problem-oriented  Time note (e5, 40'): The total time (on the date of service) for this service was 42 minutes, including discussion/face-to-face, chart review, interpretation not otherwise reported, documentation, and updating of the computerized record.        Context    Ronna Rivera is a 37 year old woman, seen with her , with concerns including:  Chief Complaint   Patient presents with     Video Visit       History, update, and/or problems      Problem List as of 3/16/2023 Reviewed: 3/16/2023  4:24 PM by Neal Ball MD          Noted    1. Erythromelalgia (H) 3/25/2021     Overview Addendum 3/6/2023  6:15 PM by Neal Ball MD      3/6/2023 back on " steroids (pulse, then BIW).  Has been on rituximab, imuran, IVIG, ASA, mexiletine  Improved after high-dose intermittent steroids, then worsened.  Dx with testing by Dr. Shannon De La Rosa at [facility]. Also seeing Dr. Mook James @ [facility]            Last Assessment & Plan 3/16/2023 Virtual Visit Edited 3/16/2023 10:36 PM by Neal Ball MD Dr. Loavenbruck just started mexiletine again. Still on imuran.   Went to pain clinic. They had some ideas including medical cannabis, and ketamine clinics. Question about natural versus synthetic opioids.   They also have an appointment at [facility].  Worsened pain at night, from sundown, needs to be in the bathtub. Wears similar clothing including pants and compression stockings.   Suggested increase gabapentin. Raised concern about hydromorphone dose being too high.  No opinions from anyone about a higher dose of steroids or anything.  Question about IV benadryl. 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   I have never actually seen her sedated. I'm not sure I share concerns about dosing, so long as not stacking.  Not sure about a reason for worsening regularly in the evening. Should trial doing wound care in the morning at least twice. Then can try the kids' homework.   Options  -- increase gabapentin past typical range (currently 400/600, change to 400/600/600)  -- trial of opioid variation (higher dose, hydromorphone, etc)  -- change time/routine for wound care, and maybe homework help  -- Add benadryl 50 mg                Relevant Medications     lidocaine (XYLOCAINE) 5 % external ointment

## 2023-03-16 NOTE — TELEPHONE ENCOUNTER
Prior Authorization Approval    Authorization Effective Date: 2/13/2023  Authorization Expiration Date: 9/11/2023  Medication: oxyCODONE (OXYCONTIN) 20 MG 12 hr tablet  Approved Dose/Quantity:   Reference #:     Insurance Company: [employer] - Phone [phone] Fax [phone]  Expected CoPay:       CoPay Card Available:      Foundation Assistance Needed:    Which Pharmacy is filling the prescription (Not needed for infusion/clinic administered): [id number] [place] [address]  Pharmacy Notified: Yes  Patient Notified: Yes

## 2023-03-16 NOTE — PROGRESS NOTES
"  [facility] Pain Management     Date of visit: 03/16/23    Reason for visit:  Consultation: Ronna Rivera is a 37 year old female with PMH significant for erythromelalgia and depression who is seen today at the request of Dr. Ho for a one time evaluation of her pain issues and recommendations for management, with specific emphasis on options for refractory pain. Ronna Rivera has been seen at a pain clinic in the past for this issue, was treated at [facility] ([facility]) for pain from erythromelalgia in 2021, last seen on 8/2021.    Patient stated reason for consult: \"the inpatient pain team recommended that we discuss options for treatment with outpatient pain provider as they were unable to do them during hospitalization, specifically epidural infusion.\"      History:  Diagnosed with erythromelalgia in February of 2021. She is currently following with Dr. De La Rosa, neurologist in the [facility] at [facility] and Dr. James (neurology) at [facility]. She has also been evaluated by Hematology and Dr. Iraheta (rheumatology) at [place]. Her primary care provider is Neal Ball MD with [facility].  Symptoms include intense burning pain of her hands and feet, with her feet being the most painful. She describes this as burning, crushing pain of her bones and joints. In addition, she develops ulcerating wounds to her feet secondary to swelling and tearing of skin. She is currently bed-bound and unable to walk due to refractory pain. She manages this pain by wearing cooling slippers on her feet, wearing compression stockings, keeping feet elevated, blowing a fan over her feet and walking in a bathtub filled with ice water while wearing a protective bag over her legs. She has undergone extensive treatment, as detailed below, with no significant change to her pain. Ronna and her  states that her case is refractory, and based on her response they have been advised to " "seek out additional options for care.  Ronna has had opioid doses increased to oxycontin 20 mg BID and dilaudid 4 mg every 4 hours since discharge due to increasing pain. At times, they are taking dilaudid every 2 hours due to uncontrolled pain.     She and her  state interest in pursuing treatment with epidural infusion based on reading the following article: CHIO Lees., & HETAL Gutierrez (2018). Current pain management strategies for patients with erythromelalgia: a critical review. Journal of pain research, 11, 4334-5901. Https://doi.org/10.2147/JPR.F720288. In this article, a case report details a patient who had a 9 month reduction of pain after 4 days of continuous infusion of 0.0625% ropivacaine and 2 mcg/ml of fentanyl run at 12 ml hour.     Summary of relevant reviewed records:  1.  Hospitalized at [facility] from 2/21/2023 to 3/2/2023 for pain secondary to acute on chronic severe erythromelalgia. Hematology, Neurology, Wound RN, PT/OT, and Pain evaluated patient during current hospitalization. PICC placed for access. Overall, she did have improvement in her pain but was still unable to tolerate almost any physical activity with her legs in a dependent position. During this hospitalization, she received high dose steroids (somewhat helpful), 12 hour ketamine trial (mild improvements in pain and modest improvement in erythema) and IVIG x 5 doses.  She was sent home withTylenol 975 every 8 hours scheduled, Ibuprofen 400 mg every 6 hours scheduled, Lidocaine 5% ointment every 4 hours as needed, Gabapentin 400 mg in AM and afternoon and 600 mg at bedtime, Oxycontin 10 mg twice daily. Hydromorphone oral 2-4 mg every 4 hours as needed and Mexiletine 150 mg at bedtime.  High dose IV steroids were continued with the direction of Dr. James.    2.  3/9/2023 Telemedicine consult with Dr. James, Neurology at [place]:   \"With this most recent flare she was again hospitalized up in the [facility] and they tried IV ketamine " "for about 12 hours which worked but only transiently. She also had 5 days of IVIG and then had 3 days of Solu-Medrol and now she is continuing the Solu-Medrol twice a week for 12 weeks. Unfortunately she is now back to her worst level she is essentially bed-bound again. We had try rituximab and that did not help either. At this point I am not sure where we are going next. Plasma exchange could be certainly worth a try at least would be safe, but I do not feel comfortable in discussing medications such as the complement targeting drugs or interleukin modulating drugs. Furthermore they would not be covered because obviously they are not approved for such indication. I had sent her to the pain clinic her for consideration of a dorsal cord stimulation or dorsal root ganglia stimulation couple of years ago but then things settle down so she did not pursue that. I think this is time to revisit that option.\"    3.  E consult with  Zamzam Moseley M.D. at [facility] on 3/13/23  \"This is a difficult situation. The patient has had multiple immunotherapies and sometimes some relief of her symptoms but never complete resolution. So at this point, it is difficult to know if this is an immunotherapy responsive condition. In terms of further workup, I would suggest the following if not already done:  Follow up with an ultrasound for the adnexal mass. I do not think that this is a paraneoplastic phenomenon, but given the refractory nature of it, I think it would be a good idea to follow up on this.  Do a mammogram if it was not already done, even if the patient is young.  We could potentially also consider doing further neural antibody testing for CASPR2 antibodies CRMP5-IgG by Western blot that can be seen in neuropathies and sometimes in autonomic neuropathies, but I consider those to be low probability (axonal neuropathy evaluation).  In terms of further immunotherapy, it is difficult to know how to proceed. Plasma " "exchange was suggested, and even though it could be tried, I think that her refractoriness on rituximab will make this a low probability of help but could be tried as next step. There was some question about other immunomodulatory therapies. It is difficult to know if there is anything further in terms of immunotherapy to try here. If the patient was positive for any of the above mentioned antibodies or a cancer was found, which I think it is not the case here given the duration and her age, we could consider going with medications like Cytoxan, but it would not be something that I would try now as I am not convinced that this is an immunotherapy responsive condition, and also the risks are too high with a very limited possibility of response. Another approach would be to see if there is anything that we can target in terms of the immune system, and the patient could have plasma testing of cytokines to see if there are any specific signatures like elevated IL-6 that might be guiding us to try an IL-6 inhibitor. Even though this seems to be a peripheral nerve problem, we could also consider to do a spinal fluid evaluation to look for cytokines in the CSF, but I think that this can be started with the serum. If the patient is to come back, I think it would be a good idea to see her in a face-to-face [facility] visit. This would also be necessary to discuss treatments, especially if there is something in the cytokine panel that is elevated that we could theoretically discuss in terms of targeted treatments. I am more than happy to discuss this case with Dr. De La Rosa orally as well or if she is to come back to facilitate a visit in our clinic.    4. Cameron Iraheta M.D [facility] Rheumatology pm 12/6/22:  \"If there is no evidence of infection or malignancy then I would not be opposed to rituximab if Neurology feels necessary. A reasonable trial could be 1 g x 2  by 2 weeks with reassessment " "of response. However I would have to defer to Neurology if they feel that this would be beneficial because I do not have any experience treating erythromelalgia with immunosuppressive therapies particularly rituximab. I have discussed with other providers in our division and they have not used rituximab in this situation either.\"    5. [facility]:  Last seen by Dr. Galen Wills and Kailee Conde CNP. A PNS was proposed, however insurance denied. She was considering SCS, however during this period her pain significantly decreased and she self tapered off of  Oxycontin 10 mg BID and Norco prn; was also tapered off of Gabapentin 600 mg TID. She elected to not follow-up as pain had resolved and she was able to return to normal activities.  ____________________________________________________________    Patient reported symptoms:  Patient Supplied Answers To the [facility] Pain Questionnaire  [facility] Pain -  Patient Entered Questionnaire/Answers 3/15/2023   What number best describes your pain right now:  0 = No pain  to  10 = Worst pain imaginable 7   How would you describe the pain? burning, sharp, cutting, throbbing, pressure, other   Which of the following worsen your pain? standing, sitting, walking, exercise   Which of the following improve or reduce your pain? lying down, medication   What number best describes your average pain for the past week:  0 = No pain  to  10 = Worst pain imaginable 8   What number best describes your LOWEST pain in past 24 hours:  0 = No pain  to  10 = Worst pain imaginable 7   What number best describes your WORST pain in past 24 hours:  0 = No pain  to  10 = Worst pain imaginable 10   When is your pain worst? PM           Review of [place] Prescription Monitoring Program ():   No concern for abuse or misuse of controlled medications based on this report. Viewed on 3/16/2023.        Diagnostic History:  -EMG without evidence of large fiber neuropathy.   -Global anhidrosis on " her sweat test  -Autonomic reflex screen  - Extensive blood work including CBC, basic metabolic panel, CRP, cryoglobulins that were negative at [place] (previously positive), infectious testing that showed an HBc total which was positive, but the patient was taking IVIG, and was determined to be from false positivity.  -Neural autoantibody testing that was negative. This was the paraneoplastic evaluation. CBAs were not done for CASPR2.   -CT of the chest, abdomen, and pelvis that showed an adnexal mass, unclear if followed up.     Past Medical History:  She  has a past medical history of Auto-erythrocyte sensitization (H), Erythromelalgia (02/2021), Reactive depression, and Seasonal allergies (06/25/2020).   Past Surgical History:  She has not had any surgeries. History of PICC line placements.   Social History:  She  reports that she has never smoked. She has never used smokeless tobacco. She reports that she does not drink alcohol and does not use drugs.   Social History     Social History Narrative    Lives with , 2 kids (born 2009, both with autism). No guns.  Safe home and environment.  Support from friends family and [employer] community.  Not currently working.        Current Outpatient Medications:      acetaminophen (TYLENOL) 325 MG tablet, Take 3 tablets (975 mg) by mouth every 8 hours (Patient not taking: Reported on 3/6/2023), Disp: , Rfl:      azaTHIOprine (IMURAN) 50 MG tablet, , Disp: , Rfl:      gabapentin (NEURONTIN) 300 MG capsule, Take 2 capsules (600 mg) by mouth At Bedtime, Disp: 60 capsule, Rfl: 3     gabapentin (NEURONTIN) 400 MG capsule, Take 1 capsule (400 mg) by mouth 2 times daily AM and lunch (Patient not taking: Reported on 3/6/2023), Disp: 60 capsule, Rfl: 3     gentian violet 1 % external solution, Apply 0.5 mLs topically daily as needed (for 5th toes), Disp: 59 mL, Rfl: 0     HYDROmorphone (DILAUDID) 2 MG tablet, Take 1 tablet (2 mg) by mouth every 6 hours as needed for breakthrough  pain (or 4 mg to reduce pain with showers, transportation, or sleep), Disp: 30 tablet, Rfl: 0     ibuprofen (ADVIL/MOTRIN) 200 MG tablet, Take 400 mg by mouth every 6 hours as needed for moderate pain, Disp: , Rfl:      lidocaine (XYLOCAINE) 5 % external ointment, Apply topically every 4 hours as needed for moderate pain (4-6), Disp: 50 g, Rfl: 63     melatonin 1 MG TABS tablet, Take 2-5 mg by mouth nightly as needed for sleep , Disp: , Rfl:      mexiletine (MEXITIL) 150 MG capsule, Take 1 capsule (150 mg) by mouth At Bedtime, Disp: 90 capsule, Rfl: 3     naloxone (NARCAN) 4 MG/0.1ML nasal spray, Spray 1 spray (4 mg) into one nostril alternating nostrils as needed for opioid reversal every 2-3 minutes until assistance arrives, Disp: 0.2 mL, Rfl: 0     norethindrone (MICRONOR) 0.35 MG tablet, Take 2 tablets (0.7 mg) by mouth daily Take the first 3 weeks of each pack, and then immediately move on to the next pack (discard the 4th week of each pack), Disp: 180 tablet, Rfl: 3     oxyCODONE (OXYCONTIN) 10 MG 12 hr tablet, Take 1 tablet (10 mg) by mouth every 12 hours for 30 days, Disp: 60 tablet, Rfl: 0     oxyCODONE (OXYCONTIN) 10 MG 12 hr tablet, Take 1 tablet (10 mg) by mouth 2 times daily, Disp: 14 tablet, Rfl: 0     oxyCODONE (OXYCONTIN) 20 MG 12 hr tablet, Take 1 tablet (20 mg) by mouth every 12 hours, Disp: 60 tablet, Rfl: 0     povidone-iodine (BETADINE) 10 % topical solution, Apply topically as needed for wound care Paint over purple/black closed wounds on 5th toes only with povidone iodine and let dry., Disp: 236 mL, Rfl: 1     venlafaxine (EFFEXOR XR) 37.5 MG 24 hr capsule, Take 1 capsule (37.5 mg) by mouth daily, Disp: 60 capsule, Rfl: 0     vitamin C (ASCORBIC ACID) 250 MG tablet, Take 250 mg by mouth daily, Disp: , Rfl:      Vitamin D3 (CHOLECALCIFEROL) 25 mcg (1000 units) tablet, Take 25 mcg by mouth daily, Disp: , Rfl:       Allergies   Allergen Reactions     Topiramate Anxiety     Family History    Problem Relation Age of Onset     Hypertension Father      Cerebrovascular Disease Maternal Grandmother      Diabetes Maternal Grandfather      Breast Cancer Paternal Grandmother      Hypertension Paternal Grandfather      C.A.D. Paternal Grandfather      Schizophrenia No family hx of      OBJECTIVE  Vitals:    03/16/23 0851   BP: 123/78   Pulse: 98   SpO2: 98%     Constitutional: Well developed, well nourished, appears stated age. No acute distress.  Gait: Unable to assess. Arrived in wheelchair and immediately transferred to exam table in order to elevate LESTER's.   HEENT: Head atraumatic, normocephalic. Eyes without conjunctival injection or jaundice. Neck supple. No obvious neck masses.  Skin: erythema of bilateral hands, most noticeable on the palmar aspect  Extremities: Moves all extremities.  Psychiatric/mental status: Alert, without lethargy or stupor. Speech fluent. Appropriate affect. Mood normal. Able to follow commands without difficulty.   Musculoskeletal exam: LE's noted to have mild atrohpy. Compression stockings in place.     ASSESSMENT AND PLAN:    Discussed pain treatment options at length and the following plan was created:  1. MEDICATIONS: I am recommending that she maximize her gabapentin dose; she can go up to the maximum dose of 1200 mg three times per day (3600 mg a day.) The goal is to help to spare the need for opioids. I have strongly advised against dosing hydromorphone every 2 hours at home as she will place herself at high risk for an unintentional overdose and potentially lethal consequences. She does have a lock box for her controlled substances and naltrexone at home. Secondly, opioid rotation could be considered. Dicussed the difference between natural opiates and synthetic opioids. If current opioid regimen is not effective, I would recommend that consideration of morphine (MS Contin) or morphine derivative such as Hydrocodone as these are opiates and historically provided her  relief. Gene testing may also shed some light on best analgesic options (this has already been done, results pending.)  I would recommend changing one medication at a time, i.e. change oxycontin to Ms contin or dilaudid to norco. In regards to IV ketamine treatment, this is not something that we are doing outpatient at [facility]. We discussed that this is typically not covered by insurance. Finally, we discussed lidocaine infusions; again this is not something that would be done as an outpatient.   2. INTERVENTIONS: Sympathetic nerve blocks may help with central sensitization. This would be a series of 3 injections one week apart. These can be completed through the [facility]. I question if there is an overlay of complex regional pain syndrome. I think that pursuing a spinal cord stimulator would be a reasonable option. This could be used during a flare and she could stop use if/when sh e is in a remission. This work-up could be accomplished through the [facility]. However, as she has already had the work-up portion of this process with [facility], it may be reasonable to resume care with them as Ronna expresses a desire for urgent treatment. The last resort would be an intrathecal pump. We discussed that these are a treatment that cannot necessarily be started and stopped and would require close monitoring.   3. ALTERNATIVE TREATMENTS: I will reach out to the [facility] leaders to inquire about epidural infusion treatment.  I cautioned them that although the article they provided documented successful treatment with one patient, that this is likely experimental treatment that is not covered by insurance due to lack of robust scientific research. We also discussed that this is not something that can be done as an outpatient.   4. COMPLEMENTARY THERAPIES: I attempt to locate a provider  who offeres home acupuncture. I am not currently aware of a provider who offers this treatment.  We have also discussed certification for medical cannabis. Ronna meets the criteria for the diagnosis intractable pain; the cause of her pain cannot be easily removed, she has tried most means of traditional pain management, and is therefore a candidate for medical cannabis. For this reason, I have certified her through the [place] Department of [facility] website for medical cannabis. They will contact her for next steps via email to [email or web address]. She will follow up with primary care provider and/or our pain clinic for ongoing pain management and to discuss benefits and effects of medical cannabis. Certification is good for one year, will need to follow-up in one year if interested in re-certification.    No follow-up was planned at this time. I will reach out to Ronna via [employer] with any additional information that I am able to provide to her.     Nellie Hermosillo, CNP-BC, PMGT-BC, AP-PMN  [facility], [place]          BILLING TIME DOCUMENTATION:   The total TIME spent on this patient on the date of the encounter/appointment was 125 minutes.      TOTAL TIME includes:   Time spent preparing to see the patient by reviewing available medical information in the patient's medical record, including relevant provider notes, laboratory work, and imaging 30 minutes  Time spent face to face (or over the phone) with the patient 55 minutes  Time spent ordering tests, medications, procedures and referrals 5 minutes  Time spent documenting clinical information in Epic 28 minutes

## 2023-03-16 NOTE — NURSING NOTE
Is the patient currently in the state of [place]? YES    Visit mode:VIDEO    If the visit is dropped, the patient can be reconnected by: VIDEO VISIT: Send to e-mail at: [email or web address]    Will anyone else be joining the visit? NO      How would you like to obtain your AVS? MyChart    Are changes needed to the allergy or medication list? NO    Reason for visit: Return visit

## 2023-03-20 ENCOUNTER — TRANSFERRED RECORDS (OUTPATIENT)
Dept: HEALTH INFORMATION MANAGEMENT | Facility: CLINIC | Age: 38
End: 2023-03-20

## 2023-03-20 ENCOUNTER — TELEPHONE (OUTPATIENT)
Dept: INTERNAL MEDICINE | Facility: CLINIC | Age: 38
End: 2023-03-20
Payer: COMMERCIAL

## 2023-03-20 DIAGNOSIS — I73.81 ERYTHROMELALGIA (H): ICD-10-CM

## 2023-03-20 NOTE — TELEPHONE ENCOUNTER
Health Call Center    Phone Message    May a detailed message be left on voicemail: yes     Reason for Call: Medication Question or concern regarding medication   Prescription Clarification  Name of Medication: oxyCODONE (OXYCONTIN) 20 MG 12 hr tablet  Prescribing Provider: Dr. Corley   Pharmacy:  [facility] in [employer] at [place] - [phone]   What on the order needs clarification? Patient calling regarding her prescription, her pharmacy has been unable to refill this medication. She states it is in stock at a different pharmacy, the [facility] location.     Action Taken: Message routed to:  [facility]    Travel Screening: Not Applicable

## 2023-03-21 ENCOUNTER — TRANSFERRED RECORDS (OUTPATIENT)
Dept: HEALTH INFORMATION MANAGEMENT | Facility: CLINIC | Age: 38
End: 2023-03-21

## 2023-03-21 ENCOUNTER — TELEPHONE (OUTPATIENT)
Dept: INTERNAL MEDICINE | Facility: CLINIC | Age: 38
End: 2023-03-21
Payer: COMMERCIAL

## 2023-03-21 RX ORDER — OXYCODONE HCL 20 MG/1
20 TABLET, FILM COATED, EXTENDED RELEASE ORAL EVERY 12 HOURS
Qty: 60 TABLET | Refills: 0 | Status: SHIPPED | OUTPATIENT
Start: 2023-03-21 | End: 2023-04-03

## 2023-03-21 NOTE — TELEPHONE ENCOUNTER
[facility]    Phone Message    May a detailed message be left on voicemail: yes     Reason for Call: Medication Question or concern regarding medication   Prescription ClarificationoxyCODONE (OXYCONTIN) 20 MG 12 hr tablet     Name of Medication:   Prescribing Provider: Dr. Ball   Pharmacy: [facility]    What on the order needs clarification? Pharmacy calling in to clarify dosage on oxyCODONE (OXYCONTIN) 20 MG 12 hr tablet          Action Taken: Message routed to:  [facility]    Travel Screening: Not Applicable

## 2023-03-21 NOTE — TELEPHONE ENCOUNTER
oxyCODONE (OXYCONTIN) 20 MG 12 hr tablet     Take 1 tablet (20 mg) by mouth every 12 hours  Last Written Prescription Date:  3/21/23  Last Fill Quantity: 60,   # refills: 0  [facility] [id number] [facility] [place] [place] [facility] [address] [address]  Last Office Visit : 3/16/23  Future Office visit:  3/27/23       Pharmacy question> called pharmacy:  3/7/23 10 mg- too early fill 20 mg without indication of recent dose adjustments.  Pharmacy staff has spoken to patient, needed to confirm with [employer]/ [facility] recent increase in dose per pharmacy policy. Reviewed with pharm. staff Dr Ball's recent notes re: pain management and recent surgery of 3/16, yesterday. Upcoming visit 3/27/23 noted.

## 2023-03-27 ENCOUNTER — VIRTUAL VISIT (OUTPATIENT)
Dept: INTERNAL MEDICINE | Facility: CLINIC | Age: 38
End: 2023-03-27
Payer: COMMERCIAL

## 2023-03-27 ENCOUNTER — TELEPHONE (OUTPATIENT)
Dept: INTERNAL MEDICINE | Facility: CLINIC | Age: 38
End: 2023-03-27

## 2023-03-27 DIAGNOSIS — I73.81 ERYTHROMELALGIA (H): ICD-10-CM

## 2023-03-27 PROCEDURE — 99215 OFFICE O/P EST HI 40 MIN: CPT | Mod: VID | Performed by: PEDIATRICS

## 2023-03-27 NOTE — ASSESSMENT & PLAN NOTE
Met with pain specialist. Recommended to go to [facility]. She met with [facility], and ordered a 'peripheral sympathetic nerve block.' I asked for them to check with Dr. James, as to the specific site.   By now she has had ~7 doses of the higher-dose steroids.  She thinks the current opioid dosing is working better. With the oxycontin, there was only 1-2 times where she had to use the dilaudid.   Added benadryl at night, which is also helpful on the days when she gets steroids.  Dr. De La Rosa is going to talk with neuroimmunology.   Still doing cooling or icing. She is still in her bed, and there is still a 'good amount of swelling and heat.'  Doing Santyl and plurigel, and more frequent. Has a wound care appointment with [facility].  There is a clinic in [facility], that does ketamine infusions for pain as well as other things. Insurance approved that.    Pharmacogenetics results are back. I went over them briefly today (no problems with opioids, that's the most important thing). I will try to get the PGx letter off tonight.

## 2023-03-27 NOTE — PROGRESS NOTES
"Dear patient. Thank you for visiting with me. I want you to feel respected, understood, and empowered. \"Respect\" is valuing you as much as I would a close family member. \"Empowerment\" happens when you are fully informed, and can make the best possible decision for you.  Please ask me questions!  Challenge anything that is not clear.    Medical records are primarily used as memory aids for me and my colleagues. Things to know about my documentation style:  - The 'problem list' includes current symptoms or diagnoses, and some problems that are resolved but may return. I use the past medical history for problems not expected to return.  - I use single quotation marks for things that you or I said, when I want to clarify who was speaking.  - I use double quotation marks when copying a term from elsewhere in your records. Italics (besides here) may also denote a quotation.  If you have questions or concerns, please contact me; I will reply as soon as time allows.      Virtual Visit Details    Start Time: 1:39 PM    End Time:2:02 PM    Type of service:  Video Visit    Originating Location (pt. Location): Home    Platform used for Visit: [facility] Location (provider location):  On-site    PCP: Neal Ball  Visit type: problem-oriented  Time note (e5, 40'): The total time (on the date of service) for this service was 50 minutes, including discussion/face-to-face, chart review, interpretation not otherwise reported, documentation, and updating of the computerized record.        Context    Ronna Rivera is a 37 year old woman, seen with her , with concerns including:  Chief Complaint   Patient presents with     Video Visit     Follow up       History, update, and/or problems    Problem List as of 3/27/2023 Reviewed: 3/27/2023  2:28 PM by Neal Ball MD          Noted    1. Erythromelalgia (H) 3/25/2021     Overview Addendum 3/27/2023  2:27 PM by Neal Ball MD      Feet " very painful and bad enough to ulcerate and wound.  (also has lesser problems with hands and face)  3/6/2023 back on steroids (pulse, then BIW).  Takes venlafaxine.  Has been on rituximab, imuran, IVIG, ASA, mexiletine  Improved after high-dose intermittent steroids, then worsened.  Dx with testing by Dr. Shannon De La Rosa at [facility]. Also seeing Dr. Mook James @ [facility]            Last Assessment & Plan 3/27/2023 Virtual Visit Written 3/27/2023  2:28 PM by Quinn, Neal Samayoa MD        Met with pain specialist. Recommended to go to [facility]. She met with [facility], and ordered a 'peripheral sympathetic nerve block.' I asked for them to check with Dr. James, as to the specific site.   By now she has had ~7 doses of the higher-dose steroids.  She thinks the current opioid dosing is working better. With the oxycontin, there was only 1-2 times where she had to use the dilaudid.   Added benadryl at night, which is also helpful on the days when she gets steroids.  Dr. De La Rosa is going to talk with neuroimmunology.   Still doing cooling or icing. She is still in her bed, and there is still a 'good amount of swelling and heat.'  Doing Santyl and plurigel, and more frequent. Has a wound care appointment with [facility].  There is a clinic in [facility], that does ketamine infusions for pain as well as other things. Insurance approved that.    Pharmacogenetics results are back. I went over them briefly today (no problems with opioids, that's the most important thing). [facility]              We discussed these the initial pharmacogenetics report, and I shared part of it on screen.  I printed out the full packet and will send them my letter after I get done with the analysis tonight.      Pending issues (Dr. Ball)  ---------------------------------------------------------  -- PGX analysis and letter       Daphne nurse  [phone]     VISIT FREQ: every 6  "months  Outstanding issues (Dr. Ball)  --------------------------------------------       Ongoing care provided by  --------------------------------------  -- Neuro: Dr. Shannon De La Rosa @ [place], [facility]  (prev seen Dr. Orta @ [facility])  -- Pain: Dr. Wills @ [facility]   -- Rheum PRN: Dr. Cipriano Cardoza          RE: Results of Pharmacogenetics testing    Dear Ronna and family,    I hope all is well. As promised, I am writing with comments about your pharmacogenetic tests ([employer], from the vendor [employer]). I believe you should already have a copy of the report from [employer]. Please let me know if this is not true, and I will print one up for you.    First, let's start with some background comments.       Pharmacogenomics is the science of understanding how medication effects vary because of a person's genes.        \"Over-exposed\" is the term used when a person is more sensitive to a medication because of his or her gene(s). A smaller dose may be necessary than for other people, or the person may have side effects because the dose is too high.       \"Under-exposed\" means that a person may be resistant to a medication because of his or her gene(s). A higher dose may be necessary for effectiveness.       Pharmacogenetics (PGx) is the testing method, in which we look for specific genes that might make a person over-exposed or under-exposed to their medications.    If you have questions about the above, we can talk about them at a future visit.    Your PGx tests suggest the following about your current medications.    -- You have normal results for azithioprine and the relevant opioids      Other comments about PGx results:    -- In case you need a gastric acid suppressant, you are resident (under-exposed) to proton pump inhibitors including omeprazole, esomeprazole, etc. I recommend a higher dose of these, or perhaps that you should take famotidine (Pepcid).    -- There were a LOT of findings in " the psychiatric category.  You would be almost totally resistant to citalopram, doxepin, and escitalopram.  You are mostly resistant to amitriptyline, sertraline.  You would be over-exposed to bupropion (Wellbutrin)      Are these results perfect? How reliable are they?    In general, no test is perfect. PGx is probably most useful for over-exposed medications and side effects. With under-exposed medications, the most important result is whether your symptoms are managed. Overall, PGx results are meant to guide medical decisions, but the final decision-making is up to you and your health care providers. I recommend you keep your copy of the PGx results, and show them to any new health care providers. We will also keep a copy in your [facility] records.    I hope this information has been helpful. If you have questions, please raise them at our next appointment.    With best wishes for your health,      Rob Ball MD  Internal Medicine & Pediatrics  Complex primary care  [facility] [place]

## 2023-03-27 NOTE — LETTER
"Ronna Rivera  [address]  [place] [address]     3/27/2023     RE: Results of Pharmacogenetics testing      Dear Ronna and family,    I hope all is well. As promised, I am writing with comments about your pharmacogenetic tests ([employer], from the vendor [employer]). I believe you should already have a copy of the report from [employer]. Please let me know if this is not true, and I will print one up for you.    First, let's start with some background comments.       Pharmacogenomics is the science of understanding how medication effects vary because of a person's genes.        \"Over-exposed\" is the term used when a person is more sensitive to a medication because of his or her gene(s). A smaller dose may be necessary than for other people, or the person may have side effects because the dose is too high.       \"Under-exposed\" means that a person may be resistant to a medication because of his or her gene(s). A higher dose may be necessary for effectiveness.       Pharmacogenetics (PGx) is the testing method, in which we look for specific genes that might make a person over-exposed or under-exposed to their medications.    If you have questions about the above, we can talk about them at a future visit.    Your PGx tests suggest the following about your current medications.    -- You have normal results for azithioprine and the relevant opioids      Other comments about PGx results:    -- In case you need a gastric acid suppressant, you are resident (under-exposed) to proton pump inhibitors including omeprazole, esomeprazole, etc. I recommend a higher dose of these, or perhaps that you should take famotidine (Pepcid).    -- There were a LOT of findings in the psychiatric category.  You would be almost totally resistant to citalopram, doxepin, and escitalopram.  You are mostly resistant to amitriptyline, sertraline.  You would be over-exposed to bupropion (Wellbutrin)      Are these results perfect? How reliable are " they?    In general, no test is perfect. PGx is probably most useful for over-exposed medications and side effects. With under-exposed medications, the most important result is whether your symptoms are managed. Overall, PGx results are meant to guide medical decisions, but the final decision-making is up to you and your health care providers. I recommend you keep your copy of the PGx results, and show them to any new health care providers. We will also keep a copy in your [facility] records.    I hope this information has been helpful. If you have questions, please raise them at our next appointment.    With best wishes for your health,      Rob Ball MD  Internal Medicine & Pediatrics  Complex primary care  [facility] [place]                  no

## 2023-03-27 NOTE — TELEPHONE ENCOUNTER
Spoke with provider. Okay for virtual. Contacted patient and changed appt type.     Wes GRACIA, EMT at 11:54 AM on 3/27/2023.  Primary Care Clinic: [phone]

## 2023-03-27 NOTE — TELEPHONE ENCOUNTER
[facility] Call Center    Phone Message    May a detailed message be left on voicemail: yes     Reason for Call: Other: The patient would like a call to discuss if the appointment for today at 1:30pm can be changed to a video visit due to being bed bound, the patient and I originally scheduled this appointment back on 3/13/23. However, she don't remember setting it as an in person visit, patient would like the provider approval to do so. If not please call patient with reasoning thank you.     Action Taken: Message routed to:  [facility] ([facility]): pcc    Travel Screening: Not Applicable

## 2023-03-27 NOTE — PATIENT INSTRUCTIONS
Thank you for visiting the Primary Care Center today at the [facility]! The following is some information about our clinic:     Primary Care Center Frequently-Asked Questions    (1) How do I schedule appointments at the [facility]?     Primary Care--to schedule or make changes to an existing appointment, please call our primary care line at [phone].    Labs--to schedule a lab appointment at the [facility] you can use [employer] App or call [phone]. If you have a [place] location that is closer to home, you can reach out to that location for scheduling options.     Imaging--if you need to schedule a CT, X-ray, MRI, ultrasound, or other imaging study you can call [phone] to schedule at the [facility] or any other [facility] imaging location.     Referrals--if a referral to another specialty was ordered you can expect a phone call from their scheduling team. If you have not heard from them in a week, please call us or send us a Loop App message to check the status or get a scheduling number. Please note that this only applies to internal [facility] referrals. If the referral is external you would need to contact their office for scheduling.     (2) I have a question about my visit, who do I contact?     You can call us at the primary care line at [phone] to ask questions about your visit. You can also send a secure message through [employer] App, which is reviewed by clinic staff. Please note that [employer] messages have a twenty-four to forty-eight business hour turnaround time and should not be used for urgent concerns.    (3) How will I get the results of my tests?    If you are signed up for [employer] all tests will be released to you within twenty-four hours of resulting. Please allow three to five days for your doctor to review your results and place a note interpreting the results. If you do not have [employer] you will receive your  results through mail seven to ten business days following the return of the tests. Please note that if there should be any urgent or concerning results that your doctor or their registered nurse will reach out to you the same day as the tests come back. If you have follow up questions about your results or would like to discuss the results in detail please schedule a follow up with your provider either in person or virtually.     (4) How do I get refills of my prescriptions?     You should always first contact your pharmacy for refills of your medications. If submitting a refill request on [employer], please be sure to submit the request only once--repeat requests can cause delays in refill. If you are requesting a NEW medication or a medication related to new symptoms you will need to schedule an appointment with a provider prior to approval. Please note: Routine medication refills have up to one to three business day turnaround whereas controlled substances refills have up to five to seven business day turnaround.    (5) I have new symptoms, what do I do?     If you are having an immediate medical emergency, you should dial 911 for assistance.   For anything urgent that needs to be seen within a few hours to one day you should visit a local urgent care for assistance.  For non-urgent symptoms that need to be seen within a few days to a week you can schedule with an available provider in primary care by going to CURRENT or calling [phone].   If you are not sure how serious your symptoms are or you would like to receive medical advice you can always call [phone] to speak with a triage nurse.

## 2023-03-27 NOTE — NURSING NOTE
Is the patient currently in the state of [place]? YES    Visit mode:VIDEO    If the visit is dropped, the patient can be reconnected by: VIDEO VISIT: Text to cell phone: [phone]    Will anyone else be joining the visit? NO      How would you like to obtain your AVS? MyChart    Are changes needed to the allergy or medication list? NO    Reason for visit: Follow up    Chandrika DAVID

## 2023-03-30 ENCOUNTER — HOSPITAL ENCOUNTER (OUTPATIENT)
Dept: WOUND CARE | Facility: CLINIC | Age: 38
Discharge: HOME | End: 2023-03-30
Attending: SURGERY | Admitting: SURGERY
Payer: COMMERCIAL

## 2023-03-30 VITALS — DIASTOLIC BLOOD PRESSURE: 69 MMHG | HEART RATE: 89 BPM | TEMPERATURE: 97.9 F | SYSTOLIC BLOOD PRESSURE: 121 MMHG

## 2023-03-30 DIAGNOSIS — I73.81 ERYTHROMELALGIA (H): ICD-10-CM

## 2023-03-30 DIAGNOSIS — S91.302A OPEN WOUND OF LEFT FOOT, INITIAL ENCOUNTER: ICD-10-CM

## 2023-03-30 DIAGNOSIS — L97.512 FOOT ULCER, RIGHT, WITH FAT LAYER EXPOSED (H): ICD-10-CM

## 2023-03-30 DIAGNOSIS — S91.104A OPEN WOUND OF FIFTH TOE OF RIGHT FOOT, INITIAL ENCOUNTER: ICD-10-CM

## 2023-03-30 DIAGNOSIS — L97.522 FOOT ULCER, LEFT, WITH FAT LAYER EXPOSED (H): ICD-10-CM

## 2023-03-30 DIAGNOSIS — S91.301A OPEN WOUND OF RIGHT FOOT, INITIAL ENCOUNTER: Primary | ICD-10-CM

## 2023-03-30 DIAGNOSIS — S91.105A OPEN WOUND OF FIFTH TOE OF LEFT FOOT, INITIAL ENCOUNTER: ICD-10-CM

## 2023-03-30 LAB
LAB DIRECTOR COMMENTS: NORMAL
LAB DIRECTOR DISCLAIMER: NORMAL
LAB DIRECTOR INTERPRETATION: NORMAL
LAB DIRECTOR METHODOLOGY: NORMAL
LAB DIRECTOR RESULTS: NORMAL
SPECIMEN DESCRIPTION: NORMAL

## 2023-03-30 PROCEDURE — 83088 ASSAY OF HISTAMINE: CPT | Performed by: SURGERY

## 2023-03-30 PROCEDURE — 81291 MTHFR GENE: CPT | Performed by: SURGERY

## 2023-03-30 PROCEDURE — G0452 MOLECULAR PATHOLOGY INTERPR: HCPCS | Mod: 26 | Performed by: PATHOLOGY

## 2023-03-30 PROCEDURE — 97602 WOUND(S) CARE NON-SELECTIVE: CPT

## 2023-03-30 PROCEDURE — 99204 OFFICE O/P NEW MOD 45 MIN: CPT | Performed by: SURGERY

## 2023-03-30 PROCEDURE — 36415 COLL VENOUS BLD VENIPUNCTURE: CPT | Performed by: SURGERY

## 2023-03-30 PROCEDURE — 82306 VITAMIN D 25 HYDROXY: CPT | Performed by: SURGERY

## 2023-03-30 RX ORDER — DOXYCYCLINE 100 MG/1
100 TABLET ORAL 2 TIMES DAILY
Qty: 60 TABLET | Refills: 0 | Status: SHIPPED | OUTPATIENT
Start: 2023-03-30 | End: 2023-04-29

## 2023-03-30 NOTE — DISCHARGE INSTRUCTIONS
Ronna Rivera      1985    A DME order for supplies has been placed to [facility]. If there are any issues with your order including not receiving the order please call [facility] at [phone] option 3. They can also provide a tracking number for you if you had supplies shipped to you.    Your next appointment is a telephone visit. Please text photos of your wounds the day before or the day of the appointment to [phone]. Please include a tape measure or ruler of some sort in your photo for reference.   Please also include your name and date of birth on the text to identify yourself and include the location of the wound(s).  (The cell phone is a secure line and is secure with your information)     Home Care Referral placed to [facility] who already comes for PICC cares; asking for wound care management  Home Health Care:  [facility] Phone: [phone] Fax: [phone] (non medicare. [facility] ordered supplies)    Per your discussion with Dr. Long:  Try Acupuncture  Neuroglide  Chrissie Med Jg  Decrease salt to less than 25mg daily (check sodium levels in your electrolyte tablets)   your Doxycycline prescription and take twice daily for one month    [facility] - (Phone: [phone], Fax: [phone])  Bilateral Coolflex Wrap     Medications/supplements to aid in healing:  Vitamin D3 10,000 iu per day (for 3 months)  Tea C 1,000 mg take daily  Vitamin B Complex with folic acid take 1 tablet daily   Vitamin B 12 1000 mcg daily  Zinc 30mg capsule or tablet daily (ok to take 50mg if 30mg cannot be found)  Vitamin A 10,000 I.unit(s). daily for 3 weeks  7. Magnesium 400mg one tablet daily    Wound Dressing Change: left and right dorsal feet, left and right 5th later toes, and right plantar foot  - Wash your hands with soap and water before you begin your dressing change and prepare a clean surface for dressings.  - Apply Epsom Salt soaked rolled gauze  "and soak for 30 minutes  - After cleansing with mild unscented soap (such as Cetaphil, Cerave or Dove) and water, Apply VASHE on roll gauze, lay into wound bed, let sit for 15 minutes, remove gauze (do not rinse) then apply dressing  - Apply a ceramide based lotion (Cera-Ve, Vanicream, Cetaphil) to intact skin  - Apply peasize amount of Plurogel to each wound  - Cover each wound with Xeroform (approximatly 1/10 per wound. 1/2 xeroform used with each dressing change to cover all wounds)  - Apply spandage size 6 from base of toes to just above ankles and EdemaWear purple stripe from just above ankle to just below knee  - Cover with one 5x9 ABD per foot (2 ABD used per dressing change total)  - Secure with 2\" Medipore Tape  - Once you receive your Coolflex wraps from [facility] Apply over the above dressing to both legs  Change Daily    Compression:   Your compression is Coolflex and can be removed at night and put back on first thing in the morning.   Please remove compression dressing if toes turn blue and/or tingle and can not be relieved by raising the leg for one hour. If unable to reapply in the morning, keep compression on until next dressing change.    Walk as much as you can, as you are able. Whenever you sit raise your ankle above your hips to promote wound healing.      Edemawear  Then apply Purple Stripe EdemaWear from toes to knee. EdemaWear should be worn 24/7 unless bathing/showering or changing the dressing. You will wash and reuse the EdemaWear. DO NOT CUT THE EDEMAWEAR. IF IT IS TOO LONG THEN CUFF THE EDEMAWEAR (the EdemaWear can shrink length wise with washing).    Plurogel  Put plurogel in the fridge where it becomes thinner. You will use less of the plurogel this way, extending the use of the tube, and the plurogel will be more soothing.  To Order more plurogel: [employer] or call [phone] to place an order for 0.7 oz tube  Use discount code PLUROHEALS (all caps) at checkout in the discount " code section to decrease price to $55     GABRIELAAnthony Long M.D. March 30, 2023    Call us at [phone] if you have any questions about your wounds, have redness or swelling around your wound, have a fever of 101 or greater or if you have any other problems or concerns. We answer the phone Monday through Friday 8 am to 4 pm, please leave a message as we check the voicemail frequently throughout the day.     If you had a positive experience please indicate that on your patient satisfaction survey form that [facility] will be sending you.    It was a pleasure meeting with you today.  Thank you for allowing me and my team the privilege of caring for you today.  YOU are the reason we are here, and I truly hope we provided you with the excellent service you deserve.  Please let us know if there is anything else we can do for you so that we can be sure you are leaving completely satisfied with your care experience.      If you have any billing related questions please call the [facility] Business office at [phone]. The clinic staff does not handle billing related matters.    If you are scheduled to have a follow up appointment, you will receive a reminder call the day before your visit. On the appointment day please arrive 15 minutes prior to your appointment time. If you are unable to keep that appointment, please call the clinic to cancel or reschedule. If you are more than 10 minutes late or greater for your appointment, the clinic policy is that you may be asked to reschedule.

## 2023-03-30 NOTE — PROGRESS NOTES
Patient arrived for wound care visit. Certified Wound Care Nurse time spent evaluating patient record, completed a full evaluation and documented wound(s) & ramon-wound skin; provided recommendation based on treatment plan. Applied dressing, reviewed discharge instructions, patient education, and discussed plan of care with appropriate medical team staff members and patient and/or family members.     Kandace Mendez RN

## 2023-03-30 NOTE — PROGRESS NOTES
[facility] [place] Progress Note    Subject: Ronna Rivera 37-year-old female with establish cares at [facility] in the erythromelalgia clinic, has developed right foot wounds.  Medical record reviewed including [facility] records, extensive previous testing and management.  She is confined essentially to bed currently secondary to the significance of the pain, dependent rubor and increased pain.  They are seeking potential hospitalization for epidural pain relief to try to break the current cycle of pain.  Symptoms of been present for approximately 2 years, she did have a brief respite of improvement.  Currently utilizing 30 to 40 mm weight compression stockings.  Chronic edema of the toes, feet, ankles.  No arterial occlusive disease.  Does not utilize tobacco.  Nondiabetic.  No trauma to the lower extremities.  History of Raynaud's phenomenon.  No history of malignancy.  Does have chronic pain management plan, has trialed IV ketamine, there may be a potential for oral ketamine utilization.  Patient does have in the PICC line in currently.  She maintains adequate hydration with approximately 1.5 to 2 L of water daily basis.  She does not salt her food.  Not on amlodipine.  No anemia.  Normal renal function, normal albumin level.  Nondiabetic.  Denies nausea, emesis, diarrhea, tends to constipation secondary to opioid use.  Sleeps in a bed with her feet elevated, typically in bed by 10:11 PM, out of bed by 5 to 6 AM.    PMH:   Past Medical History:   Diagnosis Date     Auto-erythrocyte sensitization (H)      Erythromelalgia 02/2021     Reactive depression      Seasonal allergies 06/25/2020    Mostly resolved     Patient Active Problem List   Diagnosis     Other chronic pain     Rash and nonspecific skin eruption     Erythromelalgia (H)     Anxiety     DDD (degenerative disc disease), lumbar     Migraine with aura and without status migrainosus, not intractable     Chronic insomnia     Asthma,  exercise induced     Hypermobile joints     Vasovagal syncope     Autonomic dysfunction     Impaired mobility     Abnormal TSH     Need for pneumocystis prophylaxis     Medical marijuana use     Headache, drug induced (IVIG)     Adrenal suppression (H)     Menstrual suppression because of erythromelalagia     Reactive depression     Recurrent herpes labialis     PICC (peripherally inserted central catheter) in place     Social Hx:   Social History     Socioeconomic History     Marital status:      Spouse name: Shiraz     Number of children: 2     Years of education: 16     Highest education level: Not on file   Occupational History     Employer: UNEMPLOYED   Tobacco Use     Smoking status: Never     Smokeless tobacco: Never   Substance and Sexual Activity     Alcohol use: No     Drug use: No     Sexual activity: Yes     Partners: Male   Other Topics Concern      Service No     Blood Transfusions No     Caffeine Concern No     Occupational Exposure No     Hobby Hazards No     Sleep Concern No     Stress Concern No     Weight Concern No     Special Diet No     Back Care Yes     Comment: low back pain, with period     Exercise Yes     Comment: 3 times weekly, treadmill     Bike Helmet Yes     Seat Belt Yes     Self-Exams No   Social History Narrative    Lives with , 2 kids (born 2009, both with autism). No guns.  Safe home and environment.  Support from friends family and [employer] community.  Not currently working.     Social Determinants of Health     Financial Resource Strain: Low Risk      Difficulty of Paying Living Expenses: Not hard at all   Food Insecurity: No Food Insecurity     Worried About Running Out of Food in the Last Year: Never true     Ran Out of Food in the Last Year: Never true   Transportation Needs: No Transportation Needs     Lack of Transportation (Medical): No     Lack of Transportation (Non-Medical): No   Physical Activity: Not on file   Stress: Not on file   Social  Connections: Not on file   Intimate Partner Violence: Not on file   Housing Stability: Not on file       Surgical Hx:   Past Surgical History:   Procedure Laterality Date     INSERT PICC LINE Left 06/23/2021    Procedure: INSERTION, PICC;  Surgeon: Neal Penny MD;  Location: [facility] OR     IR PICC PLACEMENT > 5 YRS OF AGE  06/23/2021     NO HISTORY OF SURGERY       PICC SINGLE LUMEN PLACEMENT Left 03/02/2023    Left brachial-lateral vein 44cm total 1cm external.Placement verified by [facility]lock 3CG.PICC okay to use.       Allergies:    Allergies   Allergen Reactions     Topiramate Anxiety       Medications:   Current Outpatient Medications   Medication     azaTHIOprine (IMURAN) 50 MG tablet     gabapentin (NEURONTIN) 300 MG capsule     gabapentin (NEURONTIN) 400 MG capsule     HYDROmorphone (DILAUDID) 2 MG tablet     ibuprofen (ADVIL/MOTRIN) 200 MG tablet     lidocaine (XYLOCAINE) 5 % external ointment     melatonin 1 MG TABS tablet     mexiletine (MEXITIL) 150 MG capsule     naloxone (NARCAN) 4 MG/0.1ML nasal spray     norethindrone (MICRONOR) 0.35 MG tablet     oxyCODONE (OXYCONTIN) 20 MG 12 hr tablet     venlafaxine (EFFEXOR XR) 37.5 MG 24 hr capsule     vitamin C (ASCORBIC ACID) 250 MG tablet     Vitamin D3 (CHOLECALCIFEROL) 25 mcg (1000 units) tablet     No current facility-administered medications for this encounter.       Labs:   Recent Labs   Lab Test 02/26/23  0740 02/24/23  0722 06/04/21  1655 04/09/21  1725   ALBUMIN  --  3.6   < > 3.7   HGB 12.2 12.4   < > 13.2   WBC 4.4 7.1   < > 7.9   CRP  --   --   --  <2.9    < > = values in this interval not displayed.     Creatinine   Date Value Ref Range Status   02/26/2023 0.58 0.51 - 0.95 mg/dL Final   04/09/2021 0.81 0.52 - 1.04 mg/dL Final     GFR Estimate   Date Value Ref Range Status   02/26/2023 >90 >60 mL/min/1.73m2 Final     Comment:     eGFR calculated using 2021 CKD-EPI equation.   04/09/2021 >90 >60 mL/min/[1.73_m2] Final     Comment:     Non   GFR Calc  Starting 12/18/2018, serum creatinine based estimated GFR (eGFR) will be   calculated using the Chronic Kidney Disease Epidemiology Collaboration   (CKD-EPI) equation.       GFR Estimate If Black   Date Value Ref Range Status   04/09/2021 >90 >60 mL/min/[1.73_m2] Final     Comment:      GFR Calc  Starting 12/18/2018, serum creatinine based estimated GFR (eGFR) will be   calculated using the Chronic Kidney Disease Epidemiology Collaboration   (CKD-EPI) equation.       Lab Results   Component Value Date    WBC 4.4 02/26/2023    WBC 6.8 06/28/2021     Lab Results   Component Value Date    RBC 3.80 02/26/2023    RBC 4.50 06/28/2021     Lab Results   Component Value Date    HGB 12.2 02/26/2023    HGB 14.2 06/28/2021     Lab Results   Component Value Date    HCT 37.2 02/26/2023    HCT 41.4 06/28/2021     No components found for: MCT  Lab Results   Component Value Date    MCV 98 02/26/2023    MCV 92 06/28/2021     Lab Results   Component Value Date    MCH 32.1 02/26/2023    MCH 31.6 06/28/2021     Lab Results   Component Value Date    MCHC 32.8 02/26/2023    MCHC 34.3 06/28/2021     Lab Results   Component Value Date    RDW 13.3 02/26/2023    RDW 12.7 06/28/2021     Lab Results   Component Value Date     02/26/2023     06/28/2021          Nutrition requirements were discussed with patient today.  Objective:  LMP 02/19/2023   Incision/Surgical Site 06/23/21 Left Arm (Active)        General:  Patient is alert and orientated, no acute distress.  Conversant 37-year-old female,  present.    Vascular: Palpable bilateral popliteal pulses.  Interstitial edema on the dorsum of the feet, palpable bilateral posterior tibial pulses.  Feet are warm, significant erythema bilaterally, left worse than right, squaring of toes bilaterally, interstitial edema from the level of the toes to the ankles though also interstitial edema to the proximal tibial margins with significant  dermis.  Several ulcerations on the plantar surface of the right foot at the base of the fourth and fifth toes, there is shallow eschar over the lateral aspect of the right fifth toe and lateral aspect of the left fifth toe..  No heel ulcerations.  No ulcerations between the toes.  Ulcerations on the dorsum of the feet bilaterally.  Ulcers without bone or tendon exposure.  No tibial ulcerations or calf ulcerations.        Impression: Erythromelalgia with right and left foot wounds, chronic pain syndrome      Plan: Discussed options, will trial dermal lymphatic stimulation with purple stripe EdemaWear, the hypoperfusion is likely resulting in dermal lymphatic dysfunction, dermal lymphatic stimulation from the toes to the knees may help decrease in addition to static compression on top of the EdemaWear which consist of CoolFlex foot and calf components fit at [facility].  For wound care specific, Vashe gauze soaked in Epsom salts for approximately 15 to 20 minutes, then soaked with hypochlorous acid Vashe to decrease wound pH, then application of cool PluroGel on Xeroform to wounds, triamcinolone cream for ceramide and decreasing pH to 5.5 application to right and left feet, and application of purple stripe EdemaWear, and CoolFlex.  Wear EdemaWear 24 per 7.  CoolFlex be worn specifically when rising ambulate as this markedly increases her discomfort, she finds compression of significant benefit.  Ideally dynamic dermal lymphatic interaction with the EdemaWear in addition to a static overlay compression will provide improved subcutaneous edema resolution.  We will also utilize diosmin micronized purified flavonoid fraction vein formula 500 mg 1 tablet 4 times a day for 2 to 3 months in addition to higher dose vitamin D at 10,000 international units daily to improve endothelial cell function, vitamin A 10,000 international units daily for 3 weeks and Tea-C with bioflavonoids, zinc.  She states she is on a B complex which  covers B12, B6 and methylated folic acid.  Maintain adequate hydration.  She drinks minimal coffee.  No sodas.  No added sodium to the food, she states she is taking electrolyte solution twice a day, she will check the sodium amount to ensure that she is staying at 2500 mg/day total or less.  Sodium has been demonstrated to impact GAGS within the dermis and may potentially impact endothelial cell health.  Tends towards Mediterranean style food with high flavonoid components.  Discussed utilization of a MLD mat, neuroglide,, can use for 1 hour 2-3 times per day may assist in pain control, this would be out-of-pocket, not covered by insurance, they understand.  Also discussed potential for acupuncture application.  They will discuss with their pain clinic.  Nutrients and therapies he can be performed at home given his difficult for her to travel to clinic 6 due to the necessity of consistent leg elevation.  We will obtain a vitamin D level, histamine level and MTHFR given her Raynaud's phenomenon.  If she were heterozygous or homozygous, this may contribute to decreased nitric oxide and extreme environments or health conditions, could also consider then utilization of arginine.  No history of deep vein thrombosis or pulmonary fibrosis.  Patient will return to the clinic in 2 weeks time with a telehealth visit.      Further instructions from your care team       Ronna Rivera      1985    A DME order for supplies has been placed to [facility]. If there are any issues with your order including not receiving the order please call [facility] at [phone] option 3. They can also provide a tracking number for you if you had supplies shipped to you.    Your next appointment is a telephone visit. Please text photos of your wounds the day before or the day of the appointment to [phone]. Please include a tape measure or ruler of some sort in your photo for reference.   Please also include  your name and date of birth on the text to identify yourself and include the location of the wound(s).  (The cell phone is a secure line and is secure with your information)     Home Care Referral placed to [facility] care who already comes for PICC cares; asking for wound care management  Home Health Care:  [facility] Phone: [phone] Fax: [phone] (non medicare. [facility] ordered supplies)    Per your discussion with Dr. Long:  Try Acupuncture  Neuroglide  Chrissie Med Jg  Decrease salt to less than 25mg daily (check sodium levels in your electrolyte tablets)   your Doxycycline prescription and take twice daily for one month    [facility] - (Phone: [phone], Fax: [phone])  Bilateral Coolflex Wrap     Medications/supplements to aid in healin. Vitamin D3 10,000 iu per day (for 3 months)  2. Tea C 1,000 mg take daily  3. Vitamin B Complex with folic acid take 1 tablet daily   4. Vitamin B 12 1000 mcg daily  5. Zinc 30mg capsule or tablet daily (ok to take 50mg if 30mg cannot be found)  6. Vitamin A 10,000 I.unit(s). daily for 3 weeks  7. Magnesium 400mg one tablet daily    Wound Dressing Change: left and right dorsal feet, left and right 5th later toes, and right plantar foot  - Wash your hands with soap and water before you begin your dressing change and prepare a clean surface for dressings.  - Apply Epsom Salt soaked rolled gauze and soak for 30 minutes  - After cleansing with mild unscented soap (such as Cetaphil, Cerave or Dove) and water, Apply VASHE on roll gauze, lay into wound bed, let sit for 15 minutes, remove gauze (do not rinse) then apply dressing  - Apply a ceramide based lotion (Cera-Ve, Vanicream, Cetaphil) to intact skin  - Apply peasize amount of Plurogel to each wound  - Cover each wound with Xeroform (approximatly 1/10 per wound. 1/2 xeroform used with each dressing change to cover all wounds)  - Apply spandage size 6 from base of toes to just above  "ankles and EdemaWear purple stripe from just above ankle to just below knee  - Cover with one 5x9 ABD per foot (2 ABD used per dressing change total)  - Secure with 2\" Medipore Tape  - Once you receive your Coolflex wraps from [facility] Apply over the above dressing to both legs  Change Daily    Compression:   Your compression is Coolflex and can be removed at night and put back on first thing in the morning.   Please remove compression dressing if toes turn blue and/or tingle and can not be relieved by raising the leg for one hour. If unable to reapply in the morning, keep compression on until next dressing change.    Walk as much as you can, as you are able. Whenever you sit raise your ankle above your hips to promote wound healing.      Edemawear  Then apply Purple Stripe EdemaWear from toes to knee. EdemaWear should be worn 24/7 unless bathing/showering or changing the dressing. You will wash and reuse the EdemaWear. DO NOT CUT THE EDEMAWEAR. IF IT IS TOO LONG THEN CUFF THE EDEMAWEAR (the EdemaWear can shrink length wise with washing).    Plurogel  Put plurogel in the fridge where it becomes thinner. You will use less of the plurogel this way, extending the use of the tube, and the plurogel will be more soothing.  To Order more plurogel: shop[employer] or call [phone] to place an order for 0.7 oz tube  Use discount code PLUROHEALS (all caps) at checkout in the discount code section to decrease price to $55     M. Garry Long M.D. March 30, 2023    Call us at [phone] if you have any questions about your wounds, have redness or swelling around your wound, have a fever of 101 or greater or if you have any other problems or concerns. We answer the phone Monday through Friday 8 am to 4 pm, please leave a message as we check the voicemail frequently throughout the day.     If you had a positive experience please indicate that on your patient satisfaction survey form that [facility] will be sending " you.    It was a pleasure meeting with you today.  Thank you for allowing me and my team the privilege of caring for you today.  YOU are the reason we are here, and I truly hope we provided you with the excellent service you deserve.  Please let us know if there is anything else we can do for you so that we can be sure you are leaving completely satisfied with your care experience.      If you have any billing related questions please call the [facility] Business office at [phone]. The clinic staff does not handle billing related matters.    If you are scheduled to have a follow up appointment, you will receive a reminder call the day before your visit. On the appointment day please arrive 15 minutes prior to your appointment time. If you are unable to keep that appointment, please call the clinic to cancel or reschedule. If you are more than 10 minutes late or greater for your appointment, the clinic policy is that you may be asked to reschedule.              Cameron Long MD on 3/30/2023 at 10:04 AM                  Dictated using Dragon voice recognition software which may result in transcription errors

## 2023-03-31 ENCOUNTER — TELEPHONE (OUTPATIENT)
Dept: INTERNAL MEDICINE | Facility: CLINIC | Age: 38
End: 2023-03-31
Payer: COMMERCIAL

## 2023-03-31 ENCOUNTER — MYC MEDICAL ADVICE (OUTPATIENT)
Dept: INTERNAL MEDICINE | Facility: CLINIC | Age: 38
End: 2023-03-31
Payer: COMMERCIAL

## 2023-03-31 LAB — DEPRECATED CALCIDIOL+CALCIFEROL SERPL-MC: 58 UG/L (ref 20–75)

## 2023-03-31 NOTE — TELEPHONE ENCOUNTER
Yes this is fine to switch.  Can you please also switch any future in-person visits to a video date (CAMACHO on Fridays ok).

## 2023-03-31 NOTE — TELEPHONE ENCOUNTER
Patient confirmed change appt mode to virtual, and rescheduled 6/28/23 appt to 5/5/23 virtual w PCP

## 2023-03-31 NOTE — TELEPHONE ENCOUNTER
Duplicate.  See telephone encounter.  Waiting for provider approval.      Enoch Tilley CMA ([facility]) at 3:00 PM on 3/31/2023

## 2023-03-31 NOTE — TELEPHONE ENCOUNTER
The patient wanted to know if her 4/3/23 Acute follow up can be changed to a video visit instead, due to her illness which makes it hard for her to leave the house, Dr. Ball next virtual visits wont bet until mid may, please review and follow up thank you.

## 2023-04-03 ENCOUNTER — VIRTUAL VISIT (OUTPATIENT)
Dept: INTERNAL MEDICINE | Facility: CLINIC | Age: 38
End: 2023-04-03
Payer: COMMERCIAL

## 2023-04-03 ENCOUNTER — MEDICAL CORRESPONDENCE (OUTPATIENT)
Dept: HEALTH INFORMATION MANAGEMENT | Facility: CLINIC | Age: 38
End: 2023-04-03

## 2023-04-03 VITALS — HEIGHT: 62 IN | BODY MASS INDEX: 22.26 KG/M2 | WEIGHT: 121 LBS

## 2023-04-03 DIAGNOSIS — I73.81 ERYTHROMELALGIA (H): Primary | ICD-10-CM

## 2023-04-03 LAB — HISTAMINE SERPL-SCNC: 9 NMOL/L

## 2023-04-03 PROCEDURE — 99215 OFFICE O/P EST HI 40 MIN: CPT | Mod: VID | Performed by: PEDIATRICS

## 2023-04-03 RX ORDER — DIAZEPAM 2 MG
2 TABLET ORAL
Qty: 20 TABLET | Refills: 0 | Status: SHIPPED | OUTPATIENT
Start: 2023-04-03 | End: 2023-05-05

## 2023-04-03 RX ORDER — OXYCODONE HCL 20 MG/1
20 TABLET, FILM COATED, EXTENDED RELEASE ORAL EVERY 12 HOURS
Qty: 60 TABLET | Refills: 0 | Status: SHIPPED | OUTPATIENT
Start: 2023-04-20 | End: 2023-04-26

## 2023-04-03 RX ORDER — HYDROMORPHONE HYDROCHLORIDE 2 MG/1
2 TABLET ORAL EVERY 6 HOURS PRN
Qty: 60 TABLET | Refills: 0 | Status: SHIPPED | OUTPATIENT
Start: 2023-04-03 | End: 2023-04-20

## 2023-04-03 ASSESSMENT — PATIENT HEALTH QUESTIONNAIRE - PHQ9: SUM OF ALL RESPONSES TO PHQ QUESTIONS 1-9: 4

## 2023-04-03 ASSESSMENT — PAIN SCALES - GENERAL: PAINLEVEL: SEVERE PAIN (6)

## 2023-04-03 NOTE — NURSING NOTE
Is the patient currently in the state of [place]? YES    Visit mode:VIDEO    If the visit is dropped, the patient can be reconnected by: VIDEO VISIT: Send to e-mail at: [email or web address]    Will anyone else be joining the visit? NO      How would you like to obtain your AVS? MyChart    Are changes needed to the allergy or medication list? NO    Reason for visit: Return

## 2023-04-03 NOTE — PROGRESS NOTES
"Dear patient. Thank you for visiting with me. I want you to feel respected, understood, and empowered. \"Respect\" is valuing you as much as I would a close family member. \"Empowerment\" happens when you are fully informed, and can make the best possible decision for you.  Please ask me questions!  Challenge anything that is not clear.    Medical records are primarily used as memory aids for me and my colleagues. Things to know about my documentation style:  - The 'problem list' includes current symptoms or diagnoses, and some problems that are resolved but may return. I use the past medical history for problems not expected to return.  - I use single quotation marks for things that you or I said, when I want to clarify who was speaking.  - I use double quotation marks when copying a term from elsewhere in your records. Italics (besides here) may also denote a quotation.  If you have questions or concerns, please contact me; I will reply as soon as time allows.      Virtual Visit Details    Start Time: 3:27 PM    End Time:3:49 PM    Type of service:  Video Visit    Originating Location (pt. Location): Home    Platform used for Visit: [employer]    [place] Location (provider location):  On-site    PCP: Neal Ball  Visit type: problem-oriented  Time note (e5, 40'): The total time (on the date of service) for this service was 40 minutes, including discussion/face-to-face, chart review, interpretation not otherwise reported, documentation, and updating of the computerized record.        Context    Ronna Rivera is a 37 year old woman, seen with her , with concerns including:  Chief Complaint   Patient presents with     Video Visit       History, update, and/or problems      Problem List as of 4/3/2023 Reviewed: 4/3/2023  9:09 PM by Neal Ball MD          Noted    1. Erythromelalgia (H) - Primary 3/25/2021     Overview Addendum 3/27/2023  2:27 PM by Neal Ball MD      Feet very " painful and bad enough to ulcerate and wound.  (also has lesser problems with hands and face)  3/6/2023 back on steroids (pulse, then BIW).  Takes venlafaxine.  Has been on rituximab, imuran, IVIG, ASA, mexiletine  Improved after high-dose intermittent steroids, then worsened.  Dx with testing by Dr. Shannon De La Rosa at [facility]. Also seeing Dr. Mook James @ [facility]            Last Assessment & Plan 4/3/2023 Virtual Visit Written 4/3/2023  9:14 PM by Neal Ball MD      Erythromelalgia       SUBJECTIVE: Several updates  -- Still on regular IV steroids. Have worked on the timing of activity as we discussed in a previous visit.   -- Nerve block didn't help at all.  Seeing TCP for possible nerve stimulator, as suggested by [facility] joaquina. Dr. De La Rosa also commenting about the nerve stimulator.  -- Wound care Dr. Long  -- Valium helped with the nerve block.       OBJECTIVE: cheeks are flared, as much as I have ever seen them. Speech is a little halting, but fluency and mental status unimpaired.       ASSESSMENT: Erythromelalgia doesn't seem to be any better despite many meds and returning to steroids.       PLAN:   -- Renewed opioids  -- Will try cooling blanket DME. I think this was considered before, but I didn't go ahead with this because there was something else on deck from the neurologists.  -- While we are waiting for the next interventions, I got the idea to try a low- or medium-dose benzodiazepine. The rationale for this is mixed. I don't believe the erythromelalgia is due to anxiety (although physiologic stress may worsen it in some patients). Benzos may help the agitation. Also ... benzos have temporary benefits for some other types of autonomic dysfunction. Ordinarily I see benzos as a terrible idea for long-term dysautonomia, because the dosage escalates and duration narrows. In Ronna's case, I don't see any other options, and I feel confident that this will hopefully be a temporary thing  while more definitive treatment is tried. Will start with low (2mg) dose and escalate via e-visit if needed.          Relevant Medications     HYDROmorphone (DILAUDID) 2 MG tablet     oxyCODONE (OXYCONTIN) 20 MG 12 hr tablet (Start on 4/20/2023)     diazepam (VALIUM) 2 MG tablet     Other Relevant Orders     Miscellaneous Order for DME - ONLY FOR DME

## 2023-04-04 ENCOUNTER — DOCUMENTATION ONLY (OUTPATIENT)
Dept: INTERNAL MEDICINE | Facility: CLINIC | Age: 38
End: 2023-04-04

## 2023-04-04 ENCOUNTER — TELEPHONE (OUTPATIENT)
Dept: WOUND CARE | Facility: CLINIC | Age: 38
End: 2023-04-04

## 2023-04-04 NOTE — ASSESSMENT & PLAN NOTE
Erythromelalgia       SUBJECTIVE: Several updates  -- Still on regular IV steroids. Have worked on the timing of activity as we discussed in a previous visit.   -- Nerve block didn't help at all.  Seeing TCP for possible nerve stimulator, as suggested by [facility] pain. Dr. De La Rosa also commenting about the nerve stimulator.  -- Wound care Dr. Long  -- Sreekanth helped with the nerve block.       OBJECTIVE: cheeks are flared, as much as I have ever seen them. Speech is a little halting, but fluency and mental status unimpaired.       ASSESSMENT: Erythromelalgia doesn't seem to be any better despite many meds and returning to steroids.       PLAN:   -- Renewed opioids  -- Will try cooling blanket DME. I think this was considered before, but I didn't go ahead with this because there was something else on deck from the neurologists.  -- While we are waiting for the next interventions, I got the idea to try a low- or medium-dose benzodiazepine. The rationale for this is mixed. I don't believe the erythromelalgia is due to anxiety (although physiologic stress may worsen it in some patients). Benzos may help the agitation. Also ... benzos have temporary benefits for some other types of autonomic dysfunction. Ordinarily I see benzos as a terrible idea for long-term dysautonomia, because the dosage escalates and duration narrows. In Ronna's case, I don't see any other options, and I feel confident that this will hopefully be a temporary thing while more definitive treatment is tried. Will start with low (2mg) dose and escalate via e-visit if needed.

## 2023-04-04 NOTE — PROGRESS NOTES
Type of Form Received: repairs    Form Received (Date) 4/4/23   Form Filled out Yes 4/9/23   Placed in provider folder Yes

## 2023-04-04 NOTE — ADDENDUM NOTE
Encounter addended by: Ana Luisa Mead RN on: 4/4/2023 8:51 AM   Actions taken: Care Plan modified, Order list changed, Diagnosis association updated

## 2023-04-04 NOTE — TELEPHONE ENCOUNTER
Dr. Long's note indicates foot and calf coolflex needed. Order updated and faxed to [facility]. Call placed to patient to inform her of this. She states [facility] is able to mail the foot components to her.

## 2023-04-04 NOTE — TELEPHONE ENCOUNTER
Patient has a question about medical supplies. She picked up the Coflex boot from [employer] but it's a calf wrap and is wondering if she needs to boot too? She's also requesting to reschedule her 4/12 appt due to a conflict and is wondering if that will be a problem?    [phone]

## 2023-04-05 ENCOUNTER — TELEPHONE (OUTPATIENT)
Dept: INTERNAL MEDICINE | Facility: CLINIC | Age: 38
End: 2023-04-05
Payer: COMMERCIAL

## 2023-04-05 ENCOUNTER — MEDICAL CORRESPONDENCE (OUTPATIENT)
Dept: HEALTH INFORMATION MANAGEMENT | Facility: CLINIC | Age: 38
End: 2023-04-05
Payer: COMMERCIAL

## 2023-04-05 ENCOUNTER — DOCUMENTATION ONLY (OUTPATIENT)
Dept: INTERNAL MEDICINE | Facility: CLINIC | Age: 38
End: 2023-04-05
Payer: COMMERCIAL

## 2023-04-05 NOTE — PROGRESS NOTES
Type of Form Received: orders    Form Received (Date) 4/5/23   Form Filled out yes 4/9/23   Placed in provider folder Yes

## 2023-04-05 NOTE — TELEPHONE ENCOUNTER
[facility]    Phone Message    May a detailed message be left on voicemail: yes     Reason for Call: Other: Lizzeth from [facility] called to f/u on plan of care paperwork that was faxed to the clinic on 3/13, they have still not recevied it back yet, fax number is [phone]

## 2023-04-09 ENCOUNTER — MEDICAL CORRESPONDENCE (OUTPATIENT)
Dept: HEALTH INFORMATION MANAGEMENT | Facility: CLINIC | Age: 38
End: 2023-04-09
Payer: COMMERCIAL

## 2023-04-10 ENCOUNTER — TRANSFERRED RECORDS (OUTPATIENT)
Dept: HEALTH INFORMATION MANAGEMENT | Facility: CLINIC | Age: 38
End: 2023-04-10

## 2023-04-10 ENCOUNTER — HOSPITAL ENCOUNTER (OUTPATIENT)
Dept: WOUND CARE | Facility: CLINIC | Age: 38
Discharge: HOME | End: 2023-04-10
Attending: SURGERY
Payer: COMMERCIAL

## 2023-04-10 DIAGNOSIS — S91.104A OPEN WOUND OF FIFTH TOE OF RIGHT FOOT, INITIAL ENCOUNTER: Primary | ICD-10-CM

## 2023-04-10 DIAGNOSIS — L97.512 FOOT ULCER, RIGHT, WITH FAT LAYER EXPOSED (H): ICD-10-CM

## 2023-04-10 DIAGNOSIS — S91.105A OPEN WOUND OF FIFTH TOE OF LEFT FOOT, INITIAL ENCOUNTER: ICD-10-CM

## 2023-04-10 DIAGNOSIS — L97.522 FOOT ULCER, LEFT, WITH FAT LAYER EXPOSED (H): ICD-10-CM

## 2023-04-10 PROCEDURE — 99213 OFFICE O/P EST LOW 20 MIN: CPT | Mod: TEL | Performed by: SURGERY

## 2023-04-10 NOTE — PROGRESS NOTES
Phone-Visit Details    Type of service:  Phone Visit    Length of call: 15 minutes    Originating Location (pt. Location): Home    Distant Location (provider location):  [facility]     Mode of Communication:  Telephone    Provider has received verbal consent for a Telephone Visit from the patient? Yes  [facility] Wound Healing Cut Off Progress Note    Subject: Ronna Rivera , erythromelagia, bilateral foot wounds, significant pain, to be seen in pain clinic, will be discussing both IV and an oral ketamine.  He is just in the past several days has received all the components of care that we originally discussed, at this point recommend we will know if this is impact follows with the course of the next 2 months, recommended M PFF Diosmin Hesperidan flavanoid tablet 4 times per day for 2 months, then would very good to go to 1 tablet twice daily.  PluroGel application to wounds daily with hypochlorous acid Vashe to decrease wound pH and decrease his biofilm.  Utilizing compression of the Sigvaris CoolFlex foot and Renal stones, she has not received a full complement.  She tried a peripheral nerve block last week that did not have significant benefit.    Patient Active Problem List   Diagnosis     Other chronic pain     Rash and nonspecific skin eruption     Erythromelalgia (H)     Anxiety     DDD (degenerative disc disease), lumbar     Migraine with aura and without status migrainosus, not intractable     Chronic insomnia     Asthma, exercise induced     Hypermobile joints     Vasovagal syncope     Autonomic dysfunction     Impaired mobility     Abnormal TSH     Need for pneumocystis prophylaxis     Medical marijuana use     Headache, drug induced (IVIG)     Adrenal suppression (H)     Menstrual suppression because of erythromelalagia     Reactive depression     Recurrent herpes labialis     PICC (peripherally inserted central catheter) in place     Foot ulcer, right, with fat layer  exposed (H)     Foot ulcer, left, with fat layer exposed (H)     Open wound of fifth toe of right foot, initial encounter     Open wound of fifth toe of left foot, initial encounter     Past Medical History:   Diagnosis Date     Auto-erythrocyte sensitization (H)      Erythromelalgia 02/2021     Reactive depression      Seasonal allergies 06/25/2020    Mostly resolved     Exam:  LMP 02/19/2023   Wound (used by [facility] only) 03/30/23 1011 Right dorsal foot neuropathic ulcer (Active)   Thickness/Stage full thickness 04/10/23 0700   Base white;yellow 04/10/23 0700   Periwound redness 04/10/23 0700   Periwound Temperature warm 04/10/23 0700   Periwound Skin Turgor soft 04/10/23 0700   Length (cm) 1.1 04/10/23 0700   Width (cm) 0.7 04/10/23 0700   Depth (cm) 0.3 04/10/23 0700   Wound (cm^2) 0.77 cm^2 04/10/23 0700   Wound Volume (cm^3) 0.23 cm^3 04/10/23 0700   Wound healing % 0 04/10/23 0700   Drainage Characteristics/Odor serosanguineous 04/10/23 0700   Drainage Amount moderate 04/10/23 0700   Care, Wound non-select wound debridement performed 04/10/23 0700       Wound (used by [facility] only) 03/30/23 1012 Left dorsal foot neuropathic ulcer (Active)   Thickness/Stage full thickness 04/10/23 0700   Base yellow;white 04/10/23 0700   Periwound redness 04/10/23 0700   Periwound Temperature warm 04/10/23 0700   Periwound Skin Turgor soft 04/10/23 0700   Edges open 04/10/23 0700   Length (cm) 0.7 04/10/23 0700   Width (cm) 0.4 04/10/23 0700   Depth (cm) 0.3 04/10/23 0700   Wound (cm^2) 0.28 cm^2 04/10/23 0700   Wound Volume (cm^3) 0.08 cm^3 04/10/23 0700   Wound healing % 0 04/10/23 0700   Drainage Characteristics/Odor serosanguineous 04/10/23 0700   Drainage Amount moderate 04/10/23 0700   Care, Wound non-select wound debridement performed 04/10/23 0700       Wound (used by [facility] only) 03/30/23 1012 Left lateral 5 toe neuropathic ulcer (Active)   Thickness/Stage full thickness 04/10/23 0700   Base slough;white;black  eschar;yellow 04/10/23 0700   Periwound pink 04/10/23 0700   Periwound Temperature warm 04/10/23 0700   Periwound Skin Turgor soft 04/10/23 0700   Edges open 04/10/23 0700   Length (cm) 0.8 04/10/23 0700   Width (cm) 0.9 04/10/23 0700   Depth (cm) 0.1 04/10/23 0700   Wound (cm^2) 0.72 cm^2 04/10/23 0700   Wound Volume (cm^3) 0.07 cm^3 04/10/23 0700   Wound healing % 0 04/10/23 0700   Drainage Characteristics/Odor serosanguineous 04/10/23 0700   Drainage Amount moderate 04/10/23 0700   Care, Wound non-select wound debridement performed 04/10/23 0700       Wound (used by [facility] only) 03/30/23 1014 Right lateral 5 toe neuropathic ulcer (Active)   Thickness/Stage full thickness 04/10/23 0700   Base black eschar 04/10/23 0700   Periwound pink 04/10/23 0700   Periwound Temperature warm 04/10/23 0700   Periwound Skin Turgor soft 04/10/23 0700   Edges open 04/10/23 0700   Length (cm) 1.6 04/10/23 0700   Width (cm) 1 04/10/23 0700   Depth (cm) 0.1 04/10/23 0700   Wound (cm^2) 1.6 cm^2 04/10/23 0700   Wound Volume (cm^3) 0.16 cm^3 04/10/23 0700   Wound healing % 0 04/10/23 0700   Drainage Characteristics/Odor serosanguineous 04/10/23 0700   Drainage Amount large 04/10/23 0700   Care, Wound non-select wound debridement performed 04/10/23 0700       Wound (used by [facility] only) 03/30/23 1023 Right plantar foot neuropathic ulcer (Active)   Thickness/Stage full thickness 04/10/23 0700   Base pink 04/10/23 0700   Periwound redness 04/10/23 0700   Periwound Temperature warm 04/10/23 0700   Periwound Skin Turgor soft 04/10/23 0700   Edges open 04/10/23 0700   Length (cm) 0.8 04/10/23 0700   Width (cm) 1 04/10/23 0700   Depth (cm) 0.2 04/10/23 0700   Wound (cm^2) 0.8 cm^2 04/10/23 0700   Wound Volume (cm^3) 0.16 cm^3 04/10/23 0700   Wound healing % 0 04/10/23 0700   Drainage Characteristics/Odor serosanguineous 04/10/23 0700   Drainage Amount moderate 04/10/23 0700   Care, Wound non-select wound debridement performed 04/10/23 0700        Incision/Surgical Site 06/23/21 Left Arm (Active)     Photographs reviewed        Impression: Erythromelagia, chronic right and left foot wounds, chronic pain    Plan: We will dress the wounds with hypochlorous acid Vashe soak, then application of PluroGel, navy strip EdemaWear, Sigvaris Coolflex compression to right and left feet and calves, M PFF 1 tablet 4 times per day over the next 2 months in addition to other discussed adjunct of micronutrients.  We will establish follow-up telehealth visit and in person visit for in person wound examination.  Histamine level was slightly elevated, we did the MTHFR was normal, vitamin D was within normal limits.      Further instructions from your care team       Ronna Rivera      1985    A DME order was not completed because the supplies are ordered by home care or at a care facility      Your next appointment is a telephone visit. Please text photos of your wounds the day before or the day of the appointment to [phone] . Please include a tape measure or ruler of some sort in your photo for reference.     Please also include your name and date of birth on the text to identify yourself and include the location of the wound(s).  (The cell phone is a secure line and is secure with your information)    Home Health Care: [facility] Phone: [phone] Fax:  [phone] (non medicare. [facility] ordered supplies)    [facility] Pain clinic to discuss oral Ketamine Phone:     Per your discussion with Dr. Long:  Try Acupuncture  Neuroglide  Chrissie Med Jg  Decrease salt to less than 25mg daily (check sodium levels in your electrolyte tablets)   your Doxycycline prescription and take twice daily for one month  [facility] - (Phone: [phone], Fax: [phone])  Bilateral Coolflex Wrap    Medications/supplements to aid in healing:  Vitamin D3 10,000 iu per day (for 3 months)  Tea C 1,000 mg take daily  Vitamin B Complex with folic acid take 1 tablet  "daily  Vitamin B 12 1000 mcg daily  Zinc 30mg capsule or tablet daily (ok to take 50mg if 30mg cannot be found)  Vitamin A 10,000 I.unit(s). daily for 3 weeks  Magnesium 400mg one tablet daily  -Vein Formula 1000mg. Take one (1) 500mg capsule four times per day. Order from www."[employer] or [employer] or call [phone]. (The 1000mg is two (2) 500mg capsules)    Once your wound is healed, take Vein Formula one (1) 500mg capsule daily for the rest of your life.      Wound Dressing Change: left and right dorsal feet, left and right 5th later toes,  and right plantar foot  - Wash your hands with soap and water before you begin your dressing change and  prepare a clean surface for dressings.  - Apply Epsom Salt soaked rolled gauze and soak for 30 minutes  - After cleansing with mild unscented soap (such as Cetaphil, Cerave or Dove) and  water, Apply VASHE on roll gauze, lay into wound bed, let sit for 15 minutes, remove  gauze (do not rinse) then apply dressing  - Apply a ceramide based lotion (Cera-Ve, Vanicream, Cetaphil) to intact skin  - Apply peasize amount of Plurogel to each wound  - Cover each wound with Xeroform (approximatly 1/10 per wound. 1/2 xeroform used  with each dressing change to cover all wounds)  - Apply spandage size 6 from base of toes to just above ankles and EdemaWear purple  stripe from just above ankle to just below knee  - Cover with one 5x9 ABD per foot (2 ABD used per dressing change total)  - Secure with 2\" Medipore Tape  - Once you receive your Coolflex wraps from \[facility]\"" Apply over the above dressing to  both legs  Change Daily      Compression:  Your compression is Coolflex and can be removed at night and put back on first thing  in the morning.  Please remove compression dressing if toes turn blue and/or tingle and can not be  relieved by raising the leg for one hour. If unable to reapply in the morning, keep  compression on until next dressing change.  Walk as much as you can, as " you are able. Whenever you sit raise your ankle  above your hips to promote wound healing.      Edemawear  Then apply Purple Stripe EdemaWear from toes to knee. EdemaWear should be worn  24/7 unless bathing/showering or changing the dressing. You will wash and reuse the  EdemaWear. DO NOT CUT THE EDEMAWEAR. IF IT IS TOO LONG THEN CUFF THE  EDEMAWEAR (the EdemaWear can shrink length wise with washing).    Put plurogel in the fridge where it becomes thinner. You will use less of the plurogel this way, extending the use of the tube, and the plurogel will be more soothing.    To Order more plurogel: Visit www.[employer] or call [phone] to place an order for 0.7 oz (20gm) tube     HETAL Long M.D. April 10, 2023    Call us at [phone] if you have any questions about your wounds, have redness or swelling around your wound, have a fever of 101 or greater or if you have any other problems or concerns. We answer the phone Monday through Friday 8 am to 4 pm, please leave a message as we check the voicemail frequently throughout the day.     If you had a positive experience please indicate that on your patient satisfaction survey form that [facility] will be sending you.    It was a pleasure meeting with you today.  Thank you for allowing me and my team the privilege of caring for you today.  YOU are the reason we are here, and I truly hope we provided you with the excellent service you deserve.  Please let us know if there is anything else we can do for you so that we can be sure you are leaving completely satisfied with your care experience.      If you have any billing related questions please call the [facility] Business office at [phone]. The clinic staff does not handle billing related matters.    If you are scheduled to have a follow up appointment, you will receive a reminder call the day before your visit. On the appointment day please arrive 15 minutes prior to your appointment time. If  you are unable to keep that appointment, please call the clinic to cancel or reschedule. If you are more than 10 minutes late or greater for your appointment, the clinic policy is that you may be asked to reschedule.            Cameron Long MD on 4/10/2023 at 8:17 AM                    Dictated using Dragon voice recognition software which may result in transcription errors

## 2023-04-10 NOTE — PROGRESS NOTES
Patient called for a telephone visit. Certified Wound Care Nurse time spent evaluating patient record, completed a full evaluation and documented wound(s) & ramon-wound skin; provided recommendation based on treatment plan. Reviewed discharge instructions, patient education, and discussed plan of care with appropriate medical team staff members and patient and/or family members.

## 2023-04-10 NOTE — DISCHARGE INSTRUCTIONS
Ronna Rivera      1985    A DME order was not completed because the supplies are ordered by home care or at a care facility      Your next appointment is a telephone visit. Please text photos of your wounds the day before or the day of the appointment to [phone] . Please include a tape measure or ruler of some sort in your photo for reference.   Please also include your name and date of birth on the text to identify yourself and include the location of the wound(s).  (The cell phone is a secure line and is secure with your information)    Home Health Care: [facility] Phone: [phone] Fax:[phone] (non medicare. I ordered supplies)    [facility] -  [facility] will call you to coordinate care as prescribed your provider. If you don t hear from a representative within 2 business days, please call [phone]. Or [phone]    Per your discussion with Dr. Long:  Try Acupuncture  Neuroglide  Chrissie Med Jg  Decrease salt to less than 25mg daily (check sodium levels in your electrolyte tablets)   your Doxycycline prescription and take twice daily for one month  [facility] - (Phone: [phone], Fax: [phone])  Bilateral Coolflex Wrap    Medications/supplements to aid in healing:  Vitamin D3 10,000 iu per day (for 3 months)  Tea C 1,000 mg take daily  Vitamin B Complex with folic acid take 1 tablet daily  Vitamin B 12 1000 mcg daily  Zinc 30mg capsule or tablet daily (ok to take 50mg if 30mg cannot be found)  Vitamin A 10,000 I.unit(s). daily for 3 weeks  Magnesium 400mg one tablet daily  -Vein Formula 1000mg. Take one (1) 500mg capsule four times per day for two months (4/10/23). Order from www.[employer] or [employer] or call [phone]. (The 1000mg is two (2) 500mg capsules)    Once your wound is healed, take Vein Formula one (1) 500mg capsule daily for the rest of your life.      Wound Dressing Change: left and right dorsal  "feet, left and right 5th later toes,  and right plantar foot  - Wash your hands with soap and water before you begin your dressing change and  prepare a clean surface for dressings.  - Apply Epsom Salt soaked rolled gauze and soak for 30 minutes  - After cleansing with mild unscented soap (such as Cetaphil, Cerave or Dove) and  water, Apply VASHE on roll gauze, lay into wound bed, let sit for 15 minutes, remove  gauze (do not rinse) then apply dressing  - Apply a ceramide based lotion (Cera-Ve, Vanicream, Cetaphil) to intact skin  - Apply peasize amount of Plurogel to each wound  - Cover each wound with Xeroform (approximatly 1/10 per wound. 1/2 xeroform used  with each dressing change to cover all wounds)  - Apply spandage size 6 from base of toes to just above ankles and EdemaWear purple  stripe from just above ankle to just below knee  - Cover with one 5x9 ABD per foot (2 ABD used per dressing change total)  - Secure with 2\" Medipore Tape  - Once you receive your Coolflex wraps from [facility] Apply over the above dressing to  both legs  Change Daily      Compression:  Your compression is Coolflex and can be removed at night and put back on first thing  in the morning.  Please remove compression dressing if toes turn blue and/or tingle and can not be  relieved by raising the leg for one hour. If unable to reapply in the morning, keep  compression on until next dressing change.  Walk as much as you can, as you are able. Whenever you sit raise your ankle  above your hips to promote wound healing.      Edemawear  Then apply Purple Stripe EdemaWear from toes to knee. EdemaWear should be worn  24/7 unless bathing/showering or changing the dressing. You will wash and reuse the  EdemaWear. DO NOT CUT THE EDEMAWEAR. IF IT IS TOO LONG THEN CUFF THE  EDEMAWEAR (the EdemaWear can shrink length wise with washing).    Put plurogel in the fridge where it becomes thinner. You will use less of the plurogel this way, extending the " use of the tube, and the plurogel will be more soothing.    To Order more plurogel: Visit www.[employer] or call [phone] to place an order for 0.7 oz (20gm) tube     HETAL Long M.D. April 10, 2023    Call us at [phone] if you have any questions about your wounds, have redness or swelling around your wound, have a fever of 101 or greater or if you have any other problems or concerns. We answer the phone Monday through Friday 8 am to 4 pm, please leave a message as we check the voicemail frequently throughout the day.     If you had a positive experience please indicate that on your patient satisfaction survey form that [facility] will be sending you.    It was a pleasure meeting with you today.  Thank you for allowing me and my team the privilege of caring for you today.  YOU are the reason we are here, and I truly hope we provided you with the excellent service you deserve.  Please let us know if there is anything else we can do for you so that we can be sure you are leaving completely satisfied with your care experience.      If you have any billing related questions please call the [facility] Business office at [phone]. The clinic staff does not handle billing related matters.    If you are scheduled to have a follow up appointment, you will receive a reminder call the day before your visit. On the appointment day please arrive 15 minutes prior to your appointment time. If you are unable to keep that appointment, please call the clinic to cancel or reschedule. If you are more than 10 minutes late or greater for your appointment, the clinic policy is that you may be asked to reschedule.

## 2023-04-11 ENCOUNTER — TELEPHONE (OUTPATIENT)
Dept: WOUND CARE | Facility: CLINIC | Age: 38
End: 2023-04-11
Payer: COMMERCIAL

## 2023-04-11 NOTE — TELEPHONE ENCOUNTER
Left voicemail for patient to call clinic back when able to set up an in person clinic visit with Dr. Long, per Dr. Long's request.  Okay to schedule after tele visit on 5/2.

## 2023-04-11 NOTE — TELEPHONE ENCOUNTER
Patient returned call to the clinic and is now scheduled for an in clinic visit on 5/15/23 at 8:20am in addition to the telephone visit (which she elected to keep) on 5/2/23

## 2023-04-12 ENCOUNTER — TELEPHONE (OUTPATIENT)
Dept: INTERNAL MEDICINE | Facility: CLINIC | Age: 38
End: 2023-04-12
Payer: COMMERCIAL

## 2023-04-12 ENCOUNTER — DOCUMENTATION ONLY (OUTPATIENT)
Dept: INTERNAL MEDICINE | Facility: CLINIC | Age: 38
End: 2023-04-12
Payer: COMMERCIAL

## 2023-04-12 NOTE — TELEPHONE ENCOUNTER
[facility] Call [facility]    Phone Message    May a detailed message be left on voicemail: yes     Reason for Call: Other: [facility], [phone], [place] [facility], fax: [phone], please fax over Plan of care. Urgent, thank you     Action Taken: Message routed to:  [facility]    Travel Screening: Not Applicable

## 2023-04-13 ENCOUNTER — DOCUMENTATION ONLY (OUTPATIENT)
Dept: INTERNAL MEDICINE | Facility: CLINIC | Age: 38
End: 2023-04-13
Payer: COMMERCIAL

## 2023-04-13 NOTE — PROGRESS NOTES
Type of Form Received: order    Form Received (Date) 4/13/23   Form Filled out Yes 4/21/23   Placed in provider folder Yes

## 2023-04-17 ENCOUNTER — TELEPHONE (OUTPATIENT)
Dept: INTERNAL MEDICINE | Facility: CLINIC | Age: 38
End: 2023-04-17
Payer: COMMERCIAL

## 2023-04-17 ENCOUNTER — TRANSFERRED RECORDS (OUTPATIENT)
Dept: HEALTH INFORMATION MANAGEMENT | Facility: CLINIC | Age: 38
End: 2023-04-17
Payer: COMMERCIAL

## 2023-04-17 NOTE — TELEPHONE ENCOUNTER
[facility] Call Center    Phone Message    May a detailed message be left on voicemail: yes     Reason for Call: Other: José was calling about getting any additional documents to initiate  the Urgent Exploited Appeal for the spinal cord stimulator for the diagnosis of Erythromelalgia, they was hoping this can be completed before 11am tomorrow to assure they meet the required timeframe for the processing, please Fax the Appeal to [phone] or call to address any questions or concerns thank you.     Action Taken: Message routed to:  [facility]    Travel Screening: Not Applicable

## 2023-04-17 NOTE — TELEPHONE ENCOUNTER
Called and lvm with Vel- we did not order this nor is it up to us to make an appeal. If documents are needed from PCP's office to assist with appeal we can help, but appeal itself needs to come from [facility] pain clinic. They ordered the stimulator, not us. I will not submit any sort of appeal on something our office did not order.    Patricio Rojas RN on 4/17/2023 at 1:49 PM

## 2023-04-18 ENCOUNTER — MEDICAL CORRESPONDENCE (OUTPATIENT)
Dept: HEALTH INFORMATION MANAGEMENT | Facility: CLINIC | Age: 38
End: 2023-04-18
Payer: COMMERCIAL

## 2023-04-19 ENCOUNTER — TELEPHONE (OUTPATIENT)
Dept: INTERNAL MEDICINE | Facility: CLINIC | Age: 38
End: 2023-04-19
Payer: COMMERCIAL

## 2023-04-19 NOTE — TELEPHONE ENCOUNTER
[facility]    Phone Message    May a detailed message be left on voicemail: yes     Reason for Call: Medication Question or concern regarding medication   Prescription Clarification  Name of Medication:    HYDROmorphone (DILAUDID) 2 MG tablet     Prescribing Provider: Dr. Ball   What on the order needs clarification? Patient states she is going through this medication more often and is wondering if there was anything figured out with her script that had been discussed previously.     Action Taken: Message routed to:  [facility]    Travel Screening: Not Applicable

## 2023-04-20 ENCOUNTER — TELEPHONE (OUTPATIENT)
Dept: INTERNAL MEDICINE | Facility: CLINIC | Age: 38
End: 2023-04-20

## 2023-04-20 ENCOUNTER — TELEPHONE (OUTPATIENT)
Dept: INTERNAL MEDICINE | Facility: CLINIC | Age: 38
End: 2023-04-20
Payer: COMMERCIAL

## 2023-04-20 ENCOUNTER — MYC MEDICAL ADVICE (OUTPATIENT)
Dept: INTERNAL MEDICINE | Facility: CLINIC | Age: 38
End: 2023-04-20
Payer: COMMERCIAL

## 2023-04-20 ENCOUNTER — MEDICAL CORRESPONDENCE (OUTPATIENT)
Dept: HEALTH INFORMATION MANAGEMENT | Facility: CLINIC | Age: 38
End: 2023-04-20
Payer: COMMERCIAL

## 2023-04-20 ENCOUNTER — DOCUMENTATION ONLY (OUTPATIENT)
Dept: INTERNAL MEDICINE | Facility: CLINIC | Age: 38
End: 2023-04-20
Payer: COMMERCIAL

## 2023-04-20 DIAGNOSIS — I73.81 ERYTHROMELALGIA (H): ICD-10-CM

## 2023-04-20 RX ORDER — HYDROMORPHONE HYDROCHLORIDE 2 MG/1
2-4 TABLET ORAL EVERY 6 HOURS PRN
Qty: 210 TABLET | Refills: 0 | Status: SHIPPED | OUTPATIENT
Start: 2023-04-20 | End: 2023-06-06

## 2023-04-20 NOTE — TELEPHONE ENCOUNTER
[facility] Call [facility]    Phone Message    May a detailed message be left on voicemail: yes     Reason for Call: Other: Pharmacy calling to clarify dosage for HYDROmorphone (DILAUDID) 2 MG tablet, stated it is a high dose and patient is also receiving oxyCODONE (OXYCONTIN) 20 MG 12 hr tablet, please call back to inform them. Thank you     Action Taken: Message routed to:  [facility]    Travel Screening: Not Applicable

## 2023-04-20 NOTE — TELEPHONE ENCOUNTER
Glad she asked. I and her  have encouraged her to ask for more of this, while the more definitive providers come up with a plan.    I sent, separately      Hello,  I'm following up on your recent phone medicine. I'm sympathetic about the increased pain but glad that you are asking for more med support, while we wait for the more definitive treatment from the neurologists, etc.    Two things:  1. I sent in a new, larger prescription for hydromorphone.  2. Regarding long-acting ... do you feel that the oxycontin is working?  Or would you like to try a fentanyl patch instead.    Thanks    Rob Ball MD  Internal Medicine & Pediatrics  [facility], [facility] [place]

## 2023-04-20 NOTE — PROGRESS NOTES
Type of Form Received:     Form Received (Date) 4/20/23   Form Filled out Yes 4/21/23   Placed in provider folder Yes

## 2023-04-23 ENCOUNTER — HEALTH MAINTENANCE LETTER (OUTPATIENT)
Age: 38
End: 2023-04-23

## 2023-04-24 ENCOUNTER — MEDICAL CORRESPONDENCE (OUTPATIENT)
Dept: HEALTH INFORMATION MANAGEMENT | Facility: CLINIC | Age: 38
End: 2023-04-24
Payer: COMMERCIAL

## 2023-04-26 ENCOUNTER — DOCUMENTATION ONLY (OUTPATIENT)
Dept: INTERNAL MEDICINE | Facility: CLINIC | Age: 38
End: 2023-04-26
Payer: COMMERCIAL

## 2023-04-26 RX ORDER — FENTANYL 25 UG/1
1 PATCH TRANSDERMAL
Qty: 10 PATCH | Refills: 0 | Status: SHIPPED | OUTPATIENT
Start: 2023-04-26 | End: 2023-05-25 | Stop reason: DRUGHIGH

## 2023-04-26 NOTE — TELEPHONE ENCOUNTER
Called pharmacy and lvm notifying them of the change.    Patricio Rojas RN on 4/26/2023 at 1:49 PM

## 2023-04-26 NOTE — PROGRESS NOTES
Type of Form Received:     Form Received (Date) 4/26/23   Form Filled out Yes 4/27/23   Placed in provider folder Yes

## 2023-04-26 NOTE — TELEPHONE ENCOUNTER
[facility] Call Center    Phone Message    May a detailed message be left on voicemail: yes     Reason for Call: Order(s): Other:   Reason for requested:   Pete was calling back about the Plan of Care Order needing to be completed and signed then sent back via Fax, I did show that we got the Orders on 4/13/23 and they was completed and faxed on 4/21/23. However, pete said they never got it, she would like if we can resend the fax asap, if there s any questions or concerns please review and follow up thank you.    Fax: [phone]    Date needed: asap  Provider name: PCP:  Neal Ball MD      Action Taken: Message routed to:  [facility] pcc    Travel Screening: Not Applicable

## 2023-04-29 DIAGNOSIS — F32.9 REACTIVE DEPRESSION: ICD-10-CM

## 2023-04-29 DIAGNOSIS — I73.81 ERYTHROMELALGIA (H): ICD-10-CM

## 2023-05-01 ENCOUNTER — TELEPHONE (OUTPATIENT)
Dept: INTERNAL MEDICINE | Facility: CLINIC | Age: 38
End: 2023-05-01
Payer: COMMERCIAL

## 2023-05-01 NOTE — TELEPHONE ENCOUNTER
[facility].  Gave verbal orders per  for Order(s): Home Care Orders: Other: Recertification of home care to manager PIC line 1x a week for 9 week,s 3PRN        Enoch Tilley CMA ([facility]) at 3:17 PM on 5/1/2023

## 2023-05-01 NOTE — TELEPHONE ENCOUNTER
,[facility] Call Center    Phone Message    May a detailed message be left on voicemail: yes     Reason for Call: Order(s): Home Care Orders: Other: Recertification of home care to manager PIC line 1x a week for 9 week,s 3PRN    Action Taken: Message routed to:  [facility] pcp    Travel Screening: Not Applicable

## 2023-05-02 ENCOUNTER — DOCUMENTATION ONLY (OUTPATIENT)
Dept: INTERNAL MEDICINE | Facility: CLINIC | Age: 38
End: 2023-05-02

## 2023-05-02 ENCOUNTER — TELEPHONE (OUTPATIENT)
Dept: INTERNAL MEDICINE | Facility: CLINIC | Age: 38
End: 2023-05-02

## 2023-05-02 ENCOUNTER — HOSPITAL ENCOUNTER (OUTPATIENT)
Dept: WOUND CARE | Facility: CLINIC | Age: 38
Discharge: HOME | End: 2023-05-02
Attending: SURGERY
Payer: COMMERCIAL

## 2023-05-02 ENCOUNTER — TELEPHONE (OUTPATIENT)
Dept: PALLIATIVE MEDICINE | Facility: CLINIC | Age: 38
End: 2023-05-02

## 2023-05-02 DIAGNOSIS — L97.522 FOOT ULCER, LEFT, WITH FAT LAYER EXPOSED (H): ICD-10-CM

## 2023-05-02 DIAGNOSIS — L97.512 FOOT ULCER, RIGHT, WITH FAT LAYER EXPOSED (H): Primary | ICD-10-CM

## 2023-05-02 PROCEDURE — 99213 OFFICE O/P EST LOW 20 MIN: CPT | Mod: TEL | Performed by: SURGERY

## 2023-05-02 NOTE — TELEPHONE ENCOUNTER
[facility] Call Center    Phone Message    May a detailed message be left on voicemail: yes     Reason for Call: Other: Daphne is the patients nurse  with [facility] and is calling to inform us that the patient had a trial spinal cord stimulator placed today. Daphne would also like us to know that she is available and happy to help with the patients plan of care and any health resource questions we may have.    Action Taken: Message routed to:  Other: [facility]    Travel Screening: Not Applicable

## 2023-05-02 NOTE — PROGRESS NOTES
Type of Form Received: orders    Form Received (Date) 5/2/23   Form Filled out Yes 5/8/23   Placed in provider folder Yes

## 2023-05-02 NOTE — TELEPHONE ENCOUNTER
Noted by nursing    Rosie DAWSON RN Care Coordinator  [facility]

## 2023-05-02 NOTE — DISCHARGE INSTRUCTIONS
Ronna Rivera      1985    A DME order was not completed because supplies were not needed    Look into Mirragen; it is a form of micro crystalline glass and decreases pain during healing; Dr Long is looking into possibility to get you a trial amount of Mirragen.    Home Health Care: [facility] Phone: [phone] Fax:[phone] (non medicare. [facility] ordered supplies)    Per your discussion with Dr. Long:  Try Acupuncture  Neuroglide  Chrissie Med Jg  Decrease salt to less than 25mg daily (check sodium levels in your electrolyte tablets)    [facility] - (Phone: [phone], Fax: [phone]) Bilateral Coolflex Wrap    Medications/supplements to aid in healing:  Vitamin D3 10,000 iu per day (for 3 months)  Tea C 1,000 mg take daily  Vitamin B Complex with folic acid take 1 tablet daily  Vitamin B 12 1000 mcg daily  Zinc 30mg capsule or tablet daily (ok to take 50mg if 30mg cannot be found)  Vitamin A 10,000 I.unit(s). daily for 3 weeks  Magnesium 400mg one tablet daily  -Vein Formula 1000mg. Take one (1) 500mg capsule four times per day for two months (4/10/23). Order from www.[employer] or [employer] or call [phone]. (The 1000mg is two (2) 500mg capsules) Once your wound is healed, take Vein Formula one (1) 500mg capsule daily for the rest of your life.    Wound Dressing Change: left and right dorsal feet, left and right 5th lateral toes, and right plantar foot  - Wash your hands with soap and water before you begin your dressing change and prepare a clean surface for dressings.  - Apply Epsom Salt soaked rolled gauze and soak for 30 minutes  - After cleansing with mild unscented soap (such as Cetaphil, Cerave or Dove) and water, Apply Pure & Clean Hydrogel spray (available online, about $22 bottle, don't buy the wound cleanser, buy the Hydrogel) onto wound bed, let sit for 15 minutes, do not rinse then apply:  - Apply EdemaWear [place] stripe from just above toes to just below knees  (moving from Purple to Navy Edema Wear)  - Apply Surepress wraps over the Edema Wear  - Once you receive your Coolflex wraps from [facility], and pain status stabilized, replace the Surepress wraps with Coolflex wraps. Apply over the Edema Wear to both legs  Change every other day (or Daily, when pain stabilized)    Compression:   Your compression is Coolflex and can be removed at night and put back on first thing in the morning.   Surepress is also compression, as is Edema Wear.  Please remove compression dressing if toes turn blue and/or tingle and can not be relieved by raising the leg for one hour. If unable to reapply in the morning, keep compression on until next dressing change.    Put plurogel in the fridge where it becomes thinner. You will use less of the plurogel this way, extending the use of the tube, and the plurogel will be more soothing.    To Order more plurogel: Visit www.[employer] or call [phone] to place an order for 0.7 oz (20gm) tube     Walk as much as you can, as you are able. Whenever you sit raise your ankle above your hips to promote wound healing.       HETAL Long M.D. May 2, 2023    Call us at [phone] if you have any questions about your wounds, have redness or swelling around your wound, have a fever of 101 or greater or if you have any other problems or concerns. We answer the phone Monday through Friday 8 am to 4 pm, please leave a message as we check the voicemail frequently throughout the day.     If you had a positive experience please indicate that on your patient satisfaction survey form that [facility] will be sending you.    It was a pleasure meeting with you today.  Thank you for allowing me and my team the privilege of caring for you today.  YOU are the reason we are here, and I truly hope we provided you with the excellent service you deserve.  Please let us know if there is anything else we can do for you so that we can be sure you are leaving  completely satisfied with your care experience.      If you have any billing related questions please call the [facility] Business office at [phone]. The clinic staff does not handle billing related matters.    If you are scheduled to have a follow up appointment, you will receive a reminder call the day before your visit. On the appointment day please arrive 15 minutes prior to your appointment time. If you are unable to keep that appointment, please call the clinic to cancel or reschedule. If you are more than 10 minutes late or greater for your appointment, the clinic policy is that you may be asked to reschedule.

## 2023-05-02 NOTE — TELEPHONE ENCOUNTER
[facility] Call Center    Phone Message    May a detailed message be left on voicemail: yes     Reason for Call: Other: Daphne calling from  insurance to JAY had a trial of spinal cord stimulator. She would appreciate her name being added as a resource for any questions or resources that  may have. she is available.    Action Taken: Message routed to:  [facility]    Travel Screening: Not Applicable

## 2023-05-02 NOTE — PROGRESS NOTES
Patient and  called for a telephone visit.   Wound Care Nurse time spent evaluating patient record, completed a full evaluation and documented wound(s) & ramon-wound skin; provided recommendation based on treatment plan.   Reviewed discharge instructions, patient education, and discussed plan of care with appropriate medical team staff members and patient and/or family members.

## 2023-05-02 NOTE — PROGRESS NOTES
Phone-Visit Details    Type of service:  Phone Visit    Length of call: 15 minutes    Originating Location (pt. Location): Home    Distant Location (provider location):  [facility] [place]     Mode of Communication:  Telephone    Provider has received verbal consent for a Telephone Visit from the patient? Yes  [facility] [place] Progress Note    Subject: Ronna Rivera erythromelagia, significant pain, spinal cord stimulation trial to be initiated today through Friday at [place] [facility], states that the purple stripe EdemaWear work for the initial day ago was oversized, they have not been able to obtain the Sigvaris CoolFlex and elastic compression.  She is utilizing the adjunct of micronutrients and micronized purified flavonoid fraction, discussed that it may take the better part of 2 to 4 months to see for potential benefit, this is based on the similar known effects of Sulodexide that were noted in a previously published paper for this condition.    Patient Active Problem List   Diagnosis     Other chronic pain     Rash and nonspecific skin eruption     Erythromelalgia (H)     Anxiety     DDD (degenerative disc disease), lumbar     Migraine with aura and without status migrainosus, not intractable     Chronic insomnia     Asthma, exercise induced     Hypermobile joints     Vasovagal syncope     Autonomic dysfunction     Impaired mobility     Abnormal TSH     Need for pneumocystis prophylaxis     Medical marijuana use     Headache, drug induced (IVIG)     Adrenal suppression (H)     Menstrual suppression because of erythromelalagia     Reactive depression     Recurrent herpes labialis     PICC (peripherally inserted central catheter) in place     Foot ulcer, right, with fat layer exposed (H)     Foot ulcer, left, with fat layer exposed (H)     Open wound of fifth toe of right foot, initial encounter     Open wound of fifth toe of left foot, initial encounter     Past  Medical History:   Diagnosis Date     Auto-erythrocyte sensitization (H)      Erythromelalgia 02/2021     Reactive depression      Seasonal allergies 06/25/2020    Mostly resolved     Exam:  There were no vitals taken for this visit.  Wound (used by OP [facility] only) 03/30/23 1011 Right dorsal foot neuropathic ulcer (Active)   Thickness/Stage full thickness 05/02/23 0700   Base white;yellow 05/02/23 0700   Periwound redness 05/02/23 0700   Periwound Temperature warm 05/02/23 0700   Periwound Skin Turgor soft 05/02/23 0700   Length (cm) 1.1 05/02/23 0700   Width (cm) 0.7 05/02/23 0700   Depth (cm) 0.3 05/02/23 0700   Wound (cm^2) 0.77 cm^2 05/02/23 0700   Wound Volume (cm^3) 0.23 cm^3 05/02/23 0700   Wound healing % 0 05/02/23 0700   Drainage Characteristics/Odor serosanguineous 05/02/23 0700   Drainage Amount moderate 05/02/23 0700   Care, Wound non-select wound debridement performed 05/02/23 0700       Wound (used by OP I only) 03/30/23 1012 Left dorsal foot neuropathic ulcer (Active)   Thickness/Stage full thickness 05/02/23 0700   Base yellow;white 05/02/23 0700   Periwound redness 05/02/23 0700   Periwound Temperature warm 05/02/23 0700   Periwound Skin Turgor soft 05/02/23 0700   Edges open 05/02/23 0700   Length (cm) 0.7 05/02/23 0700   Width (cm) 0.4 05/02/23 0700   Depth (cm) 0.3 05/02/23 0700   Wound (cm^2) 0.28 cm^2 05/02/23 0700   Wound Volume (cm^3) 0.08 cm^3 05/02/23 0700   Wound healing % 0 05/02/23 0700   Drainage Characteristics/Odor serosanguineous 05/02/23 0700   Drainage Amount moderate 05/02/23 0700   Care, Wound non-select wound debridement performed 05/02/23 0700       Wound (used by [facility] only) 03/30/23 1012 Left lateral 5 toe neuropathic ulcer (Active)   Thickness/Stage full thickness 05/02/23 0700   Base slough;white;yellow 05/02/23 0700   Periwound pink 05/02/23 0700   Periwound Temperature warm 05/02/23 0700   Periwound Skin Turgor soft 05/02/23 0700   Edges open 05/02/23 0700   Length (cm)  0.8 04/10/23 0700   Width (cm) 0.9 04/10/23 0700   Depth (cm) 0.1 04/10/23 0700   Wound (cm^2) 0.72 cm^2 04/10/23 0700   Wound Volume (cm^3) 0.07 cm^3 04/10/23 0700   Wound healing % 0 04/10/23 0700   Drainage Characteristics/Odor serosanguineous 05/02/23 0700   Drainage Amount moderate 05/02/23 0700   Care, Wound non-select wound debridement performed 05/02/23 0700       Wound (used by [facility] only) 03/30/23 1014 Right lateral 5 toe neuropathic ulcer (Active)   Thickness/Stage full thickness 05/02/23 0700   Base slough 05/02/23 0700   Periwound pink 05/02/23 0700   Periwound Temperature warm 05/02/23 0700   Periwound Skin Turgor soft 05/02/23 0700   Edges open 05/02/23 0700   Length (cm) 1.6 05/02/23 0700   Width (cm) 1 05/02/23 0700   Depth (cm) 0.1 05/02/23 0700   Wound (cm^2) 1.6 cm^2 05/02/23 0700   Wound Volume (cm^3) 0.16 cm^3 05/02/23 0700   Wound healing % 0 05/02/23 0700   Drainage Characteristics/Odor serosanguineous 05/02/23 0700   Drainage Amount large 05/02/23 0700   Care, Wound non-select wound debridement performed 05/02/23 0700       Wound (used by [facility] only) 03/30/23 1023 Right plantar foot neuropathic ulcer (Active)   Thickness/Stage full thickness 05/02/23 0700   Base pink 05/02/23 0700   Periwound redness 05/02/23 0700   Periwound Temperature warm 05/02/23 0700   Periwound Skin Turgor soft 05/02/23 0700   Edges open 05/02/23 0700   Length (cm) 0.8 05/02/23 0700   Width (cm) 1 05/02/23 0700   Depth (cm) 0.2 05/02/23 0700   Wound (cm^2) 0.8 cm^2 05/02/23 0700   Wound Volume (cm^3) 0.16 cm^3 05/02/23 0700   Wound healing % 0 05/02/23 0700   Drainage Characteristics/Odor serosanguineous 05/02/23 0700   Drainage Amount moderate 05/02/23 0700   Care, Wound non-select wound debridement performed 05/02/23 0700       Incision/Surgical Site 06/23/21 Left Arm (Active)     Telehealth visits, no photos available, new patient portal can be a challenge to navigate for patient photo upload        Impression:  Erythromelagia, associated significant pain, foot wounds    Plan: We will dress the wounds with hydrogel hypochlorous spray on a daily basis, will pursue Mirragen trial which has been shown to be of significant benefit with pain management for wounds, would need to obtain samples for a trial, will discuss with industry representatives.  We will downsized to [place] stripe EdemaWear in place SurePress and elastic wrap to try to improve edema control.  The Sigvaris CoolFlex is more breathable and be better for warmer temperatures and I suspect in the long run to be better for her condition given the significance of the heat and pain component associated with the condition..  Continue micronized purified flavonoid fraction 500 mg twice daily, adjunct of micronutrients, focused on endothelial cell function  Patient will return to the clinic in 3 weeks time either in person or telehealth visit.      Further instructions from your care team       Ronna Rivera      1985    A DME order was not completed because supplies were not needed    Look into Mirragen; it is a form of micro crystalline glass and decreases pain during healing; Dr Long is looking into possibility to get you a trial amount of Mirragen.    Home Health Care: [facility] Phone: [phone] Fax:[phone] (non medicare. [facility] ordered supplies)    Per your discussion with Dr. Long:  Try Acupuncture  Neuroglide  Chrissie Med Jg  Decrease salt to less than 25mg daily (check sodium levels in your electrolyte tablets)    [facility] - (Phone: [phone], Fax: [phone]) Bilateral Coolflex Wrap    Medications/supplements to aid in healing:  Vitamin D3 10,000 iu per day (for 3 months)  Tea C 1,000 mg take daily  Vitamin B Complex with folic acid take 1 tablet daily  Vitamin B 12 1000 mcg daily  Zinc 30mg capsule or tablet daily (ok to take 50mg if 30mg cannot be found)  Vitamin A 10,000 I.unit(s). daily for 3 weeks  Magnesium 400mg one  tablet daily  -Vein Formula 1000mg. Take one (1) 500mg capsule four times per day for two months (4/10/23). Order from www.[employer] or [employer] or call [phone]. (The 1000mg is two (2) 500mg capsules) Once your wound is healed, take Vein Formula one (1) 500mg capsule daily for the rest of your life.    Wound Dressing Change: left and right dorsal feet, left and right 5th lateral toes, and right plantar foot  - Wash your hands with soap and water before you begin your dressing change and prepare a clean surface for dressings.  - Apply Epsom Salt soaked rolled gauze and soak for 30 minutes  - After cleansing with mild unscented soap (such as Cetaphil, Cerave or Dove) and water, Apply Pure & Clean Hydrogel spray (available online, about $22 bottle, don't buy the wound cleanser, buy the Hydrogel) onto wound bed, let sit for 15 minutes, do not rinse then apply:  - Apply EdemaWear [place] stripe from just above toes to just below knees (moving from Purple to Navy Edema Wear)  - Apply Surepress wraps over the Edema Wear  - Once you receive your Coolflex wraps from [facility], and pain status stabilized, replace the Surepress wraps with Coolflex wraps. Apply over the Edema Wear to both legs  Change every other day (or Daily, when pain stabilized)    Compression:   Your compression is Coolflex and can be removed at night and put back on first thing in the morning.   Surepress is also compression, as is Edema Wear.  Please remove compression dressing if toes turn blue and/or tingle and can not be relieved by raising the leg for one hour. If unable to reapply in the morning, keep compression on until next dressing change.    Put plurogel in the fridge where it becomes thinner. You will use less of the plurogel this way, extending the use of the tube, and the plurogel will be more soothing.    To Order more plurogel: Visit www.[employer] or call [phone] to place an order for 0.7 oz (20gm) tube     Walk as much as  you can, as you are able. Whenever you sit raise your ankle above your hips to promote wound healing.       HETAL Long M.D. May 2, 2023    Call us at [phone] if you have any questions about your wounds, have redness or swelling around your wound, have a fever of 101 or greater or if you have any other problems or concerns. We answer the phone Monday through Friday 8 am to 4 pm, please leave a message as we check the voicemail frequently throughout the day.     If you had a positive experience please indicate that on your patient satisfaction survey form that [facility] will be sending you.    It was a pleasure meeting with you today.  Thank you for allowing me and my team the privilege of caring for you today.  YOU are the reason we are here, and I truly hope we provided you with the excellent service you deserve.  Please let us know if there is anything else we can do for you so that we can be sure you are leaving completely satisfied with your care experience.      If you have any billing related questions please call the [facility] Business office at [phone]. The clinic staff does not handle billing related matters.    If you are scheduled to have a follow up appointment, you will receive a reminder call the day before your visit. On the appointment day please arrive 15 minutes prior to your appointment time. If you are unable to keep that appointment, please call the clinic to cancel or reschedule. If you are more than 10 minutes late or greater for your appointment, the clinic policy is that you may be asked to reschedule.           Cameron Long MD on 5/2/2023 at 7:32 AM  No images are attached to the encounter.  Dictated using Dragon voice recognition software which may result in transcription errors

## 2023-05-03 RX ORDER — VENLAFAXINE HYDROCHLORIDE 37.5 MG/1
37.5 CAPSULE, EXTENDED RELEASE ORAL DAILY
Qty: 90 CAPSULE | Refills: 1 | Status: SHIPPED | OUTPATIENT
Start: 2023-05-03 | End: 2023-09-19

## 2023-05-03 NOTE — TELEPHONE ENCOUNTER
venlafaxine (EFFEXOR XR) 37.5 MG 24 hr capsule  Serotonin-Norepinephrine Reuptake Inhibitors  Passed     4/3/2023  [facility] [place]   Neal Ball MD  Internal Medicine      Warnings Override History for venlafaxine (EFFEXOR XR) 37.5 MG 24 hr capsule [[id number]]    Overridden by Neal Ball MD on Apr 26, 2023 1:31 PM   Drug-Drug   1. FENTANYL / SEROTONIN/NOREPINEPHRINE REUPTAKE INHIBITORS [Level: Major]   Other Orders: fentaNYL (DURAGESIC) 25 mcg/hr 72 hr patch

## 2023-05-05 ENCOUNTER — VIRTUAL VISIT (OUTPATIENT)
Dept: INTERNAL MEDICINE | Facility: CLINIC | Age: 38
End: 2023-05-05
Payer: COMMERCIAL

## 2023-05-05 DIAGNOSIS — N94.89 ADNEXAL MASS: ICD-10-CM

## 2023-05-05 DIAGNOSIS — I73.81 ERYTHROMELALGIA (H): Primary | ICD-10-CM

## 2023-05-05 PROCEDURE — 99215 OFFICE O/P EST HI 40 MIN: CPT | Mod: VID | Performed by: PEDIATRICS

## 2023-05-05 NOTE — NURSING NOTE
Is the patient currently in the state of [place]? YES    Visit mode:VIDEO    If the visit is dropped, the patient can be reconnected by: VIDEO VISIT: Send to e-mail at: [email or web address]    Will anyone else be joining the visit? Yes, dariel Weldon      How would you like to obtain your AVS? MyChart    Are changes needed to the allergy or medication list? NO    Reason for visit: Video Visit

## 2023-05-05 NOTE — PROGRESS NOTES
"Virtual Visit Details    Type of service:  Video Visit   Started: 11:16 AM   End: 11:47 AM     Originating Location (pt. Location): Home  Distant Location (provider location):  Off-site  Platform used for Video Visit: AmWell      Dear patient. Thank you for visiting with me. I want you to feel respected, understood, and empowered. \"Respect\" is valuing you as much as I would a close family member. \"Empowerment\" happens when you are fully informed, and can make the best possible decision for you.  Please ask me questions!  Challenge anything that is not clear.    Medical records are primarily used as memory aids for me and my colleagues. Things to know about my documentation style:  - The 'problem list' includes current symptoms or diagnoses, and some problems that are resolved but may return. I use the past medical history for problems not expected to return.  - I use single quotation marks for things that you or I said, when I want to clarify who was speaking.  - I use double quotation marks when copying a term from elsewhere in your records. Italics (besides here) may also denote a quotation.  If you have questions or concerns, please contact me; I will reply as soon as time allows.    Context    PCP: Neal Ball   Visit type: problem-oriented    Ronna Rivera is a 37 year old woman, with concerns including:  Chief Complaint   Patient presents with     Video Visit       History, update, and/or problems    Problem List as of 5/5/2023 Reviewed: 5/5/2023  7:28 PM by Neal Ball MD          Noted    1. Erythromelalgia (H) - Primary 3/25/2021     Overview Addendum 3/27/2023  2:27 PM by Neal Ball MD      Feet very painful and bad enough to ulcerate and wound.  (also has lesser problems with hands and face)  3/6/2023 back on steroids (pulse, then BIW).  Takes venlafaxine.  Has been on rituximab, imuran, IVIG, ASA, mexiletine  Improved after high-dose intermittent steroids, then " worsened.  Dx with testing by Dr. Shannon De La Rosa at [facility]. Also seeing Dr. Mook James @ [facility] [place]            Last Assessment & Plan 5/5/2023 Virtual Visit Edited 5/5/2023  7:28 PM by Neal Ball MD      Spinal cord stimulator trial not as helpful as they had hoped. They are giving it some more time.  Met with Dr. Moseley @ [place]    -- Ulcers are bad, has been difficult to do treatments because when compression stockings come off the blood rushes. Showed photos of foot ulcer on back of foot and   -- Did some ativan but when tried diazepam once it seemed to make her anxiety worse so they haven't done it again since.    UPDATE: She began spinal cord stimulator treatment on Tuesday. She hasn't noticed improvement yet, but will continue for seven more days after it was remapped earlier today. She has upcoming examinations, including blood tests, a mammogram, and a pelvic ultrasound with her neuroimmunology doctor Dr. Moseley. Neurologist Dr. James has scheduled her to begin plasmapheresis treatment after the spinal cord stimulation treatment.     Ronna mentions that there hasn't been significant improvement in her pain management. She notes that after spinal stimulation treatments, she feels severe pain that causes her to scream and cry. She takes Dilaudid medication, with some relief. Ronna has taken Valium once, with no relief and notes an exacerbation in her anxiety and has not taken it since. Ronna also mentions a correlation between nice weather and less pain. She had been tolerating her pain well on lorazepam and gabapentin. For medicinial cannabis use, she needs to have an inital consult with the provider. She notes trying a 2.5 mg edible recently with some pain relief. She has an appointment with a local dispensary to review options for cannabis use. She has not had IV fluids at home, but notes that when she has received IV fluids in the past her symptoms eased. She has had 6 ketamine  treatments alongside oral magnesium supplementation. Both of these treatments seemed to help her until effects gradually weakened.    She struggles to walk daily due to her foot ulcers. The ulcer on the top of her right foot and fifth toe are painful and deep with indents. She notes that two ulcers are forming on her feet as well. She applies Aquaphor to the areas but has little improvement. She is unable to do her wound care due to her severe pain.     She currently has a picc line and will have a port placed next month when her steroid treatment is complete.       ASSESSMENT:  I discussed the different medications that she is taking and their purposes, including the medications to treat her erythroymelalgia and the pain. We discussed the potential benefits of cannabis use.     I advised the patient of the risks associated with poor wound care and management and emphasized prioritizing these wounds.     Since they are considering other possibilities rather than primary erythromelalgia, I will also expand for uncommon possibilities for a paraneoplastic or autoimmune condition. Will do 24 hr urine for 5HIAA and metanephrines/catecholamines.     PLAN: I will refer her to Ob/Gyn for further imaging and evaluation. 24 hour urine test was ordered. She will contact me requesting more lorazepam and comfort medications if needed. She will finish the stimulator trial and will have cancer workup done after. She will continue plasmaphoresis plan and continuned use of cannabis. She will follow-up in one month with continued monthly visits. She will call if anything urgent presents.            Relevant Orders     Ob/Gyn Referral     US Pelvic Complete with Transvaginal     5-HIAA quantitative urine     Metanephrine random or 24 hr urine     Catecholamines fractioned free urine        Sent myC  Hello,  It was good to see you today. Next steps, from my standpoint:  1. Please drop by a [facility] lab to get a kit for 24 hour urine  collection. This will come with instructions.  2. I have a referral placed for (A) transvaginal ultrasound, and (b) GYN evaluation, in that order. If you can get in sooner at [place], that would be preferable.  3. Someone will contact you about further appointments. For now, we should plan to check in every month, for 30 minutes, by video. I will need to see you in person whenever you can come. I encourage you to pick a favorable weather situation for your trip to [facility], to (hopefully) reduce the pain of travelling.    Thanks    Rob Ball MD  Internal Medicine & Pediatrics  [facility], [facility]       Time note (e5, 40'): The total time (on the date of service) for this service was 53 minutes, including discussion/face-to-face, chart review, interpretation not otherwise reported, documentation, and updating of the computerized record.    Scribe Disclosure:   I, HAYLEY PALAFOX, am serving as a scribe; to document services personally performed by Neal Ball MD -based on data collection and the provider's statements to me.     Provider Disclosure:  I agree with above History, Review of Systems, Physical exam and Plan.  I have reviewed the content of the documentation and have edited it as needed. I have personally performed the services documented here and the documentation accurately represents those services and the decisions I have made.      Electronically signed by:  Neal Ball MD

## 2023-05-05 NOTE — Clinical Note
Please call and facilitate: (A) 24 hour urine, probably pickup at a [facility] lab close to their home. (B) ultrasound as ordered. (C) Follow-up with me, in-person 30 min at their convenience. (D) Monthly follow-up, 30 minutes each, by video except for whenever they do the in-person visit.

## 2023-05-05 NOTE — PATIENT INSTRUCTIONS
Thank you for visiting the Primary Care Center today at the [facility]! The following is some information about our clinic:     Primary Care Center Frequently-Asked Questions    (1) How do I schedule appointments at the [facility]?     Primary Care--to schedule or make changes to an existing appointment, please call our primary care line at [phone].    Labs--to schedule a lab appointment at the [facility] you can use [employer] or call [phone]. If you have a [place] location that is closer to home, you can reach out to that location for scheduling options.     Imaging--if you need to schedule a CT, X-ray, MRI, ultrasound, or other imaging study you can call [phone] to schedule at the [facility] or any other [facility] imaging location.     Referrals--if a referral to another specialty was ordered you can expect a phone call from their scheduling team. If you have not heard from them in a week, please call us or send us a DocuSign message to check the status or get a scheduling number. Please note that this only applies to internal [facility] referrals. If the referral is external you would need to contact their office for scheduling.     (2) I have a question about my visit, who do I contact?     You can call us at the primary care line at [phone] to ask questions about your visit. You can also send a secure message through [employer]uSign, which is reviewed by clinic staff. Please note that [employer] messages have a twenty-four to forty-eight business hour turnaround time and should not be used for urgent concerns.    (3) How will I get the results of my tests?    If you are signed up for [employer] all tests will be released to you within twenty-four hours of resulting. Please allow three to five days for your doctor to review your results and place a note interpreting the results. If you do not have [employer] you will receive your  results through mail seven to ten business days following the return of the tests. Please note that if there should be any urgent or concerning results that your doctor or their registered nurse will reach out to you the same day as the tests come back. If you have follow up questions about your results or would like to discuss the results in detail please schedule a follow up with your provider either in person or virtually.     (4) How do I get refills of my prescriptions?     You should always first contact your pharmacy for refills of your medications. If submitting a refill request on [employer], please be sure to submit the request only once--repeat requests can cause delays in refill. If you are requesting a NEW medication or a medication related to new symptoms you will need to schedule an appointment with a provider prior to approval. Please note: Routine medication refills have up to one to three business day turnaround whereas controlled substances refills have up to five to seven business day turnaround.    (5) I have new symptoms, what do I do?     If you are having an immediate medical emergency, you should dial 911 for assistance.   For anything urgent that needs to be seen within a few hours to one day you should visit a local urgent care for assistance.  For non-urgent symptoms that need to be seen within a few days to a week you can schedule with an available provider in primary care by going to [employer] or calling [phone].   If you are not sure how serious your symptoms are or you would like to receive medical advice you can always call [phone] to speak with a triage nurse.

## 2023-05-05 NOTE — ASSESSMENT & PLAN NOTE
Spinal cord stimulator trial not as helpful as they had hoped. They are giving it some more time.  Met with Dr. Moseley @ [place]    -- Ulcers are bad, has been difficult to do treatments because when compression stockings come off the blood rushes. Showed photos of foot ulcer on back of foot and   -- Did some ativan but when tried diazepam once it seemed to make her anxiety worse so they haven't done it again since.    UPDATE: She began spinal cord stimulator treatment on Tuesday. She hasn't noticed improvement yet, but will continue for seven more days after it was remapped earlier today. She has upcoming examinations, including blood tests, a mammogram, and a pelvic ultrasound with her neuroimmunology doctor Dr. Moseley. Neurologist Dr. James has scheduled her to begin plasmapheresis treatment after the spinal cord stimulation treatment.     Ronna mentions that there hasn't been significant improvement in her pain management. She notes that after spinal stimulation treatments, she feels severe pain that causes her to scream and cry. She takes Dilaudid medication, with some relief. Ronna has taken Valium once, with no relief and notes an exacerbation in her anxiety and has not taken it since. Ronna also mentions a correlation between nice weather and less pain. She had been tolerating her pain well on lorazepam and gabapentin. For medicinial cannabis use, she needs to have an inital consult with the provider. She notes trying a 2.5 mg edible recently with some pain relief. She has an appointment with a local dispensary to review options for cannabis use. She has not had IV fluids at home, but notes that when she has received IV fluids in the past her symptoms eased. She has had 6 ketamine treatments alongside oral magnesium supplementation. Both of these treatments seemed to help her until effects gradually weakened.    She struggles to walk daily due to her foot ulcers. The ulcer on the top of her right foot  and fifth toe are painful and deep with indents. She notes that two ulcers are forming on her feet as well. She applies Aquaphor to the areas but has little improvement. She is unable to do her wound care due to her severe pain.     She currently has a picc line and will have a port placed next month when her steroid treatment is complete.       ASSESSMENT:  I discussed the different medications that she is taking and their purposes, including the medications to treat her erythroymelalgia and the pain. We discussed the potential benefits of cannabis use.     I advised the patient of the risks associated with poor wound care and management and emphasized prioritizing these wounds.     Since they are considering other possibilities rather than primary erythromelalgia, I will also expand for uncommon possibilities for a paraneoplastic or autoimmune condition. Will do 24 hr urine for 5HIAA and metanephrines/catecholamines, protein, and UPEP    PLAN: I will refer her to Ob/Gyn for further imaging and evaluation. 24 hour urine test was ordered. She will contact me requesting more lorazepam and comfort medications if needed. She will finish the stimulator trial and will have cancer workup done after. She will continue plasmaphoresis plan and continuned use of cannabis. She will follow-up in one month with continued monthly visits. She will call if anything urgent presents.

## 2023-05-06 ENCOUNTER — MEDICAL CORRESPONDENCE (OUTPATIENT)
Dept: HEALTH INFORMATION MANAGEMENT | Facility: CLINIC | Age: 38
End: 2023-05-06
Payer: COMMERCIAL

## 2023-05-08 ENCOUNTER — TELEPHONE (OUTPATIENT)
Dept: INTERNAL MEDICINE | Facility: CLINIC | Age: 38
End: 2023-05-08
Payer: COMMERCIAL

## 2023-05-11 ENCOUNTER — DOCUMENTATION ONLY (OUTPATIENT)
Dept: INTERNAL MEDICINE | Facility: CLINIC | Age: 38
End: 2023-05-11
Payer: COMMERCIAL

## 2023-05-11 NOTE — PROGRESS NOTES
Type of Form Received:     Form Received (Date) 5/11/23   Form Filled out Yes 5/19/23   Placed in provider folder Yes

## 2023-05-15 ENCOUNTER — ANCILLARY PROCEDURE (OUTPATIENT)
Dept: ULTRASOUND IMAGING | Facility: CLINIC | Age: 38
End: 2023-05-15
Attending: PEDIATRICS
Payer: COMMERCIAL

## 2023-05-15 ENCOUNTER — LAB (OUTPATIENT)
Dept: LAB | Facility: CLINIC | Age: 38
End: 2023-05-15
Payer: COMMERCIAL

## 2023-05-15 ENCOUNTER — HOSPITAL ENCOUNTER (OUTPATIENT)
Dept: WOUND CARE | Facility: CLINIC | Age: 38
Discharge: HOME | End: 2023-05-15
Attending: SURGERY | Admitting: SURGERY
Payer: COMMERCIAL

## 2023-05-15 VITALS — DIASTOLIC BLOOD PRESSURE: 72 MMHG | TEMPERATURE: 98.2 F | HEART RATE: 89 BPM | SYSTOLIC BLOOD PRESSURE: 109 MMHG

## 2023-05-15 DIAGNOSIS — N94.89 ADNEXAL MASS: ICD-10-CM

## 2023-05-15 DIAGNOSIS — L97.512 FOOT ULCER, RIGHT, WITH FAT LAYER EXPOSED (H): ICD-10-CM

## 2023-05-15 DIAGNOSIS — S91.302A OPEN WOUND OF LEFT FOOT, INITIAL ENCOUNTER: ICD-10-CM

## 2023-05-15 DIAGNOSIS — L97.522 FOOT ULCER, LEFT, WITH FAT LAYER EXPOSED (H): ICD-10-CM

## 2023-05-15 DIAGNOSIS — M51.369 DDD (DEGENERATIVE DISC DISEASE), LUMBAR: ICD-10-CM

## 2023-05-15 DIAGNOSIS — I73.81 ERYTHROMELALGIA (H): ICD-10-CM

## 2023-05-15 DIAGNOSIS — S91.105A OPEN WOUND OF FIFTH TOE OF LEFT FOOT, INITIAL ENCOUNTER: Primary | ICD-10-CM

## 2023-05-15 DIAGNOSIS — S91.301A OPEN WOUND OF RIGHT FOOT, INITIAL ENCOUNTER: ICD-10-CM

## 2023-05-15 DIAGNOSIS — S91.104A OPEN WOUND OF FIFTH TOE OF RIGHT FOOT, INITIAL ENCOUNTER: ICD-10-CM

## 2023-05-15 PROCEDURE — 97602 WOUND(S) CARE NON-SELECTIVE: CPT

## 2023-05-15 PROCEDURE — 76830 TRANSVAGINAL US NON-OB: CPT | Performed by: RADIOLOGY

## 2023-05-15 PROCEDURE — 76856 US EXAM PELVIC COMPLETE: CPT | Performed by: RADIOLOGY

## 2023-05-15 PROCEDURE — 99214 OFFICE O/P EST MOD 30 MIN: CPT | Performed by: SURGERY

## 2023-05-15 RX ORDER — LIDOCAINE HYDROCHLORIDE 40 MG/ML
SOLUTION TOPICAL DAILY
Qty: 600 ML | Refills: 3 | Status: SHIPPED | OUTPATIENT
Start: 2023-05-15 | End: 2023-06-05

## 2023-05-15 RX ORDER — TRIAMCINOLONE ACETONIDE 1 MG/G
CREAM TOPICAL DAILY
Qty: 80 G | Refills: 1 | Status: SHIPPED | OUTPATIENT
Start: 2023-05-15

## 2023-05-15 NOTE — PROGRESS NOTES
[facility] [place] Progress Note    Subject: Ronna Rivera erythromelalgia,  severe pain, 1 week trial of nerve stimulator did not have significant function.  He is to continue to pursue other opportunities, will be seen as [place] [facility] of neurology for further adjunct of pain management options May 19.  States that the purple stripe EdemaWear had initial very positive effect for the first 24 hours without subsequent effect, they do have Sigvaris CoolFlex and elastic compression wraps for the feet.  We also discussed Neuroglide FDA approved for pain control manual lymphatic drainage for torso which may impact craniosacral stimulation therapy for pain modulation, they will look into again.  Discussed wound care options.  She has tried IV ketamine, will further discuss to oral ketamine options with her pain control team.  Last visit she is in the process of going through medically approved oral candidiasis options.  She also has pending lab work to complete.  She is MTHFR negative.  Histamine is minimally elevated.  Intermittently will walk in a scoliometer with peak covered given the intense heat sensation for her feet.  This may be contributing to negative microvascular impact and impairing wound healing and contributing to compromise of dermal healing    Patient Active Problem List   Diagnosis     Other chronic pain     Rash and nonspecific skin eruption     Erythromelalgia (H)     Anxiety     DDD (degenerative disc disease), lumbar     Migraine with aura and without status migrainosus, not intractable     Chronic insomnia     Asthma, exercise induced     Hypermobile joints     Vasovagal syncope     Autonomic dysfunction     Impaired mobility     Abnormal TSH     Need for pneumocystis prophylaxis     Medical marijuana use     Headache, drug induced (IVIG)     Adrenal suppression (H)     Menstrual suppression because of erythromelalagia     Reactive depression     Recurrent herpes labialis      PICC (peripherally inserted central catheter) in place     Foot ulcer, right, with fat layer exposed (H)     Foot ulcer, left, with fat layer exposed (H)     Open wound of fifth toe of right foot, initial encounter     Open wound of fifth toe of left foot, initial encounter     Past Medical History:   Diagnosis Date     Auto-erythrocyte sensitization (H)      Erythromelalgia 02/2021     Reactive depression      Seasonal allergies 06/25/2020    Mostly resolved     Exam:  /72 (BP Location: Left arm, Patient Position: Supine)   Pulse 89   Temp 98.2  F (36.8  C) (Temporal)   Wound (used by [facility] only) 03/30/23 1011 Right dorsal foot other (see comments) (Active)   Thickness/Stage full thickness 05/15/23 0854   Base white;yellow 05/15/23 0854   Periwound redness 05/15/23 0854   Periwound Temperature warm 05/15/23 0854   Periwound Skin Turgor soft 05/15/23 0854   Length (cm) 1.5 05/15/23 0854   Width (cm) 0.8 05/15/23 0854   Depth (cm) 0.2 05/15/23 0854   Wound (cm^2) 1.2 cm^2 05/15/23 0854   Wound Volume (cm^3) 0.24 cm^3 05/15/23 0854   Wound healing % -55.84 05/15/23 0854   Drainage Characteristics/Odor serosanguineous 05/15/23 0854   Drainage Amount moderate 05/15/23 0854   Care, Wound non-select wound debridement performed 05/15/23 0854       Wound (used by [facility] only) 03/30/23 1012 Left dorsal foot other (see comments) (Active)   Thickness/Stage full thickness 05/15/23 0854   Base yellow;white 05/15/23 0854   Periwound redness 05/15/23 0854   Periwound Temperature warm 05/15/23 0854   Periwound Skin Turgor soft 05/15/23 0854   Edges open 05/15/23 0854   Length (cm) 0.5 05/15/23 0854   Width (cm) 0.3 05/15/23 0854   Depth (cm) 0.2 05/15/23 0854   Wound (cm^2) 0.15 cm^2 05/15/23 0854   Wound Volume (cm^3) 0.03 cm^3 05/15/23 0854   Wound healing % 46.43 05/15/23 0854   Drainage Characteristics/Odor serosanguineous 05/15/23 0854   Drainage Amount moderate 05/15/23 0854   Care, Wound non-select wound debridement  performed 05/02/23 0700       Wound (used by [facility] only) 03/30/23 1012 Left lateral 5 toe neuropathic ulcer (Active)   Thickness/Stage full thickness 05/15/23 0854   Base slough;pink 05/15/23 0854   Periwound pink 05/15/23 0854   Periwound Temperature warm 05/15/23 0854   Periwound Skin Turgor soft 05/15/23 0854   Edges open 05/15/23 0854   Length (cm) 0.8 05/15/23 0854   Width (cm) 0.6 05/15/23 0854   Depth (cm) 0.2 05/15/23 0854   Wound (cm^2) 0.48 cm^2 05/15/23 0854   Wound Volume (cm^3) 0.1 cm^3 05/15/23 0854   Wound healing % 33.33 05/15/23 0854   Drainage Characteristics/Odor serosanguineous 05/15/23 0854   Drainage Amount moderate 05/15/23 0854   Care, Wound non-select wound debridement performed 05/15/23 0854       Wound (used by [facility] only) 03/30/23 1014 Right lateral 5 toe other (see comments) (Active)   Thickness/Stage full thickness 05/15/23 0854   Base slough;black eschar 05/15/23 0854   Periwound pink 05/15/23 0854   Periwound Temperature warm 05/15/23 0854   Periwound Skin Turgor soft 05/15/23 0854   Edges open 05/15/23 0854   Length (cm) 1.9 05/15/23 0854   Width (cm) 1.1 05/15/23 0854   Depth (cm) 0.3 05/15/23 0854   Wound (cm^2) 2.09 cm^2 05/15/23 0854   Wound Volume (cm^3) 0.63 cm^3 05/15/23 0854   Wound healing % -30.63 05/15/23 0854   Drainage Characteristics/Odor serosanguineous 05/15/23 0854   Drainage Amount large 05/15/23 0854   Care, Wound non-select wound debridement performed 05/15/23 0854       Wound (used by [facility] only) 03/30/23 1023 Right plantar foot other (see comments) (Active)   Thickness/Stage full thickness 05/15/23 0854   Base pink 05/15/23 0854   Periwound redness 05/15/23 0854   Periwound Temperature warm 05/15/23 0854   Periwound Skin Turgor soft 05/15/23 0854   Edges open 05/15/23 0854   Length (cm) 0.8 05/02/23 0700   Width (cm) 1 05/02/23 0700   Depth (cm) 0.2 05/02/23 0700   Wound (cm^2) 0.8 cm^2 05/02/23 0700   Wound Volume (cm^3) 0.16 cm^3 05/02/23 0700   Wound  healing % 0 05/02/23 0700   Drainage Characteristics/Odor serosanguineous 05/15/23 0854   Drainage Amount moderate 05/15/23 0854   Care, Wound non-select wound debridement performed 05/02/23 0700       Wound (used by [facility] only) 05/15/23 0859 Left plantar foot fissure (Active)   Thickness/Stage full thickness 05/15/23 0854   Base dry 05/15/23 0854   Length (cm) 0.7 05/15/23 0854   Width (cm) 0.2 05/15/23 0854   Depth (cm) 0.3 05/15/23 0854   Wound (cm^2) 0.14 cm^2 05/15/23 0854   Wound Volume (cm^3) 0.04 cm^3 05/15/23 0854   Drainage Characteristics/Odor serosanguineous 05/15/23 0854   Drainage Amount moderate 05/15/23 0854   Care, Wound non-select wound debridement performed 05/15/23 0854       Wound (used by [facility] only) 05/15/23 0901 Left medial 1 metatarsal head other (see comments) (Active)   Thickness/Stage full thickness 05/15/23 0854   Base pink;slough 05/15/23 0854   Periwound redness 05/15/23 0854   Length (cm) 0.4 05/15/23 0854   Width (cm) 0.8 05/15/23 0854   Depth (cm) 0.2 05/15/23 0854   Wound (cm^2) 0.32 cm^2 05/15/23 0854   Wound Volume (cm^3) 0.06 cm^3 05/15/23 0854   Drainage Characteristics/Odor serosanguineous 05/15/23 0854   Drainage Amount moderate 05/15/23 0854   Care, Wound non-select wound debridement performed 05/15/23 0854       Wound (used by [facility] only) 05/15/23 0901 Right medial 1 metatarsal head other (see comments) (Active)   Thickness/Stage full thickness 05/15/23 0854   Base pink;slough 05/15/23 0854   Periwound redness 05/15/23 0854   Length (cm) 0.7 05/15/23 0854   Width (cm) 0.6 05/15/23 0854   Depth (cm) 0.2 05/15/23 0854   Wound (cm^2) 0.42 cm^2 05/15/23 0854   Wound Volume (cm^3) 0.08 cm^3 05/15/23 0854   Drainage Characteristics/Odor serosanguineous 05/15/23 0854   Drainage Amount moderate 05/15/23 0854   Care, Wound non-select wound debridement performed 05/15/23 0854       Wound (used by [facility] only) 05/15/23 0902 Right lateral;dorsal foot other (see comments) (Active)    Thickness/Stage full thickness 05/15/23 0854   Base pink;slough 05/15/23 0854   Length (cm) 0.6 05/15/23 0854   Width (cm) 0.3 05/15/23 0854   Depth (cm) 0.1 05/15/23 0854   Wound (cm^2) 0.18 cm^2 05/15/23 0854   Wound Volume (cm^3) 0.02 cm^3 05/15/23 0854   Drainage Characteristics/Odor serosanguineous 05/15/23 0854   Drainage Amount moderate 05/15/23 0854   Care, Wound non-select wound debridement performed 05/15/23 0854       Incision/Surgical Site 06/23/21 Left Arm (Active)     Regressive wound status right lateral fifth toe, too painful for debridement.  2 new ulcerations at the medial first metatarsals, no heel ulceration, palpable right and left dorsalis pedis pulse.  Erythema and edema right and left great toes, second toes.  Erythema today seems less as compared to previous examination.        Impression: Erythromelalgia, chronic pain    Plan: We will dress the wounds with 4% topical lidocaine spray, hydrogel hypochlorous acid spray, trial of manuka honey pH 3.9 on right fifth toe, right and left first metatarsal ulcerations.  Can also trial triamcinolone cream application on a daily basis.  Then place  EdemaWear dermal lymphatic stimulation open sock directly over top of skin and wounds.  Can spray the hydrogel hypochlorous acid on top of the EdemaWear over the wounds as needed.  Wear the EdemaWear 24/7.  Utilization of the foot CoolFlex Velcro over the EdemaWear, wear from morning till night.  Continuing relatively high-dose micronized purified flavonoid fraction dosing at 1 tablet 4 times per day for minimum 2 months, then decrease to 1 tablet  twice daily.  Consider trial of Neuroglide 30-60 minutes 2x/day.  She is pursuing oral options for pain management and has a appointment at [place] on Friday.  Encouraged patient to avoid cold exposure to feet as this may compromise microvascular perfusion, oxygen and nutrient delivery.  Discussed other options for management.  Patient will return to the clinic in  4 weeks time      Cameron Long MD on 5/15/2023 at 10:14 AM            Dictated using Dragon voice recognition software which may result in transcription errors

## 2023-05-15 NOTE — DISCHARGE INSTRUCTIONS
Pamyuliana Rivera      1985    A DME order for supplies has been placed to [facility]. If there are any issues with your order including not receiving the order please call [facility] at [phone] option 3. They can also provide a tracking number for you if you had supplies shipped to you.     Home Health Care: [facility] Phone: [phone] Fax:[phone]     Look into Mirragen; it is a form of micro crystalline glass and decreases pain during healing; Dr Long is looking into possibility to get you a trial amount of Mirragen.     Per your discussion with Dr. Long:  Try Acupuncture  Neuroglide  Chrissie Med Jg  Decrease salt to less than 25mg daily (check sodium levels in your electrolyte tablets)     Medications/supplements to aid in healing:  Vitamin D3 10,000 iu per day (for 3 months)  Tea C 1,000 mg take daily  Vitamin B Complex with folic acid take 1 tablet daily  Vitamin B 12 1000 mcg daily  Zinc 30mg capsule or tablet daily (ok to take 50mg if 30mg cannot be found)  Vitamin A 10,000 I.unit(s). daily for 3 weeks  Magnesium 400mg one tablet daily  -Vein Formula 1000mg. Take one (1) 500mg capsule four times per day for two months (4/10/23). Order from www.[employer] or [employer] or call [phone]. (The 1000mg is two (2) 500mg capsules) Once your wound is healed, take Vein Formula one (1) 500mg capsule daily for the rest of your life.     Wound Dressing Change: left and right dorsal feet, left and right 5th lateral toes, right medial first metatarsal and left medial first metatarsal and right dorsalateral foot  - Wash your hands with soap and water before you begin your dressing change and prepare a clean surface for dressings.  - Apply Epsom Salt soaked rolled gauze and soak for 30 minutes  - Apply Bactine Max lidocaine spray as needed during wound care process  - TRIAL of triamcinolone cream on left foot only  - Cleanse with mild unscented soap (such as  Cetaphil, Cerave or Dove) and water  - Apply Manuka Honey on right lateral 5th toe, right medial first metatarsal and left medial first metatarsal  - Apply Pure & Clean Hydrogel spray to all other wounds (available online, about $22 bottle, don't buy the wound cleanser, buy the Hydrogel)   - Apply Xspan to 1st/2nd toes bilaterally  - If able to tolerate, apply EdemaWear [place] stripe from just above toes to just below knees (moving from Purple to Navy Edema Wear)  - Apply 1 gauze 4x4 to each wound (on top of Edemawear); Secure with 1/2 roll gauze for each foot + Medipore tape  - Apply Coolflex wraps over the Edema Wear to both legs  Change every other day (or Daily, when pain stabilized)    Avoid cold packs when possible, as it reduces blood flow    EdemaWear should be worn 24/7 unless bathing/showering or changing the dressing. You will wash and reuse the EdemaWear. DO NOT CUT THE EDEMAWEAR. IF IT IS TOO LONG THEN CUFF THE EDEMAWEAR (the EdemaWear can shrink length wise with washing).      Compression:   Your compression is Coolflex and can be removed at night and put back on first thing in the morning.   Please remove compression dressing if toes turn blue and/or tingle and can not be relieved by raising the leg for one hour. If unable to reapply in the morning, keep compression on until next dressing change.     Whenever you sit raise your ankle above your hips to promote wound healing.     HETAL Long M.D. May 15, 2023    Call us at [phone] if you have any questions about your wounds, have redness or swelling around your wound, have a fever of 101 or greater or if you have any other problems or concerns. We answer the phone Monday through Friday 8 am to 4 pm, please leave a message as we check the voicemail frequently throughout the day.     If you had a positive experience please indicate that on your patient satisfaction survey form that [facility] will be sending you.    It was a pleasure meeting  with you today.  Thank you for allowing me and my team the privilege of caring for you today.  YOU are the reason we are here, and I truly hope we provided you with the excellent service you deserve.  Please let us know if there is anything else we can do for you so that we can be sure you are leaving completely satisfied with your care experience.      If you have any billing related questions please call the [facility] office at [phone]. The clinic staff does not handle billing related matters.    If you are scheduled to have a follow up appointment, you will receive a reminder call the day before your visit. On the appointment day please arrive 15 minutes prior to your appointment time. If you are unable to keep that appointment, please call the clinic to cancel or reschedule. If you are more than 10 minutes late or greater for your appointment, the clinic policy is that you may be asked to reschedule.

## 2023-05-15 NOTE — ADDENDUM NOTE
Encounter addended by: Evelia Contreras RN on: 5/15/2023 12:30 PM   Actions taken: Order list changed, Diagnosis association updated

## 2023-05-16 ENCOUNTER — MEDICAL CORRESPONDENCE (OUTPATIENT)
Dept: HEALTH INFORMATION MANAGEMENT | Facility: CLINIC | Age: 38
End: 2023-05-16
Payer: COMMERCIAL

## 2023-05-16 ENCOUNTER — TRANSFERRED RECORDS (OUTPATIENT)
Dept: HEALTH INFORMATION MANAGEMENT | Facility: CLINIC | Age: 38
End: 2023-05-16
Payer: COMMERCIAL

## 2023-05-22 ENCOUNTER — MYC MEDICAL ADVICE (OUTPATIENT)
Dept: INTERNAL MEDICINE | Facility: CLINIC | Age: 38
End: 2023-05-22
Payer: COMMERCIAL

## 2023-05-22 DIAGNOSIS — I73.81 ERYTHROMELALGIA (H): Primary | ICD-10-CM

## 2023-05-23 ENCOUNTER — DOCUMENTATION ONLY (OUTPATIENT)
Dept: INTERNAL MEDICINE | Facility: CLINIC | Age: 38
End: 2023-05-23
Payer: COMMERCIAL

## 2023-05-23 RX ORDER — GABAPENTIN 600 MG/1
TABLET ORAL
Qty: 150 TABLET | Refills: 11 | Status: SHIPPED | OUTPATIENT
Start: 2023-05-23 | End: 2024-07-20

## 2023-05-23 RX ORDER — GABAPENTIN 300 MG/1
CAPSULE ORAL
Qty: 30 CAPSULE | Refills: 11 | Status: SHIPPED | OUTPATIENT
Start: 2023-05-23 | End: 2024-08-01

## 2023-05-25 ENCOUNTER — DOCUMENTATION ONLY (OUTPATIENT)
Dept: INTERNAL MEDICINE | Facility: CLINIC | Age: 38
End: 2023-05-25
Payer: COMMERCIAL

## 2023-05-25 RX ORDER — FENTANYL 25 UG/1
1 PATCH TRANSDERMAL
Qty: 10 PATCH | Refills: 0 | Status: CANCELLED | OUTPATIENT
Start: 2023-05-25

## 2023-05-25 RX ORDER — FENTANYL 50 UG/1
1 PATCH TRANSDERMAL
Qty: 10 PATCH | Refills: 0 | Status: SHIPPED | OUTPATIENT
Start: 2023-05-25 | End: 2023-06-09

## 2023-05-25 NOTE — PROGRESS NOTES
Type of Form Received:     Form Received (Date) 5/25/23   Form Filled out No   Placed in provider folder Yes

## 2023-05-29 ENCOUNTER — MEDICAL CORRESPONDENCE (OUTPATIENT)
Dept: HEALTH INFORMATION MANAGEMENT | Facility: CLINIC | Age: 38
End: 2023-05-29
Payer: COMMERCIAL

## 2023-06-02 ENCOUNTER — VIRTUAL VISIT (OUTPATIENT)
Dept: INTERNAL MEDICINE | Facility: CLINIC | Age: 38
End: 2023-06-02
Payer: COMMERCIAL

## 2023-06-02 DIAGNOSIS — F32.9 REACTIVE DEPRESSION: ICD-10-CM

## 2023-06-02 DIAGNOSIS — I73.81 ERYTHROMELALGIA (H): ICD-10-CM

## 2023-06-02 PROCEDURE — 99214 OFFICE O/P EST MOD 30 MIN: CPT | Mod: VID | Performed by: PEDIATRICS

## 2023-06-02 RX ORDER — HYDROMORPHONE HYDROCHLORIDE 2 MG/1
2-4 TABLET ORAL EVERY 6 HOURS PRN
Qty: 210 TABLET | Refills: 0 | Status: CANCELLED | OUTPATIENT
Start: 2023-06-02

## 2023-06-02 NOTE — NURSING NOTE
Is the patient currently in the state of [place]? YES    Visit mode:VIDEO    If the visit is dropped, the patient can be reconnected by: VIDEO VISIT: Send to e-mail at: [email or web address]    Will anyone else be joining the visit? NO      How would you like to obtain your AVS? MyChart    Are changes needed to the allergy or medication list? NO    Reason for visit: MANUELITO Melo VF

## 2023-06-02 NOTE — PATIENT INSTRUCTIONS
Thank you for visiting the Primary Care Center today at the [facility]! The following is some information about our clinic:     Primary Care Center Frequently-Asked Questions    (1) How do I schedule appointments at the [facility]?     Primary Care--to schedule or make changes to an existing appointment, please call our primary care line at [phone].    Labs--to schedule a lab appointment at the [facility] you can use [employer] or call [phone]. If you have a [place] location that is closer to home, you can reach out to that location for scheduling options.     Imaging--if you need to schedule a CT, X-ray, MRI, ultrasound, or other imaging study you can call [phone] to schedule at the [facility] or any other [facility] imaging location.     Referrals--if a referral to another specialty was ordered you can expect a phone call from their scheduling team. If you have not heard from them in a week, please call us or send us a [employer] message to check the status or get a scheduling number. Please note that this only applies to internal [facility] referrals. If the referral is external you would need to contact their office for scheduling.     (2) I have a question about my visit, who do I contact?     You can call us at the primary care line at [phone] to ask questions about your visit. You can also send a secure message through [employer], which is reviewed by clinic staff. Please note that [employer] messages have a twenty-four to forty-eight business hour turnaround time and should not be used for urgent concerns.    (3) How will I get the results of my tests?    If you are signed up for [employer] all tests will be released to you within twenty-four hours of resulting. Please allow three to five days for your doctor to review your results and place a note interpreting the results. If you do not have [facility] you will receive your  results through mail seven to ten business days following the return of the tests. Please note that if there should be any urgent or concerning results that your doctor or their registered nurse will reach out to you the same day as the tests come back. If you have follow up questions about your results or would like to discuss the results in detail please schedule a follow up with your provider either in person or virtually.     (4) How do I get refills of my prescriptions?     You should always first contact your pharmacy for refills of your medications. If submitting a refill request on [employer], please be sure to submit the request only once--repeat requests can cause delays in refill. If you are requesting a NEW medication or a medication related to new symptoms you will need to schedule an appointment with a provider prior to approval. Please note: Routine medication refills have up to one to three business day turnaround whereas controlled substances refills have up to five to seven business day turnaround.    (5) I have new symptoms, what do I do?     If you are having an immediate medical emergency, you should dial 911 for assistance.   For anything urgent that needs to be seen within a few hours to one day you should visit a local urgent care for assistance.  For non-urgent symptoms that need to be seen within a few days to a week you can schedule with an available provider in primary care by going to [employer] or calling [phone].   If you are not sure how serious your symptoms are or you would like to receive medical advice you can always call [phone] to speak with a triage nurse.

## 2023-06-05 ENCOUNTER — HOSPITAL ENCOUNTER (OUTPATIENT)
Dept: WOUND CARE | Facility: CLINIC | Age: 38
Discharge: HOME | End: 2023-06-05
Attending: SURGERY
Payer: COMMERCIAL

## 2023-06-05 DIAGNOSIS — S91.104A OPEN WOUND OF FIFTH TOE OF RIGHT FOOT, INITIAL ENCOUNTER: ICD-10-CM

## 2023-06-05 DIAGNOSIS — S91.105A OPEN WOUND OF FIFTH TOE OF LEFT FOOT, INITIAL ENCOUNTER: Primary | ICD-10-CM

## 2023-06-05 DIAGNOSIS — L97.522 FOOT ULCER, LEFT, WITH FAT LAYER EXPOSED (H): ICD-10-CM

## 2023-06-05 DIAGNOSIS — L97.512 FOOT ULCER, RIGHT, WITH FAT LAYER EXPOSED (H): ICD-10-CM

## 2023-06-05 DIAGNOSIS — I73.81 ERYTHROMELALGIA (H): ICD-10-CM

## 2023-06-05 PROCEDURE — 97602 WOUND(S) CARE NON-SELECTIVE: CPT | Mod: TEL,95

## 2023-06-05 PROCEDURE — 99213 OFFICE O/P EST LOW 20 MIN: CPT | Mod: TEL | Performed by: SURGERY

## 2023-06-05 RX ORDER — LIDOCAINE HYDROCHLORIDE 40 MG/ML
SOLUTION TOPICAL DAILY
Qty: 600 ML | Refills: 2 | Status: SHIPPED | OUTPATIENT
Start: 2023-06-05

## 2023-06-05 RX ORDER — SULFAMETHOXAZOLE AND TRIMETHOPRIM 400; 80 MG/1; MG/1
1 TABLET ORAL DAILY
Qty: 90 TABLET | Refills: 0 | Status: SHIPPED | OUTPATIENT
Start: 2023-06-05 | End: 2024-01-22

## 2023-06-05 NOTE — DISCHARGE INSTRUCTIONS
Pamyuliana Rivera      1985    A DME order for supplies has been placed to [facility]. If there are any issues with your order including not receiving the order please call [facility] at [phone] option 3. They can also provide a tracking number for you if you had supplies shipped to you.     Home Health Care: [facility] Phone: [phone] Fax:[phone]     Please call to schedule your x-rays and MRI at [facility]: [phone]      Per your discussion with Dr. Long:  -Acupuncture  -Neuroglide (Chrissie MedTec)  -Decrease salt to less than 25mg daily (check sodium levels in your electrolyte tablets)  -Consider Hyperbaric Oxygen Therapy in future - (it would likely be out of pocket unless you had a bone infection, likely $1,000 per week for 2-3 weeks) Email Lindsay Cormier at [employer] [email or web address] for more information  -Consider Photomodulation/Red Light Therapy in future - (it would likely be out of pocket)     Medications/supplements to aid in healing:  Vitamin D3 10,000 iu per day (for 3 months)  Tea C 1,000 mg take daily  Vitamin B Complex with folic acid take 1 tablet daily  Vitamin B 12 1000 mcg daily  Zinc 30mg capsule or tablet daily (ok to take 50mg if 30mg cannot be found)  Vitamin A 10,000 I.unit(s). daily for 3 weeks  Magnesium 400mg one tablet daily  -Vein Formula 1000mg. Take one (1) 500mg capsule four times per day for two months (4/10/23). Order from www.Wooop or [employer] or call [phone]. (The 1000mg is two (2) 500mg capsules) Once your wound is healed, take Vein Formula one (1) 500mg capsule daily for the rest of your life.  -Bactrim renewed. Probiotics can help reduce side effects from antibiotics.      Wound Dressing Change: left and right dorsal feet, left and right 5th lateral toes, right medial first metatarsal and left medial first metatarsal and right dorsalateral foot  - Wash your hands with soap and water  before you begin your dressing change and prepare a clean surface for dressings.  - Apply Bactine Max lidocaine spray as needed during wound care process  - Cleanse with mild unscented soap (such as Cetaphil, Cerave or Dove) and water  - Apply small amount of VASHE on gauze, lay into wound bed, let sit for 10 minutes, remove gauze (do not rinse) then apply dressing:   - Apply Activon Manuka Honey on any deeper/yellow wounds (right lateral 5th toe, right medial first metatarsal and left medial first metatarsal)  - Apply Pure & Clean Hydrogel spray to all other wounds (available online, about $22 bottle, don't buy the wound cleanser, buy the Hydrogel)   - Apply size 1 Xspan to 1st/2nd toes bilaterally  - If able to tolerate, apply purple or navy EdemaWear [place] stripe from just above toes to just below knees (or Spandage if Edemawear is not tolerated)  - Apply Zetuvit plus silicone border (or another silicone dressing); 2x2 to left and right 5th lateral toes, right medial first metatarsal and left medial first metatarsal and 4x4 to left and right dorsal feet and right dorsalateral foot  Change every other day      Avoid cold packs when possible, as it reduces blood flow  Hold on Coolflex compression for now, as it is causing pain.    To buy more Activon Honey, try www.Torrent LoadingSystems or [employer]  To buy more Xspan, try [employer].TweetPhoto     EdemaWear should be worn 24/7 unless bathing/showering or changing the dressing. You will wash and reuse the EdemaWear. DO NOT CUT THE EDEMAWEAR. IF IT IS TOO LONG THEN CUFF THE EDEMAWEAR (the EdemaWear can shrink length wise with washing).      Whenever you sit raise your ankle above your hips to promote wound healing.     HETAL Long M.D. June 5, 2023    Call us at [phone] if you have any questions about your wounds, have redness or swelling around your wound, have a fever of 101 degrees Fahrenheit or greater or if you have any other problems or concerns. We answer the  phone Monday through Friday 8 am to 4 pm, please leave a message as we check the voicemail frequently throughout the day.     If you had a positive experience please indicate that on your patient satisfaction survey form that [facility] will be sending you.    It was a pleasure meeting with you today.  Thank you for allowing me and my team the privilege of caring for you today.  YOU are the reason we are here, and I truly hope we provided you with the excellent service you deserve.  Please let us know if there is anything else we can do for you so that we can be sure you are leaving completely satisfied with your care experience.      If you have any billing related questions please call the [facility] Business office at [phone]. The clinic staff does not handle billing related matters.    If you are scheduled to have a follow up appointment, you will receive a reminder call the day before your visit. On the appointment day please arrive 15 minutes prior to your appointment time. If you are unable to keep that appointment, please call the clinic to cancel or reschedule. If you are more than 10 minutes late or greater for your scheduled appointment time, the clinic policy is that you may be asked to reschedule.

## 2023-06-05 NOTE — PROGRESS NOTES
Phone-Visit Details    Type of service:  Phone Visit    Length of call: 15 minutes    Originating Location (pt. Location): Home    Distant Location (provider location):  [facility] WOUND CLIN[facility] [place]     Mode of Communication:  Telephone    Provider has received verbal consent for a Telephone Visit from the patient? [facility] Wound Healing [place] Progress Note    Subject: Ronna Rivera erythromelalgia, regressive wound involving the lateral aspects of the right and left fifth toes, ordering MRI to evaluate for potential osteomyelitis including x-rays of both feet.  She is avoiding cold therapy.  She remains on adjunct of micronutrients and micronized purified flavonoid fraction.  Ideally the Diosmin Hesperidan flavanoid aspect of the micronized purified flavonoid fraction decreases microvascular hyper permeability and assist in management of lower extremity interstitial edema.  She is having difficulty tolerating the navy strip EdemaWear, also has CoolFlex and elastic compression though also having difficulty utilizing this.  She does perform significant leg elevation.  She is not on amlodipine.  We have discussed nontraditional options for management including a manual lymphatic drainage mat called neuro glide and the potential of hyperbaric oxygen, might be a covered therapy depending on results of MRI of the feet.    Patient Active Problem List   Diagnosis     Other chronic pain     Rash and nonspecific skin eruption     Erythromelalgia (H)     Anxiety     DDD (degenerative disc disease), lumbar     Migraine with aura and without status migrainosus, not intractable     Chronic insomnia     Asthma, exercise induced     Hypermobile joints     Vasovagal syncope     Autonomic dysfunction     Impaired mobility     Abnormal TSH     Need for pneumocystis prophylaxis     Medical marijuana use     Headache, drug induced (IVIG)     Adrenal suppression (H)     Menstrual suppression because of  erythromelalagia     Reactive depression     Recurrent herpes labialis     PICC (peripherally inserted central catheter) in place     Foot ulcer, right, with fat layer exposed (H)     Foot ulcer, left, with fat layer exposed (H)     Open wound of fifth toe of right foot, initial encounter     Open wound of fifth toe of left foot, initial encounter     Past Medical History:   Diagnosis Date     Auto-erythrocyte sensitization (H)      Erythromelalgia 02/2021     Reactive depression      Seasonal allergies 06/25/2020    Mostly resolved     Exam:  There were no vitals taken for this visit.  Wound (used by OP I only) 03/30/23 1011 Right dorsal foot other (see comments) (Active)   Thickness/Stage full thickness 05/15/23 0854   Base white;yellow 05/15/23 0854   Periwound redness 05/15/23 0854   Periwound Temperature warm 05/15/23 0854   Periwound Skin Turgor soft 05/15/23 0854   Length (cm) 1.5 05/15/23 0854   Width (cm) 0.8 05/15/23 0854   Depth (cm) 0.2 05/15/23 0854   Wound (cm^2) 1.2 cm^2 05/15/23 0854   Wound Volume (cm^3) 0.24 cm^3 05/15/23 0854   Wound healing % -55.84 05/15/23 0854   Drainage Characteristics/Odor serosanguineous 05/15/23 0854   Drainage Amount moderate 05/15/23 0854   Care, Wound non-select wound debridement performed 05/15/23 0854       Wound (used by OP I only) 03/30/23 1012 Left dorsal foot other (see comments) (Active)   Thickness/Stage full thickness 05/15/23 0854   Base yellow;white 05/15/23 0854   Periwound redness 05/15/23 0854   Periwound Temperature warm 05/15/23 0854   Periwound Skin Turgor soft 05/15/23 0854   Edges open 05/15/23 0854   Length (cm) 0.5 05/15/23 0854   Width (cm) 0.3 05/15/23 0854   Depth (cm) 0.2 05/15/23 0854   Wound (cm^2) 0.15 cm^2 05/15/23 0854   Wound Volume (cm^3) 0.03 cm^3 05/15/23 0854   Wound healing % 46.43 05/15/23 0854   Drainage Characteristics/Odor serosanguineous 05/15/23 0854   Drainage Amount moderate 05/15/23 0854   Care, Wound non-select wound  debridement performed 05/02/23 0700       Wound (used by [facility] only) 03/30/23 1012 Left lateral 5 toe neuropathic ulcer (Active)   Thickness/Stage full thickness 05/15/23 0854   Base slough;pink 05/15/23 0854   Periwound pink 05/15/23 0854   Periwound Temperature warm 05/15/23 0854   Periwound Skin Turgor soft 05/15/23 0854   Edges open 05/15/23 0854   Length (cm) 0.8 05/15/23 0854   Width (cm) 0.6 05/15/23 0854   Depth (cm) 0.2 05/15/23 0854   Wound (cm^2) 0.48 cm^2 05/15/23 0854   Wound Volume (cm^3) 0.1 cm^3 05/15/23 0854   Wound healing % 33.33 05/15/23 0854   Drainage Characteristics/Odor serosanguineous 05/15/23 0854   Drainage Amount moderate 05/15/23 0854   Care, Wound non-select wound debridement performed 05/15/23 0854       Wound (used by [facility] only) 03/30/23 1014 Right lateral 5 toe other (see comments) (Active)   Thickness/Stage full thickness 05/15/23 0854   Base slough;black eschar 05/15/23 0854   Periwound pink 05/15/23 0854   Periwound Temperature warm 05/15/23 0854   Periwound Skin Turgor soft 05/15/23 0854   Edges open 05/15/23 0854   Length (cm) 1.9 05/15/23 0854   Width (cm) 1.1 05/15/23 0854   Depth (cm) 0.3 05/15/23 0854   Wound (cm^2) 2.09 cm^2 05/15/23 0854   Wound Volume (cm^3) 0.63 cm^3 05/15/23 0854   Wound healing % -30.63 05/15/23 0854   Drainage Characteristics/Odor serosanguineous 05/15/23 0854   Drainage Amount large 05/15/23 0854   Care, Wound non-select wound debridement performed 05/15/23 0854       Wound (used by [facility] only) 03/30/23 1023 Right plantar foot other (see comments) (Active)   Thickness/Stage full thickness 05/15/23 0854   Base pink 05/15/23 0854   Periwound redness 05/15/23 0854   Periwound Temperature warm 05/15/23 0854   Periwound Skin Turgor soft 05/15/23 0854   Edges open 05/15/23 0854   Length (cm) 0.8 05/02/23 0700   Width (cm) 1 05/02/23 0700   Depth (cm) 0.2 05/02/23 0700   Wound (cm^2) 0.8 cm^2 05/02/23 0700   Wound Volume (cm^3) 0.16 cm^3 05/02/23 0700    Wound healing % 0 05/02/23 0700   Drainage Characteristics/Odor serosanguineous 05/15/23 0854   Drainage Amount moderate 05/15/23 0854   Care, Wound non-select wound debridement performed 05/02/23 0700       Wound (used by [facility] only) 05/15/23 0859 Left plantar foot fissure (Active)   Thickness/Stage full thickness 05/15/23 0854   Base dry 05/15/23 0854   Length (cm) 0.7 05/15/23 0854   Width (cm) 0.2 05/15/23 0854   Depth (cm) 0.3 05/15/23 0854   Wound (cm^2) 0.14 cm^2 05/15/23 0854   Wound Volume (cm^3) 0.04 cm^3 05/15/23 0854   Drainage Characteristics/Odor serosanguineous 05/15/23 0854   Drainage Amount moderate 05/15/23 0854   Care, Wound non-select wound debridement performed 05/15/23 0854       Wound (used by [facility] only) 05/15/23 0901 Left medial 1 metatarsal head other (see comments) (Active)   Thickness/Stage full thickness 05/15/23 0854   Base pink;slough 05/15/23 0854   Periwound redness 05/15/23 0854   Length (cm) 0.4 05/15/23 0854   Width (cm) 0.8 05/15/23 0854   Depth (cm) 0.2 05/15/23 0854   Wound (cm^2) 0.32 cm^2 05/15/23 0854   Wound Volume (cm^3) 0.06 cm^3 05/15/23 0854   Drainage Characteristics/Odor serosanguineous 05/15/23 0854   Drainage Amount moderate 05/15/23 0854   Care, Wound non-select wound debridement performed 05/15/23 0854       Wound (used by [facility] only) 05/15/23 0901 Right medial 1 metatarsal head other (see comments) (Active)   Thickness/Stage full thickness 05/15/23 0854   Base pink;slough 05/15/23 0854   Periwound redness 05/15/23 0854   Length (cm) 0.7 05/15/23 0854   Width (cm) 0.6 05/15/23 0854   Depth (cm) 0.2 05/15/23 0854   Wound (cm^2) 0.42 cm^2 05/15/23 0854   Wound Volume (cm^3) 0.08 cm^3 05/15/23 0854   Drainage Characteristics/Odor serosanguineous 05/15/23 0854   Drainage Amount moderate 05/15/23 0854   Care, Wound non-select wound debridement performed 05/15/23 0854       Wound (used by [facility] only) 05/15/23 0902 Right lateral;dorsal foot other (see comments)  (Active)   Thickness/Stage full thickness 05/15/23 0854   Base pink;slough 05/15/23 0854   Length (cm) 0.6 05/15/23 0854   Width (cm) 0.3 05/15/23 0854   Depth (cm) 0.1 05/15/23 0854   Wound (cm^2) 0.18 cm^2 05/15/23 0854   Wound Volume (cm^3) 0.02 cm^3 05/15/23 0854   Drainage Characteristics/Odor serosanguineous 05/15/23 0854   Drainage Amount moderate 05/15/23 0854   Care, Wound non-select wound debridement performed 05/15/23 0854       Incision/Surgical Site 06/23/21 Left Arm (Active)     Telehealth visit, photographs reviewed, the right and left fifth lateral toe wounds appear somewhat regressive, concern for underlying potential osteomyelitis, ordering MRI      Impression: Erythromelalgia with chronic bilateral extremity ulcerations, concern for right and left fifth toe osteomyelitis    Plan: MRI to be ordered, patient will proactively contact the imaging scheduling at [facility] to arrange for MRI right and left feet with gadolinium and 2 view x-ray of feet for correlation for radiology interpretation.    Wound Dressing Change: left and right dorsal feet, left and right 5th lateral toes, right medial first metatarsal and left medial first metatarsal and right dorsalateral foot  - Wash your hands with soap and water before you begin your dressing change and prepare a clean surface for dressings.  - Apply Bactine Max lidocaine spray as needed during wound care process  - Cleanse with mild unscented soap (such as Cetaphil, Cerave or Dove) and water  - Apply small amount of VASHE on gauze, lay into wound bed, let sit for 10 minutes, remove gauze (do not rinse) then apply dressing:   - Apply Activon Manuka Honey on any deeper/yellow wounds (right lateral 5th toe, right medial first metatarsal and left medial first metatarsal)  - Apply Pure & Clean Hydrogel spray to all other wounds (available online, about $22 bottle, don't buy the wound cleanser, buy the Hydrogel)   - Apply size 1 Xspan to 1st/2nd toes  bilaterally  - If able to tolerate, apply purple or navy EdemaWear [place] stripe from just above toes to just below knees (or Spandage if Edemawear is not tolerated)  - Apply 1 gauze 4x4 to each wound (on top of Edemawear); Secure with 1/2 roll gauze for each foot + Medipore tape  Change every other day (or Daily, when pain stabilized)     Avoid cold packs when possible, as it reduces blood flow  Patient will return to the clinic in 4 weeks time, has additional follow-up at [facility] erythromel[facility].      Further instructions from your care team       Ronna Rivera      1985    A DME order for supplies has been placed to [facility]. If there are any issues with your order including not receiving the order please call [facility] at [phone] option 3. They can also provide a tracking number for you if you had supplies shipped to you.     Home Health Care: [facility] Phone: [phone] Fax:[phone]     Please call to schedule your x-rays and MRI at [facility]: [phone]      Per your discussion with Dr. Long:  -Acupuncture  -Neuroglide (Chrissie MedTec)  -Decrease salt to less than 25mg daily (check sodium levels in your electrolyte tablets)  -Consider Hyperbaric Oxygen Therapy in future - (it would likely be out of pocket unless you had a bone infection, likely $1,000 per week for 2-3 weeks) Email Lindsay Cormier at [employer] [email or web address] for more information  -Consider Photomodulation/Red Light Therapy in future - (it would likely be out of pocket)     Medications/supplements to aid in healing:  Vitamin D3 10,000 iu per day (for 3 months)  Tea C 1,000 mg take daily  Vitamin B Complex with folic acid take 1 tablet daily  Vitamin B 12 1000 mcg daily  Zinc 30mg capsule or tablet daily (ok to take 50mg if 30mg cannot be found)  Vitamin A 10,000 I.unit(s). daily for 3 weeks  Magnesium 400mg one tablet daily  -Vein Formula 1000mg. Take  one (1) 500mg capsule four times per day for two months (4/10/23). Order from www.AppsFlyer or [employer] or call [phone]. (The 1000mg is two (2) 500mg capsules) Once your wound is healed, take Vein Formula one (1) 500mg capsule daily for the rest of your life.  -Bactrim renewed. Probiotics can help reduce side effects from antibiotics.      Wound Dressing Change: left and right dorsal feet, left and right 5th lateral toes, right medial first metatarsal and left medial first metatarsal and right dorsalateral foot  - Wash your hands with soap and water before you begin your dressing change and prepare a clean surface for dressings.  - Apply Bactine Max lidocaine spray as needed during wound care process  - Cleanse with mild unscented soap (such as Cetaphil, Cerave or Dove) and water  - Apply small amount of VASHE on gauze, lay into wound bed, let sit for 10 minutes, remove gauze (do not rinse) then apply dressing:   - Apply Activon Manuka Honey on any deeper/yellow wounds (right lateral 5th toe, right medial first metatarsal and left medial first metatarsal)  - Apply Pure & Clean Hydrogel spray to all other wounds (available online, about $22 bottle, don't buy the wound cleanser, buy the Hydrogel)   - Apply size 1 Xspan to 1st/2nd toes bilaterally  - If able to tolerate, apply purple or navy EdemaWear [place] stripe from just above toes to just below knees (or Spandage if Edemawear is not tolerated)  - Apply 1 gauze 4x4 to each wound (on top of Edemawear); Secure with 1/2 roll gauze for each foot + Medipore tape  Change every other day (or Daily, when pain stabilized)     Avoid cold packs when possible, as it reduces blood flow  Hold on Coolflex compression for now, as it is causing pain.    To buy more Activon Honey, try [employer] or [employer]  To buy more Xspan, try [employer]     EdemaWear should be worn 24/7 unless bathing/showering or changing the dressing. You will wash and reuse the  EdemaWear. DO NOT CUT THE EDEMAWEAR. IF IT IS TOO LONG THEN CUFF THE EDEMAWEAR (the EdemaWear can shrink length wise with washing).      Whenever you sit raise your ankle above your hips to promote wound healing.     HETAL Long M.D. June 5, 2023    Call us at [phone] if you have any questions about your wounds, have redness or swelling around your wound, have a fever of 101 degrees Fahrenheit or greater or if you have any other problems or concerns. We answer the phone Monday through Friday 8 am to 4 pm, please leave a message as we check the voicemail frequently throughout the day.     If you had a positive experience please indicate that on your patient satisfaction survey form that [facility] will be sending you.    It was a pleasure meeting with you today.  Thank you for allowing me and my team the privilege of caring for you today.  YOU are the reason we are here, and I truly hope we provided you with the excellent service you deserve.  Please let us know if there is anything else we can do for you so that we can be sure you are leaving completely satisfied with your care experience.      If you have any billing related questions please call the [facility] Business office at [phone]. The clinic staff does not handle billing related matters.    If you are scheduled to have a follow up appointment, you will receive a reminder call the day before your visit. On the appointment day please arrive 15 minutes prior to your appointment time. If you are unable to keep that appointment, please call the clinic to cancel or reschedule. If you are more than 10 minutes late or greater for your scheduled appointment time, the clinic policy is that you may be asked to reschedule.           Cameron Long MD on 6/5/2023 at 3:16 PM                 Dictated using Dragon voice recognition software which may result in transcription errors

## 2023-06-06 ENCOUNTER — MYC MEDICAL ADVICE (OUTPATIENT)
Dept: INTERNAL MEDICINE | Facility: CLINIC | Age: 38
End: 2023-06-06
Payer: COMMERCIAL

## 2023-06-06 ENCOUNTER — TELEPHONE (OUTPATIENT)
Dept: WOUND CARE | Facility: CLINIC | Age: 38
End: 2023-06-06
Payer: COMMERCIAL

## 2023-06-06 ENCOUNTER — MYC REFILL (OUTPATIENT)
Dept: INTERNAL MEDICINE | Facility: CLINIC | Age: 38
End: 2023-06-06
Payer: COMMERCIAL

## 2023-06-06 DIAGNOSIS — I73.81 ERYTHROMELALGIA (H): ICD-10-CM

## 2023-06-06 RX ORDER — HYDROMORPHONE HYDROCHLORIDE 2 MG/1
2-4 TABLET ORAL EVERY 6 HOURS PRN
Qty: 210 TABLET | Refills: 0 | Status: SHIPPED | OUTPATIENT
Start: 2023-06-06 | End: 2023-06-07

## 2023-06-06 NOTE — TELEPHONE ENCOUNTER
Patient called- needing urgent refill of dilaudid, will send to provider again.    Patricio Rojas RN on 6/6/2023 at 4:20 PM

## 2023-06-06 NOTE — TELEPHONE ENCOUNTER
April, RN with [facility] calling regarding the patients dilaudid, the pharmacy has not received any notice on the increase dose, so the patient is almost out. Caller is requesting a call back ASAP to discuss the situation. [phone].

## 2023-06-06 NOTE — TELEPHONE ENCOUNTER
Patient calling again checking on the status of this, she says she [facility] in the day to be able to pick this up, patient states that she will have to go to the ED if the medication isn't available today for her. She requests a phone call back.

## 2023-06-06 NOTE — TELEPHONE ENCOUNTER
[facility] Call Center    Phone Message    May a detailed message be left on voicemail: yes     Reason for Call: Medication Refill Request    Has the patient contacted the pharmacy for the refill? Yes   Name of medication being requested: Hydromorphone  Provider who prescribed the medication: Dr Ball   Pharmacy:      [facility] [address] [address]     Date medication is needed: Today 6/6/2023  Patient is waiting for the request and she has put in her request and sent a message to Dr Ball as well.     Action Taken: Message routed to:  [facility]    Travel Screening: Not Applicable

## 2023-06-06 NOTE — TELEPHONE ENCOUNTER
Order for MRI was entered as inpatient, please correct order to show outpatient  [place]  [phone]

## 2023-06-06 NOTE — ADDENDUM NOTE
Encounter addended by: Evelia Contreras RN on: 6/6/2023 1:43 PM   Actions taken: Order list changed, Diagnosis association updated

## 2023-06-07 ENCOUNTER — MYC MEDICAL ADVICE (OUTPATIENT)
Dept: INTERNAL MEDICINE | Facility: CLINIC | Age: 38
End: 2023-06-07
Payer: COMMERCIAL

## 2023-06-07 RX ORDER — HYDROMORPHONE HYDROCHLORIDE 2 MG/1
2-4 TABLET ORAL EVERY 6 HOURS PRN
Qty: 210 TABLET | Refills: 0 | Status: SHIPPED | OUTPATIENT
Start: 2023-06-07 | End: 2023-06-28

## 2023-06-07 NOTE — TELEPHONE ENCOUNTER
Patient called again to get this sent to [facility] on [address].     Farooq Munroe, EMT at 4:32 PM on 6/7/2023

## 2023-06-07 NOTE — TELEPHONE ENCOUNTER
[facility] Call Center    Phone Message    May a detailed message be left on voicemail: yes     Reason for Call: Medication Refill Request    Has the patient contacted the pharmacy for the refill? Yes   Name of medication being requested:  Hydromorphone  Provider who prescribed the medication: Dr Ball  Pharmacy:         [facility] [address] [place] [address]    Date medication is needed: ASAP  This store has the supply the patient needs [facility] . The other store was out of stock.  Please call the patient  when the medication has been sent to the pharmacy. Thank you!     Action Taken: Message routed to:  [facility]    Travel Screening: Not Applicable

## 2023-06-08 ENCOUNTER — TELEPHONE (OUTPATIENT)
Dept: WOUND CARE | Facility: CLINIC | Age: 38
End: 2023-06-08
Payer: COMMERCIAL

## 2023-06-08 ENCOUNTER — MYC MEDICAL ADVICE (OUTPATIENT)
Dept: INTERNAL MEDICINE | Facility: CLINIC | Age: 38
End: 2023-06-08
Payer: COMMERCIAL

## 2023-06-09 RX ORDER — FENTANYL 50 UG/1
1 PATCH TRANSDERMAL
Qty: 10 PATCH | Refills: 0 | Status: SHIPPED | OUTPATIENT
Start: 2023-06-25 | End: 2023-07-11

## 2023-06-09 RX ORDER — VENLAFAXINE HYDROCHLORIDE 75 MG/1
75 CAPSULE, EXTENDED RELEASE ORAL DAILY
Qty: 90 CAPSULE | Refills: 3 | Status: SHIPPED | OUTPATIENT
Start: 2023-06-09 | End: 2024-07-10

## 2023-06-09 NOTE — ASSESSMENT & PLAN NOTE
Erythromelalgia  She is being treated with steroids; many other meds have been in the past.  There is a plan to do several plasmapheresis treatments, via [facility]. Appt somewhat far out - I suggested she check with [facility], which may have sooner availability.  Has [facility] appointment to trial pain pump. They are hoping to use morphine and target it towards her feet.   Further testing from [facility] (e.g. looking for cancer) has been negative. There is a plan to try a biopsy later this month.  Pain medicine. Has had benefit from fentanyl, has been trying to use more     Ulcers on left foot are healing. The right foot is more concerning. Has an appt with wound care again soon, and sending photos.   Developing some new areas on the inside of her toe and achilles with 'skin pulling' and turning yellowish. She is putting dressings in that area. Trying to wear edema controlling garments, but she feels this is making the problem worse [this may not be surprising, with the pressure]

## 2023-06-12 ENCOUNTER — DOCUMENTATION ONLY (OUTPATIENT)
Dept: INTERNAL MEDICINE | Facility: CLINIC | Age: 38
End: 2023-06-12
Payer: COMMERCIAL

## 2023-06-12 NOTE — PROGRESS NOTES
"  Pain Clinic New Patient Consult Note:    Referring Provider: Jaimee   Primary care provider: Neal Ball.    Ronna Rivera is a 37 year old y.o. old female who presents to the pain clinic with pain from erythromelalgia. She is here today with her  who is very supportive.     HPI:  Patient Supplied Answers To the  Pain Questionnaire      3/15/2023    10:31 PM   [facility] -  Patient Entered Questionnaire/Answers   What number best describes your pain right now:  0 = No pain  to  10 = Worst pain imaginable 7   How would you describe the pain burning    sharp    cutting    throbbing    pressure    other   Which of the following worsen your pain standing    sitting    walking    exercise   Which of the following improve or reduce your pain lying down    medication   What number best describes your average pain for the past week:  0 = No pain  to  10 = Worst pain imaginable 8   What number best describes your LOWEST pain in past 24 hours:  0 = No pain  to  10 = Worst pain imaginable 7   What number best describes your WORST pain in past 24 hours:  0 = No pain  to  10 = Worst pain imaginable 10   When is your pain worst PM     Her pain started in February 2021 with a rash on her right foot the foot began to turn red and have burning sensation, this then spread to her left foot. The pain is a \"nerve pain\" with electrical shocks. Her feet would also swell. Her hands, cheeks, lips, ears and nose also became involved. She was initially treated by her dermatologist and when these treatments weren't working they suspected erythromelalgia and [facility] and [facility] done and was officially diagnosed with EM. In April 2021 she was hospitalized at [facility] to stabilize her condition and treat the pain, after this she was referred to [facility] for ongoing pain management. Over the summer of 2021 she was treated with high dose IV steroids for 20 " "weeks and she slowly regained function and was able to walk and was tapered off pain medications. She regained approximately 50% of her function and was managing her pain with cooling techniques and elevations. In Septemer and October of 2021 her condition worsened again and more treatments were tried to control the disease process (IVIG and Imuran). She responded to these treatments and the summer of 2022 was relatively good she again regained 50% of her function was able to walk and ride bike. Her condition worsened again at the end of September 2022 they restated IVIG and IV steroids but she did not respond to these treatments and continued to worsen. By mid-November she was wheelchair bound and my December and January she was bed bound. In December/January 2023 she trialed Rituxan but this did not help. At the end of February the pain and swelling were severe enough that she was admitted to [facility] for 10 days, during this hospitalization she had IVIG, IV solumedrol, IV ketamine infusion, IV dilaudid, oxycontin and hydromorphone. She was discharged on opioids for pain control. In March 2023 she had bilateral sympathetic bocks with Dr. Cosme at [facility] she saw no relief with this. In April she tried IV ketamine infusions (with MG and lidocaine), initially she saw some benefit from this with decreased pain flairs but the benefit seems to be waning, she is still getting these infusions. After this she had a 10 day SCS trial but she saw no relief with this.     She is currently bed bound and unable to walk, she keeps her feet in compression stockings and elevated. She also cools with a fan, ice slippers, and cool water (with her feet protected by plastic bags). The pain is a 7/10 continuously throughout the day and escalates to 10/10 at night. \"Typically, we would go to the hospital for pain at this level, but there is little the hospital can do other than additional pain management.\" Her feet are the most " "painful, and she describes it as burning pain, with crushing of her bones and joints from the swelling, along with stretching and tearing of the skin that has created ulcers on the top of her feet and on her small toes. \"Her current condition is becoming unmanageable for us at home. This is the worst it has been in over the course of two years, and we feel like we are running out of options.\"    Overall, in the past she has found the combination of steroids and IVIG the most helpful in reducing her symptoms.    She was scheduled to have an ITP trial with [facility] last Friday but she started having acute exacerbation of her pain and was using much more of her breakthrough hydromorphone. She was concerned that this would interfere with the trial and therefore cancelled that appointment. It has been rescheduled for this Friday.     Their main questions today are:  \"We have seen reference in some journal articles to epidural treatments for severe refractory EM that have reported some success. Do any of these look helpful?\"    \"Are there any other pain management strategies we have not tried?\"      Pain treatments:  - Ketamine 6 infusions over three weeks (2/6 have been done) this was initially helpful but they feel the last treatments were not as successful.  - Oxcontin 20 mg ER now transitioned to fentanyl patch  - SCS trial at [facility], not helpful  - Bilateral sympathetic nerve block with [facility], not helpful  - Medical marijuana, so far not helpful    Current pain medications:  - Fentanyl 50 mcg transdermal patch  - Hydromorphone 10 mg q4 hours prn. She was averaging 1-2 doses per day but with the most recent pain flair she has been using all of the doses  - Ativan, typically uses 2-3 times per week  - Gabapentin 1200/600/1200  - Mexilitiene 150 mg TID  - Venlafaxine 37.5 mg      Significant Medical History:   Past Medical History:   Diagnosis Date     Auto-erythrocyte sensitization (H)      Erythromelalgia 02/2021     " Reactive depression      Seasonal allergies 06/25/2020    Mostly resolved          Past Surgical History:  Past Surgical History:   Procedure Laterality Date     INSERT PICC LINE Left 06/23/2021    Procedure: INSERTION, PICC;  Surgeon: Neal Penny MD;  Location: [facility] OR     IR PICC PLACEMENT > 5 YRS OF AGE  06/23/2021     NO HISTORY OF SURGERY       PICC SINGLE LUMEN PLACEMENT Left 03/02/2023    Left brachial-lateral vein 44cm total 1cm external.Placement verified by Sherdanika 3CG.PICC okay to use.          Family History:  Family History   Problem Relation Age of Onset     Hypertension Father      Cerebrovascular Disease Maternal Grandmother      Diabetes Maternal Grandfather      Breast Cancer Paternal Grandmother      Hypertension Paternal Grandfather      C.A.D. Paternal Grandfather      Schizophrenia No family hx of           Social History:  Social History     Socioeconomic History     Marital status:      Spouse name: Shiraz     Number of children: 2     Years of education: 16     Highest education level: Not on file   Occupational History     Employer: UNEMPLOYED   Tobacco Use     Smoking status: Never     Smokeless tobacco: Never   Vaping Use     Vaping status: Not on file   Substance and Sexual Activity     Alcohol use: No     Drug use: No     Sexual activity: Yes     Partners: Male   Other Topics Concern      Service No     Blood Transfusions No     Caffeine Concern No     Occupational Exposure No     Hobby Hazards No     Sleep Concern No     Stress Concern No     Weight Concern No     Special Diet No     Back Care Yes     Comment: low back pain, with period     Exercise Yes     Comment: 3 times weekly, treadmill     Bike Helmet Yes     Seat Belt Yes     Self-Exams No   Social History Narrative    Lives with , 2 kids (born 2009, both with autism). No guns.  Safe home and environment.  Support from friends family and Jehovah's witness community.  Not currently working.     Social  Determinants of Health     Financial Resource Strain: Low Risk  (4/14/2021)    Overall Financial Resource Strain (CARDIA)      Difficulty of Paying Living Expenses: Not hard at all   Food Insecurity: No Food Insecurity (4/14/2021)    Hunger Vital Sign      Worried About Running Out of Food in the Last Year: Never true      Ran Out of Food in the Last Year: Never true   Transportation Needs: No Transportation Needs (4/14/2021)    PRAPARE - Transportation      Lack of Transportation (Medical): No      Lack of Transportation (Non-Medical): No   Physical Activity: Not on file   Stress: Not on file   Social Connections: Unknown (4/14/2021)    Social Connection and Isolation Panel [NHANES]      Frequency of Communication with Friends and Family: Not on file      Frequency of Social Gatherings with Friends and Family: More than three times a week      Attends Mormonism Services: Not on file      Active Member of Clubs or Organizations: Not asked      Attends Club or Organization Meetings: Not on file      Marital Status:    Intimate Partner Violence: Not on file   Housing Stability: Not on file     Social History     Social History Narrative    Lives with , 2 kids (born 2009, both with autism). No guns.  Safe home and environment.  Support from friends family and Zoroastrianism community.  Not currently working.          Allergies:  Allergies   Allergen Reactions     Topiramate Anxiety       Current Medications:   Current Outpatient Medications   Medication Sig Dispense Refill     azaTHIOprine (IMURAN) 50 MG tablet        [START ON 6/25/2023] fentaNYL (DURAGESIC) 50 mcg/hr 72 hr patch Place 1 patch onto the skin every 72 hours remove old patch.  (Covers through 7/23/2023 or later) 10 patch 0     gabapentin (NEURONTIN) 300 MG capsule Take 1 po mid-day along with 600 mg (total 900 mg) 30 capsule 11     gabapentin (NEURONTIN) 600 MG tablet Take 2 po AM (1200 mg), 1 po Mid-day along with a 300 mg capsule (900 mg), and 2  po PM (1200 mg) 150 tablet 11     HYDROmorphone (DILAUDID) 2 MG tablet Take 1-2 tablets (2-4 mg) by mouth every 6 hours as needed for breakthrough pain (or 6 mg to reduce pain with showers, transportation, or sleep) 210 tablet 0     ibuprofen (ADVIL/MOTRIN) 200 MG tablet Take 400 mg by mouth every 6 hours as needed for moderate pain       lidocaine (XYLOCAINE) 4 % external solution Apply topically daily Apply approximately 10mL to each foot (total of 20mL) daily 600 mL 2     lidocaine (XYLOCAINE) 5 % external ointment Apply topically every 4 hours as needed for moderate pain (4-6) 50 g 63     melatonin 1 MG TABS tablet Take 2-5 mg by mouth nightly as needed for sleep        mexiletine (MEXITIL) 150 MG capsule Take 1 capsule (150 mg) by mouth At Bedtime 90 capsule 3     naloxone (NARCAN) 4 MG/0.1ML nasal spray Spray 1 spray (4 mg) into one nostril alternating nostrils as needed for opioid reversal every 2-3 minutes until assistance arrives 0.2 mL 0     norethindrone (MICRONOR) 0.35 MG tablet Take 2 tablets (0.7 mg) by mouth daily Take the first 3 weeks of each pack, and then immediately move on to the next pack (discard the 4th week of each pack) 180 tablet 3     sulfamethoxazole-trimethoprim (BACTRIM) 400-80 MG tablet Take 1 tablet by mouth daily 90 tablet 0     triamcinolone (KENALOG) 0.1 % external cream Apply topically daily Apply to feet daily 80 g 1     venlafaxine (EFFEXOR XR) 37.5 MG 24 hr capsule Take 1 capsule (37.5 mg) by mouth daily 90 capsule 1     venlafaxine (EFFEXOR XR) 75 MG 24 hr capsule Take 1 capsule (75 mg) by mouth daily 90 capsule 3     vitamin C (ASCORBIC ACID) 250 MG tablet Take 250 mg by mouth daily       Vitamin D3 (CHOLECALCIFEROL) 25 mcg (1000 units) tablet Take 25 mcg by mouth daily         Physical Exam:     There were no vitals filed for this visit.    General Appearance: No distress, seated in her wheelchair with her legs elevated on a pillow with compression stockings and  ice  slippers on  Mood: Euthymic  HE ENT: Non constricted pupils  Respiratory: Non labored breathing  CVS: Regular rate and rhythm  GI: Deferred  Skin: No rashes over exposed skin    Laboratory results:  Recent Labs   Lab Test 02/26/23  0740 02/22/23  0759    139   POTASSIUM 4.5 4.3   CHLORIDE 107 105   CO2 23 24   ANIONGAP 8 10   * 92   BUN 9.0 9.1   CR 0.58 0.69   KEVAN 8.1* 9.1       CBC RESULTS:   Recent Labs   Lab Test 02/26/23  0740   WBC 4.4   RBC 3.80   HGB 12.2   HCT 37.2   MCV 98   MCH 32.1   MCHC 32.8   RDW 13.3          ASSESSMENT AND PLAN:     Encounter Diagnosis:  Erythromelalia    Ronna Rivera is a 37 year old y.o. old female who presents to the pain clinic with severe pain from erythromelalia. She has seen several specialists in an effort to control this disease process and manage her symptoms. She is currently following with [facility] for interventions and her PCP Dr. Ball is managing her medications. She and her  are at the point where they are reading a lot and willing to try anything to help her pain. Their main question today was whether or not we thought there was utility and feasibility to doing an epidural infusion as they had read a case report that this had been beneficial for a refractory case of EM. With such a rare disease there are primarily only case reports available to support treatments. At this time we recommended that she follow-up with [facility] and do the intrathecal pain pump trial injection first. We may revisit the epidural if that does not work. We will defer pain medication management to her PCP at this time.     I have summarized the patient s past medical history, discussed their clinical findings and the potential differential diagnosis with the patient. Significant past medical history pertinent to the patient s current condition includes erythromelalgia, migraine, ashtma, foot ulcers, depression, anxiety, and insomnia.  The clinical  findings reveal pain consistent with pain from EM. The differential diagnosis discussed with the patient are listed above. I have discussed anatomy and possible sources of the pain using models and/or pictures (diagrams). I have discussed multi- disciplinary pain management options withthe patient as pertaining to their case as detailed above. The pain management options we discussed included, but were not limited to the recommendations below.  I also discussed with patient the risks, benefits and alternatives to each pain management option.  All of the patient s questions and concerns were answered to the best of my ability.    RECOMMENDATIONS:     1. Medications: Continue current pain medications as prescribed by Dr. Ball    2. Procedure: None at this time. She sees [facility] for interventional care and should continue with them and the ITP trial injection this Friday.     Follow up: with Dr. Ball and [facility]    This patient was seen and discussed with the attending physician.    Poly Her MD  Chronic Pain Fellow   [facility]     I saw and examined the patient with the fellow and agree with the findings and the plan of care as documented in the fellow's note.   Eligio Malagon IV, MD

## 2023-06-13 ENCOUNTER — TELEPHONE (OUTPATIENT)
Dept: CARE COORDINATION | Facility: CLINIC | Age: 38
End: 2023-06-13

## 2023-06-13 ENCOUNTER — OFFICE VISIT (OUTPATIENT)
Dept: ANESTHESIOLOGY | Facility: CLINIC | Age: 38
End: 2023-06-13
Payer: COMMERCIAL

## 2023-06-13 VITALS
DIASTOLIC BLOOD PRESSURE: 82 MMHG | OXYGEN SATURATION: 95 % | HEIGHT: 62 IN | SYSTOLIC BLOOD PRESSURE: 129 MMHG | HEART RATE: 99 BPM | BODY MASS INDEX: 22.13 KG/M2

## 2023-06-13 DIAGNOSIS — L97.512 FOOT ULCER, RIGHT, WITH FAT LAYER EXPOSED (H): ICD-10-CM

## 2023-06-13 DIAGNOSIS — L97.522 FOOT ULCER, LEFT, WITH FAT LAYER EXPOSED (H): ICD-10-CM

## 2023-06-13 DIAGNOSIS — S91.104A OPEN WOUND OF FIFTH TOE OF RIGHT FOOT, INITIAL ENCOUNTER: ICD-10-CM

## 2023-06-13 DIAGNOSIS — S91.105A OPEN WOUND OF FIFTH TOE OF LEFT FOOT, INITIAL ENCOUNTER: ICD-10-CM

## 2023-06-13 PROCEDURE — 99203 OFFICE O/P NEW LOW 30 MIN: CPT | Mod: GC

## 2023-06-13 ASSESSMENT — PAIN SCALES - GENERAL: PAINLEVEL: SEVERE PAIN (7)

## 2023-06-13 NOTE — PATIENT INSTRUCTIONS
Treatment planning:    Follow up with [facility].      Recommended Follow up:      Follow up as needed.       To speak with a nurse, schedule/reschedule/cancel a clinic appointment, or request a medication refill call: [phone].    You can also reach us by [email or web address]: [email or web address]

## 2023-06-13 NOTE — TELEPHONE ENCOUNTER
[facility] home infusion  Ailyn Gutierrez [facility] called- gave verbal order to continue IV solumedrol for one week (2 more doses) Will clarify Solu- Medrol plan with PCP.    Patricio Rojas RN on 6/13/2023 at 12:53 PM

## 2023-06-13 NOTE — NURSING NOTE
Patient presents with:  Consult: Consult both feet pain      Severe Pain (7)     Pain Medications     Opioid Agonists Refills Start End     fentaNYL (DURAGESIC) 50 mcg/hr 72 hr patch    0 6/25/2023     Sig - Route: Place 1 patch onto the skin every 72 hours remove old patch.  (Covers through 7/23/2023 or later) - Transdermal    Class: E-Prescribe    Earliest Fill Date: 6/25/2023    Prior authorization: Waiting for Payer Response    This order has not been released to its destination.     HYDROmorphone (DILAUDID) 2 MG tablet    0 6/7/2023     Sig - Route: Take 1-2 tablets (2-4 mg) by mouth every 6 hours as needed for breakthrough pain (or 6 mg to reduce pain with showers, transportation, or sleep) - Oral    Class: E-Prescribe    Earliest Fill Date: 6/7/2023          What medications are you using for pain? Fentanyl patch, hydromorphone, gabapentin    (New patients only) Have you been seen by another pain clinic/ provider? yes    (Return Patients only) What refills are you needing today? No    Expectations wanting to talk about options

## 2023-06-13 NOTE — LETTER
"6/13/2023       RE: Ronna Rivera  [address]  [facility] [address]     Dear Colleague,    Thank you for referring your patient, Ronna Rivera, to the [facility] CLINIC FOR COMPREHENSIVE PAIN MANAGEMENT [place] at [facility]. Please see a copy of my visit note below.      Pain Clinic New Patient Consult Note:    Referring Provider: Jaimee   Primary care provider: Neal Ball.    Ronna Rivera is a 37 year old y.o. old female who presents to the pain clinic with pain from erythromelalgia. She is here today with her  who is very supportive.     HPI:  Patient Supplied Answers To the  Pain Questionnaire      3/15/2023    10:31 PM    Pain -  Patient Entered Questionnaire/Answers   What number best describes your pain right now:  0 = No pain  to  10 = Worst pain imaginable 7   How would you describe the pain burning    sharp    cutting    throbbing    pressure    other   Which of the following worsen your pain standing    sitting    walking    exercise   Which of the following improve or reduce your pain lying down    medication   What number best describes your average pain for the past week:  0 = No pain  to  10 = Worst pain imaginable 8   What number best describes your LOWEST pain in past 24 hours:  0 = No pain  to  10 = Worst pain imaginable 7   What number best describes your WORST pain in past 24 hours:  0 = No pain  to  10 = Worst pain imaginable 10   When is your pain worst PM     Her pain started in February 2021 with a rash on her right foot the foot began to turn red and have burning sensation, this then spread to her left foot. The pain is a \"nerve pain\" with electrical shocks. Her feet would also swell. Her hands, cheeks, lips, ears and nose also became involved. She was initially treated by her dermatologist and when these treatments weren't working they suspected erythromelalgia and she was referred to [place]. " She went to [place] and had many tests done and was officially diagnosed with EM. In April 2021 she was hospitalized at [facility] to stabilize her condition and treat the pain, after this she was referred to [facility] for ongoing pain management. Over the summer of 2021 she was treated with high dose IV steroids for 20 weeks and she slowly regained function and was able to walk and was tapered off pain medications. She regained approximately 50% of her function and was managing her pain with cooling techniques and elevations. In Septemer and October of 2021 her condition worsened again and more treatments were tried to control the disease process (IVIG and Imuran). She responded to these treatments and the summer of 2022 was relatively good she again regained 50% of her function was able to walk and ride bike. Her condition worsened again at the end of September 2022 they restated IVIG and IV steroids but she did not respond to these treatments and continued to worsen. By mid-November she was wheelchair bound and my December and January she was bed bound. In December/January 2023 she trialed Rituxan but this did not help. At the end of February the pain and swelling were severe enough that she was admitted to [facility] for 10 days, during this hospitalization she had IVIG, IV solumedrol, IV ketamine infusion, IV dilaudid, oxycontin and hydromorphone. She was discharged on opioids for pain control. In March 2023 she had bilateral sympathetic bocks with Dr. Cosme at [facility] she saw no relief with this. In April she tried IV ketamine infusions (with MG and lidocaine), initially she saw some benefit from this with decreased pain flairs but the benefit seems to be waning, she is still getting these infusions. After this she had a 10 day SCS trial but she saw no relief with this.     She is currently bed bound and unable to walk, she keeps her feet in compression stockings and elevated. She also  "cools with a fan, ice slippers, and cool water (with her feet protected by plastic bags). The pain is a 7/10 continuously throughout the day and escalates to 10/10 at night. \"Typically, we would go to the hospital for pain at this level, but there is little the hospital can do other than additional pain management.\" Her feet are the most painful, and she describes it as burning pain, with crushing of her bones and joints from the swelling, along with stretching and tearing of the skin that has created ulcers on the top of her feet and on her small toes. \"Her current condition is becoming unmanageable for us at home. This is the worst it has been in over the course of two years, and we feel like we are running out of options.\"    Overall, in the past she has found the combination of steroids and IVIG the most helpful in reducing her symptoms.    She was scheduled to have an ITP trial with [facility] last Friday but she started having acute exacerbation of her pain and was using much more of her breakthrough hydromorphone. She was concerned that this would interfere with the trial and therefore cancelled that appointment. It has been rescheduled for this Friday.     Their main questions today are:  \"We have seen reference in some journal articles to epidural treatments for severe refractory EM that have reported some success. Do any of these look helpful?\"    \"Are there any other pain management strategies we have not tried?\"      Pain treatments:  - Ketamine 6 infusions over three weeks (2/6 have been done) this was initially helpful but they feel the last treatments were not as successful.  - Oxcontin 20 mg ER now transitioned to fentanyl patch  - SCS trial at [facility], not helpful  - Bilateral sympathetic nerve block with [facility], not helpful  - Medical marijuana, so far not helpful    Current pain medications:  - Fentanyl 50 mcg transdermal patch  - Hydromorphone 10 mg q4 hours prn. She was averaging 1-2 doses per day but with " the most recent pain flair she has been using all of the doses  - Ativan, typically uses 2-3 times per week  - Gabapentin 1200/600/1200  - Mexilitiene 150 mg TID  - Venlafaxine 37.5 mg      Significant Medical History:   Past Medical History:   Diagnosis Date    Auto-erythrocyte sensitization (H)     Erythromelalgia 02/2021    Reactive depression     Seasonal allergies 06/25/2020    Mostly resolved          Past Surgical History:  Past Surgical History:   Procedure Laterality Date    INSERT PICC LINE Left 06/23/2021    Procedure: INSERTION, PICC;  Surgeon: Neal Penny MD;  Location: [facility] OR    IR PICC PLACEMENT > 5 YRS OF AGE  06/23/2021    NO HISTORY OF SURGERY      PICC SINGLE LUMEN PLACEMENT Left 03/02/2023    Left brachial-lateral vein 44cm total 1cm external.Placement verified by [facility] 3CG.PICC okay to use.          Family History:  Family History   Problem Relation Age of Onset    Hypertension Father     Cerebrovascular Disease Maternal Grandmother     Diabetes Maternal Grandfather     Breast Cancer Paternal Grandmother     Hypertension Paternal Grandfather     C.A.D. Paternal Grandfather     Schizophrenia No family hx of           Social History:  Social History     Socioeconomic History    Marital status:      Spouse name: Shiraz    Number of children: 2    Years of education: 16    Highest education level: Not on file   Occupational History     Employer: UNEMPLOYED   Tobacco Use    Smoking status: Never    Smokeless tobacco: Never   Vaping Use    Vaping status: Not on file   Substance and Sexual Activity    Alcohol use: No    Drug use: No    Sexual activity: Yes     Partners: Male   Other Topics Concern     Service No    Blood Transfusions No    Caffeine Concern No    Occupational Exposure No    Hobby Hazards No    Sleep Concern No    Stress Concern No    Weight Concern No    Special Diet No    Back Care Yes     Comment: low back pain, with period    Exercise Yes     Comment: 3  times weekly, treadmill    Bike Helmet Yes    Seat Belt Yes    Self-Exams No   Social History Narrative    Lives with , 2 kids (born 2009, both with autism). No guns.  Safe home and environment.  Support from friends family and [employer] community.  Not currently working.     Social Determinants of Health     Financial Resource Strain: Low Risk  (4/14/2021)    Overall Financial Resource Strain (CARDIA)     Difficulty of Paying Living Expenses: Not hard at all   Food Insecurity: No Food Insecurity (4/14/2021)    Hunger Vital Sign     Worried About Running Out of Food in the Last Year: Never true     Ran Out of Food in the Last Year: Never true   Transportation Needs: No Transportation Needs (4/14/2021)    PRAPARE - Transportation     Lack of Transportation (Medical): No     Lack of Transportation (Non-Medical): No   Physical Activity: Not on file   Stress: Not on file   Social Connections: Unknown (4/14/2021)    Social Connection and Isolation Panel [NHANES]     Frequency of Communication with Friends and Family: Not on file     Frequency of Social Gatherings with Friends and Family: More than three times a week     Attends Buddhist Services: Not on file     Active Member of Clubs or Organizations: Not asked     Attends Club or Organization Meetings: Not on file     Marital Status:    Intimate Partner Violence: Not on file   Housing Stability: Not on file     Social History     Social History Narrative    Lives with , 2 kids (born 2009, both with autism). No guns.  Safe home and environment.  Support from friends family and [employer] community.  Not currently working.          Allergies:  Allergies   Allergen Reactions    Topiramate Anxiety       Current Medications:   Current Outpatient Medications   Medication Sig Dispense Refill    azaTHIOprine (IMURAN) 50 MG tablet       [START ON 6/25/2023] fentaNYL (DURAGESIC) 50 mcg/hr 72 hr patch Place 1 patch onto the skin every 72 hours remove old patch.   (Covers through 7/23/2023 or later) 10 patch 0    gabapentin (NEURONTIN) 300 MG capsule Take 1 po mid-day along with 600 mg (total 900 mg) 30 capsule 11    gabapentin (NEURONTIN) 600 MG tablet Take 2 po AM (1200 mg), 1 po Mid-day along with a 300 mg capsule (900 mg), and 2 po PM (1200 mg) 150 tablet 11    HYDROmorphone (DILAUDID) 2 MG tablet Take 1-2 tablets (2-4 mg) by mouth every 6 hours as needed for breakthrough pain (or 6 mg to reduce pain with showers, transportation, or sleep) 210 tablet 0    ibuprofen (ADVIL/MOTRIN) 200 MG tablet Take 400 mg by mouth every 6 hours as needed for moderate pain      lidocaine (XYLOCAINE) 4 % external solution Apply topically daily Apply approximately 10mL to each foot (total of 20mL) daily 600 mL 2    lidocaine (XYLOCAINE) 5 % external ointment Apply topically every 4 hours as needed for moderate pain (4-6) 50 g 63    melatonin 1 MG TABS tablet Take 2-5 mg by mouth nightly as needed for sleep       mexiletine (MEXITIL) 150 MG capsule Take 1 capsule (150 mg) by mouth At Bedtime 90 capsule 3    naloxone (NARCAN) 4 MG/0.1ML nasal spray Spray 1 spray (4 mg) into one nostril alternating nostrils as needed for opioid reversal every 2-3 minutes until assistance arrives 0.2 mL 0    norethindrone (MICRONOR) 0.35 MG tablet Take 2 tablets (0.7 mg) by mouth daily Take the first 3 weeks of each pack, and then immediately move on to the next pack (discard the 4th week of each pack) 180 tablet 3    sulfamethoxazole-trimethoprim (BACTRIM) 400-80 MG tablet Take 1 tablet by mouth daily 90 tablet 0    triamcinolone (KENALOG) 0.1 % external cream Apply topically daily Apply to feet daily 80 g 1    venlafaxine (EFFEXOR XR) 37.5 MG 24 hr capsule Take 1 capsule (37.5 mg) by mouth daily 90 capsule 1    venlafaxine (EFFEXOR XR) 75 MG 24 hr capsule Take 1 capsule (75 mg) by mouth daily 90 capsule 3    vitamin C (ASCORBIC ACID) 250 MG tablet Take 250 mg by mouth daily      Vitamin D3 (CHOLECALCIFEROL) 25  mcg (1000 units) tablet Take 25 mcg by mouth daily         Physical Exam:     There were no vitals filed for this visit.    General Appearance: No distress, seated in her wheelchair with her legs elevated on a pillow with compression stockings and  ice slippers on  Mood: Euthymic  HE ENT: Non constricted pupils  Respiratory: Non labored breathing  CVS: Regular rate and rhythm  GI: Deferred  Skin: No rashes over exposed skin    Laboratory results:  Recent Labs   Lab Test 02/26/23  0740 02/22/23  0759    139   POTASSIUM 4.5 4.3   CHLORIDE 107 105   CO2 23 24   ANIONGAP 8 10   * 92   BUN 9.0 9.1   CR 0.58 0.69   KEVAN 8.1* 9.1       CBC RESULTS:   Recent Labs   Lab Test 02/26/23  0740   WBC 4.4   RBC 3.80   HGB 12.2   HCT 37.2   MCV 98   MCH 32.1   MCHC 32.8   RDW 13.3          ASSESSMENT AND PLAN:     Encounter Diagnosis:  Erythromelalia    Ronna Rivera is a 37 year old y.o. old female who presents to the pain clinic with severe pain from erythromelalia. She has seen several specialists in an effort to control this disease process and manage her symptoms. She is currently following with [facility] for interventions and her PCP Dr. Ball is managing her medications. She and her  are at the point where they are reading a lot and willing to try anything to help her pain. Their main question today was whether or not we thought there was utility and feasibility to doing an epidural infusion as they had read a case report that this had been beneficial for a refractory case of EM. With such a rare disease there are primarily only case reports available to support treatments. At this time we recommended that she follow-up with [facility] and do the intrathecal pain pump trial injection first. We may revisit the epidural if that does not work. We will defer pain medication management to her PCP at this time.     I have summarized the patient s past medical history, discussed their  clinical findings and the potential differential diagnosis with the patient. Significant past medical history pertinent to the patient s current condition includes erythromelalgia, migraine, ashtma, foot ulcers, depression, anxiety, and insomnia.  The clinical findings reveal pain consistent with pain from EM. The differential diagnosis discussed with the patient are listed above. I have discussed anatomy and possible sources of the pain using models and/or pictures (diagrams). I have discussed multi- disciplinary pain management options withthe patient as pertaining to their case as detailed above. The pain management options we discussed included, but were not limited to the recommendations below.  I also discussed with patient the risks, benefits and alternatives to each pain management option.  All of the patient s questions and concerns were answered to the best of my ability.    RECOMMENDATIONS:     1. Medications: Continue current pain medications as prescribed by Dr. Ball    2. Procedure: None at this time. She sees [facility] for interventional care and should continue with them and the ITP trial injection this Friday.     Follow up: with Dr. Ball and [facility]    This patient was seen and discussed with the attending physician.    Poly Her MD  Chronic Pain Fellow   [facility]     I saw and examined the patient with the fellow and agree with the findings and the plan of care as documented in the fellow's note.   Eligio Malagon IV, MD

## 2023-06-14 ENCOUNTER — OFFICE VISIT (OUTPATIENT)
Dept: INTERNAL MEDICINE | Facility: CLINIC | Age: 38
End: 2023-06-14
Payer: COMMERCIAL

## 2023-06-14 ENCOUNTER — ANCILLARY PROCEDURE (OUTPATIENT)
Dept: MRI IMAGING | Facility: CLINIC | Age: 38
End: 2023-06-14
Attending: SURGERY
Payer: COMMERCIAL

## 2023-06-14 VITALS — SYSTOLIC BLOOD PRESSURE: 129 MMHG | OXYGEN SATURATION: 98 % | HEART RATE: 94 BPM | DIASTOLIC BLOOD PRESSURE: 86 MMHG

## 2023-06-14 DIAGNOSIS — I73.81 ERYTHROMELALGIA (H): ICD-10-CM

## 2023-06-14 DIAGNOSIS — S91.104A OPEN WOUND OF FIFTH TOE OF RIGHT FOOT, INITIAL ENCOUNTER: ICD-10-CM

## 2023-06-14 DIAGNOSIS — L97.522 FOOT ULCER, LEFT, WITH FAT LAYER EXPOSED (H): ICD-10-CM

## 2023-06-14 DIAGNOSIS — L97.512 FOOT ULCER, RIGHT, WITH FAT LAYER EXPOSED (H): ICD-10-CM

## 2023-06-14 DIAGNOSIS — S91.105A OPEN WOUND OF FIFTH TOE OF LEFT FOOT, INITIAL ENCOUNTER: ICD-10-CM

## 2023-06-14 PROCEDURE — 99215 OFFICE O/P EST HI 40 MIN: CPT | Performed by: PEDIATRICS

## 2023-06-14 PROCEDURE — 73720 MRI LWR EXTREMITY W/O&W/DYE: CPT | Mod: RT

## 2023-06-14 PROCEDURE — 250N000011 HC RX IP 250 OP 636: Performed by: SURGERY

## 2023-06-14 RX ADMIN — HEPARIN SODIUM (PORCINE) LOCK FLUSH IV SOLN 100 UNIT/ML 5 ML: 100 SOLUTION at 12:58

## 2023-06-14 NOTE — NURSING NOTE
Ronna Rivera is a 37 year old female that presents in clinic today for the following:     Chief Complaint   Patient presents with     Musculoskeletal Problem     Pt here to discuss foot concerns       The patient's allergies and medications were reviewed. The patient's vitals were obtained without incident. The patient does not have any other questions for the provider.     Michael Corona, EMT at 8:20 AM on 6/14/2023.  Primary Care Clinic: [phone]

## 2023-06-14 NOTE — ASSESSMENT & PLAN NOTE
SUBJECTIVE:  - Week 12 of steroids, may consult suggested taper, I agree steroids have not been beneficial   - Silicone dressings have been beneficial, wound bed is raised   - Initiated silicone dressings on all wounds   - Does not have vascular specialist, has appointment at [place] in    - Follows with Dr. Saini for wound care   - Patient shakes feet to distract pain   - Plasmapheresis on 7/17 at [facility], 6 outpatient treatments over two weeks, sent message to hematologist to see if she can be seen faster  - Dr. Malagon suggested pain pump over epidural, following up at [facility]        OBJECTIVE:   - See photos   - Granulation tissues reached level of epidermis, outside is helpfully raised, no discharge or surrounding erythema other than the erythromelalgia itself   - Unfortunately there is a large area of erythromelalgia up right calf laterally   - Harlequin effect    ASSESSMENT:   - Condition has become more resistant to treatment     PLAN:   - Steroid plan is totally up to them   - Plasmapheresis   - Vascular referral   - Discussed possible future bone marrow transplant

## 2023-06-14 NOTE — PROGRESS NOTES
"Dear patient. Thank you for visiting with me. I want you to feel respected, understood, and empowered. \"Respect\" is valuing you as much as I would a close family member. \"Empowerment\" happens when you are fully informed, and can make the best possible decision for you.  Please ask me questions!  Challenge anything that is not clear.    Medical records are primarily used as memory aids for me and my colleagues. Things to know about my documentation style:  - The 'problem list' includes current symptoms or diagnoses, and some problems that are resolved but may return. I use the past medical history for problems not expected to return.  - I use single quotation marks for things that you or I said, when I want to clarify who was speaking.  - I use double quotation marks when copying a term from elsewhere in your records. Italics (besides here) may also denote a quotation.  If you have questions or concerns, please contact me; I will reply as soon as time allows.    Context    Ronna Rivera is a 37 year old woman, with concerns including:  Chief Complaint   Patient presents with     Musculoskeletal Problem     Pt here to discuss foot concerns     PCP: Neal Ball   Visit type: problem-oriented    /86 (BP Location: Right arm, Patient Position: Sitting, Cuff Size: Adult Regular)   Pulse 94   SpO2 98%     Problems and progress        Problem List as of 6/14/2023 Reviewed: 5/5/2023  7:28 PM by Neal Ball MD          Noted    1. Erythromelalgia (H) 3/25/2021     Overview Addendum 3/27/2023  2:27 PM by Neal Ball MD      Feet very painful and bad enough to ulcerate and wound.  (also has lesser problems with hands and face)  3/6/2023 back on steroids (pulse, then BIW).  Takes venlafaxine.  Has been on rituximab, imuran, IVIG, ASA, mexiletine  Improved after high-dose intermittent steroids, then worsened.  Dx with testing by Dr. Shannon De La Rosa at [facility]. Also seeing Dr. Delvalle " Erika @ [place]            Last Assessment & Plan 6/14/2023 Office Visit Written 6/14/2023  8:46 AM by Saqib Duffy      SUBJECTIVE:  - Week 12 of steroids, may consult suggested taper, I agree steroids have not been beneficial   - Silicone dressings have been beneficial, wound bed is raised   - Initiated silicone dressings on all wounds   - Does not have vascular specialist, has appointment at [place] in    - Follows with Dr. Saini for wound care   - Patient shakes feet to distract pain   - Plasmapheresis on 7/17 at [place], 6 outpatient treatments over two weeks, sent message to hematologist to see if she can be seen faster  - Dr. Malagon suggested pain pump over epidural, following up at [facility]        OBJECTIVE:   - See photos   - Granulation tissues reached level of epidermis, outside is helpfully raised, no discharge or surrounding erythema other than the erythromelalgia itself   - Unfortunately there is a large area of erythromelalgia up right calf laterally   - Harlequin effect    ASSESSMENT:   - Condition has become more resistant to treatment     PLAN:   - Steroid plan is totally up to them   - Plasmapheresis   - Vascular referral   - Discussed possible future bone marrow transplant                   CHRONIC CARE MANAGEMENT at [facility]  -------------------------------------------  PENDING ISSUES for [facility]:   -------------------------------------------    ------------------------------------------------  ONGOING SPECIALTY CARE BY:  ------------------------------------------------  -- Neuro: Dr. Shannon De La Rosa @ [facility], Dr. Mook James @[facility]  (prev seen Dr. Orta @ [facility])  -- Pain: Dr. Wills @ [facility] (opioids by PCP Dr. Ball)  -- Rheum PRN: Dr. Cipriano Cardoza      ----------------------------------------------------------------------------------  OUTSIDE PERSONNEL FOR INSURANCE, CADI, ETC:    Daphne nurse  [phone]    ----------------------------------------------------------------------------------  SUPPLIERS AND VENDORS:   ---------------------------------------------------------------------------------        About this visit:  Time note (e5, 40'): The total time (on the date of service) for this service was >40 minutes, including discussion/face-to-face, chart review, interpretation not otherwise reported, documentation, and updating of the computerized record.    Scribe Disclosure:   I, Saqib Duffy, am serving as a scribe; to document services personally performed by Dr. Neal Ball- -based on data collection and the provider's statements to me.     Provider Disclosure:  I agree with above History, Review of Systems, Physical exam and Plan.  I have reviewed the content of the documentation and have edited it as needed. I have personally performed the services documented here and the documentation accurately represents those services and the decisions I have made.      Electronically signed by:  Dr. Neal Ball

## 2023-06-15 ENCOUNTER — TELEPHONE (OUTPATIENT)
Dept: WOUND CARE | Facility: CLINIC | Age: 38
End: 2023-06-15
Payer: COMMERCIAL

## 2023-06-15 NOTE — TELEPHONE ENCOUNTER
Patient left a vm asking if her MRI results are in yet? She is especially wondering if there is any infection in the bone?

## 2023-06-16 NOTE — ADDENDUM NOTE
Encounter addended by: Evelia Contreras RN on: 6/16/2023 10:33 AM   Actions taken: Flowsheet accepted, Edited Discharge Instructions, Clinical Note Signed, Order list changed, Diagnosis association updated

## 2023-06-16 NOTE — TELEPHONE ENCOUNTER
Patient states no bone felt in wound, and it looks better since last visit.  Therefore likely ok to proceed with pain pump.    Patient asked to inform Dr. Hanks at [facility] [phone], fax [phone] - called and informed. They asked for written okay. Informed that a visit is required for this.      Set up televisit next week with Solange Aguila PA-C. Solange Aguila PA-C aware. Patient agreed with plan.

## 2023-06-16 NOTE — PROGRESS NOTES
Patient Active Problem List   Diagnosis     Other chronic pain     Rash and nonspecific skin eruption     Erythromelalgia (H)     Anxiety     DDD (degenerative disc disease), lumbar     Migraine with aura and without status migrainosus, not intractable     Chronic insomnia     Asthma, exercise induced     Hypermobile joints     Vasovagal syncope     Autonomic dysfunction     Impaired mobility     Abnormal TSH     Need for pneumocystis prophylaxis     Medical marijuana use     Headache, drug induced (IVIG)     Adrenal suppression (H)     Menstrual suppression because of erythromelalagia     Reactive depression     Recurrent herpes labialis     PICC (peripherally inserted central catheter) in place     Foot ulcer, right, with fat layer exposed (H)     Foot ulcer, left, with fat layer exposed (H)     Open wound of fifth toe of right foot, initial encounter     Open wound of fifth toe of left foot, initial encounter     Past Medical History:   Diagnosis Date     Auto-erythrocyte sensitization (H)      Erythromelalgia 02/2021     Reactive depression      Seasonal allergies 06/25/2020    Mostly resolved     Labs:   Recent Labs   Lab Test 02/26/23  0740 02/24/23  0722 06/04/21  1655 04/09/21  1725   ALBUMIN  --  3.6   < > 3.7   HGB 12.2 12.4   < > 13.2   WBC 4.4 7.1   < > 7.9   CRP  --   --   --  <2.9    < > = values in this interval not displayed.     Nutrition requirements were discussed with patient today.  Vitals:  There were no vitals taken for this visit.  Wound:   Wound (used by [facility] only) 03/30/23 1011 Right dorsal foot other (see comments) (Active)   Thickness/Stage full thickness 06/05/23 1600   Base white;yellow 06/05/23 1600   Periwound redness 06/05/23 1600   Periwound Temperature warm 06/05/23 1600   Periwound Skin Turgor soft 06/05/23 1600   Length (cm) 1.5 06/05/23 1600   Width (cm) 0.8 06/05/23 1600   Depth (cm) 0.2 06/05/23 1600   Wound (cm^2) 1.2 cm^2 06/05/23 1600   Wound Volume (cm^3) 0.24 cm^3  06/05/23 1600   Wound healing % -55.84 06/05/23 1600   Drainage Characteristics/Odor serosanguineous 06/05/23 1600   Drainage Amount large 06/05/23 1600   Care, Wound non-select wound debridement performed 06/05/23 1600       Wound (used by OP [facility] only) 03/30/23 1012 Left dorsal foot other (see comments) (Active)   Thickness/Stage full thickness 06/05/23 1600   Base yellow;white 06/05/23 1600   Periwound redness 06/05/23 1600   Periwound Temperature warm 06/05/23 1600   Periwound Skin Turgor soft 06/05/23 1600   Edges open 06/05/23 1600   Length (cm) 0.5 06/05/23 1600   Width (cm) 0.3 06/05/23 1600   Depth (cm) 0.2 06/05/23 1600   Wound (cm^2) 0.15 cm^2 06/05/23 1600   Wound Volume (cm^3) 0.03 cm^3 06/05/23 1600   Wound healing % 46.43 06/05/23 1600   Drainage Characteristics/Odor serosanguineous 06/05/23 1600   Drainage Amount large 06/05/23 1600   Care, Wound non-select wound debridement performed 06/05/23 1600       Wound (used by OP [facility] only) 03/30/23 1012 Left lateral 5 toe neuropathic ulcer (Active)   Thickness/Stage full thickness 06/05/23 1600   Base slough;pink 06/05/23 1600   Periwound pink 06/05/23 1600   Periwound Temperature warm 06/05/23 1600   Periwound Skin Turgor soft 06/05/23 1600   Edges open 06/05/23 1600   Length (cm) 0.8 06/05/23 1600   Width (cm) 0.6 06/05/23 1600   Depth (cm) 0.2 06/05/23 1600   Wound (cm^2) 0.48 cm^2 06/05/23 1600   Wound Volume (cm^3) 0.1 cm^3 06/05/23 1600   Wound healing % 33.33 06/05/23 1600   Drainage Characteristics/Odor serosanguineous 06/05/23 1600   Drainage Amount large 06/05/23 1600   Care, Wound non-select wound debridement performed 06/05/23 1600       Wound (used by [facility] only) 03/30/23 1014 Right lateral 5 toe other (see comments) (Active)   Thickness/Stage full thickness 06/05/23 1600   Base slough;black eschar 06/05/23 1600   Periwound pink 06/05/23 1600   Periwound Temperature warm 06/05/23 1600   Periwound Skin Turgor soft 06/05/23 1600   Edges open  06/05/23 1600   Length (cm) 1.9 06/05/23 1600   Width (cm) 1.1 06/05/23 1600   Depth (cm) 0.3 06/05/23 1600   Wound (cm^2) 2.09 cm^2 06/05/23 1600   Wound Volume (cm^3) 0.63 cm^3 06/05/23 1600   Wound healing % -30.63 06/05/23 1600   Drainage Characteristics/Odor serosanguineous 06/05/23 1600   Drainage Amount large 06/05/23 1600   Care, Wound non-select wound debridement performed 06/05/23 1600       Wound (used by [facility] only) 03/30/23 1023 Right plantar foot other (see comments) (Active)   Thickness/Stage full thickness 06/05/23 1600   Base pink 06/05/23 1600   Periwound redness 06/05/23 1600   Periwound Temperature warm 06/05/23 1600   Periwound Skin Turgor soft 06/05/23 1600   Edges open 06/05/23 1600   Length (cm) 0.8 06/05/23 1600   Width (cm) 1 06/05/23 1600   Depth (cm) 0.2 06/05/23 1600   Wound (cm^2) 0.8 cm^2 06/05/23 1600   Wound Volume (cm^3) 0.16 cm^3 06/05/23 1600   Wound healing % 0 06/05/23 1600   Drainage Characteristics/Odor serosanguineous 06/05/23 1600   Drainage Amount large 06/05/23 1600   Care, Wound non-select wound debridement performed 06/05/23 1600       Wound (used by [facility] only) 05/15/23 0859 Left plantar foot fissure (Active)   Thickness/Stage full thickness 06/05/23 1600   Base dry 06/05/23 1600   Length (cm) 0.7 06/05/23 1600   Width (cm) 0.2 06/05/23 1600   Depth (cm) 0.3 06/05/23 1600   Wound (cm^2) 0.14 cm^2 06/05/23 1600   Wound Volume (cm^3) 0.04 cm^3 06/05/23 1600   Wound healing % 0 06/05/23 1600   Drainage Characteristics/Odor serosanguineous 06/05/23 1600   Drainage Amount moderate;large 06/05/23 1600   Care, Wound non-select wound debridement performed 06/05/23 1600       Wound (used by [facility] only) 05/15/23 0901 Left medial 1 metatarsal head other (see comments) (Active)   Thickness/Stage full thickness 06/05/23 1600   Base pink;slough 06/05/23 1600   Periwound redness 06/05/23 1600   Length (cm) 0.4 06/05/23 1600   Width (cm) 0.8 06/05/23 1600   Depth (cm) 0.2 06/05/23  1600   Wound (cm^2) 0.32 cm^2 06/05/23 1600   Wound Volume (cm^3) 0.06 cm^3 06/05/23 1600   Wound healing % 0 06/05/23 1600   Drainage Characteristics/Odor serosanguineous 06/05/23 1600   Drainage Amount large 06/05/23 1600   Care, Wound non-select wound debridement performed 06/05/23 1600       Wound (used by [facility] only) 05/15/23 0901 Right medial 1 metatarsal head other (see comments) (Active)   Thickness/Stage full thickness 06/05/23 1600   Base pink;slough 06/05/23 1600   Periwound redness 06/05/23 1600   Length (cm) 0.7 06/05/23 1600   Width (cm) 0.6 06/05/23 1600   Depth (cm) 0.2 06/05/23 1600   Wound (cm^2) 0.42 cm^2 06/05/23 1600   Wound Volume (cm^3) 0.08 cm^3 06/05/23 1600   Wound healing % 0 06/05/23 1600   Drainage Characteristics/Odor serosanguineous 06/05/23 1600   Drainage Amount moderate;large 06/05/23 1600   Care, Wound non-select wound debridement performed 06/05/23 1600       Wound (used by [facility] only) 05/15/23 0902 Right lateral;dorsal foot other (see comments) (Active)   Thickness/Stage full thickness 06/05/23 1600   Base pink;slough 06/05/23 1600   Length (cm) 0.6 06/05/23 1600   Width (cm) 0.3 06/05/23 1600   Depth (cm) 0.1 06/05/23 1600   Wound (cm^2) 0.18 cm^2 06/05/23 1600   Wound Volume (cm^3) 0.02 cm^3 06/05/23 1600   Wound healing % 0 06/05/23 1600   Drainage Characteristics/Odor serosanguineous 06/05/23 1600   Drainage Amount large 06/05/23 1600   Care, Wound non-select wound debridement performed 06/05/23 1600       Incision/Surgical Site 06/23/21 Left Arm (Active)      Photo: No images are attached to the encounter.      Further instructions from your care team       Ronna Rivera      1985    A DME order for supplies has been placed to [facility]. If there are any issues with your order including not receiving the order please call [facility] at [phone] option 3. They can also provide a tracking number for you if you had supplies shipped to  you.     Home Health Care: [facility] Phone: [phone] Fax:[phone]     Please call to schedule your x-rays and MRI at [facility]: [phone]      Per your discussion with Dr. Long:  -Acupuncture  -Neuroglide (Chriss[employer])  -Decrease salt to less than 25mg daily (check sodium levels in your electrolyte tablets)  -Consider Hyperbaric Oxygen Therapy in future - (it would likely be out of pocket unless you had a bone infection, likely $1,000 per week for 2-3 weeks) Email Lindsay Cormier at [email or web address] for more information  -Consider Photomodulation/Red Light Therapy in future - (it would likely be out of pocket)     Medications/supplements to aid in healing:  Vitamin D3 10,000 iu per day (for 3 months)  Tea C 1,000 mg take daily  Vitamin B Complex with folic acid take 1 tablet daily  Vitamin B 12 1000 mcg daily  Zinc 30mg capsule or tablet daily (ok to take 50mg if 30mg cannot be found)  Vitamin A 10,000 I.unit(s). daily for 3 weeks  Magnesium 400mg one tablet daily  -Vein Formula 1000mg. Take one (1) 500mg capsule four times per day for two months (4/10/23). Order from www.[employer] or [employer] or call [phone]. (The 1000mg is two (2) 500mg capsules) Once your wound is healed, take Vein Formula one (1) 500mg capsule daily for the rest of your life.  -Bactrim renewed. Probiotics can help reduce side effects from antibiotics.      Wound Dressing Change: left and right dorsal feet, left and right 5th lateral toes, right medial first metatarsal and left medial first metatarsal and right dorsalateral foot  - Wash your hands with soap and water before you begin your dressing change and prepare a clean surface for dressings.  - Apply Bactine Max lidocaine spray as needed during wound care process  - Cleanse with mild unscented soap (such as Cetaphil, Cerave or Dove) and water  - Apply small amount of VASHE on gauze, lay into wound bed, let sit for 10  minutes, remove gauze (do not rinse) then apply dressing:   - Apply Activon Manuka Honey on any deeper/yellow wounds (right lateral 5th toe, right medial first metatarsal and left medial first metatarsal)  - Apply Pure & Clean Hydrogel spray to all other wounds (available online, about $22 bottle, don't buy the wound cleanser, buy the Hydrogel)   - Apply size 1 Xspan to 1st/2nd toes bilaterally  - If able to tolerate, apply purple or navy EdemaWear [place] stripe from just above toes to just below knees (or Spandage if Edemawear is not tolerated)  - Apply Zetuvit plus silicone border (or another silicone dressing); 2x2 to left and right 5th lateral toes, right medial first metatarsal and left medial first metatarsal and 4x4 to left and right dorsal feet and right dorsalateral foot  Change every other day      Avoid cold packs when possible, as it reduces blood flow  Hold on Coolflex compression for now, as it is causing pain.    To buy more Activon Honey, try [employer] or [employer]  To buy more Xspan, try [employer]     EdemaWear should be worn 24/7 unless bathing/showering or changing the dressing. You will wash and reuse the EdemaWear. DO NOT CUT THE EDEMAWEAR. IF IT IS TOO LONG THEN CUFF THE EDEMAWEAR (the EdemaWear can shrink length wise with washing).      Whenever you sit raise your ankle above your hips to promote wound healing.     HETAL Long M.D. June 5, 2023    Call us at [phone] if you have any questions about your wounds, have redness or swelling around your wound, have a fever of 101 degrees Fahrenheit or greater or if you have any other problems or concerns. We answer the phone Monday through Friday 8 am to 4 pm, please leave a message as we check the voicemail frequently throughout the day.     If you had a positive experience please indicate that on your patient satisfaction survey form that [facility] will be sending you.    It was a pleasure meeting with you today.   Thank you for allowing me and my team the privilege of caring for you today.  YOU are the reason we are here, and I truly hope we provided you with the excellent service you deserve.  Please let us know if there is anything else we can do for you so that we can be sure you are leaving completely satisfied with your care experience.      If you have any billing related questions please call the [facility] Business office at [phone]. The clinic staff does not handle billing related matters.    If you are scheduled to have a follow up appointment, you will receive a reminder call the day before your visit. On the appointment day please arrive 15 minutes prior to your appointment time. If you are unable to keep that appointment, please call the clinic to cancel or reschedule. If you are more than 10 minutes late or greater for your scheduled appointment time, the clinic policy is that you may be asked to reschedule.

## 2023-06-16 NOTE — TELEPHONE ENCOUNTER
"1.  Right foot MRI done; Left foot not done yet because radiology was 1 hour behind and patient was in too much pain.      Right foot MRI results: \"Infection is difficult to exclude, however, given the multifocality of the abnormality, correlation with skin integrity and ulcer depth should be incorporated to assess for underlying osteomyelitis.\"    Pain clinic doctor wants to insert pain pump, but can't do so without active infection. It would involve inserting a needle only.     Asked patient to have  palpate right 5th toe wound base and see if any bone (feels very hard or sharp) present. She will call us back.  No fever, chills, malaise, nausea.      2.  Medvanced silicone foam 2x2 and 4x4 works well and she requests more of them.   Sent order to [facility] for supplies.  "

## 2023-06-20 ENCOUNTER — HOSPITAL ENCOUNTER (OUTPATIENT)
Dept: WOUND CARE | Facility: CLINIC | Age: 38
Discharge: HOME | End: 2023-06-20
Attending: PHYSICIAN ASSISTANT
Payer: COMMERCIAL

## 2023-06-20 ENCOUNTER — DOCUMENTATION ONLY (OUTPATIENT)
Dept: INTERNAL MEDICINE | Facility: CLINIC | Age: 38
End: 2023-06-20

## 2023-06-20 DIAGNOSIS — L97.522 FOOT ULCER, LEFT, WITH FAT LAYER EXPOSED (H): ICD-10-CM

## 2023-06-20 DIAGNOSIS — S91.105A OPEN WOUND OF FIFTH TOE OF LEFT FOOT, INITIAL ENCOUNTER: ICD-10-CM

## 2023-06-20 DIAGNOSIS — L97.512 FOOT ULCER, RIGHT, WITH FAT LAYER EXPOSED (H): ICD-10-CM

## 2023-06-20 DIAGNOSIS — S91.104A OPEN WOUND OF FIFTH TOE OF RIGHT FOOT, INITIAL ENCOUNTER: Primary | ICD-10-CM

## 2023-06-20 PROCEDURE — 99212 OFFICE O/P EST SF 10 MIN: CPT | Mod: TEL | Performed by: PHYSICIAN ASSISTANT

## 2023-06-20 NOTE — LETTER
06/20/23      Dear Dr. Cosme,    Ronna Rivera is followed in our clinic for foot wounds. She recently underwent an MRI to evaluate for osteomyelitis beneath her lateral foot wounds. The MRI did not show any concerning lesions in this area. There was non-specific bone marrow edema in the first through fourth toes which we feel is reactive to her inflammatory condition and not related to an infectious process. Of course, without a biopsy or aspiration infection cannot be 100% ruled out, however there is negligible clinical suspicion for infection. Therefore, we feel comfortable clearing her to move forward with her procedure with your group. Feel free to reach out if you'd like to discuss her case any further.      Sincerely,      Corinne Aguila PA-C

## 2023-06-20 NOTE — PROGRESS NOTES
PamAdriana Rivera presents today for a telephone visit. She has been previously followed by Dr. Garry Long for wounds related to erythomelalgia. She is anticipating a pain pump trial through [facility] and they would like our opinion on her MRI as there was some question of infection. After reviewing the location of her wounds and the absence of systemic symptoms I do not feel that the bone marrow edema on MRI represents infection but rather is related to her inflammatory condition. I have no concerns about her undergoing the procedure. This was discussed with Ronna Wright and a letter was sent to Dr. Cosme with [facility] reiterating this.     Phone-Visit Details    Type of service:  Phone Visit    Length of call: 5 minutes    Originating Location (pt. Location): Home    Distant Location (provider location):  [facility] [place]     Mode of Communication:  Telephone    Provider has received verbal consent for a Telephone Visit from the patient? Yes

## 2023-06-20 NOTE — PROGRESS NOTES
Patient called for a telephone visit. Certified Wound Care Nurse time spent evaluating patient record, completed a full evaluation and documented wound(s) & ramon-wound skin; provided recommendation based on treatment plan. Reviewed discharge instructions, patient education, and discussed plan of care with appropriate medical team staff members and patient and/or family members.   Kandace Mendez RN

## 2023-06-20 NOTE — TELEPHONE ENCOUNTER
Ailyn WHITTINGTON from [facility] calling- wondering if we are continuing steroids. Per LV note- Dr. Ball considering taper. Will verify with provider and call Ailyn back.    Patricio Rojas RN on 6/20/2023 at 12:25 PM

## 2023-06-20 NOTE — TELEPHONE ENCOUNTER
The steroid plan is up to Dr. James. I left a message with him, and the [facility] are trying to reach him, but we haven't had word yet.   If the [facility] don't have news yet, the steroids need to continue.    Unfortunately, Ronna's condition has proven to be very resistant. She is getting plasmaphersis soon, hopefully that will help, and point the way to next steps.    My role is limited to pain control and signing the infusion orders. The infusion plans and other meds are at the discretion of Dr. OVERTON and the [facility] people.

## 2023-06-20 NOTE — PROGRESS NOTES
Type of Form Received:     Form Received (Date) 6/19/23   Form Filled out Yes 6/28/23   Placed in provider folder Yes

## 2023-06-20 NOTE — DISCHARGE INSTRUCTIONS
Pam Barron      1985    A DME order was not completed because the supplies are ordered by home care or at a care facility    Home Health Care: [facility] Phone: [phone] Fax:[phone]      Per your discussion with Dr. Long:  -Acupuncture  -Neuroglide (Chrissie [employer])  -Decrease salt to less than 25mg daily (check sodium levels in your electrolyte tablets)  -Consider Hyperbaric Oxygen Therapy in future - (it would likely be out of pocket unless you had a bone infection, likely $1,000 per week for 2-3 weeks) Email Lindsay Cormier at [email or web address] for more information  -Consider Photomodulation/Red Light Therapy in future - (it would likely be out of pocket)     Medications/supplements to aid in healing:  Vitamin D3 10,000 iu per day (for 3 months)  Tea C 1,000 mg take daily  Vitamin B Complex with folic acid take 1 tablet daily  Vitamin B 12 1000 mcg daily  Zinc 30mg capsule or tablet daily (ok to take 50mg if 30mg cannot be found)  Vitamin A 10,000 I.unit(s). daily for 3 weeks  Magnesium 400mg one tablet daily  -Vein Formula 1000mg. Take one (1) 500mg capsule four times per day for two months (4/10/23). Order from www.[employer] or [employer] or call [phone]. (The 1000mg is two (2) 500mg capsules) Once your wound is healed, take Vein Formula one (1) 500mg capsule daily for the rest of your life.  -Bactrim renewed. Probiotics can help reduce side effects from antibiotics.      Wound Dressing Change: left and right dorsal feet, left and right 5th lateral toes, right medial first metatarsal and left medial first metatarsal and right dorsalateral foot  - Wash your hands with soap and water before you begin your dressing change and prepare a clean surface for dressings.  - Apply Bactine Max lidocaine spray as needed during wound care process  - Cleanse with mild unscented soap (such as Cetaphil, Cerave or Dove) and water  - Apply small amount of VASHE on gauze, lay  into wound bed, let sit for 10 minutes, remove gauze (do not rinse) then apply dressing:   - Apply Activon Manuka Honey on any deeper/yellow wounds (right lateral 5th toe, right medial first metatarsal and left medial first metatarsal)  - Apply Pure & Clean Hydrogel spray to all other wounds (available online, about $22 bottle, don't buy the wound cleanser, buy the Hydrogel)   - Apply size 1 Xspan to 1st/2nd toes bilaterally  - If able to tolerate, apply purple or navy EdemaWear [place] stripe from just above toes to just below knees (or Spandage if Edemawear is not tolerated)  - Apply Zetuvit plus silicone border (or another silicone dressing); 2x2 to left and right 5th lateral toes, right medial first metatarsal and left medial first metatarsal and 4x4 to left and right dorsal feet and right dorsalateral foot  Change every other day      Avoid cold packs when possible, as it reduces blood flow  Hold on Coolflex compression for now, as it is causing pain.     To buy more Activon Honey, try [employer] or [employer]  To buy more Xspan, try [employer]     EdemaWear should be worn 24/7 unless bathing/showering or changing the dressing. You will wash and reuse the EdemaWear. DO NOT CUT THE EDEMAWEAR. IF IT IS TOO LONG THEN CUFF THE EDEMAWEAR (the EdemaWear can shrink length wise with washing).      Whenever you sit raise your ankle above your hips to promote wound healing.    Corinne Aguila PA-C June 20, 2023    Call us at [phone] if you have any questions about your wounds, have redness or swelling around your wound, have a fever of 101 degrees Fahrenheit or greater or if you have any other problems or concerns. We answer the phone Monday through Friday 8 am to 4 pm, please leave a message as we check the voicemail frequently throughout the day.     If you had a positive experience please indicate that on your patient satisfaction survey form that [facility] will be sending you.    It was a  pleasure meeting with you today.  Thank you for allowing me and my team the privilege of caring for you today.  YOU are the reason we are here, and I truly hope we provided you with the excellent service you deserve.  Please let us know if there is anything else we can do for you so that we can be sure you are leaving completely satisfied with your care experience.      If you have any billing related questions please call the [facility] Business office at [phone]. The clinic staff does not handle billing related matters.    If you are scheduled to have a follow up appointment, you will receive a reminder call the day before your visit. On the appointment day please arrive 15 minutes prior to your appointment time. If you are unable to keep that appointment, please call the clinic to cancel or reschedule. If you are more than 10 minutes late or greater for your scheduled appointment time, the clinic policy is that you may be asked to reschedule.

## 2023-06-22 ENCOUNTER — DOCUMENTATION ONLY (OUTPATIENT)
Dept: INTERNAL MEDICINE | Facility: CLINIC | Age: 38
End: 2023-06-22
Payer: COMMERCIAL

## 2023-06-22 NOTE — PROGRESS NOTES
Type of Form Received:     Form Received (Date) 6/22/23   Form Filled out Yes, 7/9/23   Placed in provider folder Yes

## 2023-06-23 ENCOUNTER — MEDICAL CORRESPONDENCE (OUTPATIENT)
Dept: HEALTH INFORMATION MANAGEMENT | Facility: CLINIC | Age: 38
End: 2023-06-23
Payer: COMMERCIAL

## 2023-06-27 DIAGNOSIS — I73.81 ERYTHROMELALGIA (H): ICD-10-CM

## 2023-06-27 RX ORDER — FENTANYL 50 UG/1
1 PATCH TRANSDERMAL
Qty: 10 PATCH | Refills: 0 | Status: CANCELLED | OUTPATIENT
Start: 2023-06-27

## 2023-06-27 NOTE — TELEPHONE ENCOUNTER
[facility] Call Center    Phone Message    May a detailed message be left on voicemail: yes     Reason for Call: Medication Refill Request    Has the patient contacted the pharmacy for the refill? Yes   Name of medication being requested: Fentanyl patch and HYDROmorphone (DILAUDID) 2 MG tablet   Provider who prescribed the medication: Dr Ball  Pharmacy:      [facility] [address] [address]    Date medication is needed: 6/30/2023         Action Taken: Message routed to:  [facility]    Travel Screening: Not Applicable

## 2023-06-29 RX ORDER — HYDROMORPHONE HYDROCHLORIDE 2 MG/1
2-4 TABLET ORAL EVERY 6 HOURS PRN
Qty: 210 TABLET | Refills: 0 | Status: SHIPPED | OUTPATIENT
Start: 2023-06-30 | End: 2023-10-23

## 2023-06-29 NOTE — TELEPHONE ENCOUNTER
See 06/27/23 refill encounter.      Enoch Tilley CMA ([facility]) at 8:28 AM on 6/29/2023

## 2023-07-02 RX ORDER — ONDANSETRON 4 MG/1
4 TABLET, ORALLY DISINTEGRATING ORAL EVERY 30 MIN PRN
Status: CANCELLED | OUTPATIENT
Start: 2023-07-02

## 2023-07-02 RX ORDER — METOPROLOL TARTRATE 1 MG/ML
1-2 INJECTION, SOLUTION INTRAVENOUS EVERY 5 MIN PRN
Status: CANCELLED | OUTPATIENT
Start: 2023-07-02

## 2023-07-02 RX ORDER — ACETAMINOPHEN 325 MG/1
975 TABLET ORAL ONCE
Status: CANCELLED | OUTPATIENT
Start: 2023-07-02 | End: 2023-07-02

## 2023-07-02 RX ORDER — FENTANYL CITRATE 50 UG/ML
25 INJECTION, SOLUTION INTRAMUSCULAR; INTRAVENOUS
Status: CANCELLED | OUTPATIENT
Start: 2023-07-02

## 2023-07-02 RX ORDER — HYDRALAZINE HYDROCHLORIDE 20 MG/ML
2.5-5 INJECTION INTRAMUSCULAR; INTRAVENOUS EVERY 10 MIN PRN
Status: CANCELLED | OUTPATIENT
Start: 2023-07-02

## 2023-07-02 RX ORDER — SODIUM CHLORIDE, SODIUM LACTATE, POTASSIUM CHLORIDE, CALCIUM CHLORIDE 600; 310; 30; 20 MG/100ML; MG/100ML; MG/100ML; MG/100ML
INJECTION, SOLUTION INTRAVENOUS CONTINUOUS
Status: CANCELLED | OUTPATIENT
Start: 2023-07-02

## 2023-07-02 RX ORDER — HYDROMORPHONE HYDROCHLORIDE 1 MG/ML
0.2 INJECTION, SOLUTION INTRAMUSCULAR; INTRAVENOUS; SUBCUTANEOUS EVERY 5 MIN PRN
Status: CANCELLED | OUTPATIENT
Start: 2023-07-02

## 2023-07-02 RX ORDER — FENTANYL CITRATE 50 UG/ML
50 INJECTION, SOLUTION INTRAMUSCULAR; INTRAVENOUS EVERY 5 MIN PRN
Status: CANCELLED | OUTPATIENT
Start: 2023-07-02

## 2023-07-02 RX ORDER — ONDANSETRON 2 MG/ML
4 INJECTION INTRAMUSCULAR; INTRAVENOUS EVERY 30 MIN PRN
Status: CANCELLED | OUTPATIENT
Start: 2023-07-02

## 2023-07-02 RX ORDER — OXYCODONE HYDROCHLORIDE 5 MG/1
5 TABLET ORAL EVERY 4 HOURS PRN
Status: CANCELLED | OUTPATIENT
Start: 2023-07-02

## 2023-07-02 RX ORDER — HYDROMORPHONE HYDROCHLORIDE 1 MG/ML
0.4 INJECTION, SOLUTION INTRAMUSCULAR; INTRAVENOUS; SUBCUTANEOUS EVERY 5 MIN PRN
Status: CANCELLED | OUTPATIENT
Start: 2023-07-02

## 2023-07-02 RX ORDER — FENTANYL CITRATE 50 UG/ML
25 INJECTION, SOLUTION INTRAMUSCULAR; INTRAVENOUS EVERY 5 MIN PRN
Status: CANCELLED | OUTPATIENT
Start: 2023-07-02

## 2023-07-02 RX ORDER — LIDOCAINE 40 MG/G
CREAM TOPICAL
Status: CANCELLED | OUTPATIENT
Start: 2023-07-02

## 2023-07-02 RX ORDER — OXYCODONE HYDROCHLORIDE 5 MG/1
10 TABLET ORAL EVERY 4 HOURS PRN
Status: CANCELLED | OUTPATIENT
Start: 2023-07-02

## 2023-07-04 ENCOUNTER — MEDICAL CORRESPONDENCE (OUTPATIENT)
Dept: HEALTH INFORMATION MANAGEMENT | Facility: CLINIC | Age: 38
End: 2023-07-04

## 2023-07-04 ENCOUNTER — APPOINTMENT (OUTPATIENT)
Dept: GENERAL RADIOLOGY | Facility: CLINIC | Age: 38
DRG: 871 | End: 2023-07-04
Attending: EMERGENCY MEDICINE
Payer: COMMERCIAL

## 2023-07-04 ENCOUNTER — NURSE TRIAGE (OUTPATIENT)
Dept: NURSING | Facility: CLINIC | Age: 38
End: 2023-07-04
Payer: COMMERCIAL

## 2023-07-04 ENCOUNTER — HOSPITAL ENCOUNTER (INPATIENT)
Facility: CLINIC | Age: 38
LOS: 2 days | Discharge: HOME HEALTH | DRG: 871 | End: 2023-07-06
Attending: EMERGENCY MEDICINE | Admitting: INTERNAL MEDICINE
Payer: COMMERCIAL

## 2023-07-04 DIAGNOSIS — S91.105A OPEN WOUND OF FIFTH TOE OF LEFT FOOT, INITIAL ENCOUNTER: Primary | ICD-10-CM

## 2023-07-04 DIAGNOSIS — A41.9 SEPSIS WITHOUT ACUTE ORGAN DYSFUNCTION, DUE TO UNSPECIFIED ORGANISM (H): ICD-10-CM

## 2023-07-04 DIAGNOSIS — J18.9 COMMUNITY ACQUIRED PNEUMONIA OF RIGHT UPPER LOBE OF LUNG: ICD-10-CM

## 2023-07-04 LAB
ALBUMIN SERPL BCG-MCNC: 3.7 G/DL (ref 3.5–5.2)
ALP SERPL-CCNC: 55 U/L (ref 35–104)
ALT SERPL W P-5'-P-CCNC: 20 U/L (ref 0–50)
ANION GAP SERPL CALCULATED.3IONS-SCNC: 14 MMOL/L (ref 7–15)
AST SERPL W P-5'-P-CCNC: 20 U/L (ref 0–45)
BASOPHILS # BLD AUTO: 0 10E3/UL (ref 0–0.2)
BASOPHILS NFR BLD AUTO: 0 %
BILIRUB SERPL-MCNC: 0.4 MG/DL
BUN SERPL-MCNC: 11.3 MG/DL (ref 6–20)
CALCIUM SERPL-MCNC: 8.6 MG/DL (ref 8.6–10)
CHLORIDE SERPL-SCNC: 91 MMOL/L (ref 98–107)
CREAT SERPL-MCNC: 0.84 MG/DL (ref 0.51–0.95)
DEPRECATED HCO3 PLAS-SCNC: 24 MMOL/L (ref 22–29)
EOSINOPHIL # BLD AUTO: 0 10E3/UL (ref 0–0.7)
EOSINOPHIL NFR BLD AUTO: 0 %
ERYTHROCYTE [DISTWIDTH] IN BLOOD BY AUTOMATED COUNT: 13.9 % (ref 10–15)
GFR SERPL CREATININE-BSD FRML MDRD: >90 ML/MIN/1.73M2
GLUCOSE SERPL-MCNC: 113 MG/DL (ref 70–99)
GROUP A STREP BY PCR: NOT DETECTED
HCO3 BLDV-SCNC: 28 MMOL/L (ref 21–28)
HCT VFR BLD AUTO: 38 % (ref 35–47)
HGB BLD-MCNC: 12.8 G/DL (ref 11.7–15.7)
HOLD SPECIMEN: NORMAL
HOLD SPECIMEN: NORMAL
IMM GRANULOCYTES # BLD: 0.1 10E3/UL
IMM GRANULOCYTES NFR BLD: 0 %
LACTATE BLD-SCNC: 1.5 MMOL/L
LYMPHOCYTES # BLD AUTO: 0.7 10E3/UL (ref 0.8–5.3)
LYMPHOCYTES NFR BLD AUTO: 5 %
MCH RBC QN AUTO: 31.5 PG (ref 26.5–33)
MCHC RBC AUTO-ENTMCNC: 33.7 G/DL (ref 31.5–36.5)
MCV RBC AUTO: 94 FL (ref 78–100)
MONOCYTES # BLD AUTO: 1.1 10E3/UL (ref 0–1.3)
MONOCYTES NFR BLD AUTO: 7 %
NEUTROPHILS # BLD AUTO: 13.8 10E3/UL (ref 1.6–8.3)
NEUTROPHILS NFR BLD AUTO: 88 %
NRBC # BLD AUTO: 0 10E3/UL
NRBC BLD AUTO-RTO: 0 /100
PCO2 BLDV: 39 MM HG (ref 40–50)
PH BLDV: 7.46 [PH] (ref 7.32–7.43)
PLATELET # BLD AUTO: 170 10E3/UL (ref 150–450)
PO2 BLDV: 29 MM HG (ref 25–47)
POTASSIUM SERPL-SCNC: 3.7 MMOL/L (ref 3.4–5.3)
PROT SERPL-MCNC: 6.1 G/DL (ref 6.4–8.3)
RBC # BLD AUTO: 4.06 10E6/UL (ref 3.8–5.2)
SAO2 % BLDV: 59 % (ref 94–100)
SODIUM SERPL-SCNC: 129 MMOL/L (ref 136–145)
WBC # BLD AUTO: 15.7 10E3/UL (ref 4–11)

## 2023-07-04 PROCEDURE — 85025 COMPLETE CBC W/AUTO DIFF WBC: CPT | Performed by: EMERGENCY MEDICINE

## 2023-07-04 PROCEDURE — 250N000013 HC RX MED GY IP 250 OP 250 PS 637: Performed by: EMERGENCY MEDICINE

## 2023-07-04 PROCEDURE — 258N000003 HC RX IP 258 OP 636: Performed by: EMERGENCY MEDICINE

## 2023-07-04 PROCEDURE — 36415 COLL VENOUS BLD VENIPUNCTURE: CPT | Performed by: EMERGENCY MEDICINE

## 2023-07-04 PROCEDURE — 80053 COMPREHEN METABOLIC PANEL: CPT | Performed by: EMERGENCY MEDICINE

## 2023-07-04 PROCEDURE — 120N000001 HC R&B MED SURG/OB

## 2023-07-04 PROCEDURE — 99285 EMERGENCY DEPT VISIT HI MDM: CPT | Mod: 25

## 2023-07-04 PROCEDURE — 96361 HYDRATE IV INFUSION ADD-ON: CPT

## 2023-07-04 PROCEDURE — 71046 X-RAY EXAM CHEST 2 VIEWS: CPT

## 2023-07-04 PROCEDURE — 250N000011 HC RX IP 250 OP 636: Performed by: EMERGENCY MEDICINE

## 2023-07-04 PROCEDURE — 87651 STREP A DNA AMP PROBE: CPT | Performed by: EMERGENCY MEDICINE

## 2023-07-04 PROCEDURE — 96365 THER/PROPH/DIAG IV INF INIT: CPT

## 2023-07-04 PROCEDURE — 99223 1ST HOSP IP/OBS HIGH 75: CPT | Mod: AI | Performed by: INTERNAL MEDICINE

## 2023-07-04 PROCEDURE — 96375 TX/PRO/DX INJ NEW DRUG ADDON: CPT

## 2023-07-04 PROCEDURE — 82803 BLOOD GASES ANY COMBINATION: CPT

## 2023-07-04 PROCEDURE — 83605 ASSAY OF LACTIC ACID: CPT

## 2023-07-04 RX ORDER — ONDANSETRON 2 MG/ML
INJECTION INTRAMUSCULAR; INTRAVENOUS
Status: COMPLETED
Start: 2023-07-04 | End: 2023-07-04

## 2023-07-04 RX ORDER — CEFTRIAXONE 1 G/1
1 INJECTION, POWDER, FOR SOLUTION INTRAMUSCULAR; INTRAVENOUS ONCE
Status: COMPLETED | OUTPATIENT
Start: 2023-07-04 | End: 2023-07-04

## 2023-07-04 RX ORDER — LORAZEPAM 0.5 MG/1
0.5 TABLET ORAL DAILY PRN
COMMUNITY
End: 2023-08-17

## 2023-07-04 RX ORDER — ACETAMINOPHEN 500 MG
1000 TABLET ORAL ONCE
Status: COMPLETED | OUTPATIENT
Start: 2023-07-04 | End: 2023-07-04

## 2023-07-04 RX ORDER — DIPHENHYDRAMINE HCL 50 MG
50 CAPSULE ORAL AT BEDTIME
COMMUNITY

## 2023-07-04 RX ORDER — ONDANSETRON 2 MG/ML
4 INJECTION INTRAMUSCULAR; INTRAVENOUS ONCE
Status: COMPLETED | OUTPATIENT
Start: 2023-07-04 | End: 2023-07-04

## 2023-07-04 RX ORDER — METHYLPREDNISOLONE SODIUM SUCCINATE 1 G/16ML
1000 INJECTION, POWDER, LYOPHILIZED, FOR SOLUTION INTRAMUSCULAR; INTRAVENOUS
COMMUNITY
End: 2024-02-12

## 2023-07-04 RX ORDER — AZITHROMYCIN 500 MG/1
500 INJECTION, POWDER, LYOPHILIZED, FOR SOLUTION INTRAVENOUS ONCE
Status: COMPLETED | OUTPATIENT
Start: 2023-07-04 | End: 2023-07-04

## 2023-07-04 RX ADMIN — ONDANSETRON 4 MG: 2 INJECTION INTRAMUSCULAR; INTRAVENOUS at 20:08

## 2023-07-04 RX ADMIN — ACETAMINOPHEN 1000 MG: 500 TABLET ORAL at 20:47

## 2023-07-04 RX ADMIN — AZITHROMYCIN MONOHYDRATE 500 MG: 500 INJECTION, POWDER, LYOPHILIZED, FOR SOLUTION INTRAVENOUS at 22:11

## 2023-07-04 RX ADMIN — SODIUM CHLORIDE 2000 ML: 9 INJECTION, SOLUTION INTRAVENOUS at 20:30

## 2023-07-04 RX ADMIN — CEFTRIAXONE SODIUM 1 G: 1 INJECTION, POWDER, FOR SOLUTION INTRAMUSCULAR; INTRAVENOUS at 20:05

## 2023-07-04 ASSESSMENT — ACTIVITIES OF DAILY LIVING (ADL)
ADLS_ACUITY_SCORE: 35
ADLS_ACUITY_SCORE: 33
ADLS_ACUITY_SCORE: 35

## 2023-07-04 NOTE — TELEPHONE ENCOUNTER
Nurse Triage SBAR    Is this a 2nd Level Triage? NO    Situation: Fever, confusion    Background: Patient calling, states that she has a auto-immune disorder. She is calling because she has a temp of 103 today and states that she is feeling very weak and confused.  States that she has a headache and a dry cough.     Assessment: Fever, confusion, headache    Protocol Recommended Disposition:   Go to ED Now, See More Appropriate Guideline    Recommendation:     They will do a covid test at home and call back if it is positive. Otherwise they will go to the ED.      Routed to provider     JONI CRUZ RN      Does the patient meet one of the following criteria for ADS visit consideration? 16+ years old, with an [facility] PCP     TIP  Providers, please consider if this condition is appropriate for management at one of our Acute and Diagnostic Services sites.     If patient is a good candidate, please use dotphrase <dot>triageresponse and select Refer to ADS to document.    Reason for Disposition    Other symptom is present, see that guideline (e.g., symptoms of cough, runny nose, sore throat, earache, abdominal pain, diarrhea, vomiting)    Headache or vomiting    Additional Information    Negative: Shock suspected (e.g., cold/pale/clammy skin, too weak to stand, low BP, rapid pulse)    Negative: Difficult to awaken or acting confused (e.g., disoriented, slurred speech)    Negative: [1] Difficulty breathing AND [2] bluish lips, tongue or face    Negative: New-onset rash with multiple purple (or blood-colored) spots or dots    Negative: Sounds like a life-threatening emergency to the triager    Negative: Fever in a cancer patient who is currently (or recently) receiving chemotherapy or radiation therapy, or cancer patient who has metastatic or end-stage cancer and is receiving palliative care    Negative: Pregnant    Negative: Postpartum (from 0 to 6 weeks after delivery)    Negative: Fever onset within 24 hours of  receiving vaccine    Negative: [1] Fever AND [2] within 14 days of COVID-19 Exposure    Negative: [1] Difficult to awaken or acting confused (e.g., disoriented, slurred speech) AND [2] present now AND [3] diabetes mellitus    Negative: [1] Difficult to awaken or acting confused (e.g., disoriented, slurred speech) AND [2] present now AND [3] new-onset    Negative: [1] Weakness of the face, arm, or leg on one side of the body AND [2] new-onset    Negative: [1] Numbness of the face, arm, or leg on one side of the body AND [2] new-onset    Negative: [1] Loss of speech or garbled speech AND [2] new-onset    Negative: Difficulty breathing or bluish lips    Negative: Shock suspected (e.g., cold/pale/clammy skin, too weak to stand, low BP, rapid pulse)    Negative: Seeing, hearing, or feeling things that are not there (i.e., visual, auditory, or tactile hallucinations)    Negative: Followed a head injury    Negative: Drug overdose suspected    Negative: Sounds like a life-threatening emergency to the triager    Negative: Questions or concerns about alcohol use, unhealthy alcohol use, binge drinking, intoxication, or withdrawal    Negative: Questions or concerns about substance use (drug use), unhealthy drug use, intoxication, or withdrawal    Negative: [1] Diabetes mellitus AND [2] confusion from low blood sugar (i.e., < 60 mg/dl or 3.5 mmol/l)    Protocols used: FEVER-A-AH, CONFUSION - DELIRIUM-A-AH

## 2023-07-04 NOTE — ED TRIAGE NOTES
Immunocompromised pt presents with  due to cough, fever, lethargy. Pt has child at home with strep.      Triage Assessment     Row Name 07/04/23 5096       Triage Assessment (Adult)    Airway WDL WDL       Respiratory WDL    Respiratory WDL WDL       Skin Circulation/Temperature WDL    Skin Circulation/Temperature WDL WDL       Cardiac WDL    Cardiac WDL WDL       Peripheral/Neurovascular WDL    Peripheral Neurovascular WDL WDL       Cognitive/Neuro/Behavioral WDL    Cognitive/Neuro/Behavioral WDL WDL

## 2023-07-05 ENCOUNTER — MYC MEDICAL ADVICE (OUTPATIENT)
Dept: INTERNAL MEDICINE | Facility: CLINIC | Age: 38
End: 2023-07-05

## 2023-07-05 LAB
ANION GAP SERPL CALCULATED.3IONS-SCNC: 11 MMOL/L (ref 7–15)
BUN SERPL-MCNC: 9.2 MG/DL (ref 6–20)
CALCIUM SERPL-MCNC: 8.2 MG/DL (ref 8.6–10)
CHLORIDE SERPL-SCNC: 105 MMOL/L (ref 98–107)
CREAT SERPL-MCNC: 0.69 MG/DL (ref 0.51–0.95)
DEPRECATED HCO3 PLAS-SCNC: 23 MMOL/L (ref 22–29)
ERYTHROCYTE [DISTWIDTH] IN BLOOD BY AUTOMATED COUNT: 14.2 % (ref 10–15)
GFR SERPL CREATININE-BSD FRML MDRD: >90 ML/MIN/1.73M2
GLUCOSE SERPL-MCNC: 81 MG/DL (ref 70–99)
HCT VFR BLD AUTO: 34.6 % (ref 35–47)
HGB BLD-MCNC: 11.5 G/DL (ref 11.7–15.7)
MCH RBC QN AUTO: 31.9 PG (ref 26.5–33)
MCHC RBC AUTO-ENTMCNC: 33.2 G/DL (ref 31.5–36.5)
MCV RBC AUTO: 96 FL (ref 78–100)
PLATELET # BLD AUTO: 142 10E3/UL (ref 150–450)
POTASSIUM SERPL-SCNC: 4 MMOL/L (ref 3.4–5.3)
RBC # BLD AUTO: 3.61 10E6/UL (ref 3.8–5.2)
SODIUM SERPL-SCNC: 139 MMOL/L (ref 136–145)
WBC # BLD AUTO: 10.5 10E3/UL (ref 4–11)

## 2023-07-05 PROCEDURE — 85027 COMPLETE CBC AUTOMATED: CPT | Performed by: INTERNAL MEDICINE

## 2023-07-05 PROCEDURE — 250N000012 HC RX MED GY IP 250 OP 636 PS 637: Performed by: INTERNAL MEDICINE

## 2023-07-05 PROCEDURE — 258N000003 HC RX IP 258 OP 636: Performed by: INTERNAL MEDICINE

## 2023-07-05 PROCEDURE — 999N000040 HC STATISTIC CONSULT NO CHARGE VASC ACCESS

## 2023-07-05 PROCEDURE — 250N000011 HC RX IP 250 OP 636: Mod: JZ | Performed by: INTERNAL MEDICINE

## 2023-07-05 PROCEDURE — 82310 ASSAY OF CALCIUM: CPT | Performed by: INTERNAL MEDICINE

## 2023-07-05 PROCEDURE — 36415 COLL VENOUS BLD VENIPUNCTURE: CPT | Performed by: INTERNAL MEDICINE

## 2023-07-05 PROCEDURE — 250N000013 HC RX MED GY IP 250 OP 250 PS 637: Performed by: INTERNAL MEDICINE

## 2023-07-05 PROCEDURE — G0463 HOSPITAL OUTPT CLINIC VISIT: HCPCS

## 2023-07-05 PROCEDURE — 99232 SBSQ HOSP IP/OBS MODERATE 35: CPT | Performed by: INTERNAL MEDICINE

## 2023-07-05 PROCEDURE — 120N000001 HC R&B MED SURG/OB

## 2023-07-05 RX ORDER — ACETAMINOPHEN 325 MG/1
650 TABLET ORAL EVERY 6 HOURS PRN
Status: DISCONTINUED | OUTPATIENT
Start: 2023-07-05 | End: 2023-07-06 | Stop reason: HOSPADM

## 2023-07-05 RX ORDER — LORAZEPAM 0.5 MG/1
0.5 TABLET ORAL DAILY PRN
Status: DISCONTINUED | OUTPATIENT
Start: 2023-07-05 | End: 2023-07-06 | Stop reason: HOSPADM

## 2023-07-05 RX ORDER — MEXILETINE HYDROCHLORIDE 150 MG/1
150 CAPSULE ORAL 3 TIMES DAILY
Status: DISCONTINUED | OUTPATIENT
Start: 2023-07-05 | End: 2023-07-06 | Stop reason: HOSPADM

## 2023-07-05 RX ORDER — SODIUM CHLORIDE, SODIUM LACTATE, POTASSIUM CHLORIDE, CALCIUM CHLORIDE 600; 310; 30; 20 MG/100ML; MG/100ML; MG/100ML; MG/100ML
INJECTION, SOLUTION INTRAVENOUS CONTINUOUS
Status: DISCONTINUED | OUTPATIENT
Start: 2023-07-05 | End: 2023-07-06

## 2023-07-05 RX ORDER — AZITHROMYCIN 250 MG/1
250 TABLET, FILM COATED ORAL DAILY
Status: DISCONTINUED | OUTPATIENT
Start: 2023-07-06 | End: 2023-07-06 | Stop reason: HOSPADM

## 2023-07-05 RX ORDER — PROCHLORPERAZINE 25 MG
25 SUPPOSITORY, RECTAL RECTAL EVERY 12 HOURS PRN
Status: DISCONTINUED | OUTPATIENT
Start: 2023-07-05 | End: 2023-07-06 | Stop reason: HOSPADM

## 2023-07-05 RX ORDER — BISACODYL 10 MG
10 SUPPOSITORY, RECTAL RECTAL DAILY PRN
Status: DISCONTINUED | OUTPATIENT
Start: 2023-07-05 | End: 2023-07-06 | Stop reason: HOSPADM

## 2023-07-05 RX ORDER — AZATHIOPRINE 50 MG/1
100 TABLET ORAL DAILY
Status: DISCONTINUED | OUTPATIENT
Start: 2023-07-05 | End: 2023-07-06 | Stop reason: HOSPADM

## 2023-07-05 RX ORDER — METHYLPREDNISOLONE SODIUM SUCCINATE 1 G/16ML
1000 INJECTION, POWDER, LYOPHILIZED, FOR SOLUTION INTRAMUSCULAR; INTRAVENOUS
Status: DISCONTINUED | OUTPATIENT
Start: 2023-07-06 | End: 2023-07-06

## 2023-07-05 RX ORDER — ACETAMINOPHEN 650 MG/1
650 SUPPOSITORY RECTAL EVERY 6 HOURS PRN
Status: DISCONTINUED | OUTPATIENT
Start: 2023-07-05 | End: 2023-07-06 | Stop reason: HOSPADM

## 2023-07-05 RX ORDER — SULFAMETHOXAZOLE AND TRIMETHOPRIM 400; 80 MG/1; MG/1
1 TABLET ORAL EVERY EVENING
Status: DISCONTINUED | OUTPATIENT
Start: 2023-07-05 | End: 2023-07-06 | Stop reason: HOSPADM

## 2023-07-05 RX ORDER — GABAPENTIN 600 MG/1
1200 TABLET ORAL 2 TIMES DAILY
Status: DISCONTINUED | OUTPATIENT
Start: 2023-07-05 | End: 2023-07-06 | Stop reason: HOSPADM

## 2023-07-05 RX ORDER — LIDOCAINE 40 MG/G
CREAM TOPICAL
Status: DISCONTINUED | OUTPATIENT
Start: 2023-07-05 | End: 2023-07-06 | Stop reason: HOSPADM

## 2023-07-05 RX ORDER — LIDOCAINE 50 MG/G
OINTMENT TOPICAL EVERY 4 HOURS PRN
Status: DISCONTINUED | OUTPATIENT
Start: 2023-07-05 | End: 2023-07-06 | Stop reason: HOSPADM

## 2023-07-05 RX ORDER — GABAPENTIN 300 MG/1
900 CAPSULE ORAL DAILY
Status: DISCONTINUED | OUTPATIENT
Start: 2023-07-05 | End: 2023-07-06 | Stop reason: HOSPADM

## 2023-07-05 RX ORDER — ONDANSETRON 2 MG/ML
4 INJECTION INTRAMUSCULAR; INTRAVENOUS EVERY 6 HOURS PRN
Status: DISCONTINUED | OUTPATIENT
Start: 2023-07-05 | End: 2023-07-06 | Stop reason: HOSPADM

## 2023-07-05 RX ORDER — ONDANSETRON 4 MG/1
4 TABLET, ORALLY DISINTEGRATING ORAL EVERY 6 HOURS PRN
Status: DISCONTINUED | OUTPATIENT
Start: 2023-07-05 | End: 2023-07-06 | Stop reason: HOSPADM

## 2023-07-05 RX ORDER — FENTANYL 50 UG/1
50 PATCH TRANSDERMAL
Status: DISCONTINUED | OUTPATIENT
Start: 2023-07-05 | End: 2023-07-05

## 2023-07-05 RX ORDER — HYDROMORPHONE HYDROCHLORIDE 1 MG/ML
0.5 INJECTION, SOLUTION INTRAMUSCULAR; INTRAVENOUS; SUBCUTANEOUS
Status: DISCONTINUED | OUTPATIENT
Start: 2023-07-05 | End: 2023-07-06 | Stop reason: HOSPADM

## 2023-07-05 RX ORDER — LIDOCAINE HYDROCHLORIDE 40 MG/ML
SOLUTION TOPICAL DAILY
Status: DISCONTINUED | OUTPATIENT
Start: 2023-07-05 | End: 2023-07-06 | Stop reason: HOSPADM

## 2023-07-05 RX ORDER — VENLAFAXINE HYDROCHLORIDE 37.5 MG/1
37.5 CAPSULE, EXTENDED RELEASE ORAL 2 TIMES DAILY
Status: DISCONTINUED | OUTPATIENT
Start: 2023-07-05 | End: 2023-07-06 | Stop reason: HOSPADM

## 2023-07-05 RX ORDER — POLYETHYLENE GLYCOL 3350 17 G/17G
17 POWDER, FOR SOLUTION ORAL DAILY PRN
Status: DISCONTINUED | OUTPATIENT
Start: 2023-07-05 | End: 2023-07-06 | Stop reason: HOSPADM

## 2023-07-05 RX ORDER — IBUPROFEN 400 MG/1
400 TABLET, FILM COATED ORAL EVERY 6 HOURS PRN
Status: DISCONTINUED | OUTPATIENT
Start: 2023-07-05 | End: 2023-07-06 | Stop reason: HOSPADM

## 2023-07-05 RX ORDER — HYDROMORPHONE HYDROCHLORIDE 2 MG/1
4 TABLET ORAL EVERY 4 HOURS PRN
Status: DISCONTINUED | OUTPATIENT
Start: 2023-07-05 | End: 2023-07-06 | Stop reason: HOSPADM

## 2023-07-05 RX ORDER — NALOXONE HYDROCHLORIDE 0.4 MG/ML
0.2 INJECTION, SOLUTION INTRAMUSCULAR; INTRAVENOUS; SUBCUTANEOUS
Status: DISCONTINUED | OUTPATIENT
Start: 2023-07-05 | End: 2023-07-06 | Stop reason: HOSPADM

## 2023-07-05 RX ORDER — FENTANYL 50 UG/1
50 PATCH TRANSDERMAL
Status: DISCONTINUED | OUTPATIENT
Start: 2023-07-06 | End: 2023-07-06 | Stop reason: HOSPADM

## 2023-07-05 RX ORDER — DIPHENHYDRAMINE HCL 25 MG
50 CAPSULE ORAL AT BEDTIME
Status: DISCONTINUED | OUTPATIENT
Start: 2023-07-05 | End: 2023-07-06 | Stop reason: HOSPADM

## 2023-07-05 RX ORDER — ACETAMINOPHEN AND CODEINE PHOSPHATE 120; 12 MG/5ML; MG/5ML
0.7 SOLUTION ORAL DAILY
Status: DISCONTINUED | OUTPATIENT
Start: 2023-07-05 | End: 2023-07-06 | Stop reason: HOSPADM

## 2023-07-05 RX ORDER — NALOXONE HYDROCHLORIDE 0.4 MG/ML
0.4 INJECTION, SOLUTION INTRAMUSCULAR; INTRAVENOUS; SUBCUTANEOUS
Status: DISCONTINUED | OUTPATIENT
Start: 2023-07-05 | End: 2023-07-06 | Stop reason: HOSPADM

## 2023-07-05 RX ORDER — ENOXAPARIN SODIUM 100 MG/ML
40 INJECTION SUBCUTANEOUS EVERY 24 HOURS
Status: DISCONTINUED | OUTPATIENT
Start: 2023-07-05 | End: 2023-07-06 | Stop reason: HOSPADM

## 2023-07-05 RX ORDER — CEFTRIAXONE 1 G/1
1 INJECTION, POWDER, FOR SOLUTION INTRAMUSCULAR; INTRAVENOUS EVERY 24 HOURS
Status: DISCONTINUED | OUTPATIENT
Start: 2023-07-05 | End: 2023-07-06

## 2023-07-05 RX ORDER — PROCHLORPERAZINE MALEATE 10 MG
10 TABLET ORAL EVERY 6 HOURS PRN
Status: DISCONTINUED | OUTPATIENT
Start: 2023-07-05 | End: 2023-07-06 | Stop reason: HOSPADM

## 2023-07-05 RX ORDER — HYDROMORPHONE HYDROCHLORIDE 2 MG/1
8 TABLET ORAL EVERY 4 HOURS PRN
Status: DISCONTINUED | OUTPATIENT
Start: 2023-07-05 | End: 2023-07-06 | Stop reason: HOSPADM

## 2023-07-05 RX ADMIN — HYDROMORPHONE HYDROCHLORIDE 4 MG: 2 TABLET ORAL at 15:09

## 2023-07-05 RX ADMIN — SODIUM CHLORIDE, POTASSIUM CHLORIDE, SODIUM LACTATE AND CALCIUM CHLORIDE: 600; 310; 30; 20 INJECTION, SOLUTION INTRAVENOUS at 01:38

## 2023-07-05 RX ADMIN — MEXILETINE HYDROCHLORIDE 150 MG: 150 CAPSULE ORAL at 15:09

## 2023-07-05 RX ADMIN — VENLAFAXINE HYDROCHLORIDE 37.5 MG: 37.5 CAPSULE, EXTENDED RELEASE ORAL at 01:39

## 2023-07-05 RX ADMIN — GABAPENTIN 1200 MG: 600 TABLET, FILM COATED ORAL at 01:39

## 2023-07-05 RX ADMIN — DIPHENHYDRAMINE HYDROCHLORIDE 50 MG: 25 CAPSULE ORAL at 01:39

## 2023-07-05 RX ADMIN — ACETAMINOPHEN AND CODEINE PHOSPHATE 0.7 MG: 120; 12 SOLUTION ORAL at 21:40

## 2023-07-05 RX ADMIN — GABAPENTIN 900 MG: 300 CAPSULE ORAL at 14:08

## 2023-07-05 RX ADMIN — VENLAFAXINE HYDROCHLORIDE 37.5 MG: 37.5 CAPSULE, EXTENDED RELEASE ORAL at 21:33

## 2023-07-05 RX ADMIN — GABAPENTIN 1200 MG: 600 TABLET, FILM COATED ORAL at 08:17

## 2023-07-05 RX ADMIN — MEXILETINE HYDROCHLORIDE 150 MG: 150 CAPSULE ORAL at 08:17

## 2023-07-05 RX ADMIN — HYDROMORPHONE HYDROCHLORIDE 8 MG: 2 TABLET ORAL at 21:39

## 2023-07-05 RX ADMIN — IBUPROFEN 400 MG: 400 TABLET, FILM COATED ORAL at 16:22

## 2023-07-05 RX ADMIN — SODIUM CHLORIDE, POTASSIUM CHLORIDE, SODIUM LACTATE AND CALCIUM CHLORIDE: 600; 310; 30; 20 INJECTION, SOLUTION INTRAVENOUS at 22:23

## 2023-07-05 RX ADMIN — DIPHENHYDRAMINE HYDROCHLORIDE 50 MG: 25 CAPSULE ORAL at 21:33

## 2023-07-05 RX ADMIN — GABAPENTIN 1200 MG: 600 TABLET, FILM COATED ORAL at 21:33

## 2023-07-05 RX ADMIN — Medication 1 LOZENGE: at 01:44

## 2023-07-05 RX ADMIN — IBUPROFEN 400 MG: 400 TABLET, FILM COATED ORAL at 22:29

## 2023-07-05 RX ADMIN — CEFTRIAXONE SODIUM 1 G: 1 INJECTION, POWDER, FOR SOLUTION INTRAMUSCULAR; INTRAVENOUS at 21:34

## 2023-07-05 RX ADMIN — VENLAFAXINE HYDROCHLORIDE 37.5 MG: 37.5 CAPSULE, EXTENDED RELEASE ORAL at 08:17

## 2023-07-05 RX ADMIN — ENOXAPARIN SODIUM 40 MG: 40 INJECTION SUBCUTANEOUS at 08:17

## 2023-07-05 RX ADMIN — SULFAMETHOXAZOLE AND TRIMETHOPRIM 1 TABLET: 400; 80 TABLET ORAL at 21:33

## 2023-07-05 RX ADMIN — AZATHIOPRINE 100 MG: 50 TABLET ORAL at 21:33

## 2023-07-05 RX ADMIN — SODIUM CHLORIDE, POTASSIUM CHLORIDE, SODIUM LACTATE AND CALCIUM CHLORIDE: 600; 310; 30; 20 INJECTION, SOLUTION INTRAVENOUS at 11:51

## 2023-07-05 RX ADMIN — MEXILETINE HYDROCHLORIDE 150 MG: 150 CAPSULE ORAL at 21:33

## 2023-07-05 RX ADMIN — IBUPROFEN 400 MG: 400 TABLET, FILM COATED ORAL at 08:26

## 2023-07-05 ASSESSMENT — ACTIVITIES OF DAILY LIVING (ADL)
ADLS_ACUITY_SCORE: 36
DRESSING/BATHING: BATHING DIFFICULTY, ASSISTANCE 1 PERSON
ADLS_ACUITY_SCORE: 36
TOILETING: 1-->ASSISTANCE (EQUIPMENT/PERSON) NEEDED
HEARING_DIFFICULTY_OR_DEAF: NO
DRESSING/BATHING_DIFFICULTY: YES
ADLS_ACUITY_SCORE: 36
ADLS_ACUITY_SCORE: 34
DRESS: 0-->INDEPENDENT
ADLS_ACUITY_SCORE: 36
HEARING_DIFFICULTY_OR_DEAF: NO
EQUIPMENT_CURRENTLY_USED_AT_HOME: WHEELCHAIR, MANUAL;SHOWER CHAIR
TOILETING: 0-->INDEPENDENT
CONCENTRATING,_REMEMBERING_OR_MAKING_DECISIONS_DIFFICULTY: NO
ADLS_ACUITY_SCORE: 36
WEAR_GLASSES_OR_BLIND: NO
CHANGE_IN_FUNCTIONAL_STATUS_SINCE_ONSET_OF_CURRENT_ILLNESS/INJURY: YES
ADLS_ACUITY_SCORE: 36
DIFFICULTY_EATING/SWALLOWING: OTHER (SEE COMMENTS)
ADLS_ACUITY_SCORE: 36
DRESS: 1-->ASSISTANCE (EQUIPMENT/PERSON) NEEDED
ADLS_ACUITY_SCORE: 36
FALL_HISTORY_WITHIN_LAST_SIX_MONTHS: NO
TOILETING_ISSUES: YES
DIFFICULTY_COMMUNICATING: NO
TOILETING_ASSISTANCE: TOILETING DIFFICULTY, ASSISTANCE 1 PERSON
ADLS_ACUITY_SCORE: 34
WALKING_OR_CLIMBING_STAIRS_DIFFICULTY: OTHER (SEE COMMENTS)
DOING_ERRANDS_INDEPENDENTLY_DIFFICULTY: YES
BATHING: 1-->ASSISTANCE NEEDED
DIFFICULTY_COMMUNICATING: NO

## 2023-07-05 NOTE — PROGRESS NOTES
RECEIVING UNIT ED HANDOFF REVIEW    ED Nurse Handoff Report was reviewed by: Karol Addison RN on July 4, 2023 at 11:48 PM

## 2023-07-05 NOTE — PLAN OF CARE
Goal Outcome Evaluation:    Summary: Pneumonia of RL   DATE & TIME: 7/5/23 5305-5004    Cognitive Concerns/ Orientation : A&Ox4   BEHAVIOR & AGGRESSION TOOL COLOR: GREEN  CIWA SCORE: N/A   ABNL VS/O2: VSS RA soft BP's 92/59, afebrile   MOBILITY: Ax1 GW to bedside commode-uses wheelchair at home (Wheelchair in room if needed)   PAIN MANAGMENT: Denies  DIET: Regular   BOWEL/BLADDER: Continent   ABNL LAB/BG: WBC 15.7,   DRAIN/DEVICES: L PICC running LR @100mL/hr   TELEMETRY RHYTHM: N/A  SKIN: Wounds BLE covered with stockings (shown pictures by ) follows with  wound clinic. Ice packs on feet and hands due to Erythromelalgia   TESTS/PROCEDURES: x-ray completed 7/4   D/C DAY/GOALS/PLACE: 2 days pending improvement of labs   OTHER IMPORTANT INFO: WOC consult placed for BLE wounds  Fentanyl patch in place    Lozenge x1 for sore throat

## 2023-07-05 NOTE — PROGRESS NOTES
Patient A/Ox4 and call light appropriate.  This shift patient denied nausea, SOB, dizziness. Remained afebrile and on RA. She did endorse pain both in her feet and a headache. For the headache she received Advil 2x this shift. Her feet pain she stated was triggered from wound care. 4mg of PO dilaudid given.   She has been able to ambulate to the bedside commode with assist of 1. LR continues to infuse at 100mL/hr.

## 2023-07-05 NOTE — PROGRESS NOTES
[facility]    Patient is currently on service with [facility] for IV steroids (Solumedrol 1g IV 2x/week). Home RN provided by [facility].     Liaison will follow along. If applicable at discharge, please include Home Infusion Referral and Home Care Referral in discharge orders.     Thank you,    Kailee Ronquillo RN  [facility] Liaison  [phone] (M-F 8a-5p)  [phone] Office

## 2023-07-05 NOTE — H&P
[facility]    History and Physical - Hospitalist Service       Date of Admission:  7/4/2023    Assessment & Plan   Ronna Rivera is a 37 year old female with past medical history including erythromelalgia on chronic immunosuppression, chronic lower extremity wounds who presents with cough, fatigue. Admitted on 7/4/2023.     Sepsis secondary to community acquired pneumonia, right-sided  *Presents with cough, fatigue, URI symptoms  *CXR with vague airspace opacity right suprahilar region possibly representing developing pneumonia  *T100.5F, WBC 15.7, . Lactic acid normal.   *Immunosuppressed on high dose methylprednisolone twice weekly and azathioprine   - Continue ceftriaxone + azithromycin  - Blood cultures in process  - Trend fever curve, WBC count     Erythromelalgia   Chronic pain syndrome   Chronic lower extremity wounds  *Complicated history requiring multiple prior treatments with ongoing symptoms.   *Follows with vascular surgery/wound clinic, neurology, pain clinic, rheumatology through [place], [facility], [facility].   - Continue prior to admission azathioprine, methylprednisolone  - Continue prior to admission gabapentin, mexiletine, venlafaxine, fentanyl patch, topical lidocaine  - Hydromorphone PO/IV PRN ordered  - Wound RN consulted    Hyponatremia, mild   Sodium 129 on admission. Suspect hypovolemic.   - Continue IVF  - Repeat BMP in AM        Diet:   Regular diet  DVT Prophylaxis: Enoxaparin (Lovenox) SQ  Garcia Catheter: Not present  Code Status:   Full code     Disposition Plan   Admit to inpatient. Anticipate greater than or equal to 2 midnights prior to discharge.     Entered: Santy Velásquez MD 07/04/2023, 10:25 PM     The patient's care was discussed with the ED provider, patient       Clinically Significant Risk Factors Present on Admission         # Hyponatremia: Lowest Na = 129 mmol/L in last 2 days, will monitor as appropriate                              Santy Velásquez MD  [facility]    ______________________________________________________________________    Chief Complaint   Fatigue, cough    History of Present Illness   Ronna Rivera is a 37 year old female who presents with the above chief complaint.    History is obtained from the patient, discussion with ED provider and review of medical record. The patient reports going to bed 7/3 feeling in her typical state of health. Upon awakening 7/4 she felt extreme fatigue as well as a cough that felt productive at times, but unable to expectorate any phlegm.  Additionally she reports some mild shortness of breath, runny nose, sore throat.  She reports a fever to 103 Fahrenheit at home.  She reports her chronic lower extremity wounds have been slow to heal, but otherwise no recent changes.  She presented to [facility] for further evaluation.    In the Emergency Department, the patient was seen by Dr. Juares, with whom I discussed the patient's presenting symptoms and emergency department course.  Initial vital signs were a temperature of 100.5F, , /71, RR 22, SpO2 93% on room air. Pertinent work-up as noted in A&P. The patient received IVF, ceftriaxone, azithromycin and Hospitalists were contacted for admission to the hospital.     Past Medical History    I have reviewed this patient's medical history and updated it with pertinent information if needed.   Past Medical History:   Diagnosis Date     Auto-erythrocyte sensitization (H)      Erythromelalgia 02/2021     Reactive depression      Seasonal allergies 06/25/2020    Mostly resolved       Past Surgical History   I have reviewed this patient's surgical history and updated it with pertinent information if needed.  Past Surgical History:   Procedure Laterality Date     INSERT PICC LINE Left 06/23/2021    Procedure: INSERTION, PICC;  Surgeon: Neal Penny MD;  [place] [facility] [place]     IR PICC PLACEMENT > 5  YRS OF AGE  06/23/2021     NO HISTORY OF SURGERY       PICC SINGLE LUMEN PLACEMENT Left 03/02/2023    Left brachial-lateral vein 44cm total 1cm external.Placement verified by Monae 3CG.PICC okay to use.         Social History   I have reviewed this patient's social history and updated it with pertinent information if needed.  Social History     Tobacco Use     Smoking status: Never     Smokeless tobacco: Never   Substance Use Topics     Alcohol use: No     Drug use: No     Lives in [place].      Family History   I have reviewed this patient's family history and updated it with pertinent information if needed.   Family History   Problem Relation Age of Onset     Hypertension Father      Cerebrovascular Disease Maternal Grandmother      Diabetes Maternal Grandfather      Breast Cancer Paternal Grandmother      Hypertension Paternal Grandfather      C.A.D. Paternal Grandfather      Schizophrenia No family hx of         Prior to Admission Medications     Prior to Admission Medications   Prescriptions Last Dose Informant Patient Reported? Taking?   HYDROmorphone (DILAUDID) 2 MG tablet Past Week Self No Yes   Sig: Take 1-2 tablets (2-4 mg) by mouth every 6 hours as needed for breakthrough pain (or 6 mg to reduce pain with showers, transportation, or sleep)   Patient taking differently: Take 10 mg by mouth every 4 hours as needed for breakthrough pain (or 6 mg to reduce pain with showers, transportation, or sleep)   LORazepam (ATIVAN) 0.5 MG tablet prn Self Yes Yes   Sig: Take 0.5 mg by mouth daily as needed for anxiety   Vitamin D3 (CHOLECALCIFEROL) 25 mcg (1000 units) tablet 7/3/2023 at am Self Yes Yes   Sig: Take 25 mcg by mouth daily   azaTHIOprine (IMURAN) 50 MG tablet 7/3/2023 at pm Self Yes Yes   Sig: Take 100 mg by mouth daily   collagenase (SANTYL) 250 UNIT/GM external ointment 7/3/2023 Self Yes Yes   Sig: Apply topically daily as needed (with foot wound cares)   diphenhydrAMINE (BENADRYL) 50 MG capsule  7/3/2023 at pm Self Yes Yes   Sig: Take 50 mg by mouth At Bedtime   fentaNYL (DURAGESIC) 50 mcg/hr 72 hr patch 7/3/2023 at am Self No Yes   Sig: Place 1 patch onto the skin every 72 hours remove old patch.  (Covers through 7/23/2023 or later)   gabapentin (NEURONTIN) 300 MG capsule 7/3/2023 at 1400 Self No Yes   Sig: Take 1 po mid-day along with 600 mg (total 900 mg)   gabapentin (NEURONTIN) 600 MG tablet 7/4/2023 at am Self No Yes   Sig: Take 2 po AM (1200 mg), 1 po Mid-day along with a 300 mg capsule (900 mg), and 2 po PM (1200 mg)   ibuprofen (ADVIL/MOTRIN) 200 MG tablet prn Self Yes No   Sig: Take 400 mg by mouth every 6 hours as needed for moderate pain   lidocaine (XYLOCAINE) 4 % external solution prn Self No No   Sig: Apply topically daily Apply approximately 10mL to each foot (total of 20mL) daily   lidocaine (XYLOCAINE) 5 % external ointment prn Self No No   Sig: Apply topically every 4 hours as needed for moderate pain (4-6)   melatonin 1 MG TABS tablet 7/3/2023 at pm Self Yes Yes   Sig: Take 4 mg by mouth At Bedtime   methylPREDNISolone sodium succinate (SOLU-MEDROL) 1000 MG injection 7/2/2023 Self Yes Yes   Sig: Inject 1,000 mg into the vein twice a week On Sunday and Thursday   mexiletine (MEXITIL) 150 MG capsule 7/4/2023 at am Self No Yes   Sig: Take 1 capsule (150 mg) by mouth At Bedtime   Patient taking differently: Take 150 mg by mouth 3 times daily   naloxone (NARCAN) 4 MG/0.1ML nasal spray prn Self No No   Sig: Spray 1 spray (4 mg) into one nostril alternating nostrils as needed for opioid reversal every 2-3 minutes until assistance arrives   norethindrone (MICRONOR) 0.35 MG tablet 7/3/2023 at pm Self No Yes   Sig: Take 2 tablets (0.7 mg) by mouth daily Take the first 3 weeks of each pack, and then immediately move on to the next pack (discard the 4th week of each pack)   sulfamethoxazole-trimethoprim (BACTRIM) 400-80 MG tablet 7/3/2023 at pm Self No Yes   Sig: Take 1 tablet by mouth daily    triamcinolone (KENALOG) 0.1 % external cream Not Taking Self No No   Sig: Apply topically daily Apply to feet daily   Patient not taking: Reported on 7/4/2023   venlafaxine (EFFEXOR XR) 37.5 MG 24 hr capsule 7/4/2023 at am Self No Yes   Sig: Take 1 capsule (37.5 mg) by mouth daily   Patient taking differently: Take 37.5 mg by mouth 2 times daily   venlafaxine (EFFEXOR XR) 75 MG 24 hr capsule Not Taking Self No No   Sig: Take 1 capsule (75 mg) by mouth daily   Patient not taking: Reported on 7/4/2023   vitamin C (ASCORBIC ACID) 250 MG tablet 7/3/2023 at am Self Yes Yes   Sig: Take 250 mg by mouth daily      Facility-Administered Medications: None     Allergies   Allergies   Allergen Reactions     Topiramate Anxiety       Physical Exam   Vital Signs: Temp: (!) 100.5  F (38.1  C) Temp src: Temporal BP: 109/64 Pulse: 108   Resp: 22 SpO2: 94 % O2 Device: None (Room air)    Weight: 130 lbs 0 oz    Constitutional: Mildly ill appearing, NAD  Eyes: PERRL, EOMI  HENT: Oropharynx clear, MMM  Respiratory: Clear to auscultation bilaterally, good air movement, normal effort   Cardiovascular: RRR, no m/r/g. No peripheral edema.   GI: Soft, non-tender, non-distended. No rebound tenderness or guarding.    Skin: Warm, dry. Lower extremities with compression stockings on, deferred exam at patient request  Neurologic: Alert. Responding to questions appropriately. Following commands.   Psychiatric: Normal affect, appropriate      Data   Data reviewed today: I reviewed all medications, new labs and imaging results over the last 24 hours. I personally reviewed no images or EKG's today.    Recent Labs   Lab 07/04/23  1851   WBC 15.7*   HGB 12.8   MCV 94      *   POTASSIUM 3.7   CHLORIDE 91*   CO2 24   BUN 11.3   CR 0.84   ANIONGAP 14   KEVAN 8.6   *   ALBUMIN 3.7   PROTTOTAL 6.1*   BILITOTAL 0.4   ALKPHOS 55   ALT 20   AST 20       Recent Results (from the past 24 hour(s))   Chest XR,  PA & LAT    Narrative    EXAM: XR  CHEST 2 VIEWS  LOCATION: [facility]  DATE: 7/4/2023    INDICATION: cough  COMPARISON: None.      Impression    IMPRESSION: Heart size and pulmonary vessels are normal. Question vague minimal airspace disease in the right suprahilar region, possibly developing pneumonia. Lungs otherwise clear. Left PICC line in good position.

## 2023-07-05 NOTE — PHARMACY-ADMISSION MEDICATION HISTORY
"Pharmacist Admission Medication History    Admission medication history is complete. The information provided in this note is only as accurate as the sources available at the time of the update.    Medication reconciliation/reorder completed by provider prior to medication history? No    Information Source(s): Patient, Family member and CareEverywhere/SureScripts via in-person    Pertinent Information: pt gets Home Infusion of IV solu medrol twice a week    Changes made to PTA medication list:    Added: solu medrol, santyl, lorazepam, benadryl    Deleted: triamcinolone (marked \"not taking\")    Changed: azathioprine dose, hydromorphone dose, melatonin dose, mexiletine frequency, venlafaxine dose    Medication Affordability:  Not including over the counter (OTC) medications, was there a time in the past 3 months when you did not take your medications as prescribed because of cost?: No    Allergies reviewed with patient and updates made in EHR: yes    Medication History Completed By: Griselda Rader RPH 7/4/2023 10:07 PM    Prior to Admission medications    Medication Sig Last Dose Taking? Auth Provider Long Term End Date   azaTHIOprine (IMURAN) 50 MG tablet Take 100 mg by mouth daily 7/3/2023 at pm Yes Reported, Patient Yes    collagenase (SANTYL) 250 UNIT/GM external ointment Apply topically daily as needed (with foot wound cares) 7/3/2023 Yes Unknown, Entered By History     diphenhydrAMINE (BENADRYL) 50 MG capsule Take 50 mg by mouth At Bedtime 7/3/2023 at pm Yes Unknown, Entered By History     fentaNYL (DURAGESIC) 50 mcg/hr 72 hr patch Place 1 patch onto the skin every 72 hours remove old patch.  (Covers through 7/23/2023 or later) 7/3/2023 at am Yes Neal Ball MD     gabapentin (NEURONTIN) 300 MG capsule Take 1 po mid-day along with 600 mg (total 900 mg) 7/3/2023 at 1400 Yes Neal Ball MD Yes    gabapentin (NEURONTIN) 600 MG tablet Take 2 po AM (1200 mg), 1 po Mid-day along with a 300 mg " capsule (900 mg), and 2 po PM (1200 mg) 7/4/2023 at am Yes Neal Ball MD Yes    HYDROmorphone (DILAUDID) 2 MG tablet Take 1-2 tablets (2-4 mg) by mouth every 6 hours as needed for breakthrough pain (or 6 mg to reduce pain with showers, transportation, or sleep)  Patient taking differently: Take 10 mg by mouth every 4 hours as needed for breakthrough pain (or 6 mg to reduce pain with showers, transportation, or sleep) Past Week Yes Neal Ball MD     LORazepam (ATIVAN) 0.5 MG tablet Take 0.5 mg by mouth daily as needed for anxiety prn Yes Unknown, Entered By History     melatonin 1 MG TABS tablet Take 4 mg by mouth At Bedtime 7/3/2023 at pm Yes Unknown, Entered By History     methylPREDNISolone sodium succinate (SOLU-MEDROL) 1000 MG injection Inject 1,000 mg into the vein twice a week On Sunday and Thursday 7/2/2023 Yes Unknown, Entered By History     mexiletine (MEXITIL) 150 MG capsule Take 1 capsule (150 mg) by mouth At Bedtime  Patient taking differently: Take 150 mg by mouth 3 times daily 7/4/2023 at am Yes Naseem Ho MD Yes    norethindrone (MICRONOR) 0.35 MG tablet Take 2 tablets (0.7 mg) by mouth daily Take the first 3 weeks of each pack, and then immediately move on to the next pack (discard the 4th week of each pack) 7/3/2023 at pm Yes Neal Ball MD Yes    sulfamethoxazole-trimethoprim (BACTRIM) 400-80 MG tablet Take 1 tablet by mouth daily 7/3/2023 at pm Yes Cameron Long MD     venlafaxine (EFFEXOR XR) 37.5 MG 24 hr capsule Take 1 capsule (37.5 mg) by mouth daily  Patient taking differently: Take 37.5 mg by mouth 2 times daily 7/4/2023 at am Yes Neal Ball MD Yes    vitamin C (ASCORBIC ACID) 250 MG tablet Take 250 mg by mouth daily 7/3/2023 at am Yes Unknown, Entered By History     Vitamin D3 (CHOLECALCIFEROL) 25 mcg (1000 units) tablet Take 25 mcg by mouth daily 7/3/2023 at am Yes Unknown, Entered By History     ibuprofen (ADVIL/MOTRIN) 200 MG  tablet Take 400 mg by mouth every 6 hours as needed for moderate pain prn  Unknown, Entered By History     lidocaine (XYLOCAINE) 4 % external solution Apply topically daily Apply approximately 10mL to each foot (total of 20mL) daily prn  Cameron Long MD Yes    lidocaine (XYLOCAINE) 5 % external ointment Apply topically every 4 hours as needed for moderate pain (4-6) prn  Neal Ball MD     naloxone (NARCAN) 4 MG/0.1ML nasal spray Spray 1 spray (4 mg) into one nostril alternating nostrils as needed for opioid reversal every 2-3 minutes until assistance arrives prn  Neal Ball MD Yes    triamcinolone (KENALOG) 0.1 % external cream Apply topically daily Apply to feet daily  Patient not taking: Reported on 7/4/2023 Not Taking  Cameron Long MD     venlafaxine (EFFEXOR XR) 75 MG 24 hr capsule Take 1 capsule (75 mg) by mouth daily  Patient not taking: Reported on 7/4/2023 Not Taking  Neal Ball MD Yes      Time Spent on this activity: 30 minutes

## 2023-07-05 NOTE — PROGRESS NOTES
MD Notification    Notified Person Name:Dr. Greco     Notification Date/Time:7/5/23 0107    Notification Interaction:AMCOM    Purpose of Notification:637-AB pt complaining of sore throat, requesting a throat lozenge, can you place order? Thank you.     Orders Received:    Comments:

## 2023-07-05 NOTE — CONSULTS
[facility] Nurse Inpatient Assessment     Consulted for:  BLE    Summary:  Bilateral feet with chronic small ulcerations r/t erythromelagia and related autoimmune/vascular issues.  No acute s/s infection.  Pt follows at [facility]; will continue similar treatment protocol to clinic while in hospital.      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    Areas Assessed:      Areas visualized during today's visit: Lower extremities     Wound location:  Bilateral feet, multiple areas    Last photo: 7-5-23 dorsal feet      7-5-23 plantar feet, heels      7-5-23 right lateral foot      7-5-23 left lateral foot      Wound due to: erythromelagia sequelae  Wound history/plan of care: pt states have been flaring since around April 2023, says they wax and wane depending on circulation and environment  Wound base: moist yellow-pink tissue     Palpation of the wound bed: normal      Drainage: scant     Description of drainage: serosanguinous     Measurements (length x width x depth, in cm): multiple areas, various sizes from approx 0.3 x 0.3 x 0.2cm to 1.5 x 1 x 0.3cm      Tunneling: N/A     Undermining: N/A  Periwound skin: Erythema- blanchable, mild maceration, peeling, cracking      Color: pink      Temperature: warm when flaring  Odor: none  Pain: mild and moderate, aching, tender and burning  Pain interventions prior to dressing change: slow and gentle cares   Treatment goal: Heal , Infection control/prevention, Protection and Remove necrotic tissue  STATUS: initial assessment  Supplies ordered: gathered, at bedside and supplies stored on unit       Treatment Plan:     Bilateral foot wound(s): Every other day and prn: (ok if pt/ want to help with dressing changes):  1.  Cleanse  with Vashe and gauze.  2.  Swab periwounds with Cavilon, let dry.  3.  Apply Plurogel (# [id number]) to all wounds (approx-nickel thick).  4.  Cover shallower wounds with Mepilex Lite and deeper wounds with Mepilex Border, cut to fit.     5.  Secure the foam with Medipore tape as needed.   6.  Apply pt's compression socks using her sock-toshia (pt can assist with this).   7.  Elevate legs with pt's foam wedge.     Follow-up in [facility].     Orders: Written    RECOMMEND PRIMARY TEAM ORDER: None, at this time  Education provided: plan of care  Discussed plan of care with: Patient and Nurse  WO nurse follow-up plan: weekly and prn  Notify WOC if wound(s) deteriorate.  Nursing to notify the Provider(s) and re-consult the WOC Nurse if new skin concern.    DATA:     Current support surface: Standard  Standard gel/foam mattress (IsoFlex, Atmos air, etc)  Containment of urine/stool: Continent of bladder and Continent of bowel, uses commode  BMI: Body mass index is 23.78 kg/m .   Active diet order: Orders Placed This Encounter      Combination Diet Regular Diet Adult     Output: I/O last 3 completed shifts:  In: 2000 [IV Piggyback:2000]  Out: -      Labs: Recent Labs   Lab 07/05/23  0940 07/04/23  1851   ALBUMIN  --  3.7   HGB 11.5* 12.8   WBC 10.5 15.7*     Pressure injury risk assessment:   Sensory Perception: 4-->no impairment  Moisture: 4-->rarely moist  Activity: 3-->walks occasionally  Mobility: 3-->slightly limited  Nutrition: 2-->probably inadequate  Friction and Shear: 2-->potential problem  Rodney Score: 18    Holley Jain RN CWOCN  -Securely message with [employer] ([facility] [employer])   -[facility] Office Phone: [phone] (messages checked periodically Mon-Fri 8a-4p)

## 2023-07-05 NOTE — UTILIZATION REVIEW
"  Admission Status; Secondary Review Determination         Under the authority of the Utilization Management Committee, the utilization review process indicated a secondary review on the above patient.  The review outcome is based on review of the medical records, discussions with staff, and applying clinical experience noted on the date of the review.        (X)      Inpatient Status Appropriate - This patient's medical care is consistent with medical management for inpatient care and reasonable inpatient medical practice.      () Observation Status Appropriate - This patient does not meet hospital inpatient criteria and is placed in observation status. If this patient's primary payer is Medicare and was admitted as an inpatient, Condition Code 44 should be used and patient status changed to \"observation\".   () Admission Status NOT Appropriate - This patient's medical care is not consistent with medical management for Inpatient or Observation Status.          RATIONALE FOR DETERMINATION     37 year old female with past medical history including erythromelalgia on chronic immunosuppression and chronic lower extremity wounds who presented with cough and fatigue.  She is on immunosuppression with high-dose methylprednisolone twice weekly as well as azathioprine.  She had a fever to 103.  She had tachycardia at 142.  Patient's white count was elevated at 15.7 and sodium was 129.  Cultures were obtained and patient was placed on IV fluids.  Past x-ray was suggestive of pneumonia and patient was placed on broad-spectrum IV antibiotics.  A multiple day hospitalization is anticipated, and patient is appropriate for inpatient status.    The severity of illness, intensity of service provided, expected LOS and risk for adverse outcome make the care complex, high risk and appropriate for hospital admission.        The information on this document is developed by the utilization review team in order for the business office to " ensure compliance.  This only denotes the appropriateness of proper admission status and does not reflect the quality of care rendered.         The definitions of Inpatient Status and Observation Status used in making the determination above are those provided in the CMS Coverage Manual, Chapter 1 and Chapter 6, section 70.4.      Sincerely,     Darek Esposito MD  Physician Advisor  Utilization Review/ Case Management  [facility].

## 2023-07-05 NOTE — ED PROVIDER NOTES
"  History     Chief Complaint:  Cough and Pharyngitis       The history is provided by the patient.      Ronna Rivera is a 37 year old female with a history of erythromelalgia, chronic pain, migraines, and generalized anxiety disorder who presents with sore throat, unproductive cough and fever. Sore throat began a couple days ago. She woke this morning with myalgia, feeling \"out of it\" and confused, with trouble opening her eyes. She was first noted to be febrile this morning. She also reports frontal headache. Denies neck stiffness. She had ibuprofen at 5 pm today. She is on immunosuppressants for erythromelalgia. She has been bed-bound due to erythromelalgia for the past few months. This is the first time she's been sick in that time.    Independent Historian:   Patient's  reports he was diagnosed epiglottitis last week. Her son was also ill with strep recently.    Review of External Notes:   none    Medications:    Imuran  Gabapentin   Dilaudid  Mexitil  Micronor  Effexor  Ativan  Namenda    Past Medical History:    Auto-erythrocyte sensitization  Erythromelalgia  Depression   Chronic pain  Degenerative disc disease, lumbar  Asthma  Impaired mobility  Medical marijuana use  Adrenal suppression  Recurrent herpes labialis  Foot ulcer   Generalized anxiety disorder   Migraine    Past Surgical History:    Insert PICC line   Lasik  Umbilical hernia repair   section     Physical Exam     Patient Vitals for the past 24 hrs:   BP Temp Temp src Pulse Resp SpO2 Height Weight   23 1841 118/71 (!) 100.5  F (38.1  C) Temporal (!) 142 22 93 % 1.575 m (5' 2\") 59 kg (130 lb)        Physical Exam  General: Alert, ill-appearing, legs elevated on a cooler.  HENT: mucous membranes moist, slightly erythematous posterior oropharynx.  Neck: Full range of motion without pain.  CV: Tachycardic rate, regular rhythm  Resp: normal effort, clear throughout, no crackles or wheezing  GI: abdomen soft and nontender, no " guarding  MSK: no bony tenderness  Skin: appropriately warm and dry  Neuro: alert, clear speech, oriented  Psych: normal mood and affect      Emergency Department Course   ECG:  ECG results from 02/21/23   EKG 12-lead, complete     Value    Systolic Blood Pressure     Diastolic Blood Pressure     Ventricular Rate 77    Atrial Rate 77    ND Interval 124    QRS Duration 70        QTc 450    P Axis 60    R AXIS 65    T Axis 59    Interpretation ECG      Sinus rhythm  Normal ECG  Unconfirmed report - interpretation of this ECG is computer generated - see medical record for final interpretation  Confirmed by - EMERGENCY ROOM, PHYSICIAN ([id number]),  JULIAN HORTON ([id number]) on 2/22/2023 9:21:31 AM         Imaging:  Chest XR,  PA & LAT   Final Result   IMPRESSION: Heart size and pulmonary vessels are normal. Question vague minimal airspace disease in the right suprahilar region, possibly developing pneumonia. Lungs otherwise clear. Left PICC line in good position.         Report per radiology    Laboratory:  Labs Ordered and Resulted from Time of ED Arrival to Time of ED Departure   COMPREHENSIVE METABOLIC PANEL - Abnormal       Result Value    Sodium 129 (*)     Potassium 3.7      Chloride 91 (*)     Carbon Dioxide (CO2) 24      Anion Gap 14      Urea Nitrogen 11.3      Creatinine 0.84      Calcium 8.6      Glucose 113 (*)     Alkaline Phosphatase 55      AST 20      ALT 20      Protein Total 6.1 (*)     Albumin 3.7      Bilirubin Total 0.4      GFR Estimate >90     CBC WITH PLATELETS AND DIFFERENTIAL - Abnormal    WBC Count 15.7 (*)     RBC Count 4.06      Hemoglobin 12.8      Hematocrit 38.0      MCV 94      MCH 31.5      MCHC 33.7      RDW 13.9      Platelet Count 170      % Neutrophils 88      % Lymphocytes 5      % Monocytes 7      % Eosinophils 0      % Basophils 0      % Immature Granulocytes 0      NRBCs per 100 WBC 0      Absolute Neutrophils 13.8 (*)     Absolute Lymphocytes 0.7 (*)     Absolute  Monocytes 1.1      Absolute Eosinophils 0.0      Absolute Basophils 0.0      Absolute Immature Granulocytes 0.1      Absolute NRBCs 0.0     ISTAT GASES LACTATE VENOUS POCT - Abnormal    Lactic Acid POCT 1.5      Bicarbonate Venous POCT 28      O2 Sat, Venous POCT 59 (*)     pCO2 Venous POCT 39 (*)     pH Venous POCT 7.46 (*)     pO2 Venous POCT 29     GROUP A STREPTOCOCCUS PCR THROAT SWAB - Normal    Group A strep by PCR Not Detected        Emergency Department Course & Assessments:       Interventions:  Medications   azithromycin (ZITHROMAX) 500 mg vial to attach to  mL bag (has no administration in time range)   ondansetron (ZOFRAN) injection 4 mg ( Intravenous Not Given 7/4/23 2129)   cefTRIAXone (ROCEPHIN) 1 g vial to attach to  mL bag for ADULTS or NS 50 mL bag for PEDS (0 g Intravenous Stopped 7/4/23 2117)   0.9% sodium chloride BOLUS (2,000 mLs Intravenous $New Bag 7/4/23 2030)   acetaminophen (TYLENOL) tablet 1,000 mg (1,000 mg Oral $Given 7/4/23 2047)        Independent Interpretation (X-rays, CTs, rhythm strip):  I reviewed the patient's chest radiograph.  This demonstrates a very slight right hilar infiltrate.    Assessments/Consultations/Discussion of Management or Tests:  ED Course as of 07/04/23 2132 Tue Jul 04, 2023 1921 I obtained the history and examined the patient as noted above.    2128 I rechecked and updated the patient.        Social Determinants of Health affecting care:   None    Disposition:  The patient was admitted to the hospital under the care of Dr. Velásquez.     Impression & Plan    Medical Decision Making:  Ronna Rivera is a 37 year old female who is immunosuppressed for history of erythromelalgia, who presents today with sore throat, cough, and fever.  On exam, the patient is ill-appearing, with normal oxygen saturation and tachycardia.  She has no signs on exam to suggest meningitis.  Chest radiograph suggests pneumonia.  Otherwise, strep test negative.  She  has a mild leukocytosis, but normal lactate.  Blood cultures are pending.  She was started on IV antibiotics with Rocephin and azithromycin.  I recommend admission to the hospital, given persistent tachycardia, sepsis criteria, and immunosuppressed status.  She remains ill-appearing, but is being resuscitated.   and patient agree.  She will be admitted to the hospital.      Diagnosis:    ICD-10-CM    1. Community acquired pneumonia of right upper lobe of lung  J18.9       2. Sepsis without acute organ dysfunction, due to unspecified organism (H)  A41.9            Discharge Medications:  New Prescriptions    No medications on file        Scribe Disclosure:  I, Shayna Carey, am serving as a scribe at 7:15 PM on 7/4/2023 to document services personally performed by Natacha Juares MD based on my observations and the provider's statements to me.     7/4/2023   Natacha Juares MD Pepper, Tracy Lynn, MD  07/04/23 4883

## 2023-07-05 NOTE — PROGRESS NOTES
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      Sepsis dt community acquired pneumonia, right-sided  * Presented to ED with cough, fatigue, URI symptoms. On chronic immunosuppression with high dose methylprednisolone twice weekly and azathioprine   * In ED, was febrile with Tmax 100.5, tachycardic with HR 140s. O2 sats stable on RA. WBC 15.7, lactate nl. CXR with vague airspace opacity right suprahilar region possibly representing developing pneumonia. Starte don treatment with ceftriaxone and azithromycin.   -- cont ceftriaxone and azithromycin  -- monitor fever curve, WBC trend, blood cultures  -- O2 sats remain stable on RA     Erythromelalgia   Chronic pain syndrome   Chronic lower extremity wounds  * Complicated history requiring multiple prior treatments with ongoing symptoms.   * Follows with vascular surgery/wound clinic, neurology, pain clinic, rheumatology through [place], [facility], [facility].   -- cont PTA azathioprine, methylprednisolone  -- cont PTA gabapentin, mexiletine, venlafaxine, fentanyl patch, topical lidocaine  -- has oral/IV dilaudid available prn  -- WO RN consult     Hyponatremia, mild   * Na 129 on admission. Presumed hypovolemic. IVFs started  -- today's BMP still pending collection  -- cont IVFs with LR @100ml/h for now     FEN: IVFs as above, lytes stable, regular diet  DVT Prophylaxis: Lovenox  Code Status: Full Code    Disposition: Anticipate discharge home on oral abx in 1-2d, pending response to treatment    Lola Garcia, DO    Medical Decision Making       Please see A&P for additional details of medical decision making.       Interval History   Chart reivewed. Seen this morning. Reports some headache. +cough  but not productive. No work of breathing. No abd pain/n/v. Chronic foot pain actually feeling a little better today than previous weeks/mths    -Data reviewed today: I reviewed all new labs and imaging results over the last 24 hours. I personally reviewed no images or EKG's today.    Physical Exam   Temp: 97.7  F (36.5  C) Temp src: Axillary BP: 92/59 Pulse: 78   Resp: 20 SpO2: 94 % O2 Device: None (Room air)    Vitals:    07/04/23 1841   Weight: 59 kg (130 lb)     Vital Signs with Ranges  Temp:  [97.7  F (36.5  C)-100.5  F (38.1  C)] 97.7  F (36.5  C)  Pulse:  [] 78  Resp:  [20-22] 20  BP: ()/(50-71) 92/59  SpO2:  [93 %-94 %] 94 %  I/O last 3 completed shifts:  In: 2000 [IV Piggyback:2000]  Out: -     Constitutional: Resting comfortably, alert and conversing appropriately, NAD  Respiratory: CTAB, no wheeze/rales/rhonchi, no increased work of breathing  Cardiovascular: HRRR, no MGR, no LE edema  GI: S, NT, ND, +BS  Skin/Integumen: warm/dry, compression stockings on LEs so skin not directly examined by me  Other:      Medications     lactated ringers 100 mL/hr at 07/05/23 0138       azaTHIOprine  100 mg Oral Daily     [START ON 7/6/2023] azithromycin  250 mg Oral Daily     cefTRIAXone  1 g Intravenous Q24H     diphenhydrAMINE  50 mg Oral At Bedtime     enoxaparin ANTICOAGULANT  40 mg Subcutaneous Q24H     fentaNYL  50 mcg Transdermal Q72H    And     fentaNYL   Transdermal Q8H [facility]     gabapentin  900 mg Oral Daily     gabapentin  1,200 mg Oral BID     lidocaine   Topical Daily     [START ON 7/6/2023] methylPREDNISolone sodium succinate  1,000 mg Intravenous Once per day on Sun Thu     mexiletine  150 mg Oral TID     norethindrone  0.7 mg Oral Daily     sodium chloride (PF)  3 mL Intracatheter Q8H     sulfamethoxazole-trimethoprim  1 tablet Oral QPM     venlafaxine  37.5 mg Oral BID       Data   Recent Labs   Lab 07/04/23  1851   WBC 15.7*   HGB 12.8   MCV 94      *   POTASSIUM 3.7   CHLORIDE  91*   CO2 24   BUN 11.3   CR 0.84   ANIONGAP 14   KEVAN 8.6   *   ALBUMIN 3.7   PROTTOTAL 6.1*   BILITOTAL 0.4   ALKPHOS 55   ALT 20   AST 20       Recent Results (from the past 24 hour(s))   Chest XR,  PA & LAT    Narrative    EXAM: XR CHEST 2 VIEWS  LOCATION: [facility]  DATE: 7/4/2023    INDICATION: cough  COMPARISON: None.      Impression    IMPRESSION: Heart size and pulmonary vessels are normal. Question vague minimal airspace disease in the right suprahilar region, possibly developing pneumonia. Lungs otherwise clear. Left PICC line in good position.

## 2023-07-05 NOTE — ED NOTES
"[facility]  ED Nurse Handoff Report    ED Chief complaint: Cough and Pharyngitis      ED Diagnosis:   Final diagnoses:   Community acquired pneumonia of right upper lobe of lung   Sepsis without acute organ dysfunction, due to unspecified organism (H)       Code Status: Full Code    Allergies:   Allergies   Allergen Reactions     Topiramate Anxiety       Patient Story: Pt comes in with sore throat, cough, weakness, and fever for several days. Hx of erythromelagia, chronic pain, on immunosuppressants. Xray shows pneumonia of right lung.  Focused Assessment:  Slightly tachy. Legs elevated on cooler per her home pain control regimen.    Treatments and/or interventions provided: IV abx, IV hydration of two liters, tylenol for HA, zofran  Patient's response to treatments and/or interventions: Pt resting    To be done/followed up on inpatient unit:      Does this patient have any cognitive concerns?: None    Activity level - Baseline/Home:  Stand with Assist  Activity Level - Current:   Stand with Assist -  has been helping    Patient's Preferred language: English   Needed?: No    Isolation: None  Infection: Not Applicable  Patient tested for COVID 19 prior to admission: YES  Bariatric?: No    Vital Signs:   Vitals:    07/04/23 1841 07/04/23 2104 07/04/23 2134 07/04/23 2300   BP: 118/71 109/64  98/57   Pulse: (!) 142  108 102   Resp: 22      Temp: (!) 100.5  F (38.1  C)      TempSrc: Temporal      SpO2: 93% 94%  93%   Weight: 59 kg (130 lb)      Height: 1.575 m (5' 2\")          Cardiac Rhythm:    Was the PSS-3 completed:   Yes  What interventions are required if any?               Family Comments:  at bedside and is extremely helpful  OBS brochure/video discussed/provided to patient/family: No              Name of person given brochure if not patient:              Relationship to patient:     For the majority of the shift this patient's behavior was Green.   Behavioral " interventions performed were None needed.    ED NURSE PHONE NUMBER: [phone]

## 2023-07-06 VITALS
RESPIRATION RATE: 18 BRPM | SYSTOLIC BLOOD PRESSURE: 111 MMHG | HEART RATE: 112 BPM | TEMPERATURE: 100 F | DIASTOLIC BLOOD PRESSURE: 77 MMHG | HEIGHT: 62 IN | OXYGEN SATURATION: 90 % | WEIGHT: 130 LBS | BODY MASS INDEX: 23.92 KG/M2

## 2023-07-06 LAB
ANION GAP SERPL CALCULATED.3IONS-SCNC: 9 MMOL/L (ref 7–15)
BUN SERPL-MCNC: 5.6 MG/DL (ref 6–20)
CALCIUM SERPL-MCNC: 8.5 MG/DL (ref 8.6–10)
CHLORIDE SERPL-SCNC: 102 MMOL/L (ref 98–107)
CREAT SERPL-MCNC: 0.71 MG/DL (ref 0.51–0.95)
DEPRECATED HCO3 PLAS-SCNC: 24 MMOL/L (ref 22–29)
ERYTHROCYTE [DISTWIDTH] IN BLOOD BY AUTOMATED COUNT: 13.9 % (ref 10–15)
GFR SERPL CREATININE-BSD FRML MDRD: >90 ML/MIN/1.73M2
GLUCOSE SERPL-MCNC: 153 MG/DL (ref 70–99)
HCT VFR BLD AUTO: 36.8 % (ref 35–47)
HGB BLD-MCNC: 12 G/DL (ref 11.7–15.7)
MCH RBC QN AUTO: 31.4 PG (ref 26.5–33)
MCHC RBC AUTO-ENTMCNC: 32.6 G/DL (ref 31.5–36.5)
MCV RBC AUTO: 96 FL (ref 78–100)
PLATELET # BLD AUTO: 143 10E3/UL (ref 150–450)
POTASSIUM SERPL-SCNC: 3.6 MMOL/L (ref 3.4–5.3)
RBC # BLD AUTO: 3.82 10E6/UL (ref 3.8–5.2)
SODIUM SERPL-SCNC: 135 MMOL/L (ref 136–145)
WBC # BLD AUTO: 11.8 10E3/UL (ref 4–11)

## 2023-07-06 PROCEDURE — 85027 COMPLETE CBC AUTOMATED: CPT | Performed by: INTERNAL MEDICINE

## 2023-07-06 PROCEDURE — 36415 COLL VENOUS BLD VENIPUNCTURE: CPT | Performed by: INTERNAL MEDICINE

## 2023-07-06 PROCEDURE — 99239 HOSP IP/OBS DSCHRG MGMT >30: CPT | Performed by: INTERNAL MEDICINE

## 2023-07-06 PROCEDURE — 250N000013 HC RX MED GY IP 250 OP 250 PS 637: Performed by: INTERNAL MEDICINE

## 2023-07-06 PROCEDURE — 80048 BASIC METABOLIC PNL TOTAL CA: CPT | Performed by: INTERNAL MEDICINE

## 2023-07-06 PROCEDURE — 250N000011 HC RX IP 250 OP 636: Performed by: INTERNAL MEDICINE

## 2023-07-06 RX ORDER — CEFUROXIME AXETIL 500 MG/1
500 TABLET ORAL EVERY 12 HOURS SCHEDULED
Status: DISCONTINUED | OUTPATIENT
Start: 2023-07-06 | End: 2023-07-06 | Stop reason: HOSPADM

## 2023-07-06 RX ORDER — AZITHROMYCIN 250 MG/1
250 TABLET, FILM COATED ORAL DAILY
Qty: 3 TABLET | Refills: 0 | Status: SHIPPED | OUTPATIENT
Start: 2023-07-07 | End: 2023-07-10

## 2023-07-06 RX ORDER — CEFAZOLIN SODIUM 2 G/50ML
2 SOLUTION INTRAVENOUS
Status: CANCELLED | OUTPATIENT
Start: 2023-07-06

## 2023-07-06 RX ORDER — CEFUROXIME AXETIL 500 MG/1
500 TABLET ORAL 2 TIMES DAILY
Qty: 14 TABLET | Refills: 0 | Status: SHIPPED | OUTPATIENT
Start: 2023-07-06 | End: 2023-07-13

## 2023-07-06 RX ORDER — ONDANSETRON 4 MG/1
4 TABLET, ORALLY DISINTEGRATING ORAL EVERY 6 HOURS PRN
Qty: 30 TABLET | Refills: 0 | Status: SHIPPED | OUTPATIENT
Start: 2023-07-06 | End: 2023-09-08

## 2023-07-06 RX ADMIN — CEFUROXIME AXETIL 500 MG: 500 TABLET ORAL at 09:25

## 2023-07-06 RX ADMIN — VENLAFAXINE HYDROCHLORIDE 37.5 MG: 37.5 CAPSULE, EXTENDED RELEASE ORAL at 09:25

## 2023-07-06 RX ADMIN — GABAPENTIN 1200 MG: 600 TABLET, FILM COATED ORAL at 09:25

## 2023-07-06 RX ADMIN — MEXILETINE HYDROCHLORIDE 150 MG: 150 CAPSULE ORAL at 09:25

## 2023-07-06 RX ADMIN — ENOXAPARIN SODIUM 40 MG: 40 INJECTION SUBCUTANEOUS at 09:25

## 2023-07-06 RX ADMIN — AZITHROMYCIN MONOHYDRATE 250 MG: 250 TABLET ORAL at 09:25

## 2023-07-06 RX ADMIN — ONDANSETRON 4 MG: 4 TABLET, ORALLY DISINTEGRATING ORAL at 09:30

## 2023-07-06 RX ADMIN — ACETAMINOPHEN 650 MG: 325 TABLET ORAL at 06:57

## 2023-07-06 RX ADMIN — IBUPROFEN 400 MG: 400 TABLET, FILM COATED ORAL at 03:55

## 2023-07-06 RX ADMIN — FENTANYL 1 PATCH: 50 PATCH TRANSDERMAL at 09:25

## 2023-07-06 ASSESSMENT — ACTIVITIES OF DAILY LIVING (ADL)
ADLS_ACUITY_SCORE: 37
ADLS_ACUITY_SCORE: 37
ADLS_ACUITY_SCORE: 36
ADLS_ACUITY_SCORE: 37

## 2023-07-06 NOTE — PLAN OF CARE
Goal Outcome Evaluation:      Plan of Care Reviewed With: patient, spouse    Summary: Pneumonia of RL   DATE & TIME: 7/6/23 1645-9767  Cognitive Concerns/ Orientation : A&Ox4   BEHAVIOR & AGGRESSION TOOL COLOR: GREEN  CIWA SCORE: N/A   ABNL VS/O2: VSS RA   MOBILITY: Ax1 GW to bedside commode-uses wheelchair at home (Wheelchair in room if needed)   PAIN MANAGMENT: 6/10 Patch applied  DIET: Regular   BOWEL/BLADDER: Continent   ABNL LAB/BG: no new  DRAIN/DEVICES: L PICC running SL for discharge   TELEMETRY RHYTHM: N/A  SKIN: Wounds BLE covered with stockings- WC completed during day shift and orders to continue EOD. Ice packs on feet and hands due to Erythromelalgia   TESTS/PROCEDURES: none this shift   D/C DAY/GOALS/PLACE: pending response to abx   OTHER IMPORTANT INFO: Fentanyl patch in place on R arm. Pt decided to leave before zofran could be filled- returned to pharmacy    Discharge    Patient discharged to Home via Private transport with Belongings  Care plan note See above    Listed belongings gathered and given to patient (including from security/pharmacy). Yes  Care Plan and Patient education resolved: Yes  Prescriptions if needed, hard copies sent with patient  NA  Medication Bin checked and emptied on discharge Yes  SW/care coordinator/charge RN aware of discharge: Yes

## 2023-07-06 NOTE — PLAN OF CARE
Goal Outcome Evaluation:       Summary: Pneumonia of RL   DATE & TIME: 7/5/23 [id number]    Cognitive Concerns/ Orientation : A&Ox4   BEHAVIOR & AGGRESSION TOOL COLOR: GREEN  CIWA SCORE: N/A   ABNL VS/O2: VSS RA soft BP's 104/68, afebrile   MOBILITY: Ax1 GW to bedside commode-uses wheelchair at home (Wheelchair in room if needed)   PAIN MANAGMENT: 6/10 headache- gave PRN advil x2 and PRN tylenol x1, 7/10 bilat ft pain- gave- PRN 8mg oral dilaudid x1.  DIET: Regular   BOWEL/BLADDER: Continent   ABNL LAB/BG: no new  DRAIN/DEVICES: L PICC running LR @100mL/hr   TELEMETRY RHYTHM: N/A  SKIN: Wounds BLE covered with stockings- WC completed during day shift and orders to continue EOD. Ice packs on feet and hands due to Erythromelalgia   TESTS/PROCEDURES: none this shift   D/C DAY/GOALS/PLACE: pending response to abx   OTHER IMPORTANT INFO: Fentanyl patch in place on bilateral upper arms. Plan to remove both today and place new one. Wears patch at home normally. Switched to oral Abx today.

## 2023-07-06 NOTE — DISCHARGE SUMMARY
[facility]  Hospitalist Discharge Summary      Date of Admission:  7/4/2023  Date of Discharge:  7/6/2023  Discharging Provider: Elaine Morales MD    Discharge Diagnoses   Sepsis due to right-sided community-acquired bacterial pneumonia  Erythromelalgia   Chronic pain syndrome   Chronic ulcerations on bilateral feet from erythromelalgia and related autoimmune/vascular issues.  Hyponatremia, mild     Follow-ups Needed After Discharge   Follow-up Appointments     Follow-up and recommended labs and tests       Follow up with primary care provider, Neal Ball, within 2 weeks   for hospital follow- up.  No follow up labs or test are needed.          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     Presented to ED with cough, fatigue, URI symptoms. On chronic immunosuppression with high dose methylprednisolone twice weekly and azathioprine     In ED, was febrile with Tmax 100.5, tachycardic with HR 140s. O2 sats stable on RA. WBC 15.7, lactate nl. CXR showed vague airspace opacity right suprahilar region possibly representing developing pneumonia. She was initiated on ceftriaxone and azithromycin with improvement. She has remained stable on room air throughout this admission.    She will be discharged on Ceftin 500 mg BID x7 days  She will be discharged on azithromycin to complete a 5 day course  Her chronic medical conditions remained stable, and she will continue her home meds as previously prescribed    Consultations This Hospital Stay   WOUND OSTOMY CONTINENCE NURSE  IP CONSULT    Code Status   Full Code    Time Spent on this Encounter   I, Elaine Morales MD, personally saw the patient today and spent 35 minutes discharging this  patient.       Elaine Morales MD  [place] [address] [facility]  [address] [place] [address]  Phone: [phone]  ______________________________________________________________________    Physical Exam   Vital Signs: Temp: 100  F (37.8  C) Temp src: Oral BP: 111/77 Pulse: 112   Resp: 18 SpO2: 90 % O2 Device: None (Room air)    Weight: 130 lbs 0 oz    Constitutional: Resting comfortably, NAD  HEENT: Sclera white, conjunctiva clear, EOMI, MMM  Respiratory: Breathing non-labored. Diminished breath sounds over right lung field  Cardiovascular: Heart RRR, no m/r/g. No pedal edema.   GI: +BS. Abd soft/NT  Skin: Compression stockings in place to BLE  Musculoskeletal: Normal muscle bulk and tone  Neurologic: Alert and appropriate. VALDES  Psychiatric: Calm and cooperative    Primary Care Physician   Neal Ball    Discharge Orders      Home Care Referral      Reason for your hospital stay    Pneumonia which is improving with antibiotics     Follow-up and recommended labs and tests     Follow up with primary care provider, Neal Ball, within 2 weeks for hospital follow- up.  No follow up labs or test are needed.     Activity    Your activity upon discharge: activity as tolerated     Diet    Follow this diet upon discharge: Regular diet       Significant Results and Procedures   Most Recent 3 CBC's:Recent Labs   Lab Test 07/06/23  1002 07/05/23  0940 07/04/23  1851   WBC 11.8* 10.5 15.7*   HGB 12.0 11.5* 12.8   MCV 96 96 94   * 142* 170     Most Recent 3 BMP's:Recent Labs   Lab Test 07/06/23  1002 07/05/23  0940 07/04/23  1851   * 139 129*   POTASSIUM 3.6 4.0 3.7   CHLORIDE 102 105 91*   CO2 24 23 24   BUN 5.6* 9.2 11.3   CR 0.71 0.69 0.84   ANIONGAP 9 11 14   KEVAN 8.5* 8.2* 8.6   * 81 113*   ,   Results for orders placed or performed during the hospital encounter of 07/04/23   Chest XR,  PA & LAT    Narrative    EXAM: XR CHEST 2 VIEWS  [facility]  DATE: 7/4/2023    INDICATION: cough  COMPARISON: None.      Impression    IMPRESSION: Heart size and pulmonary vessels are normal. Question vague minimal airspace disease in the right suprahilar region, possibly developing pneumonia. Lungs otherwise clear. Left PICC line in good position.       Discharge Medications   Discharge Medication List as of 7/6/2023 12:16 PM      START taking these medications    Details   azithromycin (ZITHROMAX) 250 MG tablet Take 1 tablet (250 mg) by mouth daily for 3 days, Disp-3 tablet, R-0, E-Prescribe      cefuroxime (CEFTIN) 500 MG tablet Take 1 tablet (500 mg) by mouth 2 times daily for 7 days, Disp-14 tablet, R-0, E-Prescribe      ondansetron (ZOFRAN ODT) 4 MG ODT tab Take 1 tablet (4 mg) by mouth every 6 hours as needed for nausea or vomiting, Disp-30 tablet, R-0, E-Prescribe         CONTINUE these medications which have NOT CHANGED    Details   azaTHIOprine (IMURAN) 50 MG tablet Take 100 mg by mouth daily, Historical      collagenase (SANTYL) 250 UNIT/GM external ointment Apply topically daily as needed (with foot wound cares)Historical      diphenhydrAMINE (BENADRYL) 50 MG capsule Take 50 mg by mouth At Bedtime, Historical      fentaNYL (DURAGESIC) 50 mcg/hr 72 hr patch Place 1 patch onto the skin every 72 hours remove old patch.  (Covers through 7/23/2023 or later), Disp-10 patch, R-0, E-Prescribe      gabapentin (NEURONTIN) 300 MG capsule Take 1 po mid-day along with 600 mg (total 900 mg), Disp-30 capsule, R-11, E-PrescribeHigh dose compassionate use for palliation, has tolerated top of usual range without problems      gabapentin (NEURONTIN) 600 MG tablet Take 2 po AM (1200 mg), 1 po Mid-day along with a 300 mg capsule (900 mg), and 2 po PM (1200 mg), Disp-150 tablet, R-11, E-PrescribeHigh dose compassionate use for palliation, has tolerated top of usual range without problems      HYDROmorphone (DILAUDID) 2 MG tablet Take 1-2 tablets (2-4  mg) by mouth every 6 hours as needed for breakthrough pain (or 6 mg to reduce pain with showers, transportation, or sleep), Disp-210 tablet, R-0, E-Prescribe      ibuprofen (ADVIL/MOTRIN) 200 MG tablet Take 400 mg by mouth every 6 hours as needed for moderate pain, Historical      lidocaine (XYLOCAINE) 4 % external solution Apply topically daily Apply approximately 10mL to each foot (total of 20mL) dailyDisp-600 mL, [id number]      lidocaine (XYLOCAINE) 5 % external ointment Apply topically every 4 hours as needed for moderate pain (4-6)Disp-50 g, [id number]      LORazepam (ATIVAN) 0.5 MG tablet Take 0.5 mg by mouth daily as needed for anxiety, Historical      melatonin 1 MG TABS tablet Take 4 mg by mouth At Bedtime, Historical      methylPREDNISolone sodium succinate (SOLU-MEDROL) 1000 MG injection Inject 1,000 mg into the vein twice a week On Sunday and Thursday, Historical      mexiletine (MEXITIL) 150 MG capsule Take 1 capsule (150 mg) by mouth At Bedtime, Disp-90 capsule, R-3, E-Prescribe      naloxone (NARCAN) 4 MG/0.1ML nasal spray Spray 1 spray (4 mg) into one nostril alternating nostrils as needed for opioid reversal every 2-3 minutes until assistance arrives, Disp-0.2 mL, R-0, E-Prescribe      norethindrone (MICRONOR) 0.35 MG tablet Take 2 tablets (0.7 mg) by mouth daily Take the first 3 weeks of each pack, and then immediately move on to the next pack (discard the 4th week of each pack), Disp-180 tablet, R-3, E-Prescribe      sulfamethoxazole-trimethoprim (BACTRIM) 400-80 MG tablet Take 1 tablet by mouth daily, Disp-90 tablet, R-0, E-Prescribe      triamcinolone (KENALOG) 0.1 % external cream Apply topically daily Apply to feet dailyDisp-80 g, [id number]      !! venlafaxine (EFFEXOR XR) 37.5 MG 24 hr capsule Take 1 capsule (37.5 mg) by mouth daily, Disp-90 capsule, R-1, E-Prescribe      !! venlafaxine (EFFEXOR XR) 75 MG 24 hr capsule Take 1 capsule (75 mg) by mouth daily, Disp-90 capsule,  R-3, E-PrescribeDose increase      vitamin C (ASCORBIC ACID) 250 MG tablet Take 250 mg by mouth daily, Historical      Vitamin D3 (CHOLECALCIFEROL) 25 mcg (1000 units) tablet Take 25 mcg by mouth daily, Historical       !! - Potential duplicate medications found. Please discuss with provider.        Allergies   Allergies   Allergen Reactions     Topiramate Anxiety

## 2023-07-07 ENCOUNTER — PATIENT OUTREACH (OUTPATIENT)
Dept: CARE COORDINATION | Facility: CLINIC | Age: 38
End: 2023-07-07

## 2023-07-07 NOTE — PROGRESS NOTES
"[facility] offered Clinic Care Coordination to an established [facility] eligible patient and patient declined CCC at this time.    Clinic Care Coordination Contact  [facility]: Post-Discharge Note  SITUATION                                                      Admission:    Admission Date: 07/04/23   Reason for Admission: Cough, Pharyngitis  Discharge:   Discharge Date: 07/06/23  Discharge Diagnosis: Sepsis due to right-sided community-acquired bacterial pneumonia, Erythromelalgia, Chronic pain syndrome, Chronic lower extremity wounds, Hyponatremia, mild    BACKGROUND                                                      Per hospital discharge summary and inpatient provider notes:    Ronna Rivera is a 37 year old female with a history of erythromelalgia, chronic pain, migraines, and generalized anxiety disorder who presents with sore throat, unproductive cough and fever. Sore throat began a couple days ago. She woke this morning with myalgia, feeling \"out of it\" and confused, with trouble opening her eyes. She was first noted to be febrile this morning. She also reports frontal headache. Denies neck stiffness. She had ibuprofen at 5 pm today. She is on immunosuppressants for erythromelalgia. She has been bed-bound due to erythromelalgia for the past few months. This is the first time she's been sick in that time.     ASSESSMENT      Discharge Assessment  How are you doing now that you are home?: \"I'm doing okay. It's been going well and it's been easier to manage my condition at home.\"  How are your symptoms? (Red Flag symptoms escalate to triage hotline per guidelines): Improved  Do you feel your condition is stable enough to be safe at home until your provider visit?: Yes  Does the patient have their discharge instructions? : Yes  Does the patient have questions regarding their discharge instructions? : No  Were you started on any new medications or were there changes to any of your previous medications? : " Yes  Does the patient have all of their medications?: Yes  Do you have questions regarding any of your medications? : No  Do you have all of your needed medical supplies or equipment (DME)?  (i.e. oxygen tank, CPAP, cane, etc.): Yes  Discharge follow-up appointment scheduled within 14 calendar days? : Yes  Discharge Follow Up Appointment Date: 07/10/23  Discharge Follow Up Appointment Scheduled with?: Primary Care Provider    Post-op ([facility] CTA Only)  If the patient had a surgery or procedure, do they have any questions for a nurse?: No    PLAN                                                      Outpatient Plan:      Follow-ups Needed After Discharge  Follow-up Appointments     Follow-up and recommended labs and tests       Follow up with primary care provider, Neal Ball, within 2 weeks   for hospital follow- up.  No follow up labs or test are needed.      Future Appointments   Date Time Provider Department Center   7/10/2023  6:00 PM Neal Ball MD [facility]   7/21/2023 10:00 AM [id number]   7/26/2023  3:20 PM Reynold Avelar MD [facility]   8/11/2023  9:30 AM Neal Ball MD [facility]   9/8/2023  9:30 AM Neal Ball MD [facility]         For any urgent concerns, please contact our 24 hour nurse triage line: [phone] ([phone])         ZANE England  [phone]  [facility]

## 2023-07-08 ENCOUNTER — TELEPHONE (OUTPATIENT)
Dept: NURSING | Facility: CLINIC | Age: 38
End: 2023-07-08
Payer: COMMERCIAL

## 2023-07-08 NOTE — TELEPHONE ENCOUNTER
[facility] care nurse calling for resumption of care orders today. She states that she usually talks to the on call provider herself and is asking to do so. Writer transferred caller to the switchboard to have a provider paged. She states the orders for resumption of care can not wait thru the weekend.     Keely Lewis RN  [facility] Nurse Advisor 11:49 AM 7/8/2023

## 2023-07-09 ENCOUNTER — MEDICAL CORRESPONDENCE (OUTPATIENT)
Dept: HEALTH INFORMATION MANAGEMENT | Facility: CLINIC | Age: 38
End: 2023-07-09
Payer: COMMERCIAL

## 2023-07-10 ENCOUNTER — TELEPHONE (OUTPATIENT)
Dept: INTERNAL MEDICINE | Facility: CLINIC | Age: 38
End: 2023-07-10

## 2023-07-10 ENCOUNTER — DOCUMENTATION ONLY (OUTPATIENT)
Dept: INTERNAL MEDICINE | Facility: CLINIC | Age: 38
End: 2023-07-10

## 2023-07-10 ENCOUNTER — VIRTUAL VISIT (OUTPATIENT)
Dept: INTERNAL MEDICINE | Facility: CLINIC | Age: 38
End: 2023-07-10
Payer: COMMERCIAL

## 2023-07-10 DIAGNOSIS — I73.81 ERYTHROMELALGIA (H): Primary | ICD-10-CM

## 2023-07-10 DIAGNOSIS — G95.9 MYELOPATHY (H): ICD-10-CM

## 2023-07-10 PROCEDURE — 99214 OFFICE O/P EST MOD 30 MIN: CPT | Mod: VID | Performed by: PEDIATRICS

## 2023-07-10 NOTE — TELEPHONE ENCOUNTER
[facility] Call Center    Phone Message    May a detailed message be left on voicemail: yes     Reason for Call: Other: Patient requesting her in person appt to be changed to virtual today, at 5:45.      Action Taken: Message routed to:  [facility]    Travel Screening: Not Applicable

## 2023-07-10 NOTE — NURSING NOTE
Is the patient currently in the state of [place]? YES    Visit mode:VIDEO    If the visit is dropped, the patient can be reconnected by: VIDEO VISIT: Text to cell phone: [phone]    Will anyone else be joining the visit? Yes, dariel Weldon      How would you like to obtain your AVS? MyChart    Are changes needed to the allergy or medication list? NO - same as last checked    Reason for visit: RECHECK    JUAN SMITH

## 2023-07-10 NOTE — PROGRESS NOTES
"Virtual Visit Details    Type of service:  Video Visit     Originating Location (pt. Location): Home  Distant Location (provider location):  On-site  Platform used for Video Visit: AmWell     Dear patient. Thank you for visiting with me. I want you to feel respected, understood, and empowered. \"Respect\" is valuing you as much as I would a close family member. \"Empowerment\" happens when you are fully informed, and can make the best possible decision for you.  Please ask me questions!  Challenge anything that is not clear.    Medical records are primarily used as memory aids for me and my colleagues. Things to know about my documentation style:  - The 'problem list' includes current symptoms or diagnoses, and some problems that are resolved but may return. I use the past medical history for problems not expected to return.  - I use single quotation marks for things that you or I said, when I want to clarify who was speaking.  - I use double quotation marks when copying a term from elsewhere in your records. Italics (besides here) may also denote a quotation.  If you have questions or concerns, please contact me; I will reply as soon as time allows.    Context    Ronna Rivera is a 37 year old woman, with concerns including:  Chief Complaint   Patient presents with     RECHECK     PCP: Neal Ball   Visit type: problem-oriented      Problems and progress      Community-acquired pneumonia   SUBJECTIVE:   - Put on IV fluids and abx during hospitalization, abnormal Sp02 in low 90's    - Imaging showed lower right infiltrate   - Attributed to immune suppression, her son recently had strep and  had epiglottitis,  treated with abx and responded in 2 days   - When at the most sick, saw large reduction in eythreal myalgia symptoms   - Dr. ACKERMAN attempting to rule out cancers    ASSESSMENT:   - Final vaccination for epiglottitis may have been missed in childhood, and when infected caused pneumonia   - " When septic, body restricts blood flow in perhiphery, improving EM symptoms      PLAN:   - Safely suppress immune system   - Pending cervical/thorasic MRI     Problem List as of 7/10/2023 Reviewed: 5/5/2023  7:28 PM by Neal Ball MD          Noted       Nervous and Auditory    1. Erythromelalgia (H) - Primary 3/25/2021     Overview Addendum 3/27/2023  2:27 PM by Neal Ball MD      Feet very painful and bad enough to ulcerate and wound.  (also has lesser problems with hands and face)  3/6/2023 back on steroids (pulse, then BIW).  Takes venlafaxine.  Has been on rituximab, imuran, IVIG, ASA, mexiletine  Improved after high-dose intermittent steroids, then worsened.  Dx with testing by Dr. Shannon De La Rosa at [facility]. [place] seeing [place] Mook [place] [facility]            Last Assessment & Plan 7/10/2023 Virtual Visit Edited 7/11/2023  2:03 PM by Neal Ball MD           UPDATE:  Interesting how Em pain waned somewhat when septic. Foot pain returned on day two of hospitalization/abx, triggered by wound care removing dressings   - Had/has not initiated Tri. (immune supressor)    - States that when fentanyl patch wears off, notices increased pain, only used dilauted one night since hospitalization   - Considering plasmapheresis, working on coordination   - Remaining constant on steroids, no current plan of taper  - Has been able to get ouside with the cooler temperatures    - Pain pump consult Wednesday        ASSESSMENT and PLAN: I will review Dr. ACKERMAN's notes and order MRI here.          Relevant Medications     fentaNYL (DURAGESIC) 50 mcg/hr 72 hr patch (Start on 7/24/2023)     mexiletine (MEXITIL) 150 MG capsule     Other Relevant Orders     MR Cervical Spine w/o & w Contrast     MR Brain w/o & w Contrast     MR Thoracic Spine w/o & w Contrast                   CHRONIC CARE MANAGEMENT at [facility]  -------------------------------------------  PENDING ISSUES for [facility]:    -------------------------------------------    ------------------------------------------------  ONGOING SPECIALTY CARE BY:  ------------------------------------------------  -- Neuro: Dr. Shannon De La Rosa @ [place], [place]. Mook [place]  (prev seen Dr. Orta @ [facility])  -- Pain: Dr. Wills @ [facility] (opioids by PCP Dr. Ball)  -- Rheum PRN: Dr. Cipriano Cardoza      ----------------------------------------------------------------------------------  OUTSIDE PERSONNEL FOR INSURANCE, CADI, ETC:    nurse Daphne  [phone]   ----------------------------------------------------------------------------------  SUPPLIERS AND VENDORS:   ---------------------------------------------------------------------------------      Scribe Disclosure:   I, Saqib Duffy, am serving as a scribe; to document services personally performed by Dr. Neal Ball- -based on data collection and the provider's statements to me.     Provider Disclosure:  I agree with above History, Review of Systems, Physical exam and Plan.  I have reviewed the content of the documentation and have edited it as needed. I have personally performed the services documented here and the documentation accurately represents those services and the decisions I have made.      Electronically signed by:  Dr. Neal Ball

## 2023-07-10 NOTE — PROGRESS NOTES
Type of Form Received:     Form Received (Date) 7/10/23   Form Filled out Yes 7/11/23   Placed in provider folder Yes

## 2023-07-10 NOTE — PATIENT INSTRUCTIONS
Thank you for visiting the Primary Care Center today at the [facility]! The following is some information about our clinic:     Primary Care Center Frequently-Asked Questions    (1) How do I schedule appointments at the [facility]?     Primary Care--to schedule or make changes to an existing appointment, please call our primary care line at [phone].    Labs--to schedule a lab appointment at the [facility] you can use FND or call [phone]. If you have a [place] location that is closer to home, you can reach out to that location for scheduling options.     Imaging--if you need to schedule a CT, X-ray, MRI, ultrasound, or other imaging study you can call [phone] to schedule at the [facility] or any other [facility] imaging location.     Referrals--if a referral to another specialty was ordered you can expect a phone call from their scheduling team. If you have not heard from them in a week, please call us or send us a FND message to check the status or get a scheduling number. Please note that this only applies to internal [facility] referrals. If the referral is external you would need to contact their office for scheduling.     (2) I have a question about my visit, who do I contact?     You can call us at the primary care line at [phone] to ask questions about your visit. You can also send a secure message through FND, which is reviewed by clinic staff. Please note that FND messages have a twenty-four to forty-eight business hour turnaround time and should not be used for urgent concerns.    (3) How will I get the results of my tests?    If you are signed up for [employer] all tests will be released to you within twenty-four hours of resulting. Please allow three to five days for your doctor to review your results and place a note interpreting the results. If you do not have [employer] you will receive your  results through mail seven to ten business days following the return of the tests. Please note that if there should be any urgent or concerning results that your doctor or their registered nurse will reach out to you the same day as the tests come back. If you have follow up questions about your results or would like to discuss the results in detail please schedule a follow up with your provider either in person or virtually.     (4) How do I get refills of my prescriptions?     You should always first contact your pharmacy for refills of your medications. If submitting a refill request on [email or web address], please be sure to submit the request only once--repeat requests can cause delays in refill. If you are requesting a NEW medication or a medication related to new symptoms you will need to schedule an appointment with a provider prior to approval. Please note: Routine medication refills have up to one to three business day turnaround whereas controlled substances refills have up to five to seven business day turnaround.    (5) I have new symptoms, what do I do?     If you are having an immediate medical emergency, you should dial 911 for assistance.   For anything urgent that needs to be seen within a few hours to one day you should visit a local urgent care for assistance.  For non-urgent symptoms that need to be seen within a few days to a week you can schedule with an available provider in primary care by going to [employer] or calling [phone].   If you are not sure how serious your symptoms are or you would like to receive medical advice you can always call [phone] to speak with a triage nurse.

## 2023-07-10 NOTE — ASSESSMENT & PLAN NOTE
UPDATE:  Interesting how Em pain waned somewhat when septic. Foot pain returned on day two of hospitalization/abx, triggered by wound care removing dressings   - Had/has not initiated Tri. (immune supressor)    - States that when fentanyl patch wears off, notices increased pain, only used dilauted one night since hospitalization   - Considering plasmapheresis, working on coordination   - Remaining constant on steroids, no current plan of taper  - Has been able to get ouside with the cooler temperatures    - Pain pump consult Wednesday        ASSESSMENT and PLAN: I will review Dr. ACKERMAN's notes and order MRI here.   2.34

## 2023-07-11 ENCOUNTER — TELEPHONE (OUTPATIENT)
Dept: INTERNAL MEDICINE | Facility: CLINIC | Age: 38
End: 2023-07-11
Payer: COMMERCIAL

## 2023-07-11 ENCOUNTER — MEDICAL CORRESPONDENCE (OUTPATIENT)
Dept: HEALTH INFORMATION MANAGEMENT | Facility: CLINIC | Age: 38
End: 2023-07-11
Payer: COMMERCIAL

## 2023-07-11 RX ORDER — FENTANYL 50 UG/1
1 PATCH TRANSDERMAL
Qty: 10 PATCH | Refills: 0 | Status: SHIPPED | OUTPATIENT
Start: 2023-07-24 | End: 2023-08-22

## 2023-07-11 RX ORDER — MEXILETINE HYDROCHLORIDE 150 MG/1
150 CAPSULE ORAL 3 TIMES DAILY
Refills: 0 | COMMUNITY
Start: 2023-07-11 | End: 2023-09-15

## 2023-07-11 NOTE — TELEPHONE ENCOUNTER
[facility] Call [facility]    Phone Message    May a detailed message be left on voicemail: yes     Reason for Call: Other: Warren, JASSI [facility], reporting: 3rd call to patient to do a post discharge call, if patient is seen in office warren requesting that we let her know to call her at [phone]. Thank you     Action Taken: Message routed to:  [facility]    Travel Screening: Not Applicable

## 2023-07-14 ENCOUNTER — MEDICAL CORRESPONDENCE (OUTPATIENT)
Dept: HEALTH INFORMATION MANAGEMENT | Facility: CLINIC | Age: 38
End: 2023-07-14
Payer: COMMERCIAL

## 2023-07-14 ENCOUNTER — TELEPHONE (OUTPATIENT)
Dept: INTERNAL MEDICINE | Facility: CLINIC | Age: 38
End: 2023-07-14
Payer: COMMERCIAL

## 2023-07-14 NOTE — TELEPHONE ENCOUNTER
[facility] Call [facility]    Phone Message    May a detailed message be left on voicemail: yes     Reason for Call: Order(s): Home Care Orders: Physical Therapy (PT): Jaclyn PT calling from [facility] requesting verbal orders to continue care for 1x per week for 5 more weeks, mobility and strength training. OK to LVM.     Action Taken: Message routed to:  [facility]    Travel Screening: Not Applicable

## 2023-07-15 ENCOUNTER — HEALTH MAINTENANCE LETTER (OUTPATIENT)
Age: 38
End: 2023-07-15

## 2023-07-17 ENCOUNTER — DOCUMENTATION ONLY (OUTPATIENT)
Dept: INTERNAL MEDICINE | Facility: CLINIC | Age: 38
End: 2023-07-17
Payer: COMMERCIAL

## 2023-07-17 NOTE — PROGRESS NOTES
Type of Form Received:     Form Received (Date) 7/17/123   Form Filled out Yes, 7/23/23   Placed in provider folder Yes

## 2023-07-18 RX ORDER — LIDOCAINE 40 MG/G
CREAM TOPICAL
Status: CANCELLED | OUTPATIENT
Start: 2023-07-18

## 2023-07-18 RX ORDER — CEFAZOLIN SODIUM 2 G/50ML
2 SOLUTION INTRAVENOUS
Status: CANCELLED | OUTPATIENT
Start: 2023-07-18

## 2023-07-25 ENCOUNTER — MEDICAL CORRESPONDENCE (OUTPATIENT)
Dept: HEALTH INFORMATION MANAGEMENT | Facility: CLINIC | Age: 38
End: 2023-07-25
Payer: COMMERCIAL

## 2023-07-26 ENCOUNTER — HOSPITAL ENCOUNTER (OUTPATIENT)
Dept: WOUND CARE | Facility: CLINIC | Age: 38
Discharge: HOME | End: 2023-07-26
Attending: FAMILY MEDICINE | Admitting: FAMILY MEDICINE
Payer: COMMERCIAL

## 2023-07-26 VITALS
HEART RATE: 67 BPM | DIASTOLIC BLOOD PRESSURE: 79 MMHG | RESPIRATION RATE: 14 BRPM | TEMPERATURE: 96.8 F | SYSTOLIC BLOOD PRESSURE: 124 MMHG

## 2023-07-26 DIAGNOSIS — L97.522 FOOT ULCER, LEFT, WITH FAT LAYER EXPOSED (H): ICD-10-CM

## 2023-07-26 DIAGNOSIS — L97.512 FOOT ULCER, RIGHT, WITH FAT LAYER EXPOSED (H): Primary | ICD-10-CM

## 2023-07-26 PROBLEM — S91.104A: Status: RESOLVED | Noted: 2023-03-30 | Resolved: 2023-07-26

## 2023-07-26 PROBLEM — S91.105A: Status: RESOLVED | Noted: 2023-03-30 | Resolved: 2023-07-26

## 2023-07-26 PROCEDURE — 99215 OFFICE O/P EST HI 40 MIN: CPT | Performed by: FAMILY MEDICINE

## 2023-07-26 PROCEDURE — 97602 WOUND(S) CARE NON-SELECTIVE: CPT

## 2023-07-26 NOTE — DISCHARGE INSTRUCTIONS
Pam Rivera      1985    A DME order was not completed because the supplies are ordered by home care or at a care facility    Home Health Care: [facility] Phone: [phone] Fax:[phone]      Per your discussion with Dr. Long:  -Acupuncture  -Neuroglide ([employer])  -Decrease salt to less than 25mg daily (check sodium levels in your electrolyte tablets)  -Consider Hyperbaric Oxygen Therapy in future - (it would likely be out of pocket unless you had a bone infection, likely $1,000 per week for 2-3 weeks) Email Lindsay Cormier at [employer] [email or web address] for more information  -Consider Photomodulation/Red Light Therapy in future - (it would likely be out of pocket)     Medications/supplements to aid in healing:  Vitamin D3 10,000 iu per day (for 3 months)  Tea C 1,000 mg take daily  Vitamin B Complex with folic acid take 1 tablet daily  Vitamin B 12 1000 mcg daily  Zinc 30mg capsule or tablet daily (ok to take 50mg if 30mg cannot be found)  Vitamin A 10,000 I.unit(s). daily for 3 weeks  Magnesium 400mg one tablet daily  -Vein Formula 1000mg. Take one (1) 500mg capsule four times per day for two months (4/10/23). Order from www.[employer] or [employer] or call [phone]. (The 1000mg is two (2) 500mg capsules) Once your wound is healed, take Vein Formula one (1) 500mg capsule daily for the rest of your life.  -Bactrim renewed. Probiotics can help reduce side effects from antibiotics.      A diet high in protein is important for wound healing, we recommend getting 90 grams of protein per day.   Taking protein shakes or bars are a good way to get extra protein in your diet.     Good sources of protein:  Pork 26g per 3 oz  Whey protein powder - 24g per scoop (on average)  Greek yogurt - 23g per 8oz   Chicken or Turkey - 23g per 3oz  Fish - 20-25g per 3oz  Beef - 18-23g per 3oz  Navy beans - 20g per cup  Cottage cheese - 14g per 1/2 cup   Lentils - 13g per 1/4 cup  Beef jerky 13g  "per 1oz  2% milk - 8g per cup  Peanut butter - 8g per 2 tablespoons  Eggs - 6g per egg  Mixed nuts - 6g per 2oz    Please raise your legs above your hips for 30 mins 2 times a day to promote wound healing.  Walk as much as you can. When you sit raise your ankle above your hips to promote wound healing.       Wound Dressing Change: Left and Right dorsal feet, Right dorsal lateral foot, Left and Right 5th lateral toes, Right medial first metatarsal and Left medial first metatarsal  - Wash your hands with soap and water before you begin your dressing change and prepare a clean surface for dressings.  - Apply Bactine Max lidocaine spray as needed during wound care process  - Cleanse with mild unscented soap (such as Cetaphil, Cerave or Dove) and water  - Apply small amount of VASHE on gauze, lay into wound bed, let sit for 10 minutes, remove gauze (do not rinse) then apply dressing:   - Apply 1/10th of a piece of a 4x4\" Alginate (like Melgisorb) to open wounds   - Apply Zetuvit plus silicone border (or another silicone dressing); 2x2 to left and right 5th lateral toes, right medial first metatarsal and left medial first metatarsal and 4x4 to left and right dorsal feet and right dorsalateral foot,   - If able to tolerate, apply purple or navy EdemaWear [place] stripe from just above toes to just below knees (or Spandage size E if Edemawear is not tolerated)  - Wear your compression socks over the dressings  Change every other day     Wound Dressing Change: Left and Right Heels, Left and Right lateral 3rd Toes  - Wash your hands with soap and water before you begin your dressing change and prepare a clean surface for dressings.  - Apply Bactine Max lidocaine spray as needed during wound care process  - Cleanse with mild unscented soap (such as Cetaphil, Cerave or Dove) and water  - Apply small amount of VASHE on gauze, lay into wound bed, let sit for 10 minutes, remove gauze (do not rinse) then apply dressing:   - Apply " Zetuvit plus silicone border (or another silicone dressing); 6x6 to Bilateral Heels, 1/3 of a 2x2 to the Left and Right lateral 3rd toes  - If able to tolerate, Apply Spandage Size E or Edema Wear (purple or Navy Stripe)  - Wear your compression socks over the dressings  Change every other day      Wound Dressing Change: Left and Right Plantar   - Wash your hands with soap and water before you begin your dressing change and prepare a clean surface for dressings.  - Apply Bactine Max lidocaine spray as needed during wound care process  - Cleanse with mild unscented soap (such as Cetaphil, Cerave or Dove) and water  - Apply small amount of VASHE on gauze, lay into wound bed, let sit for 10 minutes, remove gauze (do not rinse) then apply dressing:   - Apply a ceramide based lotion (Cera-Ve, Vanicream, Cetaphil)  - If able to tolerate, apply purple or navy EdemaWear [place] stripe from just above toes to just below knees (or Spandage if Edemawear is not tolerated)  - Wear your compression socks over the dressings  Change every other day      Avoid cold packs when possible, as it reduces blood flow  Hold on Coolflex compression for now, as it is causing pain.     To buy more Activon Honey, try [employer] or [employer]  To buy more Xspan, try [employer]     EdemaWear should be worn 24/7 unless bathing/showering or changing the dressing. You will wash and reuse the EdemaWear. DO NOT CUT THE EDEMAWEAR. IF IT IS TOO LONG THEN CUFF THE EDEMAWEAR (the EdemaWear can shrink length wise with washing).      Whenever you sit raise your ankle above your hips to promote wound healing.    Continue to use your venous pump boots as much as you are able to tolerate with the goal of:        Reynold Avelar M.D. July 26, 2023    Call us at [phone] if you have any questions about your wounds, have redness or swelling around your wound, have a fever of 101 degrees Fahrenheit or greater or if you have any other problems  or concerns. We answer the phone Monday through Friday 8 am to 4 pm, please leave a message as we check the voicemail frequently throughout the day.     If you had a positive experience please indicate that on your patient satisfaction survey form that [facility] will be sending you.    It was a pleasure meeting with you today.  Thank you for allowing me and my team the privilege of caring for you today.  YOU are the reason we are here, and I truly hope we provided you with the excellent service you deserve.  Please let us know if there is anything else we can do for you so that we can be sure you are leaving completely satisfied with your care experience.      If you have any billing related questions please call the [facility] Business office at [phone]. The clinic staff does not handle billing related matters.    If you are scheduled to have a follow up appointment, you will receive a reminder call the day before your visit. On the appointment day please arrive 15 minutes prior to your appointment time. If you are unable to keep that appointment, please call the clinic to cancel or reschedule. If you are more than 10 minutes late or greater for your scheduled appointment time, the clinic policy is that you may be asked to reschedule.

## 2023-07-26 NOTE — PROGRESS NOTES
Wound Clinic Note         Visit date: 07/26/2023       Cheif Complaint:     Ronna Rivera is a 37 year old   female had concerns including WOUND CARE.  The patient has bilateral foot wounds.         HISTORY OF PRESENT ILLNESS:    Ronna Rivera reports the wound has been present since March 2023.  The wound began without a clear cause.  She has erythromelalgia and has tried numerous treatment strategies to treat this to no avail.  She has wounds on both of her feet relating to this disease process.  She was recently evaluated at [facility] by vascular medicine and dermatology on June 26, 2023.  A biopsy was obtained which showed fairly normal healing skin.  She had an MRI of her right foot performed on June 14, 2023 which did not show any evidence of osteomyelitis.        The patient has been bandaging the wound with hydrogel, Mepilex bandage and a Band-Aid to hold these in place all changed every other day.  There has been Moderate drainage from the wound.       The pateint denies fevers or chills.  They report the pain from the wound has been 7/10 and has remained about the same recently.      The patient reports laying down to elevate their legs above the level of their heart at least twice a day.     Today the patient reports maintaining a regular diet without special attention to protein.        She denies a history of diabetes or cigarette smoking.  She does take high-dose steroids for treatment of her erythromelalgia.        The patient has not had any symptoms of infection relating to the wound recently and is not currently on antibiotics.       Problem List:   Past Medical History:   Diagnosis Date    Auto-erythrocyte sensitization (H)     Erythromelalgia 02/2021    Reactive depression     Seasonal allergies 06/25/2020    Mostly resolved             Family Hx: family history includes Breast Cancer in her paternal grandmother; C.A.D. in her paternal grandfather; Cerebrovascular Disease in her maternal  grandmother; Diabetes in her maternal grandfather; Hypertension in her father and paternal grandfather.       Surgical Hx:   Past Surgical History:   Procedure Laterality Date    INSERT PICC LINE Left 06/23/2021    Procedure: INSERTION, PICC;  Surgeon: Neal Penny MD;  Location: [facility] OR    IR PICC PLACEMENT > 5 YRS OF AGE  06/23/2021    NO HISTORY OF SURGERY      PICC SINGLE LUMEN PLACEMENT Left 03/02/2023    Left brachial-lateral vein 44cm total 1cm external.Placement verified by [facility] 3CG.PICC okay to use.          Allergies:    Allergies   Allergen Reactions    Topiramate Anxiety              Medication History:    Current Outpatient Medications   Medication Sig    azaTHIOprine (IMURAN) 50 MG tablet Take 100 mg by mouth daily    collagenase (SANTYL) 250 UNIT/GM external ointment Apply topically daily as needed (with foot wound cares)    diphenhydrAMINE (BENADRYL) 50 MG capsule Take 50 mg by mouth At Bedtime    fentaNYL (DURAGESIC) 50 mcg/hr 72 hr patch Place 1 patch onto the skin every 72 hours remove old patch.  (Covers through 8/23/2023 or later)    gabapentin (NEURONTIN) 300 MG capsule Take 1 po mid-day along with 600 mg (total 900 mg)    gabapentin (NEURONTIN) 600 MG tablet Take 2 po AM (1200 mg), 1 po Mid-day along with a 300 mg capsule (900 mg), and 2 po PM (1200 mg)    HYDROmorphone (DILAUDID) 2 MG tablet Take 1-2 tablets (2-4 mg) by mouth every 6 hours as needed for breakthrough pain (or 6 mg to reduce pain with showers, transportation, or sleep) (Patient taking differently: Take 10 mg by mouth every 4 hours as needed for breakthrough pain (or 6 mg to reduce pain with showers, transportation, or sleep))    ibuprofen (ADVIL/MOTRIN) 200 MG tablet Take 400 mg by mouth every 6 hours as needed for moderate pain    lidocaine (XYLOCAINE) 4 % external solution Apply topically daily Apply approximately 10mL to each foot (total of 20mL) daily    lidocaine (XYLOCAINE) 5 % external ointment Apply  topically every 4 hours as needed for moderate pain (4-6)    LORazepam (ATIVAN) 0.5 MG tablet Take 0.5 mg by mouth daily as needed for anxiety    melatonin 1 MG TABS tablet Take 4 mg by mouth At Bedtime    methylPREDNISolone sodium succinate (SOLU-MEDROL) 1000 MG injection Inject 1,000 mg into the vein twice a week On Sunday and Thursday    mexiletine (MEXITIL) 150 MG capsule Take 1 capsule (150 mg) by mouth 3 times daily    naloxone (NARCAN) 4 MG/0.1ML nasal spray Spray 1 spray (4 mg) into one nostril alternating nostrils as needed for opioid reversal every 2-3 minutes until assistance arrives    norethindrone (MICRONOR) 0.35 MG tablet Take 2 tablets (0.7 mg) by mouth daily Take the first 3 weeks of each pack, and then immediately move on to the next pack (discard the 4th week of each pack)    ondansetron (ZOFRAN ODT) 4 MG ODT tab Take 1 tablet (4 mg) by mouth every 6 hours as needed for nausea or vomiting    sulfamethoxazole-trimethoprim (BACTRIM) 400-80 MG tablet Take 1 tablet by mouth daily    triamcinolone (KENALOG) 0.1 % external cream Apply topically daily Apply to feet daily    venlafaxine (EFFEXOR XR) 37.5 MG 24 hr capsule Take 1 capsule (37.5 mg) by mouth daily (Patient taking differently: Take 37.5 mg by mouth 2 times daily)    venlafaxine (EFFEXOR XR) 75 MG 24 hr capsule Take 1 capsule (75 mg) by mouth daily (Patient not taking: Reported on 7/4/2023)    vitamin C (ASCORBIC ACID) 250 MG tablet Take 250 mg by mouth daily    Vitamin D3 (CHOLECALCIFEROL) 25 mcg (1000 units) tablet Take 25 mcg by mouth daily     No current facility-administered medications for this encounter.         Tobacco History:  reports that she has never smoked. She has never used smokeless tobacco.       REVIEW OF SYMPTOMS:   The review of systems was negative except as noted in the HPI.           PHYSICAL EXAMINATION:     /79 (BP Location: Right arm, Patient Position: Chair)   Pulse 67   Temp 96.8  F (36  C) (Temporal)    Resp 14            GENERAL: The patient overall appears well and is no acute distress.   HEAD: normocephalic   EYES: Sclera and conjunctiva clear   NECK: no obvious masses   LUNGS: breathing is unlabored.   EXTREMITIES: No clubbing, cyanosis or edema   SKIN: No rashes or other abnormalities except as noted under the Wound section below.   NEUROLOGICAL: normal motor and sensory function   EDEMA: Mild      WOUND: The wound appears healthy with no sign of infection.   Wound bed: granulation tissue  Periwound: healthy intact skin  She has a number of fairly small superficial wounds over both of her feet.      Also see below for wound details:       Circumferential volume measures:             No data to display                Ulceration(s)/Wound(s):   Please see the media tab under the chart review for pictures of the wounds.  Nursing staff removed dressings and cleansed wound.               No results for input(s): HGBA1C, A1C, EAG in the last 52018 hours.    Invalid input(s): [id number]       Recent Labs   Lab Test 07/04/23  1851 02/24/23  0722 02/22/23  0759   ALBUMIN 3.7 3.6 3.7              No debridement performed today.                  ASSESSMENT:   This is a 37 year old  female with bilateral feet wounds.          PLAN:   I recommend that they bandage the wounds on the feet that are having more drainage with alginate, Mepilex and her edema wear stockings changed every other day.  The other smaller wounds which are not draining much she can to continue to bandage with hydrogel, Mepilex and the edema wear stocking changed every other day.  I explained to the patient today that controlling the edema is probably the most important thing we can do to help heal the wound.  I have specifically recommended that they lay down with their legs above the level of the heart for 30 minutes at least twice a day.   I have explained to the patient the importance of protein intake to wound healing.  I have explained that  increasing protein intake will speed wound healing.  We discussed several types of food that are high in protein and the wound care nurse gave the patient a handout that summarizes this information.  In addition to further speed wound healing I have encouraged the patient to take a protein supplement.   The patient will return to the wound clinic in 4 weeks to see me again.        45 minutes spent on the date of the encounter doing chart review, history and exam, documentation and further activities per the note      Reynold Avelar MD  07/26/2023   3:23 PM   [facility]/Wound  [phone]    This note was electronically signed by Reynold Avelar MD        Further instructions from your care team         Ronna Rivera      1985    A DME order was not completed because the supplies are ordered by home care or at a care facility    [facility] Care: [facility] Phone: [phone] Fax:[phone]      Per your discussion with Dr. Long:  -Acupuncture  -Neuroglide ([employer])  -Decrease salt to less than 25mg daily (check sodium levels in your electrolyte tablets)  -Consider Hyperbaric Oxygen Therapy in future - (it would likely be out of pocket unless you had a bone infection, likely $1,000 per week for 2-3 weeks) Email Lindsay Cormier at [employer] [email or web address] for more information  -Consider Photomodulation/Red Light Therapy in future - (it would likely be out of pocket)     Medications/supplements to aid in healing:  Vitamin D3 10,000 iu per day (for 3 months)  Tea C 1,000 mg take daily  Vitamin B Complex with folic acid take 1 tablet daily  Vitamin B 12 1000 mcg daily  Zinc 30mg capsule or tablet daily (ok to take 50mg if 30mg cannot be found)  Vitamin A 10,000 I.unit(s). daily for 3 weeks  Magnesium 400mg one tablet daily  -Vein Formula 1000mg. Take one (1) 500mg capsule four times per day for two months (4/10/23). Order from www.[employer] or  "[employer] or call [phone]. (The 1000mg is two (2) 500mg capsules) Once your wound is healed, take Vein Formula one (1) 500mg capsule daily for the rest of your life.  -Bactrim renewed. Probiotics can help reduce side effects from antibiotics.      A diet high in protein is important for wound healing, we recommend getting 90 grams of protein per day.   Taking protein shakes or bars are a good way to get extra protein in your diet.     Good sources of protein:  Pork 26g per 3 oz  Whey protein powder - 24g per scoop (on average)  Greek yogurt - 23g per 8oz   Chicken or Turkey - 23g per 3oz  Fish - 20-25g per 3oz  Beef - 18-23g per 3oz  Navy beans - 20g per cup  Cottage cheese - 14g per 1/2 cup   Lentils - 13g per 1/4 cup  Beef jerky 13g per 1oz  2% milk - 8g per cup  Peanut butter - 8g per 2 tablespoons  Eggs - 6g per egg  Mixed nuts - 6g per 2oz    Please raise your legs above your hips for 30 mins 2 times a day to promote wound healing.  Walk as much as you can. When you sit raise your ankle above your hips to promote wound healing.       Wound Dressing Change: Left and Right dorsal feet, Right dorsal lateral foot, Left and Right 5th lateral toes, Right medial first metatarsal and Left medial first metatarsal  - Wash your hands with soap and water before you begin your dressing change and prepare a clean surface for dressings.  - Apply Bactine Max lidocaine spray as needed during wound care process  - Cleanse with mild unscented soap (such as Cetaphil, Cerave or Dove) and water  - Apply small amount of VASHE on gauze, lay into wound bed, let sit for 10 minutes, remove gauze (do not rinse) then apply dressing:   - Apply 1/10th of a piece of a 4x4\" Alginate (like Melgisorb) to open wounds   - Apply Zetuvit plus silicone border (or another silicone dressing); 2x2 to left and right 5th lateral toes, right medial first metatarsal and left medial first metatarsal and 4x4 to left and right dorsal feet and right " dorsalateral foot,   - If able to tolerate, apply purple or navy EdemaWear [place] stripe from just above toes to just below knees (or Spandage size E if Edemawear is not tolerated)  - Wear your compression socks over the dressings  Change every other day     Wound Dressing Change: Left and Right Heels, Left and Right lateral 3rd Toes  - Wash your hands with soap and water before you begin your dressing change and prepare a clean surface for dressings.  - Apply Bactine Max lidocaine spray as needed during wound care process  - Cleanse with mild unscented soap (such as Cetaphil, Cerave or Dove) and water  - Apply small amount of VASHE on gauze, lay into wound bed, let sit for 10 minutes, remove gauze (do not rinse) then apply dressing:   - Apply Zetuvit plus silicone border (or another silicone dressing); 6x6 to Bilateral Heels, 1/3 of a 2x2 to the Left and Right lateral 3rd toes  - If able to tolerate, Apply Spandage Size E or Edema Wear (purple or Navy Stripe)  - Wear your compression socks over the dressings  Change every other day      Wound Dressing Change: Left and Right Plantar   - Wash your hands with soap and water before you begin your dressing change and prepare a clean surface for dressings.  - Apply Bactine Max lidocaine spray as needed during wound care process  - Cleanse with mild unscented soap (such as Cetaphil, Cerave or Dove) and water  - Apply small amount of VASHE on gauze, lay into wound bed, let sit for 10 minutes, remove gauze (do not rinse) then apply dressing:   - Apply a ceramide based lotion (Cera-Ve, Vanicream, Cetaphil)  - If able to tolerate, apply purple or navy EdemaWear [place] stripe from just above toes to just below knees (or Spandage if Edemawear is not tolerated)  - Wear your compression socks over the dressings  Change every other day      Avoid cold packs when possible, as it reduces blood flow  Hold on Coolflex compression for now, as it is causing pain.     To buy more Activon  Honey, try www.[employer] or [employer]  To buy more Xspan, try [employer]     EdemaWear should be worn 24/7 unless bathing/showering or changing the dressing. You will wash and reuse the EdemaWear. DO NOT CUT THE EDEMAWEAR. IF IT IS TOO LONG THEN CUFF THE EDEMAWEAR (the EdemaWear can shrink length wise with washing).      Whenever you sit raise your ankle above your hips to promote wound healing.    Continue to use your venous pump boots as much as you are able to tolerate with the goal of:        Reynold Avelar M.D. July 26, 2023    Call us at [phone] if you have any questions about your wounds, have redness or swelling around your wound, have a fever of 101 degrees Fahrenheit or greater or if you have any other problems or concerns. We answer the phone Monday through Friday 8 am to 4 pm, please leave a message as we check the voicemail frequently throughout the day.     If you had a positive experience please indicate that on your patient satisfaction survey form that [facility] will be sending you.    It was a pleasure meeting with you today.  Thank you for allowing me and my team the privilege of caring for you today.  YOU are the reason we are here, and I truly hope we provided you with the excellent service you deserve.  Please let us know if there is anything else we can do for you so that we can be sure you are leaving completely satisfied with your care experience.      If you have any billing related questions please call the [facility] Business office at [phone]. The clinic staff does not handle billing related matters.    If you are scheduled to have a follow up appointment, you will receive a reminder call the day before your visit. On the appointment day please arrive 15 minutes prior to your appointment time. If you are unable to keep that appointment, please call the clinic to cancel or reschedule. If you are more than 10 minutes late or greater for your scheduled  appointment time, the clinic policy is that you may be asked to reschedule.         ,

## 2023-07-27 NOTE — ADDENDUM NOTE
Encounter addended by: Abby Boland RN on: 7/27/2023 8:18 AM   Actions taken: Flowsheet accepted, Charge Capture section accepted

## 2023-07-27 NOTE — ADDENDUM NOTE
Encounter addended by: Reynold Avelar MD on: 7/27/2023 8:28 AM   Actions taken: Clinical Note Signed

## 2023-07-27 NOTE — ADDENDUM NOTE
Encounter addended by: Abby Boland RN on: 7/27/2023 8:11 AM   Actions taken: Edited Discharge Instructions

## 2023-07-28 ENCOUNTER — ANCILLARY PROCEDURE (OUTPATIENT)
Dept: MRI IMAGING | Facility: CLINIC | Age: 38
End: 2023-07-28
Attending: PEDIATRICS
Payer: COMMERCIAL

## 2023-07-28 ENCOUNTER — DOCUMENTATION ONLY (OUTPATIENT)
Dept: INTERNAL MEDICINE | Facility: CLINIC | Age: 38
End: 2023-07-28
Payer: COMMERCIAL

## 2023-07-28 DIAGNOSIS — I73.81 ERYTHROMELALGIA (H): ICD-10-CM

## 2023-07-28 DIAGNOSIS — G95.9 MYELOPATHY (H): ICD-10-CM

## 2023-07-28 PROCEDURE — 72157 MRI CHEST SPINE W/O & W/DYE: CPT

## 2023-07-28 PROCEDURE — 255N000002 HC RX 255 OP 636: Mod: JZ | Performed by: PEDIATRICS

## 2023-07-28 PROCEDURE — A9585 GADOBUTROL INJECTION: HCPCS | Mod: JZ | Performed by: PEDIATRICS

## 2023-07-28 PROCEDURE — 72156 MRI NECK SPINE W/O & W/DYE: CPT

## 2023-07-28 PROCEDURE — 70553 MRI BRAIN STEM W/O & W/DYE: CPT

## 2023-07-28 RX ORDER — GADOBUTROL 604.72 MG/ML
6 INJECTION INTRAVENOUS ONCE
Status: COMPLETED | OUTPATIENT
Start: 2023-07-28 | End: 2023-07-28

## 2023-07-28 RX ADMIN — GADOBUTROL 6 ML: 604.72 INJECTION INTRAVENOUS at 08:56

## 2023-07-28 NOTE — PROGRESS NOTES
Type of Form Received:     Form Received (Date) 7/28/23   Form Filled out Yes, 8/3/23   Placed in provider folder Yes

## 2023-08-02 ENCOUNTER — OFFICE VISIT (OUTPATIENT)
Dept: INTERNAL MEDICINE | Facility: CLINIC | Age: 38
End: 2023-08-02
Payer: COMMERCIAL

## 2023-08-02 ENCOUNTER — ANCILLARY PROCEDURE (OUTPATIENT)
Dept: CT IMAGING | Facility: CLINIC | Age: 38
End: 2023-08-02
Attending: PEDIATRICS
Payer: COMMERCIAL

## 2023-08-02 VITALS
HEIGHT: 62 IN | DIASTOLIC BLOOD PRESSURE: 84 MMHG | HEART RATE: 85 BPM | BODY MASS INDEX: 23.32 KG/M2 | OXYGEN SATURATION: 98 % | WEIGHT: 126.7 LBS | SYSTOLIC BLOOD PRESSURE: 130 MMHG

## 2023-08-02 DIAGNOSIS — I73.81 ERYTHROMELALGIA (H): ICD-10-CM

## 2023-08-02 DIAGNOSIS — Z01.818 PRE-OPERATIVE EXAMINATION: Primary | ICD-10-CM

## 2023-08-02 DIAGNOSIS — J98.4 LUNG ABNORMALITY: ICD-10-CM

## 2023-08-02 PROBLEM — G44.40 HEADACHE, DRUG INDUCED: Status: RESOLVED | Noted: 2021-10-27 | Resolved: 2023-08-02

## 2023-08-02 PROCEDURE — 99215 OFFICE O/P EST HI 40 MIN: CPT | Performed by: PEDIATRICS

## 2023-08-02 PROCEDURE — 71250 CT THORAX DX C-: CPT | Performed by: RADIOLOGY

## 2023-08-02 ASSESSMENT — ASTHMA QUESTIONNAIRES: ACT_TOTALSCORE: 25

## 2023-08-02 NOTE — NURSING NOTE
"Ronna Rivera is a 37 year old female patient that presents today in clinic for the following:    Chief Complaint   Patient presents with    Pre-Op Exam     The patient's allergies and medications were reviewed as noted. A set of vitals were recorded as noted without incident: /84 (BP Location: Right arm, Patient Position: Sitting, Cuff Size: Adult Regular)   Pulse 85   Ht 1.581 m (5' 2.25\")   Wt 57.5 kg (126 lb 11.2 oz)   SpO2 98%   BMI 22.99 kg/m  . The patient does not have any other questions for the provider.    Fernando Rojas, EMT 11:01 AM on 8/2/2023   "

## 2023-08-02 NOTE — PROGRESS NOTES
"I, Neal Ball MD saw the patient with the resident, and agree with the resident's findings and plan of care as documented in the resident's note.  /84 (BP Location: Right arm, Patient Position: Sitting, Cuff Size: Adult Regular)   Pulse 85   Ht 1.581 m (5' 2.25\")   Wt 57.5 kg (126 lb 11.2 oz)   SpO2 98%   BMI 22.99 kg/m    I personally reviewed vital signs and past record.  Key findings: patient here for a pre-op for planned line placement, and that was done.  However:    Problem List as of 8/2/2023 Reviewed: 8/2/2023  8:27 PM by Neal Ball MD            Noted    1. Erythromelalgia (H) 3/25/2021     Overview Addendum 7/11/2023  2:10 PM by Neal Ball MD      Feet very painful and bad enough to ulcerate and wound.  (also has lesser problems with hands and face)  3/6/2023 back on steroids (pulse, then BIW).  Takes venlafaxine.  Has been on rituximab, imuran, IVIG, ASA, mexiletine  Improved after high-dose intermittent steroids, then worsened.  Dx with testing by Dr. Shannon De La Rosa at [facility]. Also seeing Dr. Mook James @ [facility] [place]  6/26/23: derm biopsy @ [place] showed changes consistent with healing skin.          Last Assessment & Plan 8/2/2023 Office Visit Written 8/2/2023  8:26 PM by Neal Ball MD      Erythromelalgia       UPDATE: Still has been planning to do pheresis, but considering otherwise. Had a surprising improvement in EM pain and function with her pneumonia, and then worsened somewhat, and since has improved more. Has 'bad days' a few per week still. Still doing nighttime cooling, but doesn't need to do it during the day. Walked in here.       ASSESSMENT: Difficult decision. The steroid timeline now is roughly equivalent to what happened before, assuming that all of the other meds weren't very contributory. Maybe even including IVIG. My opinion is that the initial pheresis was meant to be a therapeutic trial ... So it seems suboptimal to do it " now, when she is improving anyway. Better to wean the steroids as planned, and follow her course into the Autumn. In this case we shouldn't do the catheter now, to reduce general risk.       PLAN: She will check with Dr. James for next steps.

## 2023-08-02 NOTE — PROGRESS NOTES
PRE-OP EVALUATION:  [facility]  Internal Medicine     HPI:      Ronna Rivera 37 year old female who presents today at the request of *** for preoperative cardiopulmonary risk assessment for the following intended procedure:  Surgery/Procedure:  Surgery Location:  Surgeon:  Surgery Date:  Time of Surgery:  Type of Anesthesia Anticipated: {ANESTHESIA:186442}  Where patient plans to recover: {Preop post recovery plans :896403}  Fax number for surgical facility: {SURGERY FAX NUMBER:[id number]}    HPI related to upcoming procedure:      Cardiac risks: ***  Pulmonary risks: ***  Anemia risks: ***  Exercise tolerance: ***  Personal history of bleeding tendencies/disorders: ***  Family history of bleeding tendencies/disorders: ***  Personal history of adverse anesthesia reactions: ***  Family history of adverse anesthesia reactions: ***  Personal history of unanticipated surgical complications: ***                  Risk Assessment  Physical activity:   {pre-op risk comments about physical activity:399281}     Respiratory concerns:  ***     Clotting concerns:  ***     Other chronic medical concerns  ***    Other chronic medical concerns     Preoperative Review of :  {Mnpmpreport:085332}                                                                                                                                                 .     MEDICAL HISTORY:     Patient Active Problem List   Diagnosis    Other chronic pain    Rash and nonspecific skin eruption    Erythromelalgia (H)    Anxiety    DDD (degenerative disc disease), lumbar    Migraine with aura and without status migrainosus, not intractable    Chronic insomnia    Asthma, exercise induced    Hypermobile joints    Vasovagal syncope    Autonomic dysfunction    Impaired mobility    Abnormal TSH    Need for pneumocystis prophylaxis    Medical marijuana use    Headache, drug induced (IVIG)    Adrenal suppression (H)    Menstrual suppression because of  erythromelalagia    Reactive depression    Recurrent herpes labialis    PICC (peripherally inserted central catheter) in place    Foot ulcer, right, with fat layer exposed (H)    Foot ulcer, left, with fat layer exposed (H)    Community acquired pneumonia of right upper lobe of lung    Sepsis without acute organ dysfunction, due to unspecified organism (H)       Past Surgical History:   Procedure Laterality Date    INSERT PICC LINE Left 06/23/2021    Procedure: INSERTION, PICC;  Surgeon: Neal Penny MD;  Location: [facility] OR    IR PICC PLACEMENT > 5 YRS OF AGE  06/23/2021    NO HISTORY OF SURGERY      PICC SINGLE LUMEN PLACEMENT Left 03/02/2023    Left brachial-lateral vein 44cm total 1cm external.Placement verified by [facility] 3[facility].PICC okay to use.       Family History   Problem Relation Age of Onset    Hypertension Father     Cerebrovascular Disease Maternal Grandmother     Diabetes Maternal Grandfather     Breast Cancer Paternal Grandmother     Hypertension Paternal Grandfather     C.A.D. Paternal Grandfather     Schizophrenia No family hx of        Social History     Tobacco Use    Smoking status: Never    Smokeless tobacco: Never   Substance Use Topics    Alcohol use: No    Drug use: No       Current Outpatient Medications   Medication Sig Dispense Refill    azaTHIOprine (IMURAN) 50 MG tablet Take 100 mg by mouth daily      collagenase (SANTYL) 250 UNIT/GM external ointment Apply topically daily as needed (with foot wound cares)      diphenhydrAMINE (BENADRYL) 50 MG capsule Take 50 mg by mouth At Bedtime      fentaNYL (DURAGESIC) 50 mcg/hr 72 hr patch Place 1 patch onto the skin every 72 hours remove old patch.  (Covers through 8/23/2023 or later) 10 patch 0    gabapentin (NEURONTIN) 300 MG capsule Take 1 po mid-day along with 600 mg (total 900 mg) 30 capsule 11    gabapentin (NEURONTIN) 600 MG tablet Take 2 po AM (1200 mg), 1 po Mid-day along with a 300 mg capsule (900 mg), and 2 po PM (1200 mg) 150  tablet 11    HYDROmorphone (DILAUDID) 2 MG tablet Take 1-2 tablets (2-4 mg) by mouth every 6 hours as needed for breakthrough pain (or 6 mg to reduce pain with showers, transportation, or sleep) (Patient taking differently: Take 10 mg by mouth every 4 hours as needed for breakthrough pain (or 6 mg to reduce pain with showers, transportation, or sleep)) 210 tablet 0    ibuprofen (ADVIL/MOTRIN) 200 MG tablet Take 400 mg by mouth every 6 hours as needed for moderate pain      lidocaine (XYLOCAINE) 4 % external solution Apply topically daily Apply approximately 10mL to each foot (total of 20mL) daily 600 mL 2    lidocaine (XYLOCAINE) 5 % external ointment Apply topically every 4 hours as needed for moderate pain (4-6) 50 g 63    LORazepam (ATIVAN) 0.5 MG tablet Take 0.5 mg by mouth daily as needed for anxiety      melatonin 1 MG TABS tablet Take 4 mg by mouth At Bedtime      methylPREDNISolone sodium succinate (SOLU-MEDROL) 1000 MG injection Inject 1,000 mg into the vein twice a week On Sunday and Thursday      mexiletine (MEXITIL) 150 MG capsule Take 1 capsule (150 mg) by mouth 3 times daily  0    naloxone (NARCAN) 4 MG/0.1ML nasal spray Spray 1 spray (4 mg) into one nostril alternating nostrils as needed for opioid reversal every 2-3 minutes until assistance arrives 0.2 mL 0    norethindrone (MICRONOR) 0.35 MG tablet Take 2 tablets (0.7 mg) by mouth daily Take the first 3 weeks of each pack, and then immediately move on to the next pack (discard the 4th week of each pack) 180 tablet 3    ondansetron (ZOFRAN ODT) 4 MG ODT tab Take 1 tablet (4 mg) by mouth every 6 hours as needed for nausea or vomiting 30 tablet 0    sulfamethoxazole-trimethoprim (BACTRIM) 400-80 MG tablet Take 1 tablet by mouth daily 90 tablet 0    triamcinolone (KENALOG) 0.1 % external cream Apply topically daily Apply to feet daily 80 g 1    venlafaxine (EFFEXOR XR) 37.5 MG 24 hr capsule Take 1 capsule (37.5 mg) by mouth daily (Patient taking  "differently: Take 37.5 mg by mouth 2 times daily) 90 capsule 1    venlafaxine (EFFEXOR XR) 75 MG 24 hr capsule Take 1 capsule (75 mg) by mouth daily (Patient not taking: Reported on 7/4/2023) 90 capsule 3    vitamin C (ASCORBIC ACID) 250 MG tablet Take 250 mg by mouth daily      Vitamin D3 (CHOLECALCIFEROL) 25 mcg (1000 units) tablet Take 25 mcg by mouth daily         Allergies   Allergen Reactions    Topiramate Anxiety       Latex Allergy: NO      REVIEW OF SYSTEMS:     Pertinent questions are noted below.  Answers are \"NO\" unless otherwise noted:    1.  - Do you have a history of heart attack, stroke, stent, bypass or surgery on an artery in the head, neck, heart or legs? ***  2.  - Do you ever have any pain or discomfort in your chest? ***  3. - Do you have a history of  Heart Failure? ***  4. - Are you troubled by shortness of breath when: walking on the level, up a slight hill or at night? ***  5. - Do you currently have a cold, bronchitis or other respiratory infection? ***  6. - Do you have a cough, shortness of breath or wheezing? ***  7. - Do you sometimes get pains in the calves of your legs when you walk? ***  8. - Do you or anyone in your family have previous history of blood clots? ***  9. - Do you or does anyone in your family have a serious bleeding problem such as prolonged bleeding following surgeries or cuts? ***  10. - Have you ever had problems with anemia or been told to take iron pills? ***  11. - Have you had any abnormal blood loss such as black, tarry or bloody stools, or abnormal vaginal bleeding? ***  12. - Have you ever had a blood transfusion? ***  13. - Have you or any of your relatives ever had problems with anesthesia? ***  14. - Do you have sleep apnea, excessive snoring or daytime drowsiness? ***  15. - Do you have any prosthetic heart valves? ***  16. - Do you have prosthetic joints? ***  17. - Is there any chance that you may be pregnant? ***    EXAM:   There were no vitals taken " "for this visit.    General:  A&Ox3, NAD  Head: atraumatic  Eyes:  Pupils 2-3 mm, sclera white, EOM's full, conjunctiva moist, no periorbital swelling    Ears:  TM's normal, EAC's patent, clear of cerumen  Nose:  Nasal passages clear, turbinates not swollen  Throat/Mouth:  No pharyngeal erythema, exudate, ulcers, oral mucosa and tongue moist, normal hard and soft palate  Neck:  Trachea midline, Full AROM, supple, thyroid smooth, symmetric, not enlarged, no nodules, no neck lymphadenopathy  Lungs:  Clear to auscultation throughout, no wheezes, rhonchi or rales. Normal respiratory effort and no intercostal retractions.  C/V:  Regular rate and rhythm, no murmurs, rubs or gallops.  No JVD, no carotid bruits. Radial and DP pulses 2+, equal and regular.  Abdomen:  Not distended.  Bowel sounds active.  No tenderness, no hepatosplenomegaly or masses.  No CVA tenderness or masses.  Lymph:  No cervical lymph nodes.  Neuro: Alert and oriented, face symmetric. Able to get on/off exam table without assistance.  Strength grossly intact. No tremor.  Gait steady.   M/S:   No joint deformities noted.  No joint swelling.  Skin:   Normal temperature., turgor and texture. No rashes, suspicious lesions, or jaundice on exposed skin surfaces.   Extremities:  No peripheral edema, no digital cyanosis  Psych:  Alert and oriented. Appropriate affect.  Not psychomotor slowed.  No signs of anxiety or agitation.      DIAGNOSTICS:   The following, pertinent test results were reviewed:    EKG:***   *** (staff) reviewed the EKG and agrees with the reading as documented by the resident.          IMPRESSION:     Diagnoses for the visit today:      Preoperative Assessment and recommendation     Appreciate the opportunity to be involved in this patient's care.     Revised Cardiac Risk Index (RCRI):  The patient has the following serious cardiovascular risks for perioperative complications:  {PREOP REVISED CARDIAC RISK INDEX (RCRI) :151569::\" - No " "serious cardiac risks = 0 points\"}   RCRI Interpretation: {REVISED CARDIAC RISK INTERPRETATION :041723}     Procedure risk. My understanding is that the described procedure is considered {HIGH=major cardiovascular or procedures requiring prolonged anesthesia >4 hours or large fluid shifts;    INTERMEDIATE=abdominal, most orthopedic and intrathoracic surgery; LOW= endoscopy, cataract and breast surgery:534668} risk.     {Click here to pull in possible risk data after PreOp Qnr has been answered for this visit :189029}     {assessment and recommendations:367712}     {Risks and Recommendations (Optional):913763}     Otherwise, patient appears to be medically optimized for upcoming procedure, assuming appropriate medical supervision onsite for anesthesia or other developments.     {Medication HOLD Times for PREOP (Optional):396017}         RECOMMENDATIONS:     Ronna Rivera may proceed with proposed procedure, with the following diagnositic tests and/or specialist consultation(s):  ***    Instructions for home medications ramon-operatively discussed with patient verbally and in writing                Routine follow up with the patient's primary care provider is advised    Patient seen and case discussed with Dr. Rob Ball who agrees with the above assessment and plan.    Aug 2, 2023  Alyssia Murphy DO  Internal Medicine PGY-1  [facility]      Resources utilized: 1) RCRI Risk Assessment tool: https://www.Sinim[employer].Purple Harry/calc/1739/nzgondg-duuqzhh-lpbn-index-pre-operative-risk  2) NSQIP risk calculator: https://riskcalculator.facs.org/RiskCalculator/  3) 2014 ACC/AHA guidelines: https://www.ahajournals.org/doi/epub/10.1161/CIR.6625485468220500  4) STOP-BANG Calculator: https://www.Sinimanesalc.com/calc/3992/stop-bang-score-obstructive-sleep-apnea  5) DEAR tool for post-op delirium risk assessment: https://www.[facility].org/Portals/0/Documents/ptsafety/[facility]%20Delirium/[facility]%20DEAR%20Tool.pdf  6) DM " Medication mgmt: https://www.mos.org/blog/7696565

## 2023-08-02 NOTE — PROGRESS NOTES
PRIMARY CARE CENTER    Primary Care Provider: Neal Ball MD    PRE-OPERATIVE EVALUATION    HPI:      Ronna Rivera 37 year old female who presents today for preoperative cardiopulmonary risk assessment for the following intended procedure:  Surgery/Procedure: tunneled CVC placement for plasmapheresis  Surgery [facility] [facility]  Surgeon: TONYA  Surgery Date: Being rescheduled at this time  Type of Anesthesia Anticipated: General  Where patient plans to recover: At home with family  Fax number for surgical facility: [phone]    HPI related to upcoming procedure: Patient has a history of erythromelalgia, will be starting plasmapheresis treatments. It was recommended to have a catheter (?tunneled CVC) placed for improved access if she wanted to go ahead with these treatments. Overall, Debbi has been feeling well over the past 2 weeks. She has been able to sit and walk (no longer using wheelchair), feels as though some improvement may be in part from the steroids. Pain has been moderately controlled - uses fentanyl patches daily, using less Dilaudid recently (primarily for wound care, whom she sees regularly).      No history of reactions to anesthesia in the past. Debbi otherwise has no concerns or questions at this time. Inquiring about changes to her venlafaxine dosing. Of note, she is going to be getting her CT chest today.      Risk Assessment  Physical activity:        - No concerns.   Able to climb a flight of stairs, perform household chores, and walk 1-2 blocks without shortness of breath, dyspnea on exertion.     Respiratory concerns:  Hospitalized for PNA at [facility] on 7/4/23, completed course of ABX and now feeling back to baseline.     Clotting concerns:  None     Other chronic medical concerns  None     Preoperative Review of :   reviewed - controlled substances reflected in medication list.                                                                                                                                                  .     MEDICAL HISTORY:     Patient Active Problem List   Diagnosis    Other chronic pain    Rash and nonspecific skin eruption    Erythromelalgia (H)    Anxiety    DDD (degenerative disc disease), lumbar    Migraine with aura and without status migrainosus, not intractable    Chronic insomnia    Asthma, exercise induced    Hypermobile joints    Vasovagal syncope    Autonomic dysfunction    Impaired mobility    Abnormal TSH    Need for pneumocystis prophylaxis    Medical marijuana use    Headache, drug induced (IVIG)    Adrenal suppression (H)    Menstrual suppression because of erythromelalagia    Reactive depression    Recurrent herpes labialis    PICC (peripherally inserted central catheter) in place    Foot ulcer, right, with fat layer exposed (H)    Foot ulcer, left, with fat layer exposed (H)    Community acquired pneumonia of right upper lobe of lung    Sepsis without acute organ dysfunction, due to unspecified organism (H)       Past Surgical History:   Procedure Laterality Date    INSERT PICC LINE Left 06/23/2021    Procedure: INSERTION, PICC;  Surgeon: Neal Penny MD;  Location: [facility] OR    IR PICC PLACEMENT > 5 YRS OF AGE  06/23/2021    NO HISTORY OF SURGERY      PICC SINGLE LUMEN PLACEMENT Left 03/02/2023    Left brachial-lateral vein 44cm total 1cm external.Placement verified by Monae 3CG.PICC okay to use.       Family History   Problem Relation Age of Onset    Hypertension Father     Cerebrovascular Disease Maternal Grandmother     Diabetes Maternal Grandfather     Breast Cancer Paternal Grandmother     Hypertension Paternal Grandfather     C.A.D. Paternal Grandfather     Schizophrenia No family hx of        Social History     Tobacco Use    Smoking status: Never    Smokeless tobacco: Never   Substance Use Topics    Alcohol use: No    Drug use: No       Current Outpatient Medications   Medication Sig Dispense Refill     azaTHIOprine (IMURAN) 50 MG tablet Take 100 mg by mouth daily      collagenase (SANTYL) 250 UNIT/GM external ointment Apply topically daily as needed (with foot wound cares)      diphenhydrAMINE (BENADRYL) 50 MG capsule Take 50 mg by mouth At Bedtime      fentaNYL (DURAGESIC) 50 mcg/hr 72 hr patch Place 1 patch onto the skin every 72 hours remove old patch.  (Covers through 8/23/2023 or later) 10 patch 0    gabapentin (NEURONTIN) 300 MG capsule Take 1 po mid-day along with 600 mg (total 900 mg) 30 capsule 11    gabapentin (NEURONTIN) 600 MG tablet Take 2 po AM (1200 mg), 1 po Mid-day along with a 300 mg capsule (900 mg), and 2 po PM (1200 mg) 150 tablet 11    HYDROmorphone (DILAUDID) 2 MG tablet Take 1-2 tablets (2-4 mg) by mouth every 6 hours as needed for breakthrough pain (or 6 mg to reduce pain with showers, transportation, or sleep) (Patient taking differently: Take 10 mg by mouth every 4 hours as needed for breakthrough pain (or 6 mg to reduce pain with showers, transportation, or sleep)) 210 tablet 0    ibuprofen (ADVIL/MOTRIN) 200 MG tablet Take 400 mg by mouth every 6 hours as needed for moderate pain      lidocaine (XYLOCAINE) 4 % external solution Apply topically daily Apply approximately 10mL to each foot (total of 20mL) daily 600 mL 2    lidocaine (XYLOCAINE) 5 % external ointment Apply topically every 4 hours as needed for moderate pain (4-6) 50 g 63    LORazepam (ATIVAN) 0.5 MG tablet Take 0.5 mg by mouth daily as needed for anxiety      melatonin 1 MG TABS tablet Take 4 mg by mouth At Bedtime      methylPREDNISolone sodium succinate (SOLU-MEDROL) 1000 MG injection Inject 1,000 mg into the vein twice a week On Sunday and Thursday      mexiletine (MEXITIL) 150 MG capsule Take 1 capsule (150 mg) by mouth 3 times daily  0    naloxone (NARCAN) 4 MG/0.1ML nasal spray Spray 1 spray (4 mg) into one nostril alternating nostrils as needed for opioid reversal every 2-3 minutes until assistance arrives 0.2 mL 0  "   norethindrone (MICRONOR) 0.35 MG tablet Take 2 tablets (0.7 mg) by mouth daily Take the first 3 weeks of each pack, and then immediately move on to the next pack (discard the 4th week of each pack) 180 tablet 3    ondansetron (ZOFRAN ODT) 4 MG ODT tab Take 1 tablet (4 mg) by mouth every 6 hours as needed for nausea or vomiting 30 tablet 0    sulfamethoxazole-trimethoprim (BACTRIM) 400-80 MG tablet Take 1 tablet by mouth daily 90 tablet 0    triamcinolone (KENALOG) 0.1 % external cream Apply topically daily Apply to feet daily 80 g 1    venlafaxine (EFFEXOR XR) 37.5 MG 24 hr capsule Take 1 capsule (37.5 mg) by mouth daily (Patient taking differently: Take 37.5 mg by mouth 2 times daily) 90 capsule 1    venlafaxine (EFFEXOR XR) 75 MG 24 hr capsule Take 1 capsule (75 mg) by mouth daily (Patient not taking: Reported on 7/4/2023) 90 capsule 3    vitamin C (ASCORBIC ACID) 250 MG tablet Take 250 mg by mouth daily      Vitamin D3 (CHOLECALCIFEROL) 25 mcg (1000 units) tablet Take 25 mcg by mouth daily         Allergies   Allergen Reactions    Topiramate Anxiety       Latex Allergy: NO        EXAM:   /84 (BP Location: Right arm, Patient Position: Sitting, Cuff Size: Adult Regular)   Pulse 85   Ht 1.581 m (5' 2.25\")   Wt 57.5 kg (126 lb 11.2 oz)   SpO2 98%   BMI 22.99 kg/m        General:  A&Ox3, NAD  Head: atraumatic  Eyes:  Pupils 2-3 mm, sclera white, EOM's full, conjunctiva moist, no periorbital swelling    Ears:  TM's normal, EAC's patent, clear of cerumen  Nose:  Nasal passages clear, turbinates not swollen  Throat/Mouth:  No pharyngeal erythema, exudate, ulcers, oral mucosa and tongue moist, normal hard and soft palate  Neck:  Trachea midline, Full AROM, supple, thyroid smooth, symmetric, not enlarged, no nodules, no neck lymphadenopathy  Lungs:  Clear to auscultation throughout, no wheezes, rhonchi or rales. Normal respiratory effort and no intercostal retractions.  C/V:  Regular rate and rhythm, no " murmurs, rubs or gallops.  No JVD, no carotid bruits. Radial and DP pulses 2+, equal and regular.  Abdomen:  Not distended.  Bowel sounds active.  No tenderness, no hepatosplenomegaly or masses.  No CVA tenderness or masses.  Lymph:  No cervical lymph nodes.  Neuro: Alert and oriented, face symmetric. Able to get on/off exam table without assistance.  Strength grossly intact. No tremor.  Gait steady.   M/S:   No joint deformities noted.  No joint swelling.  Skin:   Normal temperature., turgor and texture. Bilateral foot wounds were not examined today given significant amount of pain w/ examination & patient follows with wound care.  Psych:  Alert and oriented. Appropriate affect.  Not psychomotor slowed.  No signs of anxiety or agitation.      DIAGNOSTICS:   Lab results from 7/6/23 hospitalization reviewed in EPIC. PLT 140s, WBC 11.8.      IMPRESSION & RECOMMENDATIONS:   Diagnoses for the visit today:  #Pre-operative exam    Preoperative Assessment & Recommendations:  Appreciate the opportunity to be involved in this patient's care.     Revised Cardiac Risk Index (RCRI):  The patient has the following serious cardiovascular risks for perioperative complications:   - No serious cardiac risks = 0 points   RCRI Interpretation: 0 points: Class I (very low risk - 0.4% complication rate)     Procedure risk. My understanding is that the described procedure is considered LOW risk.        Overall, there are no apparent contraindications to planned tunneled CVC procedure.     Otherwise, patient appears to be medically optimized for upcoming procedure, assuming appropriate medical supervision onsite for anesthesia or other developments.       Patient seen and case discussed with Dr. Rob Ball who agrees with the above assessment and plan. We will adjust venlafaxine dosing.      Aug 2, 2023  Alyssia Murphy DO  Internal Medicine PGY-1  [facility]        Resources utilized: 1) RCRI Risk Assessment tool:  https://www.[employer].com/calc/1739/omhrshq-woisbqz-zyqu-index-pre-operative-risk  2) NSQIP risk calculator: https://riskcalculator.facs.org/RiskCalculator/  3) 2014 ACC/AHA guidelines: https://www.ahajournals.org/doi/epub/10.1161/CIR.0038887685931506  4) STOP-BANG Calculator: https://www.[employer]calc/3992/stop-bang-score-obstructive-sleep-apnea  5) DEAR tool for post-op delirium risk assessment: https://www.[facility].org/Portals/0/Documents/ptsafety/[facility]%20Delirium/[facility]%20DEAR%20Tool.pdf  6) DM Medication mgmt: https://www.moshp.org/blog/2470433

## 2023-08-02 NOTE — PROGRESS NOTES
PRIMARY CARE CENTER    Primary Care Provider: Neal Ball MD    PRE-OPERATIVE EVALUATION    HPI:      Ronna Rivera 37 year old female who presents today at the request of *** for preoperative cardiopulmonary risk assessment for the following intended procedure:  Surgery/Procedure:  Surgery Location:  Surgeon:  Surgery Date:  Time of Surgery:  Type of Anesthesia Anticipated: {ANESTHESIA:408822}  Where patient plans to recover: {Preop post recovery plans :855273}  Fax number for surgical facility: {SURGERY FAX NUMBER:[id number]}    HPI related to upcoming procedure:      Cardiac risks: ***  Pulmonary risks: ***  Anemia risks: ***  Exercise tolerance: ***  Personal history of bleeding tendencies/disorders: ***  Family history of bleeding tendencies/disorders: ***  Personal history of adverse anesthesia reactions: ***  Family history of adverse anesthesia reactions: ***  Personal history of unanticipated surgical complications: ***                  Risk Assessment  Physical activity:   {pre-op risk comments about physical activity:912383}     Respiratory concerns:  ***     Clotting concerns:  ***     Other chronic medical concerns  ***    Other chronic medical concerns     Preoperative Review of :  {Mnpmpreport:459196}                                                                                                                                                 .     MEDICAL HISTORY:     Patient Active Problem List   Diagnosis    Other chronic pain    Rash and nonspecific skin eruption    Erythromelalgia (H)    Anxiety    DDD (degenerative disc disease), lumbar    Migraine with aura and without status migrainosus, not intractable    Chronic insomnia    Asthma, exercise induced    Hypermobile joints    Vasovagal syncope    Autonomic dysfunction    Impaired mobility    Abnormal TSH    Need for pneumocystis prophylaxis    Medical marijuana use    Headache, drug induced (IVIG)    Adrenal suppression (H)     Menstrual suppression because of erythromelalagia    Reactive depression    Recurrent herpes labialis    PICC (peripherally inserted central catheter) in place    Foot ulcer, right, with fat layer exposed (H)    Foot ulcer, left, with fat layer exposed (H)    Community acquired pneumonia of right upper lobe of lung    Sepsis without acute organ dysfunction, due to unspecified organism (H)       Past Surgical History:   Procedure Laterality Date    INSERT PICC LINE Left 06/23/2021    Procedure: INSERTION, PICC;  Surgeon: Neal Penny MD;  Location: [facility] OR    IR PICC PLACEMENT > 5 YRS OF AGE  06/23/2021    NO HISTORY OF SURGERY      PICC SINGLE LUMEN PLACEMENT Left 03/02/2023    Left brachial-lateral vein 44cm total 1cm external.Placement verified by Monae 3CG.PICC okay to use.       Family History   Problem Relation Age of Onset    Hypertension Father     Cerebrovascular Disease Maternal Grandmother     Diabetes Maternal Grandfather     Breast Cancer Paternal Grandmother     Hypertension Paternal Grandfather     C.A.D. Paternal Grandfather     Schizophrenia No family hx of        Social History     Tobacco Use    Smoking status: Never    Smokeless tobacco: Never   Substance Use Topics    Alcohol use: No    Drug use: No       Current Outpatient Medications   Medication Sig Dispense Refill    azaTHIOprine (IMURAN) 50 MG tablet Take 100 mg by mouth daily      collagenase (SANTYL) 250 UNIT/GM external ointment Apply topically daily as needed (with foot wound cares)      diphenhydrAMINE (BENADRYL) 50 MG capsule Take 50 mg by mouth At Bedtime      fentaNYL (DURAGESIC) 50 mcg/hr 72 hr patch Place 1 patch onto the skin every 72 hours remove old patch.  (Covers through 8/23/2023 or later) 10 patch 0    gabapentin (NEURONTIN) 300 MG capsule Take 1 po mid-day along with 600 mg (total 900 mg) 30 capsule 11    gabapentin (NEURONTIN) 600 MG tablet Take 2 po AM (1200 mg), 1 po Mid-day along with a 300 mg capsule (900  mg), and 2 po PM (1200 mg) 150 tablet 11    HYDROmorphone (DILAUDID) 2 MG tablet Take 1-2 tablets (2-4 mg) by mouth every 6 hours as needed for breakthrough pain (or 6 mg to reduce pain with showers, transportation, or sleep) (Patient taking differently: Take 10 mg by mouth every 4 hours as needed for breakthrough pain (or 6 mg to reduce pain with showers, transportation, or sleep)) 210 tablet 0    ibuprofen (ADVIL/MOTRIN) 200 MG tablet Take 400 mg by mouth every 6 hours as needed for moderate pain      lidocaine (XYLOCAINE) 4 % external solution Apply topically daily Apply approximately 10mL to each foot (total of 20mL) daily 600 mL 2    lidocaine (XYLOCAINE) 5 % external ointment Apply topically every 4 hours as needed for moderate pain (4-6) 50 g 63    LORazepam (ATIVAN) 0.5 MG tablet Take 0.5 mg by mouth daily as needed for anxiety      melatonin 1 MG TABS tablet Take 4 mg by mouth At Bedtime      methylPREDNISolone sodium succinate (SOLU-MEDROL) 1000 MG injection Inject 1,000 mg into the vein twice a week On Sunday and Thursday      mexiletine (MEXITIL) 150 MG capsule Take 1 capsule (150 mg) by mouth 3 times daily  0    naloxone (NARCAN) 4 MG/0.1ML nasal spray Spray 1 spray (4 mg) into one nostril alternating nostrils as needed for opioid reversal every 2-3 minutes until assistance arrives 0.2 mL 0    norethindrone (MICRONOR) 0.35 MG tablet Take 2 tablets (0.7 mg) by mouth daily Take the first 3 weeks of each pack, and then immediately move on to the next pack (discard the 4th week of each pack) 180 tablet 3    ondansetron (ZOFRAN ODT) 4 MG ODT tab Take 1 tablet (4 mg) by mouth every 6 hours as needed for nausea or vomiting 30 tablet 0    sulfamethoxazole-trimethoprim (BACTRIM) 400-80 MG tablet Take 1 tablet by mouth daily 90 tablet 0    triamcinolone (KENALOG) 0.1 % external cream Apply topically daily Apply to feet daily 80 g 1    venlafaxine (EFFEXOR XR) 37.5 MG 24 hr capsule Take 1 capsule (37.5 mg) by  "mouth daily (Patient taking differently: Take 37.5 mg by mouth 2 times daily) 90 capsule 1    venlafaxine (EFFEXOR XR) 75 MG 24 hr capsule Take 1 capsule (75 mg) by mouth daily (Patient not taking: Reported on 7/4/2023) 90 capsule 3    vitamin C (ASCORBIC ACID) 250 MG tablet Take 250 mg by mouth daily      Vitamin D3 (CHOLECALCIFEROL) 25 mcg (1000 units) tablet Take 25 mcg by mouth daily         Allergies   Allergen Reactions    Topiramate Anxiety       Latex Allergy: NO      REVIEW OF SYSTEMS:     Pertinent questions are noted below.  Answers are \"NO\" unless otherwise noted:    1.  - Do you have a history of heart attack, stroke, stent, bypass or surgery on an artery in the head, neck, heart or legs? ***  2.  - Do you ever have any pain or discomfort in your chest? ***  3. - Do you have a history of  Heart Failure? ***  4. - Are you troubled by shortness of breath when: walking on the level, up a slight hill or at night? ***  5. - Do you currently have a cold, bronchitis or other respiratory infection? ***  6. - Do you have a cough, shortness of breath or wheezing? ***  7. - Do you sometimes get pains in the calves of your legs when you walk? ***  8. - Do you or anyone in your family have previous history of blood clots? ***  9. - Do you or does anyone in your family have a serious bleeding problem such as prolonged bleeding following surgeries or cuts? ***  10. - Have you ever had problems with anemia or been told to take iron pills? ***  11. - Have you had any abnormal blood loss such as black, tarry or bloody stools, or abnormal vaginal bleeding? ***  12. - Have you ever had a blood transfusion? ***  13. - Have you or any of your relatives ever had problems with anesthesia? ***  14. - Do you have sleep apnea, excessive snoring or daytime drowsiness? ***  15. - Do you have any prosthetic heart valves? ***  16. - Do you have prosthetic joints? ***  17. - Is there any chance that you may be pregnant? ***    EXAM: "   There were no vitals taken for this visit.    General:  A&Ox3, NAD  Head: atraumatic  Eyes:  Pupils 2-3 mm, sclera white, EOM's full, conjunctiva moist, no periorbital swelling    Ears:  TM's normal, EAC's patent, clear of cerumen  Nose:  Nasal passages clear, turbinates not swollen  Throat/Mouth:  No pharyngeal erythema, exudate, ulcers, oral mucosa and tongue moist, normal hard and soft palate  Neck:  Trachea midline, Full AROM, supple, thyroid smooth, symmetric, not enlarged, no nodules, no neck lymphadenopathy  Lungs:  Clear to auscultation throughout, no wheezes, rhonchi or rales. Normal respiratory effort and no intercostal retractions.  C/V:  Regular rate and rhythm, no murmurs, rubs or gallops.  No JVD, no carotid bruits. Radial and DP pulses 2+, equal and regular.  Abdomen:  Not distended.  Bowel sounds active.  No tenderness, no hepatosplenomegaly or masses.  No CVA tenderness or masses.  Lymph:  No cervical lymph nodes.  Neuro: Alert and oriented, face symmetric. Able to get on/off exam table without assistance.  Strength grossly intact. No tremor.  Gait steady.   M/S:   No joint deformities noted.  No joint swelling.  Skin:   Normal temperature., turgor and texture. No rashes, suspicious lesions, or jaundice on exposed skin surfaces.   Extremities:  No peripheral edema, no digital cyanosis  Psych:  Alert and oriented. Appropriate affect.  Not psychomotor slowed.  No signs of anxiety or agitation.      DIAGNOSTICS:   The following, pertinent test results were reviewed:    EKG:***   *** (staff) reviewed the EKG and agrees with the reading as documented by the resident.          IMPRESSION & RECOMMENDATIONS:   Diagnoses for the visit today:  There are no diagnoses linked to this encounter.    Preoperative Assessment & Recommendations:  Appreciate the opportunity to be involved in this patient's care.     Revised Cardiac Risk Index (RCRI):  The patient has the following serious cardiovascular risks for  "perioperative complications:  {PREOP REVISED CARDIAC RISK INDEX (RCRI) :071656::\" - No serious cardiac risks = 0 points\"}   RCRI Interpretation: {REVISED CARDIAC RISK INTERPRETATION :447864}     Procedure risk. My understanding is that the described procedure is considered {HIGH=major cardiovascular or procedures requiring prolonged anesthesia >4 hours or large fluid shifts;    INTERMEDIATE=abdominal, most orthopedic and intrathoracic surgery; LOW= endoscopy, cataract and breast surgery:361677} risk.     {Click here to pull in possible risk data after PreOp Qnr has been answered for this visit :229405}     {assessment and recommendations:640274}     {Risks and Recommendations (Optional):459570}     Otherwise, patient appears to be medically optimized for upcoming procedure, assuming appropriate medical supervision onsite for anesthesia or other developments.     {Medication HOLD Times for PREOP (Optional):404705}. Instructions for home medications ramon-operatively discussed with patient verbally and in writing.        Patient seen and case discussed with Dr. Rob Ball who agrees with the above assessment and plan.    Aug 2, 2023  Alyssia Murphy, DO  Internal Medicine PGY-1  [facility]                      Resources utilized: 1) RCRI Risk Assessment tool: https://www.[employer]galc.[employer]/calc/1739/wwpargr-nvmewcy-nsmq-index-pre-operative-risk  2) NSQIP risk calculator: https://riskcalculator.facs.org/RiskCalculator/  3) 2014 ACC/AHA guidelines: https://www.ahajournals.org/doi/epub/10.1161/CIR.1180147410108470  4) STOP-BANG Calculator: https://www.[employer].com/calc/3992/stop-bang-score-obstructive-sleep-apnea  5) DEAR tool for post-op delirium risk assessment: https://www.[facility].org/Portals/0/Documents/ptsafety/[facility]%20Delirium/[facility]%20DEAR%20Tool.pdf  6) DM Medication mgmt: https://www.moshp.org/blog/2585648    "

## 2023-08-02 NOTE — PROGRESS NOTES
Surgeon: Dr. Ana Maria Luis  Fax number for Preop Evaluation: [phone]  [place] [facility]  Date of Surgery: Being rescheduled currently  Procedure: IP Line  History of reaction to anesthesia? No    Fernando Rojas, EMT at 10:49 AM on 8/2/2023.

## 2023-08-03 NOTE — ASSESSMENT & PLAN NOTE
Erythromelalgia       UPDATE: Still has been planning to do pheresis, but considering otherwise. Had a surprising improvement in EM pain and function with her pneumonia, and then worsened somewhat, and since has improved more. Has 'bad days' a few per week still. Still doing nighttime cooling, but doesn't need to do it during the day. Walked in here.       ASSESSMENT: Difficult decision. The steroid timeline now is roughly equivalent to what happened before, assuming that all of the other meds weren't very contributory. Maybe even including IVIG. My opinion is that the initial pheresis was meant to be a therapeutic trial ... So it seems suboptimal to do it now, when she is improving anyway. Better to wean the steroids as planned, and follow her course into the [facility]. In this case we shouldn't do the catheter now, to reduce general risk.       PLAN: She will check with Dr. James for next steps.

## 2023-08-11 ENCOUNTER — VIRTUAL VISIT (OUTPATIENT)
Dept: INTERNAL MEDICINE | Facility: CLINIC | Age: 38
End: 2023-08-11
Payer: COMMERCIAL

## 2023-08-11 DIAGNOSIS — J98.4 LUNG ABNORMALITY: ICD-10-CM

## 2023-08-11 DIAGNOSIS — I73.81 ERYTHROMELALGIA (H): Primary | ICD-10-CM

## 2023-08-11 PROCEDURE — 99213 OFFICE O/P EST LOW 20 MIN: CPT | Mod: VID | Performed by: PEDIATRICS

## 2023-08-11 ASSESSMENT — PAIN SCALES - GENERAL: PAINLEVEL: MODERATE PAIN (5)

## 2023-08-11 NOTE — PROGRESS NOTES
"Virtual Visit Details    Type of service:  Video Visit     Originating Location (pt. Location): Home    Distant Location (provider location):  Off-site  Platform used for Video Visit: RoxanaWell    Start Time: 9:33 AM  End Time:0941      Dear patient. Thank you for visiting with me. I want you to feel respected, understood, and empowered. \"Respect\" is valuing you as much as I would a close family member. \"Empowerment\" happens when you are fully informed, and can make the best possible decision for you.  Please ask me questions!  Challenge anything that is not clear.    Medical records are primarily used as memory aids for me and my colleagues. Things to know about my documentation style:  - The 'problem list' includes current symptoms or diagnoses, and some problems that are resolved but may return. I use the past medical history for problems not expected to return.  - I use single quotation marks for things that you or I said, when I want to clarify who was speaking.  - I use double quotation marks when copying a term from elsewhere in your records. Italics (besides here) may also denote a quotation.  If you have questions or concerns, please contact me; I will reply as soon as time allows.    Context    PCP: Neal Ball   Visit type: problem-oriented    Ronna Rivera is a 37 year old woman, with concerns including:  Chief Complaint   Patient presents with    RECHECK    Results     Discuss CT scan       History, update, and/or problems    Problem List as of 8/11/2023 Reviewed: 8/11/2023  9:55 AM by Neal Ball MD            Noted    1. Erythromelalgia (H) - Primary 3/25/2021     Overview Addendum 7/11/2023  2:10 PM by Neal Ball MD      Feet very painful and bad enough to ulcerate and wound.  (also has lesser problems with hands and face)  3/6/2023 back on steroids (pulse, then BIW).  Takes venlafaxine.  Has been on rituximab, imuran, IVIG, ASA, mexiletine  Improved after " high-dose intermittent steroids, then worsened.  Dx with testing by Dr. Shannon De La Rosa at [facility]. Also seeing Dr. Mook James @ [facility]  6/26/23: derm biopsy @ [facility] showed changes consistent with healing skin.          Last Assessment & Plan 8/11/2023 Virtual Visit Written 8/11/2023  9:56 AM by Neal Ball MD      Doing really well, has been a good couple of weeks. Has been able to walk and even drive sometimes. Did a walk around sidewalks with the dog. Weather/shade was good.  The ongoing theory about timing of her improvement is the timing/duration of the steroids. Still on steroids, hasn't started weaning. Didn't get the port for pheresis as per previous discussion.  Re: cancer concern, the CT looked resassuring, with only the lingering effects of pneumonia.        ASSESSMENT and PLAN: Awaiting for Dawna OVERTON and TYRON for plans.  -- didn't think that the pain pump is indicated, so this can probably be postponed as well.  -- Would like to wean opioids outside of the patch. Depending on progress in the next few weeks, may be able to reduce overall opioid exposure, so it will be effective at the next time needed.

## 2023-08-11 NOTE — ASSESSMENT & PLAN NOTE
Doing really well, has been a good couple of weeks. Has been able to walk and even drive sometimes. Did a walk around sidewalks with the dog. Weather/shade was good.  The ongoing theory about timing of her improvement is the timing/duration of the steroids. Still on steroids, hasn't started weaning. Didn't get the port for pheresis as per previous discussion.  Re: cancer concern, the CT looked resassuring, with only the lingering effects of pneumonia.        ASSESSMENT and PLAN: Awaiting for Dawna OVERTON and TYRON for plans.  -- didn't think that the pain pump is indicated, so this can probably be postponed as well.  -- Would like to wean opioids outside of the patch. Depending on progress in the next few weeks, may be able to reduce overall opioid exposure, so it will be effective at the next time needed.    Planning repeat CT in November.

## 2023-08-11 NOTE — PATIENT INSTRUCTIONS
Thank you for visiting the Primary Care Center today at the [facility] ER! The following is some information about our clinic:     Primary Care Center Frequently-Asked Questions    (1) How do I schedule appointments at the [facility]?     Primary Care--to schedule or make changes to an existing appointment, please call our primary care line at [phone].    Labs--to schedule a lab appointment at the [facility] you can use [employer] or call [phone]. If you have a [place] location that is closer to home, you can reach out to that location for scheduling options.     Imaging--if you need to schedule a CT, X-ray, MRI, ultrasound, or other imaging study you can call [phone] to schedule at the [facility] or any other [facility] imaging location.     Referrals--if a referral to another specialty was ordered you can expect a phone call from their scheduling team. If you have not heard from them in a week, please call us or send us a [employer] message to check the status or get a scheduling number. Please note that this only applies to internal [facility] referrals. If the referral is external you would need to contact their office for scheduling.     (2) I have a question about my visit, who do I contact?     You can call us at the primary care line at [phone] to ask questions about your visit. You can also send a secure message through [employer], which is reviewed by clinic staff. Please note that [employer] messages have a twenty-four to forty-eight business hour turnaround time and should not be used for urgent concerns.    (3) How will I get the results of my tests?    If you are signed up for [employer] all tests will be released to you within twenty-four hours of resulting. Please allow three to five days for your doctor to review your results and place a note interpreting the results. If you do not have [employer] you will receive your  results through mail seven to ten business days following the return of the tests. Please note that if there should be any urgent or concerning results that your doctor or their registered nurse will reach out to you the same day as the tests come back. If you have follow up questions about your results or would like to discuss the results in detail please schedule a follow up with your provider either in person or virtually.     (4) How do I get refills of my prescriptions?     You should always first contact your pharmacy for refills of your medications. If submitting a refill request on [employer], please be sure to submit the request only once--repeat requests can cause delays in refill. If you are requesting a NEW medication or a medication related to new symptoms you will need to schedule an appointment with a provider prior to approval. Please note: Routine medication refills have up to one to three business day turnaround whereas controlled substances refills have up to five to seven business day turnaround.    (5) I have new symptoms, what do I do?     If you are having an immediate medical emergency, you should dial 911 for assistance.   For anything urgent that needs to be seen within a few hours to one day you should visit a local urgent care for assistance.  For non-urgent symptoms that need to be seen within a few days to a week you can schedule with an available provider in primary care by going to [employer] or calling [phone].   If you are not sure how serious your symptoms are or you would like to receive medical advice you can always call [phone] to speak with a triage nurse.

## 2023-08-11 NOTE — NURSING NOTE
Is the patient currently in the state of [place]? YES    Visit mode:VIDEO    If the visit is dropped, the patient can be reconnected by: VIDEO VISIT: Text to cell phone: [phone]    Will anyone else be joining the visit? NO      How would you like to obtain your AVS? MyChart    Are changes needed to the allergy or medication list? NO    Reason for visit: RECHECK and Results (Discuss CT scan)    Jayne MONZONF

## 2023-08-13 RX ORDER — DOXYCYCLINE 100 MG/1
TABLET ORAL
Qty: 60 TABLET | Refills: 0 | OUTPATIENT
Start: 2023-08-13

## 2023-08-15 ENCOUNTER — DOCUMENTATION ONLY (OUTPATIENT)
Dept: INTERNAL MEDICINE | Facility: CLINIC | Age: 38
End: 2023-08-15
Payer: COMMERCIAL

## 2023-08-15 NOTE — PROGRESS NOTES
Type of Form Received:     Form Received (Date) 8/15/23   Form Filled out Yes, 8/21/23   Placed in provider folder Yes

## 2023-08-20 ENCOUNTER — MEDICAL CORRESPONDENCE (OUTPATIENT)
Dept: HEALTH INFORMATION MANAGEMENT | Facility: CLINIC | Age: 38
End: 2023-08-20
Payer: COMMERCIAL

## 2023-08-21 ENCOUNTER — MEDICAL CORRESPONDENCE (OUTPATIENT)
Dept: HEALTH INFORMATION MANAGEMENT | Facility: CLINIC | Age: 38
End: 2023-08-21
Payer: COMMERCIAL

## 2023-08-21 ENCOUNTER — TELEPHONE (OUTPATIENT)
Dept: INTERNAL MEDICINE | Facility: CLINIC | Age: 38
End: 2023-08-21
Payer: COMMERCIAL

## 2023-08-21 ENCOUNTER — DOCUMENTATION ONLY (OUTPATIENT)
Dept: INTERNAL MEDICINE | Facility: CLINIC | Age: 38
End: 2023-08-21
Payer: COMMERCIAL

## 2023-08-21 NOTE — TELEPHONE ENCOUNTER
[facility] Call [facility]    Phone Message    May a detailed message be left on voicemail: yes     Reason for Call: Other: She is going on vacation, would you be okay with her not changing her PICC dressing or should she go to the ED while on vacation to have this redone for her?  Please call.       Action Taken: Message routed to:  [facility]    Travel Screening: Not Applicable

## 2023-08-21 NOTE — PROGRESS NOTES
Type of Form Received:     Form Received (Date) 8/21/23   Form Filled out Yes, 8/28   Placed in provider folder Yes

## 2023-08-22 ENCOUNTER — MYC REFILL (OUTPATIENT)
Dept: INTERNAL MEDICINE | Facility: CLINIC | Age: 38
End: 2023-08-22
Payer: COMMERCIAL

## 2023-08-22 ENCOUNTER — DOCUMENTATION ONLY (OUTPATIENT)
Dept: INTERNAL MEDICINE | Facility: CLINIC | Age: 38
End: 2023-08-22
Payer: COMMERCIAL

## 2023-08-22 DIAGNOSIS — I73.81 ERYTHROMELALGIA (H): ICD-10-CM

## 2023-08-22 NOTE — PROGRESS NOTES
Type of Form Received:     Form Received (Date) 8/22/23   Form Filled out Yes, faxed 8/28   Placed in provider folder Yes

## 2023-08-23 ENCOUNTER — TELEPHONE (OUTPATIENT)
Dept: INTERNAL MEDICINE | Facility: CLINIC | Age: 38
End: 2023-08-23
Payer: COMMERCIAL

## 2023-08-23 ENCOUNTER — MYC MEDICAL ADVICE (OUTPATIENT)
Dept: INTERNAL MEDICINE | Facility: CLINIC | Age: 38
End: 2023-08-23
Payer: COMMERCIAL

## 2023-08-23 DIAGNOSIS — I73.81 ERYTHROMELALGIA (H): ICD-10-CM

## 2023-08-23 NOTE — TELEPHONE ENCOUNTER
[facility] Call Center    Phone Message    May a detailed message be left on voicemail: yes     Reason for Call:   Patient called regarding a note for travel and for a refill for   fentaNYL (DURAGESIC) 50 mcg/hr 72 hr patch   [facility] [id number] [place] [address]  [place] see last two My Chart messages dated today and yesterday, Patricio please call.  She is leaving Saturday for her trip.        Action Taken: Message routed to:  [facility] PCC    Travel Screening: Not Applicable

## 2023-08-23 NOTE — LETTER
RE: Ronna Rivera,  1985  [address] [address]     2023     To whom it may concern:     I am writing at the request of my patient Ronna Rivera. She suffers from a condition called erythromelalgia, which leads to severe circulatory problems and pain in her feet and lower legs.    She has a medical need for accommodations:       1. Bring medications on board the airplane, including a fentanyl patch that she wears.       2. Bring a ice/cooler on board with medications including IV fluids and medications in ball-like containers - which will violate the fluid restriction for carry-ons.       3.  Early boarding, to be able to get off her feet ASAP.       4. Ice-containing slippers to keep my feet cold.    Thank you for your attention in this matter.     Rob Ball MD  (elec sig 23 4:03 PM)   Internal Medicine & Pediatrics  [facility], [facility] [place]

## 2023-08-24 RX ORDER — FENTANYL 50 UG/1
1 PATCH TRANSDERMAL
Qty: 10 PATCH | Refills: 0 | Status: SHIPPED | OUTPATIENT
Start: 2023-09-22 | End: 2023-08-25

## 2023-08-24 NOTE — TELEPHONE ENCOUNTER
[facility] Call [facility]    Phone Message    May a detailed message be left on voicemail: yes     Reason for Call: Medication Refill Request    Has the patient contacted the pharmacy for the refill? Yes   Name of medication being requested: Fentanyl patches  Provider who prescribed the medication: Dr.. Ball  Pharmacy: [facility], [phone]  Date medication is needed: Today or tomorrow       Pt is calling again and changed the pharmacy to the above location, she said this pharmacy has it in stock.  Pt stated she is going out of town Saturday, please call when this has been completed    Action Taken: Message routed to:  [facility] PCC    Travel Screening: Not Applicable

## 2023-08-24 NOTE — TELEPHONE ENCOUNTER
Pt called again to follow up on the message below and getting the medication filled today or tomorrow for her trip this coming Saturday

## 2023-08-24 NOTE — CONFIDENTIAL NOTE
It is ok to delay the change for a few days, so long as the dressing isn't coming off. Otherwise we need to help her get a local option for dressing change. Typically this is done at a local infusion center rather than an ED.

## 2023-08-25 ENCOUNTER — MYC REFILL (OUTPATIENT)
Dept: INTERNAL MEDICINE | Facility: CLINIC | Age: 38
End: 2023-08-25
Payer: COMMERCIAL

## 2023-08-25 ENCOUNTER — MEDICAL CORRESPONDENCE (OUTPATIENT)
Dept: HEALTH INFORMATION MANAGEMENT | Facility: CLINIC | Age: 38
End: 2023-08-25
Payer: COMMERCIAL

## 2023-08-25 DIAGNOSIS — I73.81 ERYTHROMELALGIA (H): ICD-10-CM

## 2023-08-25 DIAGNOSIS — N94.89 MENSTRUAL SUPPRESSION: ICD-10-CM

## 2023-08-25 RX ORDER — FENTANYL 50 UG/1
1 PATCH TRANSDERMAL
Qty: 10 PATCH | Refills: 0 | Status: SHIPPED | OUTPATIENT
Start: 2023-09-22 | End: 2023-09-28

## 2023-08-25 RX ORDER — FENTANYL 50 UG/1
1 PATCH TRANSDERMAL
Qty: 10 PATCH | Refills: 0 | Status: SHIPPED | OUTPATIENT
Start: 2023-08-25 | End: 2023-08-25

## 2023-08-25 RX ORDER — FENTANYL 50 UG/1
1 PATCH TRANSDERMAL
Qty: 10 PATCH | Refills: 0 | OUTPATIENT
Start: 2023-08-25

## 2023-08-25 RX ORDER — FENTANYL 50 UG/1
1 PATCH TRANSDERMAL
Qty: 10 PATCH | Refills: 0 | Status: SHIPPED | OUTPATIENT
Start: 2023-08-25 | End: 2023-10-23

## 2023-08-25 NOTE — CONFIDENTIAL NOTE
Sent via Muufri        To whom it may concern:     I am writing at the request of my patient Ronna Rivera. She suffers from a condition called erythromelalgia, which leads to severe circulatory problems and pain in her feet and lower legs.    She has a medical need for accommodations:       1. Bring medications on board the airplane, including a fentanyl patch that she wears.       2. Bring a ice/cooler on board with medications including IV fluids and medications in ball-like containers - which will violate the fluid restriction for carry-ons.       3.  Early boarding, to be able to get off her feet ASAP.       4. Ice-containing slippers to keep my feet cold.    Rob Ball MD  Internal Medicine & Pediatrics  [facility], [facility] [place]

## 2023-08-25 NOTE — TELEPHONE ENCOUNTER
Patient called for status of theses, she needs her patches and letter today as she is travelling tomorrow out of state.

## 2023-08-25 NOTE — TELEPHONE ENCOUNTER
[facility] Call [facility]    Phone Message    May a detailed message be left on voicemail: yes     Reason for Call: Medication Question or concern regarding medication   Prescription Clarification  Name of Medication: fentanyl patches  Prescribing Provider: Dr Ball   Pharmacy: [id number] [address] [address]     What on the order needs clarification? Patient is calling because the medication was sent to the wrong pharmacy. Patient called yesterday to say the prescription was in stock at the one [facility] [address]      Action Taken: Message routed to:  [facility] PCC    Travel Screening: Not Applicable

## 2023-08-25 NOTE — TELEPHONE ENCOUNTER
Called Ronna- she needs fent patch sent to [facility] on [address] in [place]. Called pharmacy- they have it in stock. Ronna also wondering about if micronor refills- pharmacy has this. Ok'd to fill early and she should call her insurance.  Patricio Rojas RN on 8/25/2023 at 11:29 AM

## 2023-08-28 ENCOUNTER — DOCUMENTATION ONLY (OUTPATIENT)
Dept: INTERNAL MEDICINE | Facility: CLINIC | Age: 38
End: 2023-08-28
Payer: COMMERCIAL

## 2023-08-28 NOTE — PROGRESS NOTES
Type of Form Received: [facility] Order #[id number]    Form Received (Date) 8/24/23   Form Filled out Yes   Placed in provider folder Yes

## 2023-08-29 ENCOUNTER — DOCUMENTATION ONLY (OUTPATIENT)
Dept: INTERNAL MEDICINE | Facility: CLINIC | Age: 38
End: 2023-08-29
Payer: COMMERCIAL

## 2023-08-29 NOTE — PROGRESS NOTES
Type of Form Received: [facility] - POC# [id number] & Order # [id number], [id number], [id number], [id number]    Form Received (Date) 8/28/23   Form Filled out No   Placed in provider folder Yes

## 2023-08-30 ENCOUNTER — DOCUMENTATION ONLY (OUTPATIENT)
Dept: INTERNAL MEDICINE | Facility: CLINIC | Age: 38
End: 2023-08-30
Payer: COMMERCIAL

## 2023-08-30 NOTE — PROGRESS NOTES
Type of Form Received:     Form Received (Date) 08/25/23   Form Filled out Yes 9/14/23   Placed in provider folder Yes

## 2023-08-31 ENCOUNTER — DOCUMENTATION ONLY (OUTPATIENT)
Dept: INTERNAL MEDICINE | Facility: CLINIC | Age: 38
End: 2023-08-31
Payer: COMMERCIAL

## 2023-08-31 NOTE — PROGRESS NOTES
Type of Form Received:     Form Received (Date) 8/31/23   Form Filled out Yes 9/14/23   Placed in provider folder Yes

## 2023-09-06 ENCOUNTER — TELEPHONE (OUTPATIENT)
Dept: INTERNAL MEDICINE | Facility: CLINIC | Age: 38
End: 2023-09-06
Payer: COMMERCIAL

## 2023-09-06 NOTE — TELEPHONE ENCOUNTER
[facility] Call [facility]    Phone Message    May a detailed message be left on voicemail: yes     Reason for Call: Other: Pt of Dr. Ball's calling in stating she tested positivie for covid today and would like to get the medication started.    [facility] [place]    Action Taken: Message routed to:  [facility] P[facility]    Travel Screening: Not Applicable

## 2023-09-07 ENCOUNTER — MYC MEDICAL ADVICE (OUTPATIENT)
Dept: INTERNAL MEDICINE | Facility: CLINIC | Age: 38
End: 2023-09-07
Payer: COMMERCIAL

## 2023-09-07 NOTE — TELEPHONE ENCOUNTER
.Instructions for Patients      What are the symptoms of COVID-19?  Symptoms can include fever, cough, shortness of breath, chills, headache, muscle pain sore throat, fatigue, runny or stuffy nose, and loss of taste and smell. Other less common symptoms include nausea, vomiting, or diarrhea (watery stools).    Know when to call 911. Emergency warning signs include:  Trouble breathing or shortness of breath  Pain or pressure in the chest that doesn't go away  Feeling confused like you haven't felt before, or not being able to wake up  Bluish-colored lips or face    How can I take care of myself?  Get lots of rest. Drink extra fluids (unless a doctor has told you not to).  Take Tylenol (acetaminophen) for fever or pain. If you have liver or kidney problems, ask your family doctor if it's okay to take Tylenol   Adults can take either:   650 mg (two 325 mg pills) every 4 to 6 hours, or...   1,000 mg (two 500 mg pills) every 8 hours as needed.  Note: Don't take more than 3,000 mg in one day. Acetaminophen is found in many medicines (both prescribed and over the counter medicines). Read all labels to be sure you don't take too much.  For children, check the Tylenol bottle for the right dose. The dose is based on the child's age or weight.  Take over the counter medicines for your symptoms as needed. Talk to your pharmacist.  If you have other health problems (like cancer, heart failure, an organ transplant, or severe kidney disease): Call your specialty clinic if you don't feel better in the next 2 days.    Where can I get more information?  [facility] COVID-19 Resource Hub: [email or web address]   CDC Quarantine & Isolation: https://www.cdc.gov/coronavirus/2019-ncov/your-health/quarantine-isolation.html   CDC - What to Do If You're Sick: https://www.cdc.gov/coronavirus/2019-ncov/if-you-are-sick/index.html  Learn about the ACTIV-6 Clinical Trial: activ6.[facility] or call (657)-[phone]

## 2023-09-07 NOTE — TELEPHONE ENCOUNTER
Appt needed for covid treatment.   Looks like patient has an appointment with Dr. Ball tomorrow.        Enoch Tilley CMA ([facility]) at 6:33 AM on 9/7/2023

## 2023-09-08 ENCOUNTER — VIRTUAL VISIT (OUTPATIENT)
Dept: INTERNAL MEDICINE | Facility: CLINIC | Age: 38
End: 2023-09-08
Payer: COMMERCIAL

## 2023-09-08 DIAGNOSIS — I73.81 ERYTHROMELALGIA (H): ICD-10-CM

## 2023-09-08 DIAGNOSIS — U07.1 INFECTION DUE TO 2019 NOVEL CORONAVIRUS: Primary | ICD-10-CM

## 2023-09-08 DIAGNOSIS — J18.9 COMMUNITY ACQUIRED PNEUMONIA OF RIGHT UPPER LOBE OF LUNG: ICD-10-CM

## 2023-09-08 PROBLEM — A41.9 SEPSIS WITHOUT ACUTE ORGAN DYSFUNCTION, DUE TO UNSPECIFIED ORGANISM (H): Status: RESOLVED | Noted: 2023-07-04 | Resolved: 2023-09-08

## 2023-09-08 PROCEDURE — 99214 OFFICE O/P EST MOD 30 MIN: CPT | Mod: VID | Performed by: PEDIATRICS

## 2023-09-08 RX ORDER — ONDANSETRON 4 MG/1
4 TABLET, ORALLY DISINTEGRATING ORAL EVERY 6 HOURS PRN
Qty: 30 TABLET | Refills: 0 | Status: SHIPPED | OUTPATIENT
Start: 2023-09-08

## 2023-09-08 ASSESSMENT — PAIN SCALES - GENERAL: PAINLEVEL: MILD PAIN (2)

## 2023-09-08 NOTE — NURSING NOTE
Is the patient currently in the state of [place]? YES    Visit mode:VIDEO    If the visit is dropped, the patient can be reconnected by: VIDEO VISIT: Text to cell phone:   Telephone Information:   Mobile [phone]       Will anyone else be joining the visit? NO  (If patient encounters technical issues they should call [phone]    How would you like to obtain your AVS? MyChart    Are changes needed to the allergy or medication list? No    Reason for visit: RECHARRON SMITH

## 2023-09-08 NOTE — ASSESSMENT & PLAN NOTE
Erythromelalgia  Feet have been good during COVID. While on vacation went on some short hikes. No flaring during illness.  Steroids are still twice per week. Dr. OVERTON endorsed the Dr. ACKERMAN taper schedule, assuming she gets to stable situation first. I agree with this.  Fentanyl wean discussion - will wean to 25 in late Sept if still doing well. Will need to take 0.5 to 1 tabs of dilaudid after that per day.

## 2023-09-08 NOTE — PROGRESS NOTES
"Virtual Visit Details    Type of service:  Video Visit     Originating Location (pt. Location): Home    Distant Location (provider location):  Off-site  Platform used for Video Visit: FileTrek    Start Time: 0939  End Time:9:55 AM    Dear patient. Thank you for visiting with me. I want you to feel respected, understood, and empowered. \"Respect\" is valuing you as much as I would a close family member. \"Empowerment\" happens when you are fully informed, and can make the best possible decision for you.  Please ask me questions!  Challenge anything that is not clear.    Medical records are primarily used as memory aids for me and my colleagues. Things to know about my documentation style:  - The 'problem list' includes current symptoms or diagnoses, and some problems that are resolved but may return. I use the past medical history for problems not expected to return.  - I use single quotation marks for things that you or I said, when I want to clarify who was speaking.  - I use double quotation marks when copying a term from elsewhere in your records. Italics (besides here) may also denote a quotation.  If you have questions or concerns, please contact me; I will reply as soon as time allows.    Context    PCP: Neal Ball   Visit type: problem-oriented    Ronna Rivera is a 37 year old woman, with concerns including:  Chief Complaint   Patient presents with    RECHECK       History, update, and/or problems    COVID  Went on a trip while she was walking better. Was mostly masked, except when in a busy trip to [facility].  Felt rough the first day when coming down with it, took dog for a walk.  Now still some nausea.  Aquarium: Saturday last week.  Onset: Tuesday night, fell asleep while eating  Testing: Wednesday  Wasn't able to get appointment until today.  Urgent care was concerned about prescribing because of fentanyl patch.  This is the only time she has known she had COVID. She had antibodies at a " previous test, but had had vaccines by now.  Keeping hydrated. Didn't receive stress dose steroids, and hasn't been dizzy.       ASSESSMENT and PLAN:   Discussed options. Doing better, so not a candidate. May not have been a good candidate anyway because of medication risks, and coping well with symptoms.  -- will refill zofran by request    Erythromelalgia  Feet have been good during COVID. While on vacation went on some short hikes. No flaring during illness.  Steroids are still twice per week. Dr. OVERTON endorsed the Dr. ACKERMAN taper schedule, assuming she gets to stable situation first. I agree with this.  Fentanyl wean discussion - will wean to 25 in late Sept if still doing well. Will need to take 0.5 to 1 tabs of dilaudid after that per day.

## 2023-09-13 ENCOUNTER — MEDICAL CORRESPONDENCE (OUTPATIENT)
Dept: HEALTH INFORMATION MANAGEMENT | Facility: CLINIC | Age: 38
End: 2023-09-13
Payer: COMMERCIAL

## 2023-09-15 DIAGNOSIS — I73.81 ERYTHROMELALGIA (H): ICD-10-CM

## 2023-09-18 NOTE — TELEPHONE ENCOUNTER
mexiletine (MEXITIL) 150 MG capsule  0 7/11/2023          Routing refill request to provider for review/approval because:  Drug not on the  refill protocol AND listed as historical    Lesley Banuelos RN'

## 2023-09-19 ENCOUNTER — MYC MEDICAL ADVICE (OUTPATIENT)
Dept: INTERNAL MEDICINE | Facility: CLINIC | Age: 38
End: 2023-09-19
Payer: COMMERCIAL

## 2023-09-19 DIAGNOSIS — I73.81 ERYTHROMELALGIA (H): ICD-10-CM

## 2023-09-19 DIAGNOSIS — F32.9 REACTIVE DEPRESSION: ICD-10-CM

## 2023-09-19 RX ORDER — MEXILETINE HYDROCHLORIDE 150 MG/1
150 CAPSULE ORAL EVERY 8 HOURS
Qty: 90 CAPSULE | Refills: 3 | Status: SHIPPED | OUTPATIENT
Start: 2023-09-19 | End: 2023-09-28

## 2023-09-19 RX ORDER — VENLAFAXINE HYDROCHLORIDE 37.5 MG/1
37.5 CAPSULE, EXTENDED RELEASE ORAL 2 TIMES DAILY
Qty: 180 CAPSULE | Refills: 3 | Status: SHIPPED | OUTPATIENT
Start: 2023-09-19 | End: 2024-01-22

## 2023-09-20 ENCOUNTER — TELEPHONE (OUTPATIENT)
Dept: INTERNAL MEDICINE | Facility: CLINIC | Age: 38
End: 2023-09-20
Payer: COMMERCIAL

## 2023-09-20 NOTE — TELEPHONE ENCOUNTER
Dr. Ball and I received the email copied below.  Could someone please send them records as requested?  I think the office note from 11/28/22 may be sufficient.    Thanks,  Cj Barrett MD    *    Greetings,  I'm reaching out regarding Portal order number [id number] Patient ID [id number] Ordered on 11/29/2022. We are in the final stages of the insurance process for this patient, and their payer is requesting chart notes to move forward with the review.  Could you please send over chart notes for this patient at your earliest convenience? Our fax is [phone].    Regards,  Laly Farris    [email or web address]  [phone]  [email or web address]

## 2023-09-20 NOTE — LETTER
[employer]  Fax [phone]    RE: Ronna Rivera   1985, Portal order number [id number]    2023     To whom it may concern:     I am writing about my patient, Ronna Rivera, and her insurance company's request for records pertaining to the pharmacogenetic testing I did earlier in . Some records should be attached to this letter.    The pharmacogenetic testing (RightMed, from vendor [employer]) has been extremely helpful.  In brief: this patient has severe erythromelalgia, a neuroimmunologic condition in which peripheral vascular dysfunction lead to pain and ulcers of the feet, and sometimes other locations. Her condition has been resistant to treatments, including azathioprine. 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  Also, opioids had surprisingly limited benefit for her pain and therefore the pain-erythromelalgia loop. There are very many gene-drug interactions in the opioid group - too numerous to list here (see pharmgkb). The finding of normal results for these helped us to make clinical decisions, including dosing plans (thus avoiding severe side effects from azathioprine and opioids) and next steps (such as another round of steroids rather than several other consultations, hospitalizations, and plasmapheresis).    While the cost of the RightMed test is small, it saved the insurer a very large amount of money earlier this year - and therefore should be approved.    Sincerely,      Rob Ball MD  (elec sig 23 6:32 PM)  Internal Medicine & Pediatrics  [facility] [place]

## 2023-09-20 NOTE — CONFIDENTIAL NOTE
Received message of good plausibility (their fax number is correct).      Letter 3/28/23 - sending along behind the 22 record that Dr. Barrett was kind enough to send.  New comment letter below    RE: Ronna Rivera   1985     To whom it may concern:     I am writing about my patient, Ronna Rivera, and her insurance company's request for records pertaining to the pharmacogenetic testing I did earlier in . Some records should be attached to this letter.    The pharmacogenetic testing (Right[employer], from vendor [employer]) has been extremely helpful.  In brief: this patient has severe erythromelalgia, a neuroimmunologic condition in which peripheral vascular dysfunction lead to pain and ulcers of the feet, and sometimes other locations. Her condition has been resistant to treatments, including azathioprine. 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  Also, opioids had surprisingly limited benefit for her pain and therefore the pain-erythromelalgia loop. There are very many gene-drug interactions in the opioid group - too numerous to list here (see pharmgkb). The finding of normal results for these helped us to make clinical decisions, including dosing plans (thus avoiding severe side effects from azathioprine and opioids) and next steps (such as another round of steroids rather than several other consultations, hospitalizations, and plasmapheresis).    While the cost of the RightMed test is small, it saved the insurer a very large amount of money earlier this year - and therefore should be approved.    Sincerely,      Rob Ball MD  Internal Medicine & Pediatrics  [facility], [facility] [place]

## 2023-10-03 ENCOUNTER — MYC MEDICAL ADVICE (OUTPATIENT)
Dept: INTERNAL MEDICINE | Facility: CLINIC | Age: 38
End: 2023-10-03
Payer: COMMERCIAL

## 2023-10-03 DIAGNOSIS — I73.81 ERYTHROMELALGIA (H): ICD-10-CM

## 2023-10-03 DIAGNOSIS — F32.9 REACTIVE DEPRESSION: ICD-10-CM

## 2023-10-03 DIAGNOSIS — N94.89 MENSTRUAL SUPPRESSION: ICD-10-CM

## 2023-10-05 NOTE — TELEPHONE ENCOUNTER
venlafaxine (EFFEXOR XR) 37.5 MG 24 hr capsule  And   norethindrone (MICRONOR) 0.35 MG tablet    Please send new orders to updated pharmacy for Pt care.   Needing Providers signature.    Thank you     Patricia Cheatham RN  Central Triage Red Flags/Med Refills

## 2023-10-09 ENCOUNTER — TELEPHONE (OUTPATIENT)
Dept: INTERNAL MEDICINE | Facility: CLINIC | Age: 38
End: 2023-10-09
Payer: COMMERCIAL

## 2023-10-09 RX ORDER — VENLAFAXINE HYDROCHLORIDE 37.5 MG/1
37.5 CAPSULE, EXTENDED RELEASE ORAL 2 TIMES DAILY
Qty: 180 CAPSULE | OUTPATIENT
Start: 2023-10-09

## 2023-10-09 RX ORDER — ACETAMINOPHEN AND CODEINE PHOSPHATE 120; 12 MG/5ML; MG/5ML
0.7 SOLUTION ORAL DAILY
Qty: 90 TABLET | OUTPATIENT
Start: 2023-10-09

## 2023-10-09 NOTE — TELEPHONE ENCOUNTER
[facility] Call Center    Phone Message    May a detailed message be left on voicemail: yes     Reason for Call: Order(s): Home Care Orders: Other: re-cert for weekly picc line services

## 2023-10-09 NOTE — TELEPHONE ENCOUNTER
Called April. Gave verbal orders per for Order(s): Home Care Orders: Other: re-cert for weekly picc line services       Enoch Tilley CMA ([facility]) at 11:08 AM on 10/9/2023

## 2023-10-10 ENCOUNTER — DOCUMENTATION ONLY (OUTPATIENT)
Dept: INTERNAL MEDICINE | Facility: CLINIC | Age: 38
End: 2023-10-10
Payer: COMMERCIAL

## 2023-10-10 NOTE — PROGRESS NOTES
Type of Form Received:     Form Received (Date) 10/10/23   Form Filled out Yes 10/12/23   Placed in provider folder Yes

## 2023-10-11 ENCOUNTER — MEDICAL CORRESPONDENCE (OUTPATIENT)
Dept: HEALTH INFORMATION MANAGEMENT | Facility: CLINIC | Age: 38
End: 2023-10-11
Payer: COMMERCIAL

## 2023-10-16 ENCOUNTER — MYC MEDICAL ADVICE (OUTPATIENT)
Dept: INTERNAL MEDICINE | Facility: CLINIC | Age: 38
End: 2023-10-16
Payer: COMMERCIAL

## 2023-10-16 DIAGNOSIS — I73.81 ERYTHROMELALGIA (H): ICD-10-CM

## 2023-10-16 DIAGNOSIS — N94.89 MENSTRUAL SUPPRESSION: ICD-10-CM

## 2023-10-16 RX ORDER — ACETAMINOPHEN AND CODEINE PHOSPHATE 120; 12 MG/5ML; MG/5ML
0.7 SOLUTION ORAL DAILY
Qty: 180 TABLET | Refills: 3 | Status: SHIPPED | OUTPATIENT
Start: 2023-10-16 | End: 2023-10-23

## 2023-10-17 ENCOUNTER — MEDICAL CORRESPONDENCE (OUTPATIENT)
Dept: INTERNAL MEDICINE | Facility: CLINIC | Age: 38
End: 2023-10-17
Payer: COMMERCIAL

## 2023-10-18 ENCOUNTER — MEDICAL CORRESPONDENCE (OUTPATIENT)
Dept: INTERNAL MEDICINE | Facility: CLINIC | Age: 38
End: 2023-10-18
Payer: COMMERCIAL

## 2023-10-19 ENCOUNTER — DOCUMENTATION ONLY (OUTPATIENT)
Dept: INTERNAL MEDICINE | Facility: CLINIC | Age: 38
End: 2023-10-19
Payer: COMMERCIAL

## 2023-10-19 NOTE — PROGRESS NOTES
Type of Form Received:     Form Received (Date) 10/19/23   Form Filled out Yes 10/31/23   Placed in provider folder Yes

## 2023-10-20 ENCOUNTER — DOCUMENTATION ONLY (OUTPATIENT)
Dept: INTERNAL MEDICINE | Facility: CLINIC | Age: 38
End: 2023-10-20
Payer: COMMERCIAL

## 2023-10-20 NOTE — PROGRESS NOTES
Type of Form Received:     Form Received (Date) 10/17/23   Form Filled out Yes 10/31/23   Placed in provider folder Yes

## 2023-10-23 ENCOUNTER — VIRTUAL VISIT (OUTPATIENT)
Dept: INTERNAL MEDICINE | Facility: CLINIC | Age: 38
End: 2023-10-23
Payer: COMMERCIAL

## 2023-10-23 DIAGNOSIS — I73.81 ERYTHROMELALGIA (H): ICD-10-CM

## 2023-10-23 DIAGNOSIS — N94.89 MENSTRUAL SUPPRESSION: ICD-10-CM

## 2023-10-23 PROCEDURE — 99215 OFFICE O/P EST HI 40 MIN: CPT | Mod: VID | Performed by: PEDIATRICS

## 2023-10-23 RX ORDER — HYDROMORPHONE HYDROCHLORIDE 2 MG/1
2-4 TABLET ORAL EVERY 4 HOURS PRN
Qty: 210 TABLET | Refills: 0 | Status: SHIPPED | OUTPATIENT
Start: 2023-10-23 | End: 2024-02-12

## 2023-10-23 RX ORDER — FENTANYL 50 UG/1
1 PATCH TRANSDERMAL
Qty: 10 PATCH | Refills: 0 | Status: SHIPPED | OUTPATIENT
Start: 2023-10-25 | End: 2023-11-24

## 2023-10-23 RX ORDER — FENTANYL 50 UG/1
1 PATCH TRANSDERMAL
Qty: 10 PATCH | Refills: 0 | Status: SHIPPED | OUTPATIENT
Start: 2023-11-25 | End: 2023-12-15

## 2023-10-23 RX ORDER — HYDROMORPHONE HYDROCHLORIDE 2 MG/1
10 TABLET ORAL EVERY 4 HOURS PRN
Status: CANCELLED | OUTPATIENT
Start: 2023-10-23

## 2023-10-23 RX ORDER — ACETAMINOPHEN AND CODEINE PHOSPHATE 120; 12 MG/5ML; MG/5ML
0.7 SOLUTION ORAL DAILY
Qty: 180 TABLET | Refills: 3 | Status: SHIPPED | OUTPATIENT
Start: 2023-10-23

## 2023-10-23 NOTE — PATIENT INSTRUCTIONS
Thank you for visiting the Primary Care Center today at the [facility]! The following is some information about our clinic:     Primary Care Center Frequently-Asked Questions    (1) How do I schedule appointments at the [facility]?     Primary Care--to schedule or make changes to an existing appointment, please call our primary care line at [phone].    Labs--to schedule a lab appointment at the [facility] you can use [employer] or call [phone]. If you have a [place] location that is closer to home, you can reach out to that location for scheduling options.     Imaging--if you need to schedule a CT, X-ray, MRI, ultrasound, or other imaging study you can call [phone] to schedule at the [facility] or any other [facility] imaging location.     Referrals--if a referral to another specialty was ordered you can expect a phone call from their scheduling team. If you have not heard from them in a week, please call us or send us a Vocent message to check the status or get a scheduling number. Please note that this only applies to internal [facility] referrals. If the referral is external you would need to contact their office for scheduling.     (2) I have a question about my visit, who do I contact?     You can call us at the primary care line at [phone] to ask questions about your visit. You can also send a secure message through Vocent, which is reviewed by clinic staff. Please note that [employer] messages have a twenty-four to forty-eight business hour turnaround time and should not be used for urgent concerns.    (3) How will I get the results of my tests?    If you are signed up for [employer] all tests will be released to you within twenty-four hours of resulting. Please allow three to five days for your doctor to review your results and place a note interpreting the results. If you do not have [facility] you will receive your  results through mail seven to ten business days following the return of the tests. Please note that if there should be any urgent or concerning results that your doctor or their registered nurse will reach out to you the same day as the tests come back. If you have follow up questions about your results or would like to discuss the results in detail please schedule a follow up with your provider either in person or virtually.     (4) How do I get refills of my prescriptions?     You should always first contact your pharmacy for refills of your medications. If submitting a refill request on [employer], please be sure to submit the request only once--repeat requests can cause delays in refill. If you are requesting a NEW medication or a medication related to new symptoms you will need to schedule an appointment with a provider prior to approval. Please note: Routine medication refills have up to one to three business day turnaround whereas controlled substances refills have up to five to seven business day turnaround.    (5) I have new symptoms, what do I do?     If you are having an immediate medical emergency, you should dial 911 for assistance.   For anything urgent that needs to be seen within a few hours to one day you should visit a local urgent care for assistance.  For non-urgent symptoms that need to be seen within a few days to a week you can schedule with an available provider in primary care by going to [email or web address] or calling [phone].   If you are not sure how serious your symptoms are or you would like to receive medical advice you can always call [phone] to speak with a triage nurse.

## 2023-10-23 NOTE — PROGRESS NOTES
"Dear patient. Thank you for visiting with me. I want you to feel respected, understood, and empowered. \"Respect\" is valuing you as much as I would a close family member. \"Empowerment\" happens when you are fully informed, and can make the best possible decision for you.  Please ask me questions!  Challenge anything that is not clear.    Medical records are primarily used as memory aids for me and my colleagues. Things to know about my documentation style:  - The 'problem list' includes current symptoms or diagnoses, and some problems that are resolved but may return. I use the past medical history for problems not expected to return.  - I use single quotation marks for things that you or I said, when I want to clarify who was speaking.  - I use double quotation marks when copying a term from elsewhere in your records. Italics (besides here) may also denote a quotation.  If you have questions or concerns, please contact me; I will reply as soon as time allows.    Context    PCP: Neal Ball   Visit type: problem-oriented    Ronna Rivera is a 37 year old woman, seen with her , with concerns including:  Chief Complaint   Patient presents with    RECHECK    treatment plan       History, update, and/or problems    Problem List as of 10/23/2023 Reviewed: 8/11/2023  9:55 AM by Neal Ball MD            Noted    1. Erythromelalgia (H24) 3/25/2021     Overview Addendum 7/11/2023  2:10 PM by Neal Ball MD      Feet very painful and bad enough to ulcerate and wound.  (also has lesser problems with hands and face)  3/6/2023 back on steroids (pulse, then BIW).  Takes venlafaxine.  Has been on rituximab, imuran, IVIG, ASA, mexiletine  Improved after high-dose intermittent steroids, then worsened.  Dx with testing by Dr. Shannon De La Rosa at [facility]. Also seeing Dr. Mook James @ [facility]  6/26/23: derm biopsy @ [place] showed changes consistent with healing skin.          Last " Assessment & Plan 10/23/2023 Virtual Visit Written 10/23/2023  8:02 PM by Neal Ball MD      It has been up and down recently. She has needed dilaudid sometimes, and others has been able to walk around. Wounds are painful but somewhat improved.  Had appt with Dr. ACKERMAN at [place] 2 weeks ago - she was concerned about the steroids and wanted to taper them down. She started lengthening out the doses, from BIW to about weekly.  Dr. ACKERMAN told them that she doesn't think it is autoimmune related. Her concern is that interventions are causing other issues. Dr. Smith seemed to think that her wounds are from over-cooling her feet, and Ronna Wright feels that he was ignoring what she said (counseled about this).  Ronna Wright and  muse how odd that her symptoms go away when she is sick (e.g. with COVID).  Remind about rituximab: 2, 2 weeks apart, no effect.  Only benefit seems to have been from steroids, although Pam acknowledges that it could theoretically be a coincidence (none of us feel that way).   Next Dr. James appt isn't until January. She is trying to move the appt.   Think about the pain pump.       ASSESSMENT: This remains a concerning, disappointing situation. I provided support and solidarity counseling.  I'd like to see a plan besides more opioids or mental health care for a neurologic problem.   Will ask about whether rituximab has been adequately tried with two doses.   Otherwise, she is headed back to plasmapheresis. Wrote the letter below for updates to colleagues.    Dear colleagues,    I had the pleasure of seeing our mutual patient, Ronna Rivera ( 1985). 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 Her severity has been partially helped by corticosteroids and (oddly) during periods of other illnesses. She may also have had seasonal variation.     Here are some observations and requests.    1. Diagnosis. Ronna Wright has an usual version of  erythromelalgia, in contrast to the more routine patients seen by all of us. The last diagnostic word on this was from [place] rheumatologists Dawna Morales and Jacey. They obtained some further tests, which were (fortunately or unfortunately) negative.   I gather that oncologist Dr. Hendrix felt that cancer has been ruled out, as much as is possible.    2. Foot ulcers. I gather there was suspicion that her wounds were caused by over-cooling her feet, rather than erythromelalgia. I am skeptical about this theory, given that her wounds are more focal than is seen with whole-foot cooling. Irregular foci is more characteristic of her pattern of erythromelalgia. Also, the wounds' severity does not match with her use of cooling water. However, she agreed to be cautious.    3. Medications. The last input on this was from [place]'s Dr. Moseley, who reduced Ronna Wright's steroid frequency because of med safety concerns. Her symptoms have therefore increased.       She remains on azathioprine, mexiletine, gabapentin, fentanyl patch, and an increasing amount of hydromorphone.       She had two treatments of rituximab. My understanding is that you all believe this is sufficient to demonstrate lack of success. If not, please consider another trial. 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    4. Non-pharmacological interventions:       Pain pump. I am not currently enthusiastic about this, since her ulcerations may proceed (see #1 above). If we end up at a palliative focus, then she is established with Dr. Cosme. In that case, she will need increased regular attention from wound care providers.       Plasmapheresis. My understanding is that we are back to this option again, via [facility] (Dawna Hendrix and Erika) since that is what was approved by insurance. I'm hoping that you will be able to expedite this next step, including by hospitalization if needed. If you need me to reactivate [facility]  hematology again, I can do that - although I anticipate further delay due to insurance approval.         Thanks to all    Rob Ball MD  (elec sig 10/23/23 5:28 PM)  Internal Medicine & Pediatrics  [facility] [place]           Relevant Medications     fentaNYL (DURAGESIC) 50 mcg/hr 72 hr patch (Start on 10/25/2023)     norethindrone (MICRONOR) 0.35 MG tablet     fentaNYL (DURAGESIC) 50 mcg/hr 72 hr patch (Start on 11/25/2023)     HYDROmorphone (DILAUDID) 2 MG tablet    2. Menstrual suppression because of erythromelalagia 10/17/2022     Overview Signed 10/17/2022  9:48 AM by Neal Ball MD      Reason: worsened erythromelalgia pre-menstrually  Method: Norethindrone because of migraines with aura.  Single dose without gap started 10/17/22.  May need to increase to BID after December.            Last Assessment & Plan 10/23/2023 Virtual Visit Written 10/23/2023  8:42 PM by Neal Ball MD      Refilling Rx and trying to get 90 day supplies for their insurance request.          Relevant Medications     norethindrone (MICRONOR) 0.35 MG tablet       Time note (e5, 40'): The total time (on the date of service) for this service was 45 minutes, including discussion/face-to-face, chart review, interpretation not otherwise reported, documentation, and updating of the computerized record.    Virtual Visit Details    Type of service:  Video Visit     Start Time: 10:08 AM  End Time:10:36 AM      Originating Location (pt. Location): Home    Distant Location (provider location):  Off-site  Platform used for Video Visit: RoxanaWell

## 2023-10-23 NOTE — NURSING NOTE
Is the patient currently in the state of [place]? YES    Visit mode:VIDEO    If the visit is dropped, the patient can be reconnected by: VIDEO VISIT: Send to e-mail at: [email or web address]    Will anyone else be joining the visit? Yes,  will join visit  (If patient encounters technical issues they should call [phone])    How would you like to obtain your AVS? MyChart    Are changes needed to the allergy or medication list? N/A    Reason for visit: RECHECK and treatment plan    Komal SMITH

## 2023-10-23 NOTE — LETTER
To Rj Phillips Zekeridou, Ba Hendrix Davis, [place], [place]      RE: Ronna Rivera  [address] [address]       10/23/2023       Dear colleagues,    I had the pleasure of seeing our mutual patient, Ronna Rivera ( 1985). 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 Her severity has been partially helped by corticosteroids and (oddly) during periods of other illnesses. She may also have had seasonal variation.     Here are my updated observations and requests:    1. Diagnosis. Ronna Wright has an usual version of erythromelalgia, in contrast to the more routine patients seen by all of us. The last diagnostic word on this was from [place] rheumatologists Dawna Morales and Jacey. They obtained some further tests, which were (fortunately or unfortunately) negative.   I gather that oncologist Dr. Hendrix felt that cancer has been ruled out, as much as is possible.    2. Foot ulcers. I gather there was suspicion that her wounds were caused by over-cooling her feet, rather than erythromelalgia. I am skeptical about this theory, given that her wounds are more focal than is seen with whole-foot cooling. Irregular foci is more characteristic of her pattern of erythromelalgia. Also, the wounds' severity does not match with her use of cooling water. However, she agreed to be cautious.    3. Medications. The last input on this was from [place]'s Dr. Moseley, who reduced Ronna Wright's steroid frequency because of med safety concerns. Her symptoms have therefore increased.       She remains on azathioprine, mexiletine, gabapentin, fentanyl patch, and an increasing amount of hydromorphone.       She had two treatments of rituximab. My understanding is that you all believe this is sufficient to demonstrate lack of success. If not, please consider another trial. 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    4. Non-pharmacological  interventions:       Pain pump. I am not currently enthusiastic about this, since her ulcerations may proceed (see #1 above). If we end up at a palliative focus, then she is established with Dr. Cosme. In that case, she will need increased regular attention from wound care providers.       Plasmapheresis. My understanding is that we are back to this option again, via [facility] (Dawna Hendrix and Erika) since that is what was approved by insurance. I'm hoping that you will be able to expedite this next step, including by hospitalization if needed. If you need me to reactivate [facility] hematology again, I can do that - although I anticipate further delay due to insurance approval.         Thanks to all    Rob Ball MD  (elec sig 10/23/23 5:28 PM)  Internal Medicine & Pediatrics  [facility] [place]

## 2023-10-24 NOTE — ASSESSMENT & PLAN NOTE
It has been up and down recently. She has needed dilaudid sometimes, and others has been able to walk around. Wounds are painful but somewhat improved.  Had appt with Dr. ACKERMAN at [place] 2 weeks ago - she was concerned about the steroids and wanted to taper them down. She started lengthening out the doses, from BIW to about weekly.  Dr. ACKERMAN told them that she doesn't think it is autoimmune related. Her concern is that interventions are causing other issues. Dr. Smith seemed to think that her wounds are from over-cooling her feet, and Ronna Wright feels that he was ignoring what she said (counseled about this).  Ronna Wright and  muse how odd that her symptoms go away when she is sick (e.g. with COVID).  Remind about rituximab: 2, 2 weeks apart, no effect.  Only benefit seems to have been from steroids, although Ronna Wright acknowledges that it could theoretically be a coincidence (none of us feel that way).   Next Dr. James appt isn't until January. She is trying to move the appt.   Think about the pain pump.       ASSESSMENT: This remains a concerning, disappointing situation. I provided support and solidarity counseling.  I'd like to see a plan besides more opioids or mental health care for a neurologic problem.   Will ask about whether rituximab has been adequately tried with two doses.   Otherwise, she is headed back to plasmapheresis. Wrote the letter below for updates to colleagues.    Dear colleagues,    I had the pleasure of seeing our mutual patient, Ronna Rivera ( 1985). 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 Her severity has been partially helped by corticosteroids and (oddly) during periods of other illnesses. She may also have had seasonal variation.     Here are some observations and requests.    1. Diagnosis. Ronna Wright has an usual version of erythromelalgia, in contrast to the more routine patients seen by all of us. The last diagnostic word on this was from  [facility] rheumatologists Dawna Morales and Jacey. They obtained some further tests, which were (fortunately or unfortunately) negative.   I gather that oncologist Dr. Hendrix felt that cancer has been ruled out, as much as is possible.    2. Foot ulcers. I gather there was suspicion that her wounds were caused by over-cooling her feet, rather than erythromelalgia. I am skeptical about this theory, given that her wounds are more focal than is seen with whole-foot cooling. Irregular foci is more characteristic of her pattern of erythromelalgia. Also, the wounds' severity does not match with her use of cooling water. However, she agreed to be cautious.    3. Medications. The last input on this was from [place]'s Dr. Moseley, who reduced Ronna Wright's steroid frequency because of med safety concerns. Her symptoms have therefore increased.       She remains on azathioprine, mexiletine, gabapentin, fentanyl patch, and an increasing amount of hydromorphone.       She had two treatments of rituximab. My understanding is that you all believe this is sufficient to demonstrate lack of success. If not, please consider another trial. 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    4. Non-pharmacological interventions:       Pain pump. I am not currently enthusiastic about this, since her ulcerations may proceed (see #1 above). If we end up at a palliative focus, then she is established with Dr. Cosme. In that case, she will need increased regular attention from wound care providers.       Plasmapheresis. My understanding is that we are back to this option again, via [facility] (Dawna Hendrix and Erika) since that is what was approved by insurance. I'm hoping that you will be able to expedite this next step, including by hospitalization if needed. If you need me to reactivate [facility] hematology again, I can do that - although I anticipate further delay due to insurance approval.         Thanks to  all    Rob Ball MD  (elec sig 10/23/23 5:28 PM)  Internal Medicine & Pediatrics  [facility], [facility] [place]

## 2023-11-28 ENCOUNTER — MYC MEDICAL ADVICE (OUTPATIENT)
Dept: INTERNAL MEDICINE | Facility: CLINIC | Age: 38
End: 2023-11-28
Payer: COMMERCIAL

## 2023-12-11 ENCOUNTER — MEDICAL CORRESPONDENCE (OUTPATIENT)
Dept: INTERNAL MEDICINE | Facility: CLINIC | Age: 38
End: 2023-12-11
Payer: COMMERCIAL

## 2023-12-12 ENCOUNTER — DOCUMENTATION ONLY (OUTPATIENT)
Dept: INTERNAL MEDICINE | Facility: CLINIC | Age: 38
End: 2023-12-12
Payer: COMMERCIAL

## 2023-12-12 NOTE — PROGRESS NOTES
Type of Form Received:     Form Received (Date) 12/12/23   Form Filled out Yes 12/18/23   Placed in provider folder Yes

## 2023-12-15 ENCOUNTER — VIRTUAL VISIT (OUTPATIENT)
Dept: INTERNAL MEDICINE | Facility: CLINIC | Age: 38
End: 2023-12-15
Payer: COMMERCIAL

## 2023-12-15 DIAGNOSIS — Z00.00 PREVENTATIVE HEALTH CARE: ICD-10-CM

## 2023-12-15 DIAGNOSIS — R20.0 BILATERAL FINGER NUMBNESS: Primary | ICD-10-CM

## 2023-12-15 DIAGNOSIS — I73.81 ERYTHROMELALGIA (H): ICD-10-CM

## 2023-12-15 PROCEDURE — 99215 OFFICE O/P EST HI 40 MIN: CPT | Mod: VID | Performed by: PEDIATRICS

## 2023-12-15 RX ORDER — FENTANYL 50 UG/1
1 PATCH TRANSDERMAL
Qty: 15 PATCH | Refills: 0 | Status: SHIPPED | OUTPATIENT
Start: 2023-12-15 | End: 2024-01-14

## 2023-12-15 ASSESSMENT — ANXIETY QUESTIONNAIRES
GAD7 TOTAL SCORE: 4
5. BEING SO RESTLESS THAT IT IS HARD TO SIT STILL: NOT AT ALL
6. BECOMING EASILY ANNOYED OR IRRITABLE: SEVERAL DAYS
2. NOT BEING ABLE TO STOP OR CONTROL WORRYING: NOT AT ALL
1. FEELING NERVOUS, ANXIOUS, OR ON EDGE: SEVERAL DAYS
GAD7 TOTAL SCORE: 4
3. WORRYING TOO MUCH ABOUT DIFFERENT THINGS: NOT AT ALL
7. FEELING AFRAID AS IF SOMETHING AWFUL MIGHT HAPPEN: SEVERAL DAYS
IF YOU CHECKED OFF ANY PROBLEMS ON THIS QUESTIONNAIRE, HOW DIFFICULT HAVE THESE PROBLEMS MADE IT FOR YOU TO DO YOUR WORK, TAKE CARE OF THINGS AT HOME, OR GET ALONG WITH OTHER PEOPLE: SOMEWHAT DIFFICULT

## 2023-12-15 ASSESSMENT — PATIENT HEALTH QUESTIONNAIRE - PHQ9
SUM OF ALL RESPONSES TO PHQ QUESTIONS 1-9: 9
5. POOR APPETITE OR OVEREATING: SEVERAL DAYS

## 2023-12-15 NOTE — PROGRESS NOTES
"Dear patient. Thank you for visiting with me. I want you to feel respected, understood, and empowered. \"Respect\" is valuing you as much as I would a close family member. \"Empowerment\" happens when you are fully informed, and can make the best possible decision for you.  Please ask me questions!  Challenge anything that is not clear.    Medical records are primarily used as memory aids for me and my colleagues. Things to know about my documentation style:  - The 'problem list' includes current symptoms or diagnoses, and some problems that are resolved but may return. I use the past medical history for problems not expected to return.  - I use single quotation marks for things that you or I said, when I want to clarify who was speaking.  - I use double quotation marks when copying a term from elsewhere in your records. Italics (besides here) may also denote a quotation.  If you have questions or concerns, please contact me; I will reply as soon as time allows.    Context    PCP: Neal Ball   Visit type: problem-oriented    Ronna Rivera is a 38 year old woman, seen with her , with concerns including:  Chief Complaint   Patient presents with    RECHECK    Recheck Medication       History, update, and/or problems      Problem List as of 12/15/2023 Reviewed: 12/15/2023  8:09 PM by Neal Ball MD            Noted    1. Erythromelalgia (H24) 3/25/2021     Overview Addendum 7/11/2023  2:10 PM by Neal Ball MD      Feet very painful and bad enough to ulcerate and wound.  (also has lesser problems with hands and face)  3/6/2023 back on steroids (pulse, then BIW).  Takes venlafaxine.  Has been on rituximab, imuran, IVIG, ASA, mexiletine  Improved after high-dose intermittent steroids, then worsened.  Dx with testing by Dr. Shannon De La Rosa at [facility]. Also seeing Dr. Mook James @ [facility]  6/26/23: derm biopsy @ [facility] showed changes consistent with healing skin.          Last " Assessment & Plan 12/15/2023 Virtual Visit Edited 12/15/2023  9:06 AM by Neal Ball MD      Doing pretty well today. Things have settled somewhat, for whatever reason. She has been working with Dawna James and Lorelei about steroids PRN, etc. Reviewed meds.   Wounds are somewhat slower than at other times.  Challenging swing with the fentanyl patch, she had some energy/comfort swings, especially with the last day of 3, with much worse pain at the ulcerated/scabbed areas.   Pain pump trial with TCP went really well. She put on lighter stockings and was able to tolerate that. Was able to feel where the pain was, but didn't hurt. Will have to taper off pain medications before the pump implant.        ASSESSMENT:   I'm ready to defer opioids to TCP in preparation for her pump. I tend to prefer rapid weans with withdrawal prophylaxis, but I know some pain doctors would rather a gradual wean.  In the meantime, it is fine to switch to 48 hours freq for fentanyl.  The 'bad patch' phenomenon is not uncommon. She can claim the patch fell off for this, so long as it doesn't happen too often.       PLAN: Await TCP timing and dates. In meantime 48 hr fentanyl is fine.    Offered to send to outside specialist of choice. They will research this.          Relevant Medications     fentaNYL (DURAGESIC) 50 mcg/hr 72 hr patch     Other Relevant Orders     Vitamin B12     Vitamin D Deficiency     Vitamin C     Hemoglobin A1c    2. Bilateral finger numbness - Primary 12/15/2023     Last Assessment & Plan 12/15/2023 Virtual Visit Written 12/15/2023  9:12 AM by Neal Ball MD      Getting numbness and tingling in hands, worst in fourth fingers and perhaps fifth, but often in medial fingers also. Sometimes day, sometimes awakening. Rarely will have ascending sparking pain.  Sounds like possible masking from unrelated opioids. They are talking with Dr. OVERTON. Also I think that my hand OT can look at this.           Relevant Orders     Occupational Therapy Referral             Additional issues:  Question about checking vitamin levels, with concern about being too high.  B12, C, and D were the main ones mentioned. I am ok with checking these. B12 elevated is unlikely to cause a problem, but could check   She listed off several supplements, and will send me a note to update med list.        Time note (e5, 40'): The total time (on the date of service) for this service was 40 minutes, including discussion/face-to-face, chart review, interpretation not otherwise reported, documentation, and updating of the computerized record.    Virtual Visit Details    Type of service:  Video Visit     Start Time: 8:38 AM  End Time:9:11 AM      Originating Location (pt. Location): Home    Distant Location (provider location):  Off-site  Platform used for Video Visit: Asthmatx

## 2023-12-15 NOTE — ASSESSMENT & PLAN NOTE
Doing pretty well today. Things have settled somewhat, for whatever reason. She has been working with Dawna James and Lorelei about steroids PRN, etc. Reviewed meds.   Wounds are somewhat slower than at other times.  Challenging swing with the fentanyl patch, she had some energy/comfort swings, especially with the last day of 3, with much worse pain at the ulcerated/scabbed areas.   Pain pump trial with TCP went really well. She put on lighter stockings and was able to tolerate that. Was able to feel where the pain was, but didn't hurt. Will have to taper off pain medications before the pump implant.        ASSESSMENT:   I'm ready to defer opioids to TCP in preparation for her pump. I tend to prefer rapid weans with withdrawal prophylaxis, but I know some pain doctors would rather a gradual wean.  In the meantime, it is fine to switch to 48 hours freq for fentanyl.  The 'bad patch' phenomenon is not uncommon. She can claim the patch fell off for this, so long as it doesn't happen too often.       PLAN: Await TCP timing and dates. In meantime 48 hr fentanyl is fine.    Offered to send to outside specialist of choice. They will research this.

## 2023-12-15 NOTE — PATIENT INSTRUCTIONS
Thank you for visiting the Primary Care Center today at the [facility]! The following is some information about our clinic:     Primary Care Center Frequently-Asked Questions    (1) How do I schedule appointments at the [facility]?     Primary Care--to schedule or make changes to an existing appointment, please call our primary care line at [phone].    Labs--to schedule a lab appointment at the [facility] you can use ClearMyMail or call [phone]. If you have a [place] location that is closer to home, you can reach out to that location for scheduling options.     Imaging--if you need to schedule a CT, X-ray, MRI, ultrasound, or other imaging study you can call [phone] to schedule at the [facility] or any other [facility] imaging location.     Referrals--if a referral to another specialty was ordered you can expect a phone call from their scheduling team. If you have not heard from them in a week, please call us or send us a ClearMyMail message to check the status or get a scheduling number. Please note that this only applies to internal [facility] referrals. If the referral is external you would need to contact their office for scheduling.     (2) I have a question about my visit, who do I contact?     You can call us at the primary care line at [phone] to ask questions about your visit. You can also send a secure message through ClearMyMail, which is reviewed by clinic staff. Please note that ClearMyMail messages have a twenty-four to forty-eight business hour turnaround time and should not be used for urgent concerns.    (3) How will I get the results of my tests?    If you are signed up for [email or web address] all tests will be released to you within twenty-four hours of resulting. Please allow three to five days for your doctor to review your results and place a note interpreting the results. If you do not have Wildcardhart you will receive your  results through mail seven to ten business days following the return of the tests. Please note that if there should be any urgent or concerning results that your doctor or their registered nurse will reach out to you the same day as the tests come back. If you have follow up questions about your results or would like to discuss the results in detail please schedule a follow up with your provider either in person or virtually.     (4) How do I get refills of my prescriptions?     You should always first contact your pharmacy for refills of your medications. If submitting a refill request on Att[employer]D, please be sure to submit the request only once--repeat requests can cause delays in refill. If you are requesting a NEW medication or a medication related to new symptoms you will need to schedule an appointment with a provider prior to approval. Please note: Routine medication refills have up to one to three business day turnaround whereas controlled substances refills have up to five to seven business day turnaround.    (5) I have new symptoms, what do I do?     If you are having an immediate medical emergency, you should dial 911 for assistance.   For anything urgent that needs to be seen within a few hours to one day you should visit a local urgent care for assistance.  For non-urgent symptoms that need to be seen within a few days to a week you can schedule with an available provider in primary care by going to [facility] or calling [phone].   If you are not sure how serious your symptoms are or you would like to receive medical advice you can always call [phone] to speak with a triage nurse.

## 2023-12-15 NOTE — NURSING NOTE
Is the patient currently in the state of [place]? YES    Visit mode:VIDEO    If the visit is dropped, the patient can be reconnected by: VIDEO VISIT: Send to e-mail at: [email or web address]    Will anyone else be joining the visit? NO  (If patient encounters technical issues they should call [phone]    How would you like to obtain your AVS? MyChart    Are changes needed to the allergy or medication list? Yes PT not taking triamicinolone - PT is taking calcium and magnesium supplement.    Reason for visit: RECHECK and Recheck Medication    Joel MONZONF

## 2023-12-15 NOTE — ASSESSMENT & PLAN NOTE
Getting numbness and tingling in hands, worst in fourth fingers and perhaps fifth, but often in medial fingers also. Sometimes day, sometimes awakening. Rarely will have ascending sparking pain.  Sounds like possible masking from unrelated opioids. They are talking with Dr. OVERTON. Also I think that my hand OT can look at this.

## 2023-12-18 ENCOUNTER — MEDICAL CORRESPONDENCE (OUTPATIENT)
Dept: HEALTH INFORMATION MANAGEMENT | Facility: CLINIC | Age: 38
End: 2023-12-18
Payer: COMMERCIAL

## 2023-12-18 ENCOUNTER — MYC MEDICAL ADVICE (OUTPATIENT)
Dept: INTERNAL MEDICINE | Facility: CLINIC | Age: 38
End: 2023-12-18
Payer: COMMERCIAL

## 2023-12-18 ENCOUNTER — TELEPHONE (OUTPATIENT)
Dept: INTERNAL MEDICINE | Facility: CLINIC | Age: 38
End: 2023-12-18
Payer: COMMERCIAL

## 2023-12-18 NOTE — TELEPHONE ENCOUNTER
Spoke with April FIELDS and gave the following verbal order(s): Home Care Orders: Skilled Nursing: Recertification for continued home care, SN: 1x a week for Pic line management.  Keely BARROSO LPN  [facility]

## 2023-12-18 NOTE — TELEPHONE ENCOUNTER
[facility] Call Center    Phone Message    May a detailed message be left on voicemail: yes     Reason for Call: Order(s): Home Care Orders: Skilled Nursing:   Recertification for continued home care   SN: 1x a week for Pic line management, verbal order needed thank you.  Action Taken: Message routed to:  [facility] pcc    Travel Screening: Not Applicable

## 2023-12-19 ENCOUNTER — DOCUMENTATION ONLY (OUTPATIENT)
Dept: INTERNAL MEDICINE | Facility: CLINIC | Age: 38
End: 2023-12-19
Payer: COMMERCIAL

## 2023-12-19 NOTE — PROGRESS NOTES
Type of Form Received:     Form Received (Date) 12/19/23   Form Filled out Yes, faxed 12/20   Placed in provider folder Yes

## 2023-12-20 ENCOUNTER — TELEPHONE (OUTPATIENT)
Dept: INTERNAL MEDICINE | Facility: CLINIC | Age: 38
End: 2023-12-20
Payer: COMMERCIAL

## 2023-12-20 ENCOUNTER — MYC MEDICAL ADVICE (OUTPATIENT)
Dept: INTERNAL MEDICINE | Facility: CLINIC | Age: 38
End: 2023-12-20
Payer: COMMERCIAL

## 2023-12-20 NOTE — TELEPHONE ENCOUNTER
Central Prior Authorization Team   Phone: [phone]    PA Initiation    Medication: fentaNYL (DURAGESIC) 50 mcg/hr 72 hr patch  Insurance Company: [employer] Non-Specialty PA's - Phone [phone] Fax [phone]  Pharmacy Filling the Rx: [id number] [place] [address]  Filling Pharmacy Phone: [phone]  Filling Pharmacy Fax:    Start Date: 12/20/2023    Will need to call [employer] directly, per covermymeds plan does not cover the requested quantity of 15 per 30 days

## 2023-12-20 NOTE — CONFIDENTIAL NOTE
Coordinators, if by Thursday morning the patient hasn't replied, please telephone her and offer the Thursday 11a or 1130 add-in.

## 2023-12-20 NOTE — TELEPHONE ENCOUNTER
Called pharmacy- let them know to do early refill. They currently have 10 patches. Called patient- she will try to  around the 24th as that is when the last patch goes on. She feels better on the 48 hour timing. Scheduled with Dr. Ball for pre op/ pain med taper. She talked with pain clinic and they agree with Dr. Ball's plans for a rapid taper.    Patricio Rojas RN on 12/20/2023 at 2:51 PM

## 2023-12-20 NOTE — TELEPHONE ENCOUNTER
Pharmacy called back- Needing PA for 48 hr patches. Will send priority.    Patricio Rojas RN on 12/20/2023 at 3:06 PM

## 2023-12-20 NOTE — TELEPHONE ENCOUNTER
Per call to [employer] [phone]-    Rep unable to create a case for additional quantities of more than 10 patches per 30 days.   Transferred me to benefits coverage review to see if this is a rolling fill day issue.     Per benefits coverage review dept-  Per rep Sonya, this patient's plan does cover the 15 per 30 days however the patient filled quantity of 10 patches on 11/27.  Right now she is only able to fill quantity of 5 patches for 10 day supply.  After that fill, she will only be able to fill 10 quantity for next fill.  This will be the case from now on (cycling of filling a qty of 5 per 10 days, then quantity of 10 per 20 days).     Patient's plan has rolling fill days for this medication.  Any partial fill will throw her total quantity fill days off.  Since that partial fill on 11/27 was for qty 10, patient will not be able to fill her total quantity of 15 per 30 days until 12/27/2023 (if she does not want to fill quantity of 5 per 10 days for this time).  There is no back on track override I can do per rep.     Called [facility] pharmacy  [phone]  to let them know of the information above.  Per pharmacist since the Rx is written 15 per 30 days they cannot change the quantity/day supply on their end. A new Rx for quantity 5 per 10 days would need to come from the clinic for them to process it through insurance.      Will route this encounter back to clinic for review. No further action can be taken by PA team.  Medication/quantity is covered but due to rolling fill day issue patient cannot fill the requested 30 days.  Please see above.

## 2023-12-20 NOTE — TELEPHONE ENCOUNTER
Prior Authorization Retail Medication Request    Medication/Dose: fentaNYL (DURAGESIC) 50 mcg/hr 72 hr patch    Rationale:  We are having her do an increased frequency for every 48 hours. Need this done hopefully before holidays.    Insurance   Primary: [facility]  Insurance ID:  [id number]

## 2023-12-21 ENCOUNTER — DOCUMENTATION ONLY (OUTPATIENT)
Dept: INTERNAL MEDICINE | Facility: CLINIC | Age: 38
End: 2023-12-21
Payer: COMMERCIAL

## 2023-12-21 NOTE — PROGRESS NOTES
Type of Form Received:     Form Received (Date) 12/21/23   Form Filled out Yes, faxed 12/27   Placed in provider folder Yes

## 2023-12-22 ENCOUNTER — MYC MEDICAL ADVICE (OUTPATIENT)
Dept: INTERNAL MEDICINE | Facility: CLINIC | Age: 38
End: 2023-12-22

## 2023-12-22 ENCOUNTER — VIRTUAL VISIT (OUTPATIENT)
Dept: INTERNAL MEDICINE | Facility: CLINIC | Age: 38
End: 2023-12-22
Payer: COMMERCIAL

## 2023-12-22 DIAGNOSIS — I73.81 ERYTHROMELALGIA (H): Primary | ICD-10-CM

## 2023-12-22 DIAGNOSIS — I73.81 ERYTHROMELALGIA (H): ICD-10-CM

## 2023-12-22 PROCEDURE — 99215 OFFICE O/P EST HI 40 MIN: CPT | Mod: VID | Performed by: PEDIATRICS

## 2023-12-22 RX ORDER — FENTANYL 25 UG/1
1 PATCH TRANSDERMAL
Qty: 10 PATCH | Refills: 0 | Status: SHIPPED | OUTPATIENT
Start: 2023-12-22 | End: 2023-12-26

## 2023-12-22 RX ORDER — CLONIDINE HYDROCHLORIDE 0.1 MG/1
0.1 TABLET ORAL 2 TIMES DAILY
Qty: 60 TABLET | Refills: 0 | Status: SHIPPED | OUTPATIENT
Start: 2023-12-22 | End: 2024-01-03 | Stop reason: DRUGHIGH

## 2023-12-22 NOTE — PATIENT INSTRUCTIONS
Thank you for visiting the Primary Care Center today at the [facility]! The following is some information about our clinic:     Primary Care Center Frequently-Asked Questions    (1) How do I schedule appointments at the [facility]?     Primary Care--to schedule or make changes to an existing appointment, please call our primary care line at [phone].    Labs--to schedule a lab appointment at the [facility] you can use SolarPrint or call [phone]. If you have a [place] location that is closer to home, you can reach out to that location for scheduling options.     Imaging--if you need to schedule a CT, X-ray, MRI, ultrasound, or other imaging study you can call [phone] to schedule at the [facility] or any other [facility] imaging location.     Referrals--if a referral to another specialty was ordered you can expect a phone call from their scheduling team. If you have not heard from them in a week, please call us or send us a SolarPrint message to check the status or get a scheduling number. Please note that this only applies to internal [facility] referrals. If the referral is external you would need to contact their office for scheduling.     (2) I have a question about my visit, who do I contact?     You can call us at the primary care line at [phone] to ask questions about your visit. You can also send a secure message through SolarPrint, which is reviewed by clinic staff. Please note that SolarPrint messages have a twenty-four to forty-eight business hour turnaround time and should not be used for urgent concerns.    (3) How will I get the results of my tests?    If you are signed up for [employer] all tests will be released to you within twenty-four hours of resulting. Please allow three to five days for your doctor to review your results and place a note interpreting the results. If you do not have HotPadshart you will receive your  results through mail seven to ten business days following the return of the tests. Please note that if there should be any urgent or concerning results that your doctor or their registered nurse will reach out to you the same day as the tests come back. If you have follow up questions about your results or would like to discuss the results in detail please schedule a follow up with your provider either in person or virtually.     (4) How do I get refills of my prescriptions?     You should always first contact your pharmacy for refills of your medications. If submitting a refill request on Zooomr, please be sure to submit the request only once--repeat requests can cause delays in refill. If you are requesting a NEW medication or a medication related to new symptoms you will need to schedule an appointment with a provider prior to approval. Please note: Routine medication refills have up to one to three business day turnaround whereas controlled substances refills have up to five to seven business day turnaround.    (5) I have new symptoms, what do I do?     If you are having an immediate medical emergency, you should dial 911 for assistance.   For anything urgent that needs to be seen within a few hours to one day you should visit a local urgent care for assistance.  For non-urgent symptoms that need to be seen within a few days to a week you can schedule with an available provider in primary care by going to [employer] or calling [phone].   If you are not sure how serious your symptoms are or you would like to receive medical advice you can always call [phone] to speak with a triage nurse.

## 2023-12-22 NOTE — ASSESSMENT & PLAN NOTE
There was a quantity problem with the fentanyl.  She called this morning and supposedly the ball is our court.  the pump is coming in, and she is supposed to be off opioids for a week before startup.  Needs to be weaned down to zero by the 5th. Putting in on the 12th.  There are some other days, which would keep her connected to our office in contrast to the 3 upcoming days (holiday weekend).  They have two 50 patches left.        ASSESSMENT and PLAN:   We discussed plans, and will start on 12/26 (so she can reach us).  12/26: decrease fentanyl from 50 to 25, start clonidine 0.1 oral BID.  12/27 to 12/29: decide if withdrawal is bad, and whether clonidine needs to be increased to 0.1 TID or 0.2 BID, etc.  ~1/5 be done with the fentanyl, increase clonidine if needed  I will write a wean for hydromorphone depending on how she does between now and 1/5. (will need at least one add-in video for all of this)

## 2023-12-22 NOTE — NURSING NOTE
Is the patient currently in the state of [place]? YES    Visit mode:VIDEO    If the visit is dropped, the patient can be reconnected by: VIDEO VISIT: Text to cell phone:   Telephone Information:   Mobile [phone]       Will anyone else be joining the visit? Yes,  Shiraz  (If patient encounters technical issues they should call [phone]    How would you like to obtain your AVS? MyChart    Are changes needed to the allergy or medication list? Pt stated no changes to allergies and Pt stated no med changes    Reason for visit: RECHECK and Musculoskeletal Problem (/)    JUAN SMITH

## 2023-12-22 NOTE — PROGRESS NOTES
"Dear patient. Thank you for visiting with me. I want you to feel respected, understood, and empowered. \"Respect\" is valuing you as much as I would a close family member. \"Empowerment\" happens when you are fully informed, and can make the best possible decision for you.  Please ask me questions!  Challenge anything that is not clear.    Medical records are primarily used as memory aids for me and my colleagues. Things to know about my documentation style:  - The 'problem list' includes current symptoms or diagnoses, and some problems that are resolved but may return. I use the past medical history for problems not expected to return.  - I use single quotation marks for things that you or I said, when I want to clarify who was speaking.  - I use double quotation marks when copying a term from elsewhere in your records. Italics (besides here) may also denote a quotation.  If you have questions or concerns, please contact me; I will reply as soon as time allows.      Virtual Visit Details    Start Time: 12:43 PM    End Time:1:00 PM    Type of service:  Video Visit    Originating Location (pt. Location): Home    Platform used for Visit: [facility] Location (provider location):  Off-site    PCP: Neal Ball  Visit type: problem-oriented  Time note (e5, 40'): The total time (on the date of service) for this service was 40 minutes, including discussion/face-to-face, chart review, interpretation not otherwise reported, documentation, and updating of the computerized record.        Context    Ronna Rivera is a 38 year old woman, with concerns including:  Chief Complaint   Patient presents with    RECHECK    Musculoskeletal Problem              History, update, and/or problems          Problem List as of 12/22/2023 Reviewed: 12/22/2023  4:36 PM by Neal Ball MD            Noted    1. Erythromelalgia (H24) - Primary 3/25/2021     Overview Addendum 7/11/2023  2:10 PM by Neal Ball, " MD      Feet very painful and bad enough to ulcerate and wound.  (also has lesser problems with hands and face)  3/6/2023 back on steroids (pulse, then BIW).  Takes venlafaxine.  Has been on rituximab, imuran, IVIG, ASA, mexiletine  Improved after high-dose intermittent steroids, then worsened.  Dx with testing by Dr. Shannon De La Rosa at [facility]. [place]ook [place] [facility]  [place]/26/23: derm biopsy @ [place] showed changes consistent with healing skin.          Last Assessment & Plan 12/22/2023 Virtual Visit Written 12/22/2023  4:36 PM by Neal Ball MD      There was a quantity problem with the fentanyl.  She called this morning and supposedly the ball is our court.  the pump is coming in, and she is supposed to be off opioids for a week before startup.  Needs to be weaned down to zero by the 5th. Putting in on the 12th.  There are some other days, which would keep her connected to our office in contrast to the 3 upcoming days (holiday weekend).  They have two 50 patches left.        ASSESSMENT and PLAN:   We discussed plans, and will start on 12/26 (so she can reach us).  12/26: decrease fentanyl from 50 to 25, start clonidine 0.1 oral BID.  12/27 to 12/29: decide if withdrawal is bad, and whether clonidine needs to be increased to 0.1 TID or 0.2 BID, etc.  ~1/5 be done with the fentanyl, increase clonidine if needed  I will write a wean for hydromorphone depending on how she does between now and 1/5. (will need at least one add-in video for all of this)          Relevant Medications     fentaNYL (DURAGESIC) 25 mcg/hr 72 hr patch     cloNIDine (CATAPRES) 0.1 MG tablet     Anastaciahart to send:    Hello,  It was good to see you today. Here are my recommendations:  1. Stay with the 50 mcg patch over the weekend  2.  the 25 mcg patches and clonidine pills.  3. On late Monday or morning Tuesday (your choice):  -- take off the 50 mcg patch,  -- start the 25 mcg patch.  -- start the clonidine 0.1  mg pill prescription   4. Check in with us on Tuesday or Wednesday, so we know how you are doing. We can keep going as per #3, or increase the clonidine, or increase the hydromorphone.    Clonidine helps to reduce the sensations of withdrawal (heart rate, sweating, malaise, etc). It may also help the erythromelalgia.    5. Depending on how next week goes, we will either stop the 25 mcg patch (and wean the hydromorphone from there) OR give you 3-6 days or a 12.5 mcg patch.    Rob Ball MD  Internal Medicine & Pediatrics  [facility], [facility] [place]

## 2023-12-26 ENCOUNTER — TELEPHONE (OUTPATIENT)
Dept: INTERNAL MEDICINE | Facility: CLINIC | Age: 38
End: 2023-12-26
Payer: COMMERCIAL

## 2023-12-26 DIAGNOSIS — I73.81 ERYTHROMELALGIA (H): ICD-10-CM

## 2023-12-26 RX ORDER — FENTANYL 25 UG/1
1 PATCH TRANSDERMAL
Qty: 10 PATCH | Refills: 0 | Status: SHIPPED | OUTPATIENT
Start: 2023-12-26 | End: 2024-01-03

## 2023-12-26 RX ORDER — FENTANYL 25 UG/1
1 PATCH TRANSDERMAL
Qty: 10 PATCH | Refills: 0 | Status: CANCELLED | OUTPATIENT
Start: 2023-12-26

## 2023-12-26 NOTE — TELEPHONE ENCOUNTER
fentaNYL (DURAGESIC) 25 mcg/hr 72 hr patch   10 patch 0 12/22/2023 12/22/2023  [facility] [place]      Neal Ball MD     Routed because: not in stock at pharm where sent. It can't be transferred to other pharm, Please send new order. Thanks. Request per pt call.     regular  Rate: normal           Beta Blocker:  Dose within 24 Hrs         Neuro/Psych:   Negative Neuro/Psych ROS              GI/Hepatic/Renal:   (+) bowel prep,           Endo/Other:    (+) hypothyroidism::., .                 Abdominal:   (+) obese,         Vascular: negative vascular ROS. Anesthesia Plan      MAC     ASA 3       Induction: intravenous. Anesthetic plan and risks discussed with patient. Plan discussed with CRNA. This pre-anesthesia assessment may be used as a history and physical.    DOS STAFF ADDENDUM:    Pt seen and examined, chart reviewed (including anesthesia, drug and allergy history). No interval changes to history and physical examination. Anesthetic plan, risks, benefits, alternatives, and personnel involved discussed with patient. Patient verbalized an understanding and agrees to proceed.       Chasidy Paez MD  March 6, 2018  9:25 AM

## 2023-12-26 NOTE — TELEPHONE ENCOUNTER
Patient  would like a call from Patricio the nurse.to discuss the medication to be sent over to another pharmacy.  Please call him back. Writer called the back line and Enoch said that the Dr would be in at 4pm and also talked to the patient wife earlier today. Writer did let her know that the prescription would be sent when the dr is in the clinic and patient understood verbally after telling her that information.

## 2023-12-26 NOTE — TELEPHONE ENCOUNTER
Prescription request was sent to provider today, HP, around 1:30 pm.  Waiting for provider to approve and sign.    See 12/22/23 mychart encounter.      Enoch Tilley CMA ([facility]) at 3:07 PM on 12/26/2023

## 2023-12-26 NOTE — TELEPHONE ENCOUNTER
[facility] Call Center    Phone Message    May a detailed message be left on voicemail: yes     Reason for Call: Medication Question or concern regarding medication   Prescription Clarification  Name of Medication:   fentaNYL (DURAGESIC) 25 mcg/hr 72 hr patch   Prescribing Provider: Dr Ball   Pharmacy:      [facility] [id number] [address] [place]      What on the order needs clarification? Patient calling to have her prescription called into a different pharmacy. They have the 10/25 patches she is needing. Please call to discuss further. Patient is hoping to get the rest when they come in.      Action Taken: Message routed to:  [facility] ([facility]    Travel Screening: Not Applicable

## 2024-01-03 ENCOUNTER — LAB (OUTPATIENT)
Dept: LAB | Facility: CLINIC | Age: 39
End: 2024-01-03
Payer: COMMERCIAL

## 2024-01-03 ENCOUNTER — MYC MEDICAL ADVICE (OUTPATIENT)
Dept: INTERNAL MEDICINE | Facility: CLINIC | Age: 39
End: 2024-01-03

## 2024-01-03 ENCOUNTER — OFFICE VISIT (OUTPATIENT)
Dept: INTERNAL MEDICINE | Facility: CLINIC | Age: 39
End: 2024-01-03
Payer: COMMERCIAL

## 2024-01-03 VITALS
HEART RATE: 78 BPM | SYSTOLIC BLOOD PRESSURE: 106 MMHG | WEIGHT: 129.4 LBS | OXYGEN SATURATION: 99 % | BODY MASS INDEX: 23.48 KG/M2 | DIASTOLIC BLOOD PRESSURE: 71 MMHG

## 2024-01-03 DIAGNOSIS — Z12.4 CERVICAL CANCER SCREENING: ICD-10-CM

## 2024-01-03 DIAGNOSIS — I73.81 ERYTHROMELALGIA (H): ICD-10-CM

## 2024-01-03 DIAGNOSIS — Z86.39 HISTORY OF VITAMIN D DEFICIENCY: ICD-10-CM

## 2024-01-03 DIAGNOSIS — L97.512 FOOT ULCER, RIGHT, WITH FAT LAYER EXPOSED (H): ICD-10-CM

## 2024-01-03 DIAGNOSIS — Z23 NEED FOR HEPATITIS B VACCINATION: ICD-10-CM

## 2024-01-03 DIAGNOSIS — Z00.00 PREVENTATIVE HEALTH CARE: ICD-10-CM

## 2024-01-03 DIAGNOSIS — Z45.2 PICC (PERIPHERALLY INSERTED CENTRAL CATHETER) IN PLACE: ICD-10-CM

## 2024-01-03 DIAGNOSIS — R20.0 BILATERAL FINGER NUMBNESS: ICD-10-CM

## 2024-01-03 DIAGNOSIS — L97.522 FOOT ULCER, LEFT, WITH FAT LAYER EXPOSED (H): Primary | ICD-10-CM

## 2024-01-03 DIAGNOSIS — R79.89 ABNORMAL TSH: ICD-10-CM

## 2024-01-03 DIAGNOSIS — G62.9 SMALL FIBER NEUROPATHY: Primary | ICD-10-CM

## 2024-01-03 PROBLEM — G90.89 AUTOIMMUNE AUTONOMIC NEUROPATHY: Status: ACTIVE | Noted: 2021-06-06

## 2024-01-03 LAB
ALBUMIN SERPL BCG-MCNC: 4.2 G/DL (ref 3.5–5.2)
ALP SERPL-CCNC: 57 U/L (ref 40–150)
ALT SERPL W P-5'-P-CCNC: 11 U/L (ref 0–50)
ANION GAP SERPL CALCULATED.3IONS-SCNC: 8 MMOL/L (ref 7–15)
AST SERPL W P-5'-P-CCNC: 20 U/L (ref 0–45)
BASOPHILS # BLD AUTO: 0 10E3/UL (ref 0–0.2)
BASOPHILS NFR BLD AUTO: 1 %
BILIRUB SERPL-MCNC: 0.2 MG/DL
BUN SERPL-MCNC: 14.8 MG/DL (ref 6–20)
CALCIUM SERPL-MCNC: 9.2 MG/DL (ref 8.6–10)
CHLORIDE SERPL-SCNC: 104 MMOL/L (ref 98–107)
CREAT SERPL-MCNC: 0.82 MG/DL (ref 0.51–0.95)
DEPRECATED HCO3 PLAS-SCNC: 28 MMOL/L (ref 22–29)
EGFRCR SERPLBLD CKD-EPI 2021: >90 ML/MIN/1.73M2
EOSINOPHIL # BLD AUTO: 0 10E3/UL (ref 0–0.7)
EOSINOPHIL NFR BLD AUTO: 1 %
ERYTHROCYTE [DISTWIDTH] IN BLOOD BY AUTOMATED COUNT: 12.9 % (ref 10–15)
GLUCOSE SERPL-MCNC: 88 MG/DL (ref 70–99)
HBA1C MFR BLD: 5.5 %
HBV SURFACE AB SERPL IA-ACNC: 5.01 M[IU]/ML
HBV SURFACE AB SERPL IA-ACNC: NONREACTIVE M[IU]/ML
HCT VFR BLD AUTO: 35.9 % (ref 35–47)
HCV AB SERPL QL IA: NONREACTIVE
HGB BLD-MCNC: 12.2 G/DL (ref 11.7–15.7)
HIV 1+2 AB+HIV1 P24 AG SERPL QL IA: NONREACTIVE
IMM GRANULOCYTES # BLD: 0 10E3/UL
IMM GRANULOCYTES NFR BLD: 0 %
LYMPHOCYTES # BLD AUTO: 0.8 10E3/UL (ref 0.8–5.3)
LYMPHOCYTES NFR BLD AUTO: 23 %
MCH RBC QN AUTO: 31.9 PG (ref 26.5–33)
MCHC RBC AUTO-ENTMCNC: 34 G/DL (ref 31.5–36.5)
MCV RBC AUTO: 94 FL (ref 78–100)
MONOCYTES # BLD AUTO: 0.4 10E3/UL (ref 0–1.3)
MONOCYTES NFR BLD AUTO: 12 %
NEUTROPHILS # BLD AUTO: 2.1 10E3/UL (ref 1.6–8.3)
NEUTROPHILS NFR BLD AUTO: 63 %
NRBC # BLD AUTO: 0 10E3/UL
NRBC BLD AUTO-RTO: 0 /100
PLATELET # BLD AUTO: 224 10E3/UL (ref 150–450)
POTASSIUM SERPL-SCNC: 4.2 MMOL/L (ref 3.4–5.3)
PROT SERPL-MCNC: 6.4 G/DL (ref 6.4–8.3)
RBC # BLD AUTO: 3.82 10E6/UL (ref 3.8–5.2)
SODIUM SERPL-SCNC: 140 MMOL/L (ref 135–145)
TSH SERPL DL<=0.005 MIU/L-ACNC: 1.08 UIU/ML (ref 0.3–4.2)
VIT B12 SERPL-MCNC: 791 PG/ML (ref 232–1245)
VIT D+METAB SERPL-MCNC: 85 NG/ML (ref 20–50)
WBC # BLD AUTO: 3.4 10E3/UL (ref 4–11)

## 2024-01-03 PROCEDURE — 82595 ASSAY OF CRYOGLOBULIN: CPT | Performed by: PEDIATRICS

## 2024-01-03 PROCEDURE — 85025 COMPLETE CBC W/AUTO DIFF WBC: CPT | Performed by: PATHOLOGY

## 2024-01-03 PROCEDURE — 83036 HEMOGLOBIN GLYCOSYLATED A1C: CPT | Performed by: PEDIATRICS

## 2024-01-03 PROCEDURE — 99000 SPECIMEN HANDLING OFFICE-LAB: CPT | Performed by: PATHOLOGY

## 2024-01-03 PROCEDURE — 87389 HIV-1 AG W/HIV-1&-2 AB AG IA: CPT | Performed by: PEDIATRICS

## 2024-01-03 PROCEDURE — 82180 ASSAY OF ASCORBIC ACID: CPT | Mod: 90 | Performed by: PATHOLOGY

## 2024-01-03 PROCEDURE — 80053 COMPREHEN METABOLIC PANEL: CPT | Performed by: PATHOLOGY

## 2024-01-03 PROCEDURE — 86803 HEPATITIS C AB TEST: CPT | Performed by: PEDIATRICS

## 2024-01-03 PROCEDURE — 84443 ASSAY THYROID STIM HORMONE: CPT | Performed by: PATHOLOGY

## 2024-01-03 PROCEDURE — 82607 VITAMIN B-12: CPT | Performed by: PEDIATRICS

## 2024-01-03 PROCEDURE — 36415 COLL VENOUS BLD VENIPUNCTURE: CPT | Performed by: PATHOLOGY

## 2024-01-03 PROCEDURE — 82306 VITAMIN D 25 HYDROXY: CPT | Performed by: PEDIATRICS

## 2024-01-03 PROCEDURE — 86706 HEP B SURFACE ANTIBODY: CPT | Performed by: PEDIATRICS

## 2024-01-03 PROCEDURE — 99215 OFFICE O/P EST HI 40 MIN: CPT | Performed by: PEDIATRICS

## 2024-01-03 RX ORDER — CLONIDINE HYDROCHLORIDE 0.2 MG/1
0.2 TABLET ORAL 2 TIMES DAILY
Qty: 60 TABLET | Refills: 3 | Status: SHIPPED | OUTPATIENT
Start: 2024-01-03 | End: 2024-05-02

## 2024-01-03 RX ORDER — FENTANYL 25 UG/1
1 PATCH TRANSDERMAL
COMMUNITY
Start: 2024-01-03 | End: 2024-01-10

## 2024-01-03 RX ORDER — FENTANYL 12.5 UG/1
1 PATCH TRANSDERMAL
Qty: 10 PATCH | Refills: 0 | Status: SHIPPED | OUTPATIENT
Start: 2024-01-03 | End: 2024-01-11

## 2024-01-03 NOTE — ASSESSMENT & PLAN NOTE
Hasn't needed a port placed yet.   First was placed 6/23/21, and the second was more recent but not able to find documentation - it was done in [place].

## 2024-01-03 NOTE — PROGRESS NOTES
Ronna Wright is a 38 year old that presents in clinic today for the following:     Chief Complaint   Patient presents with    Pre-Op Exam     Pt here for pre-op exam for pain pump with [facility] on 1/24/24. Dr. Saji Cosme.           1/3/2024    11:13 AM   Additional Questions   Roomed by MJL, EMT       Screenings from encounters over the past 10 days    No data recorded       Michael Corona at 11:20 AM on 1/3/2024

## 2024-01-03 NOTE — PROGRESS NOTES
Dear patient. Medical records are primarily used as memory aids for me and my colleagues. Things to know about my documentation style:  - The 'problem list' includes current symptoms or diagnoses, and some problems that are resolved but may return. I use the past medical history for problems not expected to return.  - I use single quotation marks for things that you or I said, when I want to clarify who was speaking.  - I use double quotation marks when copying a term from elsewhere in your records. Italics (besides here) may also denote a quotation.  If you have questions or concerns, please contact me; I will reply as soon as time allows.    Ronna Rivera is a 38 year old woman who presents for a preoperative evaluation.  PCP: Neal Ball     Subjective     PREOPERATIVE EVALUATION:  Today's date: 01/03/24    Surgical Information (as known today):  Surgery/Procedure: Pain pump placement  Surgery Location: [facility]  Surgeon: Dr. Saji Cosme  Surgery Date: 1/24/24  Surgery Fax: [phone]  Where patient plans to recover: At home with family    HPI related to upcoming procedure:    Requires pain medication pump because of intractable erythromelalgia, and some evidence that the opioids themselves (and possibly pain relief) is helping reduce the dysautonomic erythromelalgia.    erythromelalgia  The pump procedure has been postponed until the 24th.  There was some withdrawal coming from 50 to 25, despite the clonidine. Had anxiety, aches, and 'rough' feeling. On a couple of days had symptoms flare, so took dilaudid.  Also her ulceration has opened up again, and the dorsal area's former ulcers have turned red again.  May have benefited from starting the clonidine, she thinks.  We had an effective discussion about opioid wean, to get her to zero one week before the pain pump as the pain clinic requested. I am a little concerned about the worsened erythromelalgia, since (in theory) there could  be actual benefit from the opioids, in addition to pain relief alone.       OBJECTIVE: see photos below.       ASSESSMENT and PLAN:   -- could double clonidine  -- should continue with existing 25 through end of supply.  -- I will write for 12.5 mcg for a month, use these every 48 hours ... plan for using TWO patches if there is a shortfall for a day?    -- would continue steroids through the procedure.      She is wondering about having the pump placed in front. I have not personally seen this done before, and defer to her pain pump providers.    [facility] RE Ronna Rivrea  Fax [phone]    RE: Ronna Rivera, MRN [id number]       TREATMENT ORDERS    Please continue with present steroid dosing until further notice (FYI likely will wean later this winter after pain pump has stabilized).  Please message me if you need more orders.    Rob Ball MD  (elec sig 01/03/24 11:54 AM)  Internal Medicine & Pediatrics  [facility] [place]                         8/2/2023    10:59 AM   Preop Questions   1. Have you ever had a heart attack or stroke? No   2. Have you ever had surgery on your heart or blood vessels, such as a stent placement, a coronary artery bypass, or surgery on an artery in your head, neck, heart, or legs? No   3. Do you have chest pain with activity? No   4. Do you have a history of  heart failure? No   5. Do you currently have a cold, bronchitis or symptoms of other infection? No   6. Do you have a cough, shortness of breath, or wheezing? No   7. Do you or anyone in your family have previous history of blood clots? No   8. Do you or does anyone in your family have a serious bleeding problem such as prolonged bleeding following surgeries or cuts? No   9. Have you ever had problems with anemia or been told to take iron pills? No   10. Have you had any abnormal blood loss such as black, tarry or bloody stools, or abnormal vaginal bleeding? No   11.  Have you ever had a blood transfusion? No   12. Are you willing to have a blood transfusion if it is medically needed before, during, or after your surgery? Yes   13. Have you or any of your relatives ever had problems with anesthesia? No   14. Do you have sleep apnea, excessive snoring or daytime drowsiness? No   15. Do you have any artifical heart valves or other implanted medical devices like a pacemaker, defibrillator, or continuous glucose monitor? No   16. Do you have artificial joints? No   17. Are you allergic to latex? No   18. Is there any chance that you may be pregnant? No       Risk Assessment  Physical activity:        - Limited because of erythromelalgia    Respiratory concerns:  None. Has history of exercise induced asthma    Clotting concerns:  None known    Other chronic medical concerns  peripheral foot ulcers from erythromelalgia     PICC history: First was placed 6/23/21, and the second was 3/2/23 during hosp in [place].    Preoperative Review of :   reviewed - controlled substances reflected in medication list.      Allergies   Allergen Reactions    Topiramate Anxiety         Health Care Directive:  Patient does not have a Health Care Directive or Living Will    As part of this encounter, I reviewed (and updated as able) the past medical, family, and social history.  (Please see past histories (etc) appended to this note at the bottom)    Objective     /71 (BP Location: Right arm, Patient Position: Sitting, Cuff Size: Adult Regular)   Pulse 78   Wt 58.7 kg (129 lb 6.4 oz)   SpO2 99%   BMI 23.48 kg/m      Physical Exam         Ecg 2/21/23 normal QT    Assessment & Plan     Preoperative Assessment and recommendation    Appreciate the opportunity to be involved in this patient's care.    Revised Cardiac Risk Index (RCRI):  The patient has the following serious cardiovascular risks for perioperative complications:   - No serious cardiac risks = 0 points   RCRI Interpretation: 1 point:  Class II (low risk - 0.9% complication rate)    Procedure risk. My understanding is that the described procedure is considered LOW risk.      Overall, there are no apparent contraindications to planned low-risk procedure.  The team should be alert to worsening erythromelalgia before and after the procedure.  Risk can be mitigated by steroid bursts and (possibly) increased opioids. Please also comply with the patient's suggestion about foot temperature/care.    Otherwise, patient appears to be medically optimized for upcoming procedure, assuming appropriate medical supervision onsite for anesthesia or other developments.            Neal Ball MD   01/03/24 11:09 AM   [facility] [place]   Contact: [phone], or clinic phone [phone]        About this visit:  Time note (e5, 40'): The total time (on the date of service) for this service was 54 minutes, including discussion/face-to-face, chart review, interpretation not otherwise reported, documentation, and updating of the computerized record.    Addendum: Past histories included in Epic as of this documentation      Patient Active Problem List   Diagnosis    Other chronic pain    Rash and nonspecific skin eruption    Erythromelalgia (H24)    Anxiety    DDD (degenerative disc disease), lumbar    Migraine with aura and without status migrainosus, not intractable    Chronic insomnia    Asthma, exercise induced    Hypermobile joints    Vasovagal syncope    Autoimmune autonomic neuropathy    Impaired mobility    Abnormal TSH    Need for pneumocystis prophylaxis    Medical marijuana use    Adrenal suppression (H24)    Menstrual suppression because of erythromelalagia    Reactive depression    Recurrent herpes labialis    PICC (peripherally inserted central catheter) in place    Foot ulcer, right, with fat layer exposed (H)    Foot ulcer, left, with fat layer exposed (H)    Bilateral finger numbness    Small fiber  neuropathy       Past Medical History:   Diagnosis Date    Auto-erythrocyte sensitization (H24)     Community acquired pneumonia of right upper lobe of lung 07/04/2023    Erythromelalgia 02/2021    Infection due to 2019 novel coronavirus 09/01/2023    Reactive depression     Seasonal allergies 06/25/2020    Mostly resolved       Past Surgical History:   Procedure Laterality Date    INSERT PICC LINE Left 06/23/2021    Procedure: INSERTION, PICC;  Surgeon: Neal Penny MD;  Location: [facility] OR    IR PICC PLACEMENT > 5 YRS OF AGE  06/23/2021    NO HISTORY OF SURGERY      PICC SINGLE LUMEN PLACEMENT Left 03/02/2023    Left brachial-lateral vein 44cm total 1cm external.Placement verified by Sherdanika 3CG.PICC okay to use.       Current Outpatient Medications   Medication Sig Dispense Refill    azaTHIOprine (IMURAN) 50 MG tablet Take 100 mg by mouth daily      cloNIDine (CATAPRES) 0.1 MG tablet Take 1 tablet (0.1 mg) by mouth 2 times daily during wean of opioids; keep in touch with Dr. Ball's office about effects and when discontinuing. 60 tablet 0    collagenase (SANTYL) 250 UNIT/GM external ointment Apply topically daily as needed (with foot wound cares)      diphenhydrAMINE (BENADRYL) 50 MG capsule Take 50 mg by mouth At Bedtime      fentaNYL (DURAGESIC) 12 mcg/hr 72 hr patch Place 1 patch onto the skin every 72 hours remove old patch. 10 patch 0    fentaNYL (DURAGESIC) 25 mcg/hr 72 hr patch Place 1 patch onto the skin every 72 hours remove old patch. 10 patch 0    fentaNYL (DURAGESIC) 50 mcg/hr 72 hr patch Place 1 patch onto the skin every 48 hours for 30 days remove old patch.   (Covers increased freq approximately from 12/15/2023 through 1/14/2024 or later) 15 patch 0    fentaNYL (DURAGESIC) 50 mcg/hr 72 hr patch Place 1 patch onto the skin every 72 hours remove old patch.   (Covers approximately from 9/22/23 through 10/22/2023 or later) 10 patch 0    gabapentin (NEURONTIN) 300 MG capsule Take 1 po mid-day  along with 600 mg (total 900 mg) 30 capsule 11    gabapentin (NEURONTIN) 600 MG tablet Take 2 po AM (1200 mg), 1 po Mid-day along with a 300 mg capsule (900 mg), and 2 po PM (1200 mg) 150 tablet 11    HYDROmorphone (DILAUDID) 2 MG tablet Take 1-2 tablets (2-4 mg) by mouth every 4 hours as needed for breakthrough pain (or 6 mg to reduce pain with showers, transportation, or sleep) 210 tablet 0    ibuprofen (ADVIL/MOTRIN) 200 MG tablet Take 400 mg by mouth every 6 hours as needed for moderate pain      lidocaine (XYLOCAINE) 4 % external solution Apply topically daily Apply approximately 10mL to each foot (total of 20mL) daily 600 mL 2    lidocaine (XYLOCAINE) 5 % external ointment Apply topically every 4 hours as needed for moderate pain (4-6) 50 g 63    LORazepam (ATIVAN) 0.5 MG tablet Take 1 tablet (0.5 mg) by mouth daily as needed for anxiety 20 tablet 0    melatonin 1 MG TABS tablet Take 4 mg by mouth At Bedtime      methylPREDNISolone sodium succinate (SOLU-MEDROL) 1000 MG injection Inject 1,000 mg into the vein twice a week On Sunday and Thursday      mexiletine (MEXITIL) 150 MG capsule Take 1 capsule (150 mg) by mouth every 8 hours 90 capsule 3    naloxone (NARCAN) 4 MG/0.1ML nasal spray Spray 1 spray (4 mg) into one nostril alternating nostrils as needed for opioid reversal every 2-3 minutes until assistance arrives 0.2 mL 0    norethindrone (MICRONOR) 0.35 MG tablet Take 2 tablets (0.7 mg) by mouth daily Take the first 3 weeks of each pack, and then immediately move on to the next pack (discard the 4th week of each pack) 180 tablet 3    ondansetron (ZOFRAN ODT) 4 MG ODT tab Take 1 tablet (4 mg) by mouth every 6 hours as needed for nausea or vomiting 30 tablet 0    sulfamethoxazole-trimethoprim (BACTRIM) 400-80 MG tablet Take 1 tablet by mouth daily 90 tablet 0    venlafaxine (EFFEXOR XR) 37.5 MG 24 hr capsule Take 1 capsule (37.5 mg) by mouth 2 times daily 180 capsule 3    vitamin C (ASCORBIC ACID) 250 MG  tablet Take 250 mg by mouth daily      Vitamin D3 (CHOLECALCIFEROL) 25 mcg (1000 units) tablet Take 25 mcg by mouth daily      triamcinolone (KENALOG) 0.1 % external cream Apply topically daily Apply to feet daily (Patient not taking: Reported on 9/8/2023) 80 g 1    venlafaxine (EFFEXOR XR) 75 MG 24 hr capsule Take 1 capsule (75 mg) by mouth daily (Patient not taking: Reported on 9/8/2023) 90 capsule 3       Family History   Problem Relation Age of Onset    Hypertension Father     Cerebrovascular Disease Maternal Grandmother     Diabetes Maternal Grandfather     Breast Cancer Paternal Grandmother     Hypertension Paternal Grandfather     C.A.D. Paternal Grandfather     Schizophrenia No family hx of        Social History     Social History Narrative    Lives with , 2 kids (born 2009, both with autism). No guns.  Safe home and environment.  Support from friends family and Confucianism community.  Not currently working.       Tobacco Use    Smoking status: Never     Passive exposure: Never    Smokeless tobacco: Never   Vaping Use    Vaping Use: Never used   Substance and Sexual Activity    Alcohol use: No    Drug use: No    Sexual activity: Yes     Partners: Male

## 2024-01-03 NOTE — LETTER
[facility] RE Ronna Rivera  Fax [phone]    RE: Ronna Rivera, MRN [id number]       TREATMENT ORDERS    Please continue with present steroid dosing until further notice (FYI likely will wean later this winter after pain pump has stabilized).  Please message me if you need more orders.    Rob Ball MD  (elec sig 01/03/24 11:54 AM)  Internal Medicine & Pediatrics  [facility], [facility] [place]

## 2024-01-04 ENCOUNTER — DOCUMENTATION ONLY (OUTPATIENT)
Dept: INTERNAL MEDICINE | Facility: CLINIC | Age: 39
End: 2024-01-04
Payer: COMMERCIAL

## 2024-01-04 NOTE — ASSESSMENT & PLAN NOTE
Denise excerpt  HEPATITIS B TESTING showed that you do NOT have significant immunity. Recent guidelines suggest:    Please look at your shot records, to see if you previously had all 3 hepatitis B shots. Many people in your age range have not had all 3 shots.         -- If you have NOT had all 3 hepatitis B shots, please have these done at any retail pharmacy.          -- If you have had hepatitis B shots, then you should have a booster. This can be done at any retail pharmacy.     We give shots also, but many insurances no longer cover shots done at doctors' offices.    Either way, please bring your shot records to the pharmacy and send us a copy.

## 2024-01-04 NOTE — PROGRESS NOTES
Type of Form Received:     Form Received (Date) 1/4/24   Form Filled out No   Placed in provider folder Yes

## 2024-01-04 NOTE — ASSESSMENT & PLAN NOTE
Preop visit today.    erythromelalgia  The pump procedure has been postponed until the 24th.  There was some withdrawal coming from 50 to 25, despite the clonidine. Had anxiety, aches, and 'rough' feeling. On a couple of days had symptoms flare, so took dilaudid.  Also her ulceration has opened up again, and the dorsal area's former ulcers have turned red again.  May have benefited from starting the clonidine, she thinks.  We had an effective discussion about opioid wean, to get her to zero one week before the pain pump as the pain clinic requested. I am a little concerned about the worsened erythromelalgia, since (in theory) there could be actual benefit from the opioids, in addition to pain relief alone.       OBJECTIVE: see photos below.       ASSESSMENT and PLAN:   -- could double clonidine  -- should continue with existing 25 through end of supply.  -- I will write for 12.5 mcg for a month, use these every 48 hours ... plan for using TWO patches if there is a shortfall for a day?    -- would continue steroids through the procedure.      She is wondering about having the pump placed in front. I have not personally seen this done before, and defer to her pain pump providers.    [facility] RE Ronna Rivera  Fax [phone]    RE: Ronna Rivera, MRN [id number]       TREATMENT ORDERS    Please continue with present steroid dosing until further notice (FYI likely will wean later this winter after pain pump has stabilized).  Please message me if you need more orders.    Rob Ball MD  (elec sig 01/03/24 11:54 AM)  Internal Medicine & Pediatrics  [facility], [facility] [place]

## 2024-01-04 NOTE — ASSESSMENT & PLAN NOTE
MyC excerpt:     VITAMIN D was just a small amount above the normal range  . I recommend pulling back on the vitamin D supplements - vitamin D is one of those vitamins that should NOT be higher than normal. You can either take your pill every other day, or skip it on Friday, Saturday, and Sunday.

## 2024-01-07 LAB — VIT C SERPL-MCNC: 114 UMOL/L

## 2024-01-09 LAB — CRYOGLOB SER QL: ABNORMAL

## 2024-01-11 ENCOUNTER — MYC REFILL (OUTPATIENT)
Dept: INTERNAL MEDICINE | Facility: CLINIC | Age: 39
End: 2024-01-11
Payer: COMMERCIAL

## 2024-01-11 ENCOUNTER — DOCUMENTATION ONLY (OUTPATIENT)
Dept: INTERNAL MEDICINE | Facility: CLINIC | Age: 39
End: 2024-01-11
Payer: COMMERCIAL

## 2024-01-11 DIAGNOSIS — I73.81 ERYTHROMELALGIA (H): ICD-10-CM

## 2024-01-11 RX ORDER — FENTANYL 12.5 UG/1
1 PATCH TRANSDERMAL
Qty: 10 PATCH | Refills: 0 | Status: SHIPPED | OUTPATIENT
Start: 2024-01-11 | End: 2024-01-22

## 2024-01-11 NOTE — PROGRESS NOTES
Type of Form Received:     Form Received (Date) 1/11/24   Form Filled out Yes, faxed 1/15   Placed in provider folder Yes

## 2024-01-11 NOTE — TELEPHONE ENCOUNTER
fentaNYL (DURAGESIC) 12 mcg/hr 72 hr patch   Please resend this order.   Last order from 1/3/2024 was not received.  Med not on protocol to fill    Patricia Cheatham RN  Central Triage Red Flags/Med Refills

## 2024-01-14 ENCOUNTER — MEDICAL CORRESPONDENCE (OUTPATIENT)
Dept: HEALTH INFORMATION MANAGEMENT | Facility: CLINIC | Age: 39
End: 2024-01-14
Payer: COMMERCIAL

## 2024-01-15 ENCOUNTER — TRANSFERRED RECORDS (OUTPATIENT)
Dept: HEALTH INFORMATION MANAGEMENT | Facility: CLINIC | Age: 39
End: 2024-01-15
Payer: COMMERCIAL

## 2024-01-16 DIAGNOSIS — I73.81 ERYTHROMELALGIA (H): ICD-10-CM

## 2024-01-18 ENCOUNTER — DOCUMENTATION ONLY (OUTPATIENT)
Dept: INTERNAL MEDICINE | Facility: CLINIC | Age: 39
End: 2024-01-18
Payer: COMMERCIAL

## 2024-01-18 NOTE — PROGRESS NOTES
Type of Form Received:     Form Received (Date) 1/18/24   Form Filled out Yes 1/22/24   Placed in provider folder Yes

## 2024-01-19 DIAGNOSIS — I73.81 ERYTHROMELALGIA (H): ICD-10-CM

## 2024-01-20 RX ORDER — MEXILETINE HYDROCHLORIDE 150 MG/1
150 CAPSULE ORAL EVERY 8 HOURS
Qty: 270 CAPSULE | Status: CANCELLED | OUTPATIENT
Start: 2024-01-20

## 2024-01-21 RX ORDER — LORAZEPAM 0.5 MG/1
0.5 TABLET ORAL DAILY PRN
Qty: 20 TABLET | Refills: 0 | Status: SHIPPED | OUTPATIENT
Start: 2024-01-21

## 2024-01-21 NOTE — TELEPHONE ENCOUNTER
mexiletine (MEXITIL) 150 MG capsule 90 capsule 3 9/28/2023     Last Office Visit: 1/3/24  Future Office visit:   1/22/24    Routing refill request to provider for review/approval because:  Provider must authorize    Lesley Banuelos RN  P Red Flag Triage/MRT

## 2024-01-21 NOTE — TELEPHONE ENCOUNTER
LORazepam (ATIVAN) 0.5 MG tablet   20 tablet 0 8/18/2023       1/3/2024  [facility] [place]    Neal Ball MD  Internal Medicine    Routed because: controlled.

## 2024-01-22 ENCOUNTER — MEDICAL CORRESPONDENCE (OUTPATIENT)
Dept: HEALTH INFORMATION MANAGEMENT | Facility: CLINIC | Age: 39
End: 2024-01-22

## 2024-01-22 ENCOUNTER — VIRTUAL VISIT (OUTPATIENT)
Dept: INTERNAL MEDICINE | Facility: CLINIC | Age: 39
End: 2024-01-22
Payer: COMMERCIAL

## 2024-01-22 DIAGNOSIS — N94.89 MENSTRUAL SUPPRESSION: ICD-10-CM

## 2024-01-22 DIAGNOSIS — F43.23 ADJUSTMENT REACTION WITH ANXIETY AND DEPRESSION: ICD-10-CM

## 2024-01-22 DIAGNOSIS — I73.81 ERYTHROMELALGIA (H): Primary | ICD-10-CM

## 2024-01-22 PROCEDURE — 99214 OFFICE O/P EST MOD 30 MIN: CPT | Mod: 95 | Performed by: PEDIATRICS

## 2024-01-22 RX ORDER — MEXILETINE HYDROCHLORIDE 150 MG/1
150 CAPSULE ORAL EVERY 8 HOURS
Qty: 90 CAPSULE | Refills: 3 | Status: SHIPPED | OUTPATIENT
Start: 2024-01-22

## 2024-01-22 ASSESSMENT — ANXIETY QUESTIONNAIRES
6. BECOMING EASILY ANNOYED OR IRRITABLE: MORE THAN HALF THE DAYS
IF YOU CHECKED OFF ANY PROBLEMS ON THIS QUESTIONNAIRE, HOW DIFFICULT HAVE THESE PROBLEMS MADE IT FOR YOU TO DO YOUR WORK, TAKE CARE OF THINGS AT HOME, OR GET ALONG WITH OTHER PEOPLE: VERY DIFFICULT
2. NOT BEING ABLE TO STOP OR CONTROL WORRYING: SEVERAL DAYS
3. WORRYING TOO MUCH ABOUT DIFFERENT THINGS: MORE THAN HALF THE DAYS
5. BEING SO RESTLESS THAT IT IS HARD TO SIT STILL: SEVERAL DAYS
1. FEELING NERVOUS, ANXIOUS, OR ON EDGE: NEARLY EVERY DAY

## 2024-01-22 ASSESSMENT — ASTHMA QUESTIONNAIRES
QUESTION_5 LAST FOUR WEEKS HOW WOULD YOU RATE YOUR ASTHMA CONTROL: WELL CONTROLLED
QUESTION_3 LAST FOUR WEEKS HOW OFTEN DID YOUR ASTHMA SYMPTOMS (WHEEZING, COUGHING, SHORTNESS OF BREATH, CHEST TIGHTNESS OR PAIN) WAKE YOU UP AT NIGHT OR EARLIER THAN USUAL IN THE MORNING: NOT AT ALL
QUESTION_1 LAST FOUR WEEKS HOW MUCH OF THE TIME DID YOUR ASTHMA KEEP YOU FROM GETTING AS MUCH DONE AT WORK, SCHOOL OR AT HOME: NONE OF THE TIME
ACT_TOTALSCORE: 24
QUESTION_2 LAST FOUR WEEKS HOW OFTEN HAVE YOU HAD SHORTNESS OF BREATH: NOT AT ALL
ACT_TOTALSCORE: 24
QUESTION_4 LAST FOUR WEEKS HOW OFTEN HAVE YOU USED YOUR RESCUE INHALER OR NEBULIZER MEDICATION (SUCH AS ALBUTEROL): NOT AT ALL

## 2024-01-22 ASSESSMENT — PATIENT HEALTH QUESTIONNAIRE - PHQ9
SUM OF ALL RESPONSES TO PHQ QUESTIONS 1-9: 16
5. POOR APPETITE OR OVEREATING: MORE THAN HALF THE DAYS

## 2024-01-22 NOTE — ASSESSMENT & PLAN NOTE
Question about birth control - she had a period after going vertically rather than horizontally in the pack.  Hard to say whether her fourth week is placebo or not, this could be a month-to-month variation depending on the insurance's and pharmacy's deals with (s). Should check upon receipt.

## 2024-01-22 NOTE — ASSESSMENT & PLAN NOTE
Anxiety worsened with the recent worsening - mainly the problem is opioid withdrawal and erythromelalgia. The VVF and Ronna talked about mental health referral. She is benefitting from venlafaxine, she says.

## 2024-01-22 NOTE — NURSING NOTE
Is the patient currently in the state of [place]? YES    Visit mode:VIDEO    If the visit is dropped, the patient can be reconnected by: VIDEO VISIT: Send to e-mail at: [email or web address]    Will anyone else be joining the visit? YES: How would they like to receive their invitation? Send to e-mail: NA  (If patient encounters technical issues they should call [phone]    How would you like to obtain your AVS? MyChart    Are changes needed to the allergy or medication list? Yes Not using fentanyl patches, kenalog cream, or lidocaine soln. Not taking bactrim, venlafaxine 37.5 mg, Vitamin D3    PT also taking B-12, Probiotic, Calcium    Reason for visit: RECHECK and Recheck Medication      Depression Response    Patient completed the PHQ-9 assessment for depression and scored >9? Yes  Question 9 on the PHQ-9 was positive for suicidality? No  Does patient have current mental health provider? No    Is this a virtual visit? Yes   Does patient have suicidal ideation (positive question 9)? No - offer to place Mental Health Referral.  Referral order pended    I personally notified the following: visit provider      Joel SMITH

## 2024-01-22 NOTE — PATIENT INSTRUCTIONS
Thank you for visiting the Primary Care Center today at the [facility]! The following is some information about our clinic:     Primary Care Center Frequently-Asked Questions    (1) How do I schedule appointments at the [facility]?     Primary Care--to schedule or make changes to an existing appointment, please call our primary care line at [phone].    Labs--to schedule a lab appointment at the [facility] you can use [employer] or call [phone]. If you have a [place] location that is closer to home, you can reach out to that location for scheduling options.     Imaging--if you need to schedule a CT, X-ray, MRI, ultrasound, or other imaging study you can call [phone] to schedule at the [facility] or any other [facility] imaging location.     Referrals--if a referral to another specialty was ordered you can expect a phone call from their scheduling team. If you have not heard from them in a week, please call us or send us a [employer] message to check the status or get a scheduling number. Please note that this only applies to internal [facility] referrals. If the referral is external you would need to contact their office for scheduling.     (2) I have a question about my visit, who do I contact?     You can call us at the primary care line at [phone] to ask questions about your visit. You can also send a secure message through [employer], which is reviewed by clinic staff. Please note that [employer] messages have a twenty-four to forty-eight business hour turnaround time and should not be used for urgent concerns.    (3) How will I get the results of my tests?    If you are signed up for SmartyContentt all tests will be released to you within twenty-four hours of resulting. Please allow three to five days for your doctor to review your results and place a note interpreting the results. If you do not have Aqdothart you will receive your  results through mail seven to ten business days following the return of the tests. Please note that if there should be any urgent or concerning results that your doctor or their registered nurse will reach out to you the same day as the tests come back. If you have follow up questions about your results or would like to discuss the results in detail please schedule a follow up with your provider either in person or virtually.     (4) How do I get refills of my prescriptions?     You should always first contact your pharmacy for refills of your medications. If submitting a refill request on [employer], please be sure to submit the request only once--repeat requests can cause delays in refill. If you are requesting a NEW medication or a medication related to new symptoms you will need to schedule an appointment with a provider prior to approval. Please note: Routine medication refills have up to one to three business day turnaround whereas controlled substances refills have up to five to seven business day turnaround.    (5) I have new symptoms, what do I do?     If you are having an immediate medical emergency, you should dial 911 for assistance.   For anything urgent that needs to be seen within a few hours to one day you should visit a local urgent care for assistance.  For non-urgent symptoms that need to be seen within a few days to a week you can schedule with an available provider in primary care by going to [employer] or calling [phone].   If you are not sure how serious your symptoms are or you would like to receive medical advice you can always call [phone] to speak with a triage nurse.

## 2024-01-22 NOTE — ASSESSMENT & PLAN NOTE
Has had rough symptoms lately, attributed to weaning medication in preparation for the upcoming pain pump. Had some flares, and some secondary anxiety. (reminder to reader: opioids and benzos are both symptomatic AND therapeutic for the primary condition - the trick is to keep her symptoms at a low level rather than gone, to hopefully mitigate the tendency to need more and more of these meds.  Has been off fentanyl entirely since last week, used dilaudid as needed.  The doctor who is doing the procedure was more lenient than the PA that Ronna Wright originally spoke with. It is ok to have the dilaudid somewhat.  Has had to use the ativan up to daily lately, to reduce the pain  Has noticed clonidine lowering BP - only symptom seems to be mild incidental dizziness and perhaps more fatigue. She feels the clonidine may be helping the erythromelalgia.        ASSESSMENT and PLAN: Discussed about med planning. Should only make one major change per week, and update us so it is documented for me and others.  See also anxiety and menstrual suppression    ADDENDUM: requested mexiletine refill, done.

## 2024-01-22 NOTE — PROGRESS NOTES
"Dear patient. Thank you for visiting with me. I want you to feel respected, understood, and empowered. \"Respect\" is valuing you as much as I would a close family member. \"Empowerment\" happens when you are fully informed, and can make the best possible decision for you.  Please ask me questions!  Challenge anything that is not clear.    Medical records are primarily used as memory aids for me and my colleagues. Things to know about my documentation style:  - The 'problem list' includes current symptoms or diagnoses, and some problems that are resolved but may return. I use the past medical history for problems not expected to return.  - I use single quotation marks for things that you or I said, when I want to clarify who was speaking.  - I use double quotation marks when copying a term from elsewhere in your records. Italics (besides here) may also denote a quotation.  If you have questions or concerns, please contact me; I will reply as soon as time allows.    Context    PCP: Neal Ball   Visit type: problem-oriented    Ronna Rivera is a 38 year old woman, seen with her , with concerns including:  Chief Complaint   Patient presents with    RECHECK    Recheck Medication       History, update, and/or problems    Problem List as of 1/22/2024 Reviewed: 1/22/2024  9:04 AM by Neal Ball MD            Noted    1. Erythromelalgia (H24) - Primary 3/25/2021     Overview Addendum 7/11/2023  2:10 PM by Neal Ball MD      Feet very painful and bad enough to ulcerate and wound.  (also has lesser problems with hands and face)  3/6/2023 back on steroids (pulse, then BIW).  Takes venlafaxine.  Has been on rituximab, imuran, IVIG, ASA, mexiletine  Improved after high-dose intermittent steroids, then worsened.  Dx with testing by Dr. Shannon De La Rosa at [facility]. Also seeing Dr. Mook James @ [facility]  6/26/23: derm biopsy @ [place] showed changes consistent with healing skin.          " Last Assessment & Plan 1/22/2024 Virtual Visit Edited 1/22/2024  1:04 PM by Neal Ball MD      Has had rough symptoms lately, attributed to weaning medication in preparation for the upcoming pain pump. Had some flares, and some secondary anxiety. (reminder to reader: opioids and benzos are both symptomatic AND therapeutic for the primary condition - the trick is to keep her symptoms at a low level rather than gone, to hopefully mitigate the tendency to need more and more of these meds.  Has been off fentanyl entirely since last week, used dilaudid as needed.  The doctor who is doing the procedure was more lenient than the PA that Ronna Wright originally spoke with. It is ok to have the dilaudid somewhat.  Has had to use the ativan up to daily lately, to reduce the pain  Has noticed clonidine lowering BP - only symptom seems to be mild incidental dizziness and perhaps more fatigue. She feels the clonidine may be helping the erythromelalgia.        ASSESSMENT and PLAN: Discussed about med planning. Should only make one major change per week, and update us so it is documented for me and others.  See also anxiety and menstrual suppression    ADDENDUM: requested mexiletine refill, done.            Relevant Medications     mexiletine (MEXITIL) 150 MG capsule     Other Relevant Orders     Adult Mental Health  Referral    2. Menstrual suppression because of erythromelalagia 10/17/2022     Overview Signed 10/17/2022  9:48 AM by Neal Ball MD      Reason: worsened erythromelalgia pre-menstrually  Method: Norethindrone because of migraines with aura.  Single dose without gap started 10/17/22.  May need to increase to BID after December.            Last Assessment & Plan 1/22/2024 Virtual Visit Written 1/22/2024  9:02 AM by Neal Ball MD      Question about birth control - she had a period after going vertically rather than horizontally in the pack.  Hard to say whether her fourth  "week is placebo or not, this could be a month-to-month variation depending on the insurance's and pharmacy's deals with (s). Should check upon receipt.         3. Anxiety 1/1/2021     Overview Addendum 10/17/2022  9:54 AM by Neal Ball MD      \"Mood problems\" = oversimplified term for adjustment reaction and stress (her own medical issues plus 2 autistic kids) and medication side effects (IV steroids). She sees a therapist and psychiatrist.  Off duloxetine as of 10/17/2022          Last Assessment & Plan 10/17/2022 Virtual Visit Written 10/17/2022  9:54 AM by Neal Ball MD      See comments about duloxetine under vasovagal spells              Time note (e4, 30'): The total time (on the date of service) for this service was 30 minutes, including discussion/face-to-face, chart review, interpretation not otherwise reported, documentation, and updating of the computerized record.      Virtual Visit Details    Type of service:  Video Visit   Start Time: 8:35 AM  End Time:8:59 AM    Originating Location (pt. Location): Home    Distant Location (provider location):  Off-site  Platform used for Video Visit: Irene  "

## 2024-01-31 ENCOUNTER — DOCUMENTATION ONLY (OUTPATIENT)
Dept: INTERNAL MEDICINE | Facility: CLINIC | Age: 39
End: 2024-01-31
Payer: COMMERCIAL

## 2024-01-31 NOTE — PROGRESS NOTES
Type of Form Received:     Form Received (Date) 1/31/24   Form Filled out Yes, faxed 2/2   Placed in provider folder Yes

## 2024-02-01 ENCOUNTER — MEDICAL CORRESPONDENCE (OUTPATIENT)
Dept: HEALTH INFORMATION MANAGEMENT | Facility: CLINIC | Age: 39
End: 2024-02-01
Payer: COMMERCIAL

## 2024-02-12 ENCOUNTER — MEDICAL CORRESPONDENCE (OUTPATIENT)
Dept: HEALTH INFORMATION MANAGEMENT | Facility: CLINIC | Age: 39
End: 2024-02-12

## 2024-02-12 ENCOUNTER — VIRTUAL VISIT (OUTPATIENT)
Dept: INTERNAL MEDICINE | Facility: CLINIC | Age: 39
End: 2024-02-12
Payer: COMMERCIAL

## 2024-02-12 DIAGNOSIS — I73.81 ERYTHROMELALGIA (H): Primary | ICD-10-CM

## 2024-02-12 DIAGNOSIS — N94.89 MENSTRUAL SUPPRESSION: ICD-10-CM

## 2024-02-12 PROCEDURE — 99213 OFFICE O/P EST LOW 20 MIN: CPT | Mod: 95 | Performed by: PEDIATRICS

## 2024-02-12 NOTE — PROGRESS NOTES
"Dear patient. Thank you for visiting with me. I want you to feel respected, understood, and empowered. \"Respect\" is valuing you as much as I would a close family member. \"Empowerment\" happens when you are fully informed, and can make the best possible decision for you.  Please ask me questions!  Challenge anything that is not clear.    Medical records are primarily used as memory aids for me and my colleagues. Things to know about my documentation style:  - The 'problem list' includes current symptoms or diagnoses, and some problems that are resolved but may return. I use the past medical history for problems not expected to return.  - I use single quotation marks for things that you or I said, when I want to clarify who was speaking.  - I use double quotation marks when copying a term from elsewhere in your records. Italics (besides here) may also denote a quotation.  If you have questions or concerns, please contact me; I will reply as soon as time allows.    Context    PCP: Neal Ball   Visit type: problem-oriented    Ronna Rivera is a 38 year old woman, seen with her , with concerns including:  Chief Complaint   Patient presents with    RECHECK    Erythromelalgia       History, update, and/or problems      Problem List as of 2/12/2024 Reviewed: 1/22/2024  9:04 AM by Neal Ball MD            Noted    1. Erythromelalgia (H24) - Primary 3/25/2021     Overview Addendum 2/12/2024  8:58 AM by Neal Ball MD      Feet very painful and bad enough to ulcerate and wound.  (also has lesser problems with hands and face)  mexiletine, imuran, venlafaxine, gabapentin.  Has been on steroids, rituximab, IVIG, ASA, mexiletine  Improved after high-dose intermittent steroids, then worsened.  Dx with testing by Dr. Shannon De La Rosa at [facility]. Also seeing Dr. Mook James @ [facility]  6/26/23: derm biopsy @ [place] showed changes consistent with healing skin.          Last Assessment & " Plan 2/12/2024 Virtual Visit Written 2/12/2024  8:57 AM by Neal Ball MD      It has been a few weeks. Surgery went ok, with pump placement.  Clonidine was lowering BP and caused some headaches, wasn't sure about CSF leakage, but headaches better since clonidine decreased.  Has had an increase in dose twice since surgery, still not quite where we need to be. They are trying to go slow. Sometimes getting pushback from staff instead of the provider - I suspect this is because Ronna Wright has different cause of pain than the staff are used to.  Erythromelalgia is a little crazy from day to day, up and down, sometimes even ears and nose, where it hasn't been in a while. The pump area itself seems to be ok.  Not on patch or oral, except for a really bad time on Saturday when she took a single dilaudid.  Not having redness without pain.  Not having IVF. Has steroids sent out just in case.       ASSESSMENT: Glad that there seems to be making progress  My main concern that might not be addressed is ulceration without feeling, so they are keeping a close eye on ulcers.  -- The erythromelalgia flare after pump placement seems consistent with what we often see. Actually it is reassuring about previous control because of brief return of areas that have mostly unaffected for a while.         2. Menstrual suppression because of erythromelalagia 10/17/2022     Overview Signed 10/17/2022  9:48 AM by Neal Ball MD      Reason: worsened erythromelalgia pre-menstrually  Method: Norethindrone because of migraines with aura.  Single dose without gap started 10/17/22.  May need to increase to BID after December.            Last Assessment & Plan 2/12/2024 Virtual Visit Written 2/12/2024  8:58 AM by Neal Ball MD      Had another period despite the 0.7mg, which was surprising.   I don't feel comfortable increasing dose at this time, but can revisit after pump dose has stabilized.            Pending  issues (Dr. Ball)  ---------------------------------------------------  -- ask about norethindrone again        Time note (e3, 20'): The total time (on the date of service) for this service was 22 minutes, including discussion/face-to-face, chart review, interpretation not otherwise reported, documentation, and updating of the computerized record.    Virtual Visit Details    Type of service:  Video Visit   Start Time: 08 34  End Time:8:56 AM    Originating Location (pt. Location): Home    Distant Location (provider location):  Off-site  Platform used for Video Visit: [employer]

## 2024-02-12 NOTE — PATIENT INSTRUCTIONS
Thank you for visiting the Primary Care Center today at the [facility]! The following is some information about our clinic:     Primary Care Center Frequently-Asked Questions    (1) How do I schedule appointments at the [facility]?     Primary Care--to schedule or make changes to an existing appointment, please call our primary care line at [phone].    Labs--to schedule a lab appointment at the [facility] you can use [employer] or call [phone]. If you have a [place] location that is closer to home, you can reach out to that location for scheduling options.     Imaging--if you need to schedule a CT, X-ray, MRI, ultrasound, or other imaging study you can call [phone] to schedule at the [facility] or any other [facility] imaging location.     Referrals--if a referral to another specialty was ordered you can expect a phone call from their scheduling team. If you have not heard from them in a week, please call us or send us a [employer] message to check the status or get a scheduling number. Please note that this only applies to internal [facility] referrals. If the referral is external you would need to contact their office for scheduling.     (2) I have a question about my visit, who do I contact?     You can call us at the primary care line at [phone] to ask questions about your visit. You can also send a secure message through [employer], which is reviewed by clinic staff. Please note that [employer] messages have a twenty-four to forty-eight business hour turnaround time and should not be used for urgent concerns.    (3) How will I get the results of my tests?    If you are signed up for HandMinder[employer] all tests will be released to you within twenty-four hours of resulting. Please allow three to five days for your doctor to review your results and place a note interpreting the results. If you do not have [email or web address] you will receive your  results through mail seven to ten business days following the return of the tests. Please note that if there should be any urgent or concerning results that your doctor or their registered nurse will reach out to you the same day as the tests come back. If you have follow up questions about your results or would like to discuss the results in detail please schedule a follow up with your provider either in person or virtually.     (4) How do I get refills of my prescriptions?     You should always first contact your pharmacy for refills of your medications. If submitting a refill request on [employer], please be sure to submit the request only once--repeat requests can cause delays in refill. If you are requesting a NEW medication or a medication related to new symptoms you will need to schedule an appointment with a provider prior to approval. Please note: Routine medication refills have up to one to three business day turnaround whereas controlled substances refills have up to five to seven business day turnaround.    (5) I have new symptoms, what do I do?     If you are having an immediate medical emergency, you should dial 911 for assistance.   For anything urgent that needs to be seen within a few hours to one day you should visit a local urgent care for assistance.  For non-urgent symptoms that need to be seen within a few days to a week you can schedule with an available provider in primary care by going to [employer] or calling [phone].   If you are not sure how serious your symptoms are or you would like to receive medical advice you can always call [phone] to speak with a triage nurse.

## 2024-02-12 NOTE — ASSESSMENT & PLAN NOTE
Had another period despite the 0.7mg, which was surprising.   I don't feel comfortable increasing dose at this time, but can revisit after pump dose has stabilized.

## 2024-02-12 NOTE — ASSESSMENT & PLAN NOTE
It has been a few weeks. Surgery went ok, with pump placement.  Clonidine was lowering BP and caused some headaches, wasn't sure about CSF leakage, but headaches better since clonidine decreased.  Has had an increase in dose twice since surgery, still not quite where we need to be. They are trying to go slow. Sometimes getting pushback from staff instead of the provider - I suspect this is because Ronna Wright has different cause of pain than the staff are used to.  Erythromelalgia is a little crazy from day to day, up and down, sometimes even ears and nose, where it hasn't been in a while. The pump area itself seems to be ok.  Not on patch or oral, except for a really bad time on Saturday when she took a single dilaudid.  Not having redness without pain.  Not having IVF. Has steroids sent out just in case.       ASSESSMENT: Glad that there seems to be making progress  My main concern that might not be addressed is ulceration without feeling, so they are keeping a close eye on ulcers.  -- The erythromelalgia flare after pump placement seems consistent with what we often see. Actually it is reassuring about previous control because of brief return of areas that have mostly unaffected for a while.

## 2024-02-12 NOTE — NURSING NOTE
Is the patient currently in the state of [place]? YES    Visit mode:VIDEO    If the visit is dropped, the patient can be reconnected by: VIDEO VISIT: Send to e-mail at: [email or web address]    Will anyone else be joining the visit? NO  (If patient encounters technical issues they should call [phone]    How would you like to obtain your AVS? MyChart    Are changes needed to the allergy or medication list? Yes please see med flagged for removal.    Reason for visit: RECHECK and Erythromelalgia    Angélica SMITH

## 2024-02-14 ENCOUNTER — DOCUMENTATION ONLY (OUTPATIENT)
Dept: INTERNAL MEDICINE | Facility: CLINIC | Age: 39
End: 2024-02-14
Payer: COMMERCIAL

## 2024-02-14 NOTE — PROGRESS NOTES
Type of Form Received:     Form Received (Date) 2/14/24   Form Filled out Yes 2/19/24   Placed in provider folder Yes

## 2024-02-18 ENCOUNTER — MEDICAL CORRESPONDENCE (OUTPATIENT)
Dept: HEALTH INFORMATION MANAGEMENT | Facility: CLINIC | Age: 39
End: 2024-02-18
Payer: COMMERCIAL

## 2024-02-21 ENCOUNTER — DOCUMENTATION ONLY (OUTPATIENT)
Dept: INTERNAL MEDICINE | Facility: CLINIC | Age: 39
End: 2024-02-21
Payer: COMMERCIAL

## 2024-02-21 NOTE — PROGRESS NOTES
Type of Form Received:     Form Received (Date) 2/21/24   Form Filled out No   Placed in provider folder Yes

## 2024-02-28 ENCOUNTER — DOCUMENTATION ONLY (OUTPATIENT)
Dept: INTERNAL MEDICINE | Facility: CLINIC | Age: 39
End: 2024-02-28
Payer: COMMERCIAL

## 2024-02-28 NOTE — PROGRESS NOTES
Next appt 05/05/2023  Last appt 11/04/2022    Refill request for   Disp Refills Start End    amLODIPine (NORVASC) 5 MG tablet 90 tablet 0 8/9/2022     Sig: TAKE 1 TABLET BY MOUTH ONCE DAILY IN THE MORNING FOR HYPERTENSION        Refilled per standing med protocol.     Type of Form Received:     Form Received (Date) 2/28/24   Form Filled out Yes 3/7/24   Placed in provider folder Yes

## 2024-02-28 NOTE — PROGRESS NOTES
Type of Form Received:     Form Received (Date) 2/27/24   Form Filled out Yes 3/7/24   Placed in provider folder Yes

## 2024-03-03 ENCOUNTER — MEDICAL CORRESPONDENCE (OUTPATIENT)
Dept: HEALTH INFORMATION MANAGEMENT | Facility: CLINIC | Age: 39
End: 2024-03-03
Payer: COMMERCIAL

## 2024-03-06 ENCOUNTER — DOCUMENTATION ONLY (OUTPATIENT)
Dept: INTERNAL MEDICINE | Facility: CLINIC | Age: 39
End: 2024-03-06
Payer: COMMERCIAL

## 2024-03-06 ENCOUNTER — TELEPHONE (OUTPATIENT)
Dept: INTERNAL MEDICINE | Facility: CLINIC | Age: 39
End: 2024-03-06
Payer: COMMERCIAL

## 2024-03-06 NOTE — PROGRESS NOTES
Type of Form Received:     Form Received (Date) 3/6/24   Form Filled out Yes 3/7/24   Placed in provider folder Yes

## 2024-03-06 NOTE — TELEPHONE ENCOUNTER
[facility] Call Center    Phone Message    May a detailed message be left on voicemail: yes     Reason for Call: Other: Lizzeth at [facility] called to follow up on the orders that they faxed to the clinic, requesting they be signed and faxed back to [phone]

## 2024-03-07 ENCOUNTER — MEDICAL CORRESPONDENCE (OUTPATIENT)
Dept: HEALTH INFORMATION MANAGEMENT | Facility: CLINIC | Age: 39
End: 2024-03-07
Payer: COMMERCIAL

## 2024-03-12 ENCOUNTER — PATIENT OUTREACH (OUTPATIENT)
Dept: CARE COORDINATION | Facility: CLINIC | Age: 39
End: 2024-03-12
Payer: COMMERCIAL

## 2024-03-12 NOTE — PROGRESS NOTES
Clinic Care Coordination Contact    Situation: Patient chart reviewed by care coordinator.    Background: Pt identified via PCP as candidate for care coordination.     Assessment: [employer] message sent to patient offering CC services.     Plan/Recommendations: RN will await pt's reply for further outreach.     FLORINA GROVE RN on 3/12/2024 at 3:38 PM    Patient read [employer] message and did not reply. RN will close program due to patient declining.     FLORINA GROVE RN on 3/26/2024 at 11:40 AM

## 2024-03-20 ENCOUNTER — TRANSFERRED RECORDS (OUTPATIENT)
Dept: HEALTH INFORMATION MANAGEMENT | Facility: CLINIC | Age: 39
End: 2024-03-20
Payer: COMMERCIAL

## 2024-03-28 ENCOUNTER — DOCUMENTATION ONLY (OUTPATIENT)
Dept: INTERNAL MEDICINE | Facility: CLINIC | Age: 39
End: 2024-03-28
Payer: COMMERCIAL

## 2024-03-28 NOTE — PROGRESS NOTES
Type of Form Received:     Form Received (Date) 3/28/24   Form Filled out Yes, faxed 4/1   Placed in provider folder Yes

## 2024-03-29 ENCOUNTER — DOCUMENTATION ONLY (OUTPATIENT)
Dept: INTERNAL MEDICINE | Facility: CLINIC | Age: 39
End: 2024-03-29
Payer: COMMERCIAL

## 2024-03-31 ENCOUNTER — MEDICAL CORRESPONDENCE (OUTPATIENT)
Dept: HEALTH INFORMATION MANAGEMENT | Facility: CLINIC | Age: 39
End: 2024-03-31
Payer: COMMERCIAL

## 2024-04-02 NOTE — PROGRESS NOTES
Type of Form Received:     Form Received (Date) 3/29/24   Form Filled out Yes, faxed 4/8/24   Placed in provider folder Yes

## 2024-04-07 ENCOUNTER — MEDICAL CORRESPONDENCE (OUTPATIENT)
Dept: HEALTH INFORMATION MANAGEMENT | Facility: CLINIC | Age: 39
End: 2024-04-07
Payer: COMMERCIAL

## 2024-04-22 ENCOUNTER — TRANSFERRED RECORDS (OUTPATIENT)
Dept: HEALTH INFORMATION MANAGEMENT | Facility: CLINIC | Age: 39
End: 2024-04-22
Payer: COMMERCIAL

## 2024-04-29 NOTE — PATIENT INSTRUCTIONS
Thank you for visiting the [facility].    We completed your PAP smear screening for cervical cancer. You will receive your results and recommended follow up as soon as they are available.    We recommend scheduling a pelvic ultrasound to evaluate for abnormalities that may cause increased menstrual bleeding. We will have a follow up appointment after this imaging to discuss your results.     Please do not hesitate to contact the office if you have any questions or concerns.

## 2024-05-01 NOTE — PROGRESS NOTES
SUBJECTIVE:                                                   Ronna Rivera is a 38 year old female who presents to clinic today for the following health issue(s):  Patient presents with:  Abnormal Uterine Bleeding: Last menses from 04/10/2024 to 2024.    HPI:  Ronna Wright presents for abnormal uterine bleeding -  menorrhagia x 4 months.     She reports cycles were always regular with average flow (5 days in length, 2 heavier days, 3 lighter days) following menarche. 3 years ago she was diagnosed with erythromelalgia and was started on progestin only pills (has migraine with aura) by PCP for menstrual suppression in attempt to improve symptoms. She reports POPs did slightly improve symptoms of erythromelalgia. For the last few years, she had occasional spotting/breakthrough bleeding but not full menstrual periods.    Over the last 4 months, patient began having increasingly heavy and longer menstrual periods. Most recent menses from 4/10 to . Has not been sexually active with chronic pain/erythromelalgia, no concern for pregnancy or STIs.     Denies fever/chills, pelvic pain/pressure, vaginal pain, vaginal lesions, changes in vaginal discharge, urinary burning / change in frequency.     Reports easy bruising, frequent epistaxis as a teen, but has had extensive lab work completed with [facility] and denies evidence of bleeding disorder.     Patient does not urinary urgency, but has had this since the birth of her twins. Wonders about pelvic floor PT.     Patient was on IV steroids for 8 months, discontinued last November and has had some adjustments in pain medications, but otherwise denies changes in medications during last 4 months.     Patient due for PAP, last done 2009 (NIL).    Patient's last menstrual period was 04/10/2024..     Patient is not sexually active, .  Using oral contraceptives for contraception.    reports that she has never smoked. She has never been exposed to tobacco  smoke. She has never used smokeless tobacco.    STD testing offered?  Declined    Health maintenance updated:  yes    Overdue          Never done ASTHMA ACTION PLAN (Yearly)     Never done ADVANCE CARE PLANNING (Every 5 Years)     Never done DEPRESSION ACTION PLAN (Once)     Never done Pneumococcal Vaccine: Pediatrics (0 to 5 Years) and At-Risk Patients (6 to 64 Years) (1 of 2 - PCV)     Never done HEPATITIS B IMMUNIZATION (1 of 3 - 19+ 3-dose series)     JAN 9 2010 YEARLY PREVENTIVE VISIT (Yearly)  Last completed: Jan 9, 2009 JAN 9 2012 PAP (Every 3 Years)  Last completed: Jan 9, 2009 JAN 14 2022 COVID-19 Vaccine (3 - Moderna risk series)  Last completed: Dec 17, 2021       Today's PHQ-2 Score:       2/12/2024     8:35 AM   PHQ-2 ( 1999 [employer])   Q1: Little interest or pleasure in doing things 1   Q2: Feeling down, depressed or hopeless 1   PHQ-2 Score 2     Today's PHQ-9 Score:       5/2/2024     1:08 PM   PHQ-9 SCORE   PHQ-9 Total Score MyChart 5 (Mild depression)   PHQ-9 Total Score 5     Today's RACHEL-7 Score:       5/2/2024     1:08 PM   RACHEL-7 SCORE   Total Score 7 (mild anxiety)   Total Score 7       Problem list and histories reviewed & adjusted, as indicated.  Additional history: as documented.    Patient Active Problem List   Diagnosis    Other chronic pain    Rash and nonspecific skin eruption    Erythromelalgia (H24)    Anxiety    DDD (degenerative disc disease), lumbar    Migraine with aura and without status migrainosus, not intractable    Chronic insomnia    Asthma, exercise induced    Hypermobile joints    Vasovagal syncope    Autoimmune autonomic neuropathy    Impaired mobility    Abnormal TSH    Need for pneumocystis prophylaxis    Medical marijuana use    Adrenal suppression (H24)    Menstrual suppression because of erythromelalagia    Adjustment reaction with anxiety and depression    Recurrent herpes labialis    PICC (peripherally inserted central catheter) in place    Foot ulcer,  right, with fat layer exposed (H)    Foot ulcer, left, with fat layer exposed (H)    Bilateral finger numbness    Small fiber neuropathy    Need for hepatitis B vaccination    History of vitamin D deficiency     Past Surgical History:   Procedure Laterality Date    INSERT PICC LINE Left 06/23/2021    Procedure: INSERTION, PICC;  Surgeon: Neal Penny MD;  Location: [facility] OR    IR PICC PLACEMENT > 5 YRS OF AGE  06/23/2021    NO HISTORY OF SURGERY      PICC SINGLE LUMEN PLACEMENT Left 03/02/2023    Left brachial-lateral vein 44cm total 1cm external.Placement verified by [facility] 3CG.PICC okay to use.      Social History     Tobacco Use    Smoking status: Never     Passive exposure: Never    Smokeless tobacco: Never   Substance Use Topics    Alcohol use: No      Problem (# of Occurrences) Relation (Name,Age of Onset)    Diabetes (1) Maternal Grandfather    Hypertension (2) Father, Paternal Grandfather    Cerebrovascular Disease (1) Maternal Grandmother    Breast Cancer (1) Paternal Grandmother    C.A.D. (1) Paternal Grandfather           Negative family history of: Schizophrenia              Current Outpatient Medications   Medication Sig Dispense Refill    azaTHIOprine (IMURAN) 50 MG tablet Take 100 mg by mouth daily      Cyanocobalamin (VITAMIN B 12 PO)       diphenhydrAMINE (BENADRYL) 50 MG capsule Take 50 mg by mouth At Bedtime      gabapentin (NEURONTIN) 300 MG capsule Take 1 po mid-day along with 600 mg (total 900 mg) 30 capsule 11    gabapentin (NEURONTIN) 600 MG tablet Take 2 po AM (1200 mg), 1 po Mid-day along with a 300 mg capsule (900 mg), and 2 po PM (1200 mg) 150 tablet 11    ibuprofen (ADVIL/MOTRIN) 200 MG tablet Take 400 mg by mouth every 6 hours as needed for moderate pain      lidocaine (XYLOCAINE) 4 % external solution Apply topically daily Apply approximately 10mL to each foot (total of 20mL) daily 600 mL 2    lidocaine (XYLOCAINE) 5 % external ointment Apply topically every 4 hours as needed  "for moderate pain (4-6) 50 g 63    LORazepam (ATIVAN) 0.5 MG tablet Take 1 tablet (0.5 mg) by mouth daily as needed for anxiety 20 tablet 0    MAGNESIUM CITRATE PO Take 500 mg by mouth daily      melatonin 1 MG TABS tablet Take 4 mg by mouth At Bedtime      mexiletine (MEXITIL) 150 MG capsule Take 1 capsule (150 mg) by mouth every 8 hours 90 capsule 3    naloxone (NARCAN) 4 MG/0.1ML nasal spray Spray 1 spray (4 mg) into one nostril alternating nostrils as needed for opioid reversal every 2-3 minutes until assistance arrives 0.2 mL 0    norethindrone (MICRONOR) 0.35 MG tablet Take 2 tablets (0.7 mg) by mouth daily Take the first 3 weeks of each pack, and then immediately move on to the next pack (discard the 4th week of each pack) 180 tablet 3    ondansetron (ZOFRAN ODT) 4 MG ODT tab Take 1 tablet (4 mg) by mouth every 6 hours as needed for nausea or vomiting 30 tablet 0    triamcinolone (KENALOG) 0.1 % external cream Apply topically daily Apply to feet daily 80 g 1    venlafaxine (EFFEXOR XR) 75 MG 24 hr capsule Take 1 capsule (75 mg) by mouth daily 90 capsule 3    collagenase (SANTYL) 250 UNIT/GM external ointment Apply topically daily as needed (with foot wound cares) (Patient not taking: Reported on 5/2/2024)      vitamin C (ASCORBIC ACID) 250 MG tablet Take 250 mg by mouth daily (Patient not taking: Reported on 5/2/2024)      Vitamin D3 (CHOLECALCIFEROL) 25 mcg (1000 units) tablet Take 25 mcg by mouth daily (Patient not taking: Reported on 5/2/2024)       No current facility-administered medications for this visit.     Allergies   Allergen Reactions    Topiramate Anxiety       OBJECTIVE:     /64   Ht 1.562 m (5' 1.5\")   Wt 53.1 kg (117 lb)   LMP 04/10/2024   BMI 21.75 kg/m    Body mass index is 21.75 kg/m .    Exam:  Constitutional:  Appearance: Well nourished, well developed alert, in no acute distress  Neurologic:  Mental Status:  Oriented X3.  Normal strength and tone, sensory exam grossly normal, " mentation intact and speech normal.    Psychiatric:  Mentation appears normal and affect normal/bright.  Pelvic Exam:  External Genitalia:     Normal appearance for age, no discharge present, no tenderness present, no inflammatory lesions present, color normal  Vagina:     Normal vaginal vault without central or paravaginal defects, no discharge present, no inflammatory lesions present, no masses present  Bladder:     Nontender to palpation  Urethra:   Urethral Body:  Urethra palpation normal, urethra structural support normal   Urethral Meatus:  No erythema or lesions present  Cervix:     Appearance healthy, no lesions present, nontender to palpation, no bleeding present,   Uterus:     Uterus: firm, normal sized and nontender  Adnexa:     No adnexal tenderness present, no adnexal masses present  Perineum:     Perineum within normal limits, no evidence of trauma, no rashes or skin lesions present  Anus:     Anus within normal limits, no hemorrhoids present  Inguinal Lymph Nodes:     No lymphadenopathy present  Pubic Hair:     Normal pubic hair distribution for age  Genitalia and Groin:     No rashes present, no lesions present, no areas of discoloration, no masses present     In-Clinic Test Results:  No results found for this or any previous visit (from the past 24 hour(s)).    ASSESSMENT/PLAN:                                                        ICD-10-CM    1. Menorrhagia with regular cycle  N92.0 Ob/Gyn  Referral     US Transvaginal Pelvic Non-OB      2. Screening for cervical cancer  Z12.4 Pap screen with HPV - recommended age 30 - 65 years      3. Urinary urgency  R39.15 Physical Therapy  Referral          Patient Instructions   Thank you for visiting the [facility].    We completed your PAP smear screening for cervical cancer. You will receive your results and recommended follow up as soon as they are available.    We recommend scheduling a pelvic ultrasound to  evaluate for abnormalities that may cause increased menstrual bleeding. We will have a follow up appointment after this imaging to discuss your results.     Please do not hesitate to contact the office if you have any questions or concerns.      Patient presents today for abnormal uterine bleeding, menorrhagia x 4 months.     Menorrhagia  - Discussed potential causes of abnormal bleeding including thyroid abnormalities, structural uterine abnormalities, abnormal rise in estrogen with prolonged bleeding, pregnancy, infection, hyperplasia, bleeding disorders, medications.   - ordered TVUS to assess for structural abnormalities given gradual increase in cycle length and flow over past few months  - chronic IV steroids may have affected menstrual flow during use, but now multiple months out from discontinuation   - normal CBC with PCP in Jan of this year, extensive bloodwork with [place] [facility] [place] - not suggestive of bleeding disorder  - normal TSH with PCP in Jan of this year  - patient can continue POPs for now, can adjust as needed if symptoms continue / pending results of TVUS    Urinary Urgency  - referral placed for Pelvic Floor Physical Therapy    Updated PAP. Results via PAP team when complete.     Emi Frederick PA-C  [facility] [place]    Answers submitted by the patient for this visit:  Patient Health Questionnaire (Submitted on 5/2/2024)  If you checked off any problems, how difficult have these problems made it for you to do your work, take care of things at home, or get along with other people?: Not difficult at all  PHQ9 TOTAL SCORE: 5  RACHEL-7 (Submitted on 5/2/2024)  RACHEL 7 TOTAL SCORE: 7  General Questionnaire (Submitted on 5/2/2024)  Chief Complaint: Chronic problems general questions HPI Form  How many servings of fruits and vegetables do you eat daily?: 4 or more  On average, how many sweetened beverages do you drink each day (Examples: soda, juice, sweet tea, etc.   Do NOT count diet or artificially sweetened beverages)?: 0  How many minutes a day do you exercise enough to make your heart beat faster?: 20 to 29  How many days a week do you exercise enough to make your heart beat faster?: 7  How many days per week do you miss taking your medication?: 0  General Concern (Submitted on 5/2/2024)  Chief Complaint: Chronic problems general questions HPI Form  What is the reason for your visit today?: long lasting period  When did your symptoms begin?: 3-4 weeks ago  What are your symptoms?: long period  How would you describe these symptoms?: Moderate  Are your symptoms:: Improving  Have you had these symptoms before?: No

## 2024-05-02 ENCOUNTER — OFFICE VISIT (OUTPATIENT)
Dept: OBGYN | Facility: CLINIC | Age: 39
End: 2024-05-02
Attending: PEDIATRICS
Payer: COMMERCIAL

## 2024-05-02 VITALS
SYSTOLIC BLOOD PRESSURE: 120 MMHG | HEIGHT: 62 IN | WEIGHT: 117 LBS | BODY MASS INDEX: 21.53 KG/M2 | DIASTOLIC BLOOD PRESSURE: 64 MMHG

## 2024-05-02 DIAGNOSIS — R39.15 URINARY URGENCY: ICD-10-CM

## 2024-05-02 DIAGNOSIS — N92.0 MENORRHAGIA WITH REGULAR CYCLE: Primary | ICD-10-CM

## 2024-05-02 DIAGNOSIS — Z12.4 SCREENING FOR CERVICAL CANCER: ICD-10-CM

## 2024-05-02 PROCEDURE — G0145 SCR C/V CYTO,THINLAYER,RESCR: HCPCS

## 2024-05-02 PROCEDURE — 87624 HPV HI-RISK TYP POOLED RSLT: CPT

## 2024-05-02 PROCEDURE — 99203 OFFICE O/P NEW LOW 30 MIN: CPT

## 2024-05-02 PROCEDURE — 99459 PELVIC EXAMINATION: CPT

## 2024-05-02 ASSESSMENT — ANXIETY QUESTIONNAIRES
GAD7 TOTAL SCORE: 7
7. FEELING AFRAID AS IF SOMETHING AWFUL MIGHT HAPPEN: NOT AT ALL
3. WORRYING TOO MUCH ABOUT DIFFERENT THINGS: SEVERAL DAYS
2. NOT BEING ABLE TO STOP OR CONTROL WORRYING: SEVERAL DAYS
7. FEELING AFRAID AS IF SOMETHING AWFUL MIGHT HAPPEN: NOT AT ALL
4. TROUBLE RELAXING: SEVERAL DAYS
IF YOU CHECKED OFF ANY PROBLEMS ON THIS QUESTIONNAIRE, HOW DIFFICULT HAVE THESE PROBLEMS MADE IT FOR YOU TO DO YOUR WORK, TAKE CARE OF THINGS AT HOME, OR GET ALONG WITH OTHER PEOPLE: SOMEWHAT DIFFICULT
5. BEING SO RESTLESS THAT IT IS HARD TO SIT STILL: SEVERAL DAYS
8. IF YOU CHECKED OFF ANY PROBLEMS, HOW DIFFICULT HAVE THESE MADE IT FOR YOU TO DO YOUR WORK, TAKE CARE OF THINGS AT HOME, OR GET ALONG WITH OTHER PEOPLE?: SOMEWHAT DIFFICULT
GAD7 TOTAL SCORE: 7
6. BECOMING EASILY ANNOYED OR IRRITABLE: SEVERAL DAYS
GAD7 TOTAL SCORE: 7
1. FEELING NERVOUS, ANXIOUS, OR ON EDGE: MORE THAN HALF THE DAYS

## 2024-05-02 ASSESSMENT — PATIENT HEALTH QUESTIONNAIRE - PHQ9
SUM OF ALL RESPONSES TO PHQ QUESTIONS 1-9: 5
SUM OF ALL RESPONSES TO PHQ QUESTIONS 1-9: 5
10. IF YOU CHECKED OFF ANY PROBLEMS, HOW DIFFICULT HAVE THESE PROBLEMS MADE IT FOR YOU TO DO YOUR WORK, TAKE CARE OF THINGS AT HOME, OR GET ALONG WITH OTHER PEOPLE: NOT DIFFICULT AT ALL

## 2024-05-08 LAB
BKR LAB AP GYN ADEQUACY: NORMAL
BKR LAB AP GYN INTERPRETATION: NORMAL
BKR LAB AP HPV REFLEX: NORMAL
BKR LAB AP PREVIOUS ABNORMAL: NORMAL
PATH REPORT.COMMENTS IMP SPEC: NORMAL
PATH REPORT.COMMENTS IMP SPEC: NORMAL
PATH REPORT.RELEVANT HX SPEC: NORMAL

## 2024-05-11 LAB
HUMAN PAPILLOMA VIRUS 16 DNA: NEGATIVE
HUMAN PAPILLOMA VIRUS 18 DNA: NEGATIVE
HUMAN PAPILLOMA VIRUS FINAL DIAGNOSIS: NORMAL
HUMAN PAPILLOMA VIRUS OTHER HR: NEGATIVE

## 2024-05-14 PROBLEM — Z12.4 SCREENING FOR CERVICAL CANCER: Status: ACTIVE | Noted: 2024-05-14

## 2024-05-29 ENCOUNTER — THERAPY VISIT (OUTPATIENT)
Dept: PHYSICAL THERAPY | Facility: CLINIC | Age: 39
End: 2024-05-29
Payer: COMMERCIAL

## 2024-05-29 DIAGNOSIS — N81.89 PELVIC FLOOR WEAKNESS IN FEMALE: Primary | ICD-10-CM

## 2024-05-29 DIAGNOSIS — R39.15 URINARY URGENCY: ICD-10-CM

## 2024-05-29 PROCEDURE — 97530 THERAPEUTIC ACTIVITIES: CPT | Mod: GP | Performed by: PHYSICAL THERAPIST

## 2024-05-29 PROCEDURE — 97110 THERAPEUTIC EXERCISES: CPT | Mod: GP | Performed by: PHYSICAL THERAPIST

## 2024-05-29 PROCEDURE — 97161 PT EVAL LOW COMPLEX 20 MIN: CPT | Mod: GP | Performed by: PHYSICAL THERAPIST

## 2024-05-29 NOTE — PROGRESS NOTES
PHYSICAL THERAPY EVALUATION  Type of Visit: Evaluation    See electronic medical record for Abuse and Falls Screening details.    Subjective       Presenting condition or subjective complaint: frequent urination and urgency symptoms.  She finds that caffeine can be an irritant.  She reports sudden urge and will have to find bathroom quickly.  Wearing pads for security in case she can't make it in public.  Last week had daily leaks.  This week has minimal.  So unsure of why symptoms change.  Pt reports has had a lot of different medical issues last few years.  Pt was diagnosed 3 years ago with erythromelalgia, has been on a pain pump to help with symptom management.  Pt reports due to experiencing pain, mostly in LE, will have to be bed bound (in the past was bed bound x 4 months in 2023) due to pain if LE were in dependent position causing pain and swelling.  Pt also has been having abnormal uterine bleeding/menorrhagia x 4 months.    Pt has 15 year old twins.  Symptoms started after having twins.  Symptoms worsened past couple years.    Date of onset: 05/02/24 (MD order date)    Relevant medical history:     Past Medical History:   Diagnosis Date    Auto-erythrocyte sensitization (H24)     Community acquired pneumonia of right upper lobe of lung 07/04/2023    Erythromelalgia 02/2021    Infection due to 2019 novel coronavirus 09/01/2023    Reactive depression     Seasonal allergies 06/25/2020    Mostly resolved     Dates & types of surgery: abdominoplasty 2016 pain pump 2024   Past Surgical History:   Procedure Laterality Date    INSERT PICC LINE Left 06/23/2021    Procedure: INSERTION, PICC;  Surgeon: Neal Penny MD;  Location: [facility] OR    IR PICC PLACEMENT > 5 YRS OF AGE  06/23/2021    NO HISTORY OF SURGERY      PICC SINGLE LUMEN PLACEMENT Left 03/02/2023    Left brachial-lateral vein 44cm total 1cm external.Placement verified by [facility] 3CG.PICC okay to use.     Prior diagnostic imaging/testing results:      getting an pelvic US in future   Prior therapy history for the same diagnosis, illness or injury: No      Prior Level of Function  Transfers:   Ambulation:   ADL:   IADL:     Living Environment  Social support: With a significant other or spouse   Type of home: House; 2-story   Stairs to enter the home: Yes       Ramp: No   Stairs inside the home: Yes 2 Is there a railing: Yes   Help at home: None  Equipment owned: Walker with wheels; Standard wheelchair     Employment: No    Hobbies/Interests:      Patient goals for therapy: not hve to worry about being far from the athroom    Pain assessment:      Objective      PELVIC EVALUATION  ADDITIONAL HISTORY:  Sex assigned at birth: Female  Gender identity: Female    Pronouns: She/Her Hers      Bladder History:  Feels bladder filling: does feel urges to urinate  Triggers for feeling of inability to wait to go to the bathroom: Yes caffeine  How long can you wait to urinate: 10minutes, reports will void every hour  Gets up at night to urinate: No    Can stop the flow of urine when urinating: Sometimes  Volume of urine usually released: Medium   Other issues:  denies need to strain, denies difficulty starting stream, sometimes may feel incomplete emptying but not always, denies post void dribbling  Number of bladder infections in last 12 months:    Fluid intake per day:   16pz coffww with almond milk,  will try to drink about 40 oz of water a day    Medications taken for bladder: No     Activities causing urine leak: Hurrying to the bathroom due to a strong urge to urinate (pee)    Amount of urine typically leaked:  medium to large  Pads used to help with leaking: Yes I use this many pads per day: only occationally  - if does use a pad, she uses a moderate absorbancy incontinence pad.      Bowel History:  Frequency of bowel movement: 2-4x daily  Consistency of stool: Hard  can be type 2 to type 6  Ignores the urge to defecate: No  Other bowel issues:    Length of time  spent trying to have a bowel movement:      Sexual Function History:  Sexual orientation: Straight    Sexually active: Yes  Lubrication used: No No  Pelvic pain:      Pain or difficulty with orgasms/erection/ejaculation: Yes difficulty  State of menopause: Perimenopause (have not gone through menopause yet)  Hormone medications: Yes norethindrone    Are you currently pregnant: No, Number of previous pregnancies: 1, Number of deliveries: 1twin, If you have delivered before, did you have any of these issues during delivery: Tearing;  delivery; Vaginal delivery, Have you been diagnosed with pelvic prolapse or abdominal separation: No, Do you get regular exercise: Yes, I do this type of exercise: walkong, Have you tried pelvic floor strengthening exercises for 4 weeks: No, Do you have any history of trauma that is relevant to your care that you d like to share: No.  Labor was induced, made it to 36 weeks, babies were delivered 1 vaginally and then had to have  with 2nd.  Babies were about 6 lbs each.  She did have umbulical hernia afterwards so did have an abdominoplasty.    Discussed reason for referral regarding pelvic health needs and external/internal pelvic floor muscle examination with patient/guardian.  Opportunity provided to ask questions and verbal consent for assessment and intervention was given.    PAIN:   POSTURE: Sitting Posture: Rounded shoulders, Forward head, slumped posture  LUMBAR SCREEN:  lumbar flex WNL, ext mod loss  HIP SCREEN:  Strength:  WNL,     Flexibility - decreased in B adductors, piriformis and hip flexors  Functional Strength Testing: Double Leg Squat: Anterior knee translation and Improper use of glutes/hips  Single Leg Squat: Anterior knee translation and Improper use of glutes/hips    PELVIC/SI SCREEN:  WNL   PAIN PROVOCATION TEST:   PELVIS/SI SPECIAL TESTS:   BREATHING SYMMETRY: Decreased rib cage mobility    PELVIC EXAM  External Visual Inspection:  At rest:  Normal  With voluntary pelvic floor contraction: Perineal elevation  Relaxation of PFM: Partial/delayed relaxation  With intra-abdominal pressure: Cough: Perineal descent  Bearing down as defecation: No change    Integumentary:   Introitus: Tight    External Digital Palpation per Perineum:   Ischiocavernosis: Unremarkable  Bulbo cavernosis: Unremarkable  Transverse perineal: Unremarkable  Levator ani: Unremarkable  Perineal body: Unremarkable    Scar:   Location/Type:   Mobility:     Internal Digital Palpation:  Per Vagina:  Myofascial Resistance to Palpation: Taut  Digital Muscle Performance: P (Power): 3/5  E (Endurance): 5-8 sec  F (Fast Twitch): 10 reps  Compensations: Abdominals, Breath holding  Relaxation Post-Contraction: Partial/delayed relaxation    Per Rectum:        Pelvic Organ Prolapse:   Non observed    ABDOMINAL ASSESSMENT  Diastasis Rectus Abdominis (MUNDO):  MUNDO presence: No    Abdominal Activation/Strength:     Scar:   Location/Type:   Mobility:     Fascial Tension/Restriction:     BIOFEEDBACK:  Position:   Surface Electrodes:     Abdominals:     Perianals:       DERMATOMES:   DTR S:     Assessment & Plan   CLINICAL IMPRESSIONS  Medical Diagnosis: urinary urgency    Treatment Diagnosis: pelvic floor weakness   Impression/Assessment: Patient is a 38 year old female with urge incontinence complaints.  The following significant findings have been identified: Decreased ROM/flexibility, Decreased strength, Impaired muscle performance, Decreased activity tolerance, and Impaired posture. These impairments interfere with their ability to perform self care tasks, recreational activities, and community mobility as compared to previous level of function.     Clinical Decision Making (Complexity):  Clinical Presentation: Stable/Uncomplicated  Clinical Presentation Rationale: based on medical and personal factors listed in PT evaluation  Clinical Decision Making (Complexity): Low complexity    PLAN OF  CARE  Treatment Interventions:  Modalities: Biofeedback, Ultrasound  Interventions: Manual Therapy, Neuromuscular Re-education, Therapeutic Activity, Therapeutic Exercise, Self-Care/Home Management    Long Term Goals     PT Goal 1  Goal Identifier: urge incontinence  Goal Description: Pt will report 0 episodes of urinary urge incontinence for 1 month  Rationale: to maximize safety and independence with performance of ADLs and functional tasks  Goal Progress: pt may leak every day in 1 weeks  Target Date: 07/28/24      Frequency of Treatment: 1 x a week  Duration of Treatment: 3 weeks tapering to 2 times a month x 2 months    Recommended Referrals to Other Professionals: Physical Therapy  Education Assessment:        Risks and benefits of evaluation/treatment have been explained.   Patient/Family/caregiver agrees with Plan of Care.     Evaluation Time:     PT Eval, Low Complexity Minutes (39001): 20       Signing Clinician: Osman Ugalde PT

## 2024-07-03 ENCOUNTER — TRANSFERRED RECORDS (OUTPATIENT)
Dept: HEALTH INFORMATION MANAGEMENT | Facility: CLINIC | Age: 39
End: 2024-07-03
Payer: COMMERCIAL

## 2024-07-10 DIAGNOSIS — F32.9 REACTIVE DEPRESSION: Primary | ICD-10-CM

## 2024-07-11 RX ORDER — VENLAFAXINE HYDROCHLORIDE 75 MG/1
75 CAPSULE, EXTENDED RELEASE ORAL DAILY
Qty: 90 CAPSULE | Refills: 2 | Status: SHIPPED | OUTPATIENT
Start: 2024-07-11

## 2024-07-20 ENCOUNTER — NURSE TRIAGE (OUTPATIENT)
Dept: NURSING | Facility: CLINIC | Age: 39
End: 2024-07-20
Payer: COMMERCIAL

## 2024-07-20 ENCOUNTER — TELEPHONE (OUTPATIENT)
Dept: NURSING | Facility: CLINIC | Age: 39
End: 2024-07-20
Payer: COMMERCIAL

## 2024-07-20 DIAGNOSIS — I73.81 ERYTHROMELALGIA (H): ICD-10-CM

## 2024-07-20 RX ORDER — GABAPENTIN 600 MG/1
TABLET ORAL
Qty: 40 TABLET | Refills: 0 | Status: SHIPPED | OUTPATIENT
Start: 2024-07-20 | End: 2024-08-01

## 2024-07-20 NOTE — TELEPHONE ENCOUNTER
Telephone call  Patient calling to request a new prescription of Gabapentin she had one refill left but the prescription had .  Paging the On call for a refill.  Dr Raza on call gave her a prescription for 10 days take  two 600 mg gabapentin Bid. Asked her to follow up with Dr Ball for a new prescription. Called patient and relayed the message.    Rossi Chang RN   [facility] Nurse Advisor  1:51 PM 2024

## 2024-07-20 NOTE — TELEPHONE ENCOUNTER
Pt requesting refill of non-essential med. Pharmacy unable to refill. Refill request put in, advised Pt to request bridging dose from pharmacy and call PCP office when open     Reason for Disposition   [1] Prescription refill request for NON-ESSENTIAL medicine (i.e., no harm to patient if med not taken) AND [2] triager unable to refill per department policy    Protocols used: Medication Refill and Renewal Call-A-

## 2024-08-01 ENCOUNTER — MYC MEDICAL ADVICE (OUTPATIENT)
Dept: INTERNAL MEDICINE | Facility: CLINIC | Age: 39
End: 2024-08-01
Payer: COMMERCIAL

## 2024-08-01 DIAGNOSIS — I73.81 ERYTHROMELALGIA (H): ICD-10-CM

## 2024-08-01 RX ORDER — GABAPENTIN 600 MG/1
TABLET ORAL
Qty: 40 TABLET | Refills: 3 | Status: SHIPPED | OUTPATIENT
Start: 2024-08-01 | End: 2024-08-02 | Stop reason: DRUGHIGH

## 2024-08-01 RX ORDER — GABAPENTIN 300 MG/1
CAPSULE ORAL
Qty: 30 CAPSULE | Refills: 3 | Status: SHIPPED | OUTPATIENT
Start: 2024-08-01 | End: 2024-08-02 | Stop reason: DRUGHIGH

## 2024-08-02 RX ORDER — GABAPENTIN 300 MG/1
CAPSULE ORAL
Qty: 90 CAPSULE | Refills: 3 | Status: CANCELLED | OUTPATIENT
Start: 2024-08-02

## 2024-08-02 RX ORDER — GABAPENTIN 600 MG/1
1200 TABLET ORAL 2 TIMES DAILY
Qty: 360 TABLET | Refills: 1 | Status: SHIPPED | OUTPATIENT
Start: 2024-08-02

## 2024-09-07 ENCOUNTER — HEALTH MAINTENANCE LETTER (OUTPATIENT)
Age: 39
End: 2024-09-07

## 2024-10-08 ENCOUNTER — MYC REFILL (OUTPATIENT)
Dept: INTERNAL MEDICINE | Facility: CLINIC | Age: 39
End: 2024-10-08
Payer: COMMERCIAL

## 2024-10-08 DIAGNOSIS — I73.81 ERYTHROMELALGIA (H): ICD-10-CM

## 2024-10-10 ENCOUNTER — MYC MEDICAL ADVICE (OUTPATIENT)
Dept: INTERNAL MEDICINE | Facility: CLINIC | Age: 39
End: 2024-10-10
Payer: COMMERCIAL

## 2024-10-11 NOTE — TELEPHONE ENCOUNTER
Patient stated veronica had requested a refill. Now patient had requested it on the 8th and understands how many days it may take but since she thought pharm had requested for her a week ago and it didn't go through now she is out please advise asap thanks.

## 2024-10-11 NOTE — TELEPHONE ENCOUNTER
mexiletine (MEXITIL) 150 MG capsule   Disp-90 capsule, R-3   Start: 01/22/2024 2/12/2024  [facility] [place]      Neal Ball MD  Internal Medicine     Routed because: my chart request  Anti Arrhythmic Agents Protocol [id number]/08/2024 09:03 AM   Protocol Details Lipid Panel on file in past year    Medication needs approval from authorizing provider    Recent (6 mo) or future (30 days) visit with authorizing provider's specialty

## 2024-10-14 RX ORDER — MEXILETINE HYDROCHLORIDE 150 MG/1
150 CAPSULE ORAL EVERY 8 HOURS
Qty: 90 CAPSULE | Refills: 3 | Status: SHIPPED | OUTPATIENT
Start: 2024-10-14

## 2024-10-24 DIAGNOSIS — I73.81 ERYTHROMELALGIA (H): ICD-10-CM

## 2024-10-24 DIAGNOSIS — N94.89 MENSTRUAL SUPPRESSION: ICD-10-CM

## 2024-10-29 ENCOUNTER — MYC REFILL (OUTPATIENT)
Dept: INTERNAL MEDICINE | Facility: CLINIC | Age: 39
End: 2024-10-29
Payer: COMMERCIAL

## 2024-10-29 DIAGNOSIS — N94.89 MENSTRUAL SUPPRESSION: ICD-10-CM

## 2024-10-29 DIAGNOSIS — I73.81 ERYTHROMELALGIA (H): ICD-10-CM

## 2024-10-29 RX ORDER — ACETAMINOPHEN AND CODEINE PHOSPHATE 120; 12 MG/5ML; MG/5ML
0.7 SOLUTION ORAL DAILY
Qty: 180 TABLET | Refills: 1 | Status: SHIPPED | OUTPATIENT
Start: 2024-10-29

## 2024-10-29 NOTE — TELEPHONE ENCOUNTER
LVD:  2/12/2024  [facility] Neal Sanderson MD  Internal Medicine     Refilled per protocol.

## 2024-10-29 NOTE — TELEPHONE ENCOUNTER
"norethindrone (MICRONOR) 0.35 MG tablet    Last Written Prescription Date:  10/23/2023  Last Fill Quantity: 180,   # refills: 3  Last Office Visit : 2/12/2024  Future Office visit:  none  Routing norethindrone (MICRONOR) 0.35 MG tablet refill request to provider for review/approval because: recent visit with OB/GYN 5/2/2024 for \"Abnormal Uterine Bleeding\"  "

## 2024-10-30 RX ORDER — ACETAMINOPHEN AND CODEINE PHOSPHATE 120; 12 MG/5ML; MG/5ML
0.7 SOLUTION ORAL DAILY
Qty: 180 TABLET | Refills: 3 | OUTPATIENT
Start: 2024-10-30

## 2024-11-03 DIAGNOSIS — I73.81 ERYTHROMELALGIA (H): ICD-10-CM

## 2024-11-04 NOTE — TELEPHONE ENCOUNTER
LORazepam (ATIVAN) 0.5 MG tablet         Last Written Prescription Date:  1-21-24  Last Fill Quantity: 20,   # refills: 0  Last Office Visit : 2-12-24  Future Office visit:none    Routing refill request to provider for review/approval because:  Drug not on the [facility], P or [facility] refill protocol or controlled substance

## 2024-11-05 RX ORDER — LORAZEPAM 0.5 MG/1
0.5 TABLET ORAL DAILY PRN
Qty: 20 TABLET | Refills: 0 | Status: SHIPPED | OUTPATIENT
Start: 2024-11-05

## 2024-12-10 ENCOUNTER — TELEPHONE (OUTPATIENT)
Dept: INTERNAL MEDICINE | Facility: CLINIC | Age: 39
End: 2024-12-10

## 2024-12-10 NOTE — TELEPHONE ENCOUNTER
[facility] Call Center    Phone Message    May a detailed message be left on voicemail: yes     Reason for Call: Other: Pt would like to change her in person appt to virtual on 12/10 if possible. Pt was offered an opening on Friday 12/13, but is not able to make that. Pt states she is pretty flexible, but Friday does not work for her. Please review and call to discuss.     Action Taken: Other: PCC    Travel Screening: Not Applicable     Date of Service:

## 2024-12-10 NOTE — TELEPHONE ENCOUNTER
Patient confirmed scheduled appointment:  Date: 12/13/2024  Time: 9:30am  Visit type: VIDEO RETURN  Provider: PCP  Location: VIRTUAL  Testing/imaging: -  Additional notes: -

## 2024-12-11 NOTE — PROGRESS NOTES
This is a recent snapshot of the patient's [facility] medical record.  For current drug dose and complete information and questions, call [phone] or In Basket pool, fv home infusion ([id number])  CSN Number:  [id number]       60yo F with PMHx of COPD, Raynaud's, Lupus, Sjogren;s, Nicotine dependence, Anxiety, unspecified seizure history presents to the ED with abdominal pain and confusion. Admitting for hyponatremia/failure to thrive

## 2024-12-13 ENCOUNTER — VIRTUAL VISIT (OUTPATIENT)
Dept: INTERNAL MEDICINE | Facility: CLINIC | Age: 39
End: 2024-12-13
Payer: COMMERCIAL

## 2024-12-13 DIAGNOSIS — I73.01 RAYNAUD'S PHENOMENON WITH GANGRENE (H): ICD-10-CM

## 2024-12-13 DIAGNOSIS — I73.81 ERYTHROMELALGIA (H): Primary | ICD-10-CM

## 2024-12-13 PROCEDURE — 99214 OFFICE O/P EST MOD 30 MIN: CPT | Mod: 95 | Performed by: PEDIATRICS

## 2024-12-13 PROCEDURE — G2211 COMPLEX E/M VISIT ADD ON: HCPCS | Performed by: PEDIATRICS

## 2024-12-13 ASSESSMENT — PATIENT HEALTH QUESTIONNAIRE - PHQ9
SUM OF ALL RESPONSES TO PHQ QUESTIONS 1-9: 8
SUM OF ALL RESPONSES TO PHQ QUESTIONS 1-9: 8
10. IF YOU CHECKED OFF ANY PROBLEMS, HOW DIFFICULT HAVE THESE PROBLEMS MADE IT FOR YOU TO DO YOUR WORK, TAKE CARE OF THINGS AT HOME, OR GET ALONG WITH OTHER PEOPLE: SOMEWHAT DIFFICULT

## 2024-12-13 ASSESSMENT — ASTHMA QUESTIONNAIRES
QUESTION_2 LAST FOUR WEEKS HOW OFTEN HAVE YOU HAD SHORTNESS OF BREATH: NOT AT ALL
QUESTION_4 LAST FOUR WEEKS HOW OFTEN HAVE YOU USED YOUR RESCUE INHALER OR NEBULIZER MEDICATION (SUCH AS ALBUTEROL): NOT AT ALL
QUESTION_5 LAST FOUR WEEKS HOW WOULD YOU RATE YOUR ASTHMA CONTROL: COMPLETELY CONTROLLED
ACT_TOTALSCORE: 25
QUESTION_1 LAST FOUR WEEKS HOW MUCH OF THE TIME DID YOUR ASTHMA KEEP YOU FROM GETTING AS MUCH DONE AT WORK, SCHOOL OR AT HOME: NONE OF THE TIME
ACT_TOTALSCORE: 25
QUESTION_3 LAST FOUR WEEKS HOW OFTEN DID YOUR ASTHMA SYMPTOMS (WHEEZING, COUGHING, SHORTNESS OF BREATH, CHEST TIGHTNESS OR PAIN) WAKE YOU UP AT NIGHT OR EARLIER THAN USUAL IN THE MORNING: NOT AT ALL

## 2024-12-13 NOTE — NURSING NOTE
Current patient location: Patient declined to provide     Is the patient currently in the state of [place]? YES    Visit mode:VIDEO    If the visit is dropped, the patient can be reconnected by:VIDEO VISIT: Text to cell phone:   Telephone Information:   Mobile [phone]       Will anyone else be joining the visit? YES: How would they like to receive their invitation? Text to cell phone:   (If patient encounters technical issues they should call [phone])    Are changes needed to the allergy or medication list? Pt stated no changes to allergies and Pt declined med review    Are refills needed on medications prescribed by this physician? NO    Rooming Documentation:  Questionnaire(s) completed    Reason for visit: RECHECK    Joel MONZONF

## 2024-12-13 NOTE — PATIENT INSTRUCTIONS
Thank you for visiting the Primary Care Center today at the [facility]! The following is some information about our clinic:     Primary Care Center Frequently-Asked Questions    (1) How do I schedule appointments at the [facility]?     Primary Care--to schedule or make changes to an existing appointment, please call our primary care line at [phone].    Labs--to schedule a lab appointment at the [facility] you can use [employer] or call [phone]. If you have a [place] location that is closer to home, you can reach out to that location for scheduling options.     Imaging--if you need to schedule a CT, X-ray, MRI, ultrasound, or other imaging study you can call [phone] to schedule at the [facility] or any other [facility] imaging location.     Referrals--if a referral to another specialty was ordered you can expect a phone call from their scheduling team. If you have not heard from them in a week, please call us or send us a [employer] message to check the status or get a scheduling number. Please note that this only applies to internal [facility] referrals. If the referral is external you would need to contact their office for scheduling.     (2) I have a question about my visit, who do I contact?     You can call us at the primary care line at [phone] to ask questions about your visit. You can also send a secure message through [employer], which is reviewed by clinic staff. Please note that [employer] messages have a twenty-four to forty-eight business hour turnaround time and should not be used for urgent concerns.    (3) How will I get the results of my tests?    If you are signed up for [employer] all tests will be released to you within twenty-four hours of resulting. Please allow three to five days for your doctor to review your results and place a note interpreting the results. If you do not have [employer] you will receive your  results through mail seven to ten business days following the return of the tests. Please note that if there should be any urgent or concerning results that your doctor or their registered nurse will reach out to you the same day as the tests come back. If you have follow up questions about your results or would like to discuss the results in detail please schedule a follow up with your provider either in person or virtually.     (4) How do I get refills of my prescriptions?     You should always first contact your pharmacy for refills of your medications. If submitting a refill request on [employer], please be sure to submit the request only once--repeat requests can cause delays in refill. If you are requesting a NEW medication or a medication related to new symptoms you will need to schedule an appointment with a provider prior to approval. Please note: Routine medication refills have up to one to three business day turnaround whereas controlled substances refills have up to five to seven business day turnaround.    (5) I have new symptoms, what do I do?     If you are having an immediate medical emergency, you should dial 911 for assistance.   For anything urgent that needs to be seen within a few hours to one day you should visit a local urgent care for assistance.  For non-urgent symptoms that need to be seen within a few days to a week you can schedule with an available provider in primary care by going to [employer] or calling [phone].   If you are not sure how serious your symptoms are or you would like to receive medical advice you can always call [phone] to speak with a triage nurse.

## 2024-12-13 NOTE — PROGRESS NOTES
"Dear patient. Thank you for visiting with me. I want you to feel respected, understood, and empowered. \"Respect\" is valuing you as much as I would a close family member. \"Empowerment\" happens when you are fully informed, and can make the best possible decision for you.  Please ask me questions!  Challenge anything that is not clear.    Medical records are primarily used as memory aids for me and my colleagues. Things to know about my documentation style:  - The 'problem list' includes current symptoms or diagnoses, and some problems that are resolved but may return. I use the past medical history for problems not expected to return.  - I use single quotation marks for things that you or I said, when I want to clarify who was speaking.  - I use double quotation marks when copying a term from elsewhere in your records. Italics (besides here) may also denote a quotation.  If you have questions or concerns, please contact me; I will reply as soon as time allows.    Context    PCP: Neal Ball   Visit type: problem-oriented    Ronna Rivera is a 39 year old woman, seen with her , with concerns including:  Chief Complaint   Patient presents with    RECHECK    EM Follow up       History, update, and/or problems    erythromelalgia   possible Raynaud's at other times  Things went pretty well over summer, after pain pump placed last spring. Then when change in season to Fall things started to go downhill.   Able to manage better with her on the pain pump than previous year. Especially bad when swings in outdoor temperature. Pump dose a little higher with the last swing.  Wounds on right food have reopened.  This has been red rather than white or blue.  [place] was worried that her wounds were because of icing her feet, but she isn't icing this year, and the wounds are back.  She comments (during Raynaud's conversation) about waxing and waning.   Hasn't seen Dr. James in a while.       ASSESSMENT and " PLAN: Disappointing but clearly still an outlier for erythromelalgia.   Interesting that we haven't considered treating combined Raynaud's. The risk is that erythromelalgia could be worsened by anti-Raynaud's meds. However gangrene would be unlikely, and if we picked a very small dose then the effect would be short-term. We talked about pros and cons. I will send a prescription and then some directions.    Hello,  Sorry about the delay in getting the prescription and instructions to you. Here are some thoughts about the trial of nifedipine.       Why are we doing this?  Thus far, we have been treating only erythromelalgia, because we know you have that. However, your overall presentation has been somewhat atypical, because of its severity and its lack of response to treatment. Some things you recently said made me wonder if you also have Raynaud's phenomenon. This can also cause pain, but involves a different type of neurovascular malfunction. Many people have both of these conditions. In trialing a new medicine, we are guessing that perhaps you have both problems. The trick is that anti-Raynaud's treatments like nifedipine might make erythromelalgia worse, just like some anti-erythromelalgia treatments might make Raynaud's worse. So, we have to start with a tiny dose of medicine, and see about the effect.       What should you do? If you are having foot pain, try to  if it seems typical for you: red, burning, and maybe swelling. In that case, the nifedipine isn't worth trying. However ... if your feet seem atypical for your erythromelalgia: pale or blue, no swelling, and pain that seems more cold or stinging rather than burning ... then it might be worth trying the nifedipine.   -- Start with 2 mg ... if they are able to give you the liquid I prescribed, then that will just be one mL only. Swallow it with some water (don't place it under your tongue).   -- If the dose doesn't have any effect, then continue  with whatever you were going to do for erythromelalgia. Then try a 4 mg dose the next time, at least 6 hours later.  If there is still no effect, you can try increasing by 2 mg increments again, every 6 hours IF the pain still seems atypical.   -- If the pain worsens, then that episode of pain was likely not Raynaud's. Make a note of it, and try that same dose once more, if similar atypical pain happens, 6 or more hours later. I don't want you to do this more than twice, unless you are with an experienced provider.         In sum: since your case is severe and atypical, we want you to see if your foot pain is sometimes from erythromelalgia and sometimes from Raynaud's. The only way to see this is by experimenting with tiny but gradually increasing doses of medication.          If you have questions, please send me an evisit. I'm not confident that a regular call or myChart will get through to me on a timely basis.     Rob Ball MD  Internal Medicine & Pediatrics  [facility], [facility] [place]     Additional issues:  went to OB-GYN because of periods being 'all over the place.'         Start Time: 9:43 AM  End Time:10:07 AM    The longitudinal plan of care for the diagnosis(es)/condition(s) as documented were addressed during this visit. Due to the added complexity in care, I will continue to support Ronna Wright in the subsequent management and with ongoing continuity of care.      Ronna Wright is a 39 year old who is being evaluated via a billable video visit.    How would you like to obtain your AVS? MyChart  If the video visit is dropped, the invitation should be resent by: Send to e-mail at: [email or web address]  Will anyone else be joining your video visit? Yes: NA. How would they like to receive their invitation? Text to cell phone: [phone]          Subjective   Ronna Wright is a 39 year old, presenting for the following health issues:  RECHECK and EM Follow up    History  of Present Illness       Reason for visit:  EM follow up    She eats 4 or more servings of fruits and vegetables daily.She consumes 0 sweetened beverage(s) daily.She exercises with enough effort to increase her heart rate 30 to 60 minutes per day.  She exercises with enough effort to increase her heart rate 5 days per week.   She is taking medications regularly.                   Objective             Physical Exam             Video-Visit Details    Type of service:  Video Visit   Originating Location (pt. Location): Home    Distant Location (provider location):  Off-site  Platform used for Video Visit: Irene  Signed Electronically by: Neal Ball MD      Virtual Visit Details    Type of service:  Video Visit     Originating Location (pt. Location): Other in car outside vet office    Distant Location (provider location):  Off-site  Platform used for Video Visit: Irene  Answers submitted by the patient for this visit:  Patient Health Questionnaire (Submitted on 12/13/2024)  If you checked off any problems, how difficult have these problems made it for you to do your work, take care of things at home, or get along with other people?: Somewhat difficult  PHQ9 TOTAL SCORE: 8  General Questionnaire (Submitted on 12/13/2024)  Chief Complaint: Chronic problems general questions HPI Form  What is the reason for your visit today? : EM follow up  How many servings of fruits and vegetables do you eat daily?: 4 or more  On average, how many sweetened beverages do you drink each day (Examples: soda, juice, sweet tea, etc.  Do NOT count diet or artificially sweetened beverages)?: 0  How many minutes a day do you exercise enough to make your heart beat faster?: 30 to 60  How many days a week do you exercise enough to make your heart beat faster?: 5  How many days per week do you miss taking your medication?: 0  Questionnaire about: Chronic problems general questions HPI Form (Submitted on 12/13/2024)  Chief Complaint:  Chronic problems general questions HPI Form

## 2024-12-14 ENCOUNTER — MYC MEDICAL ADVICE (OUTPATIENT)
Dept: INTERNAL MEDICINE | Facility: CLINIC | Age: 39
End: 2024-12-14
Payer: COMMERCIAL

## 2024-12-15 NOTE — ASSESSMENT & PLAN NOTE
erythromelalgia   possible Raynaud's at other times  Things went pretty well over summer, after pain pump placed last spring. Then when change in season to Fall things started to go downhill.   Able to manage better with her on the pain pump than previous year. Especially bad when swings in outdoor temperature. Pump dose a little higher with the last swing.  Wounds on right food have reopened.  This has been red rather than white or blue.  [place] was worried that her wounds were because of icing her feet, but she isn't icing this year, and the wounds are back.  She comments (during Raynaud's conversation) about waxing and waning.   Hasn't seen Dr. James in a while.       ASSESSMENT and PLAN: Disappointing but clearly still an outlier for erythromelalgia.   Interesting that we haven't considered treating combined Raynaud's. The risk is that erythromelalgia could be worsened by anti-Raynaud's meds. However gangrene would be unlikely, and if we picked a very small dose then the effect would be short-term. We talked about pros and cons. I will send a prescription and then some directions.    Hello,  Sorry about the delay in getting the prescription and instructions to you. Here are some thoughts about the trial of nifedipine.       Why are we doing this?  Thus far, we have been treating only erythromelalgia, because we know you have that. However, your overall presentation has been somewhat atypical, because of its severity and its lack of response to treatment. Some things you recently said made me wonder if you also have Raynaud's phenomenon. This can also cause pain, but involves a different type of neurovascular malfunction. Many people have both of these conditions. In trialing a new medicine, we are guessing that perhaps you have both problems. The trick is that anti-Raynaud's treatments like nifedipine might make erythromelalgia worse, just like some anti-erythromelalgia treatments might make Raynaud's worse.  So, we have to start with a tiny dose of medicine, and see about the effect.       What should you do? If you are having foot pain, try to  if it seems typical for you: red, burning, and maybe swelling. In that case, the nifedipine isn't worth trying. However ... if your feet seem atypical for your erythromelalgia: pale or blue, no swelling, and pain that seems more cold or stinging rather than burning ... then it might be worth trying the nifedipine.   -- Start with 2 mg ... if they are able to give you the liquid I prescribed, then that will just be one mL only. Swallow it with some water (don't place it under your tongue).   -- If the dose doesn't have any effect, then continue with whatever you were going to do for erythromelalgia. Then try a 4 mg dose the next time, at least 6 hours later.  If there is still no effect, you can try increasing by 2 mg increments again, every 6 hours IF the pain still seems atypical.   -- If the pain worsens, then that episode of pain was likely not Raynaud's. Make a note of it, and try that same dose once more, if similar atypical pain happens, 6 or more hours later. I don't want you to do this more than twice, unless you are with an experienced provider.         In sum: since your case is severe and atypical, we want you to see if your foot pain is sometimes from erythromelalgia and sometimes from Raynaud's. The only way to see this is by experimenting with tiny but gradually increasing doses of medication.          If you have questions, please send me an evisit. I'm not confident that a regular call or myChart will get through to me on a timely basis.     Rob Ball MD  Internal Medicine & Pediatrics  [facility], [facility] [place]

## 2024-12-16 ENCOUNTER — TELEPHONE (OUTPATIENT)
Dept: INTERNAL MEDICINE | Facility: CLINIC | Age: 39
End: 2024-12-16
Payer: COMMERCIAL

## 2024-12-17 ENCOUNTER — TELEPHONE (OUTPATIENT)
Dept: INTERNAL MEDICINE | Facility: CLINIC | Age: 39
End: 2024-12-17
Payer: COMMERCIAL

## 2024-12-17 DIAGNOSIS — I73.81 ERYTHROMELALGIA (H): ICD-10-CM

## 2024-12-17 DIAGNOSIS — I73.01 RAYNAUD'S PHENOMENON WITH GANGRENE (H): ICD-10-CM

## 2024-12-18 NOTE — TELEPHONE ENCOUNTER
Nifedipine is backordered at [facility]. Called [facility]- they could compound this. Will talk to patient.    Patricio Rojas RN on 12/18/2024 at 11:30 AM

## 2025-02-24 ENCOUNTER — MYC REFILL (OUTPATIENT)
Dept: INTERNAL MEDICINE | Facility: CLINIC | Age: 40
End: 2025-02-24
Payer: COMMERCIAL

## 2025-02-24 DIAGNOSIS — I73.81 ERYTHROMELALGIA: ICD-10-CM

## 2025-02-24 RX ORDER — MEXILETINE HYDROCHLORIDE 150 MG/1
150 CAPSULE ORAL EVERY 8 HOURS
Qty: 90 CAPSULE | Refills: 3 | Status: SHIPPED | OUTPATIENT
Start: 2025-02-24

## 2025-03-03 ENCOUNTER — VIRTUAL VISIT (OUTPATIENT)
Dept: INTERNAL MEDICINE | Facility: CLINIC | Age: 40
End: 2025-03-03
Payer: COMMERCIAL

## 2025-03-03 DIAGNOSIS — M54.81 BILATERAL OCCIPITAL NEURALGIA: ICD-10-CM

## 2025-03-03 DIAGNOSIS — N94.89 MENSTRUAL SUPPRESSION: Primary | ICD-10-CM

## 2025-03-03 DIAGNOSIS — I73.01 RAYNAUD'S PHENOMENON WITH GANGRENE (H): ICD-10-CM

## 2025-03-03 DIAGNOSIS — I73.81 ERYTHROMELALGIA: ICD-10-CM

## 2025-03-03 PROBLEM — G62.9 SMALL FIBER NEUROPATHY: Status: ACTIVE | Noted: 2022-10-31

## 2025-03-03 PROCEDURE — G2211 COMPLEX E/M VISIT ADD ON: HCPCS | Performed by: PEDIATRICS

## 2025-03-03 PROCEDURE — 98006 SYNCH AUDIO-VIDEO EST MOD 30: CPT | Performed by: PEDIATRICS

## 2025-03-03 RX ORDER — AMOXICILLIN 875 MG/1
1 TABLET, COATED ORAL
COMMUNITY
Start: 2025-02-26

## 2025-03-03 RX ORDER — ACETAMINOPHEN AND CODEINE PHOSPHATE 120; 12 MG/5ML; MG/5ML
SOLUTION ORAL
Qty: 90 TABLET | Refills: 2 | Status: SHIPPED | OUTPATIENT
Start: 2025-03-03

## 2025-03-03 ASSESSMENT — PATIENT HEALTH QUESTIONNAIRE - PHQ9
10. IF YOU CHECKED OFF ANY PROBLEMS, HOW DIFFICULT HAVE THESE PROBLEMS MADE IT FOR YOU TO DO YOUR WORK, TAKE CARE OF THINGS AT HOME, OR GET ALONG WITH OTHER PEOPLE: SOMEWHAT DIFFICULT
SUM OF ALL RESPONSES TO PHQ QUESTIONS 1-9: 7
SUM OF ALL RESPONSES TO PHQ QUESTIONS 1-9: 7

## 2025-03-03 NOTE — ASSESSMENT & PLAN NOTE
Ongoing variable menorrhagia, including with and causing stress (see erythromelalgia above). Her period also worsens the erythromelalgia.       ASSESSMENT and PLAN:   -- reconnect with her OB/GYN. Consider nexplanon or similar  -- In meantime, we can try increasing the norethindrone again. The 'high' dose is still quite a bit more than her current dose.

## 2025-03-03 NOTE — ASSESSMENT & PLAN NOTE
The last couple weeks there have been more challenges, with foot pain again, although she also has had hand problems. Less reliable to have benefit from lifting or reduced constriction. Several triggers. She may have worsened with influenza and a complicating ear infection. Stress due to a child having a mental complication from influenza. Also has had more 'hormones out of whack.' Had a 3 week long period, despite being on norethindrone. She needs to go back to GYN, she says.  In December we planned a trial of Raynaud's directed treatment, but she wasn't able to get the nifedipine liquid because of insurance reasons. We were thinking about sending this to the compounding pharmacy.       ASSESSMENT: Worsened erythromelalgia with recent impacts, as has happened to her before.       PLAN:   -- Continue with previous meds  -- Try prescribing the nifedipine liquid again. It seems crucial to have a very adjustable dose because of the risk she would face from erythromelalgia worsening, if she is not positive which extreme she is experiencing (erythromelalgia versus Raynauds). I referred her back to my message from December.  If there is a problem with this getting covered, I asked her to contact us ASAP.

## 2025-03-03 NOTE — ASSESSMENT & PLAN NOTE
Lately has been having headaches, posterior/occiput, she mentions 'right by the occipital nerve.'  It turns out that she had injections at neurology clinic many years ago, and that helped.       ASSESSMENT and PLAN: I added this diagnosis to her problem list. Stress and tension can bring this on, and it is a relief to have a 'normal' problem for her. If problem continues or worsens, we can get her in to a provider who does these injections.

## 2025-03-03 NOTE — PROGRESS NOTES
"Dear patient. Thank you for visiting with me. I want you to feel respected, understood, and empowered. \"Respect\" is valuing you as much as I would a close family member. \"Empowerment\" happens when you are fully informed, and can make the best possible decision for you.  Please ask me questions!  Challenge anything that is not clear.    Medical records are primarily used as memory aids for me and my colleagues. Things to know about my documentation style:  - The 'problem list' includes current symptoms or diagnoses, and some problems that are resolved but may return. I use the past medical history for problems not expected to return.  - I use single quotation marks for things that you or I said, when I want to clarify who was speaking.  - I use double quotation marks when copying a term from elsewhere in your records. Italics (besides here) may also denote a quotation.  If you have questions or concerns, please contact me; I will reply as soon as time allows.    Context    PCP: Neal Ball   Visit type: problem-oriented    Ronna Rivera is a 39 year old woman, seen with her , with concerns including:  Chief Complaint   Patient presents with    RECHECK    Continuity of Care       History, update, and/or problems    Problem List as of 3/3/2025 Reviewed: 3/3/2025 12:20 PM by Neal Ball MD            Noted    1. Erythromelalgia 3/25/2021     Overview Addendum 2/12/2024  8:58 AM by Neal Ball MD      Feet very painful and bad enough to ulcerate and wound.  (also has lesser problems with hands and face)  mexiletine, imuran, venlafaxine, gabapentin.  Has been on steroids, rituximab, IVIG, ASA, mexiletine  Improved after high-dose intermittent steroids, then worsened.  Dx with testing by Dr. Shannon De La Rosa at [facility]. Also seeing Dr. Mook James @ [facility]  6/26/23: derm biopsy @ [place] showed changes consistent with healing skin.          Last Assessment & Plan 3/3/2025 " Virtual Visit Written 3/3/2025 12:17 PM by Neal Ball MD      The last couple weeks there have been more challenges, with foot pain again, although she also has had hand problems. Less reliable to have benefit from lifting or reduced constriction. Several triggers. She may have worsened with influenza and a complicating ear infection. Stress due to a child having a mental complication from influenza. Also has had more 'hormones out of whack.' Had a 3 week long period, despite being on norethindrone. She needs to go back to GYN, she says.  In December we planned a trial of Raynaud's directed treatment, but she wasn't able to get the nifedipine liquid because of insurance reasons. We were thinking about sending this to the compounding pharmacy.       ASSESSMENT: Worsened erythromelalgia with recent impacts, as has happened to her before.       PLAN:   -- Continue with previous meds  -- Try prescribing the nifedipine liquid again. It seems crucial to have a very adjustable dose because of the risk she would face from erythromelalgia worsening, if she is not positive which extreme she is experiencing (erythromelalgia versus Raynauds). I referred her back to my message from December.  If there is a problem with this getting covered, I asked her to contact us ASAP.          Relevant Medications     NIFEdipine (ADALAT) 2 mg/mL SUSP    2. Menstrual suppression because of erythromelalagia - Primary 10/17/2022     Overview Signed 10/17/2022  9:48 AM by Neal Ball MD      Reason: worsened erythromelalgia pre-menstrually  Method: Norethindrone because of migraines with aura.  Single dose without gap started 10/17/22.  May need to increase to BID after December.            Last Assessment & Plan 3/3/2025 Virtual Visit Written 3/3/2025 12:22 PM by Neal Ball MD      Ongoing variable menorrhagia, including with and causing stress (see erythromelalgia above). Her period also worsens the  erythromelalgia.       ASSESSMENT and PLAN:   -- reconnect with her OB/GYN. Consider nexplanon or similar  -- In meantime, we can try increasing the norethindrone again. The 'high' dose is still quite a bit more than her current dose.          Relevant Medications     norethindrone (MICRONOR) 0.35 MG tablet    3. Bilateral occipital neuralgia 1/1/2019     Last Assessment & Plan 3/3/2025 Virtual Visit Written 3/3/2025 12:20 PM by Neal Ball MD      Lately has been having headaches, posterior/occiput, she mentions 'right by the occipital nerve.'  It turns out that she had injections at neurology clinic many years ago, and that helped.       ASSESSMENT and PLAN: I added this diagnosis to her problem list. Stress and tension can bring this on, and it is a relief to have a 'normal' problem for her. If problem continues or worsens, we can get her in to a provider who does these injections.          Relevant Medications     NIFEdipine (ADALAT) 2 mg/mL SUSP       Disposition comment: they are aware about me leaving, from the letter. We talked about options for staying and getting care elsewhere.        Time note (Ve4, 30'): The total time (on the date of service) for this service was 33 minutes, including discussion/face-to-face, chart review, interpretation not otherwise reported, documentation, and updating of the computerized record.    Start Time: 9:05 AM  End Time:9:31 AM    The longitudinal plan of care for the diagnosis(es)/condition(s) as documented were addressed during this visit. Due to the added complexity in care, I will continue to support Ronna Wright in the subsequent management and with ongoing continuity of care.      Ronna Wright is a 39 year old who is being evaluated via a billable video visit.    How would you like to obtain your AVS? MyChart  If the video visit is dropped, the invitation should be resent by: Send to e-mail at: [email or web address]  Will anyone else be joining your  video visit? No          Subjective   Ronna Wright is a 39 year old, presenting for the following health issues:  RECHECK and Continuity of Care    HPI                  Objective           Vitals:  No vitals were obtained today due to virtual visit.    Physical Exam             Video-Visit Details    Type of service:  Video Visit   Originating Location (pt. Location): Home    Distant Location (provider location):  Off-site  Platform used for Video Visit: Irene  Signed Electronically by: Neal Ball MD

## 2025-03-04 ENCOUNTER — MYC MEDICAL ADVICE (OUTPATIENT)
Dept: INTERNAL MEDICINE | Facility: CLINIC | Age: 40
End: 2025-03-04
Payer: COMMERCIAL

## 2025-03-04 DIAGNOSIS — I73.81 ERYTHROMELALGIA: ICD-10-CM

## 2025-03-04 DIAGNOSIS — F32.9 REACTIVE DEPRESSION: Primary | ICD-10-CM

## 2025-03-11 ENCOUNTER — TRANSFERRED RECORDS (OUTPATIENT)
Dept: HEALTH INFORMATION MANAGEMENT | Facility: CLINIC | Age: 40
End: 2025-03-11
Payer: COMMERCIAL

## 2025-03-18 ENCOUNTER — MYC REFILL (OUTPATIENT)
Dept: INTERNAL MEDICINE | Facility: CLINIC | Age: 40
End: 2025-03-18
Payer: COMMERCIAL

## 2025-03-18 DIAGNOSIS — I73.81 ERYTHROMELALGIA: ICD-10-CM

## 2025-03-19 RX ORDER — ONDANSETRON 4 MG/1
4 TABLET, ORALLY DISINTEGRATING ORAL EVERY 6 HOURS PRN
Qty: 30 TABLET | Refills: 3 | Status: SHIPPED | OUTPATIENT
Start: 2025-03-19

## 2025-03-31 RX ORDER — LORAZEPAM 0.5 MG/1
0.5 TABLET ORAL DAILY PRN
Qty: 20 TABLET | Refills: 0 | Status: SHIPPED | OUTPATIENT
Start: 2025-03-31

## 2025-03-31 RX ORDER — VENLAFAXINE HYDROCHLORIDE 150 MG/1
150 CAPSULE, EXTENDED RELEASE ORAL DAILY
Qty: 90 CAPSULE | Refills: 3 | Status: SHIPPED | OUTPATIENT
Start: 2025-03-31

## 2025-04-16 ENCOUNTER — HOSPITAL ENCOUNTER (EMERGENCY)
Facility: CLINIC | Age: 40
Discharge: HOME | End: 2025-04-16
Attending: STUDENT IN AN ORGANIZED HEALTH CARE EDUCATION/TRAINING PROGRAM | Admitting: STUDENT IN AN ORGANIZED HEALTH CARE EDUCATION/TRAINING PROGRAM
Payer: COMMERCIAL

## 2025-04-16 VITALS
SYSTOLIC BLOOD PRESSURE: 141 MMHG | DIASTOLIC BLOOD PRESSURE: 76 MMHG | HEART RATE: 95 BPM | OXYGEN SATURATION: 100 % | RESPIRATION RATE: 18 BRPM

## 2025-04-16 DIAGNOSIS — S61.317A LACERATION OF LEFT LITTLE FINGER WITHOUT FOREIGN BODY WITH DAMAGE TO NAIL, INITIAL ENCOUNTER: Primary | ICD-10-CM

## 2025-04-16 PROCEDURE — 99283 EMERGENCY DEPT VISIT LOW MDM: CPT

## 2025-04-16 ASSESSMENT — COLUMBIA-SUICIDE SEVERITY RATING SCALE - C-SSRS
6. HAVE YOU EVER DONE ANYTHING, STARTED TO DO ANYTHING, OR PREPARED TO DO ANYTHING TO END YOUR LIFE?: NO
2. HAVE YOU ACTUALLY HAD ANY THOUGHTS OF KILLING YOURSELF IN THE PAST MONTH?: NO
1. IN THE PAST MONTH, HAVE YOU WISHED YOU WERE DEAD OR WISHED YOU COULD GO TO SLEEP AND NOT WAKE UP?: NO

## 2025-04-16 NOTE — DISCHARGE INSTRUCTIONS
Thank you for allowing us to evaluate you today.  Change the dressing daily and/or when your finger gets wet or soiled  Please read the guidance provided with your discharge instructions.  Immediately return to the emergency department with any concerns.

## 2025-04-16 NOTE — ED PROVIDER NOTES
Emergency Department Note      History of Present Illness     Chief Complaint   Laceration      HPI   Ronna Rivera is a very pleasant 39 year old female presenting with a laceration. Patient states one hour ago she cut the tip of her left pinky with an apple tray. There is a small laceration to the tip of her finger. She applied hydrogen peroxide. Bleeding is mostly controlled. Patient is right hand dominant.     Independent Historian   None    Review of External Notes   None.    Past Medical History     Medical History and Problem List   Anxiety   Migraine   Foot ulcer   Bilateral occipital neuralgia   Asthma   Chronic insomnia   DDD    Medications   Gabapentin   Mexitil   Venlafaxine   Amoxil  Imuran       Surgical History   C section  Abdominoplasty   Lasik eye surgery  Umbilical hernia repair     Physical Exam     Patient Vitals for the past 24 hrs:   BP Pulse Resp SpO2   04/16/25 1328 (!) 141/76 95 18 100 %     Physical Exam  Pleasant female stated age seated on stretcher.  On the left pinky finger, there is an area of superficial skin loss, with tip of nail also removed, total wound size is approximately 6 mm in maximal diameter.  There are couple of even more superficial lacerations on the selby of the left pinky finger consistent with appearance of apple tray.  Bleeding is largely controlled.  Patient has normal range of motion.  No foreign body.    Diagnostics     Lab Results   Labs Ordered and Resulted from Time of ED Arrival to Time of ED Departure - No data to display    Imaging   No orders to display       EKG   No ECG performed.     Independent Interpretation   None    ED Course      Medications Administered   Medications - No data to display    Procedures   Procedures   None performed    Discussion of Management   None    ED Course   ED Course as of 04/16/25 1345   Wed Apr 16, 2025   1324 Adult Tetanus Booster  09/11/2018   1330 I obtained history and examined the patient as noted above.         Additional Documentation  None    Medical Decision Making / Diagnosis     CMS Diagnoses: None    MIPS       None    MDM   This patient has a laceration to the left pinky finger.   Vital signs are reassuring.  There is not evidence of neurovascular injury.   Tetanus is up to date..  Wound was cleansed prior to arrival.  With location and nature of wound, no possibility of primary closure.  We provided wound dressing with Surgicel.  Wound will heal via secondary intention.  Plan for follow-up with primary care clinician  in 1 week for for reevaluation. Findings were discussed.  Additional verbal instructions were provided.  I discussed specific warning signs and instructed the patient to return to the emergency department if there are any concerns. Understanding of instructions was voiced, questions were answered and the patient was discharged.          Disposition   The patient was discharged.     Diagnosis     ICD-10-CM    1. Laceration of left little finger without foreign body with damage to nail, initial encounter  S61.317A            Discharge Medications   New Prescriptions    No medications on file          Prasad Wagner MD  04/16/25 2992

## 2025-04-16 NOTE — ED TRIAGE NOTES
Pt reports she cut left hand 5th digit with apple tray. Bleeding controlled.     Triage Assessment (Adult)       Row Name 04/16/25 1300          Triage Assessment    Airway WDL WDL        Respiratory WDL    Respiratory WDL WDL        Cognitive/Neuro/Behavioral WDL    Cognitive/Neuro/Behavioral WDL WDL

## 2025-04-28 NOTE — PROGRESS NOTES
SUBJECTIVE:                                                   Ronna Rivera is a 39 year old female who presents to clinic today for the following health issue(s):  Patient presents with:  Follow Up: Patient is in to follow up to ultrasound. Still having periods that are last 2-3 weeks. Last period was the end of March and lasted 3 weeks.     HPI:  Ronna Wright is a 38 yo female with PMHx erythromelalgia, migraine with aura who presents with menorrhagia x 1.5 years.     She was last seen by myself on 5/2/2024 for the same symptoms, at which time she reported increasingly long and heavy menstrual periods. Previous workup with [facility] negative for bleeding disorders. Lab work with PCP showing normal thyroid, liver function, normal A1c. She reports these symptoms have continued since that time with menses lasting 2-3 weeks at a time.     She has been using Micronor POPs (2 tablets - 0.7 mg daily) for contraception and abnormal uterine bleeding.     Recently completed TVUS with normal results.     Results for orders placed or performed in visit on 05/16/24   US Transvaginal Pelvic Non-OB    Narrative    Gynecological Ultrasound Report  Pelvic U/S - Transvaginal  [facility]  Referring Provider: Emi Frederick   Sonographer:  Brittnee Spurling, Registered Diagnostic Medical   Sonographer, [facility]  Indication: Bleeding/Menses- Menorrhagia (heavy menses)  LMP: No LMP recorded.  History: OCP  Gynecological Ultrasonography:   Uterus: retroverted. Contour is smooth/regular.  Size: 7.2 x 5.2 x 3.7 cm  Endometrium: Thickness Total 2.2 mm  Findings: appears wnl  Right Ovary: 3.5 x 3.1 x 2.1 cm. Simple cyst vs follicle 1.9 x 1.9 x 1.5   cm  Left Ovary: 2.5 x 2.1 x 2.3 cm. Wnl  Cul de Sac Free Fluid: No free fluid  Technique: Transvaginal Imaging performed        Impression:     Retroverted uterus with normal contour.    Endometrial lining thin and regular.  Normal ovaries bilaterally.  No free  pelvic fluid.    Keely Durbni MD     Patient's last menstrual period was 2025 (approximate)..     Patient is sexually active, .  Using oral contraceptives for contraception.    reports that she has never smoked. She has never been exposed to tobacco smoke. She has never used smokeless tobacco.    STD testing offered?  Declined    Health maintenance updated:  yes    Today's PHQ-2 Score:       2024     8:35 AM   PHQ-2 (  [employer])   Q1: Little interest or pleasure in doing things 1   Q2: Feeling down, depressed or hopeless 1   PHQ-2 Score 2     Today's PHQ-9 Score:       3/3/2025     8:58 AM   PHQ-9 SCORE   PHQ-9 Total Score MyChart 7 (Mild depression)   PHQ-9 Total Score 7        Proxy-reported     Today's RACHEL-7 Score:       2024     1:08 PM   RACHEL-7 SCORE   Total Score 7 (mild anxiety)   Total Score 7       Problem list and histories reviewed & adjusted, as indicated.  Additional history: as documented.    Patient Active Problem List   Diagnosis    Other chronic pain    Rash and nonspecific skin eruption    Erythromelalgia    Anxiety    DDD (degenerative disc disease), lumbar    Migraine with aura and without status migrainosus, not intractable    Chronic insomnia    Asthma, exercise induced    Hypermobile joints    Vasovagal syncope    Autoimmune autonomic neuropathy    Impaired mobility    Abnormal TSH    Need for pneumocystis prophylaxis    Medical marijuana use    Adrenal suppression    Menstrual suppression because of erythromelalagia    Adjustment reaction with anxiety and depression    Recurrent herpes labialis    PICC (peripherally inserted central catheter) in place    Foot ulcer, right, with fat layer exposed (H)    Foot ulcer, left, with fat layer exposed (H)    Bilateral finger numbness    Small fiber neuropathy    Need for hepatitis B vaccination    History of vitamin D deficiency    Screening for cervical cancer    Urinary urgency    Pelvic floor weakness in female     Bilateral occipital neuralgia     Past Surgical History:   Procedure Laterality Date    INSERT PICC LINE Left 06/23/2021    Procedure: INSERTION, PICC;  Surgeon: Neal Penny MD;  Location: [facility] OR    IR PICC PLACEMENT > 5 YRS OF AGE  06/23/2021    NO HISTORY OF SURGERY      PICC SINGLE LUMEN PLACEMENT Left 03/02/2023    Left brachial-lateral vein 44cm total 1cm external.Placement verified by [facility] 3[facility].PICC okay to use.      Social History     Tobacco Use    Smoking status: Never     Passive exposure: Never    Smokeless tobacco: Never   Substance Use Topics    Alcohol use: No      Problem (# of Occurrences) Relation (Name,Age of Onset)    Diabetes (1) Maternal Grandfather    Hypertension (2) Father, Paternal Grandfather    Cerebrovascular Disease (1) Maternal Grandmother    Breast Cancer (1) Paternal Grandmother    C.A.D. (1) Paternal Grandfather           Negative family history of: Schizophrenia              Current Outpatient Medications   Medication Sig Dispense Refill    amoxicillin (AMOXIL) 875 MG tablet Take 1 tablet by mouth 3 times daily.      azaTHIOprine (IMURAN) 50 MG tablet Take 100 mg by mouth daily      diphenhydrAMINE (BENADRYL) 50 MG capsule Take 50 mg by mouth At Bedtime      gabapentin (NEURONTIN) 600 MG tablet Take 2 tablets (1,200 mg) by mouth 2 times daily 360 tablet 1    ibuprofen (ADVIL/MOTRIN) 200 MG tablet Take 400 mg by mouth every 6 hours as needed for moderate pain      lidocaine (XYLOCAINE) 4 % external solution Apply topically daily Apply approximately 10mL to each foot (total of 20mL) daily 600 mL 2    lidocaine (XYLOCAINE) 5 % external ointment Apply topically every 4 hours as needed for moderate pain (4-6) 50 g 63    LORazepam (ATIVAN) 0.5 MG tablet Take 1 tablet (0.5 mg) by mouth daily as needed for anxiety. 20 tablet 0    MAGNESIUM CITRATE PO Take 500 mg by mouth daily      melatonin 1 MG TABS tablet Take 4 mg by mouth At Bedtime      mexiletine (MEXITIL) 150 MG  capsule Take 1 capsule (150 mg) by mouth every 8 hours. 90 capsule 3    naloxone (NARCAN) 4 MG/0.1ML nasal spray Spray 1 spray (4 mg) into one nostril alternating nostrils as needed for opioid reversal every 2-3 minutes until assistance arrives 0.2 mL 0    NIFEdipine (ADALAT) 2 mg/mL SUSP Take PO PRN pain in feet that does not seem like your typical erythromelalgia. Start with 2 mg dose, and then increase by 2 mg increments every 6 hours IF pain remains the same. Keep in touch with Dr. Ball about effects. 40 mL 0    norethindrone (MICRONOR) 0.35 MG tablet Take 2 po in AM, 1 po PM. 90 tablet 2    norethindrone (MICRONOR) 0.35 MG tablet Take 2 tablets (0.7 mg) by mouth daily. Take the first 3 weeks of each pack, and then immediately move on to the next pack (discard the 4th week of each pack) 180 tablet 1    ondansetron (ZOFRAN ODT) 4 MG ODT tab Take 1 tablet (4 mg) by mouth every 6 hours as needed for nausea or vomiting. 30 tablet 3    triamcinolone (KENALOG) 0.1 % external cream Apply topically daily Apply to feet daily 80 g 1    venlafaxine (EFFEXOR XR) 75 MG 24 hr capsule Take 1 capsule (75 mg) by mouth daily. 90 capsule 2     No current facility-administered medications for this visit.     Allergies   Allergen Reactions    Topiramate Anxiety     OBJECTIVE:     /72   Wt 51.8 kg (114 lb 3.2 oz)   LMP 03/31/2025 (Approximate)   BMI 21.23 kg/m    Body mass index is 21.23 kg/m .    Exam:  Constitutional:  Appearance: Well nourished, well developed alert, in no acute distress  Neurologic:  Mental Status:  Oriented X3.  Normal strength and tone, sensory exam grossly normal, mentation intact and speech normal.    Psychiatric:  Mentation appears normal and affect normal/bright.     In-Clinic Test Results:  No results found for this or any previous visit (from the past 24 hours).    ASSESSMENT/PLAN:                                                        ICD-10-CM    1. Menorrhagia with regular cycle  N92.0            Patient Instructions   Thank you for visiting the [facility].    Today we discussed the results of your results ultrasound and continued prolonged menstrual bleeding. We discussed the trial of a new progesterone only pill.     Please do not hesitate to contact the office if you have any questions or concerns.     Ronna Wright is a 40 yo female with PMHx erythromelalgia, migraine with aura who presents with menorrhagia x 1.5 years.     Menorrhagia / Breakthrough Bleeding on POPs  - normal pelvic US with thin endometrial lining 2.2 mm  - discussed alternative progestin only contraception/cycle control including change in formulation of POPs, Nexplanon, progesterone IUD  - patient interested in trial of alternative POP     She was last seen by myself on 5/2/2024 for the same symptoms, at which time she reported increasingly long and heavy menstrual periods. Previous workup with [facility] negative for bleeding disorders. Lab work with PCP showing normal thyroid, liver function, normal A1c. She reports these symptoms have continued since that time with menses lasting 2-3 weeks at a time.     She has been using Micronor POPs (2 tablets - 0.7 mg daily) for contraception and abnormal uterine bleeding.     Recently completed TVUS with normal results.     Emi Frederick PA-C  [facility]

## 2025-04-30 ENCOUNTER — ANCILLARY PROCEDURE (OUTPATIENT)
Dept: ULTRASOUND IMAGING | Facility: CLINIC | Age: 40
End: 2025-04-30
Payer: COMMERCIAL

## 2025-04-30 PROCEDURE — 76830 TRANSVAGINAL US NON-OB: CPT | Performed by: OBSTETRICS & GYNECOLOGY

## 2025-05-01 ENCOUNTER — OFFICE VISIT (OUTPATIENT)
Dept: OBGYN | Facility: CLINIC | Age: 40
End: 2025-05-01
Payer: COMMERCIAL

## 2025-05-01 VITALS — WEIGHT: 114.2 LBS | BODY MASS INDEX: 21.23 KG/M2 | SYSTOLIC BLOOD PRESSURE: 104 MMHG | DIASTOLIC BLOOD PRESSURE: 72 MMHG

## 2025-05-01 DIAGNOSIS — N92.0 MENORRHAGIA WITH REGULAR CYCLE: Primary | ICD-10-CM

## 2025-05-01 RX ORDER — NORETHINDRONE 5 MG/1
5 TABLET ORAL DAILY
Qty: 84 TABLET | Refills: 4 | OUTPATIENT
Start: 2025-05-01

## 2025-05-01 NOTE — PATIENT INSTRUCTIONS
Thank you for visiting the [facility].    Today we discussed the results of your results ultrasound and continued prolonged menstrual bleeding. We discussed the trial of a new progesterone only pill.     Please do not hesitate to contact the office if you have any questions or concerns.

## 2025-05-02 PROBLEM — G95.9 MYELOPATHY (H): Status: ACTIVE | Noted: 2025-05-02

## 2025-05-02 PROBLEM — F33.1 MODERATE RECURRENT MAJOR DEPRESSION (H): Status: ACTIVE | Noted: 2025-05-02

## 2025-05-05 ENCOUNTER — TELEPHONE (OUTPATIENT)
Dept: OBGYN | Facility: CLINIC | Age: 40
End: 2025-05-05
Payer: COMMERCIAL

## 2025-05-05 NOTE — TELEPHONE ENCOUNTER
Per pharmacy, the medication prescribed drospirenone (SLYND) 4 MG TABS tablet  is a non-preferred product and rejected under the patient's benefit. The member's insurance provided preferred products that are covered by their benefit.  - Deborah 0.35 MG Tablet  -Norlyda 0.35 MG Tablet  -Norethindorone 0.35 MG Tablet       Please advise,    Minna Dominguez MA on 5/5/2025 at 10:55 AM

## 2025-05-05 NOTE — LETTER
May 15, 2025  [employer]  Attention Appeals Department        Ronna Rivera  : 1985  ID:[id number]       To Whom It May Concern,     Requesting review of denial: drospirenone (SLYND) 4 MG TABS tablet      Ronna Wright is a 38 yo female with PMHx erythromelalgia, migraine with aura who presents with menorrhagia x 1.5 years.      She was first seen by myself on 2024 for the these symptoms, at which time she reported increasingly long and heavy menstrual periods lasting 2-3 weeks at a time and disrupting ability to complete normal daily activities. Previous workup with [facility] negative for bleeding disorders. Lab work with PCP showing normal thyroid, liver function, normal A1c. Recently completed TVUS negative for structural etiology of bleeding.      She has been using Micronor POPs once daily (1 tablet - 0.35 mg daily) and then increased in dosage (2 tablets - 0.7 mg daily) to treat her abnormal uterine bleeding without improvement in symptoms.     I'd like to appeal this decision as the patient has already tried and failed a progesterone only pill at and above the dosages of the recommended alternatives. Slynd is a unique formulation inequivalent to the provided formulary options.     Thank you for your time and consideration.      Sincerely,        Emi Frederick PA-C    Electronically signed

## 2025-05-05 NOTE — TELEPHONE ENCOUNTER
I'd like to appeal this decision as the patient has already tried and failed a progesterone only pill at these dosages. Slynd is a unique formulation inequivalent to the provided formulary options.      Thanks!  Gabriela

## 2025-05-15 ENCOUNTER — MYC REFILL (OUTPATIENT)
Dept: INTERNAL MEDICINE | Facility: CLINIC | Age: 40
End: 2025-05-15
Payer: COMMERCIAL

## 2025-05-15 ENCOUNTER — MYC MEDICAL ADVICE (OUTPATIENT)
Dept: INTERNAL MEDICINE | Facility: CLINIC | Age: 40
End: 2025-05-15
Payer: COMMERCIAL

## 2025-05-15 DIAGNOSIS — I73.81 ERYTHROMELALGIA: ICD-10-CM

## 2025-05-15 RX ORDER — GABAPENTIN 600 MG/1
1200 TABLET ORAL 2 TIMES DAILY
Qty: 360 TABLET | Refills: 1 | Status: SHIPPED | OUTPATIENT
Start: 2025-05-15

## 2025-05-15 NOTE — TELEPHONE ENCOUNTER
Please review appeal draft please let me know if okay to forward to prior auth pool.    Minna Dominguez MA on 5/15/2025 at 2:04 PM

## 2025-05-19 NOTE — TELEPHONE ENCOUNTER
Retail Pharmacy Prior Authorization Team   Phone: [phone]    Prior Authorization Approval    Medication: SLYND 4 MG PO TABS  Authorization Effective Date: 4/19/2025  Authorization Expiration Date: 5/19/2026  Insurance Company: [employer] - Phone [phone] Fax [phone]  Which Pharmacy is filling the prescription: [facility] [id number] [place] [address]  Pharmacy Notified: YES  Patient Notified: YES (faxed approval letter to pharmacy and notified patient via [employer] message)

## 2025-05-22 DIAGNOSIS — I73.81 ERYTHROMELALGIA: ICD-10-CM

## 2025-05-23 ENCOUNTER — TELEPHONE (OUTPATIENT)
Dept: OBGYN | Facility: CLINIC | Age: 40
End: 2025-05-23

## 2025-05-27 RX ORDER — MEXILETINE HYDROCHLORIDE 150 MG/1
150 CAPSULE ORAL EVERY 8 HOURS
Qty: 90 CAPSULE | Refills: 11 | Status: SHIPPED | OUTPATIENT
Start: 2025-05-27

## 2025-05-27 NOTE — TELEPHONE ENCOUNTER
"mexiletine (MEXITIL) 150 MG capsule   Last Written Prescription:  2/24/25  #90, 3 refills  ----------------------  Last Visit Date: 3/3/25  Future Visit Date: 5/28/25  ----------------------    Refill decision: Medication unable to be refilled by RN due to: Other:  Requires provider review    Request from pharmacy:  Requested Prescriptions   Pending Prescriptions Disp Refills    mexiletine (MEXITIL) 150 MG capsule 90 capsule 3     Sig: Take 1 capsule (150 mg) by mouth every 8 hours.       Anti Arrhythmic Agents Protocol Failed - 5/27/2025  8:17 AM        Failed - Lipid Panel on file in past year     No lab results found.            Failed - CBC on file in past year     Recent Labs   Lab Test 01/03/24  1235   WBC 3.4*   RBC 3.82   HGB 12.2   HCT 35.9                    Failed - ALT on file in past year     Recent Labs   Lab Test 01/03/24  1235   ALT 11             Failed - Serum Creatinine on file in past year     Recent Labs   Lab Test 01/03/24  1235   CR 0.82                 Failed - Medication needs approval from authorizing provider     Please forward this request to the authorizing provider for approval.           Failed - Serum Sodium on file in past year     Recent Labs   Lab Test 01/03/24  1235                 Failed - Serum Potassium on file in past year     Recent Labs   Lab Test 01/03/24  1235   POTASSIUM 4.2             Failed - Recent (6 month) or future (90 days) visit with the authorizing provider's specialty (provided they have been seen in the past 9 months)     Patient had office visit in the last 6 months or has a visit in the next 30 days with authorizing provider.  See \"Patient Info\" tab in inbasket, or \"Choose Columns\" in Meds & Orders section of the refill encounter.            Passed - Patient is 18 years of age or older        Passed - Patient is not pregnant        Passed - No positive pregnancy test on file in past year       Class 1-B Antiarrhythmic Agents Failed - 5/27/2025  " 8:17 AM        Failed - Normal ALT on file in past 12 months     Recent Labs   Lab Test 01/03/24  1235   ALT 11             Failed - Normal AST on file in past 12 months     Recent Labs   Lab Test 01/03/24  1235   AST 20             Failed - ECG result on file in past 12 months     Please review. If patient has not had a recent (within the last 12 months) ECG resulted on file; forward refill request to the provider.           Passed - Most recent blood pressure under 140/90 in past 12 months     BP Readings from Last 3 Encounters:   05/01/25 104/72   04/16/25 (!) 141/76   05/02/24 120/64       No data recorded                         Passed - Recent (12 month) or future (90 days) visit with authorizing provider's specialty (provided they have been seen in the past 15 months)     The patient must have completed an in-person or virtual visit within the past 12 months or has a future visit scheduled within the next 90 days with the authorizing provider s specialty.  Urgent care and e-visits do not qualify as an office visit for this protocol.          Passed - Patient is of age 18 or older

## 2025-05-28 ENCOUNTER — LAB (OUTPATIENT)
Dept: LAB | Facility: CLINIC | Age: 40
End: 2025-05-28
Payer: COMMERCIAL

## 2025-05-28 ENCOUNTER — OFFICE VISIT (OUTPATIENT)
Dept: INTERNAL MEDICINE | Facility: CLINIC | Age: 40
End: 2025-05-28
Payer: COMMERCIAL

## 2025-05-28 VITALS
SYSTOLIC BLOOD PRESSURE: 112 MMHG | WEIGHT: 115.2 LBS | RESPIRATION RATE: 18 BRPM | DIASTOLIC BLOOD PRESSURE: 80 MMHG | TEMPERATURE: 98.4 F | BODY MASS INDEX: 21.75 KG/M2 | HEIGHT: 61 IN | HEART RATE: 91 BPM | OXYGEN SATURATION: 96 %

## 2025-05-28 DIAGNOSIS — I73.81 ERYTHROMELALGIA: Primary | ICD-10-CM

## 2025-05-28 DIAGNOSIS — Z63.9 FAMILY CIRCUMSTANCE: ICD-10-CM

## 2025-05-28 DIAGNOSIS — N80.9 ENDOMETRIOSIS: ICD-10-CM

## 2025-05-28 DIAGNOSIS — I73.81 ERYTHROMELALGIA: ICD-10-CM

## 2025-05-28 LAB
ALBUMIN SERPL BCG-MCNC: 4.5 G/DL (ref 3.5–5.2)
ALP SERPL-CCNC: 68 U/L (ref 40–150)
ALT SERPL W P-5'-P-CCNC: 20 U/L (ref 0–50)
ANION GAP SERPL CALCULATED.3IONS-SCNC: 12 MMOL/L (ref 7–15)
AST SERPL W P-5'-P-CCNC: 27 U/L (ref 0–45)
BASOPHILS # BLD AUTO: 0 10E3/UL (ref 0–0.2)
BASOPHILS NFR BLD AUTO: 0 %
BILIRUB SERPL-MCNC: 0.3 MG/DL
BUN SERPL-MCNC: 17.5 MG/DL (ref 6–20)
CALCIUM SERPL-MCNC: 8.9 MG/DL (ref 8.8–10.4)
CHLORIDE SERPL-SCNC: 102 MMOL/L (ref 98–107)
CREAT SERPL-MCNC: 0.75 MG/DL (ref 0.51–0.95)
EGFRCR SERPLBLD CKD-EPI 2021: >90 ML/MIN/1.73M2
EOSINOPHIL # BLD AUTO: 0.1 10E3/UL (ref 0–0.7)
EOSINOPHIL NFR BLD AUTO: 1 %
ERYTHROCYTE [DISTWIDTH] IN BLOOD BY AUTOMATED COUNT: 13.7 % (ref 10–15)
GLUCOSE SERPL-MCNC: 106 MG/DL (ref 70–99)
HCO3 SERPL-SCNC: 24 MMOL/L (ref 22–29)
HCT VFR BLD AUTO: 36.3 % (ref 35–47)
HGB BLD-MCNC: 12.5 G/DL (ref 11.7–15.7)
IMM GRANULOCYTES # BLD: 0 10E3/UL
IMM GRANULOCYTES NFR BLD: 0 %
LYMPHOCYTES # BLD AUTO: 1 10E3/UL (ref 0.8–5.3)
LYMPHOCYTES NFR BLD AUTO: 16 %
MCH RBC QN AUTO: 33 PG (ref 26.5–33)
MCHC RBC AUTO-ENTMCNC: 34.4 G/DL (ref 31.5–36.5)
MCV RBC AUTO: 96 FL (ref 78–100)
MONOCYTES # BLD AUTO: 0.6 10E3/UL (ref 0–1.3)
MONOCYTES NFR BLD AUTO: 10 %
NEUTROPHILS # BLD AUTO: 4.3 10E3/UL (ref 1.6–8.3)
NEUTROPHILS NFR BLD AUTO: 72 %
NRBC # BLD AUTO: 0 10E3/UL
NRBC BLD AUTO-RTO: 0 /100
PLATELET # BLD AUTO: 242 10E3/UL (ref 150–450)
POTASSIUM SERPL-SCNC: 4.3 MMOL/L (ref 3.4–5.3)
PROT SERPL-MCNC: 6.8 G/DL (ref 6.4–8.3)
RBC # BLD AUTO: 3.79 10E6/UL (ref 3.8–5.2)
SODIUM SERPL-SCNC: 138 MMOL/L (ref 135–145)
WBC # BLD AUTO: 5.9 10E3/UL (ref 4–11)

## 2025-05-28 PROCEDURE — 80053 COMPREHEN METABOLIC PANEL: CPT | Performed by: PATHOLOGY

## 2025-05-28 PROCEDURE — 36415 COLL VENOUS BLD VENIPUNCTURE: CPT | Performed by: PATHOLOGY

## 2025-05-28 PROCEDURE — G2211 COMPLEX E/M VISIT ADD ON: HCPCS | Performed by: PEDIATRICS

## 2025-05-28 PROCEDURE — 3079F DIAST BP 80-89 MM HG: CPT | Performed by: PEDIATRICS

## 2025-05-28 PROCEDURE — 99215 OFFICE O/P EST HI 40 MIN: CPT | Performed by: PEDIATRICS

## 2025-05-28 PROCEDURE — 85025 COMPLETE CBC W/AUTO DIFF WBC: CPT | Performed by: PATHOLOGY

## 2025-05-28 PROCEDURE — 3074F SYST BP LT 130 MM HG: CPT | Performed by: PEDIATRICS

## 2025-05-28 SDOH — SOCIAL STABILITY - SOCIAL INSECURITY: PROBLEM RELATED TO PRIMARY SUPPORT GROUP, UNSPECIFIED: Z63.9

## 2025-05-28 ASSESSMENT — ANXIETY QUESTIONNAIRES
IF YOU CHECKED OFF ANY PROBLEMS ON THIS QUESTIONNAIRE, HOW DIFFICULT HAVE THESE PROBLEMS MADE IT FOR YOU TO DO YOUR WORK, TAKE CARE OF THINGS AT HOME, OR GET ALONG WITH OTHER PEOPLE: SOMEWHAT DIFFICULT
6. BECOMING EASILY ANNOYED OR IRRITABLE: SEVERAL DAYS
1. FEELING NERVOUS, ANXIOUS, OR ON EDGE: SEVERAL DAYS
2. NOT BEING ABLE TO STOP OR CONTROL WORRYING: NOT AT ALL
GAD7 TOTAL SCORE: 5
7. FEELING AFRAID AS IF SOMETHING AWFUL MIGHT HAPPEN: SEVERAL DAYS
5. BEING SO RESTLESS THAT IT IS HARD TO SIT STILL: SEVERAL DAYS
3. WORRYING TOO MUCH ABOUT DIFFERENT THINGS: NOT AT ALL
GAD7 TOTAL SCORE: 5

## 2025-05-28 ASSESSMENT — PATIENT HEALTH QUESTIONNAIRE - PHQ9
5. POOR APPETITE OR OVEREATING: SEVERAL DAYS
SUM OF ALL RESPONSES TO PHQ QUESTIONS 1-9: 6

## 2025-05-28 NOTE — PROGRESS NOTES
"Dear patient. Thank you for visiting with me. I want you to feel respected, understood, and empowered. \"Respect\" is valuing you as much as I would a close family member. \"Empowerment\" happens when you are fully informed, and can make the best possible decision for you.  Please ask me questions!  Challenge anything that is not clear.    Medical records are primarily used as memory aids for me and my colleagues. Things to know about my documentation style:  - The 'problem list' includes current symptoms or diagnoses, and some problems that are resolved but may return. I use the past medical history for problems not expected to return.  - I use single quotation marks for things that you or I said, when I want to clarify who was speaking.  - I use double quotation marks when copying a term from elsewhere in your records. Italics (besides here) may also denote a quotation.  If you have questions or concerns, please contact me; I will reply as soon as time allows.    Ronna Rivera is a 39 year old woman, with concerns including:  Patient presents with:  Follow Up      PCP: Neal Ball   Visit type: problem-oriented      Assessment & Plan       Assessment & Plan  Erythromelalgia  - Erythromelalgia with possible coexisting Raynaud's phenomenon.  - Explore the possibility of Raynaud's phenomenon. Recommend trial of liquid nifedipine, contact pharmacy to determine out-of-pocket cost. If liquid nifedipine is not possible, consider using capsules. Consider taking pictures of symptoms for documentation.    Endometriosis  - Endometriosis managed with drospirenone.  - Continue drospirenone as prescribed by OBGYN. Monitor for any side effects or changes in symptoms.        Time note (e5, 40'): The total of my time (on the date of service) for this service was 40 minutes, including discussion/face-to-face, chart review, interpretation not otherwise reported, documentation, and updating of the computerized " record.    The longitudinal plan of care for the diagnosis(es)/condition(s) as documented were addressed during this visit. Due to the added complexity in care, I will continue to support Ronna Wright in the subsequent management and with ongoing continuity of care.      Subjective   Ronna Wright is a 39 year old, presenting for the following health issues:  Follow Up      5/28/2025     1:00 PM   Additional Questions   Roomed by Shaunna BEAVERS   Accompanied by N/A     History of Present Illness       Reason for visit:  Follow up           History of Present Illness-  Ronna Rivera, 39 years    Erythromelalgia  - Main problem, managed with mexiletine, gabapentin, and an intrathecal narcotic pump  - Symptoms triggered by significant changes in atmospheric pressure  - Previous trial of Raynaud's medication was interrupted due to insurance issues  - Concerns about having both erythromelalgia and Raynaud's, but erythromelalgia symptoms are predominant  - Explore the possibility of Raynaud's phenomenon. Recommend trial of liquid nifedipine, contact pharmacy to determine out-of-pocket cost. If liquid nifedipine is not possible, consider using capsules. Consider taking pictures of symptoms for documentation.    Endometriosis  - Met with OBGYN on May 1, 2025  - Prescribed drospirenone 4 mg daily  - Transvaginal ultrasound results were normal    Arthritis  - Managed with azathioprine monotherapy  - Concerns about potential leukopenia, follow-up labs not yet completed    Misc  - Last pap smear on May 2, 2024, was NILM  - HPV testing was negative    Family circumstance  - Ronna Wright experiences stress due to her son's psychiatric issues, including high anxiety, autism, and ADHD. This has impacted her ability to manage her own health conditions.              Objective    /80 (BP Location: Right arm, Patient Position: Sitting, Cuff Size: Adult Small)   Pulse 91   Temp 98.4  F (36.9  C)   Resp 18   Ht 1.562 m (5'  "1.5\")   Wt 52.3 kg (115 lb 3.2 oz)   LMP 05/07/2025 (Approximate)   SpO2 96%   BMI 21.42 kg/m    Body mass index is 21.42 kg/m .  Physical Exam  Constitutional:       General: She is not in acute distress.     Appearance: Normal appearance. She is not ill-appearing.   HENT:      Head: Normocephalic.      Nose: Nose normal.   Eyes:      General: No scleral icterus.        Right eye: No discharge.         Left eye: No discharge.      Extraocular Movements: Extraocular movements intact.   Pulmonary:      Effort: Pulmonary effort is normal. No respiratory distress.   Neurological:      Mental Status: She is alert.   Psychiatric:         Mood and Affect: Mood normal.         Behavior: Behavior normal.          Feet erythematous and slightly swollen with small ulcers covered by clean dressings. Some of the rough patches are in friction-prone areas of shoes (counseled about this).          Signed Electronically by: Neal Ball MD    "

## 2025-05-28 NOTE — PATIENT INSTRUCTIONS
Please contact your pharmacy to determine the out of pocket cost for liquid nifedipine. It is likely that you would only need the liquid version for one period of time, to determine your dose/need.    If the liquid cannot be got through insurance, compounding, or otherwise - then I think we should try capsule.

## 2025-06-05 ENCOUNTER — MYC MEDICAL ADVICE (OUTPATIENT)
Dept: OBGYN | Facility: CLINIC | Age: 40
End: 2025-06-05
Payer: COMMERCIAL

## 2025-06-16 ENCOUNTER — TRANSFERRED RECORDS (OUTPATIENT)
Dept: HEALTH INFORMATION MANAGEMENT | Facility: CLINIC | Age: 40
End: 2025-06-16
Payer: COMMERCIAL

## (undated) DEVICE — KIT INTRODUCER FLUENT MICRO 5FRX10CM ECHO TIP KIT-038-04

## (undated) DEVICE — LINEN GOWN XLG 5407

## (undated) DEVICE — CAST STOCKINETTE 3"

## (undated) DEVICE — DRSG BIOPATCH GERMICIDAL SPLIT SPONGE 4MM MED 4150

## (undated) DEVICE — Device

## (undated) DEVICE — COVER EASY EQUIP BAG W/BAND LATEX FREE EZ-28

## (undated) DEVICE — DECANTER BAG 2002S

## (undated) DEVICE — CAP LUER LOCK MALE/FEMALE DUAL 2C6250

## (undated) DEVICE — GOWN XLG DISP 9545

## (undated) DEVICE — LINEN TOWEL PACK X5 5464

## (undated) DEVICE — COVER ULTRASOUND PROBE W/GEL FLEXI-FEEL 6"X58" LF  25-FF658

## (undated) DEVICE — GLOVE PROTEXIS POWDER FREE SMT 7.5  2D72PT75X

## (undated) DEVICE — DRSG TEGADERM IV ADVANCED 3.5X4.5" 1685